# Patient Record
Sex: MALE | Race: WHITE | Employment: OTHER | URBAN - METROPOLITAN AREA
[De-identification: names, ages, dates, MRNs, and addresses within clinical notes are randomized per-mention and may not be internally consistent; named-entity substitution may affect disease eponyms.]

---

## 2017-01-13 DIAGNOSIS — E78.5 HYPERLIPIDEMIA LDL GOAL <100: Primary | ICD-10-CM

## 2017-01-18 RX ORDER — ROSUVASTATIN CALCIUM 40 MG/1
TABLET, FILM COATED ORAL
Qty: 90 TABLET | Refills: 1 | Status: SHIPPED | OUTPATIENT
Start: 2017-01-18 | End: 2017-02-17

## 2017-01-18 NOTE — TELEPHONE ENCOUNTER
Refilled per [facility] Refill Protocol.    Rajwinder Owusu RN, AE-C       Next 5 appointments (look out 90 days)     Apr 04, 2017  8:00 AM   Return Visit with Jad Ball MD   [facility] ([facility])    [address] [place] [id number]   [phone]                   CHOL      149   6/27/2016  HDL       36   6/27/2016  LDL       81   6/27/2016  TRIG      160   6/27/2016  CHOLHDLRATIO      5.4   12/23/2014

## 2017-01-31 DIAGNOSIS — Z01.30 BLOOD PRESSURE CHECK: Primary | ICD-10-CM

## 2017-01-31 NOTE — TELEPHONE ENCOUNTER
metoprolol      Last Written Prescription Date: 10-  Last Fill Quantity: 90, # refills: 0  Last Office Visit with G, ROSELIA or [facility] prescribing provider: 12-   Next 5 appointments (look out 90 days)     Apr 04, 2017  8:00 AM   Return Visit with Jad Ball MD   [facility] ([facility]    [address] [place] [id number] [phone]                   POTASSIUM   Date Value Ref Range Status   06/27/2016 4.0 3.4 - 5.3 mmol/L Final     CREATININE   Date Value Ref Range Status   06/27/2016 0.86 0.66 - 1.25 mg/dL Final     BP Readings from Last 3 Encounters:   12/22/16 154/80   07/05/16 137/79   06/27/16 131/78         Thank You,  Marisel Del Cid  Pharmacy Technician  [facility]  [phone]

## 2017-02-03 RX ORDER — METOPROLOL SUCCINATE 100 MG/1
TABLET, EXTENDED RELEASE ORAL
Qty: 30 TABLET | Refills: 0 | Status: SHIPPED | OUTPATIENT
Start: 2017-02-03 | End: 2017-02-24

## 2017-02-04 NOTE — TELEPHONE ENCOUNTER
Last bp was high.  Did one month fill and forwarded per protocol.  To try and recruit pt for bp checks here.    Segun Walter Pharm. D.  [facility] Pharmacy Manager  [phone]  2/3/2017

## 2017-02-17 ENCOUNTER — MYC REFILL (OUTPATIENT)
Dept: PEDIATRICS | Facility: CLINIC | Age: 77
End: 2017-02-17

## 2017-02-17 DIAGNOSIS — E78.5 HYPERLIPIDEMIA LDL GOAL <100: ICD-10-CM

## 2017-02-17 NOTE — TELEPHONE ENCOUNTER
Message from [employer]:  Original authorizing provider: MD Francisco Javier Sauer would like a refill of the following medications:  CRESTOR 40 MG tablet [Florian Alonzo MD]    Preferred pharmacy: [facility] - [address]    Comment:

## 2017-02-17 NOTE — TELEPHONE ENCOUNTER
Pending Prescriptions:                       Disp   Refills    rosuvastatin (CRESTOR) 40 MG tablet       90 tab*1            Sig: Take 1 tablet (40 mg) by mouth daily    Rx sent to pharmacy on 1/18/17 #90 with 1 refill. Called [facility] to verify if Rx received and sent to patient. Pharmacy never received Rx and was unable to take a verbal order unless MA contacted a new phone number/menu to reach pharmacy.  Routing to RN to resend Rx.  Miguel Luong, CMA

## 2017-02-20 RX ORDER — ROSUVASTATIN CALCIUM 40 MG/1
40 TABLET, COATED ORAL DAILY
Qty: 90 TABLET | Refills: 1 | Status: SHIPPED | OUTPATIENT
Start: 2017-02-20 | End: 2017-02-24

## 2017-02-20 NOTE — TELEPHONE ENCOUNTER
RX sent per original below.    CRESTOR 40 MG tablet 90 tablet 1 1/18/2017  No      Sig: TAKE 1 TABLET DAILY     Class: E-Prescribe     Order: [id number]     E-Prescribing Status: Transmission to pharmacy failed (1/18/2017  4:03 PM CST)     Prior authorization: Closed - Prior Authorization not required for patient/medication       Printout Tracking      External Result Report       Pharmacy      [facility] - [facility], [place] - [address] [place] [address]       Adrienne Gordon RN,   [facility]

## 2017-02-22 ENCOUNTER — DOCUMENTATION ONLY (OUTPATIENT)
Dept: LAB | Facility: CLINIC | Age: 77
End: 2017-02-22

## 2017-02-22 DIAGNOSIS — R73.03 PREDIABETES: Primary | ICD-10-CM

## 2017-02-22 DIAGNOSIS — Z13.1 SCREENING FOR DIABETES MELLITUS: ICD-10-CM

## 2017-02-22 DIAGNOSIS — R73.01 ELEVATED FASTING BLOOD SUGAR: ICD-10-CM

## 2017-02-22 DIAGNOSIS — I10 ESSENTIAL HYPERTENSION WITH GOAL BLOOD PRESSURE LESS THAN 140/90: ICD-10-CM

## 2017-02-22 DIAGNOSIS — R80.9 MICROALBUMINURIA: ICD-10-CM

## 2017-02-22 NOTE — PROGRESS NOTES
Labs pended. Routed to PCP to review and order.    BMP, lipids, microalb completed on 6/27/16    Adrienne Gordon RN,   [facility]

## 2017-02-24 ENCOUNTER — TELEPHONE (OUTPATIENT)
Dept: PEDIATRICS | Facility: CLINIC | Age: 77
End: 2017-02-24

## 2017-02-24 ENCOUNTER — OFFICE VISIT (OUTPATIENT)
Dept: PEDIATRICS | Facility: CLINIC | Age: 77
End: 2017-02-24
Payer: MEDICARE

## 2017-02-24 VITALS
HEART RATE: 75 BPM | BODY MASS INDEX: 31.12 KG/M2 | HEIGHT: 64 IN | TEMPERATURE: 97.6 F | DIASTOLIC BLOOD PRESSURE: 64 MMHG | OXYGEN SATURATION: 95 % | WEIGHT: 182.3 LBS | SYSTOLIC BLOOD PRESSURE: 114 MMHG

## 2017-02-24 DIAGNOSIS — I20.9 ISCHEMIC CHEST PAIN (H): ICD-10-CM

## 2017-02-24 DIAGNOSIS — E78.5 HYPERLIPIDEMIA LDL GOAL <100: ICD-10-CM

## 2017-02-24 DIAGNOSIS — R80.9 MICROALBUMINURIA: ICD-10-CM

## 2017-02-24 DIAGNOSIS — J06.9 VIRAL URI WITH COUGH: ICD-10-CM

## 2017-02-24 DIAGNOSIS — I25.10 CORONARY ARTERY DISEASE INVOLVING NATIVE CORONARY ARTERY OF NATIVE HEART WITHOUT ANGINA PECTORIS: ICD-10-CM

## 2017-02-24 DIAGNOSIS — L40.9 PSORIASIS: ICD-10-CM

## 2017-02-24 DIAGNOSIS — R73.03 PREDIABETES: ICD-10-CM

## 2017-02-24 DIAGNOSIS — E66.9 OBESITY (BMI 30-39.9): ICD-10-CM

## 2017-02-24 DIAGNOSIS — R73.01 ELEVATED FASTING BLOOD SUGAR: ICD-10-CM

## 2017-02-24 DIAGNOSIS — N52.9 ERECTILE DYSFUNCTION, UNSPECIFIED ERECTILE DYSFUNCTION TYPE: ICD-10-CM

## 2017-02-24 DIAGNOSIS — I10 HYPERTENSION GOAL BP (BLOOD PRESSURE) < 130/80: ICD-10-CM

## 2017-02-24 DIAGNOSIS — I10 ESSENTIAL HYPERTENSION WITH GOAL BLOOD PRESSURE LESS THAN 140/90: ICD-10-CM

## 2017-02-24 DIAGNOSIS — R73.03 PREDIABETES: Primary | ICD-10-CM

## 2017-02-24 DIAGNOSIS — Z13.1 SCREENING FOR DIABETES MELLITUS: ICD-10-CM

## 2017-02-24 LAB
ANION GAP SERPL CALCULATED.3IONS-SCNC: 9 MMOL/L (ref 3–14)
BUN SERPL-MCNC: 14 MG/DL (ref 7–30)
CALCIUM SERPL-MCNC: 8.4 MG/DL (ref 8.5–10.1)
CHLORIDE SERPL-SCNC: 104 MMOL/L (ref 94–109)
CO2 SERPL-SCNC: 24 MMOL/L (ref 20–32)
CREAT SERPL-MCNC: 0.95 MG/DL (ref 0.66–1.25)
CREAT UR-MCNC: 198 MG/DL
GFR SERPL CREATININE-BSD FRML MDRD: 77 ML/MIN/1.7M2
GLUCOSE SERPL-MCNC: 120 MG/DL (ref 70–99)
HBA1C MFR BLD: 5.8 % (ref 4.3–6)
MICROALBUMIN UR-MCNC: 254 MG/L
MICROALBUMIN/CREAT UR: 128.28 MG/G CR (ref 0–17)
POTASSIUM SERPL-SCNC: 3.8 MMOL/L (ref 3.4–5.3)
SODIUM SERPL-SCNC: 137 MMOL/L (ref 133–144)

## 2017-02-24 PROCEDURE — 36415 COLL VENOUS BLD VENIPUNCTURE: CPT | Performed by: FAMILY MEDICINE

## 2017-02-24 PROCEDURE — 80048 BASIC METABOLIC PNL TOTAL CA: CPT | Performed by: FAMILY MEDICINE

## 2017-02-24 PROCEDURE — 82043 UR ALBUMIN QUANTITATIVE: CPT | Performed by: FAMILY MEDICINE

## 2017-02-24 PROCEDURE — 83036 HEMOGLOBIN GLYCOSYLATED A1C: CPT | Performed by: FAMILY MEDICINE

## 2017-02-24 PROCEDURE — 99214 OFFICE O/P EST MOD 30 MIN: CPT | Performed by: FAMILY MEDICINE

## 2017-02-24 RX ORDER — VARDENAFIL HYDROCHLORIDE 20 MG/1
20 TABLET ORAL DAILY PRN
Qty: 6 TABLET | Refills: 2 | Status: SHIPPED | OUTPATIENT
Start: 2017-02-24 | End: 2017-02-24

## 2017-02-24 RX ORDER — NITROGLYCERIN 0.4 MG/1
0.4 TABLET SUBLINGUAL EVERY 5 MIN PRN
Qty: 60 TABLET | Refills: 6 | Status: SHIPPED | OUTPATIENT
Start: 2017-02-24 | End: 2017-08-15

## 2017-02-24 RX ORDER — HYDROCORTISONE VALERATE 2 MG/G
OINTMENT TOPICAL
Qty: 45 G | Refills: 3 | Status: SHIPPED | OUTPATIENT
Start: 2017-02-24

## 2017-02-24 RX ORDER — LISINOPRIL 20 MG/1
20 TABLET ORAL DAILY
Qty: 90 TABLET | Refills: 3 | Status: SHIPPED | OUTPATIENT
Start: 2017-02-24 | End: 2017-08-15

## 2017-02-24 RX ORDER — ROSUVASTATIN CALCIUM 40 MG/1
40 TABLET, COATED ORAL DAILY
Qty: 90 TABLET | Refills: 2 | Status: SHIPPED | OUTPATIENT
Start: 2017-02-24 | End: 2017-08-15

## 2017-02-24 RX ORDER — METOPROLOL SUCCINATE 100 MG/1
100 TABLET, EXTENDED RELEASE ORAL DAILY
Qty: 90 TABLET | Refills: 2 | Status: SHIPPED | OUTPATIENT
Start: 2017-02-24 | End: 2017-08-15

## 2017-02-24 RX ORDER — VARDENAFIL HYDROCHLORIDE 20 MG/1
20 TABLET ORAL DAILY PRN
Qty: 6 TABLET | Refills: 2 | Status: SHIPPED
Start: 2017-02-24 | End: 2018-01-01

## 2017-02-24 ASSESSMENT — PAIN SCALES - GENERAL: PAINLEVEL: NO PAIN (0)

## 2017-02-24 NOTE — PROGRESS NOTES
SUBJECTIVE:                                                    Francisco Javier Cortes is a 76 year old male who presents to clinic today for the following health issues:      Hyperlipidemia Follow-Up      Rate your low fat/cholesterol diet?: good    Taking statin?  No    Other lipid medications/supplements?:  Fish oil/Omega 3, dose twice daily without side effects     Hypertension Follow-up      Outpatient blood pressures are being checked at home.  Results are 110-120/80's.    Low Salt Diet: low salt       Amount of exercise or physical activity: 2-3 days/week for an average of 30-45 minutes    Problems taking medications regularly: No    Medication side effects: none  Diet: low salt and low fat/cholesterol    Vascular Disease Follow-up:  Coronary Artery Disease (CAD)      Chest pain or pressure, left side neck or arm pain: No    Shortness of breath/increased sweats/nausea with exertion: No    Pain in calves walking 1-2 blocks: No    Worsened or new symptoms since last visit: No    Nitroglycerin use: no    Daily aspirin use: Yes     PREDIABETES-  Deneis polyuria, polydipsia, and polyphagia.      Problem list and histories reviewed & adjusted, as indicated.  Additional history: as documented    Patient Active Problem List   Diagnosis     Hyperlipidemia LDL goal <100     Advanced directives, counseling/discussion     Prediabetes     Obesity (BMI 30-39.9)     Inflamed seborrheic keratosis     AK (actinic keratosis)     Basal cell carcinoma of neck     Microalbuminuria     Perianal dermatitis     NSTEMI (non-ST elevated myocardial infarction) (H)     S/P coronary angioplasty     Status post percutaneous transluminal coronary angioplasty-Coronary angioplasty with STEPHEN to LCx     Prostate Cancer, s/p prostatectomy     Pseudophakia of both eyes     Gastroesophageal reflux disease, esophagitis presence not specified     Coronary artery disease involving native coronary artery of native heart without angina pectoris     History of  colonic polyps     Chronic diarrhea     Essential hypertension with goal blood pressure less than 140/90     Dyslipidemia     Erectile dysfunction, unspecified erectile dysfunction type     Elevated fasting blood sugar     Past Surgical History   Procedure Laterality Date     C appendectomy       C remv prostate,retropub,rad,tot nodes  1999     Colonoscopy       Ent surgery  1980--1999     Vascular surgery  2013     stents     Stent, coronary, betty  1/09, 2014     x2, x1 in 2014     Phacoemulsification clear cornea with standard intraocular lens implant Left 6/18/2015     Procedure: PHACOEMULSIFICATION CLEAR CORNEA WITH STANDARD INTRAOCULAR LENS IMPLANT;  Surgeon: John Zimmerman MD;  [facility]:  [facility]     Phacoemulsification clear cornea with standard intraocular lens implant Right 7/16/2015     Procedure: PHACOEMULSIFICATION CLEAR CORNEA WITH STANDARD INTRAOCULAR LENS IMPLANT;  Surgeon: John Zimmerman MD;  Location:  [facility]     Colonoscopy with co2 insufflation N/A 7/5/2016     Procedure: COLONOSCOPY WITH CO2 INSUFFLATION;  Surgeon: Duane, William Charles, MD;  Location: [facility] OR     Colonoscopy Left 7/5/2016     Procedure: COMBINED COLONOSCOPY, SINGLE OR MULTIPLE BIOPSY/POLYPECTOMY BY BIOPSY;  Surgeon: Duane, William Charles, MD;  Location: [facility] OR       Social History   Substance Use Topics     Smoking status: Never Smoker     Smokeless tobacco: Never Used     Alcohol use Yes      Comment: a glass of red wine a day     Family History   Problem Relation Age of Onset     Cardiovascular Mother      HEART DISEASE Mother      Cancer - colorectal Father      HEART DISEASE Father      Asthma Brother      HEART DISEASE Son      CANCER Daughter      non hodgkins     Prostate Cancer Brother      Coronary Artery Disease Brother      Prostate Cancer Maternal Grandfather      Other Cancer Father          Current Outpatient Prescriptions   Medication Sig Dispense Refill     hydrocortisone valerate (WEST-NINOSKA) 0.2 %  ointment Apply sparingly to affected area three times daily as needed. 45 g 3     lisinopril (PRINIVIL/ZESTRIL) 20 MG tablet Take 1 tablet (20 mg) by mouth daily 90 tablet 3     metoprolol (TOPROL-XL) 100 MG 24 hr tablet Take 1 tablet (100 mg) by mouth daily 90 tablet 2     nitroglycerin (NITROSTAT) 0.4 MG sublingual tablet Place 1 tablet (0.4 mg) under the tongue every 5 minutes as needed up to 3 tablets per episode. 60 tablet 6     rosuvastatin (CRESTOR) 40 MG tablet Take 1 tablet (40 mg) by mouth daily 90 tablet 2     vardenafil (LEVITRA) 20 MG tablet Take 1 tablet (20 mg) by mouth daily as needed 6 tablet 2     Lansoprazole (PREVACID PO) Take 15 mg by mouth every morning (before breakfast) Patient needs to use brand name Prevacid (generic version causes diarrhea)       omega 3 1000 MG CAPS Take 1 g by mouth 2 times daily 120 capsule 6     Acetaminophen (TYLENOL 8 HOUR PO) Take 500 mg by mouth as needed        MAALOX ADVANCED OR Take 1 tablet as needed (Calcium carbonate 1000mg/Simethicone 80mg)       ASPIRIN 81 MG OR TABS 1 TABLET DAILY       [DISCONTINUED] rosuvastatin (CRESTOR) 40 MG tablet Take 1 tablet (40 mg) by mouth daily 90 tablet 1     [DISCONTINUED] metoprolol (TOPROL-XL) 100 MG 24 hr tablet TAKE ONE TABLET BY MOUTH EVERY DAY 30 tablet 0     [DISCONTINUED] nitroglycerin (NITROSTAT) 0.4 MG SL tablet Place 1 tablet (0.4 mg) under the tongue every 5 minutes as needed up to 3 tablets per episode. 60 tablet 6     [DISCONTINUED] lisinopril (PRINIVIL,ZESTRIL) 20 MG tablet Take 1 tablet (20 mg) by mouth daily 90 tablet 3     [DISCONTINUED] vardenafil (LEVITRA) 20 MG tablet Take 1 tablet (20 mg) by mouth daily as needed 6 tablet 2     Allergies   Allergen Reactions     Niacin      Severe rash and itching     Prevacid [Lansoprazole] Diarrhea     Patient notes diarrhea with 30mg generic version. Patient does ok with 15mg in generic and brand name.     Shellfish Allergy      One kind     Recent Labs   Lab Test   02/24/17   0704  06/27/16   0735  12/17/15   0728   12/23/14   0711   03/19/14   1554   03/29/13   0803 12/12/12   A1C  5.8  6.3*  6.2*   --   6.0   --    --    --   6.0  6.2*   LDL   --   81  85   --   99   < >   --    < >  99  110   HDL   --   36*  31*   --   34*   < >   --    < >  28*  34   TRIG   --   160*  233*   --   259*   < >   --    < >  69  109   ALT   --    --   39   --    --    --   44   --   45   --    CR  0.95  0.86  0.94   < >  1.05   < >  1.20   < >   --   1.00   GFRESTIMATED  77  86  78   < >  69   < >  59*   < >   --   78   GFRESTBLACK  >90   GFR Calc    >90   GFR Calc    >90   GFR Calc     < >  83   < >  72   < >   --    --    POTASSIUM  3.8  4.0  4.1   < >  4.3   < >  4.0   < >   --    --    TSH   --    --    --    --    --    --    --    --    --   1.80    < > = values in this interval not displayed.      BP Readings from Last 3 Encounters:   02/24/17 114/64   12/22/16 154/80   07/05/16 137/79    Wt Readings from Last 3 Encounters:   02/24/17 182 lb 4.8 oz (82.7 kg)   12/22/16 187 lb 14.4 oz (85.2 kg)   06/27/16 185 lb (83.9 kg)                  Labs reviewed in EPIC  Problem list, Medication list, Allergies, and Medical/Social/Surgical histories reviewed in [facility] and updated as appropriate.    ROS:  C: NEGATIVE for fever, chills, change in weight  EYES: NEGATIVE for vision changes or irritation  ENT/MOUTH: Mild runny nose with dry cough for the past 3 days with no concerns for sore throat, fever, chills, shortness of breath, wheezing  RESP:as above  CV: NEGATIVE for chest pain, palpitations or peripheral edema  CV: History of coronary artery disease and Hx HTN  GI: NEGATIVE for nausea, abdominal pain, heartburn, or change in bowel habits and History of GERD  MUSCULOSKELETAL: NEGATIVE for significant arthralgias or myalgia  NEURO: NEGATIVE for weakness, dizziness or paresthesias  ENDOCRINE: NEGATIVE for temperature intolerance, skin/hair  "changes  HEME/ALLERGY/IMMUNE: NEGATIVE for bleeding problems  PSYCHIATRIC: NEGATIVE for changes in mood or affect    OBJECTIVE:                                                    /64  Pulse 75  Temp 97.6  F (36.4  C) (Oral)  Ht 5' 4.25\" (1.632 m)  Wt 182 lb 4.8 oz (82.7 kg)  SpO2 95%  BMI 31.05 kg/m2  Body mass index is 31.05 kg/(m^2).  GENERAL: healthy, alert and no distress  EYES: Eyes grossly normal to inspection  HENT: ear canals and TM's normal, nose and mouth without ulcers or lesions  NECK: no adenopathy, no asymmetry, masses, or scars and thyroid normal to palpation  RESP: lungs clear to auscultation - no rales, rhonchi or wheezes  CV: regular rate and rhythm, normal S1 S2, no S3 or S4, no murmur, click or rub, no peripheral edema and peripheral pulses strong  ABDOMEN: soft, nontender, no hepatosplenomegaly, no masses and bowel sounds normal  MS: no gross musculoskeletal defects noted, no edema  SKIN: no suspicious lesions or rashes  PSYCH: mentation appears normal, affect normal/bright    Diagnostic Test Results:  Results for orders placed or performed in visit on 02/24/17 (from the past 24 hour(s))   Hemoglobin A1c   Result Value Ref Range    Hemoglobin A1C 5.8 4.3 - 6.0 %   Basic metabolic panel   Result Value Ref Range    Sodium 137 133 - 144 mmol/L    Potassium 3.8 3.4 - 5.3 mmol/L    Chloride 104 94 - 109 mmol/L    Carbon Dioxide 24 20 - 32 mmol/L    Anion Gap 9 3 - 14 mmol/L    Glucose 120 (H) 70 - 99 mg/dL    Urea Nitrogen 14 7 - 30 mg/dL    Creatinine 0.95 0.66 - 1.25 mg/dL    GFR Estimate 77 >60 mL/min/1.7m2    GFR Estimate If Black >90   GFR Calc   >60 mL/min/1.7m2    Calcium 8.4 (L) 8.5 - 10.1 mg/dL   Albumin Random Urine Quantitative   Result Value Ref Range    Creatinine Urine 198 mg/dL    Albumin Urine mg/L 254 mg/L    Albumin Urine mg/g Cr 128.28 (H) 0 - 17 mg/g Cr        ASSESSMENT/PLAN:                                                    Hyperlipidemia; " "controlled   Plan:  No changes in the patient's current treatment plan    Hypertension; controlled   Associated with the following complications:    Heart Disease and  microalbuminuria   Plan:  No changes in the patient's current treatment plan    Coronary Artery Disease (CAD); controlled   Associated with the following complications:    None   Plan:  No changes in the patient's current treatment plan      BMI:   Estimated body mass index is 31.05 kg/(m^2) as calculated from the following:    Height as of this encounter: 5' 4.25\" (1.632 m).    Weight as of this encounter: 182 lb 4.8 oz (82.7 kg).   Weight management plan: Discussed healthy diet and exercise guidelines and patient will follow up in 6 months in clinic to re-evaluate.      1. Psoriasis  Refills given per patient's request  - hydrocortisone valerate (WEST-NINOSKA) 0.2 % ointment; Apply sparingly to affected area three times daily as needed.  Dispense: 45 g; Refill: 3    2. Hypertension goal BP (blood pressure) < 130/80  Results for orders placed or performed in visit on 02/24/17   Hemoglobin A1c   Result Value Ref Range    Hemoglobin A1C 5.8 4.3 - 6.0 %   Basic metabolic panel   Result Value Ref Range    Sodium 137 133 - 144 mmol/L    Potassium 3.8 3.4 - 5.3 mmol/L    Chloride 104 94 - 109 mmol/L    Carbon Dioxide 24 20 - 32 mmol/L    Anion Gap 9 3 - 14 mmol/L    Glucose 120 (H) 70 - 99 mg/dL    Urea Nitrogen 14 7 - 30 mg/dL    Creatinine 0.95 0.66 - 1.25 mg/dL    GFR Estimate 77 >60 mL/min/1.7m2    GFR Estimate If Black >90   GFR Calc   >60 mL/min/1.7m2    Calcium 8.4 (L) 8.5 - 10.1 mg/dL   Albumin Random Urine Quantitative   Result Value Ref Range    Creatinine Urine 198 mg/dL    Albumin Urine mg/L 254 mg/L    Albumin Urine mg/g Cr 128.28 (H) 0 - 17 mg/g Cr     Blood pressure is at goal, reviewed normal BMP lab results except for significantly elevated fasting blood sugar, urine myoglobin showing microalbuminuria slightly worse from " before.  We'll continue with metoprolol 100 mg daily, lisinopril 20 mg daily, emphasised on low salt diet, regular exercise, recheck in 6 months or sooner if needed.  - lisinopril (PRINIVIL/ZESTRIL) 20 MG tablet; Take 1 tablet (20 mg) by mouth daily  Dispense: 90 tablet; Refill: 3      4. Ischemic chest pain (H)    - nitroglycerin (NITROSTAT) 0.4 MG sublingual tablet; Place 1 tablet (0.4 mg) under the tongue every 5 minutes as needed up to 3 tablets per episode.  Dispense: 60 tablet; Refill: 6    5. Hyperlipidemia LDL goal <100  LDL Cholesterol Calculated   Date Value Ref Range Status   06/27/2016 81 <100 mg/dL Final     Comment:     Desirable:       <100 mg/dl   ]  LDL is at goal, continue Crestor 40 mg daily, baby aspirin, healthy eating, regular exercises, recheck lipids in 6 months  - rosuvastatin (CRESTOR) 40 MG tablet; Take 1 tablet (40 mg) by mouth daily  Dispense: 90 tablet; Refill: 2    6. Erectile dysfunction, unspecified erectile dysfunction type  Refills given per patient's request  - vardenafil (LEVITRA) 20 MG tablet; Take 1 tablet (20 mg) by mouth daily as needed  Dispense: 6 tablet; Refill: 2    7. Prediabetes  Glucose   Date Value Ref Range Status   02/24/2017 120 (H) 70 - 99 mg/dL Final     Comment:     Fasting specimen   ]  Lab Results   Component Value Date    A1C 5.8 02/24/2017    A1C 6.3 06/27/2016    A1C 6.2 12/17/2015    A1C 6.0 12/23/2014    A1C 6.0 03/29/2013      A1c improved from before, continue with healthy eating, regular exercises, start on weight loss    8. Microalbuminuria  Lab Results   Component Value Date    MICROL 254 02/24/2017     No results found for: MICROALBUMIN  as above        9. Obesity (BMI 30-39.9)  Emphasized on weight loss, portion control, low calorie and low fat diet, healthy eating, regular exercises.      10. Coronary artery disease involving native coronary artery of native heart without angina pectoris  Continue to control hypertension, lipids, prediabetes,  follow healthy eating, regular exercises, continue baby aspirin, statin, beta blocker, ACE inhibitor    11. Viral URI with cough  Normal exam, reassured patient.   Recommend to push fluids, continue to monitor, recheck if no better in 7-10 days or sooner if needed      Chart documentation done in part with Dragon Voice recognition Software. Although reviewed after completion, some word and grammatical error may remain.    See Patient Instructions    Florian Alonzo MD  [facility]

## 2017-02-24 NOTE — NURSING NOTE
"Chief Complaint   Patient presents with     Recheck Medication       Initial /64  Pulse 75  Temp 97.6  F (36.4  C) (Oral)  Ht 5' 4.25\" (1.632 m)  Wt 182 lb 4.8 oz (82.7 kg)  SpO2 95%  BMI 31.05 kg/m2 Estimated body mass index is 31.05 kg/(m^2) as calculated from the following:    Height as of this encounter: 5' 4.25\" (1.632 m).    Weight as of this encounter: 182 lb 4.8 oz (82.7 kg).  BP completed using cuff size: clay Luong, CMA    "

## 2017-02-24 NOTE — TELEPHONE ENCOUNTER
If pharmacy has concerns about him taking Levitra and nitroglycerin due to possible interactions, please update them- Patient has never had to use the nitroglycerin, he is refilling this to keep them in hand  Patient is aware of these interactions and knows not to take the Levitra on the day he takes nitroglycerin or vice versa  Also please update patient on the same  I have resent Prescription for Levitra with the patient instructions

## 2017-02-24 NOTE — MR AVS SNAPSHOT
After Visit Summary   2/24/2017    Francisco Javier Cortes    MRN: [id number]           Patient Information     Date Of Birth          1940        Visit Information        Provider Department      2/24/2017 9:40 AM Florian Alonzo MD [facility]        Today's Diagnoses     Prediabetes    -  1    Psoriasis        Hypertension goal BP (blood pressure) < 130/80        Ischemic chest pain (H)        Hyperlipidemia LDL goal <100        Erectile dysfunction, unspecified erectile dysfunction type        Microalbuminuria        Obesity (BMI 30-39.9)        Coronary artery disease involving native coronary artery of native heart without angina pectoris        Viral URI with cough           Follow-ups after your visit        Follow-up notes from your care team     Return in about 6 months (around 8/24/2017) for physical, fasting lab.      Your next 10 appointments already scheduled     Apr 04, 2017  8:00 AM CDT   Return Visit with Jad Ball MD   [facility] ([facility])    [address] [place] [address] [phone]            Jun 13, 2017  7:30 AM CDT   Return Visit with Aby Rodriguez MD   [facility] ([facility])    [address] [place] [id number] [phone]              Who to contact     If you have questions or need follow up information about today's clinic visit or your schedule please contact [facility] directly at [phone].  Normal or non-critical lab and imaging results will be communicated to you by MyChart, letter or phone within 4 business days after the clinic has received the results. If you do not hear from us within 7 days, please contact the clinic through MyChart or phone. If you have a critical or abnormal lab result, we will notify you by phone as soon as possible.  Submit refill requests through Planet[employer]an or call your pharmacy and they will  "forward the refill request to us. Please allow 3 business days for your refill to be completed.          Additional Information About Your Visit        [facility] [place] Information     ClipMine gives you secure access to your electronic health record. If you see a primary care provider, you can also send messages to your care team and make appointments. If you have questions, please call your primary care clinic.  If you do not have a primary care provider, please call [phone] and they will assist you.      ClipMine is an electronic gateway that provides easy, online access to your medical records. With ClipMine, you can request a clinic appointment, read your test results, renew a prescription or communicate with your care team.     To access your existing account, please contact your [facility] or call [phone] for assistance.        Care EveryWhere ID     This is your Care EveryWhere ID. This could be used by other organizations to access your [place] medical records  [id number]        Your Vitals Were     Pulse Temperature Height Pulse Oximetry BMI (Body Mass Index)       75 97.6  F (36.4  C) (Oral) 5' 4.25\" (1.632 m) 95% 31.05 kg/m2        Blood Pressure from Last 3 Encounters:   02/24/17 114/64   12/22/16 154/80   07/05/16 137/79    Weight from Last 3 Encounters:   02/24/17 182 lb 4.8 oz (82.7 kg)   12/22/16 187 lb 14.4 oz (85.2 kg)   06/27/16 185 lb (83.9 kg)              Today, you had the following     No orders found for display         Today's Medication Changes          These changes are accurate as of: 2/24/17  8:12 PM.  If you have any questions, ask your nurse or doctor.               Start taking these medicines.        Dose/Directions    vardenafil 20 MG tablet   Commonly known as:  LEVITRA   Used for:  Erectile dysfunction, unspecified erectile dysfunction type   Started by:  Florian Alonzo MD        Dose:  20 mg   Take 1 tablet (20 mg) by mouth daily as needed " Take 30 min to 4 hours before intercourse.  Never use with nitroglycerin, terazosin or doxazosin.   Quantity:  6 tablet   Refills:  2         These medicines have changed or have updated prescriptions.        Dose/Directions    metoprolol 100 MG 24 hr tablet   Commonly known as:  TOPROL-XL   This may have changed:  See the new instructions.   Used for:  Hypertension goal BP (blood pressure) < 130/80, Coronary artery disease involving native coronary artery of native heart without angina pectoris   Changed by:  Florian Alonzo MD        Dose:  100 mg   Take 1 tablet (100 mg) by mouth daily   Quantity:  90 tablet   Refills:  2            Where to get your medicines      These medications were sent to [facility] - [address] [address]     Phone:  [phone]     hydrocortisone valerate 0.2 % ointment    lisinopril 20 MG tablet    metoprolol 100 MG 24 hr tablet    nitroglycerin 0.4 MG sublingual tablet    rosuvastatin 40 MG tablet    vardenafil 20 MG tablet                Primary Care Provider Office Phone # Fax #    Florian Alonzo [phone]       [facility] [id number] [facility] [address]        Thank you!     Thank you for choosing [facility]  for your care. Our goal is always to provide you with excellent care. Hearing back from our patients is one way we can continue to improve our services. Please take a few minutes to complete the written survey that you may receive in the mail after your visit with us. Thank you!             Your Updated Medication List - Protect others around you: Learn how to safely use, store and throw away your medicines at www.disposemymeds.org.          This list is accurate as of: 2/24/17  8:12 PM.  Always use your most recent med list.                   Brand Name Dispense Instructions for use    aspirin 81 MG tablet      1 TABLET DAILY        hydrocortisone valerate 0.2 % ointment    [facility]    45 g    Apply sparingly to affected area three times daily as needed.       lisinopril 20 MG tablet    PRINIVIL/ZESTRIL    90 tablet    Take 1 tablet (20 mg) by mouth daily       MAALOX ADVANCED PO      Take 1 tablet as needed (Calcium carbonate 1000mg/Simethicone 80mg)       metoprolol 100 MG 24 hr tablet    TOPROL-XL    90 tablet    Take 1 tablet (100 mg) by mouth daily       nitroglycerin 0.4 MG sublingual tablet    NITROSTAT    60 tablet    Place 1 tablet (0.4 mg) under the tongue every 5 minutes as needed up to 3 tablets per episode.       omega 3 1000 MG Caps     120 capsule    Take 1 g by mouth 2 times daily       PREVACID PO      Take 15 mg by mouth every morning (before breakfast) Patient needs to use brand name Prevacid (generic version causes diarrhea)       rosuvastatin 40 MG tablet    CRESTOR    90 tablet    Take 1 tablet (40 mg) by mouth daily       TYLENOL 8 HOUR PO      Take 500 mg by mouth as needed       vardenafil 20 MG tablet    LEVITRA    6 tablet    Take 1 tablet (20 mg) by mouth daily as needed Take 30 min to 4 hours before intercourse.  Never use with nitroglycerin, terazosin or doxazosin.

## 2017-02-24 NOTE — TELEPHONE ENCOUNTER
[facility] Call Center    Phone Message    Name of Caller: Francisco Javier    Phone Number: Other phone number:  [phone] Ref number [id number]  Best time to return call: anyu    May a detailed message be left on voicemail: yes    Relation to patient: Other Name: Joe  Relationship: [facility] [employer]  Is there legal documentation in chart to discuss information with this person: N/A    Reason for Call: Other: [facility] is calling to verify directions on the patients nitroglycerin (NITROSTAT) 0.4 MG sublingual tablet [33243]. Pharmacy is stating medication interfears with another medication and the pharmacy dose not recommend patient taking medication.     Action Taken: Message routed to:  Primary Care p [id number]

## 2017-02-24 NOTE — TELEPHONE ENCOUNTER
Called [facility] pharmacy. Spoke to pharmacist Obed Cardoza.  Gave him message per Dr. Alonzo.  He wanted to let us know that if the patient has taken Levitra within 24 hours of chest pain. He should not take nitroglycerine but go to the hospital.    Called patient and notified.  He states he already understood and agrees to plan.     Leticia Neal RN, [facility]

## 2017-04-04 ENCOUNTER — OFFICE VISIT (OUTPATIENT)
Dept: CARDIOLOGY | Facility: CLINIC | Age: 77
End: 2017-04-04
Payer: MEDICARE

## 2017-04-04 VITALS
BODY MASS INDEX: 31.27 KG/M2 | HEART RATE: 62 BPM | SYSTOLIC BLOOD PRESSURE: 152 MMHG | WEIGHT: 183.6 LBS | DIASTOLIC BLOOD PRESSURE: 92 MMHG | OXYGEN SATURATION: 97 %

## 2017-04-04 DIAGNOSIS — I25.118 CORONARY ARTERY DISEASE INVOLVING NATIVE CORONARY ARTERY OF NATIVE HEART WITH OTHER FORM OF ANGINA PECTORIS (H): Primary | ICD-10-CM

## 2017-04-04 PROCEDURE — 99214 OFFICE O/P EST MOD 30 MIN: CPT | Performed by: INTERNAL MEDICINE

## 2017-04-04 NOTE — NURSING NOTE
"Francisco Javier Cortes's goals for this visit include: Recheck coronary artery disease  He requests these members of his care team be copied on today's visit information: yes    PCP: Florian Alonzo    Referring Provider:  No referring provider defined for this encounter.    Chief Complaint   Patient presents with     Coronary Artery Disease       Initial BP (!) 152/92 (BP Location: Left arm, Patient Position: Chair, Cuff Size: Adult Regular)  Pulse 62  Wt 83.3 kg (183 lb 9.6 oz)  SpO2 97%  BMI 31.27 kg/m2 Estimated body mass index is 31.27 kg/(m^2) as calculated from the following:    Height as of 2/24/17: 1.632 m (5' 4.25\").    Weight as of this encounter: 83.3 kg (183 lb 9.6 oz).  Medication Reconciliation: complete    Do you need any medication refills at today's visit? no    "

## 2017-04-04 NOTE — PATIENT INSTRUCTIONS
The following is a summary of your office visit:    Medications started today:none    Medications stopped today:none    Medication dose change: none    Nurse contact information: Jennifer Soto RN  Cardiology Care Coordinator  [phone] Phone  [phone] Fax    Appointments made today: One year follow up    Patient instructions:none      If you have had any blood work, imaging or other testing completed we will be in touch within 1-2 weeks regarding the results. If you have any questions, concerns or need to schedule a follow up, please contact us at [phone]. If you are needing refills please contact your pharmacy. For urgent after hour care please call the [facility] at [phone] or the [facility] at [phone] and ask to speak to the cardiologist on call.    It was a pleasure meeting with you today. Please let us know if there is anything else we can do for you so that we can be sure you are leaving completely satisfied with your care experience.     Your Cardiology Team at [facility]  RN Care Coordinator: Jennifer  CMA: Michelle

## 2017-04-04 NOTE — MR AVS SNAPSHOT
After Visit Summary   4/4/2017    Francisco Javier Cortes    MRN: [id number]           Patient Information     Date Of Birth          1940        Visit Information        Provider Department      4/4/2017 8:00 AM Jad Ball MD [facility]        Care Instructions      The following is a summary of your office visit:    Medications started today:none    Medications stopped today:none    Medication dose change: none    Nurse contact information: Jennifer Soto RN  Cardiology Care Coordinator  [phone] Phone  [phone] Fax    Appointments made today: One year follow up    Patient instructions:none      If you have had any blood work, imaging or other testing completed we will be in touch within 1-2 weeks regarding the results. If you have any questions, concerns or need to schedule a follow up, please contact us at [phone]. If you are needing refills please contact your pharmacy. For urgent after hour care please call the [place] [facility] at [phone] or the [facility] at [phone] and ask to speak to the cardiologist on call.    It was a pleasure meeting with you today. Please let us know if there is anything else we can do for you so that we can be sure you are leaving completely satisfied with your care experience.     Your Cardiology Team at [facility]  RN Care Coordinator: Jennifer Martinez                  Follow-ups after your visit        Your next 10 appointments already scheduled     Apr 28, 2017  8:30 AM CDT   New Visit with Chris Casanova OD   [facility])    [address] [facility] [address] [phone]            Jun 13, 2017  7:30 AM CDT   Return Visit with Aby Rodriguez MD   [facility] ([facility])    [address] [facility] [address] [phone]              Who to contact     If you have  questions or need follow up information about today's clinic visit or your schedule please contact [facility] directly at [phone].  Normal or non-critical lab and imaging results will be communicated to you by [employer], letter or phone within 4 business days after the clinic has received the results. If you do not hear from us within 7 days, please contact the clinic through [employer] or phone. If you have a critical or abnormal lab result, we will notify you by phone as soon as possible.  Submit refill requests through Alo7 or call your pharmacy and they will forward the refill request to us. Please allow 3 business days for your refill to be completed.          Additional Information About Your Visit        [employer] Information     [employer] gives you secure access to your electronic health record. If you see a primary care provider, you can also send messages to your care team and make appointments. If you have questions, please call your primary care clinic.  If you do not have a primary care provider, please call [phone] and they will assist you.      Alo7 is an electronic gateway that provides easy, online access to your medical records. With Alo7, you can request a clinic appointment, read your test results, renew a prescription or communicate with your care team.     To access your existing account, please contact your [facility] or call [phone] for assistance.        Care EveryWhere ID     This is your Care EveryWhere ID. This could be used by other organizations to access your [place] medical records  [id number]        Your Vitals Were     Pulse Pulse Oximetry BMI (Body Mass Index)             62 97% 31.27 kg/m2          Blood Pressure from Last 3 Encounters:   04/04/17 (!) 152/92   02/24/17 114/64   12/22/16 154/80    Weight from Last 3 Encounters:   04/04/17 83.3 kg (183 lb 9.6 oz)   02/24/17 82.7 kg (182 lb 4.8 oz)   12/22/16 85.2 kg  (187 lb 14.4 oz)              Today, you had the following     No orders found for display       Primary Care Provider Office Phone # Fax #    Florian Alonzo [phone]       [facility] [address]  [facility] [address]        Thank you!     Thank you for choosing [facility]  for your care. Our goal is always to provide you with excellent care. Hearing back from our patients is one way we can continue to improve our services. Please take a few minutes to complete the written survey that you may receive in the mail after your visit with us. Thank you!             Your Updated Medication List - Protect others around you: Learn how to safely use, store and throw away your medicines at www.disposemymeds.org.          This list is accurate as of: 4/4/17  8:03 AM.  Always use your most recent med list.                   Brand Name Dispense Instructions for use    aspirin 81 MG tablet      1 TABLET DAILY       hydrocortisone valerate 0.2 % ointment    [facility]    45 g    Apply sparingly to affected area three times daily as needed.       lisinopril 20 MG tablet    PRINIVIL/ZESTRIL    90 tablet    Take 1 tablet (20 mg) by mouth daily       MAALOX ADVANCED PO      Take 1 tablet as needed (Calcium carbonate 1000mg/Simethicone 80mg)       metoprolol 100 MG 24 hr tablet    TOPROL-XL    90 tablet    Take 1 tablet (100 mg) by mouth daily       nitroglycerin 0.4 MG sublingual tablet    NITROSTAT    60 tablet    Place 1 tablet (0.4 mg) under the tongue every 5 minutes as needed up to 3 tablets per episode.       omega 3 1000 MG Caps     120 capsule    Take 1 g by mouth 2 times daily       PREVACID PO      Take 15 mg by mouth every morning (before breakfast) Patient needs to use brand name Prevacid (generic version causes diarrhea)       rosuvastatin 40 MG tablet    CRESTOR    90 tablet    Take 1 tablet (40 mg) by mouth daily       TYLENOL 8 HOUR PO      Take 500 mg by mouth  as needed       vardenafil 20 MG tablet    LEVITRA    6 tablet    Take 1 tablet (20 mg) by mouth daily as needed Take 30 min to 4 hours before intercourse.  Never use with nitroglycerin, terazosin or doxazosin.

## 2017-04-04 NOTE — PROGRESS NOTES
2017               Florian Alonzo MD   [address] [address]         RE:  Francisco Javier Cortes    MRN:  [id number]   :  1940      Dear Dr. Alonzo:      It was a pleasure participating care of your patient, Mr. Francisco Javier Cortes.  As you know, he is a 76-year-old gentleman who I see today for coronary artery disease.      His past medical history is significant for the followin.  Prediabetes.   2.  Hypertension.   3.  Hyperlipidemia   4.  Gastroesophageal reflux disease.   5.  Chronic diarrhea.   6.  Prostate cancer, status post prostatectomy.   7.  Basal cell carcinoma of the neck.   8.  Erectile dysfunction.   9.  Peptic ulcer disease.   10.  Cataract surgery.   11.  Appendectomy.      His cardiac history is significant for known coronary artery disease and normal LV systolic function.  He originally experienced a single episode of chest discomfort manifested by tingling in the middle of his chest with pressure at rest.  He sought medical attention and eventually had stenting of his mid right coronary artery and posterolateral branch on 2009.      His symptoms were completely relieved.  However, he had recurrent chest discomfort while driving in .  On 2014 his coronary angiogram revealed the following:     Left main, minimal disease.   LAD, 30-40% distal stenosis.   Circumflex 90% stenosis in the mid-portion.   RCA patent stents with mild disease.      A drug-eluting stent was placed in his circumflex coronary artery.      Since  he has not had any recurrent symptoms.      From a clinical standpoint, the patient has gotten a new dog who he walks twice daily for 30-45 minutes.  He also works out on the treadmill but has had some new problems with his feet and lower extremities and is only able to do this for 10 minutes as well as exercise biking for 10 minutes.  He denies any recurrent anginal symptoms during exercise or at any  time.  He denies new chest pain, shortness of breath, PND, orthopnea, edema, palpitations, syncope or near-syncope.  His blood pressures at home have been running in the 136/75 range and he has had no gross side effects from his medicines.  He has no other complaints.      CURRENT MEDICATIONS:     1.  Lisinopril 20 mg a day.   2.  Metoprolol succinate 100 mg a day.   3.  Rosuvastatin 40 mg a day.   4.  Levitra.   5.  Fish oil.   6.  Aspirin 81 mg a day.      PHYSICAL EXAMINATION:     VITAL SIGNS:  Blood pressure is 150/90, but his blood pressures at home run in the 130/70 range.  Pulse of 62.  His weight is 183 pounds.   NECK:  Exam reveals no obvious jugular venous distention.   LUNGS:  Clear to auscultation.  Respiratory effort is normal.   CARDIAC:  Reveals a regular rate and rhythm, no obvious murmur or gallop was appreciated.   ABDOMEN:  Belly soft, nontender.   EXTREMITIES:  Without gross edema.      Echocardiogram 03/21/2014 reveals normal LV systolic function, ejection fraction 60-65%, no significant valvular pathology identified.       02/24/2017 potassium 3.8, GFR normal.        IMPRESSION:  Francisco Javier is a 76-year-old gentleman who has several active cardiac issues:      1.  Coronary artery disease.      He had stenting of his right coronary artery and RODGER branch in 2009 and had recurrent chest discomfort which led to drug-eluting stent placement in the circumflex coronary artery in 2014.  He has not had any recurrent symptoms of chest discomfort since 2014 and he continues to appear to be clinically stable from this standpoint.  He walks his dog twice daily for 30-45 minutes at a time without symptoms.  We will continue to monitor his clinical progress.      2.  Hypertension.      Blood pressures appear adequately controlled at home in the 130/70 range.  We will continue to monitor.      3.  Hyperlipidemia.      He says he is due to have his cholesterol checked with you this summer.        PLAN:     1.  He  will recheck a fasting lipid profile with you at his next appointment this summer.     2.  Followup in 1 year with me or earlier if needed.      Once again, it was a pleasure participating in the care of your patient, Mr. Francisco Javier Saleh.  Please feel free to contact me anytime if you have any questions regarding his care in the future.         Sincerely,      DICK ACEVEDO MD             D: 2017 08:09   T: 2017 09:44   MT:       Name:     FRANCISCO JAVIER SALEH   MRN:      [id number]        Account:      [id number]   :      1940      Document: [id number]       cc: Florian Alonzo MD

## 2017-04-28 ENCOUNTER — OFFICE VISIT (OUTPATIENT)
Dept: OPTOMETRY | Facility: CLINIC | Age: 77
End: 2017-04-28
Payer: MEDICARE

## 2017-04-28 DIAGNOSIS — H52.4 PRESBYOPIA: ICD-10-CM

## 2017-04-28 DIAGNOSIS — Z96.1 PSEUDOPHAKIA OF BOTH EYES: ICD-10-CM

## 2017-04-28 DIAGNOSIS — H40.003 GLAUCOMA SUSPECT, BILATERAL: Primary | ICD-10-CM

## 2017-04-28 PROCEDURE — 92015 DETERMINE REFRACTIVE STATE: CPT | Mod: GY | Performed by: OPTOMETRIST

## 2017-04-28 PROCEDURE — 92014 COMPRE OPH EXAM EST PT 1/>: CPT | Performed by: OPTOMETRIST

## 2017-04-28 ASSESSMENT — REFRACTION_WEARINGRX
OD_ADD: +2.75
OD_CYLINDER: +0.25
OS_AXIS: 042
OD_CYLINDER: +0.25
OS_SPHERE: -1.00
OS_ADD: +3.00
OD_AXIS: 125
OS_ADD: +2.75
OD_SPHERE: PLANO
OS_AXIS: 042
OD_ADD: +3.00
SPECS_TYPE: BIFOCAL
OS_SPHERE: -1.00
OD_SPHERE: PLANO
OS_CYLINDER: +1.00
OD_AXIS: 125
OS_CYLINDER: +1.00

## 2017-04-28 ASSESSMENT — TONOMETRY
OS_IOP_MMHG: 18
IOP_METHOD: TONOPEN
OD_IOP_MMHG: 15

## 2017-04-28 ASSESSMENT — CONF VISUAL FIELD
OD_NORMAL: 1
METHOD: COUNTING FINGERS
OS_NORMAL: 1

## 2017-04-28 ASSESSMENT — VISUAL ACUITY
OS_CC: 20/20
OS_SC: 20/30
OD_CC: 20/25-1
OS_CC+: -1
METHOD: SNELLEN - LINEAR
CORRECTION_TYPE: GLASSES
OD_CC: 20/25
OD_SC+: -1
OS_CC: 20/20-1
OD_SC: 20/20
OD_CC+: -1

## 2017-04-28 ASSESSMENT — SLIT LAMP EXAM - LIDS
COMMENTS: UPPER LID DERMATOCHALASIS
COMMENTS: UPPER LID DERMATOCHALASIS

## 2017-04-28 ASSESSMENT — REFRACTION_MANIFEST
OD_AXIS: 135
OS_SPHERE: -1.00
OD_CYLINDER: +0.25
OS_CYLINDER: +1.25
OD_ADD: +2.75
OD_SPHERE: PLANO
OS_AXIS: 042
OS_ADD: +2.75

## 2017-04-28 ASSESSMENT — CUP TO DISC RATIO
OS_RATIO: 0.5
OD_RATIO: 0.4

## 2017-04-28 ASSESSMENT — EXTERNAL EXAM - RIGHT EYE: OD_EXAM: NORMAL

## 2017-04-28 ASSESSMENT — EXTERNAL EXAM - LEFT EYE: OS_EXAM: NORMAL

## 2017-04-28 NOTE — MR AVS SNAPSHOT
After Visit Summary   4/28/2017    Francisco Javier Cortes    MRN: [id number]           Patient Information     Date Of Birth          1940        Visit Information        Provider Department      4/28/2017 8:30 AM Chris Casanova, RONAN [facility]        Today's Diagnoses     Glaucoma suspect, bilateral    -  1    Pseudophakia of both eyes        Presbyopia          Care Instructions    You are a glaucoma suspect.  We will continue to monitor your intraocular pressures and optic nerves.   OCT and visual field at next visit.    Eyeglass prescription given.  Optional change in eyeglass prescription.    Return in 1 year for a complete eye exam or sooner if needed.    Chris Casanova, RONAN    The affects of the dilating drops last for 4- 6 hours.  You will be more sensitive to light and vision will be blurry up close.  Mydriatic sunglasses were given if needed.      Optometry Providers       Clinic Locations                                 Telephone Number   Dr. Collette Casanova   [facility]  [phone]     [facility] Hours:                [facility] Hours:       [facility] Hours:  [address]   [address] [facility] [address]     [address] [place] [address]   [place] [address]    [place] [address]  Phone: [phone]                    Phone [phone]                      Phone: [phone]                          Monday 8:00-7:00                          Monday 8:00-7:00                          Monday 8:00-7:00              Tuesday 8:00-6:00                          Tuesday 8:00-7:00                          Tuesday 8:00-7:00              Wednesday 8:00-6:00                  Wednesday 8:00-7:00                   Wednesday 8:00-7:00      Thursday 8:00-6:00                        Thursday 8:00-7:00                         Thursday 8:00-7:00            Friday  8:00-5:00                              Friday 8:00-5:00                              Friday 8:00-5:00    Please log on to [email or web address] to order your contact lenses.  The link is found on the Eye Care and Vision Services page.  As always, Thank you for trusting us with your health care needs!            Follow-ups after your visit        Follow-up notes from your care team     Return in about 1 year (around 4/28/2018) for Annual Visit.      Your next 10 appointments already scheduled     Jun 22, 2017  8:15 AM CDT   Return Visit with Aby Rodriguez MD   [facility] ([facility])    [address] [place] [address]   [phone]            Apr 10, 2018  8:00 AM CDT   Return Visit with Jad Ball MD   [facility] ([facility])    [address] [place] [id number] [phone]              Who to contact     If you have questions or need follow up information about today's clinic visit or your schedule please contact [facility] directly at [phone].  Normal or non-critical lab and imaging results will be communicated to you by MyChart, letter or phone within 4 business days after the clinic has received the results. If you do not hear from us within 7 days, please contact the clinic through [employer] or phone. If you have a critical or abnormal lab result, we will notify you by phone as soon as possible.  Submit refill requests through [employer] or call your pharmacy and they will forward the refill request to us. Please allow 3 business days for your refill to be completed.          Additional Information About Your Visit        [employer] Information     [employer] gives you secure access to your electronic health record. If you see a primary care provider, you can also send messages to your care team and make appointments. If you have questions, please call your primary care clinic.  If you do not have a  primary care provider, please call [phone] and they will assist you.      [employer] is an electronic gateway that provides easy, online access to your medical records. With [employer], you can request a clinic appointment, read your test results, renew a prescription or communicate with your care team.     To access your existing account, please contact your [facility] or call [phone] for assistance.        Care EveryWhere ID     This is your Care EveryWhere ID. This could be used by other organizations to access your [facility] medical records  [id number]         Blood Pressure from Last 3 Encounters:   04/04/17 (!) 152/92   02/24/17 114/64   12/22/16 154/80    Weight from Last 3 Encounters:   04/04/17 83.3 kg (183 lb 9.6 oz)   02/24/17 82.7 kg (182 lb 4.8 oz)   12/22/16 85.2 kg (187 lb 14.4 oz)              We Performed the Following     EYE EXAM (SIMPLE-NONBILLABLE)     REFRACTION        Primary Care Provider Office Phone # Fax #    Florian Alonzo [phone]       [facility] [address]  [facility] [address]        Thank you!     Thank you for choosing [facility]  for your care. Our goal is always to provide you with excellent care. Hearing back from our patients is one way we can continue to improve our services. Please take a few minutes to complete the written survey that you may receive in the mail after your visit with us. Thank you!             Your Updated Medication List - Protect others around you: Learn how to safely use, store and throw away your medicines at www.disposemymeds.org.          This list is accurate as of: 4/28/17 10:02 AM.  Always use your most recent med list.                   Brand Name Dispense Instructions for use    aspirin 81 MG tablet      1 TABLET DAILY       hydrocortisone valerate 0.2 % ointment    WEST-NINOSKA    45 g    Apply sparingly to affected area three times daily as needed.        lisinopril 20 MG tablet    PRINIVIL/ZESTRIL    90 tablet    Take 1 tablet (20 mg) by mouth daily       MAALOX ADVANCED PO      Take 1 tablet as needed (Calcium carbonate 1000mg/Simethicone 80mg)       metoprolol 100 MG 24 hr tablet    TOPROL-XL    90 tablet    Take 1 tablet (100 mg) by mouth daily       nitroglycerin 0.4 MG sublingual tablet    NITROSTAT    60 tablet    Place 1 tablet (0.4 mg) under the tongue every 5 minutes as needed up to 3 tablets per episode.       omega 3 1000 MG Caps     120 capsule    Take 1 g by mouth 2 times daily       PREVACID PO      Take 15 mg by mouth every morning (before breakfast) Patient needs to use brand name Prevacid (generic version causes diarrhea)       rosuvastatin 40 MG tablet    CRESTOR    90 tablet    Take 1 tablet (40 mg) by mouth daily       TYLENOL 8 HOUR PO      Take 500 mg by mouth as needed       vardenafil 20 MG tablet    LEVITRA    6 tablet    Take 1 tablet (20 mg) by mouth daily as needed Take 30 min to 4 hours before intercourse.  Never use with nitroglycerin, terazosin or doxazosin.

## 2017-04-28 NOTE — PATIENT INSTRUCTIONS
You are a glaucoma suspect.  We will continue to monitor your intraocular pressures and optic nerves.   OCT and visual field at next visit.    Eyeglass prescription given.  Optional change in eyeglass prescription.    Return in 1 year for a complete eye exam or sooner if needed.    Chris Casanova, OD    The affects of the dilating drops last for 4- 6 hours.  You will be more sensitive to light and vision will be blurry up close.  Mydriatic sunglasses were given if needed.      Optometry Providers       Clinic Locations                                 Telephone Number   Dr. Collette Casanova   [facility]  [phone]     [facility] Hours:                [facility] Hours:       [facility] Hours:  [address]   [address] [facility] [address]      [address] [place] [address]   [place] [address]    [place] [address]  Phone: [phone]                    Phone [phone]                      Phone: [phone]                          Monday 8:00-7:00                          Monday 8:00-7:00                          Monday 8:00-7:00              Tuesday 8:00-6:00                          Tuesday 8:00-7:00                          Tuesday 8:00-7:00              Wednesday 8:00-6:00                  Wednesday 8:00-7:00                   Wednesday 8:00-7:00      Thursday 8:00-6:00                        Thursday 8:00-7:00                         Thursday 8:00-7:00            Friday 8:00-5:00                              Friday 8:00-5:00                              Friday 8:00-5:00    Please log on to [email or web address] to order your contact lenses.  The link is found on the Eye Care [facility] Vision Services page.  As always, Thank you for trusting us with your health care needs!

## 2017-04-28 NOTE — PROGRESS NOTES
Chief Complaint   Patient presents with     COMPREHENSIVE EYE EXAM         Last Eye Exam: 4-  Dilated Previously: Yes    What are you currently using to see?  glasses       Distance Vision Acuity: Satisfied with vision    Near Vision Acuity: Satisfied with vision while reading  with glasses    Eye Comfort: good  Do you use eye drops? : Yes: artificial tears if eyes feel dry  Occupation or Hobbies: retired    Rozina Justice Optometric Assistant, A.B[employer]          Medical, surgical and family histories reviewed and updated 4/28/2017.       OBJECTIVE: See Ophthalmology exam    ASSESSMENT:    ICD-10-CM    1. Glaucoma suspect, bilateral H40.003 EYE EXAM (SIMPLE-NONBILLABLE)    Stable   2. Pseudophakia of both eyes Z96.1 EYE EXAM (SIMPLE-NONBILLABLE)   3. Presbyopia H52.4 REFRACTION     PLAN:     Patient Instructions   You are a glaucoma suspect.  We will continue to monitor your intraocular pressures and optic nerves.   OCT and visual field at next visit.    Eyeglass prescription given.  Optional change in eyeglass prescription.    Return in 1 year for a complete eye exam or sooner if needed.    Chris Casanova, OD

## 2017-05-27 ENCOUNTER — OFFICE VISIT (OUTPATIENT)
Dept: URGENT CARE | Facility: URGENT CARE | Age: 77
End: 2017-05-27
Payer: MEDICARE

## 2017-05-27 VITALS
OXYGEN SATURATION: 96 % | WEIGHT: 184.6 LBS | DIASTOLIC BLOOD PRESSURE: 82 MMHG | TEMPERATURE: 97.7 F | SYSTOLIC BLOOD PRESSURE: 146 MMHG | HEART RATE: 66 BPM | BODY MASS INDEX: 31.44 KG/M2

## 2017-05-27 DIAGNOSIS — H65.90 OTITIS MEDIA WITH EFFUSION, UNSPECIFIED LATERALITY: Primary | ICD-10-CM

## 2017-05-27 PROCEDURE — 99213 OFFICE O/P EST LOW 20 MIN: CPT | Performed by: FAMILY MEDICINE

## 2017-05-27 RX ORDER — AMOXICILLIN 875 MG
875 TABLET ORAL 2 TIMES DAILY
Qty: 14 TABLET | Refills: 0 | Status: SHIPPED | OUTPATIENT
Start: 2017-05-27 | End: 2017-06-03

## 2017-05-27 ASSESSMENT — PAIN SCALES - GENERAL: PAINLEVEL: MILD PAIN (2)

## 2017-05-27 NOTE — NURSING NOTE
"Chief Complaint   Patient presents with     Ear Problem     Fluid in right ear x 1 month and right ear irritaion since this morning       Initial /82 (BP Location: Left arm, Patient Position: Chair, Cuff Size: Adult Regular)  Pulse 66  Temp 97.7  F (36.5  C) (Oral)  Wt 83.7 kg (184 lb 9.6 oz)  SpO2 96%  BMI 31.44 kg/m2 Estimated body mass index is 31.44 kg/(m^2) as calculated from the following:    Height as of 2/24/17: 1.632 m (5' 4.25\").    Weight as of this encounter: 83.7 kg (184 lb 9.6 oz).  Medication Reconciliation: complete     Brianda Aguilar CMA      "

## 2017-05-27 NOTE — PROGRESS NOTES
Some of this note was populated by a medical assistant.        SUBJECTIVE:                                                    Francisco Javier Cortes is a 76 year old male who presents to clinic today for the following health issues:      Right ear pain       Duration: fluid in right ear x 1 month and pain since this morning. 2/10     Description (location/character/radiation): left ear    Intensity:  mild, 2/10    Accompanying signs and symptoms: Fluid in right ear and dizziness. Viral sinusitis a few months ago.    Hx of ear infections every couple of years as adult and frequent ear infections.     History (similar episodes/previous evaluation): None    Precipitating or alleviating factors: None    Therapies tried and outcome: None     Problem list and histories reviewed & adjusted, as indicated.  Additional history: as documented    Patient Active Problem List   Diagnosis     Hyperlipidemia LDL goal <100     Advanced directives, counseling/discussion     Prediabetes     Obesity (BMI 30-39.9)     Inflamed seborrheic keratosis     AK (actinic keratosis)     Basal cell carcinoma of neck     Microalbuminuria     Perianal dermatitis     NSTEMI (non-ST elevated myocardial infarction) (H)     S/P coronary angioplasty     Status post percutaneous transluminal coronary angioplasty-Coronary angioplasty with STEPHEN to LCx     Prostate Cancer, s/p prostatectomy     Pseudophakia of both eyes     Gastroesophageal reflux disease, esophagitis presence not specified     Coronary artery disease involving native coronary artery of native heart without angina pectoris     History of colonic polyps     Chronic diarrhea     Essential hypertension with goal blood pressure less than 140/90     Dyslipidemia     Erectile dysfunction, unspecified erectile dysfunction type     Elevated fasting blood sugar     Past Surgical History:   Procedure Laterality Date     C APPENDECTOMY       C REMV PROSTATE,RETROPUB,RAD,TOT NODES  1999     CATARACT IOL,  RT/LT       COLONOSCOPY       COLONOSCOPY Left 7/5/2016    Procedure: COMBINED COLONOSCOPY, SINGLE OR MULTIPLE BIOPSY/POLYPECTOMY BY BIOPSY;  Surgeon: Duane, William Charles, MD;  Location: [facility] OR     COLONOSCOPY WITH CO2 INSUFFLATION N/A 7/5/2016    Procedure: COLONOSCOPY WITH CO2 INSUFFLATION;  Surgeon: Duane, William Charles, MD;  Location: [facility] OR     ENT SURGERY  1980--1999     PHACOEMULSIFICATION CLEAR CORNEA WITH STANDARD INTRAOCULAR LENS IMPLANT Left 6/18/2015    Procedure: PHACOEMULSIFICATION CLEAR CORNEA WITH STANDARD INTRAOCULAR LENS IMPLANT;  Surgeon: John Zimmerman MD;  Location:  [facility]     PHACOEMULSIFICATION CLEAR CORNEA WITH STANDARD INTRAOCULAR LENS IMPLANT Right 7/16/2015    Procedure: PHACOEMULSIFICATION CLEAR CORNEA WITH STANDARD INTRAOCULAR LENS IMPLANT;  Surgeon: John Zimmerman MD;  Location:  [facility]     STENT, CORONARY, [place]  1/09, 2014    x2, x1 in 2014     VASCULAR SURGERY  2013    stents       Social History   Substance Use Topics     Smoking status: Never Smoker     Smokeless tobacco: Never Used     Alcohol use Yes      Comment: a glass of red wine a day     Family History   Problem Relation Age of Onset     Cardiovascular Mother      HEART DISEASE Mother      Cancer - colorectal Father      HEART DISEASE Father      Other Cancer Father      Asthma Brother      Coronary Artery Disease Brother      Hypertension Brother      HEART DISEASE Son      CANCER Daughter      non hodgkins     Prostate Cancer Brother      Hypertension Brother      Prostate Cancer Maternal Grandfather          Current Outpatient Prescriptions   Medication Sig Dispense Refill     hydrocortisone valerate (WEST-NINOSKA) 0.2 % ointment Apply sparingly to affected area three times daily as needed. 45 g 3     lisinopril (PRINIVIL/ZESTRIL) 20 MG tablet Take 1 tablet (20 mg) by mouth daily 90 tablet 3     metoprolol (TOPROL-XL) 100 MG 24 hr tablet Take 1 tablet (100 mg) by mouth daily 90 tablet 2     nitroglycerin  (NITROSTAT) 0.4 MG sublingual tablet Place 1 tablet (0.4 mg) under the tongue every 5 minutes as needed up to 3 tablets per episode. 60 tablet 6     rosuvastatin (CRESTOR) 40 MG tablet Take 1 tablet (40 mg) by mouth daily 90 tablet 2     vardenafil (LEVITRA) 20 MG tablet Take 1 tablet (20 mg) by mouth daily as needed Take 30 min to 4 hours before intercourse.  Never use with nitroglycerin, terazosin or doxazosin. 6 tablet 2     Lansoprazole (PREVACID PO) Take 15 mg by mouth every morning (before breakfast) Patient needs to use brand name Prevacid (generic version causes diarrhea)       omega 3 1000 MG CAPS Take 1 g by mouth 2 times daily 120 capsule 6     Acetaminophen (TYLENOL 8 HOUR PO) Take 500 mg by mouth as needed        MAALOX ADVANCED OR Take 1 tablet as needed (Calcium carbonate 1000mg/Simethicone 80mg)       ASPIRIN 81 MG OR TABS 1 TABLET DAILY       Allergies   Allergen Reactions     Niacin      Severe rash and itching     Prevacid [Lansoprazole] Diarrhea     Patient notes diarrhea with 30mg generic version. Patient does ok with 15mg in generic and brand name.     Shellfish Allergy      One kind       Reviewed and updated as needed this visit by clinical staff       Reviewed and updated as needed this visit by Provider         ROS:  Constitutional, HEENT, cardiovascular, pulmonary, gi and gu systems are negative, except as otherwise noted.    OBJECTIVE:                                                    /82 (BP Location: Left arm, Patient Position: Chair, Cuff Size: Adult Regular)  Pulse 66  Temp 97.7  F (36.5  C) (Oral)  Wt 184 lb 9.6 oz (83.7 kg)  SpO2 96%  BMI 31.44 kg/m2  Body mass index is 31.44 kg/(m^2).  GENERAL: healthy, alert and no distress  NECK: no adenopathy, no asymmetry, masses, or scars and thyroid normal to palpation  TMs noted mild effusion bilaterally and inferiorly   RESP: lungs clear to auscultation - no rales, rhonchi or wheezes  CV: regular rate and rhythm, normal S1 S2,  no S3 or S4, no murmur, click or rub, no peripheral edema and peripheral pulses strong  ABDOMEN: soft, nontender, no hepatosplenomegaly, no masses and bowel sounds normal  MS: no gross musculoskeletal defects noted, no edema    Diagnostic Test Results:  none      ASSESSMENT/PLAN:                                                        ICD-10-CM    1. Otitis media with effusion, unspecified laterality H65.90 amoxicillin (AMOXIL) 875 MG tablet      PLAN  OTC use such as decongestant/expectorant explained.   Patient educational/instructional material provided including reasons for follow-up    The patient indicates understanding of these issues and agrees with the plan.  Kris Carreno MD        [facility]

## 2017-05-27 NOTE — MR AVS SNAPSHOT
After Visit Summary   5/27/2017    Francisco Javier Cortes    MRN: [id number]           Patient Information     Date Of Birth          1940        Visit Information        Provider Department      5/27/2017 12:10 PM Kris Carreno MD [facility]        Today's Diagnoses     Otitis media with effusion, unspecified laterality    -  1       Follow-ups after your visit        Your next 10 appointments already scheduled     Jun 22, 2017  8:15 AM CDT   Return Visit with Aby Rodriguez MD   [facility])    [address] [place] [id number] [phone]            Apr 10, 2018  8:00 AM CDT   Return Visit with Jad Ball MD   [facility])    [address] [place] [id number] [phone]              Who to contact     If you have questions or need follow up information about today's clinic visit or your schedule please contact [facility] directly at [phone].  Normal or non-critical lab and imaging results will be communicated to you by [employer], letter or phone within 4 business days after the clinic has received the results. If you do not hear from us within 7 days, please contact the clinic through [employer] or phone. If you have a critical or abnormal lab result, we will notify you by phone as soon as possible.  Submit refill requests through [employer] or call your pharmacy and they will forward the refill request to us. Please allow 3 business days for your refill to be completed.          Additional Information About Your Visit        MyChart Information     [employer] gives you secure access to your electronic health record. If you see a primary care provider, you can also send messages to your care team and make appointments. If you have questions, please call your primary care clinic.  If you do not have a primary care provider, please  call [phone] and they will assist you.        Care EveryWhere ID     This is your Care EveryWhere ID. This could be used by other organizations to access your [facility] medical records  [id number]        Your Vitals Were     Pulse Temperature Pulse Oximetry BMI (Body Mass Index)          66 97.7  F (36.5  C) (Oral) 96% 31.44 kg/m2         Blood Pressure from Last 3 Encounters:   05/27/17 146/82   04/04/17 (!) 152/92   02/24/17 114/64    Weight from Last 3 Encounters:   05/27/17 83.7 kg (184 lb 9.6 oz)   04/04/17 83.3 kg (183 lb 9.6 oz)   02/24/17 82.7 kg (182 lb 4.8 oz)              Today, you had the following     No orders found for display         Today's Medication Changes          These changes are accurate as of: 5/27/17 12:36 PM.  If you have any questions, ask your nurse or doctor.               Start taking these medicines.        Dose/Directions    amoxicillin 875 MG tablet   Commonly known as:  AMOXIL   Used for:  Otitis media with effusion, unspecified laterality   Started by:  Kris Carreno MD        Dose:  875 mg   Take 1 tablet (875 mg) by mouth 2 times daily for 7 days   Quantity:  14 tablet   Refills:  0            Where to get your medicines      Some of these will need a paper prescription and others can be bought over the counter.  Ask your nurse if you have questions.     Bring a paper prescription for each of these medications     amoxicillin 875 MG tablet                Primary Care Provider Office Phone # Fax #    Florian Alonzo [phone]       [facility] [address]  [facility] [address]        Thank you!     Thank you for choosing [facility]  for your care. Our goal is always to provide you with excellent care. Hearing back from our patients is one way we can continue to improve our services. Please take a few minutes to complete the written survey that you may receive in the mail after your visit with us. Thank  you!             Your Updated Medication List - Protect others around you: Learn how to safely use, store and throw away your medicines at www.disposemymeds.org.          This list is accurate as of: 5/27/17 12:36 PM.  Always use your most recent med list.                   Brand Name Dispense Instructions for use    amoxicillin 875 MG tablet    AMOXIL    14 tablet    Take 1 tablet (875 mg) by mouth 2 times daily for 7 days       aspirin 81 MG tablet      1 TABLET DAILY       hydrocortisone valerate 0.2 % ointment    WEST-NINOSKA    45 g    Apply sparingly to affected area three times daily as needed.       lisinopril 20 MG tablet    PRINIVIL/ZESTRIL    90 tablet    Take 1 tablet (20 mg) by mouth daily       MAALOX ADVANCED PO      Take 1 tablet as needed (Calcium carbonate 1000mg/Simethicone 80mg)       metoprolol 100 MG 24 hr tablet    TOPROL-XL    90 tablet    Take 1 tablet (100 mg) by mouth daily       nitroglycerin 0.4 MG sublingual tablet    NITROSTAT    60 tablet    Place 1 tablet (0.4 mg) under the tongue every 5 minutes as needed up to 3 tablets per episode.       omega 3 1000 MG Caps     120 capsule    Take 1 g by mouth 2 times daily       PREVACID PO      Take 15 mg by mouth every morning (before breakfast) Patient needs to use brand name Prevacid (generic version causes diarrhea)       rosuvastatin 40 MG tablet    CRESTOR    90 tablet    Take 1 tablet (40 mg) by mouth daily       TYLENOL 8 HOUR PO      Take 500 mg by mouth as needed       vardenafil 20 MG tablet    LEVITRA    6 tablet    Take 1 tablet (20 mg) by mouth daily as needed Take 30 min to 4 hours before intercourse.  Never use with nitroglycerin, terazosin or doxazosin.

## 2017-06-22 ENCOUNTER — OFFICE VISIT (OUTPATIENT)
Dept: DERMATOLOGY | Facility: CLINIC | Age: 77
End: 2017-06-22
Payer: MEDICARE

## 2017-06-22 DIAGNOSIS — L57.0 ACTINIC KERATOSIS: ICD-10-CM

## 2017-06-22 DIAGNOSIS — D18.01 CHERRY ANGIOMA: ICD-10-CM

## 2017-06-22 DIAGNOSIS — Z85.828 HISTORY OF NONMELANOMA SKIN CANCER: Primary | ICD-10-CM

## 2017-06-22 DIAGNOSIS — R21 RASH: ICD-10-CM

## 2017-06-22 DIAGNOSIS — L82.1 SEBORRHEIC KERATOSIS: ICD-10-CM

## 2017-06-22 PROCEDURE — 17000 DESTRUCT PREMALG LESION: CPT | Performed by: DERMATOLOGY

## 2017-06-22 PROCEDURE — 99213 OFFICE O/P EST LOW 20 MIN: CPT | Mod: 25 | Performed by: DERMATOLOGY

## 2017-06-22 PROCEDURE — 17003 DESTRUCT PREMALG LES 2-14: CPT | Performed by: DERMATOLOGY

## 2017-06-22 RX ORDER — KETOCONAZOLE 20 MG/G
CREAM TOPICAL
Qty: 60 G | Refills: 1 | Status: CANCELLED | OUTPATIENT
Start: 2017-06-22

## 2017-06-22 RX ORDER — CLINDAMYCIN PHOSPHATE 10 UG/ML
LOTION TOPICAL
Qty: 60 ML | Refills: 1 | Status: SHIPPED | OUTPATIENT
Start: 2017-06-22

## 2017-06-22 NOTE — MR AVS SNAPSHOT
After Visit Summary   6/22/2017    Francisco Javier Cortes    MRN: [id number]           Patient Information     Date Of Birth          1940        Visit Information        Provider Department      6/22/2017 8:15 AM Aby Rodriguez MD [facility]        Today's Diagnoses     History of nonmelanoma skin cancer    -  1    Actinic keratosis        Rash        Sandy angioma        Seborrheic keratosis          Care Instructions    It was a pleasure to see you at your recent visit in Dermatology.    We will schedule your next follow up appointment however, your appointment may be rescheduled due to any unforseen circumstances.    If you have any questions or concerns, please feel free to contact us at [facility] at [phone].        Cryotherapy    What is it?    Use of a very cold liquid, such as liquid nitrogen, to freeze and destroy abnormal skin cells that need to be removed    What should I expect?    Tenderness and redness    A small blister that might grow and fill with dark purple blood. There may be crusting.    More than one treatment may be needed if the lesions do not go away.    How do I care for the treated area?    Gently wash the area with your hands when bathing.    Use a thin layer of Vaseline to help with healing. You may use a Band-Aid.     The area should heal within 7-10 days and may leave behind a pink or lighter color.     Do not use an antibiotic or Neosporin ointment.     You may take acetaminophen (Tylenol) for pain.     Call your Doctor if you have:    Severe pain    Signs of infection (warmth, redness, cloudy yellow drainage, and or a bad smell)    Questions or concerns    Who should I call with questions?       [facility]: [phone]       [facility]: [phone]       For urgent needs outside of business hours call the [facility] at [phone]        and ask  for the dermatology resident on call            Follow-ups after your visit        Your next 10 appointments already scheduled     Apr 10, 2018  8:00 AM CDT   [facility] Visit [facility] Jad Ball MD   [facility]    [id number] [place] [id number] [phone]              Who to contact     If you have questions or need follow up information about today's clinic visit or your schedule please contact [facility] directly at [phone].  Normal or non-critical lab and imaging results will be communicated to you by [employer], letter or phone within 4 business days after the clinic has received the results. If you do not hear from us within 7 days, please contact the clinic through [employer] or phone. If you have a critical or abnormal lab result, we will notify you by phone as soon as possible.  Submit refill requests through [employer] or call your pharmacy and they will forward the refill request to us. Please allow 3 business days for your refill to be completed.          Additional Information About Your Visit        [employer] Information     [employer] gives you secure access to your electronic health record. If you see a primary care provider, you can also send messages to your care team and make appointments. If you have questions, please call your primary care clinic.  If you do not have a primary care provider, please call [phone] and they will assist you.      [employer] is an electronic gateway that provides easy, online access to your medical records. With [employer], you can request a clinic appointment, read your test results, renew a prescription or communicate with your care team.     To access your existing account, please contact your [facility] or call [phone] for assistance.        Care EveryWhere ID     This is your Care EveryWhere ID. This could be used by other organizations to access your  [facility] records  [id number]         Blood Pressure from Last 3 Encounters:   05/27/17 146/82   04/04/17 (!) 152/92   02/24/17 114/64    Weight from Last 3 Encounters:   05/27/17 184 lb 9.6 oz (83.7 kg)   04/04/17 183 lb 9.6 oz (83.3 kg)   02/24/17 182 lb 4.8 oz (82.7 kg)              We Performed the Following     DESTRUCT PREMALIGNANT LESION, 2-14     DESTRUCT PREMALIGNANT LESION, FIRST          Today's Medication Changes          These changes are accurate as of: 6/22/17  8:57 AM.  If you have any questions, ask your nurse or doctor.               Start taking these medicines.        Dose/Directions    clindamycin 1 % lotion   Commonly known as:  CLEOCIN T   Used for:  Rash        Apply twice daily for 6 weeks to the groin   Quantity:  60 mL   Refills:  1            Where to get your medicines      These medications were sent to [facility] - [place], [place] - [address], [address]  [address], [address], [facility] [address]     Phone:  [phone]     clindamycin 1 % lotion                Primary Care Provider Office Phone # Fax #    Florian Alonzo [phone]       [facility] [address]  [facility] [address]        Equal Access to Services     NANDA BARAJAS AH: Hadii rivera collazo hadvasuo Solina, waaxda luqadaha, qaybta kaalmada adeegyada, zhao bazan . So [facility] [phone].    ATENCIÓN: Si habla español, tiene a ivan disposición servicios gratuitos de asistencia lingüística. Llame al [phone].    We comply with applicable federal civil rights laws and [place] laws. We do not discriminate on the basis of race, color, national origin, age, disability sex, sexual orientation or gender identity.            Thank you!     Thank you for choosing [facility]  for your care. Our goal is always to provide you with excellent care. Hearing back from our patients is one way we can continue to  improve our services. Please take a few minutes to complete the written survey that you may receive in the mail after your visit with us. Thank you!             Your Updated Medication List - Protect others around you: Learn how to safely use, store and throw away your medicines at www.disposemymeds.org.          This list is accurate as of: 6/22/17  8:57 AM.  Always use your most recent med list.                   Brand Name Dispense Instructions for use Diagnosis    aspirin 81 MG tablet      1 TABLET DAILY        clindamycin 1 % lotion    CLEOCIN T    60 mL    Apply twice daily for 6 weeks to the groin    Rash       hydrocortisone valerate 0.2 % ointment    WEST-NINOSKA    45 g    Apply sparingly to affected area three times daily as needed.    Psoriasis       lisinopril 20 MG tablet    PRINIVIL/ZESTRIL    90 tablet    Take 1 tablet (20 mg) by mouth daily    Hypertension goal BP (blood pressure) < 130/80, Coronary artery disease involving native coronary artery of native heart without angina pectoris       MAALOX ADVANCED PO      Take 1 tablet as needed (Calcium carbonate 1000mg/Simethicone 80mg)    Chest pain       metoprolol 100 MG 24 hr tablet    TOPROL-XL    90 tablet    Take 1 tablet (100 mg) by mouth daily    Hypertension goal BP (blood pressure) < 130/80, Coronary artery disease involving native coronary artery of native heart without angina pectoris       nitroglycerin 0.4 MG sublingual tablet    NITROSTAT    60 tablet    Place 1 tablet (0.4 mg) under the tongue every 5 minutes as needed up to 3 tablets per episode.    Ischemic chest pain (H), Coronary artery disease involving native coronary artery of native heart without angina pectoris       omega 3 1000 MG Caps     120 capsule    Take 1 g by mouth 2 times daily    Coronary artery disease involving native coronary artery of native heart with angina pectoris (H)       PREVACID PO      Take 15 mg by mouth every morning (before breakfast) Patient needs to use  brand name Prevacid (generic version causes diarrhea)        rosuvastatin 40 MG tablet    CRESTOR    90 tablet    Take 1 tablet (40 mg) by mouth daily    Hyperlipidemia LDL goal <100, Coronary artery disease involving native coronary artery of native heart without angina pectoris       TYLENOL 8 HOUR PO      Take 500 mg by mouth as needed        vardenafil 20 MG tablet    LEVITRA    6 tablet    Take 1 tablet (20 mg) by mouth daily as needed Take 30 min to 4 hours before intercourse.  Never use with nitroglycerin, terazosin or doxazosin.    Erectile dysfunction, unspecified erectile dysfunction type

## 2017-06-22 NOTE — NURSING NOTE
Dermatology Rooming Note    Francisco Javier Cortes's goals for this visit include:   Chief Complaint   Patient presents with     Derm Problem     6 month skin check -HX of BCC and AK       Is a scribe okay for this visit:YES    Are records needed for this visit(If yes, obtain release of information): Not applicable     Vitals: There were no vitals taken for this visit.    Referring Provider:  No referring provider defined for this encounter.

## 2017-06-22 NOTE — PATIENT INSTRUCTIONS
It was a pleasure to see you at your recent visit in Dermatology.    We will schedule your next follow up appointment however, your appointment may be rescheduled due to any unforseen circumstances.    If you have any questions or concerns, please feel free to contact us at [facility] at [phone].        Cryotherapy    What is it?    Use of a very cold liquid, such as liquid nitrogen, to freeze and destroy abnormal skin cells that need to be removed    What should I expect?    Tenderness and redness    A small blister that might grow and fill with dark purple blood. There may be crusting.    More than one treatment may be needed if the lesions do not go away.    How do I care for the treated area?    Gently wash the area with your hands when bathing.    Use a thin layer of Vaseline to help with healing. You may use a Band-Aid.     The area should heal within 7-10 days and may leave behind a pink or lighter color.     Do not use an antibiotic or Neosporin ointment.     You may take acetaminophen (Tylenol) for pain.     Call your Doctor if you have:    Severe pain    Signs of infection (warmth, redness, cloudy yellow drainage, and or a bad smell)    Questions or concerns    Who should I call with questions?       [facility]: [phone]       [facility]: [phone]       For urgent needs outside of business hours call the [facility] at [phone]        and ask for the dermatology resident on call

## 2017-06-22 NOTE — PROGRESS NOTES
[facility] Dermatology Note      Dermatology Problem List:  1. BCC, left lateral neck   -s/p Mohs 6/3/2013  2. Actinic keratosis  -Previous Tx: cryotherapy, PDT X2 treatments 2015    Encounter Date: Jun 22, 2017    CC:  Chief Complaint   Patient presents with     Derm Problem     6 month skin check -HX of BCC and AK         History of Present Illness:  Mr. Francisco Javier Cortes is a 76 year old male who presents as a follow-up for history of BCC and AK. The patient was last seen 12/13/2016 when 18 AKs were treated with cryotherapy. Today, the patient reports use of a topical on his groin with improvement of the rash. The rash will improve with occasional topical use. Reports sweating in the area while he sleeps at night. When he wakes in the morning the side he slept on corresponds to redness on that side of the groin. He feels it comes and goes.  The patient reports no other lesions of concern.    Past Medical History:   Patient Active Problem List   Diagnosis     Hyperlipidemia LDL goal <100     Advanced directives, counseling/discussion     Prediabetes     Obesity (BMI 30-39.9)     Inflamed seborrheic keratosis     AK (actinic keratosis)     Basal cell carcinoma of neck     Microalbuminuria     Perianal dermatitis     NSTEMI (non-ST elevated myocardial infarction) (H)     S/P coronary angioplasty     Status post percutaneous transluminal coronary angioplasty-Coronary angioplasty with STEPHEN to LCx     Prostate Cancer, s/p prostatectomy     Pseudophakia of both eyes     Gastroesophageal reflux disease, esophagitis presence not specified     Coronary artery disease involving native coronary artery of native heart without angina pectoris     History of colonic polyps     Chronic diarrhea     Essential hypertension with goal blood pressure less than 140/90     Dyslipidemia     Erectile dysfunction, unspecified erectile dysfunction type     Elevated fasting blood sugar     Past Medical History:   Diagnosis  Date     CAD (coronary artery disease) 1/09    s/p stentsx2     Dyslipidemia      Erectile dysfunction      GERD (gastroesophageal reflux disease)      Hypertension      Prostate cancer (H)     prostatecomy 9 years ago, PSAs remain good     Stented coronary artery     stents placed     Past Surgical History:   Procedure Laterality Date     C APPENDECTOMY       C REMV PROSTATE,RETROPUB,RAD,TOT NODES  1999     CATARACT IOL, RT/LT       COLONOSCOPY       COLONOSCOPY Left 7/5/2016    Procedure: COMBINED COLONOSCOPY, SINGLE OR MULTIPLE BIOPSY/POLYPECTOMY BY BIOPSY;  Surgeon: Duane, William Charles, MD;  Location: [facility] OR     COLONOSCOPY WITH CO2 INSUFFLATION N/A 7/5/2016    Procedure: COLONOSCOPY WITH CO2 INSUFFLATION;  Surgeon: Duane, William Charles, MD;  Location: [facility] OR     ENT SURGERY  1980--1999     PHACOEMULSIFICATION CLEAR CORNEA WITH STANDARD INTRAOCULAR LENS IMPLANT Left 6/18/2015    Procedure: PHACOEMULSIFICATION CLEAR CORNEA WITH STANDARD INTRAOCULAR LENS IMPLANT;  Surgeon: John Zimmerman MD;  Location:  [facility]     PHACOEMULSIFICATION CLEAR CORNEA WITH STANDARD INTRAOCULAR LENS IMPLANT Right 7/16/2015    Procedure: PHACOEMULSIFICATION CLEAR CORNEA WITH STANDARD INTRAOCULAR LENS IMPLANT;  Surgeon: John Zimmerman MD;  Location:  [facility]     STENT, CORONARY, [place]  1/09, 2014    x2, x1 in 2014     VASCULAR SURGERY  2013    stents     Social History:  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 The patient denies use of tanning beds.    Family History:  There is no family history of melanoma.    Medications:  Current Outpatient Prescriptions   Medication Sig Dispense Refill     hydrocortisone valerate (WEST-NINOSKA) 0.2 % ointment Apply sparingly to affected area three times daily as needed. 45 g 3     lisinopril (PRINIVIL/ZESTRIL) 20 MG tablet Take 1 tablet (20 mg) by mouth daily 90 tablet 3     metoprolol (TOPROL-XL) 100 MG 24 hr tablet Take 1 tablet (100 mg) by mouth daily 90 tablet 2      nitroglycerin (NITROSTAT) 0.4 MG sublingual tablet Place 1 tablet (0.4 mg) under the tongue every 5 minutes as needed up to 3 tablets per episode. 60 tablet 6     rosuvastatin (CRESTOR) 40 MG tablet Take 1 tablet (40 mg) by mouth daily 90 tablet 2     vardenafil (LEVITRA) 20 MG tablet Take 1 tablet (20 mg) by mouth daily as needed Take 30 min to 4 hours before intercourse.  Never use with nitroglycerin, terazosin or doxazosin. 6 tablet 2     Lansoprazole (PREVACID PO) Take 15 mg by mouth every morning (before breakfast) Patient needs to use brand name Prevacid (generic version causes diarrhea)       omega 3 1000 MG CAPS Take 1 g by mouth 2 times daily 120 capsule 6     Acetaminophen (TYLENOL 8 HOUR PO) Take 500 mg by mouth as needed        MAALOX ADVANCED OR Take 1 tablet as needed (Calcium carbonate 1000mg/Simethicone 80mg)       ASPIRIN 81 MG OR TABS 1 TABLET DAILY       Allergies   Allergen Reactions     Niacin      Severe rash and itching     Prevacid [Lansoprazole] Diarrhea     Patient notes diarrhea with 30mg generic version. Patient does ok with 15mg in generic and brand name.     Shellfish Allergy      One kind     Review of Systems:  -Const: Denies any changes in medical history. Denies recent illness, surgery or hospitalization. Is otherwise feeling well.   -Skin: As above in HPI. No additional skin concerns.    Physical exam:  There were no vitals taken for this visit.  GEN: This is a well developed, well-nourished male in no acute distress, in a pleasant mood.    SKIN: Total skin excluding the undergarment areas was performed. The exam included the head/face, neck, both arms, chest, back, abdomen, both legs, digits and/or nails. Including exam of the buttocks. Declines genital exam.   -There is a well healed surgical scar without erythema, nodularity or telangiectasias on the left lateral neck.   -There are erythematous macules with overyling adherent scale on the vertex scalp, frontal scalp and  bilateral cheeks.   -There are waxy stuck on tan to brown papules on the trunk and extremities.  -There are bright red some shaped papules scattered on the back.   -Macular erythema of the bilateral inguinal region. Positive woods lamp, coral red  -No other lesions of concern on areas examined.     Impression/Plan:  1. History of nonmelanoma skin cancer, no clincial evidence of recurrence:-using hats and sunscreen  2. Actinic keratosis, vertex scalp, frontal scalp, bilateral cheeks    Cryotherapy procedure note: After verbal consent and discussion of risks and benefits including but no limited to dyspigmentation/scar, blister, and pain, 11 were treated with 1-2mm freeze border for 1 cycle with liquid nitrogen. Post cryotherapy instructions were provided.  3. Seborrheic keratosis, trunk and extremities    No further intervention required at this time.   4. Cherry angiomas, back    No further intervention required at this time.   5. Macular erythema, bilateral inguinal region. Most consistent with erythrasma    Start clindamycin 1% lotion. Apply daily to affected area.     Follow up in 3 months to make sure groin rash only is clear, earlier for new or changing lesions.     Staff Involved:  Staff/Scribe    Scribe Disclosure:   I, Dyan Contreras, am serving as a scribe to document services personally performed by Dr. Aby Rodriguez, based on data collection and the provider's statements to me.       Provider Disclosure:   The documentation recorded by the scribe accurately reflects the services I personally performed and the decisions made by me.    Aby Rodriguez MD    Department of Dermatology  [facility]: Phone: [phone], Fax:[phone]  [facility]: Phone: [phone], Fax: [phone]

## 2017-07-10 ENCOUNTER — TRANSFERRED RECORDS (OUTPATIENT)
Dept: HEALTH INFORMATION MANAGEMENT | Facility: CLINIC | Age: 77
End: 2017-07-10

## 2017-08-08 DIAGNOSIS — Z13.29 SCREENING FOR THYROID DISORDER: ICD-10-CM

## 2017-08-08 DIAGNOSIS — I10 ESSENTIAL HYPERTENSION WITH GOAL BLOOD PRESSURE LESS THAN 140/90: ICD-10-CM

## 2017-08-08 DIAGNOSIS — Z00.00 ROUTINE GENERAL MEDICAL EXAMINATION AT A HEALTH CARE FACILITY: Primary | ICD-10-CM

## 2017-08-08 DIAGNOSIS — E78.5 HYPERLIPIDEMIA LDL GOAL <100: ICD-10-CM

## 2017-08-08 DIAGNOSIS — R73.03 PREDIABETES: ICD-10-CM

## 2017-08-08 DIAGNOSIS — I25.10 CORONARY ARTERY DISEASE INVOLVING NATIVE CORONARY ARTERY OF NATIVE HEART WITHOUT ANGINA PECTORIS: ICD-10-CM

## 2017-08-08 DIAGNOSIS — R73.01 ELEVATED FASTING BLOOD SUGAR: ICD-10-CM

## 2017-08-08 DIAGNOSIS — R80.9 MICROALBUMINURIA: ICD-10-CM

## 2017-08-08 DIAGNOSIS — C61 PROSTATE CANCER (H): ICD-10-CM

## 2017-08-14 DIAGNOSIS — E78.5 HYPERLIPIDEMIA LDL GOAL <100: ICD-10-CM

## 2017-08-14 DIAGNOSIS — R73.01 ELEVATED FASTING BLOOD SUGAR: ICD-10-CM

## 2017-08-14 DIAGNOSIS — Z00.00 ROUTINE GENERAL MEDICAL EXAMINATION AT A HEALTH CARE FACILITY: ICD-10-CM

## 2017-08-14 DIAGNOSIS — Z13.29 SCREENING FOR THYROID DISORDER: ICD-10-CM

## 2017-08-14 DIAGNOSIS — R73.03 PREDIABETES: ICD-10-CM

## 2017-08-14 DIAGNOSIS — I10 ESSENTIAL HYPERTENSION WITH GOAL BLOOD PRESSURE LESS THAN 140/90: ICD-10-CM

## 2017-08-14 DIAGNOSIS — R80.9 MICROALBUMINURIA: ICD-10-CM

## 2017-08-14 DIAGNOSIS — I25.10 CORONARY ARTERY DISEASE INVOLVING NATIVE CORONARY ARTERY OF NATIVE HEART WITHOUT ANGINA PECTORIS: ICD-10-CM

## 2017-08-14 LAB
ALBUMIN SERPL-MCNC: 3.5 G/DL (ref 3.4–5)
ALP SERPL-CCNC: 43 U/L (ref 40–150)
ALT SERPL W P-5'-P-CCNC: 35 U/L (ref 0–70)
ANION GAP SERPL CALCULATED.3IONS-SCNC: 9 MMOL/L (ref 3–14)
AST SERPL W P-5'-P-CCNC: 25 U/L (ref 0–45)
BASOPHILS # BLD AUTO: 0 10E9/L (ref 0–0.2)
BASOPHILS NFR BLD AUTO: 0.3 %
BILIRUB SERPL-MCNC: 0.5 MG/DL (ref 0.2–1.3)
BUN SERPL-MCNC: 18 MG/DL (ref 7–30)
CALCIUM SERPL-MCNC: 8.3 MG/DL (ref 8.5–10.1)
CHLORIDE SERPL-SCNC: 107 MMOL/L (ref 94–109)
CHOLEST SERPL-MCNC: 153 MG/DL
CO2 SERPL-SCNC: 25 MMOL/L (ref 20–32)
CREAT SERPL-MCNC: 0.96 MG/DL (ref 0.66–1.25)
CREAT UR-MCNC: 128 MG/DL
DIFFERENTIAL METHOD BLD: NORMAL
EOSINOPHIL # BLD AUTO: 0.1 10E9/L (ref 0–0.7)
EOSINOPHIL NFR BLD AUTO: 1.8 %
ERYTHROCYTE [DISTWIDTH] IN BLOOD BY AUTOMATED COUNT: 13.8 % (ref 10–15)
GFR SERPL CREATININE-BSD FRML MDRD: 76 ML/MIN/1.7M2
GLUCOSE SERPL-MCNC: 122 MG/DL (ref 70–99)
HBA1C MFR BLD: 5.6 % (ref 4.3–6)
HCT VFR BLD AUTO: 46.7 % (ref 40–53)
HDLC SERPL-MCNC: 32 MG/DL
HGB BLD-MCNC: 16.2 G/DL (ref 13.3–17.7)
LDLC SERPL CALC-MCNC: 81 MG/DL
LYMPHOCYTES # BLD AUTO: 2.4 10E9/L (ref 0.8–5.3)
LYMPHOCYTES NFR BLD AUTO: 32.1 %
MCH RBC QN AUTO: 32.7 PG (ref 26.5–33)
MCHC RBC AUTO-ENTMCNC: 34.7 G/DL (ref 31.5–36.5)
MCV RBC AUTO: 94 FL (ref 78–100)
MICROALBUMIN UR-MCNC: 34 MG/L
MICROALBUMIN/CREAT UR: 26.95 MG/G CR (ref 0–17)
MONOCYTES # BLD AUTO: 0.7 10E9/L (ref 0–1.3)
MONOCYTES NFR BLD AUTO: 9.1 %
NEUTROPHILS # BLD AUTO: 4.2 10E9/L (ref 1.6–8.3)
NEUTROPHILS NFR BLD AUTO: 56.7 %
NONHDLC SERPL-MCNC: 121 MG/DL
PLATELET # BLD AUTO: 181 10E9/L (ref 150–450)
POTASSIUM SERPL-SCNC: 4.2 MMOL/L (ref 3.4–5.3)
PROT SERPL-MCNC: 6.6 G/DL (ref 6.8–8.8)
RBC # BLD AUTO: 4.96 10E12/L (ref 4.4–5.9)
SODIUM SERPL-SCNC: 141 MMOL/L (ref 133–144)
TRIGL SERPL-MCNC: 202 MG/DL
TSH SERPL DL<=0.005 MIU/L-ACNC: 2.06 MU/L (ref 0.4–4)
WBC # BLD AUTO: 7.4 10E9/L (ref 4–11)

## 2017-08-14 PROCEDURE — 80061 LIPID PANEL: CPT | Performed by: FAMILY MEDICINE

## 2017-08-14 PROCEDURE — 82043 UR ALBUMIN QUANTITATIVE: CPT | Performed by: FAMILY MEDICINE

## 2017-08-14 PROCEDURE — 36415 COLL VENOUS BLD VENIPUNCTURE: CPT | Performed by: FAMILY MEDICINE

## 2017-08-14 PROCEDURE — 83036 HEMOGLOBIN GLYCOSYLATED A1C: CPT | Performed by: FAMILY MEDICINE

## 2017-08-14 PROCEDURE — 80050 GENERAL HEALTH PANEL: CPT | Performed by: FAMILY MEDICINE

## 2017-08-15 ENCOUNTER — OFFICE VISIT (OUTPATIENT)
Dept: PEDIATRICS | Facility: CLINIC | Age: 77
End: 2017-08-15
Payer: MEDICARE

## 2017-08-15 VITALS
HEART RATE: 66 BPM | BODY MASS INDEX: 30.97 KG/M2 | DIASTOLIC BLOOD PRESSURE: 76 MMHG | HEIGHT: 64 IN | TEMPERATURE: 97.3 F | WEIGHT: 181.4 LBS | OXYGEN SATURATION: 96 % | SYSTOLIC BLOOD PRESSURE: 112 MMHG

## 2017-08-15 DIAGNOSIS — E78.5 HYPERLIPIDEMIA LDL GOAL <100: ICD-10-CM

## 2017-08-15 DIAGNOSIS — R73.03 PREDIABETES: ICD-10-CM

## 2017-08-15 DIAGNOSIS — I25.10 CORONARY ARTERY DISEASE INVOLVING NATIVE CORONARY ARTERY OF NATIVE HEART WITHOUT ANGINA PECTORIS: ICD-10-CM

## 2017-08-15 DIAGNOSIS — Z00.00 ROUTINE GENERAL MEDICAL EXAMINATION AT A HEALTH CARE FACILITY: Primary | ICD-10-CM

## 2017-08-15 DIAGNOSIS — N52.9 ERECTILE DYSFUNCTION, UNSPECIFIED ERECTILE DYSFUNCTION TYPE: ICD-10-CM

## 2017-08-15 DIAGNOSIS — I25.9 CHEST PAIN DUE TO MYOCARDIAL ISCHEMIA, UNSPECIFIED ISCHEMIC CHEST PAIN TYPE: ICD-10-CM

## 2017-08-15 DIAGNOSIS — I10 HYPERTENSION GOAL BP (BLOOD PRESSURE) < 130/80: ICD-10-CM

## 2017-08-15 DIAGNOSIS — I21.4 NSTEMI (NON-ST ELEVATED MYOCARDIAL INFARCTION) (H): ICD-10-CM

## 2017-08-15 DIAGNOSIS — C61 PROSTATE CANCER (H): ICD-10-CM

## 2017-08-15 DIAGNOSIS — K21.9 GASTROESOPHAGEAL REFLUX DISEASE, ESOPHAGITIS PRESENCE NOT SPECIFIED: ICD-10-CM

## 2017-08-15 DIAGNOSIS — E66.9 OBESITY (BMI 30-39.9): ICD-10-CM

## 2017-08-15 PROCEDURE — G0439 PPPS, SUBSEQ VISIT: HCPCS | Performed by: FAMILY MEDICINE

## 2017-08-15 RX ORDER — METOPROLOL SUCCINATE 100 MG/1
100 TABLET, EXTENDED RELEASE ORAL DAILY
Qty: 90 TABLET | Refills: 3 | Status: SHIPPED | OUTPATIENT
Start: 2017-08-15 | End: 2018-01-01

## 2017-08-15 RX ORDER — LISINOPRIL 20 MG/1
20 TABLET ORAL DAILY
Qty: 90 TABLET | Refills: 3 | Status: SHIPPED | OUTPATIENT
Start: 2017-08-15 | End: 2018-01-01

## 2017-08-15 RX ORDER — NITROGLYCERIN 0.4 MG/1
0.4 TABLET SUBLINGUAL EVERY 5 MIN PRN
Qty: 60 TABLET | Refills: 6 | Status: SHIPPED | OUTPATIENT
Start: 2017-08-15

## 2017-08-15 RX ORDER — ROSUVASTATIN CALCIUM 40 MG/1
40 TABLET, COATED ORAL DAILY
Qty: 90 TABLET | Refills: 3 | Status: SHIPPED | OUTPATIENT
Start: 2017-08-15 | End: 2018-01-01

## 2017-08-15 ASSESSMENT — PAIN SCALES - GENERAL: PAINLEVEL: NO PAIN (0)

## 2017-08-15 NOTE — PATIENT INSTRUCTIONS
Schedule for recheck and fasting labs in 6 months       Preventive Health Recommendations:       Male Ages 65 and over    Yearly exam:             See your health care provider every year in order to  o   Review health changes.   o   Discuss preventive care.    o   Review your medicines if your doctor has prescribed any.    Talk with your health care provider about whether you should have a test to screen for prostate cancer (PSA).    Every 3 years, have a diabetes test (fasting glucose). If you are at risk for diabetes, you should have this test more often.    Every 5 years, have a cholesterol test. Have this test more often if you are at risk for high cholesterol or heart disease.     Every 10 years, have a colonoscopy. Or, have a yearly FIT test (stool test). These exams will check for colon cancer.    Talk to with your health care provider about screening for Abdominal Aortic Aneurysm if you have a family history of AAA or have a history of smoking.  Shots:     Get a flu shot each year.     Get a tetanus shot every 10 years.     Talk to your doctor about your pneumonia vaccines. There are now two you should receive - Pneumovax (PPSV 23) and Prevnar (PCV 13).    Talk to your doctor about a shingles vaccine.     Talk to your doctor about the hepatitis B vaccine.  Nutrition:     Eat at least 5 servings of fruits and vegetables each day.     Eat whole-grain bread, whole-wheat pasta and brown rice instead of white grains and rice.     Talk to your doctor about Calcium and Vitamin D.   Lifestyle    Exercise for at least 150 minutes a week (30 minutes a day, 5 days a week). This will help you control your weight and prevent disease.     Limit alcohol to one drink per day.     No smoking.     Wear sunscreen to prevent skin cancer.     See your dentist every six months for an exam and cleaning.     See your eye doctor every 1 to 2 years to screen for conditions such as glaucoma, macular degeneration and cataracts.

## 2017-08-15 NOTE — MR AVS SNAPSHOT
After Visit Summary   8/15/2017    Francisco Javier Cortes    MRN: [id number]           Patient Information     Date Of Birth          1940        Visit Information        Provider Department      8/15/2017 3:00 PM Florian Alonzo MD [facility]        Today's Diagnoses     Routine general medical examination at a health care facility    -  1    Hypertension goal BP (blood pressure) < 130/80        Coronary artery disease involving native coronary artery of native heart without angina pectoris        Hyperlipidemia LDL goal <100        Chest pain due to myocardial ischemia, unspecified ischemic chest pain type (H)          Care Instructions    Schedule for recheck and fasting labs in 6 months       Preventive Health Recommendations:       Male Ages 65 and over    Yearly exam:             See your health care provider every year in order to  o   Review health changes.   o   Discuss preventive care.    o   Review your medicines if your doctor has prescribed any.    Talk with your health care provider about whether you should have a test to screen for prostate cancer (PSA).    Every 3 years, have a diabetes test (fasting glucose). If you are at risk for diabetes, you should have this test more often.    Every 5 years, have a cholesterol test. Have this test more often if you are at risk for high cholesterol or heart disease.     Every 10 years, have a colonoscopy. Or, have a yearly FIT test (stool test). These exams will check for colon cancer.    Talk to with your health care provider about screening for Abdominal Aortic Aneurysm if you have a family history of AAA or have a history of smoking.  Shots:     Get a flu shot each year.     Get a tetanus shot every 10 years.     Talk to your doctor about your pneumonia vaccines. There are now two you should receive - Pneumovax (PPSV 23) and Prevnar (PCV 13).    Talk to your doctor about a shingles vaccine.     Talk to your doctor about the  hepatitis B vaccine.  Nutrition:     Eat at least 5 servings of fruits and vegetables each day.     Eat whole-grain bread, whole-wheat pasta and brown rice instead of white grains and rice.     Talk to your doctor about Calcium and Vitamin D.   Lifestyle    Exercise for at least 150 minutes a week (30 minutes a day, 5 days a week). This will help you control your weight and prevent disease.     Limit alcohol to one drink per day.     No smoking.     Wear sunscreen to prevent skin cancer.     See your dentist every six months for an exam and cleaning.     See your eye doctor every 1 to 2 years to screen for conditions such as glaucoma, macular degeneration and cataracts.          Follow-ups after your visit        Your next 10 appointments already scheduled     Apr 10, 2018  8:00 AM CDT   [facility] Visit with Jad Ball MD   [facility]    [address] [place] [id number] [phone]              Who to contact     If you have questions or need follow up information about today's clinic visit or your schedule please contact [facility] directly at [phone].  Normal or non-critical lab and imaging results will be communicated to you by Naurexhart, letter or phone within 4 business days after the clinic has received the results. If you do not hear from us within 7 days, please contact the clinic through [employer] or phone. If you have a critical or abnormal lab result, we will notify you by phone as soon as possible.  Submit refill requests through [employer] or call your pharmacy and they will forward the refill request to us. Please allow 3 business days for your refill to be completed.          Additional Information About Your Visit        NaurexharJumia Information     [employer] gives you secure access to your electronic health record. If you see a primary care provider, you can also send messages to your care team and make appointments. If  "you have questions, please call your primary care clinic.  If you do not have a primary care provider, please call [phone] and they will assist you.      ProDeaf is an electronic gateway that provides easy, online access to your medical records. With ProDeaf, you can request a clinic appointment, read your test results, renew a prescription or communicate with your care team.     To access your existing account, please contact your [facility] or call [phone] for assistance.        Care EveryWhere ID     This is your Care EveryWhere ID. This could be used by other organizations to access your [facility] medical records  [id number]        Your Vitals Were     Pulse Temperature Height Pulse Oximetry BMI (Body Mass Index)       66 97.3  F (36.3  C) (Oral) 5' 4.25\" (1.632 m) 96% 30.9 kg/m2        Blood Pressure from Last 3 Encounters:   08/15/17 112/76   05/27/17 146/82   04/04/17 (!) 152/92    Weight from Last 3 Encounters:   08/15/17 181 lb 6.4 oz (82.3 kg)   05/27/17 184 lb 9.6 oz (83.7 kg)   04/04/17 183 lb 9.6 oz (83.3 kg)              Today, you had the following     No orders found for display         Where to get your medicines      These medications were sent to [facility] [address] [address]     Phone:  [phone]     lisinopril 20 MG tablet    metoprolol 100 MG 24 hr tablet    nitroGLYcerin 0.4 MG sublingual tablet    rosuvastatin 40 MG tablet          Primary Care Provider Office Phone # Fax #    Florian Alonzo [phone] [address] [facility] [address]        Equal Access to Services     NANDA BARAJAS : Alondra Cherry, waqida macarena, qaybta kaalzhao miguel. So [facility] [phone].    ATENCIÓN: Si habla español, tiene a ivan disposición servicios gratuitos de asistencia lingüística. Llame al " [phone].    We comply with applicable federal civil rights laws and [place] laws. We do not discriminate on the basis of race, color, national origin, age, disability sex, sexual orientation or gender identity.            Thank you!     Thank you for choosing [facility]  for your care. Our goal is always to provide you with excellent care. Hearing back from our patients is one way we can continue to improve our services. Please take a few minutes to complete the written survey that you may receive in the mail after your visit with us. Thank you!             Your Updated Medication List - Protect others around you: Learn how to safely use, store and throw away your medicines at www.disposemymeds.org.          This list is accurate as of: 8/15/17  3:38 PM.  Always use your most recent med list.                   Brand Name Dispense Instructions for use Diagnosis    aspirin 81 MG tablet      1 TABLET DAILY        clindamycin 1 % lotion    CLEOCIN T    60 mL    Apply twice daily for 6 weeks to the groin    Rash       hydrocortisone valerate 0.2 % ointment    WEST-NINOSKA    45 g    Apply sparingly to affected area three times daily as needed.    Psoriasis       lisinopril 20 MG tablet    PRINIVIL/ZESTRIL    90 tablet    Take 1 tablet (20 mg) by mouth daily    Hypertension goal BP (blood pressure) < 130/80, Coronary artery disease involving native coronary artery of native heart without angina pectoris       MAALOX ADVANCED PO      Take 1 tablet as needed (Calcium carbonate 1000mg/Simethicone 80mg)    Chest pain       metoprolol 100 MG 24 hr tablet    TOPROL-XL    90 tablet    Take 1 tablet (100 mg) by mouth daily    Hypertension goal BP (blood pressure) < 130/80, Coronary artery disease involving native coronary artery of native heart without angina pectoris       nitroGLYcerin 0.4 MG sublingual tablet    NITROSTAT    60 tablet    Place 1 tablet (0.4 mg) under the tongue every 5 minutes as needed  up to 3 tablets per episode.    Chest pain due to myocardial ischemia, unspecified ischemic chest pain type (H), Coronary artery disease involving native coronary artery of native heart without angina pectoris       omega 3 1000 MG Caps     120 capsule    Take 1 g by mouth 2 times daily    Coronary artery disease involving native coronary artery of native heart with angina pectoris (H)       PREVACID PO      Take 15 mg by mouth every morning (before breakfast) Patient needs to use brand name Prevacid (generic version causes diarrhea)        rosuvastatin 40 MG tablet    CRESTOR    90 tablet    Take 1 tablet (40 mg) by mouth daily    Hyperlipidemia LDL goal <100, Coronary artery disease involving native coronary artery of native heart without angina pectoris       TYLENOL 8 HOUR PO      Take 500 mg by mouth as needed        vardenafil 20 MG tablet    LEVITRA    6 tablet    Take 1 tablet (20 mg) by mouth daily as needed Take 30 min to 4 hours before intercourse.  Never use with nitroglycerin, terazosin or doxazosin.    Erectile dysfunction, unspecified erectile dysfunction type

## 2017-08-15 NOTE — PROGRESS NOTES
SUBJECTIVE:   Francisco Javier Cortes is a 77 year old male who presents for Preventive Visit.      Are you in the first 12 months of your Medicare Part B coverage?  No    Healthy Habits:    Do you get at least three servings of calcium containing foods daily (dairy, green leafy vegetables, etc.)? yes    Amount of exercise or daily activities, outside of work: 3 day(s) per week    Problems taking medications regularly No    Medication side effects: No    Have you had an eye exam in the past two years? yes    Do you see a dentist twice per year? yes    Do you have sleep apnea, excessive snoring or daytime drowsiness? Yes, has complete sleep study     Patient declines stating he just had this completed at the [facility],      Hyperlipidemia Follow-Up      Rate your low fat/cholesterol diet?: good    Taking statin?  Yes, no muscle aches from statin    Other lipid medications/supplements?:  none    Hypertension Follow-up      Outpatient blood pressures are not being checked.    Low Salt Diet: no added salt    Vascular Disease Follow-up:  Coronary Artery Disease (CAD)      Chest pain or pressure, left side neck or arm pain: No    Shortness of breath/increased sweats/nausea with exertion: No    Pain in calves walking 1-2 blocks: No    Worsened or new symptoms since last visit: No    Nitroglycerin use: no    Daily aspirin use: Yes              Reviewed and updated as needed this visit by clinical staffTobacco  Allergies  Meds  Med Hx  Surg Hx  Fam Hx  Soc Hx        Reviewed and updated as needed this visit by Provider        Social History   Substance Use Topics     Smoking status: Never Smoker     Smokeless tobacco: Never Used     Alcohol use Yes      Comment: a glass of red wine a day       The patient does not drink >3 drinks per day nor >7 drinks per week.    Today's PHQ-2 Score:   PHQ-2 ( 1999 Pfizer) 8/15/2017 2/24/2017   Q1: Little interest or pleasure in doing things 0 0   Q2: Feeling down, depressed or hopeless 0 0   PHQ-2  Score 0 0       Do you feel safe in your environment - Yes    Do you have a Health Care Directive?: Yes: Advance Directive has been received and scanned.    Current providers sharing in care for this patient include: Patient Care Team:  Florian Alonzo MD as PCP - Zachary Green MD as MD (Cardiology)      Hearing impairment: Yes, uses hearing aids    Ability to successfully perform activities of daily living: Yes, no assistance needed     Fall risk:  Fallen 2 or more times in the past year?: No  Any fall with injury in the past year?: No      Home safety:  none identified      The following health maintenance items are reviewed in Epic and correct as of today:  Health Maintenance   Topic Date Due     ADVANCE DIRECTIVE PLANNING Q5 YRS  01/31/2017     MEDICARE ANNUAL WELLNESS VISIT  06/27/2017     FALL RISK ASSESSMENT  06/27/2017     INFLUENZA VACCINE (SYSTEM ASSIGNED)  09/01/2017     A1C Q6 MO  02/14/2018     EYE EXAM Q1 YEAR  04/28/2018     BMP Q1 YR  08/14/2018     LIPID MONITORING Q1 YEAR  08/14/2018     MICROALBUMIN Q1 YEAR  08/14/2018     TETANUS IMMUNIZATION (SYSTEM ASSIGNED)  04/27/2021     PNEUMOCOCCAL  Completed     Labs reviewed in EPIC  BP Readings from Last 3 Encounters:   08/15/17 112/76   05/27/17 146/82   04/04/17 (!) 152/92    Wt Readings from Last 3 Encounters:   08/15/17 181 lb 6.4 oz (82.3 kg)   05/27/17 184 lb 9.6 oz (83.7 kg)   04/04/17 183 lb 9.6 oz (83.3 kg)                  Patient Active Problem List   Diagnosis     Hyperlipidemia LDL goal <100     Advanced directives, counseling/discussion     Prediabetes     Obesity (BMI 30-39.9)     Inflamed seborrheic keratosis     AK (actinic keratosis)     Basal cell carcinoma of neck     Microalbuminuria     Perianal dermatitis     NSTEMI (non-ST elevated myocardial infarction) (H)     S/P coronary angioplasty     Status post percutaneous transluminal coronary angioplasty-Coronary angioplasty with STEPHEN to LCx     Prostate  Cancer, s/p prostatectomy     Pseudophakia of both eyes     Gastroesophageal reflux disease, esophagitis presence not specified     Coronary artery disease involving native coronary artery of native heart without angina pectoris     History of colonic polyps     Chronic diarrhea     Essential hypertension with goal blood pressure less than 140/90     Dyslipidemia     Erectile dysfunction, unspecified erectile dysfunction type     Elevated fasting blood sugar     Past Surgical History:   Procedure Laterality Date     C APPENDECTOMY       C REMV PROSTATE,RETROPUB,RAD,TOT NODES  1999     CATARACT IOL, RT/LT       COLONOSCOPY       COLONOSCOPY Left 7/5/2016    Procedure: COMBINED COLONOSCOPY, SINGLE OR MULTIPLE BIOPSY/POLYPECTOMY BY BIOPSY;  Surgeon: Duane, William Charles, MD;  Location: [facility] OR     COLONOSCOPY WITH CO2 INSUFFLATION N/A 7/5/2016    Procedure: COLONOSCOPY WITH CO2 INSUFFLATION;  Surgeon: Duane, William Charles, MD;  Location: [facility] OR     ENT SURGERY  1980--1999     PHACOEMULSIFICATION CLEAR CORNEA WITH STANDARD INTRAOCULAR LENS IMPLANT Left 6/18/2015    Procedure: PHACOEMULSIFICATION CLEAR CORNEA WITH STANDARD INTRAOCULAR LENS IMPLANT;  Surgeon: John Zimmerman MD;  Location:  [facility]     PHACOEMULSIFICATION CLEAR CORNEA WITH STANDARD INTRAOCULAR LENS IMPLANT Right 7/16/2015    Procedure: PHACOEMULSIFICATION CLEAR CORNEA WITH STANDARD INTRAOCULAR LENS IMPLANT;  Surgeon: John Zimmerman MD;  Location:  [facility]     STENT, CORONARY, [place]  1/09, 2014    x2, x1 in 2014     VASCULAR SURGERY  2013    stents       Social History   Substance Use Topics     Smoking status: Never Smoker     Smokeless tobacco: Never Used     Alcohol use Yes      Comment: a glass of red wine a day     Family History   Problem Relation Age of Onset     Cardiovascular Mother      HEART DISEASE Mother      Cancer - colorectal Father      HEART DISEASE Father      Other Cancer Father      Asthma Brother      Coronary Artery  Disease Brother      Hypertension Brother      HEART DISEASE Son      CANCER Daughter      non hodgkins     Prostate Cancer Brother      Hypertension Brother      Prostate Cancer Maternal Grandfather          Current Outpatient Prescriptions   Medication Sig Dispense Refill     lisinopril (PRINIVIL/ZESTRIL) 20 MG tablet Take 1 tablet (20 mg) by mouth daily 90 tablet 3     metoprolol (TOPROL-XL) 100 MG 24 hr tablet Take 1 tablet (100 mg) by mouth daily 90 tablet 3     rosuvastatin (CRESTOR) 40 MG tablet Take 1 tablet (40 mg) by mouth daily 90 tablet 3     nitroGLYcerin (NITROSTAT) 0.4 MG sublingual tablet Place 1 tablet (0.4 mg) under the tongue every 5 minutes as needed up to 3 tablets per episode. 60 tablet 6     vardenafil (LEVITRA) 20 MG tablet Take 1 tablet (20 mg) by mouth daily as needed Take 30 min to 4 hours before intercourse.  Never use with nitroglycerin, terazosin or doxazosin. 6 tablet 2     Lansoprazole (PREVACID PO) Take 15 mg by mouth every morning (before breakfast) Patient needs to use brand name Prevacid (generic version causes diarrhea)       omega 3 1000 MG CAPS Take 1 g by mouth 2 times daily 120 capsule 6     Acetaminophen (TYLENOL 8 HOUR PO) Take 500 mg by mouth as needed        MAALOX ADVANCED OR Take 1 tablet as needed (Calcium carbonate 1000mg/Simethicone 80mg)       ASPIRIN 81 MG OR TABS 1 TABLET DAILY       clindamycin (CLEOCIN T) 1 % lotion Apply twice daily for 6 weeks to the groin (Patient not taking: Reported on 8/15/2017) 60 mL 1     hydrocortisone valerate (WEST-NINOSKA) 0.2 % ointment Apply sparingly to affected area three times daily as needed. (Patient not taking: Reported on 8/15/2017) 45 g 3     [DISCONTINUED] lisinopril (PRINIVIL/ZESTRIL) 20 MG tablet Take 1 tablet (20 mg) by mouth daily 90 tablet 3     [DISCONTINUED] metoprolol (TOPROL-XL) 100 MG 24 hr tablet Take 1 tablet (100 mg) by mouth daily 90 tablet 2     [DISCONTINUED] nitroglycerin (NITROSTAT) 0.4 MG sublingual tablet  Place 1 tablet (0.4 mg) under the tongue every 5 minutes as needed up to 3 tablets per episode. 60 tablet 6     [DISCONTINUED] rosuvastatin (CRESTOR) 40 MG tablet Take 1 tablet (40 mg) by mouth daily 90 tablet 2     Allergies   Allergen Reactions     Niacin      Severe rash and itching     Prevacid [Lansoprazole] Diarrhea     Patient notes diarrhea with 30mg generic version. Patient does ok with 15mg in generic and brand name.     Shellfish Allergy      One kind     Recent Labs   Lab Test  08/14/17   0706  02/24/17   0704  06/27/16   0735  12/17/15   0728   03/19/14   1554  12/12/12   A1C  5.6  5.8  6.3*  6.2*   < >   --    < >  6.2*   LDL  81   --   81  85   < >   --    < >  110   HDL  32*   --   36*  31*   < >   --    < >  34   TRIG  202*   --   160*  233*   < >   --    < >  109   ALT  35   --    --   39   --   44   < >   --    CR  0.96  0.95  0.86  0.94   < >  1.20   < >  1.00   GFRESTIMATED  76  77  86  78   < >  59*   < >  78   GFRESTBLACK  >90   GFR Calc    >90   GFR Calc    >90   GFR Calc    >90   GFR Calc     < >  72   < >   --    POTASSIUM  4.2  3.8  4.0  4.1   < >  4.0   < >   --    TSH  2.06   --    --    --    --    --    --   1.80    < > = values in this interval not displayed.              Pneumonia Vaccine:UTD    ROS:  C: NEGATIVE for fever, chills, change in weight  INTEGUMENTARY/SKIN: NEGATIVE for worrisome rashes, moles or lesions  EYES: NEGATIVE for vision changes or irritation  E/M: NEGATIVE for ear, mouth and throat problems  R: NEGATIVE for significant cough or SOB  CV: NEGATIVE for chest pain, palpitations or peripheral edema  CV: Hx HTN  GI: NEGATIVE for nausea, abdominal pain, heartburn, or change in bowel habits and Hx GERD  : erectile dysfunction  MUSCULOSKELETAL: NEGATIVE for significant arthralgias or myalgia  NEURO: NEGATIVE for weakness, dizziness or paresthesias  ENDOCRINE: NEGATIVE for temperature intolerance,  "skin/hair changes  HEME/ALLERGY/IMMUNE: NEGATIVE for bleeding problems  PSYCHIATRIC: NEGATIVE for changes in mood or affect    OBJECTIVE:   /76  Pulse 66  Temp 97.3  F (36.3  C) (Oral)  Ht 5' 4.25\" (1.632 m)  Wt 181 lb 6.4 oz (82.3 kg)  SpO2 96%  BMI 30.9 kg/m2 Estimated body mass index is 30.9 kg/(m^2) as calculated from the following:    Height as of this encounter: 5' 4.25\" (1.632 m).    Weight as of this encounter: 181 lb 6.4 oz (82.3 kg).  EXAM:   GENERAL: healthy, alert and no distress  EYES: Eyes grossly normal to inspection, PERRL and conjunctivae and sclerae normal  HENT: ear canals and TM's normal, nose and mouth without ulcers or lesions  NECK: no adenopathy, no asymmetry, masses, or scars and thyroid normal to palpation  RESP: lungs clear to auscultation - no rales, rhonchi or wheezes  CV: regular rate and rhythm, normal S1 S2, no S3 or S4, no murmur, click or rub, no peripheral edema and peripheral pulses strong  ABDOMEN: soft, nontender, no hepatosplenomegaly, no masses and bowel sounds normal  MS: no gross musculoskeletal defects noted, no edema  SKIN: Multiple seborrheic keratotic lesions  NEURO: Normal strength and tone, mentation intact and speech normal  PSYCH: mentation appears normal, affect normal/bright    ASSESSMENT / PLAN:   1. Routine general medical examination at a health care facility  : Discussed on regular exercises, healthy eating, self testicular exams  and routine dental checks.    2. Hypertension goal BP (blood pressure) < 130/80  Last Basic Metabolic Panel:  Lab Results   Component Value Date     08/14/2017      Lab Results   Component Value Date    POTASSIUM 4.2 08/14/2017     Lab Results   Component Value Date    CHLORIDE 107 08/14/2017     Lab Results   Component Value Date    EVANGELINA 8.3 08/14/2017     Lab Results   Component Value Date    CO2 25 08/14/2017     Lab Results   Component Value Date    BUN 18 08/14/2017     Lab Results   Component Value Date    CR " 0.96 08/14/2017     Lab Results   Component Value Date     08/14/2017     BP Readings from Last 6 Encounters:   08/15/17 112/76   05/27/17 146/82   04/04/17 (!) 152/92   02/24/17 114/64   12/22/16 154/80   07/05/16 137/79       Blood pressure is at goal, revealed normal BMP lab results except for slightly low calcium and a moderately elevated fasting blood sugar  We'll continue his current medications, follow for recheck in 6 months or sooner if needed  Will follow low salt diet, weight loss and regular exercises.    - lisinopril (PRINIVIL/ZESTRIL) 20 MG tablet; Take 1 tablet (20 mg) by mouth daily  Dispense: 90 tablet; Refill: 3  - metoprolol (TOPROL-XL) 100 MG 24 hr tablet; Take 1 tablet (100 mg) by mouth daily  Dispense: 90 tablet; Refill: 3    3. Coronary artery disease involving native coronary artery of native heart without angina pectoris  , Continue statin, beta blocker, baby aspirin  Continue to control hypertension, lipids  - lisinopril (PRINIVIL/ZESTRIL) 20 MG tablet; Take 1 tablet (20 mg) by mouth daily  Dispense: 90 tablet; Refill: 3  - metoprolol (TOPROL-XL) 100 MG 24 hr tablet; Take 1 tablet (100 mg) by mouth daily  Dispense: 90 tablet; Refill: 3  - rosuvastatin (CRESTOR) 40 MG tablet; Take 1 tablet (40 mg) by mouth daily  Dispense: 90 tablet; Refill: 3  - nitroGLYcerin (NITROSTAT) 0.4 MG sublingual tablet; Place 1 tablet (0.4 mg) under the tongue every 5 minutes as needed up to 3 tablets per episode.  Dispense: 60 tablet; Refill: 6    4. Hyperlipidemia LDL goal <100  LDL Cholesterol Calculated   Date Value Ref Range Status   08/14/2017 81 <100 mg/dL Final     Comment:     Desirable:       <100 mg/dl   ]   LDL is at goal, continue with Crestor 40 mg daily, baby aspirin, healthy eating and regular exercises  - rosuvastatin (CRESTOR) 40 MG tablet; Take 1 tablet (40 mg) by mouth daily  Dispense: 90 tablet; Refill: 3    5. Chest pain due to myocardial ischemia, unspecified ischemic chest pain type  "(H)    - nitroGLYcerin (NITROSTAT) 0.4 MG sublingual tablet; Place 1 tablet (0.4 mg) under the tongue every 5 minutes as needed up to 3 tablets per episode.  Dispense: 60 tablet; Refill: 6    6. Prediabetes  Lab Results   Component Value Date    A1C 5.6 08/14/2017    A1C 5.8 02/24/2017    A1C 6.3 06/27/2016    A1C 6.2 12/17/2015    A1C 6.0 12/23/2014     Glucose   Date Value Ref Range Status   08/14/2017 122 (H) 70 - 99 mg/dL Final     Comment:     Fasting specimen   ]    Reviewed normal A1c moderately elevated fasting blood sugars. Tissue to patient's efforts on weight loss, healthy eating, regular exercises  recheck in 6 months.    7. Obesity (BMI 30-39.9)  Emphasized on weight loss, portion control, low calorie and low fat diet, healthy eating, regular exercises.          9. Prostate Cancer, s/p prostatectomy  Patient is doing well, monitor    10. Gastroesophageal reflux disease, esophagitis presence not specified  Continue reflux precautions, over-the-counter Prevacid 15 mg p.r.n.    11. Erectile dysfunction, unspecified erectile dysfunction type  On Levitra      End of Life Planning:  Patient currently has an advanced directive: Yes.  Practitioner is supportive of decision.    COUNSELING:  Reviewed preventive health counseling, as reflected in patient instructions  Special attention given to:       Regular exercise       Healthy diet/nutrition       Vision screening       Hearing screening       Dental care       Colon cancer screening       Osteoporosis Prevention/Bone Health       The ASCVD Risk score ([place] ELLIE Jr, et al., 2013) failed to calculate for the following reasons:    The patient has a prior MCI or stroke diagnosis        Estimated body mass index is 30.9 kg/(m^2) as calculated from the following:    Height as of this encounter: 5' 4.25\" (1.632 m).    Weight as of this encounter: 181 lb 6.4 oz (82.3 kg).  Weight management plan: Discussed healthy diet and exercise guidelines and patient will follow " up in 6 months in clinic to re-evaluate.   reports that he has never smoked. He has never used smokeless tobacco.        Appropriate preventive services were discussed with this patient, including applicable screening as appropriate for cardiovascular disease, diabetes, osteopenia/osteoporosis, and glaucoma.  As appropriate for age/gender, discussed screening for colorectal cancer, prostate cancer, breast cancer, and cervical cancer. Checklist reviewing preventive services available has been given to the patient.    Reviewed patients plan of care and provided an AVS. The Intermediate Care Plan ( asthma action plan, low back pain action plan, and migraine action plan) for Francisco Javier meets the Care Plan requirement. This Care Plan has been established and reviewed with the Patient.    Counseling Resources:  ATP IV Guidelines  Pooled Cohorts Equation Calculator  Breast Cancer Risk Calculator  FRAX Risk Assessment  ICSI Preventive Guidelines  Dietary Guidelines for Americans, 2010  USDA's MyPlate  ASA Prophylaxis  Lung CA Screening    Florian Alonzo MD  [facility]  Chart documentation done in part with Dragon Voice recognition Software. Although reviewed after completion, some word and grammatical error may remain.

## 2017-08-15 NOTE — NURSING NOTE
"Chief Complaint   Patient presents with     Physical       Initial /76  Pulse 66  Temp 97.3  F (36.3  C) (Oral)  Ht 5' 4.25\" (1.632 m)  Wt 181 lb 6.4 oz (82.3 kg)  SpO2 96%  BMI 30.9 kg/m2 Estimated body mass index is 30.9 kg/(m^2) as calculated from the following:    Height as of this encounter: 5' 4.25\" (1.632 m).    Weight as of this encounter: 181 lb 6.4 oz (82.3 kg).  BP completed using cuff size: anat Luong CMA    "

## 2017-09-12 ENCOUNTER — OFFICE VISIT (OUTPATIENT)
Dept: FAMILY MEDICINE | Facility: CLINIC | Age: 77
End: 2017-09-12
Payer: MEDICARE

## 2017-09-12 VITALS
SYSTOLIC BLOOD PRESSURE: 132 MMHG | BODY MASS INDEX: 30.82 KG/M2 | OXYGEN SATURATION: 97 % | HEART RATE: 65 BPM | DIASTOLIC BLOOD PRESSURE: 62 MMHG | WEIGHT: 185 LBS | HEIGHT: 65 IN | RESPIRATION RATE: 16 BRPM | TEMPERATURE: 97.9 F

## 2017-09-12 DIAGNOSIS — Z23 NEED FOR PROPHYLACTIC VACCINATION AND INOCULATION AGAINST INFLUENZA: ICD-10-CM

## 2017-09-12 DIAGNOSIS — H01.021 SQUAMOUS BLEPHARITIS OF RIGHT UPPER EYELID: Primary | ICD-10-CM

## 2017-09-12 DIAGNOSIS — L30.9 ECZEMA, UNSPECIFIED TYPE: ICD-10-CM

## 2017-09-12 PROCEDURE — 90662 IIV NO PRSV INCREASED AG IM: CPT | Performed by: FAMILY MEDICINE

## 2017-09-12 PROCEDURE — 99213 OFFICE O/P EST LOW 20 MIN: CPT | Mod: 25 | Performed by: FAMILY MEDICINE

## 2017-09-12 PROCEDURE — G0008 ADMIN INFLUENZA VIRUS VAC: HCPCS | Performed by: FAMILY MEDICINE

## 2017-09-12 ASSESSMENT — PAIN SCALES - GENERAL: PAINLEVEL: NO PAIN (0)

## 2017-09-12 NOTE — NURSING NOTE
"Chief Complaint   Patient presents with     Eye Problem     right eye lid -      Derm Problem     sides of nose         Initial /62 (BP Location: Right arm, Patient Position: Chair, Cuff Size: Adult Large)  Pulse 65  Temp 97.9  F (36.6  C) (Pulmonary Artery)  Resp 16  Ht 1.638 m (5' 4.5\")  Wt 83.9 kg (185 lb)  SpO2 97%  BMI 31.26 kg/m2 Estimated body mass index is 31.26 kg/(m^2) as calculated from the following:    Height as of this encounter: 1.638 m (5' 4.5\").    Weight as of this encounter: 83.9 kg (185 lb).  Medication Reconciliation: complete   Sindi Wong CMA      "

## 2017-09-12 NOTE — PROGRESS NOTES
Injectable Influenza Immunization Documentation    1.  Are you sick today? (Fever of 100.5 or higher on the day of the clinic)   No    2.  Have you ever had Guillain-[place] Syndrome within 6 weeks of an influenza vaccionation?  No    3. Do you have a life-threatening allergy to eggs?  No    4. Do you have a life-threatening allergy to a component of the vaccine? May include antibiotics, gelatin or latex.  No     5. Have you ever had a reaction to a dose of flu vaccine that needed immediate medical attention?  No     Form completed by Lyubov Delarosa MD

## 2017-09-12 NOTE — PATIENT INSTRUCTIONS
Baby shampoo eye washes recommended twice a day after warm compress to eye.    Continue the other creams for area around the nose.      Blepharitis    Blepharitis is an inflammation of the eyelid. It results in swelling of the eyelids, and it is usually caused by a bacterial infection or a skin condition. Blepharitis is a common eye condition. There are two types. Anterior blepharitis occurs where the eyelashes are attached (outside front edge of the eye). Posterior blepharitis affects the inner edge of the eyelid that touches the eyeball.  In addition to swollen eyelids, symptoms of blepharitis can include thick, yellow, dandruff-like scales that stick to the eyelid. There may be oily patches on the eyelid. The eyelashes may be crusted (with dandruff-like scales) when you wake up from sleeping. The irritated area may itch. The eyelids may be red. The eyes can be red and burn or sting. The eyes may tear a lot, or be dry. You can become sensitive to light or have blurred vision. Symptoms of blepharitis can cause irritability.  Blepharitis is a chronic condition and difficult to cure. Even with successful treatment, recurrences are common. Good hygiene and home treatments (in the Home care section below) can improve your condition.  Causes  Causes of blepharitis may include:    Problems with the oil glands in the eyelid (meibomian glands)    Dandruff of the scalp and eyebrows (seborrheic dermatitis)    Acne rosacea (a skin condition that causes redness of the face, and other symptoms)    Eyelash mites (tiny organisms in the eyelash follicles)    Allergic reactions to cosmetics or medicines  Home care  Medicine: The healthcare provider may prescribe an antibiotic eye drops or ointment, artificial tears, and/or steroid eye drops. Follow all instructions for using these medicines. Use all medicines as directed. If you have pain, take medicine as advised by the healthcare provider.    Wash your hands carefully with soap  and warm water before and after caring for your eyes.    Apply a warm compress or a warm, moist washcloth to the eyelids for 1 minute, 2 to 3 times a day, to loosen the crust. Then, wipe away scales or crust from the eyelids.    After applying the warm compress, gently scrub the base of the eyelashes for almost 15 seconds per eyelid. To do this, close your eyes and use a moist eyelid cleansing wipe, clean washcloth, or cotton swab. Ask your healthcare provider about products (such as nonirritating baby shampoo) to use to help clean the eyelids.    You may be instructed to gently massage your eyelids to help unblock the eyelid glands. Follow all instructions given by the healthcare provider.    Unless told otherwise, on a regular basis, with eyes closed, clean your eyelids as directed by the healthcare provider. Blepharitis can be an ongoing problem.    Do not wear eye makeup until the inflammation goes away, or as directed by your healthcare provider.    Unless told otherwise, stop using contact lenses until you complete treatment for the condition.    Wash your hands regularly to help prevent dirt and bacteria from coming in contact with your eyelid.  Follow-up care  Follow up with your healthcare provider, or as advised. Your healthcare provider may refer you to an eye specialist (an optometrist or ophthalmologist) for further evaluation and treatment.  When to seek medical advice  Call your healthcare provider right away if any of these occur:    Increase in redness of the white part of the eye    Increase in swelling, redness, irritation, or pain of the eyelids    Eye pain    Change in vision (trouble seeing or blurring)    Drainage (pus, blood) from the eyelid    Fever of 100.4 F (38 C) or higher, or as directed by your healthcare provider  Date Last Reviewed: 10/9/2015    [id number] [employer]. [address] [place] [address], [place] [address]. All rights reserved. This information is not intended as a  substitute for professional medical care. Always follow your healthcare professional's instructions.

## 2017-09-12 NOTE — PROGRESS NOTES
"  SUBJECTIVE:   Francisco Javier Cortes is a 77 year old male who presents to clinic today for the following health issues:      Eye(s) Problem  Onset: 3 days     Description:   Location: right eye lid  Pain: burning   Redness: YES    Accompanying Signs & Symptoms:  Discharge/mattering: no  Swelling: no  Visual changes: no  Fever: no  Nasal Congestion: no  Bothered by bright lights: no    History:   Trauma: no   Foreign body exposure: not sure, worked at the [employer] in [place] - Saturday    Precipitating factors:   Wearing contacts: no    Alleviating factors:  Improved by: artificial tears.   Creams triple antibiotic ointment and vanicream to the nose area.    Therapies Tried and outcome: some better     Skin on both sides of nose, using OTC creams and ointments - is a little bettrer           Problem list and histories reviewed & adjusted, as indicated.  Additional history: as documented    BP Readings from Last 3 Encounters:   09/12/17 132/62   08/15/17 112/76   05/27/17 146/82    Wt Readings from Last 3 Encounters:   09/12/17 83.9 kg (185 lb)   08/15/17 82.3 kg (181 lb 6.4 oz)   05/27/17 83.7 kg (184 lb 9.6 oz)                      Reviewed and updated as needed this visit by clinical staffTobacco  Allergies  Meds       Reviewed and updated as needed this visit by Provider  Tobacco  Allergies  Meds  Med Hx  Surg Hx  Fam Hx  Soc Hx        ROS:  Constitutional, HEENT, cardiovascular, pulmonary, gi and gu systems are negative, except as otherwise noted.      OBJECTIVE:   /62 (BP Location: Right arm, Patient Position: Chair, Cuff Size: Adult Large)  Pulse 65  Temp 97.9  F (36.6  C) (Pulmonary Artery)  Resp 16  Ht 1.638 m (5' 4.5\")  Wt 83.9 kg (185 lb)  SpO2 97%  BMI 31.26 kg/m2  Body mass index is 31.26 kg/(m^2).  GENERAL: alert, no distress and elderly  EYES: PERRL, EOMI, conjunctivae and sclerae normal and eyelids- right upper eyelid with mild erythema and swelling, small amount scaling.    HENT: " ear canals and TM's normal, nose and mouth without ulcers or lesions  NECK: no adenopathy, no asymmetry, masses, or scars and thyroid normal to palpation  RESP: lungs clear to auscultation - no rales, rhonchi or wheezes  CV: regular rate and rhythm, normal S1 S2, no S3 or S4, no murmur, click or rub, no peripheral edema and peripheral pulses strong  SKIN:  Redness fredy-nasal bilaterally, scaling.  No secondary infection noted.  Right eyebrow with scaling eczematous change as well.        ASSESSMENT/PLAN:         1. Squamous blepharitis of right upper eyelid  See instructions.  Baby shampoo eye washes, warm compresses.  Follow up if vision change, drainage or worsening symptoms.    2. Eczema, unspecified type  Face - see instructions.    3. Need for prophylactic vaccination and inoculation against influenza  - FLU VACCINE, INCREASED ANTIGEN, PRESV FREE, AGE 65+ [16452]  - ADMIN INFLUENZA (For MEDICARE Patients ONLY) []    Patient Instructions   Baby shampoo eye washes recommended twice a day after warm compress to eye.    Continue the other creams for area around the nose.      Blepharitis    Blepharitis is an inflammation of the eyelid. It results in swelling of the eyelids, and it is usually caused by a bacterial infection or a skin condition. Blepharitis is a common eye condition. There are two types. Anterior blepharitis occurs where the eyelashes are attached (outside front edge of the eye). Posterior blepharitis affects the inner edge of the eyelid that touches the eyeball.  In addition to swollen eyelids, symptoms of blepharitis can include thick, yellow, dandruff-like scales that stick to the eyelid. There may be oily patches on the eyelid. The eyelashes may be crusted (with dandruff-like scales) when you wake up from sleeping. The irritated area may itch. The eyelids may be red. The eyes can be red and burn or sting. The eyes may tear a lot, or be dry. You can become sensitive to light or have blurred  vision. Symptoms of blepharitis can cause irritability.  Blepharitis is a chronic condition and difficult to cure. Even with successful treatment, recurrences are common. Good hygiene and home treatments (in the Home care section below) can improve your condition.  Causes  Causes of blepharitis may include:    Problems with the oil glands in the eyelid (meibomian glands)    Dandruff of the scalp and eyebrows (seborrheic dermatitis)    Acne rosacea (a skin condition that causes redness of the face, and other symptoms)    Eyelash mites (tiny organisms in the eyelash follicles)    Allergic reactions to cosmetics or medicines  Home care  Medicine: The healthcare provider may prescribe an antibiotic eye drops or ointment, artificial tears, and/or steroid eye drops. Follow all instructions for using these medicines. Use all medicines as directed. If you have pain, take medicine as advised by the healthcare provider.    Wash your hands carefully with soap and warm water before and after caring for your eyes.    Apply a warm compress or a warm, moist washcloth to the eyelids for 1 minute, 2 to 3 times a day, to loosen the crust. Then, wipe away scales or crust from the eyelids.    After applying the warm compress, gently scrub the base of the eyelashes for almost 15 seconds per eyelid. To do this, close your eyes and use a moist eyelid cleansing wipe, clean washcloth, or cotton swab. Ask your healthcare provider about products (such as nonirritating baby shampoo) to use to help clean the eyelids.    You may be instructed to gently massage your eyelids to help unblock the eyelid glands. Follow all instructions given by the healthcare provider.    Unless told otherwise, on a regular basis, with eyes closed, clean your eyelids as directed by the healthcare provider. Blepharitis can be an ongoing problem.    Do not wear eye makeup until the inflammation goes away, or as directed by your healthcare provider.    Unless told  otherwise, stop using contact lenses until you complete treatment for the condition.    Wash your hands regularly to help prevent dirt and bacteria from coming in contact with your eyelid.  Follow-up care  Follow up with your healthcare provider, or as advised. Your healthcare provider may refer you to an eye specialist (an optometrist or ophthalmologist) for further evaluation and treatment.  When to seek medical advice  Call your healthcare provider right away if any of these occur:    Increase in redness of the white part of the eye    Increase in swelling, redness, irritation, or pain of the eyelids    Eye pain    Change in vision (trouble seeing or blurring)    Drainage (pus, blood) from the eyelid    Fever of 100.4 F (38 C) or higher, or as directed by your healthcare provider  Date Last Reviewed: 10/9/2015    0379-3003 [employer]. [address] [place] [address], [place] [address]. All rights reserved. This information is not intended as a substitute for professional medical care. Always follow your healthcare professional's instructions.            Lyubov Delarosa MD  [facility]

## 2017-09-12 NOTE — MR AVS SNAPSHOT
After Visit Summary   9/12/2017    Francisco Javier Cortes    MRN: [id number]           Patient Information     Date Of Birth          1940        Visit Information        Provider Department      9/12/2017 8:40 AM Lyubov Delarosa MD [facility]        Today's Diagnoses     Squamous blepharitis of right upper eyelid    -  1    Eczema, unspecified type          Care Instructions    Baby shampoo eye washes recommended twice a day after warm compress to eye.    Continue the other creams for area around the nose.      Blepharitis    Blepharitis is an inflammation of the eyelid. It results in swelling of the eyelids, and it is usually caused by a bacterial infection or a skin condition. Blepharitis is a common eye condition. There are two types. Anterior blepharitis occurs where the eyelashes are attached (outside front edge of the eye). Posterior blepharitis affects the inner edge of the eyelid that touches the eyeball.  In addition to swollen eyelids, symptoms of blepharitis can include thick, yellow, dandruff-like scales that stick to the eyelid. There may be oily patches on the eyelid. The eyelashes may be crusted (with dandruff-like scales) when you wake up from sleeping. The irritated area may itch. The eyelids may be red. The eyes can be red and burn or sting. The eyes may tear a lot, or be dry. You can become sensitive to light or have blurred vision. Symptoms of blepharitis can cause irritability.  Blepharitis is a chronic condition and difficult to cure. Even with successful treatment, recurrences are common. Good hygiene and home treatments (in the Home care section below) can improve your condition.  Causes  Causes of blepharitis may include:    Problems with the oil glands in the eyelid (meibomian glands)    Dandruff of the scalp and eyebrows (seborrheic dermatitis)    Acne rosacea (a skin condition that causes redness of the face, and other symptoms)    Eyelash mites (tiny organisms  in the eyelash follicles)    Allergic reactions to cosmetics or medicines  Home care  Medicine: The healthcare provider may prescribe an antibiotic eye drops or ointment, artificial tears, and/or steroid eye drops. Follow all instructions for using these medicines. Use all medicines as directed. If you have pain, take medicine as advised by the healthcare provider.    Wash your hands carefully with soap and warm water before and after caring for your eyes.    Apply a warm compress or a warm, moist washcloth to the eyelids for 1 minute, 2 to 3 times a day, to loosen the crust. Then, wipe away scales or crust from the eyelids.    After applying the warm compress, gently scrub the base of the eyelashes for almost 15 seconds per eyelid. To do this, close your eyes and use a moist eyelid cleansing wipe, clean washcloth, or cotton swab. Ask your healthcare provider about products (such as nonirritating baby shampoo) to use to help clean the eyelids.    You may be instructed to gently massage your eyelids to help unblock the eyelid glands. Follow all instructions given by the healthcare provider.    Unless told otherwise, on a regular basis, with eyes closed, clean your eyelids as directed by the healthcare provider. Blepharitis can be an ongoing problem.    Do not wear eye makeup until the inflammation goes away, or as directed by your healthcare provider.    Unless told otherwise, stop using contact lenses until you complete treatment for the condition.    Wash your hands regularly to help prevent dirt and bacteria from coming in contact with your eyelid.  Follow-up care  Follow up with your healthcare provider, or as advised. Your healthcare provider may refer you to an eye specialist (an optometrist or ophthalmologist) for further evaluation and treatment.  When to seek medical advice  Call your healthcare provider right away if any of these occur:    Increase in redness of the white part of the eye    Increase in  swelling, redness, irritation, or pain of the eyelids    Eye pain    Change in vision (trouble seeing or blurring)    Drainage (pus, blood) from the eyelid    Fever of 100.4 F (38 C) or higher, or as directed by your healthcare provider  Date Last Reviewed: 10/9/2015    7085-6483 [employer]. [address] [place] [address]. All rights reserved. This information is not intended as a substitute for professional medical care. Always follow your healthcare professional's instructions.                Follow-ups after your visit        Your next 10 appointments already scheduled     Apr 10, 2018  8:00 AM CDT   Return Visit with Jad Ball MD   [facility] [[email or web address]] ([facility] [[email or web address]])    [address] [place] [id number] [phone]              Who to contact     If you have questions or need follow up information about today's clinic visit or your schedule please contact [facility] directly at [phone].  Normal or non-critical lab and imaging results will be communicated to you by Sepiorhart, letter or phone within 4 business days after the clinic has received the results. If you do not hear from us within 7 days, please contact the clinic through zhiwot or phone. If you have a critical or abnormal lab result, we will notify you by phone as soon as possible.  Submit refill requests through appssavvy or call your pharmacy and they will forward the refill request to us. Please allow 3 business days for your refill to be completed.          Additional Information About Your Visit        Sepior[employer] [place] Information     appssavvy gives you secure access to your electronic health record. If you see a primary care provider, you can also send messages to your care team and make appointments. If you have questions, please call your primary care clinic.  If you do not have a primary care provider, please call [phone] and they will assist you.       "  Care EveryWhere ID     This is your Care EveryWhere ID. This could be used by other organizations to access your [facility] medical records  [id number]        Your Vitals Were     Pulse Temperature Respirations Height Pulse Oximetry BMI (Body Mass Index)    65 97.9  F (36.6  C) (Pulmonary Artery) 16 1.638 m (5' 4.5\") 97% 31.26 kg/m2       Blood Pressure from Last 3 Encounters:   09/12/17 132/62   08/15/17 112/76   05/27/17 146/82    Weight from Last 3 Encounters:   09/12/17 83.9 kg (185 lb)   08/15/17 82.3 kg (181 lb 6.4 oz)   05/27/17 83.7 kg (184 lb 9.6 oz)              Today, you had the following     No orders found for display       Primary Care Provider Office Phone # Fax #    Florian Alonzo [phone] [address]  [facility] [address]        Equal Access to Services     [facility]: Hadii aad ku hadasho Soomaali, waaxda luqadaha, qaybta kaalmada adeegyada, waxay inckin haykaiden bazan . So [facility] [phone].    ATENCIÓN: Si habla español, tiene a ivan disposición servicios gratuitos de asistencia lingüística. [facility] [phone].    We comply with applicable federal civil rights laws and [place] laws. We do not discriminate on the basis of race, color, national origin, age, disability sex, sexual orientation or gender identity.            Thank you!     Thank you for choosing [facility]  for your care. Our goal is always to provide you with excellent care. Hearing back from our patients is one way we can continue to improve our services. Please take a few minutes to complete the written survey that you may receive in the mail after your visit with us. Thank you!             Your Updated Medication List - Protect others around you: Learn how to safely use, store and throw away your medicines at www.disposemymeds.org.          This list is accurate as of: 9/12/17  9:23 AM.  Always use your most recent med list.                   Brand Name " Dispense Instructions for use Diagnosis    aspirin 81 MG tablet      1 TABLET DAILY        clindamycin 1 % lotion    CLEOCIN T    60 mL    Apply twice daily for 6 weeks to the groin    Rash       hydrocortisone valerate 0.2 % ointment    WEST-NINOSKA    45 g    Apply sparingly to affected area three times daily as needed.    Psoriasis       lisinopril 20 MG tablet    PRINIVIL/ZESTRIL    90 tablet    Take 1 tablet (20 mg) by mouth daily    Hypertension goal BP (blood pressure) < 130/80, Coronary artery disease involving native coronary artery of native heart without angina pectoris       MAALOX ADVANCED PO      Take 1 tablet as needed (Calcium carbonate 1000mg/Simethicone 80mg)    Chest pain       metoprolol 100 MG 24 hr tablet    TOPROL-XL    90 tablet    Take 1 tablet (100 mg) by mouth daily    Hypertension goal BP (blood pressure) < 130/80, Coronary artery disease involving native coronary artery of native heart without angina pectoris       nitroGLYcerin 0.4 MG sublingual tablet    NITROSTAT    60 tablet    Place 1 tablet (0.4 mg) under the tongue every 5 minutes as needed up to 3 tablets per episode.    Chest pain due to myocardial ischemia, unspecified ischemic chest pain type (H), Coronary artery disease involving native coronary artery of native heart without angina pectoris       omega 3 1000 MG Caps     120 capsule    Take 1 g by mouth 2 times daily    Coronary artery disease involving native coronary artery of native heart with angina pectoris (H)       PREVACID PO      Take 15 mg by mouth every morning (before breakfast) Patient needs to use brand name Prevacid (generic version causes diarrhea)        rosuvastatin 40 MG tablet    CRESTOR    90 tablet    Take 1 tablet (40 mg) by mouth daily    Hyperlipidemia LDL goal <100, Coronary artery disease involving native coronary artery of native heart without angina pectoris       TYLENOL 8 HOUR PO      Take 500 mg by mouth as needed        vardenafil 20 MG tablet     LEVITRA    6 tablet    Take 1 tablet (20 mg) by mouth daily as needed Take 30 min to 4 hours before intercourse.  Never use with nitroglycerin, terazosin or doxazosin.    Erectile dysfunction, unspecified erectile dysfunction type

## 2018-01-01 ENCOUNTER — ONCOLOGY VISIT (OUTPATIENT)
Dept: ONCOLOGY | Facility: CLINIC | Age: 78
End: 2018-01-01
Payer: MEDICARE

## 2018-01-01 ENCOUNTER — OFFICE VISIT (OUTPATIENT)
Dept: RADIOLOGY | Facility: CLINIC | Age: 78
End: 2018-01-01
Attending: RADIOLOGY
Payer: MEDICARE

## 2018-01-01 ENCOUNTER — CARE COORDINATION (OUTPATIENT)
Dept: ONCOLOGY | Facility: CLINIC | Age: 78
End: 2018-01-01

## 2018-01-01 ENCOUNTER — HOSPITAL ENCOUNTER (INPATIENT)
Facility: CLINIC | Age: 78
LOS: 3 days | Discharge: HOME | DRG: 683 | End: 2018-04-01
Attending: INTERNAL MEDICINE | Admitting: INTERNAL MEDICINE
Payer: MEDICARE

## 2018-01-01 ENCOUNTER — TELEPHONE (OUTPATIENT)
Dept: CARDIOLOGY | Facility: CLINIC | Age: 78
End: 2018-01-01

## 2018-01-01 ENCOUNTER — TELEPHONE (OUTPATIENT)
Dept: PEDIATRICS | Facility: CLINIC | Age: 78
End: 2018-01-01

## 2018-01-01 ENCOUNTER — INFUSION THERAPY VISIT (OUTPATIENT)
Dept: INFUSION THERAPY | Facility: CLINIC | Age: 78
End: 2018-01-01
Payer: MEDICARE

## 2018-01-01 ENCOUNTER — HOSPITAL ENCOUNTER (INPATIENT)
Facility: CLINIC | Age: 78
LOS: 2 days | Discharge: HOSPICE | DRG: 840 | End: 2018-09-19
Attending: INTERNAL MEDICINE | Admitting: INTERNAL MEDICINE
Payer: MEDICARE

## 2018-01-01 ENCOUNTER — APPOINTMENT (OUTPATIENT)
Dept: GENERAL RADIOLOGY | Facility: CLINIC | Age: 78
End: 2018-01-01
Attending: EMERGENCY MEDICINE
Payer: MEDICARE

## 2018-01-01 ENCOUNTER — TRANSFERRED RECORDS (OUTPATIENT)
Dept: HEALTH INFORMATION MANAGEMENT | Facility: CLINIC | Age: 78
End: 2018-01-01

## 2018-01-01 ENCOUNTER — HOSPITAL ENCOUNTER (INPATIENT)
Facility: CLINIC | Age: 78
LOS: 4 days | Discharge: HOME | DRG: 871 | End: 2018-04-15
Attending: INTERNAL MEDICINE | Admitting: INTERNAL MEDICINE
Payer: MEDICARE

## 2018-01-01 ENCOUNTER — APPOINTMENT (OUTPATIENT)
Dept: INTERVENTIONAL RADIOLOGY/VASCULAR | Facility: CLINIC | Age: 78
End: 2018-01-01
Attending: PHYSICIAN ASSISTANT
Payer: MEDICARE

## 2018-01-01 ENCOUNTER — HOSPITAL ENCOUNTER (OUTPATIENT)
Facility: CLINIC | Age: 78
Setting detail: OBSERVATION
Discharge: HOME | End: 2018-08-28
Attending: EMERGENCY MEDICINE | Admitting: INTERNAL MEDICINE
Payer: MEDICARE

## 2018-01-01 ENCOUNTER — RADIANT APPOINTMENT (OUTPATIENT)
Dept: RADIOLOGY | Facility: AMBULATORY SURGERY CENTER | Age: 78
End: 2018-01-01
Attending: PHYSICIAN ASSISTANT
Payer: MEDICARE

## 2018-01-01 ENCOUNTER — DOCUMENTATION ONLY (OUTPATIENT)
Dept: SPIRITUAL SERVICES | Facility: CLINIC | Age: 78
End: 2018-01-01

## 2018-01-01 ENCOUNTER — RADIANT APPOINTMENT (OUTPATIENT)
Dept: CT IMAGING | Facility: CLINIC | Age: 78
End: 2018-01-01
Attending: CLINICAL NURSE SPECIALIST
Payer: MEDICARE

## 2018-01-01 ENCOUNTER — APPOINTMENT (OUTPATIENT)
Dept: INTERVENTIONAL RADIOLOGY/VASCULAR | Facility: CLINIC | Age: 78
End: 2018-01-01
Attending: RADIOLOGY
Payer: MEDICARE

## 2018-01-01 ENCOUNTER — OFFICE VISIT (OUTPATIENT)
Dept: PEDIATRICS | Facility: CLINIC | Age: 78
End: 2018-01-01
Payer: MEDICARE

## 2018-01-01 ENCOUNTER — OFFICE VISIT (OUTPATIENT)
Dept: SURGERY | Facility: CLINIC | Age: 78
End: 2018-01-01
Payer: MEDICARE

## 2018-01-01 ENCOUNTER — RADIANT APPOINTMENT (OUTPATIENT)
Dept: CT IMAGING | Facility: CLINIC | Age: 78
End: 2018-01-01
Attending: INTERNAL MEDICINE
Payer: MEDICARE

## 2018-01-01 ENCOUNTER — TELEPHONE (OUTPATIENT)
Dept: ONCOLOGY | Facility: CLINIC | Age: 78
End: 2018-01-01

## 2018-01-01 ENCOUNTER — RADIANT APPOINTMENT (OUTPATIENT)
Dept: PET IMAGING | Facility: CLINIC | Age: 78
End: 2018-01-01
Attending: INTERNAL MEDICINE
Payer: MEDICARE

## 2018-01-01 ENCOUNTER — HOSPITAL ENCOUNTER (OUTPATIENT)
Facility: CLINIC | Age: 78
Discharge: HOME | End: 2018-03-22
Attending: OTOLARYNGOLOGY | Admitting: OTOLARYNGOLOGY
Payer: MEDICARE

## 2018-01-01 ENCOUNTER — OFFICE VISIT (OUTPATIENT)
Dept: URGENT CARE | Facility: URGENT CARE | Age: 78
End: 2018-01-01
Payer: MEDICARE

## 2018-01-01 ENCOUNTER — ALLIED HEALTH/NURSE VISIT (OUTPATIENT)
Dept: PHARMACY | Facility: CLINIC | Age: 78
End: 2018-01-01
Payer: OTHER GOVERNMENT

## 2018-01-01 ENCOUNTER — ANCILLARY PROCEDURE (OUTPATIENT)
Dept: GENERAL RADIOLOGY | Facility: CLINIC | Age: 78
End: 2018-01-01
Payer: MEDICARE

## 2018-01-01 ENCOUNTER — OFFICE VISIT (OUTPATIENT)
Dept: PULMONOLOGY | Facility: CLINIC | Age: 78
DRG: 841 | End: 2018-01-01
Attending: INTERNAL MEDICINE
Payer: MEDICARE

## 2018-01-01 ENCOUNTER — APPOINTMENT (OUTPATIENT)
Dept: CARDIOLOGY | Facility: CLINIC | Age: 78
DRG: 291 | End: 2018-01-01
Attending: PHYSICIAN ASSISTANT
Payer: MEDICARE

## 2018-01-01 ENCOUNTER — NURSE TRIAGE (OUTPATIENT)
Dept: NURSING | Facility: CLINIC | Age: 78
End: 2018-01-01

## 2018-01-01 ENCOUNTER — APPOINTMENT (OUTPATIENT)
Dept: SURGERY | Facility: CLINIC | Age: 78
End: 2018-01-01
Payer: MEDICARE

## 2018-01-01 ENCOUNTER — OFFICE VISIT (OUTPATIENT)
Dept: ONCOLOGY | Facility: CLINIC | Age: 78
End: 2018-01-01
Attending: PHYSICIAN ASSISTANT
Payer: MEDICARE

## 2018-01-01 ENCOUNTER — HOSPITAL ENCOUNTER (OUTPATIENT)
Facility: AMBULATORY SURGERY CENTER | Age: 78
End: 2018-01-01
Attending: PHYSICIAN ASSISTANT
Payer: MEDICARE

## 2018-01-01 ENCOUNTER — DOCUMENTATION ONLY (OUTPATIENT)
Dept: OTOLARYNGOLOGY | Facility: CLINIC | Age: 78
End: 2018-01-01

## 2018-01-01 ENCOUNTER — ALLIED HEALTH/NURSE VISIT (OUTPATIENT)
Dept: SURGERY | Facility: CLINIC | Age: 78
End: 2018-01-01
Payer: MEDICARE

## 2018-01-01 ENCOUNTER — OFFICE VISIT (OUTPATIENT)
Dept: CARDIOLOGY | Facility: CLINIC | Age: 78
End: 2018-01-01
Payer: MEDICARE

## 2018-01-01 ENCOUNTER — DOCUMENTATION ONLY (OUTPATIENT)
Dept: CARE COORDINATION | Facility: CLINIC | Age: 78
End: 2018-01-01

## 2018-01-01 ENCOUNTER — ALLIED HEALTH/NURSE VISIT (OUTPATIENT)
Dept: ONCOLOGY | Facility: CLINIC | Age: 78
DRG: 841 | End: 2018-01-01
Attending: INTERNAL MEDICINE
Payer: MEDICARE

## 2018-01-01 ENCOUNTER — RADIANT APPOINTMENT (OUTPATIENT)
Dept: CT IMAGING | Facility: CLINIC | Age: 78
End: 2018-01-01
Attending: FAMILY MEDICINE
Payer: MEDICARE

## 2018-01-01 ENCOUNTER — SURGERY (OUTPATIENT)
Age: 78
End: 2018-01-01

## 2018-01-01 ENCOUNTER — HOSPITAL ENCOUNTER (OUTPATIENT)
Facility: AMBULATORY SURGERY CENTER | Age: 78
End: 2018-08-08
Attending: PHYSICIAN ASSISTANT
Payer: MEDICARE

## 2018-01-01 ENCOUNTER — DOCUMENTATION ONLY (OUTPATIENT)
Dept: ONCOLOGY | Facility: CLINIC | Age: 78
End: 2018-01-01

## 2018-01-01 ENCOUNTER — NURSING HOME VISIT (OUTPATIENT)
Dept: GERIATRICS | Facility: CLINIC | Age: 78
End: 2018-01-01
Payer: MEDICARE

## 2018-01-01 ENCOUNTER — RADIANT APPOINTMENT (OUTPATIENT)
Dept: ULTRASOUND IMAGING | Facility: CLINIC | Age: 78
End: 2018-01-01
Attending: FAMILY MEDICINE
Payer: MEDICARE

## 2018-01-01 ENCOUNTER — HOSPITAL ENCOUNTER (OUTPATIENT)
Facility: CLINIC | Age: 78
Discharge: HOME | End: 2018-09-06
Attending: RADIOLOGY | Admitting: RADIOLOGY
Payer: MEDICARE

## 2018-01-01 ENCOUNTER — HOSPITAL ENCOUNTER (INPATIENT)
Facility: CLINIC | Age: 78
LOS: 1 days | Discharge: HOME | DRG: 291 | End: 2018-04-21
Attending: EMERGENCY MEDICINE | Admitting: INTERNAL MEDICINE
Payer: MEDICARE

## 2018-01-01 ENCOUNTER — TELEPHONE (OUTPATIENT)
Dept: CARE COORDINATION | Facility: CLINIC | Age: 78
End: 2018-01-01

## 2018-01-01 ENCOUNTER — HOSPITAL ENCOUNTER (OUTPATIENT)
Facility: AMBULATORY SURGERY CENTER | Age: 78
End: 2018-08-31
Attending: PHYSICIAN ASSISTANT
Payer: MEDICARE

## 2018-01-01 ENCOUNTER — MEDICAL CORRESPONDENCE (OUTPATIENT)
Dept: HEALTH INFORMATION MANAGEMENT | Facility: CLINIC | Age: 78
End: 2018-01-01

## 2018-01-01 ENCOUNTER — OFFICE VISIT (OUTPATIENT)
Dept: OTOLARYNGOLOGY | Facility: CLINIC | Age: 78
End: 2018-01-01
Payer: MEDICARE

## 2018-01-01 ENCOUNTER — ANESTHESIA EVENT (OUTPATIENT)
Dept: SURGERY | Facility: CLINIC | Age: 78
End: 2018-01-01
Payer: MEDICARE

## 2018-01-01 ENCOUNTER — PATIENT OUTREACH (OUTPATIENT)
Dept: CARE COORDINATION | Facility: CLINIC | Age: 78
End: 2018-01-01

## 2018-01-01 ENCOUNTER — MYC MEDICAL ADVICE (OUTPATIENT)
Dept: PEDIATRICS | Facility: CLINIC | Age: 78
End: 2018-01-01

## 2018-01-01 ENCOUNTER — APPOINTMENT (OUTPATIENT)
Dept: GENERAL RADIOLOGY | Facility: CLINIC | Age: 78
End: 2018-01-01
Attending: NURSE PRACTITIONER
Payer: MEDICARE

## 2018-01-01 ENCOUNTER — APPOINTMENT (OUTPATIENT)
Dept: GENERAL RADIOLOGY | Facility: CLINIC | Age: 78
DRG: 841 | End: 2018-01-01
Attending: HOSPITALIST
Payer: MEDICARE

## 2018-01-01 ENCOUNTER — HOSPITAL ENCOUNTER (OUTPATIENT)
Facility: CLINIC | Age: 78
End: 2018-01-01
Payer: MEDICARE

## 2018-01-01 ENCOUNTER — HOSPITAL ENCOUNTER (INPATIENT)
Facility: CLINIC | Age: 78
LOS: 1 days | Discharge: HOME | DRG: 841 | End: 2018-08-24
Attending: INTERNAL MEDICINE | Admitting: INTERNAL MEDICINE
Payer: MEDICARE

## 2018-01-01 ENCOUNTER — TELEPHONE (OUTPATIENT)
Dept: FAMILY MEDICINE | Facility: CLINIC | Age: 78
End: 2018-01-01

## 2018-01-01 ENCOUNTER — APPOINTMENT (OUTPATIENT)
Dept: GENERAL RADIOLOGY | Facility: CLINIC | Age: 78
DRG: 871 | End: 2018-01-01
Attending: STUDENT IN AN ORGANIZED HEALTH CARE EDUCATION/TRAINING PROGRAM
Payer: MEDICARE

## 2018-01-01 ENCOUNTER — HOSPITAL ENCOUNTER (OUTPATIENT)
Dept: PET IMAGING | Facility: CLINIC | Age: 78
Discharge: HOME | End: 2018-03-24
Attending: INTERNAL MEDICINE | Admitting: INTERNAL MEDICINE
Payer: MEDICARE

## 2018-01-01 ENCOUNTER — APPOINTMENT (OUTPATIENT)
Dept: LAB | Facility: CLINIC | Age: 78
End: 2018-01-01
Attending: INTERNAL MEDICINE
Payer: MEDICARE

## 2018-01-01 ENCOUNTER — RADIANT APPOINTMENT (OUTPATIENT)
Dept: RADIOLOGY | Facility: AMBULATORY SURGERY CENTER | Age: 78
End: 2018-01-01
Attending: NURSE PRACTITIONER
Payer: MEDICARE

## 2018-01-01 ENCOUNTER — ANESTHESIA (OUTPATIENT)
Dept: SURGERY | Facility: CLINIC | Age: 78
End: 2018-01-01
Payer: MEDICARE

## 2018-01-01 ENCOUNTER — APPOINTMENT (OUTPATIENT)
Dept: MEDSURG UNIT | Facility: CLINIC | Age: 78
End: 2018-01-01
Attending: RADIOLOGY
Payer: MEDICARE

## 2018-01-01 ENCOUNTER — HOSPITAL ENCOUNTER (OUTPATIENT)
Dept: NUCLEAR MEDICINE | Facility: CLINIC | Age: 78
Setting detail: NUCLEAR MEDICINE
Discharge: HOME | End: 2018-08-08
Attending: NURSE PRACTITIONER | Admitting: NURSE PRACTITIONER
Payer: MEDICARE

## 2018-01-01 ENCOUNTER — APPOINTMENT (OUTPATIENT)
Dept: CT IMAGING | Facility: CLINIC | Age: 78
DRG: 291 | End: 2018-01-01
Attending: EMERGENCY MEDICINE
Payer: MEDICARE

## 2018-01-01 ENCOUNTER — TELEPHONE (OUTPATIENT)
Dept: INTERVENTIONAL RADIOLOGY/VASCULAR | Facility: CLINIC | Age: 78
End: 2018-01-01

## 2018-01-01 ENCOUNTER — APPOINTMENT (OUTPATIENT)
Dept: GENERAL RADIOLOGY | Facility: CLINIC | Age: 78
DRG: 871 | End: 2018-01-01
Attending: INTERNAL MEDICINE
Payer: MEDICARE

## 2018-01-01 ENCOUNTER — CARE COORDINATION (OUTPATIENT)
Dept: OTOLARYNGOLOGY | Facility: CLINIC | Age: 78
End: 2018-01-01

## 2018-01-01 VITALS
SYSTOLIC BLOOD PRESSURE: 117 MMHG | DIASTOLIC BLOOD PRESSURE: 83 MMHG | TEMPERATURE: 97.7 F | OXYGEN SATURATION: 95 % | HEART RATE: 99 BPM

## 2018-01-01 VITALS
RESPIRATION RATE: 18 BRPM | WEIGHT: 160 LBS | TEMPERATURE: 97.8 F | OXYGEN SATURATION: 98 % | DIASTOLIC BLOOD PRESSURE: 63 MMHG | SYSTOLIC BLOOD PRESSURE: 116 MMHG | WEIGHT: 151.1 LBS | BODY MASS INDEX: 25.14 KG/M2 | HEART RATE: 86 BPM | BODY MASS INDEX: 26.63 KG/M2 | OXYGEN SATURATION: 95 % | DIASTOLIC BLOOD PRESSURE: 77 MMHG | SYSTOLIC BLOOD PRESSURE: 91 MMHG | HEART RATE: 104 BPM | RESPIRATION RATE: 18 BRPM

## 2018-01-01 VITALS
OXYGEN SATURATION: 95 % | TEMPERATURE: 98.1 F | HEIGHT: 65 IN | HEART RATE: 107 BPM | RESPIRATION RATE: 16 BRPM | DIASTOLIC BLOOD PRESSURE: 60 MMHG | BODY MASS INDEX: 22.74 KG/M2 | SYSTOLIC BLOOD PRESSURE: 81 MMHG | WEIGHT: 136.47 LBS

## 2018-01-01 VITALS
DIASTOLIC BLOOD PRESSURE: 77 MMHG | SYSTOLIC BLOOD PRESSURE: 102 MMHG | HEART RATE: 110 BPM | BODY MASS INDEX: 21.35 KG/M2 | TEMPERATURE: 97.7 F | WEIGHT: 128.3 LBS | OXYGEN SATURATION: 99 % | RESPIRATION RATE: 20 BRPM

## 2018-01-01 VITALS
HEART RATE: 111 BPM | BODY MASS INDEX: 26.44 KG/M2 | WEIGHT: 148 LBS | SYSTOLIC BLOOD PRESSURE: 116 MMHG | WEIGHT: 158.9 LBS | RESPIRATION RATE: 24 BRPM | OXYGEN SATURATION: 93 % | TEMPERATURE: 97.8 F | RESPIRATION RATE: 16 BRPM | DIASTOLIC BLOOD PRESSURE: 72 MMHG | OXYGEN SATURATION: 94 % | DIASTOLIC BLOOD PRESSURE: 76 MMHG | SYSTOLIC BLOOD PRESSURE: 112 MMHG | TEMPERATURE: 97.4 F | BODY MASS INDEX: 24.66 KG/M2 | HEART RATE: 100 BPM | HEIGHT: 65 IN

## 2018-01-01 VITALS
HEART RATE: 128 BPM | WEIGHT: 141.5 LBS | HEIGHT: 65 IN | BODY MASS INDEX: 23.57 KG/M2 | DIASTOLIC BLOOD PRESSURE: 74 MMHG | RESPIRATION RATE: 22 BRPM | OXYGEN SATURATION: 100 % | SYSTOLIC BLOOD PRESSURE: 111 MMHG | TEMPERATURE: 97.1 F

## 2018-01-01 VITALS
HEIGHT: 65 IN | OXYGEN SATURATION: 98 % | SYSTOLIC BLOOD PRESSURE: 97 MMHG | WEIGHT: 160 LBS | DIASTOLIC BLOOD PRESSURE: 69 MMHG | TEMPERATURE: 98.1 F | HEART RATE: 105 BPM | BODY MASS INDEX: 26.66 KG/M2

## 2018-01-01 VITALS
SYSTOLIC BLOOD PRESSURE: 144 MMHG | WEIGHT: 178.6 LBS | DIASTOLIC BLOOD PRESSURE: 88 MMHG | OXYGEN SATURATION: 95 % | HEART RATE: 92 BPM | RESPIRATION RATE: 18 BRPM | BODY MASS INDEX: 29.72 KG/M2 | TEMPERATURE: 98.2 F

## 2018-01-01 VITALS
DIASTOLIC BLOOD PRESSURE: 76 MMHG | OXYGEN SATURATION: 96 % | RESPIRATION RATE: 20 BRPM | SYSTOLIC BLOOD PRESSURE: 120 MMHG | HEART RATE: 96 BPM | TEMPERATURE: 98.8 F

## 2018-01-01 VITALS
WEIGHT: 162.1 LBS | SYSTOLIC BLOOD PRESSURE: 129 MMHG | BODY MASS INDEX: 26.97 KG/M2 | OXYGEN SATURATION: 96 % | TEMPERATURE: 98.5 F | RESPIRATION RATE: 20 BRPM | DIASTOLIC BLOOD PRESSURE: 65 MMHG

## 2018-01-01 VITALS
BODY MASS INDEX: 30.17 KG/M2 | SYSTOLIC BLOOD PRESSURE: 120 MMHG | HEIGHT: 65 IN | DIASTOLIC BLOOD PRESSURE: 77 MMHG | OXYGEN SATURATION: 96 % | WEIGHT: 181.06 LBS | HEART RATE: 76 BPM | TEMPERATURE: 97.7 F

## 2018-01-01 VITALS
WEIGHT: 157.7 LBS | OXYGEN SATURATION: 98 % | RESPIRATION RATE: 15 BRPM | BODY MASS INDEX: 26.24 KG/M2 | BODY MASS INDEX: 26.16 KG/M2 | WEIGHT: 157 LBS | TEMPERATURE: 98 F | DIASTOLIC BLOOD PRESSURE: 79 MMHG | SYSTOLIC BLOOD PRESSURE: 120 MMHG | HEIGHT: 65 IN | DIASTOLIC BLOOD PRESSURE: 73 MMHG | HEART RATE: 87 BPM | SYSTOLIC BLOOD PRESSURE: 109 MMHG | HEART RATE: 104 BPM | OXYGEN SATURATION: 93 %

## 2018-01-01 VITALS
HEART RATE: 107 BPM | DIASTOLIC BLOOD PRESSURE: 53 MMHG | RESPIRATION RATE: 20 BRPM | OXYGEN SATURATION: 100 % | SYSTOLIC BLOOD PRESSURE: 92 MMHG | WEIGHT: 147 LBS | HEIGHT: 65 IN | BODY MASS INDEX: 24.49 KG/M2 | TEMPERATURE: 97.8 F

## 2018-01-01 VITALS
BODY MASS INDEX: 29.82 KG/M2 | HEIGHT: 65 IN | TEMPERATURE: 98.3 F | OXYGEN SATURATION: 95 % | HEART RATE: 79 BPM | WEIGHT: 179 LBS | DIASTOLIC BLOOD PRESSURE: 73 MMHG | SYSTOLIC BLOOD PRESSURE: 125 MMHG

## 2018-01-01 VITALS
WEIGHT: 161 LBS | BODY MASS INDEX: 26.79 KG/M2 | SYSTOLIC BLOOD PRESSURE: 146 MMHG | OXYGEN SATURATION: 97 % | DIASTOLIC BLOOD PRESSURE: 78 MMHG | HEART RATE: 74 BPM

## 2018-01-01 VITALS
WEIGHT: 149 LBS | DIASTOLIC BLOOD PRESSURE: 68 MMHG | TEMPERATURE: 98.4 F | HEART RATE: 99 BPM | BODY MASS INDEX: 24.83 KG/M2 | OXYGEN SATURATION: 99 % | SYSTOLIC BLOOD PRESSURE: 100 MMHG | HEIGHT: 65 IN

## 2018-01-01 VITALS
SYSTOLIC BLOOD PRESSURE: 127 MMHG | OXYGEN SATURATION: 95 % | DIASTOLIC BLOOD PRESSURE: 71 MMHG | RESPIRATION RATE: 18 BRPM | HEART RATE: 84 BPM | TEMPERATURE: 97.5 F

## 2018-01-01 VITALS
TEMPERATURE: 97.7 F | DIASTOLIC BLOOD PRESSURE: 61 MMHG | BODY MASS INDEX: 21.33 KG/M2 | WEIGHT: 128.2 LBS | HEART RATE: 103 BPM | SYSTOLIC BLOOD PRESSURE: 82 MMHG | RESPIRATION RATE: 16 BRPM | OXYGEN SATURATION: 96 %

## 2018-01-01 VITALS
BODY MASS INDEX: 30.79 KG/M2 | RESPIRATION RATE: 18 BRPM | DIASTOLIC BLOOD PRESSURE: 84 MMHG | WEIGHT: 184.8 LBS | HEIGHT: 65 IN | SYSTOLIC BLOOD PRESSURE: 138 MMHG | OXYGEN SATURATION: 97 % | TEMPERATURE: 96.9 F | HEART RATE: 77 BPM

## 2018-01-01 VITALS
OXYGEN SATURATION: 95 % | DIASTOLIC BLOOD PRESSURE: 60 MMHG | TEMPERATURE: 98.1 F | WEIGHT: 181.9 LBS | SYSTOLIC BLOOD PRESSURE: 104 MMHG | HEIGHT: 65 IN | HEART RATE: 77 BPM | BODY MASS INDEX: 30.31 KG/M2

## 2018-01-01 VITALS
DIASTOLIC BLOOD PRESSURE: 62 MMHG | HEART RATE: 94 BPM | SYSTOLIC BLOOD PRESSURE: 96 MMHG | OXYGEN SATURATION: 98 % | TEMPERATURE: 98.4 F

## 2018-01-01 VITALS
RESPIRATION RATE: 16 BRPM | BODY MASS INDEX: 27 KG/M2 | WEIGHT: 162.04 LBS | SYSTOLIC BLOOD PRESSURE: 109 MMHG | DIASTOLIC BLOOD PRESSURE: 65 MMHG | TEMPERATURE: 98.1 F | OXYGEN SATURATION: 94 % | HEIGHT: 65 IN | HEART RATE: 7 BPM

## 2018-01-01 VITALS
DIASTOLIC BLOOD PRESSURE: 70 MMHG | HEART RATE: 109 BPM | OXYGEN SATURATION: 95 % | SYSTOLIC BLOOD PRESSURE: 97 MMHG | BODY MASS INDEX: 26.49 KG/M2 | WEIGHT: 159.02 LBS | HEIGHT: 65 IN | RESPIRATION RATE: 22 BRPM | TEMPERATURE: 97.6 F

## 2018-01-01 VITALS
SYSTOLIC BLOOD PRESSURE: 108 MMHG | DIASTOLIC BLOOD PRESSURE: 72 MMHG | RESPIRATION RATE: 16 BRPM | WEIGHT: 165.5 LBS | TEMPERATURE: 97.7 F | OXYGEN SATURATION: 98 % | HEART RATE: 95 BPM | BODY MASS INDEX: 27.54 KG/M2

## 2018-01-01 VITALS
WEIGHT: 154 LBS | RESPIRATION RATE: 16 BRPM | HEIGHT: 65 IN | HEART RATE: 106 BPM | DIASTOLIC BLOOD PRESSURE: 75 MMHG | TEMPERATURE: 97.5 F | SYSTOLIC BLOOD PRESSURE: 106 MMHG | BODY MASS INDEX: 25.66 KG/M2

## 2018-01-01 VITALS
DIASTOLIC BLOOD PRESSURE: 70 MMHG | OXYGEN SATURATION: 95 % | HEART RATE: 117 BPM | RESPIRATION RATE: 14 BRPM | WEIGHT: 141.5 LBS | TEMPERATURE: 96.8 F | HEIGHT: 65 IN | BODY MASS INDEX: 23.57 KG/M2 | SYSTOLIC BLOOD PRESSURE: 99 MMHG

## 2018-01-01 VITALS
OXYGEN SATURATION: 94 % | SYSTOLIC BLOOD PRESSURE: 104 MMHG | RESPIRATION RATE: 18 BRPM | TEMPERATURE: 97.9 F | HEART RATE: 117 BPM | DIASTOLIC BLOOD PRESSURE: 70 MMHG | BODY MASS INDEX: 25.09 KG/M2 | WEIGHT: 150.8 LBS

## 2018-01-01 VITALS
DIASTOLIC BLOOD PRESSURE: 77 MMHG | WEIGHT: 142.7 LBS | OXYGEN SATURATION: 94 % | HEART RATE: 123 BPM | SYSTOLIC BLOOD PRESSURE: 110 MMHG | TEMPERATURE: 98 F | BODY MASS INDEX: 23.75 KG/M2

## 2018-01-01 VITALS
HEART RATE: 96 BPM | BODY MASS INDEX: 26.66 KG/M2 | TEMPERATURE: 97.8 F | SYSTOLIC BLOOD PRESSURE: 108 MMHG | WEIGHT: 160 LBS | DIASTOLIC BLOOD PRESSURE: 72 MMHG | OXYGEN SATURATION: 97 % | HEIGHT: 65 IN

## 2018-01-01 VITALS
TEMPERATURE: 97 F | WEIGHT: 142.19 LBS | BODY MASS INDEX: 23.69 KG/M2 | SYSTOLIC BLOOD PRESSURE: 93 MMHG | OXYGEN SATURATION: 96 % | DIASTOLIC BLOOD PRESSURE: 68 MMHG | HEART RATE: 115 BPM | RESPIRATION RATE: 16 BRPM | HEIGHT: 65 IN

## 2018-01-01 VITALS
DIASTOLIC BLOOD PRESSURE: 71 MMHG | TEMPERATURE: 96.8 F | RESPIRATION RATE: 15 BRPM | WEIGHT: 176 LBS | SYSTOLIC BLOOD PRESSURE: 108 MMHG | BODY MASS INDEX: 29.32 KG/M2 | HEART RATE: 84 BPM | HEIGHT: 65 IN

## 2018-01-01 VITALS
HEART RATE: 86 BPM | SYSTOLIC BLOOD PRESSURE: 126 MMHG | TEMPERATURE: 99 F | DIASTOLIC BLOOD PRESSURE: 77 MMHG | OXYGEN SATURATION: 96 % | RESPIRATION RATE: 18 BRPM

## 2018-01-01 VITALS
BODY MASS INDEX: 29.82 KG/M2 | WEIGHT: 179 LBS | RESPIRATION RATE: 16 BRPM | OXYGEN SATURATION: 96 % | TEMPERATURE: 97.7 F | HEIGHT: 65 IN | SYSTOLIC BLOOD PRESSURE: 119 MMHG | DIASTOLIC BLOOD PRESSURE: 76 MMHG | HEART RATE: 76 BPM

## 2018-01-01 VITALS
TEMPERATURE: 97.6 F | RESPIRATION RATE: 18 BRPM | OXYGEN SATURATION: 92 % | DIASTOLIC BLOOD PRESSURE: 76 MMHG | SYSTOLIC BLOOD PRESSURE: 110 MMHG | HEART RATE: 103 BPM

## 2018-01-01 VITALS
HEART RATE: 86 BPM | DIASTOLIC BLOOD PRESSURE: 50 MMHG | TEMPERATURE: 97.4 F | WEIGHT: 181.1 LBS | SYSTOLIC BLOOD PRESSURE: 96 MMHG | OXYGEN SATURATION: 95 % | BODY MASS INDEX: 30.61 KG/M2

## 2018-01-01 VITALS
OXYGEN SATURATION: 98 % | DIASTOLIC BLOOD PRESSURE: 62 MMHG | HEART RATE: 100 BPM | BODY MASS INDEX: 24.66 KG/M2 | HEIGHT: 65 IN | RESPIRATION RATE: 16 BRPM | SYSTOLIC BLOOD PRESSURE: 92 MMHG | WEIGHT: 148 LBS | TEMPERATURE: 97.6 F

## 2018-01-01 VITALS
BODY MASS INDEX: 28.66 KG/M2 | DIASTOLIC BLOOD PRESSURE: 70 MMHG | SYSTOLIC BLOOD PRESSURE: 107 MMHG | HEART RATE: 80 BPM | WEIGHT: 172 LBS | HEIGHT: 65 IN | TEMPERATURE: 98 F | OXYGEN SATURATION: 95 %

## 2018-01-01 VITALS
OXYGEN SATURATION: 95 % | HEIGHT: 65 IN | BODY MASS INDEX: 30.41 KG/M2 | SYSTOLIC BLOOD PRESSURE: 112 MMHG | TEMPERATURE: 97.7 F | HEART RATE: 91 BPM | DIASTOLIC BLOOD PRESSURE: 75 MMHG | WEIGHT: 182.5 LBS

## 2018-01-01 VITALS
DIASTOLIC BLOOD PRESSURE: 68 MMHG | OXYGEN SATURATION: 95 % | HEART RATE: 102 BPM | TEMPERATURE: 97.4 F | WEIGHT: 157.1 LBS | BODY MASS INDEX: 26.14 KG/M2 | SYSTOLIC BLOOD PRESSURE: 94 MMHG

## 2018-01-01 VITALS
BODY MASS INDEX: 25.27 KG/M2 | HEART RATE: 107 BPM | HEIGHT: 65 IN | WEIGHT: 151.7 LBS | SYSTOLIC BLOOD PRESSURE: 111 MMHG | TEMPERATURE: 96.7 F | OXYGEN SATURATION: 97 % | RESPIRATION RATE: 16 BRPM | DIASTOLIC BLOOD PRESSURE: 69 MMHG

## 2018-01-01 VITALS
DIASTOLIC BLOOD PRESSURE: 65 MMHG | SYSTOLIC BLOOD PRESSURE: 89 MMHG | RESPIRATION RATE: 22 BRPM | HEART RATE: 116 BPM | OXYGEN SATURATION: 98 %

## 2018-01-01 VITALS
OXYGEN SATURATION: 98 % | SYSTOLIC BLOOD PRESSURE: 96 MMHG | DIASTOLIC BLOOD PRESSURE: 64 MMHG | HEART RATE: 81 BPM | TEMPERATURE: 97.6 F

## 2018-01-01 VITALS
OXYGEN SATURATION: 96 % | TEMPERATURE: 96.5 F | HEART RATE: 120 BPM | DIASTOLIC BLOOD PRESSURE: 50 MMHG | SYSTOLIC BLOOD PRESSURE: 90 MMHG | WEIGHT: 128.6 LBS | BODY MASS INDEX: 21.4 KG/M2

## 2018-01-01 VITALS
HEART RATE: 67 BPM | TEMPERATURE: 97.2 F | DIASTOLIC BLOOD PRESSURE: 71 MMHG | SYSTOLIC BLOOD PRESSURE: 126 MMHG | WEIGHT: 160.3 LBS | OXYGEN SATURATION: 97 % | BODY MASS INDEX: 26.68 KG/M2

## 2018-01-01 DIAGNOSIS — R11.0 NAUSEA: ICD-10-CM

## 2018-01-01 DIAGNOSIS — R59.1 LYMPHADENOPATHY: Primary | ICD-10-CM

## 2018-01-01 DIAGNOSIS — T45.1X5A CHEMOTHERAPY-INDUCED NEUTROPENIA (H): ICD-10-CM

## 2018-01-01 DIAGNOSIS — J90 PLEURAL EFFUSION: Primary | ICD-10-CM

## 2018-01-01 DIAGNOSIS — R93.5 ABNORMAL ABDOMINAL ULTRASOUND: ICD-10-CM

## 2018-01-01 DIAGNOSIS — I25.10 CORONARY ARTERY DISEASE INVOLVING NATIVE CORONARY ARTERY OF NATIVE HEART WITHOUT ANGINA PECTORIS: ICD-10-CM

## 2018-01-01 DIAGNOSIS — R73.03 PREDIABETES: ICD-10-CM

## 2018-01-01 DIAGNOSIS — J30.81 ALLERGIC RHINITIS DUE TO ANIMAL HAIR AND DANDER, UNSPECIFIED CHRONICITY: ICD-10-CM

## 2018-01-01 DIAGNOSIS — J18.9 PNEUMONIA DUE TO INFECTIOUS ORGANISM, UNSPECIFIED LATERALITY, UNSPECIFIED PART OF LUNG: ICD-10-CM

## 2018-01-01 DIAGNOSIS — D70.2 DRUG-INDUCED NEUTROPENIA (H): Primary | ICD-10-CM

## 2018-01-01 DIAGNOSIS — R53.1 WEAKNESS: ICD-10-CM

## 2018-01-01 DIAGNOSIS — C83.18 MANTLE CELL LYMPHOMA OF LYMPH NODES OF MULTIPLE SITES (H): ICD-10-CM

## 2018-01-01 DIAGNOSIS — R14.3 FLATULENCE, ERUCTATION, AND GAS PAIN: ICD-10-CM

## 2018-01-01 DIAGNOSIS — R94.31 NONSPECIFIC ST-T WAVE ELECTROCARDIOGRAPHIC CHANGES: Primary | ICD-10-CM

## 2018-01-01 DIAGNOSIS — R14.2 FLATULENCE, ERUCTATION, AND GAS PAIN: ICD-10-CM

## 2018-01-01 DIAGNOSIS — J90 PLEURAL EFFUSION: ICD-10-CM

## 2018-01-01 DIAGNOSIS — Z51.11 ENCOUNTER FOR ANTINEOPLASTIC CHEMOTHERAPY: ICD-10-CM

## 2018-01-01 DIAGNOSIS — R74.01 ELEVATED AST (SGOT): ICD-10-CM

## 2018-01-01 DIAGNOSIS — J90 BILATERAL PLEURAL EFFUSION: Primary | ICD-10-CM

## 2018-01-01 DIAGNOSIS — S31.000D WOUND OF SACRAL REGION, SUBSEQUENT ENCOUNTER: ICD-10-CM

## 2018-01-01 DIAGNOSIS — R10.10 UPPER ABDOMINAL PAIN: Primary | ICD-10-CM

## 2018-01-01 DIAGNOSIS — R14.1 FLATULENCE, ERUCTATION, AND GAS PAIN: ICD-10-CM

## 2018-01-01 DIAGNOSIS — D62 ACUTE POSTHEMORRHAGIC ANEMIA: ICD-10-CM

## 2018-01-01 DIAGNOSIS — J94.0 PLEURAL EFFUSION, CHYLOUS: Primary | ICD-10-CM

## 2018-01-01 DIAGNOSIS — R19.5 LOOSE STOOLS: ICD-10-CM

## 2018-01-01 DIAGNOSIS — R06.02 SOB (SHORTNESS OF BREATH): Primary | ICD-10-CM

## 2018-01-01 DIAGNOSIS — R60.0 LOWER EXTREMITY EDEMA: ICD-10-CM

## 2018-01-01 DIAGNOSIS — C83.18 MANTLE CELL LYMPHOMA OF LYMPH NODES OF MULTIPLE SITES (H): Primary | ICD-10-CM

## 2018-01-01 DIAGNOSIS — D64.9 ANEMIA, UNSPECIFIED TYPE: ICD-10-CM

## 2018-01-01 DIAGNOSIS — E78.5 HYPERLIPIDEMIA LDL GOAL <100: ICD-10-CM

## 2018-01-01 DIAGNOSIS — D70.2 DRUG-INDUCED NEUTROPENIA (H): ICD-10-CM

## 2018-01-01 DIAGNOSIS — R14.3 FLATULENCE, ERUCTATION AND GAS PAIN: ICD-10-CM

## 2018-01-01 DIAGNOSIS — Z53.9 DIAGNOSIS NOT YET DEFINED: Primary | ICD-10-CM

## 2018-01-01 DIAGNOSIS — I10 HYPERTENSION GOAL BP (BLOOD PRESSURE) < 130/80: ICD-10-CM

## 2018-01-01 DIAGNOSIS — I48.91 ATRIAL FIBRILLATION, UNSPECIFIED TYPE (H): ICD-10-CM

## 2018-01-01 DIAGNOSIS — Z01.818 PREOP EXAMINATION: Primary | ICD-10-CM

## 2018-01-01 DIAGNOSIS — M62.81 GENERALIZED MUSCLE WEAKNESS: ICD-10-CM

## 2018-01-01 DIAGNOSIS — E87.8 ELECTROLYTE ABNORMALITY: ICD-10-CM

## 2018-01-01 DIAGNOSIS — R14.0 BLOATING: ICD-10-CM

## 2018-01-01 DIAGNOSIS — E87.20 ACIDOSIS: ICD-10-CM

## 2018-01-01 DIAGNOSIS — Z01.818 EXAMINATION PRIOR TO CHEMOTHERAPY: ICD-10-CM

## 2018-01-01 DIAGNOSIS — I10 BENIGN ESSENTIAL HYPERTENSION: ICD-10-CM

## 2018-01-01 DIAGNOSIS — R14.2 FLATULENCE, ERUCTATION AND GAS PAIN: ICD-10-CM

## 2018-01-01 DIAGNOSIS — J94.0 CHYLOUS EFFUSION: ICD-10-CM

## 2018-01-01 DIAGNOSIS — D70.1 CHEMOTHERAPY-INDUCED NEUTROPENIA (H): ICD-10-CM

## 2018-01-01 DIAGNOSIS — R10.33 PERIUMBILICAL ABDOMINAL PAIN: ICD-10-CM

## 2018-01-01 DIAGNOSIS — Z98.61 STATUS POST PERCUTANEOUS TRANSLUMINAL CORONARY ANGIOPLASTY: ICD-10-CM

## 2018-01-01 DIAGNOSIS — I50.31 ACUTE DIASTOLIC CONGESTIVE HEART FAILURE (H): ICD-10-CM

## 2018-01-01 DIAGNOSIS — R11.0 NAUSEA: Primary | ICD-10-CM

## 2018-01-01 DIAGNOSIS — R80.9 MICROALBUMINURIA: ICD-10-CM

## 2018-01-01 DIAGNOSIS — R63.0 DECREASE IN APPETITE: ICD-10-CM

## 2018-01-01 DIAGNOSIS — E43 SEVERE MALNUTRITION (H): ICD-10-CM

## 2018-01-01 DIAGNOSIS — Z71.81 SPIRITUAL OR RELIGIOUS COUNSELING: Primary | ICD-10-CM

## 2018-01-01 DIAGNOSIS — R14.3 FLATULENCE, ERUCTATION, AND GAS PAIN: Primary | ICD-10-CM

## 2018-01-01 DIAGNOSIS — R18.8 OTHER ASCITES: Primary | ICD-10-CM

## 2018-01-01 DIAGNOSIS — R59.1 LA (LYMPHADENOPATHY): Primary | ICD-10-CM

## 2018-01-01 DIAGNOSIS — C83.18 LYMPHOMA, MANTLE CELL, MULTIPLE SITES (H): Primary | ICD-10-CM

## 2018-01-01 DIAGNOSIS — I10 ESSENTIAL HYPERTENSION WITH GOAL BLOOD PRESSURE LESS THAN 140/90: ICD-10-CM

## 2018-01-01 DIAGNOSIS — J94.0 BILATERAL CHYLOTHORAX: ICD-10-CM

## 2018-01-01 DIAGNOSIS — R59.1 LA (LYMPHADENOPATHY): ICD-10-CM

## 2018-01-01 DIAGNOSIS — G89.18 POSTOPERATIVE PAIN: Primary | ICD-10-CM

## 2018-01-01 DIAGNOSIS — J90 BILATERAL PLEURAL EFFUSION: ICD-10-CM

## 2018-01-01 DIAGNOSIS — I50.9 ACUTE ON CHRONIC CONGESTIVE HEART FAILURE, UNSPECIFIED CONGESTIVE HEART FAILURE TYPE: Primary | ICD-10-CM

## 2018-01-01 DIAGNOSIS — E87.70 HYPERVOLEMIA, UNSPECIFIED HYPERVOLEMIA TYPE: ICD-10-CM

## 2018-01-01 DIAGNOSIS — D69.6 THROMBOCYTOPENIA (H): ICD-10-CM

## 2018-01-01 DIAGNOSIS — I10 ESSENTIAL HYPERTENSION WITH GOAL BLOOD PRESSURE LESS THAN 140/90: Primary | ICD-10-CM

## 2018-01-01 DIAGNOSIS — I48.91 ATRIAL FIBRILLATION, UNSPECIFIED TYPE (H): Primary | ICD-10-CM

## 2018-01-01 DIAGNOSIS — R14.1 FLATULENCE, ERUCTATION, AND GAS PAIN: Primary | ICD-10-CM

## 2018-01-01 DIAGNOSIS — E87.70 HYPERVOLEMIA, UNSPECIFIED HYPERVOLEMIA TYPE: Primary | ICD-10-CM

## 2018-01-01 DIAGNOSIS — J18.9 HCAP (HEALTHCARE-ASSOCIATED PNEUMONIA): ICD-10-CM

## 2018-01-01 DIAGNOSIS — R14.2 FLATULENCE, ERUCTATION, AND GAS PAIN: Primary | ICD-10-CM

## 2018-01-01 DIAGNOSIS — N39.43 URINARY DRIBBLING: ICD-10-CM

## 2018-01-01 DIAGNOSIS — R14.1 FLATULENCE, ERUCTATION AND GAS PAIN: ICD-10-CM

## 2018-01-01 DIAGNOSIS — K52.9 CHRONIC DIARRHEA: ICD-10-CM

## 2018-01-01 DIAGNOSIS — I89.8 CHYLOUS ASCITES: ICD-10-CM

## 2018-01-01 DIAGNOSIS — J94.0 PLEURAL EFFUSION, CHYLOUS: ICD-10-CM

## 2018-01-01 DIAGNOSIS — R18.8 OTHER ASCITES: ICD-10-CM

## 2018-01-01 DIAGNOSIS — R63.0 POOR APPETITE: ICD-10-CM

## 2018-01-01 DIAGNOSIS — R07.0 THROAT PAIN: Primary | ICD-10-CM

## 2018-01-01 DIAGNOSIS — J90 PLEURAL EFFUSION, BILATERAL: ICD-10-CM

## 2018-01-01 DIAGNOSIS — C83.18 LYMPHOMA, MANTLE CELL, MULTIPLE SITES (H): ICD-10-CM

## 2018-01-01 DIAGNOSIS — R73.9 ELEVATED BLOOD SUGAR: ICD-10-CM

## 2018-01-01 DIAGNOSIS — Z51.5 HOSPICE CARE PATIENT: ICD-10-CM

## 2018-01-01 DIAGNOSIS — E83.42 HYPOMAGNESEMIA: ICD-10-CM

## 2018-01-01 DIAGNOSIS — R10.10 UPPER ABDOMINAL PAIN: ICD-10-CM

## 2018-01-01 DIAGNOSIS — M62.81 GENERALIZED MUSCLE WEAKNESS: Primary | ICD-10-CM

## 2018-01-01 DIAGNOSIS — I48.91 ATRIAL FIBRILLATION WITH RAPID VENTRICULAR RESPONSE (H): ICD-10-CM

## 2018-01-01 DIAGNOSIS — E86.0 DEHYDRATION: Primary | ICD-10-CM

## 2018-01-01 DIAGNOSIS — Z98.61 S/P CORONARY ANGIOPLASTY: ICD-10-CM

## 2018-01-01 DIAGNOSIS — R09.82 POSTNASAL DISCHARGE: ICD-10-CM

## 2018-01-01 DIAGNOSIS — K52.9 CHRONIC DIARRHEA: Primary | ICD-10-CM

## 2018-01-01 DIAGNOSIS — R10.33 PERIUMBILICAL ABDOMINAL PAIN: Primary | ICD-10-CM

## 2018-01-01 DIAGNOSIS — K21.9 GASTROESOPHAGEAL REFLUX DISEASE, ESOPHAGITIS PRESENCE NOT SPECIFIED: ICD-10-CM

## 2018-01-01 DIAGNOSIS — E88.3 TUMOR LYSIS SYNDROME: ICD-10-CM

## 2018-01-01 DIAGNOSIS — E87.6 HYPOKALEMIA: ICD-10-CM

## 2018-01-01 DIAGNOSIS — I50.9 ACUTE CONGESTIVE HEART FAILURE, UNSPECIFIED CONGESTIVE HEART FAILURE TYPE: ICD-10-CM

## 2018-01-01 DIAGNOSIS — Z51.5 HOSPICE CARE PATIENT: Primary | ICD-10-CM

## 2018-01-01 DIAGNOSIS — R42 DIZZINESS: ICD-10-CM

## 2018-01-01 DIAGNOSIS — R14.0 ABDOMINAL DISTENTION: ICD-10-CM

## 2018-01-01 DIAGNOSIS — C83.18 MANTLE CELL LYMPHOMA OF LYMPH NODES OF MULTIPLE REGIONS (H): ICD-10-CM

## 2018-01-01 DIAGNOSIS — C61 MALIGNANT NEOPLASM OF PROSTATE (H): ICD-10-CM

## 2018-01-01 LAB
ABO + RH BLD: NORMAL
ALBUMIN FLD-MCNC: 1 G/DL
ALBUMIN SERPL-MCNC: 1.5 G/DL (ref 3.4–5)
ALBUMIN SERPL-MCNC: 1.6 G/DL (ref 3.4–5)
ALBUMIN SERPL-MCNC: 1.8 G/DL (ref 3.4–5)
ALBUMIN SERPL-MCNC: 2 G/DL (ref 3.4–5)
ALBUMIN SERPL-MCNC: 2 G/DL (ref 3.4–5)
ALBUMIN SERPL-MCNC: 2.1 G/DL (ref 3.4–5)
ALBUMIN SERPL-MCNC: 2.3 G/DL (ref 3.4–5)
ALBUMIN SERPL-MCNC: 2.4 G/DL (ref 3.4–5)
ALBUMIN SERPL-MCNC: 2.4 G/DL (ref 3.4–5)
ALBUMIN SERPL-MCNC: 2.6 G/DL (ref 3.4–5)
ALBUMIN SERPL-MCNC: 2.6 G/DL (ref 3.4–5)
ALBUMIN SERPL-MCNC: 2.7 G/DL (ref 3.4–5)
ALBUMIN SERPL-MCNC: 2.8 G/DL (ref 3.4–5)
ALBUMIN SERPL-MCNC: 2.8 G/DL (ref 3.4–5)
ALBUMIN SERPL-MCNC: 2.9 G/DL (ref 3.4–5)
ALBUMIN SERPL-MCNC: 3 G/DL (ref 3.4–5)
ALBUMIN SERPL-MCNC: 3 G/DL (ref 3.4–5)
ALBUMIN SERPL-MCNC: 3.1 G/DL (ref 3.4–5)
ALBUMIN SERPL-MCNC: 3.2 G/DL (ref 3.4–5)
ALBUMIN SERPL-MCNC: 3.5 G/DL (ref 3.4–5)
ALBUMIN UR-MCNC: 10 MG/DL
ALBUMIN UR-MCNC: 30 MG/DL
ALBUMIN UR-MCNC: 30 MG/DL
ALP SERPL-CCNC: 102 U/L (ref 40–150)
ALP SERPL-CCNC: 106 U/L (ref 40–150)
ALP SERPL-CCNC: 107 U/L (ref 40–150)
ALP SERPL-CCNC: 110 U/L (ref 40–150)
ALP SERPL-CCNC: 128 U/L (ref 40–150)
ALP SERPL-CCNC: 133 U/L (ref 40–150)
ALP SERPL-CCNC: 168 U/L (ref 40–150)
ALP SERPL-CCNC: 67 U/L (ref 40–150)
ALP SERPL-CCNC: 69 U/L (ref 40–150)
ALP SERPL-CCNC: 72 U/L (ref 40–150)
ALP SERPL-CCNC: 76 U/L (ref 40–150)
ALP SERPL-CCNC: 77 U/L (ref 40–150)
ALP SERPL-CCNC: 78 U/L (ref 40–150)
ALP SERPL-CCNC: 81 U/L (ref 40–150)
ALP SERPL-CCNC: 85 U/L (ref 40–150)
ALP SERPL-CCNC: 89 U/L (ref 40–150)
ALP SERPL-CCNC: 91 U/L (ref 40–150)
ALP SERPL-CCNC: 92 U/L (ref 40–150)
ALP SERPL-CCNC: 94 U/L (ref 40–150)
ALP SERPL-CCNC: 94 U/L (ref 40–150)
ALP SERPL-CCNC: 95 U/L (ref 40–150)
ALP SERPL-CCNC: 95 U/L (ref 40–150)
ALP SERPL-CCNC: 96 U/L (ref 40–150)
ALT SERPL W P-5'-P-CCNC: 16 U/L (ref 0–70)
ALT SERPL W P-5'-P-CCNC: 20 U/L (ref 0–70)
ALT SERPL W P-5'-P-CCNC: 20 U/L (ref 0–70)
ALT SERPL W P-5'-P-CCNC: 21 U/L (ref 0–70)
ALT SERPL W P-5'-P-CCNC: 21 U/L (ref 0–70)
ALT SERPL W P-5'-P-CCNC: 23 U/L (ref 0–70)
ALT SERPL W P-5'-P-CCNC: 23 U/L (ref 0–70)
ALT SERPL W P-5'-P-CCNC: 24 U/L (ref 0–70)
ALT SERPL W P-5'-P-CCNC: 24 U/L (ref 0–70)
ALT SERPL W P-5'-P-CCNC: 25 U/L (ref 0–70)
ALT SERPL W P-5'-P-CCNC: 25 U/L (ref 0–70)
ALT SERPL W P-5'-P-CCNC: 26 U/L (ref 0–70)
ALT SERPL W P-5'-P-CCNC: 27 U/L (ref 0–70)
ALT SERPL W P-5'-P-CCNC: 29 U/L (ref 0–70)
ALT SERPL W P-5'-P-CCNC: 30 U/L (ref 0–70)
ALT SERPL W P-5'-P-CCNC: 31 U/L (ref 0–70)
ALT SERPL W P-5'-P-CCNC: 34 U/L (ref 0–70)
ALT SERPL W P-5'-P-CCNC: 34 U/L (ref 0–70)
ALT SERPL W P-5'-P-CCNC: 35 U/L (ref 0–70)
ALT SERPL W P-5'-P-CCNC: 36 U/L (ref 0–70)
ALT SERPL W P-5'-P-CCNC: 36 U/L (ref 0–70)
ALT SERPL W P-5'-P-CCNC: 43 U/L (ref 0–70)
ALT SERPL W P-5'-P-CCNC: 56 U/L (ref 0–70)
ALT SERPL-CCNC: 14 IU/L (ref 12–68)
ALT SERPL-CCNC: 21 IU/L (ref 12–68)
ALT SERPL-CCNC: 25 IU/L (ref 12–68)
ALT SERPL-CCNC: 38 IU/L (ref 12–68)
ALT SERPL-CCNC: <6 IU/L (ref 12–68)
AMORPH CRY #/AREA URNS HPF: ABNORMAL /HPF
AMYLASE SERPL-CCNC: 53 U/L (ref 30–110)
ANION GAP SERPL CALCULATED.3IONS-SCNC: 10 MMOL/L (ref 3–14)
ANION GAP SERPL CALCULATED.3IONS-SCNC: 11 MMOL/L (ref 3–14)
ANION GAP SERPL CALCULATED.3IONS-SCNC: 11 MMOL/L (ref 3–14)
ANION GAP SERPL CALCULATED.3IONS-SCNC: 12 MMOL/L (ref 3–14)
ANION GAP SERPL CALCULATED.3IONS-SCNC: 17 MMOL/L (ref 3–14)
ANION GAP SERPL CALCULATED.3IONS-SCNC: 7 MMOL/L (ref 3–14)
ANION GAP SERPL CALCULATED.3IONS-SCNC: 8 MMOL/L (ref 3–14)
ANION GAP SERPL CALCULATED.3IONS-SCNC: 9 MMOL/L (ref 3–14)
ANISOCYTOSIS BLD QL SMEAR: SLIGHT
APPEARANCE FLD: NORMAL
APPEARANCE FLD: NORMAL
APPEARANCE UR: ABNORMAL
APPEARANCE UR: CLEAR
APPEARANCE UR: CLEAR
APTT PPP: 33 SEC (ref 22–37)
APTT PPP: 35 SEC (ref 22–37)
APTT PPP: 39 SEC (ref 22–37)
AST SERPL W P-5'-P-CCNC: 112 U/L (ref 0–45)
AST SERPL W P-5'-P-CCNC: 116 U/L (ref 0–45)
AST SERPL W P-5'-P-CCNC: 31 U/L (ref 0–45)
AST SERPL W P-5'-P-CCNC: 33 U/L (ref 0–45)
AST SERPL W P-5'-P-CCNC: 35 U/L (ref 0–45)
AST SERPL W P-5'-P-CCNC: 35 U/L (ref 0–45)
AST SERPL W P-5'-P-CCNC: 37 U/L (ref 0–45)
AST SERPL W P-5'-P-CCNC: 42 U/L (ref 0–45)
AST SERPL W P-5'-P-CCNC: 43 U/L (ref 0–45)
AST SERPL W P-5'-P-CCNC: 44 U/L (ref 0–45)
AST SERPL W P-5'-P-CCNC: 44 U/L (ref 0–45)
AST SERPL W P-5'-P-CCNC: 46 U/L (ref 0–45)
AST SERPL W P-5'-P-CCNC: 46 U/L (ref 0–45)
AST SERPL W P-5'-P-CCNC: 47 U/L (ref 0–45)
AST SERPL W P-5'-P-CCNC: 47 U/L (ref 0–45)
AST SERPL W P-5'-P-CCNC: 48 U/L (ref 0–45)
AST SERPL W P-5'-P-CCNC: 49 U/L (ref 0–45)
AST SERPL W P-5'-P-CCNC: 49 U/L (ref 0–45)
AST SERPL W P-5'-P-CCNC: 52 U/L (ref 0–45)
AST SERPL W P-5'-P-CCNC: 64 U/L (ref 0–45)
AST SERPL W P-5'-P-CCNC: 69 U/L (ref 0–45)
AST SERPL W P-5'-P-CCNC: 71 U/L (ref 0–45)
AST SERPL W P-5'-P-CCNC: 80 U/L (ref 0–45)
AST SERPL-CCNC: 28 IU/L (ref 15–37)
AST SERPL-CCNC: 35 IU/L (ref 12–37)
AST SERPL-CCNC: 48 IU/L (ref 15–37)
AST SERPL-CCNC: 51 IU/L (ref 15–37)
AST SERPL-CCNC: 59 IU/L (ref 15–37)
BACTERIA #/AREA URNS HPF: ABNORMAL /HPF
BACTERIA SPEC CULT: ABNORMAL
BACTERIA SPEC CULT: NO GROWTH
BACTERIA SPEC CULT: NORMAL
BASOPHILS # BLD AUTO: 0 10E9/L (ref 0–0.2)
BASOPHILS NFR BLD AUTO: 0 %
BASOPHILS NFR BLD AUTO: 0.1 %
BASOPHILS NFR BLD AUTO: 0.1 %
BASOPHILS NFR BLD AUTO: 0.2 %
BASOPHILS NFR BLD AUTO: 0.3 %
BASOPHILS NFR BLD AUTO: 0.4 %
BASOPHILS NFR BLD AUTO: 0.4 %
BASOPHILS NFR BLD AUTO: 0.8 %
BILIRUB SERPL-MCNC: 0.2 MG/DL (ref 0.2–1.3)
BILIRUB SERPL-MCNC: 0.3 MG/DL (ref 0.2–1.3)
BILIRUB SERPL-MCNC: 0.4 MG/DL (ref 0.2–1.3)
BILIRUB SERPL-MCNC: 0.5 MG/DL (ref 0.2–1.3)
BILIRUB SERPL-MCNC: 0.5 MG/DL (ref 0.2–1.3)
BILIRUB SERPL-MCNC: 0.6 MG/DL (ref 0.2–1.3)
BILIRUB SERPL-MCNC: 0.7 MG/DL (ref 0.2–1.3)
BILIRUB UR QL STRIP: NEGATIVE
BLD GP AB SCN SERPL QL: NORMAL
BLD GP AB SCN SERPL QL: NORMAL
BLOOD BANK CMNT PATIENT-IMP: NORMAL
BLOOD BANK CMNT PATIENT-IMP: NORMAL
BUN SERPL-MCNC: 11 MG/DL (ref 7–30)
BUN SERPL-MCNC: 12 MG/DL (ref 7–30)
BUN SERPL-MCNC: 13 MG/DL (ref 7–30)
BUN SERPL-MCNC: 14 MG/DL (ref 7–30)
BUN SERPL-MCNC: 15 MG/DL (ref 7–30)
BUN SERPL-MCNC: 16 MG/DL (ref 7–30)
BUN SERPL-MCNC: 17 MG/DL (ref 7–30)
BUN SERPL-MCNC: 18 MG/DL (ref 7–30)
BUN SERPL-MCNC: 19 MG/DL (ref 7–30)
BUN SERPL-MCNC: 20 MG/DL (ref 7–30)
BUN SERPL-MCNC: 20 MG/DL (ref 7–30)
BUN SERPL-MCNC: 21 MG/DL (ref 7–30)
BUN SERPL-MCNC: 23 MG/DL (ref 7–30)
BUN SERPL-MCNC: 24 MG/DL (ref 7–30)
BUN SERPL-MCNC: 25 MG/DL (ref 7–30)
BUN SERPL-MCNC: 28 MG/DL (ref 7–30)
BUN SERPL-MCNC: 29 MG/DL (ref 7–30)
BUN SERPL-MCNC: 29 MG/DL (ref 7–30)
BUN SERPL-MCNC: 30 MG/DL (ref 7–30)
BUN SERPL-MCNC: 30 MG/DL (ref 7–30)
BUN SERPL-MCNC: 32 MG/DL (ref 7–30)
BUN SERPL-MCNC: 33 MG/DL (ref 7–30)
BUN SERPL-MCNC: 35 MG/DL (ref 7–30)
BUN SERPL-MCNC: 36 MG/DL (ref 7–30)
BUN SERPL-MCNC: 37 MG/DL (ref 7–30)
BURR CELLS BLD QL SMEAR: SLIGHT
C DIFF TOX B STL QL: NEGATIVE
CA-I BLD-MCNC: 3.8 MG/DL (ref 4.4–5.2)
CALCIUM SERPL-MCNC: 6.1 MG/DL (ref 8.5–10.1)
CALCIUM SERPL-MCNC: 6.4 MG/DL (ref 8.5–10.1)
CALCIUM SERPL-MCNC: 6.5 MG/DL (ref 8.5–10.1)
CALCIUM SERPL-MCNC: 6.9 MG/DL (ref 8.5–10.1)
CALCIUM SERPL-MCNC: 7 MG/DL (ref 8.5–10.1)
CALCIUM SERPL-MCNC: 7.1 MG/DL (ref 8.5–10.1)
CALCIUM SERPL-MCNC: 7.2 MG/DL (ref 8.5–10.1)
CALCIUM SERPL-MCNC: 7.3 MG/DL (ref 8.5–10.1)
CALCIUM SERPL-MCNC: 7.3 MG/DL (ref 8.5–10.1)
CALCIUM SERPL-MCNC: 7.4 MG/DL (ref 8.5–10.1)
CALCIUM SERPL-MCNC: 7.5 MG/DL (ref 8.5–10.1)
CALCIUM SERPL-MCNC: 7.5 MG/DL (ref 8.5–10.1)
CALCIUM SERPL-MCNC: 7.6 MG/DL (ref 8.5–10.1)
CALCIUM SERPL-MCNC: 7.7 MG/DL (ref 8.5–10.1)
CALCIUM SERPL-MCNC: 7.7 MG/DL (ref 8.5–10.1)
CALCIUM SERPL-MCNC: 7.8 MG/DL (ref 8.5–10.1)
CALCIUM SERPL-MCNC: 7.9 MG/DL (ref 8.5–10.1)
CALCIUM SERPL-MCNC: 8 MG/DL (ref 8.5–10.1)
CALCIUM SERPL-MCNC: 8.2 MG/DL (ref 8.5–10.1)
CALCIUM SERPL-MCNC: 8.3 MG/DL (ref 8.5–10.1)
CALCIUM SERPL-MCNC: 8.4 MG/DL (ref 8.5–10.1)
CALCIUM SERPL-MCNC: 8.7 MG/DL (ref 8.5–10.1)
CALCIUM SERPL-MCNC: 8.7 MG/DL (ref 8.5–10.1)
CALCIUM SERPL-MCNC: 9.2 MG/DL (ref 8.5–10.1)
CHLORIDE SERPL-SCNC: 100 MMOL/L (ref 94–109)
CHLORIDE SERPL-SCNC: 104 MMOL/L (ref 94–109)
CHLORIDE SERPL-SCNC: 105 MMOL/L (ref 94–109)
CHLORIDE SERPL-SCNC: 106 MMOL/L (ref 94–109)
CHLORIDE SERPL-SCNC: 107 MMOL/L (ref 94–109)
CHLORIDE SERPL-SCNC: 108 MMOL/L (ref 94–109)
CHLORIDE SERPL-SCNC: 109 MMOL/L (ref 94–109)
CHLORIDE SERPL-SCNC: 110 MMOL/L (ref 94–109)
CHLORIDE SERPL-SCNC: 111 MMOL/L (ref 94–109)
CHLORIDE SERPL-SCNC: 111 MMOL/L (ref 94–109)
CHLORIDE SERPL-SCNC: 112 MMOL/L (ref 94–109)
CHLORIDE SERPL-SCNC: 113 MMOL/L (ref 94–109)
CHLORIDE SERPL-SCNC: 114 MMOL/L (ref 94–109)
CHLORIDE SERPL-SCNC: 115 MMOL/L (ref 94–109)
CHLORIDE SERPL-SCNC: 115 MMOL/L (ref 94–109)
CHLORIDE SERPL-SCNC: 116 MMOL/L (ref 94–109)
CO2 BLDCOV-SCNC: 21 MMOL/L (ref 21–28)
CO2 SERPL-SCNC: 15 MMOL/L (ref 20–32)
CO2 SERPL-SCNC: 18 MMOL/L (ref 20–32)
CO2 SERPL-SCNC: 18 MMOL/L (ref 20–32)
CO2 SERPL-SCNC: 20 MMOL/L (ref 20–32)
CO2 SERPL-SCNC: 21 MMOL/L (ref 20–32)
CO2 SERPL-SCNC: 22 MMOL/L (ref 20–32)
CO2 SERPL-SCNC: 22 MMOL/L (ref 20–32)
CO2 SERPL-SCNC: 23 MMOL/L (ref 20–32)
CO2 SERPL-SCNC: 23 MMOL/L (ref 20–32)
CO2 SERPL-SCNC: 24 MMOL/L (ref 20–32)
CO2 SERPL-SCNC: 25 MMOL/L (ref 20–32)
CO2 SERPL-SCNC: 26 MMOL/L (ref 20–32)
CO2 SERPL-SCNC: 27 MMOL/L (ref 20–32)
CO2 SERPL-SCNC: 28 MMOL/L (ref 20–32)
CO2 SERPL-SCNC: 28 MMOL/L (ref 20–32)
COLLECT DATE SP2 STL: ABNORMAL
COLLECT DATE SP3 STL: ABNORMAL
COLLECT DATE STL: ABNORMAL
COLOR FLD: NORMAL
COLOR FLD: YELLOW
COLOR UR AUTO: ABNORMAL
COLOR UR AUTO: YELLOW
COLOR UR AUTO: YELLOW
COPATH REPORT: NORMAL
CORTIS SERPL-MCNC: 34.9 UG/DL (ref 4–22)
CREAT BLD-MCNC: 0.9 MG/DL (ref 0.66–1.25)
CREAT SERPL-MCNC: 0.79 MG/DL (ref 0.7–1.3)
CREAT SERPL-MCNC: 0.86 MG/DL (ref 0.66–1.25)
CREAT SERPL-MCNC: 0.89 MG/DL (ref 0.66–1.25)
CREAT SERPL-MCNC: 0.91 MG/DL (ref 0.66–1.25)
CREAT SERPL-MCNC: 0.93 MG/DL (ref 0.66–1.25)
CREAT SERPL-MCNC: 0.94 MG/DL (ref 0.66–1.25)
CREAT SERPL-MCNC: 0.95 MG/DL (ref 0.66–1.25)
CREAT SERPL-MCNC: 0.95 MG/DL (ref 0.7–1.3)
CREAT SERPL-MCNC: 0.97 MG/DL (ref 0.66–1.25)
CREAT SERPL-MCNC: 1 MG/DL (ref 0.66–1.25)
CREAT SERPL-MCNC: 1 MG/DL (ref 0.66–1.25)
CREAT SERPL-MCNC: 1.02 MG/DL (ref 0.66–1.25)
CREAT SERPL-MCNC: 1.03 MG/DL (ref 0.66–1.25)
CREAT SERPL-MCNC: 1.04 MG/DL (ref 0.66–1.25)
CREAT SERPL-MCNC: 1.04 MG/DL (ref 0.66–1.25)
CREAT SERPL-MCNC: 1.06 MG/DL (ref 0.66–1.25)
CREAT SERPL-MCNC: 1.07 MG/DL (ref 0.66–1.25)
CREAT SERPL-MCNC: 1.07 MG/DL (ref 0.66–1.25)
CREAT SERPL-MCNC: 1.08 MG/DL (ref 0.66–1.25)
CREAT SERPL-MCNC: 1.09 MG/DL (ref 0.66–1.25)
CREAT SERPL-MCNC: 1.1 MG/DL (ref 0.66–1.25)
CREAT SERPL-MCNC: 1.11 MG/DL (ref 0.66–1.25)
CREAT SERPL-MCNC: 1.11 MG/DL (ref 0.66–1.25)
CREAT SERPL-MCNC: 1.13 MG/DL (ref 0.66–1.25)
CREAT SERPL-MCNC: 1.16 MG/DL (ref 0.66–1.25)
CREAT SERPL-MCNC: 1.16 MG/DL (ref 0.66–1.25)
CREAT SERPL-MCNC: 1.16 MG/DL (ref 0.7–1.3)
CREAT SERPL-MCNC: 1.22 MG/DL (ref 0.66–1.25)
CREAT SERPL-MCNC: 1.24 MG/DL (ref 0.66–1.25)
CREAT SERPL-MCNC: 1.24 MG/DL (ref 0.66–1.25)
CREAT SERPL-MCNC: 1.26 MG/DL (ref 0.66–1.25)
CREAT SERPL-MCNC: 1.28 MG/DL (ref 0.66–1.25)
CREAT SERPL-MCNC: 1.33 MG/DL (ref 0.7–1.3)
CREAT SERPL-MCNC: 1.35 MG/DL (ref 0.66–1.25)
CREAT SERPL-MCNC: 1.36 MG/DL (ref 0.66–1.25)
CREAT SERPL-MCNC: 1.37 MG/DL (ref 0.66–1.25)
CREAT SERPL-MCNC: 1.39 MG/DL (ref 0.66–1.25)
CREAT SERPL-MCNC: 1.39 MG/DL (ref 0.66–1.25)
CREAT SERPL-MCNC: 1.4 MG/DL (ref 0.66–1.25)
CREAT SERPL-MCNC: 1.41 MG/DL (ref 0.66–1.25)
CREAT SERPL-MCNC: 1.45 MG/DL (ref 0.7–1.3)
CREAT UR-MCNC: 170 MG/DL
DEPRECATED S PYO AG THROAT QL EIA: NORMAL
DIFFERENTIAL METHOD BLD: ABNORMAL
DOHLE BOD BLD QL SMEAR: PRESENT
EJECTION FRACTION: 58
EOSINOPHIL # BLD AUTO: 0 10E9/L (ref 0–0.7)
EOSINOPHIL # BLD AUTO: 0.1 10E9/L (ref 0–0.7)
EOSINOPHIL # BLD AUTO: 0.2 10E9/L (ref 0–0.7)
EOSINOPHIL NFR BLD AUTO: 0 %
EOSINOPHIL NFR BLD AUTO: 0.1 %
EOSINOPHIL NFR BLD AUTO: 0.2 %
EOSINOPHIL NFR BLD AUTO: 0.3 %
EOSINOPHIL NFR BLD AUTO: 0.4 %
EOSINOPHIL NFR BLD AUTO: 0.5 %
EOSINOPHIL NFR BLD AUTO: 0.6 %
EOSINOPHIL NFR BLD AUTO: 0.7 %
EOSINOPHIL NFR BLD AUTO: 0.8 %
EOSINOPHIL NFR BLD AUTO: 1 %
EOSINOPHIL NFR BLD AUTO: 1 %
EOSINOPHIL NFR BLD AUTO: 1.1 %
EOSINOPHIL NFR BLD AUTO: 1.1 %
EOSINOPHIL NFR BLD AUTO: 1.2 %
EOSINOPHIL NFR BLD AUTO: 1.6 %
EOSINOPHIL NFR BLD AUTO: 1.9 %
EOSINOPHIL NFR BLD AUTO: 2.9 %
EOSINOPHIL NFR BLD AUTO: 5 %
EOSINOPHIL NFR FLD MANUAL: 2 %
ERYTHROCYTE [DISTWIDTH] IN BLOOD BY AUTOMATED COUNT: 14.4 % (ref 10–15)
ERYTHROCYTE [DISTWIDTH] IN BLOOD BY AUTOMATED COUNT: 14.6 % (ref 10–15)
ERYTHROCYTE [DISTWIDTH] IN BLOOD BY AUTOMATED COUNT: 15 % (ref 10–15)
ERYTHROCYTE [DISTWIDTH] IN BLOOD BY AUTOMATED COUNT: 15.5 % (ref 10–15)
ERYTHROCYTE [DISTWIDTH] IN BLOOD BY AUTOMATED COUNT: 15.6 % (ref 10–15)
ERYTHROCYTE [DISTWIDTH] IN BLOOD BY AUTOMATED COUNT: 15.8 % (ref 10–15)
ERYTHROCYTE [DISTWIDTH] IN BLOOD BY AUTOMATED COUNT: 15.9 % (ref 10–15)
ERYTHROCYTE [DISTWIDTH] IN BLOOD BY AUTOMATED COUNT: 16.1 % (ref 10–15)
ERYTHROCYTE [DISTWIDTH] IN BLOOD BY AUTOMATED COUNT: 16.2 % (ref 10–15)
ERYTHROCYTE [DISTWIDTH] IN BLOOD BY AUTOMATED COUNT: 16.4 % (ref 10–15)
ERYTHROCYTE [DISTWIDTH] IN BLOOD BY AUTOMATED COUNT: 16.6 % (ref 10–15)
ERYTHROCYTE [DISTWIDTH] IN BLOOD BY AUTOMATED COUNT: 16.7 % (ref 10–15)
ERYTHROCYTE [DISTWIDTH] IN BLOOD BY AUTOMATED COUNT: 16.8 % (ref 10–15)
ERYTHROCYTE [DISTWIDTH] IN BLOOD BY AUTOMATED COUNT: 16.9 % (ref 10–15)
ERYTHROCYTE [DISTWIDTH] IN BLOOD BY AUTOMATED COUNT: 17.2 % (ref 10–15)
ERYTHROCYTE [DISTWIDTH] IN BLOOD BY AUTOMATED COUNT: 17.3 % (ref 10–15)
ERYTHROCYTE [DISTWIDTH] IN BLOOD BY AUTOMATED COUNT: 17.3 % (ref 10–15)
ERYTHROCYTE [DISTWIDTH] IN BLOOD BY AUTOMATED COUNT: 17.7 % (ref 10–15)
ERYTHROCYTE [DISTWIDTH] IN BLOOD BY AUTOMATED COUNT: 18.5 % (ref 10–15)
ERYTHROCYTE [DISTWIDTH] IN BLOOD BY AUTOMATED COUNT: 18.6 % (ref 10–15)
ERYTHROCYTE [DISTWIDTH] IN BLOOD BY AUTOMATED COUNT: 18.7 % (ref 10–15)
ERYTHROCYTE [DISTWIDTH] IN BLOOD BY AUTOMATED COUNT: 21.1 % (ref 10–15)
ERYTHROCYTE [DISTWIDTH] IN BLOOD BY AUTOMATED COUNT: 21.2 % (ref 10–15)
ERYTHROCYTE [DISTWIDTH] IN BLOOD BY AUTOMATED COUNT: 21.5 % (ref 10–15)
ERYTHROCYTE [DISTWIDTH] IN BLOOD BY AUTOMATED COUNT: 21.6 % (ref 10–15)
FIBRINOGEN PPP-MCNC: 162 MG/DL (ref 200–420)
FIBRINOGEN PPP-MCNC: 191 MG/DL (ref 200–420)
FIBRINOGEN PPP-MCNC: 288 MG/DL (ref 200–420)
FIBRINOGEN PPP-MCNC: 319 MG/DL (ref 200–420)
FIBRINOGEN PPP-MCNC: 363 MG/DL (ref 200–420)
FLUAV H1 2009 PAND RNA SPEC QL NAA+PROBE: NEGATIVE
FLUAV H1 RNA SPEC QL NAA+PROBE: NEGATIVE
FLUAV H3 RNA SPEC QL NAA+PROBE: NEGATIVE
FLUAV RNA SPEC QL NAA+PROBE: NEGATIVE
FLUBV RNA SPEC QL NAA+PROBE: NEGATIVE
GFR SERPL CREATININE-BSD FRML MDRD: 47 ML/MIN
GFR SERPL CREATININE-BSD FRML MDRD: 49 ML/MIN/1.7M2
GFR SERPL CREATININE-BSD FRML MDRD: 50 ML/MIN/1.7M2
GFR SERPL CREATININE-BSD FRML MDRD: 51 ML/MIN/1.7M2
GFR SERPL CREATININE-BSD FRML MDRD: 51 ML/MIN/1.7M2
GFR SERPL CREATININE-BSD FRML MDRD: 52 ML/MIN
GFR SERPL CREATININE-BSD FRML MDRD: 54 ML/MIN/1.7M2
GFR SERPL CREATININE-BSD FRML MDRD: 55 ML/MIN/1.7M2
GFR SERPL CREATININE-BSD FRML MDRD: 56 ML/MIN/1.7M2
GFR SERPL CREATININE-BSD FRML MDRD: 56 ML/MIN/1.7M2
GFR SERPL CREATININE-BSD FRML MDRD: 57 ML/MIN/1.7M2
GFR SERPL CREATININE-BSD FRML MDRD: 61 ML/MIN/1.7M2
GFR SERPL CREATININE-BSD FRML MDRD: 61 ML/MIN/1.7M2
GFR SERPL CREATININE-BSD FRML MDRD: 63 ML/MIN/1.7M2
GFR SERPL CREATININE-BSD FRML MDRD: 64 ML/MIN/1.7M2
GFR SERPL CREATININE-BSD FRML MDRD: 64 ML/MIN/1.7M2
GFR SERPL CREATININE-BSD FRML MDRD: 65 ML/MIN/1.7M2
GFR SERPL CREATININE-BSD FRML MDRD: 65 ML/MIN/1.7M2
GFR SERPL CREATININE-BSD FRML MDRD: 66 ML/MIN/1.7M2
GFR SERPL CREATININE-BSD FRML MDRD: 67 ML/MIN/1.7M2
GFR SERPL CREATININE-BSD FRML MDRD: 69 ML/MIN/1.7M2
GFR SERPL CREATININE-BSD FRML MDRD: 69 ML/MIN/1.7M2
GFR SERPL CREATININE-BSD FRML MDRD: 70 ML/MIN/1.7M2
GFR SERPL CREATININE-BSD FRML MDRD: 72 ML/MIN/1.7M2
GFR SERPL CREATININE-BSD FRML MDRD: 72 ML/MIN/1.7M2
GFR SERPL CREATININE-BSD FRML MDRD: 75 ML/MIN/1.7M2
GFR SERPL CREATININE-BSD FRML MDRD: 77 ML/MIN/1.7M2
GFR SERPL CREATININE-BSD FRML MDRD: 78 ML/MIN/1.7M2
GFR SERPL CREATININE-BSD FRML MDRD: 79 ML/MIN/1.7M2
GFR SERPL CREATININE-BSD FRML MDRD: 81 ML/MIN/1.7M2
GFR SERPL CREATININE-BSD FRML MDRD: 82 ML/MIN/1.7M2
GFR SERPL CREATININE-BSD FRML MDRD: 83 ML/MIN/1.7M2
GFR SERPL CREATININE-BSD FRML MDRD: 85 ML/MIN/1.7M2
GFR SERPL CREATININE-BSD FRML MDRD: >60 ML/MIN
GLUCOSE BLDC GLUCOMTR-MCNC: 100 MG/DL (ref 70–99)
GLUCOSE BLDC GLUCOMTR-MCNC: 107 MG/DL (ref 70–99)
GLUCOSE BLDC GLUCOMTR-MCNC: 112 MG/DL (ref 70–99)
GLUCOSE BLDC GLUCOMTR-MCNC: 61 MG/DL (ref 70–99)
GLUCOSE BLDC GLUCOMTR-MCNC: 65 MG/DL (ref 70–99)
GLUCOSE BLDC GLUCOMTR-MCNC: 66 MG/DL (ref 70–99)
GLUCOSE BLDC GLUCOMTR-MCNC: 73 MG/DL (ref 70–99)
GLUCOSE BLDC GLUCOMTR-MCNC: 74 MG/DL (ref 70–99)
GLUCOSE BLDC GLUCOMTR-MCNC: 86 MG/DL (ref 70–99)
GLUCOSE BLDC GLUCOMTR-MCNC: 86 MG/DL (ref 70–99)
GLUCOSE BLDC GLUCOMTR-MCNC: 90 MG/DL (ref 70–99)
GLUCOSE BLDC GLUCOMTR-MCNC: 92 MG/DL (ref 70–99)
GLUCOSE FLD-MCNC: 85 MG/DL
GLUCOSE SERPL-MCNC: 101 MG/DL (ref 70–99)
GLUCOSE SERPL-MCNC: 104 MG/DL (ref 70–99)
GLUCOSE SERPL-MCNC: 106 MG/DL (ref 70–99)
GLUCOSE SERPL-MCNC: 106 MG/DL (ref 70–99)
GLUCOSE SERPL-MCNC: 107 MG/DL (ref 70–99)
GLUCOSE SERPL-MCNC: 107 MG/DL (ref 70–99)
GLUCOSE SERPL-MCNC: 108 MG/DL (ref 70–99)
GLUCOSE SERPL-MCNC: 108 MG/DL (ref 70–99)
GLUCOSE SERPL-MCNC: 110 MG/DL (ref 70–99)
GLUCOSE SERPL-MCNC: 117 MG/DL (ref 70–99)
GLUCOSE SERPL-MCNC: 118 MG/DL (ref 70–99)
GLUCOSE SERPL-MCNC: 123 MG/DL (ref 70–99)
GLUCOSE SERPL-MCNC: 124 MG/DL (ref 70–99)
GLUCOSE SERPL-MCNC: 126 MG/DL (ref 70–99)
GLUCOSE SERPL-MCNC: 129 MG/DL (ref 70–99)
GLUCOSE SERPL-MCNC: 130 MG/DL (ref 70–110)
GLUCOSE SERPL-MCNC: 134 MG/DL (ref 70–99)
GLUCOSE SERPL-MCNC: 135 MG/DL (ref 70–99)
GLUCOSE SERPL-MCNC: 141 MG/DL (ref 70–99)
GLUCOSE SERPL-MCNC: 141 MG/DL (ref 70–99)
GLUCOSE SERPL-MCNC: 147 MG/DL (ref 70–110)
GLUCOSE SERPL-MCNC: 147 MG/DL (ref 70–99)
GLUCOSE SERPL-MCNC: 153 MG/DL (ref 70–99)
GLUCOSE SERPL-MCNC: 167 MG/DL (ref 70–99)
GLUCOSE SERPL-MCNC: 62 MG/DL (ref 70–99)
GLUCOSE SERPL-MCNC: 66 MG/DL (ref 70–99)
GLUCOSE SERPL-MCNC: 75 MG/DL (ref 70–99)
GLUCOSE SERPL-MCNC: 76 MG/DL (ref 70–99)
GLUCOSE SERPL-MCNC: 77 MG/DL (ref 70–110)
GLUCOSE SERPL-MCNC: 78 MG/DL (ref 70–99)
GLUCOSE SERPL-MCNC: 80 MG/DL (ref 70–99)
GLUCOSE SERPL-MCNC: 82 MG/DL (ref 70–99)
GLUCOSE SERPL-MCNC: 84 MG/DL (ref 70–99)
GLUCOSE SERPL-MCNC: 85 MG/DL (ref 74–106)
GLUCOSE SERPL-MCNC: 89 MG/DL (ref 70–110)
GLUCOSE SERPL-MCNC: 89 MG/DL (ref 70–99)
GLUCOSE SERPL-MCNC: 90 MG/DL (ref 70–99)
GLUCOSE SERPL-MCNC: 92 MG/DL (ref 70–99)
GLUCOSE SERPL-MCNC: 95 MG/DL (ref 70–99)
GLUCOSE SERPL-MCNC: 95 MG/DL (ref 70–99)
GLUCOSE SERPL-MCNC: 97 MG/DL (ref 70–99)
GLUCOSE UR STRIP-MCNC: NEGATIVE MG/DL
GP B STREP AG SPEC QL: NORMAL
GRAM STN SPEC: ABNORMAL
GRAM STN SPEC: NORMAL
GRAN CASTS #/AREA URNS LPF: 3 /LPF
H PYLORI AG STL QL IA: NORMAL
HADV DNA SPEC QL NAA+PROBE: NEGATIVE
HADV DNA SPEC QL NAA+PROBE: NEGATIVE
HBA1C MFR BLD: 5.8 % (ref 4.3–6)
HBV CORE AB SERPL QL IA: NONREACTIVE
HBV SURFACE AB SERPL IA-ACNC: 1.11 M[IU]/ML
HBV SURFACE AG SERPL QL IA: NONREACTIVE
HCT VFR BLD AUTO: 25 % (ref 40–53)
HCT VFR BLD AUTO: 26.3 % (ref 40–53)
HCT VFR BLD AUTO: 27.2 % (ref 40–53)
HCT VFR BLD AUTO: 27.4 % (ref 40–53)
HCT VFR BLD AUTO: 27.6 % (ref 40–53)
HCT VFR BLD AUTO: 27.6 % (ref 40–53)
HCT VFR BLD AUTO: 28.2 % (ref 40–53)
HCT VFR BLD AUTO: 29 % (ref 40–53)
HCT VFR BLD AUTO: 29 % (ref 40–53)
HCT VFR BLD AUTO: 29.2 % (ref 40–53)
HCT VFR BLD AUTO: 29.7 % (ref 40–53)
HCT VFR BLD AUTO: 29.7 % (ref 40–53)
HCT VFR BLD AUTO: 30.2 % (ref 40–53)
HCT VFR BLD AUTO: 30.5 % (ref 40–53)
HCT VFR BLD AUTO: 31.1 % (ref 40–53)
HCT VFR BLD AUTO: 31.2 % (ref 40–53)
HCT VFR BLD AUTO: 31.3 % (ref 40–53)
HCT VFR BLD AUTO: 31.9 % (ref 40–53)
HCT VFR BLD AUTO: 33 % (ref 40–53)
HCT VFR BLD AUTO: 33.2 % (ref 40–53)
HCT VFR BLD AUTO: 33.6 % (ref 40–53)
HCT VFR BLD AUTO: 33.8 % (ref 40–53)
HCT VFR BLD AUTO: 33.8 % (ref 40–53)
HCT VFR BLD AUTO: 34.2 % (ref 40–53)
HCT VFR BLD AUTO: 34.6 % (ref 40–53)
HCT VFR BLD AUTO: 34.8 % (ref 40–53)
HCT VFR BLD AUTO: 35.2 % (ref 40–53)
HCT VFR BLD AUTO: 35.3 % (ref 40–53)
HCT VFR BLD AUTO: 36.2 % (ref 40–53)
HCT VFR BLD AUTO: 38.8 % (ref 40–53)
HCT VFR BLD AUTO: 39.2 % (ref 40–53)
HCT VFR BLD AUTO: 40.6 % (ref 40–53)
HCT VFR BLD AUTO: 43.2 % (ref 40–53)
HCT VFR BLD AUTO: 44.8 % (ref 40–53)
HCT VFR BLD AUTO: 45.4 % (ref 40–53)
HCT VFR BLD AUTO: 46.2 % (ref 40–53)
HCV AB SERPL QL IA: NONREACTIVE
HEMOCCULT SP1 STL QL: NEGATIVE
HEMOCCULT SP2 STL QL: NEGATIVE
HEMOCCULT SP3 STL QL: POSITIVE
HGB BLD-MCNC: 10.1 G/DL (ref 13.3–17.7)
HGB BLD-MCNC: 10.2 G/DL (ref 13.3–17.7)
HGB BLD-MCNC: 10.5 G/DL (ref 13.3–17.7)
HGB BLD-MCNC: 10.6 G/DL (ref 13.3–17.7)
HGB BLD-MCNC: 10.9 G/DL (ref 13.3–17.7)
HGB BLD-MCNC: 11 G/DL (ref 13.3–17.7)
HGB BLD-MCNC: 11 G/DL (ref 13.3–17.7)
HGB BLD-MCNC: 11.1 G/DL (ref 13.3–17.7)
HGB BLD-MCNC: 11.1 G/DL (ref 13.3–17.7)
HGB BLD-MCNC: 11.3 G/DL (ref 13.3–17.7)
HGB BLD-MCNC: 11.3 G/DL (ref 13.3–17.7)
HGB BLD-MCNC: 11.4 G/DL (ref 13.3–17.7)
HGB BLD-MCNC: 11.4 G/DL (ref 13.3–17.7)
HGB BLD-MCNC: 11.7 G/DL (ref 13.3–17.7)
HGB BLD-MCNC: 12.2 G/DL (ref 13.3–17.7)
HGB BLD-MCNC: 12.7 G/DL (ref 13.3–17.7)
HGB BLD-MCNC: 13.1 G/DL (ref 13.3–17.7)
HGB BLD-MCNC: 13.7 G/DL (ref 13.3–17.7)
HGB BLD-MCNC: 14.4 G/DL (ref 13.3–17.7)
HGB BLD-MCNC: 14.5 G/DL (ref 13.3–17.7)
HGB BLD-MCNC: 15.1 G/DL (ref 13.3–17.7)
HGB BLD-MCNC: 8.1 G/DL (ref 13.3–17.7)
HGB BLD-MCNC: 8.4 G/DL (ref 13.3–17.7)
HGB BLD-MCNC: 8.7 G/DL (ref 13.3–17.7)
HGB BLD-MCNC: 8.8 G/DL (ref 13.3–17.7)
HGB BLD-MCNC: 8.9 G/DL (ref 13.3–17.7)
HGB BLD-MCNC: 8.9 G/DL (ref 13.3–17.7)
HGB BLD-MCNC: 9 G/DL (ref 13.3–17.7)
HGB BLD-MCNC: 9.4 G/DL (ref 13.3–17.7)
HGB BLD-MCNC: 9.4 G/DL (ref 13.3–17.7)
HGB BLD-MCNC: 9.5 G/DL (ref 13.3–17.7)
HGB BLD-MCNC: 9.5 G/DL (ref 13.3–17.7)
HGB BLD-MCNC: 9.6 G/DL (ref 13.3–17.7)
HGB BLD-MCNC: 9.8 G/DL (ref 13.3–17.7)
HGB UR QL STRIP: ABNORMAL
HGB UR QL STRIP: ABNORMAL
HGB UR QL STRIP: NEGATIVE
HIV 1+2 AB+HIV1 P24 AG SERPL QL IA: NONREACTIVE
HMPV RNA SPEC QL NAA+PROBE: NEGATIVE
HPIV1 RNA SPEC QL NAA+PROBE: NEGATIVE
HPIV2 RNA SPEC QL NAA+PROBE: NEGATIVE
HPIV3 RNA SPEC QL NAA+PROBE: NEGATIVE
HYALINE CASTS #/AREA URNS LPF: <1 /LPF (ref 0–2)
IMM GRANULOCYTES # BLD: 0 10E9/L (ref 0–0.4)
IMM GRANULOCYTES # BLD: 0.1 10E9/L (ref 0–0.4)
IMM GRANULOCYTES # BLD: 0.2 10E9/L (ref 0–0.4)
IMM GRANULOCYTES # BLD: 0.3 10E9/L (ref 0–0.4)
IMM GRANULOCYTES # BLD: 0.3 10E9/L (ref 0–0.4)
IMM GRANULOCYTES NFR BLD: 0.3 %
IMM GRANULOCYTES NFR BLD: 0.4 %
IMM GRANULOCYTES NFR BLD: 0.4 %
IMM GRANULOCYTES NFR BLD: 0.5 %
IMM GRANULOCYTES NFR BLD: 0.6 %
IMM GRANULOCYTES NFR BLD: 0.8 %
IMM GRANULOCYTES NFR BLD: 0.9 %
IMM GRANULOCYTES NFR BLD: 1 %
IMM GRANULOCYTES NFR BLD: 1.7 %
IMM GRANULOCYTES NFR BLD: 2.1 %
IMM GRANULOCYTES NFR BLD: 2.3 %
IMM GRANULOCYTES NFR BLD: 2.4 %
INR PPP: 0.94 (ref 0.86–1.14)
INR PPP: 0.95 (ref 0.86–1.14)
INR PPP: 1.03 (ref 0.86–1.14)
INR PPP: 1.05 (ref 0.86–1.14)
INR PPP: 1.07 (ref 0.86–1.14)
INR PPP: 1.09 (ref 0.86–1.14)
INR PPP: 1.1 (ref 0.9–1.1)
INR PPP: 1.14 (ref 0.86–1.14)
INR PPP: 1.15 (ref 0.86–1.14)
INR PPP: 1.2 (ref 0.9–1.1)
INR PPP: 1.21 (ref 0.86–1.14)
INTERPRETATION ECG - MUSE: NORMAL
KETONES UR STRIP-MCNC: 10 MG/DL
KETONES UR STRIP-MCNC: NEGATIVE MG/DL
KETONES UR STRIP-MCNC: NEGATIVE MG/DL
L PNEUMO1 AG UR QL IA: NORMAL
LACTATE BLD-SCNC: 1.4 MMOL/L (ref 0.7–2)
LACTATE BLD-SCNC: 1.7 MMOL/L (ref 0.7–2)
LACTATE BLD-SCNC: 1.8 MMOL/L (ref 0.7–2)
LACTATE BLD-SCNC: 1.8 MMOL/L (ref 0.7–2)
LACTATE BLD-SCNC: 2.1 MMOL/L (ref 0.7–2)
LACTATE BLD-SCNC: 2.1 MMOL/L (ref 0.7–2.1)
LACTATE BLD-SCNC: 2.3 MMOL/L (ref 0.7–2)
LACTATE BLD-SCNC: 2.4 MMOL/L (ref 0.7–2)
LACTATE BLD-SCNC: 2.8 MMOL/L (ref 0.7–2)
LACTATE BLD-SCNC: 3.1 MMOL/L (ref 0.7–2)
LACTATE BLD-SCNC: 3.3 MMOL/L (ref 0.4–1.9)
LACTATE BLD-SCNC: 3.3 MMOL/L (ref 0.7–2)
LACTATE BLD-SCNC: 3.3 MMOL/L (ref 0.7–2)
LACTATE BLD-SCNC: 5.7 MMOL/L (ref 0.7–2)
LDH FLD L TO P-CCNC: 231 U/L
LDH FLD L TO P-CCNC: 296 U/L
LDH SERPL L TO P-CCNC: 290 U/L (ref 85–227)
LDH SERPL L TO P-CCNC: 297 U/L (ref 85–227)
LDH SERPL L TO P-CCNC: 297 U/L (ref 85–227)
LDH SERPL L TO P-CCNC: 317 U/L (ref 85–227)
LDH SERPL L TO P-CCNC: 323 U/L (ref 85–227)
LDH SERPL L TO P-CCNC: 331 U/L (ref 85–227)
LDH SERPL L TO P-CCNC: 355 U/L (ref 85–227)
LDH SERPL L TO P-CCNC: 400 U/L (ref 85–227)
LDH SERPL L TO P-CCNC: 442 U/L (ref 85–227)
LDH SERPL L TO P-CCNC: 476 U/L (ref 85–227)
LDH SERPL L TO P-CCNC: 477 U/L (ref 85–227)
LDH SERPL L TO P-CCNC: 478 U/L (ref 85–227)
LDH SERPL L TO P-CCNC: 545 U/L (ref 85–227)
LDH SERPL L TO P-CCNC: 638 U/L (ref 85–227)
LDH SERPL L TO P-CCNC: 726 U/L (ref 85–227)
LDH SERPL L TO P-CCNC: 940 U/L (ref 85–227)
LDH SERPL L TO P-CCNC: 994 U/L (ref 85–227)
LEUKOCYTE ESTERASE UR QL STRIP: NEGATIVE
LIPASE SERPL-CCNC: 188 U/L (ref 73–393)
LIPASE SERPL-CCNC: 223 U/L (ref 73–393)
LIPASE SERPL-CCNC: 252 U/L (ref 73–393)
LYMPHOCYTES # BLD AUTO: 0 10E9/L (ref 0.8–5.3)
LYMPHOCYTES # BLD AUTO: 0 10E9/L (ref 0.8–5.3)
LYMPHOCYTES # BLD AUTO: 0.1 10E9/L (ref 0.8–5.3)
LYMPHOCYTES # BLD AUTO: 0.1 10E9/L (ref 0.8–5.3)
LYMPHOCYTES # BLD AUTO: 0.2 10E9/L (ref 0.8–5.3)
LYMPHOCYTES # BLD AUTO: 0.3 10E9/L (ref 0.8–5.3)
LYMPHOCYTES # BLD AUTO: 0.4 10E9/L (ref 0.8–5.3)
LYMPHOCYTES # BLD AUTO: 0.4 10E9/L (ref 0.8–5.3)
LYMPHOCYTES # BLD AUTO: 0.5 10E9/L (ref 0.8–5.3)
LYMPHOCYTES # BLD AUTO: 0.5 10E9/L (ref 0.8–5.3)
LYMPHOCYTES # BLD AUTO: 0.6 10E9/L (ref 0.8–5.3)
LYMPHOCYTES # BLD AUTO: 0.7 10E9/L (ref 0.8–5.3)
LYMPHOCYTES # BLD AUTO: 0.7 10E9/L (ref 0.8–5.3)
LYMPHOCYTES # BLD AUTO: 0.8 10E9/L (ref 0.8–5.3)
LYMPHOCYTES # BLD AUTO: 1 10E9/L (ref 0.8–5.3)
LYMPHOCYTES # BLD AUTO: 1.1 10E9/L (ref 0.8–5.3)
LYMPHOCYTES # BLD AUTO: 1.2 10E9/L (ref 0.8–5.3)
LYMPHOCYTES # BLD AUTO: 1.3 10E9/L (ref 0.8–5.3)
LYMPHOCYTES # BLD AUTO: 1.7 10E9/L (ref 0.8–5.3)
LYMPHOCYTES # BLD AUTO: 1.7 10E9/L (ref 0.8–5.3)
LYMPHOCYTES # BLD AUTO: 1.8 10E9/L (ref 0.8–5.3)
LYMPHOCYTES # BLD AUTO: 1.9 10E9/L (ref 0.8–5.3)
LYMPHOCYTES # BLD AUTO: 2 10E9/L (ref 0.8–5.3)
LYMPHOCYTES # BLD AUTO: 2.5 10E9/L (ref 0.8–5.3)
LYMPHOCYTES # BLD AUTO: 2.9 10E9/L (ref 0.8–5.3)
LYMPHOCYTES # BLD AUTO: 3.8 10E9/L (ref 0.8–5.3)
LYMPHOCYTES # BLD AUTO: 3.9 10E9/L (ref 0.8–5.3)
LYMPHOCYTES # BLD AUTO: 4.1 10E9/L (ref 0.8–5.3)
LYMPHOCYTES NFR BLD AUTO: 0 %
LYMPHOCYTES NFR BLD AUTO: 0 %
LYMPHOCYTES NFR BLD AUTO: 0.8 %
LYMPHOCYTES NFR BLD AUTO: 0.9 %
LYMPHOCYTES NFR BLD AUTO: 1 %
LYMPHOCYTES NFR BLD AUTO: 1.8 %
LYMPHOCYTES NFR BLD AUTO: 10 %
LYMPHOCYTES NFR BLD AUTO: 11 %
LYMPHOCYTES NFR BLD AUTO: 13.9 %
LYMPHOCYTES NFR BLD AUTO: 15.7 %
LYMPHOCYTES NFR BLD AUTO: 16.6 %
LYMPHOCYTES NFR BLD AUTO: 17 %
LYMPHOCYTES NFR BLD AUTO: 18.1 %
LYMPHOCYTES NFR BLD AUTO: 18.3 %
LYMPHOCYTES NFR BLD AUTO: 19.4 %
LYMPHOCYTES NFR BLD AUTO: 20 %
LYMPHOCYTES NFR BLD AUTO: 22.2 %
LYMPHOCYTES NFR BLD AUTO: 23.1 %
LYMPHOCYTES NFR BLD AUTO: 25 %
LYMPHOCYTES NFR BLD AUTO: 28 %
LYMPHOCYTES NFR BLD AUTO: 3.9 %
LYMPHOCYTES NFR BLD AUTO: 33 %
LYMPHOCYTES NFR BLD AUTO: 37 %
LYMPHOCYTES NFR BLD AUTO: 37.1 %
LYMPHOCYTES NFR BLD AUTO: 55 %
LYMPHOCYTES NFR BLD AUTO: 6.2 %
LYMPHOCYTES NFR BLD AUTO: 6.5 %
LYMPHOCYTES NFR BLD AUTO: 7.7 %
LYMPHOCYTES NFR BLD AUTO: 7.9 %
LYMPHOCYTES NFR BLD AUTO: 8.6 %
LYMPHOCYTES NFR BLD AUTO: 9 %
LYMPHOCYTES NFR BLD AUTO: 9.3 %
LYMPHOCYTES NFR FLD MANUAL: 32 %
Lab: ABNORMAL
Lab: NORMAL
MACROCYTES BLD QL SMEAR: PRESENT
MAGNESIUM SERPL-MCNC: 1.6 MG/DL (ref 1.6–2.3)
MAGNESIUM SERPL-MCNC: 1.8 MG/DL (ref 1.6–2.3)
MAGNESIUM SERPL-MCNC: 1.8 MG/DL (ref 1.6–2.3)
MAGNESIUM SERPL-MCNC: 1.9 MG/DL (ref 1.6–2.3)
MAGNESIUM SERPL-MCNC: 2 MG/DL (ref 1.6–2.3)
MAGNESIUM SERPL-MCNC: 2.1 MG/DL (ref 1.6–2.3)
MCH RBC QN AUTO: 29.1 PG (ref 26.5–33)
MCH RBC QN AUTO: 29.2 PG (ref 26.5–33)
MCH RBC QN AUTO: 29.3 PG (ref 26.5–33)
MCH RBC QN AUTO: 29.4 PG (ref 26.5–33)
MCH RBC QN AUTO: 29.4 PG (ref 26.5–33)
MCH RBC QN AUTO: 29.5 PG (ref 26.5–33)
MCH RBC QN AUTO: 29.6 PG (ref 26.5–33)
MCH RBC QN AUTO: 29.7 PG (ref 26.5–33)
MCH RBC QN AUTO: 29.7 PG (ref 26.5–33)
MCH RBC QN AUTO: 29.8 PG (ref 26.5–33)
MCH RBC QN AUTO: 30.1 PG (ref 26.5–33)
MCH RBC QN AUTO: 30.1 PG (ref 26.5–33)
MCH RBC QN AUTO: 30.3 PG (ref 26.5–33)
MCH RBC QN AUTO: 32 PG (ref 26.5–33)
MCH RBC QN AUTO: 32.3 PG (ref 26.5–33)
MCH RBC QN AUTO: 32.6 PG (ref 26.5–33)
MCH RBC QN AUTO: 32.8 PG (ref 26.5–33)
MCH RBC QN AUTO: 33.1 PG (ref 26.5–33)
MCH RBC QN AUTO: 33.3 PG (ref 26.5–33)
MCH RBC QN AUTO: 33.4 PG (ref 26.5–33)
MCH RBC QN AUTO: 33.5 PG (ref 26.5–33)
MCH RBC QN AUTO: 33.6 PG (ref 26.5–33)
MCH RBC QN AUTO: 33.6 PG (ref 26.5–33)
MCH RBC QN AUTO: 33.7 PG (ref 26.5–33)
MCH RBC QN AUTO: 34 PG (ref 26.5–33)
MCH RBC QN AUTO: 34.1 PG (ref 26.5–33)
MCH RBC QN AUTO: 34.5 PG (ref 26.5–33)
MCH RBC QN AUTO: 34.6 PG (ref 26.5–33)
MCH RBC QN AUTO: 34.9 PG (ref 26.5–33)
MCHC RBC AUTO-ENTMCNC: 31.4 G/DL (ref 31.5–36.5)
MCHC RBC AUTO-ENTMCNC: 31.7 G/DL (ref 31.5–36.5)
MCHC RBC AUTO-ENTMCNC: 31.7 G/DL (ref 31.5–36.5)
MCHC RBC AUTO-ENTMCNC: 31.8 G/DL (ref 31.5–36.5)
MCHC RBC AUTO-ENTMCNC: 31.9 G/DL (ref 31.5–36.5)
MCHC RBC AUTO-ENTMCNC: 32 G/DL (ref 31.5–36.5)
MCHC RBC AUTO-ENTMCNC: 32.1 G/DL (ref 31.5–36.5)
MCHC RBC AUTO-ENTMCNC: 32.2 G/DL (ref 31.5–36.5)
MCHC RBC AUTO-ENTMCNC: 32.3 G/DL (ref 31.5–36.5)
MCHC RBC AUTO-ENTMCNC: 32.4 G/DL (ref 31.5–36.5)
MCHC RBC AUTO-ENTMCNC: 32.5 G/DL (ref 31.5–36.5)
MCHC RBC AUTO-ENTMCNC: 32.5 G/DL (ref 31.5–36.5)
MCHC RBC AUTO-ENTMCNC: 32.6 G/DL (ref 31.5–36.5)
MCHC RBC AUTO-ENTMCNC: 32.7 G/DL (ref 31.5–36.5)
MCHC RBC AUTO-ENTMCNC: 32.8 G/DL (ref 31.5–36.5)
MCHC RBC AUTO-ENTMCNC: 32.8 G/DL (ref 31.5–36.5)
MCHC RBC AUTO-ENTMCNC: 32.9 G/DL (ref 31.5–36.5)
MCHC RBC AUTO-ENTMCNC: 33 G/DL (ref 31.5–36.5)
MCHC RBC AUTO-ENTMCNC: 33 G/DL (ref 31.5–36.5)
MCHC RBC AUTO-ENTMCNC: 33.1 G/DL (ref 31.5–36.5)
MCHC RBC AUTO-ENTMCNC: 33.1 G/DL (ref 31.5–36.5)
MCHC RBC AUTO-ENTMCNC: 33.4 G/DL (ref 31.5–36.5)
MCV RBC AUTO: 100 FL (ref 78–100)
MCV RBC AUTO: 101 FL (ref 78–100)
MCV RBC AUTO: 102 FL (ref 78–100)
MCV RBC AUTO: 103 FL (ref 78–100)
MCV RBC AUTO: 103 FL (ref 78–100)
MCV RBC AUTO: 107 FL (ref 78–100)
MCV RBC AUTO: 107 FL (ref 78–100)
MCV RBC AUTO: 108 FL (ref 78–100)
MCV RBC AUTO: 109 FL (ref 78–100)
MCV RBC AUTO: 90 FL (ref 78–100)
MCV RBC AUTO: 91 FL (ref 78–100)
MCV RBC AUTO: 91 FL (ref 78–100)
MCV RBC AUTO: 92 FL (ref 78–100)
MCV RBC AUTO: 93 FL (ref 78–100)
MCV RBC AUTO: 94 FL (ref 78–100)
MCV RBC AUTO: 99 FL (ref 78–100)
METAMYELOCYTES # BLD: 0.1 10E9/L
METAMYELOCYTES # BLD: 0.3 10E9/L
METAMYELOCYTES # BLD: 0.3 10E9/L
METAMYELOCYTES # BLD: 0.6 10E9/L
METAMYELOCYTES NFR BLD MANUAL: 0.9 %
METAMYELOCYTES NFR BLD MANUAL: 1 %
METAMYELOCYTES NFR BLD MANUAL: 1.7 %
METAMYELOCYTES NFR BLD MANUAL: 2.6 %
MICROALBUMIN UR-MCNC: 82 MG/L
MICROALBUMIN/CREAT UR: 48.35 MG/G CR (ref 0–17)
MICROBIOLOGIST REVIEW: NORMAL
MONOCYTES # BLD AUTO: 0.1 10E9/L (ref 0–1.3)
MONOCYTES # BLD AUTO: 0.2 10E9/L (ref 0–1.3)
MONOCYTES # BLD AUTO: 0.3 10E9/L (ref 0–1.3)
MONOCYTES # BLD AUTO: 0.4 10E9/L (ref 0–1.3)
MONOCYTES # BLD AUTO: 0.5 10E9/L (ref 0–1.3)
MONOCYTES # BLD AUTO: 0.6 10E9/L (ref 0–1.3)
MONOCYTES # BLD AUTO: 0.7 10E9/L (ref 0–1.3)
MONOCYTES # BLD AUTO: 0.8 10E9/L (ref 0–1.3)
MONOCYTES # BLD AUTO: 0.9 10E9/L (ref 0–1.3)
MONOCYTES # BLD AUTO: 1.1 10E9/L (ref 0–1.3)
MONOCYTES # BLD AUTO: 1.3 10E9/L (ref 0–1.3)
MONOCYTES # BLD AUTO: 1.5 10E9/L (ref 0–1.3)
MONOCYTES # BLD AUTO: 1.7 10E9/L (ref 0–1.3)
MONOCYTES # BLD AUTO: 2.5 10E9/L (ref 0–1.3)
MONOCYTES NFR BLD AUTO: 0.9 %
MONOCYTES NFR BLD AUTO: 1 %
MONOCYTES NFR BLD AUTO: 1.8 %
MONOCYTES NFR BLD AUTO: 10 %
MONOCYTES NFR BLD AUTO: 10.5 %
MONOCYTES NFR BLD AUTO: 10.9 %
MONOCYTES NFR BLD AUTO: 13.3 %
MONOCYTES NFR BLD AUTO: 14 %
MONOCYTES NFR BLD AUTO: 14.4 %
MONOCYTES NFR BLD AUTO: 16.8 %
MONOCYTES NFR BLD AUTO: 2.6 %
MONOCYTES NFR BLD AUTO: 2.6 %
MONOCYTES NFR BLD AUTO: 2.8 %
MONOCYTES NFR BLD AUTO: 3 %
MONOCYTES NFR BLD AUTO: 3.6 %
MONOCYTES NFR BLD AUTO: 3.9 %
MONOCYTES NFR BLD AUTO: 4.4 %
MONOCYTES NFR BLD AUTO: 4.6 %
MONOCYTES NFR BLD AUTO: 4.6 %
MONOCYTES NFR BLD AUTO: 5 %
MONOCYTES NFR BLD AUTO: 5.5 %
MONOCYTES NFR BLD AUTO: 5.6 %
MONOCYTES NFR BLD AUTO: 6 %
MONOCYTES NFR BLD AUTO: 6.8 %
MONOCYTES NFR BLD AUTO: 7 %
MONOCYTES NFR BLD AUTO: 7 %
MONOCYTES NFR BLD AUTO: 7.7 %
MONOCYTES NFR BLD AUTO: 8 %
MONOCYTES NFR BLD AUTO: 8.9 %
MONOCYTES NFR BLD AUTO: 9 %
MONOS+MACROS NFR FLD MANUAL: 19 %
MRSA DNA SPEC QL NAA+PROBE: NEGATIVE
MUCOUS THREADS #/AREA URNS LPF: PRESENT /LPF
NEUTROPHILS # BLD AUTO: 10.1 10E9/L (ref 1.6–8.3)
NEUTROPHILS # BLD AUTO: 10.4 10E9/L (ref 1.6–8.3)
NEUTROPHILS # BLD AUTO: 10.4 10E9/L (ref 1.6–8.3)
NEUTROPHILS # BLD AUTO: 11 10E9/L (ref 1.6–8.3)
NEUTROPHILS # BLD AUTO: 12.3 10E9/L (ref 1.6–8.3)
NEUTROPHILS # BLD AUTO: 12.4 10E9/L (ref 1.6–8.3)
NEUTROPHILS # BLD AUTO: 12.5 10E9/L (ref 1.6–8.3)
NEUTROPHILS # BLD AUTO: 12.7 10E9/L (ref 1.6–8.3)
NEUTROPHILS # BLD AUTO: 12.8 10E9/L (ref 1.6–8.3)
NEUTROPHILS # BLD AUTO: 19.2 10E9/L (ref 1.6–8.3)
NEUTROPHILS # BLD AUTO: 2 10E9/L (ref 1.6–8.3)
NEUTROPHILS # BLD AUTO: 2.3 10E9/L (ref 1.6–8.3)
NEUTROPHILS # BLD AUTO: 2.6 10E9/L (ref 1.6–8.3)
NEUTROPHILS # BLD AUTO: 2.7 10E9/L (ref 1.6–8.3)
NEUTROPHILS # BLD AUTO: 2.9 10E9/L (ref 1.6–8.3)
NEUTROPHILS # BLD AUTO: 20.1 10E9/L (ref 1.6–8.3)
NEUTROPHILS # BLD AUTO: 29.4 10E9/L (ref 1.6–8.3)
NEUTROPHILS # BLD AUTO: 3.1 10E9/L (ref 1.6–8.3)
NEUTROPHILS # BLD AUTO: 34.2 10E9/L (ref 1.6–8.3)
NEUTROPHILS # BLD AUTO: 4 10E9/L (ref 1.6–8.3)
NEUTROPHILS # BLD AUTO: 4.3 10E9/L (ref 1.6–8.3)
NEUTROPHILS # BLD AUTO: 4.5 10E9/L (ref 1.6–8.3)
NEUTROPHILS # BLD AUTO: 5.3 10E9/L (ref 1.6–8.3)
NEUTROPHILS # BLD AUTO: 6.1 10E9/L (ref 1.6–8.3)
NEUTROPHILS # BLD AUTO: 6.4 10E9/L (ref 1.6–8.3)
NEUTROPHILS # BLD AUTO: 6.4 10E9/L (ref 1.6–8.3)
NEUTROPHILS # BLD AUTO: 6.6 10E9/L (ref 1.6–8.3)
NEUTROPHILS # BLD AUTO: 6.8 10E9/L (ref 1.6–8.3)
NEUTROPHILS # BLD AUTO: 6.8 10E9/L (ref 1.6–8.3)
NEUTROPHILS # BLD AUTO: 7.3 10E9/L (ref 1.6–8.3)
NEUTROPHILS # BLD AUTO: 8.3 10E9/L (ref 1.6–8.3)
NEUTROPHILS # BLD AUTO: 8.5 10E9/L (ref 1.6–8.3)
NEUTROPHILS NFR BLD AUTO: 38 %
NEUTROPHILS NFR BLD AUTO: 54.9 %
NEUTROPHILS NFR BLD AUTO: 58 %
NEUTROPHILS NFR BLD AUTO: 60 %
NEUTROPHILS NFR BLD AUTO: 63.4 %
NEUTROPHILS NFR BLD AUTO: 64 %
NEUTROPHILS NFR BLD AUTO: 64 %
NEUTROPHILS NFR BLD AUTO: 66.7 %
NEUTROPHILS NFR BLD AUTO: 67.4 %
NEUTROPHILS NFR BLD AUTO: 67.5 %
NEUTROPHILS NFR BLD AUTO: 69 %
NEUTROPHILS NFR BLD AUTO: 70.6 %
NEUTROPHILS NFR BLD AUTO: 71 %
NEUTROPHILS NFR BLD AUTO: 72 %
NEUTROPHILS NFR BLD AUTO: 73.5 %
NEUTROPHILS NFR BLD AUTO: 74.6 %
NEUTROPHILS NFR BLD AUTO: 80 %
NEUTROPHILS NFR BLD AUTO: 80.7 %
NEUTROPHILS NFR BLD AUTO: 81 %
NEUTROPHILS NFR BLD AUTO: 81.3 %
NEUTROPHILS NFR BLD AUTO: 83 %
NEUTROPHILS NFR BLD AUTO: 84.7 %
NEUTROPHILS NFR BLD AUTO: 85 %
NEUTROPHILS NFR BLD AUTO: 85.5 %
NEUTROPHILS NFR BLD AUTO: 86.2 %
NEUTROPHILS NFR BLD AUTO: 87.6 %
NEUTROPHILS NFR BLD AUTO: 91.4 %
NEUTROPHILS NFR BLD AUTO: 93.9 %
NEUTROPHILS NFR BLD AUTO: 95.7 %
NEUTROPHILS NFR BLD AUTO: 97 %
NEUTROPHILS NFR BLD AUTO: 97.4 %
NEUTROPHILS NFR BLD AUTO: 98.2 %
NEUTS BAND NFR FLD MANUAL: 47 %
NEUTS BAND NFR FLD MANUAL: 8 %
NITRATE UR QL: NEGATIVE
NRBC # BLD AUTO: 0 10*3/UL
NRBC BLD AUTO-RTO: 0 /100
NT-PROBNP SERPL-MCNC: 1436 PG/ML (ref 0–1800)
NT-PROBNP SERPL-MCNC: 3025 PG/ML (ref 0–1800)
OTHER CELLS # BLD MANUAL: 1.1 10E9/L
OTHER CELLS FLD MANUAL: 92 %
OTHER CELLS NFR BLD MANUAL: 7.4 %
OVALOCYTES BLD QL SMEAR: SLIGHT
OVALOCYTES BLD QL SMEAR: SLIGHT
PCO2 BLDV: 38 MM HG (ref 40–50)
PH BLDV: 7.35 PH (ref 7.32–7.43)
PH UR STRIP: 5 PH (ref 5–7)
PH UR STRIP: 5 PH (ref 5–7)
PH UR STRIP: 7 PH (ref 5–7)
PHOSPHATE SERPL-MCNC: 2.2 MG/DL (ref 2.5–4.5)
PHOSPHATE SERPL-MCNC: 2.4 MG/DL (ref 2.5–4.5)
PHOSPHATE SERPL-MCNC: 2.5 MG/DL (ref 2.5–4.5)
PHOSPHATE SERPL-MCNC: 2.6 MG/DL (ref 2.5–4.5)
PHOSPHATE SERPL-MCNC: 2.7 MG/DL (ref 2.5–4.5)
PHOSPHATE SERPL-MCNC: 2.7 MG/DL (ref 2.5–4.5)
PHOSPHATE SERPL-MCNC: 2.9 MG/DL (ref 2.5–4.5)
PHOSPHATE SERPL-MCNC: 2.9 MG/DL (ref 2.5–4.5)
PHOSPHATE SERPL-MCNC: 3 MG/DL (ref 2.5–4.5)
PHOSPHATE SERPL-MCNC: 3 MG/DL (ref 2.5–4.5)
PHOSPHATE SERPL-MCNC: 3.1 MG/DL (ref 2.5–4.5)
PHOSPHATE SERPL-MCNC: 3.3 MG/DL (ref 2.5–4.5)
PHOSPHATE SERPL-MCNC: 3.4 MG/DL (ref 2.5–4.5)
PHOSPHATE SERPL-MCNC: 3.5 MG/DL (ref 2.5–4.5)
PHOSPHATE SERPL-MCNC: 4 MG/DL (ref 2.5–4.5)
PLATELET # BLD AUTO: 105 10E9/L (ref 150–450)
PLATELET # BLD AUTO: 105 10E9/L (ref 150–450)
PLATELET # BLD AUTO: 107 10E9/L (ref 150–450)
PLATELET # BLD AUTO: 114 10E9/L (ref 150–450)
PLATELET # BLD AUTO: 114 10E9/L (ref 150–450)
PLATELET # BLD AUTO: 124 10E9/L (ref 150–450)
PLATELET # BLD AUTO: 126 10E9/L (ref 150–450)
PLATELET # BLD AUTO: 129 10E9/L (ref 150–450)
PLATELET # BLD AUTO: 137 10E9/L (ref 150–450)
PLATELET # BLD AUTO: 138 10E9/L (ref 150–450)
PLATELET # BLD AUTO: 140 10E9/L (ref 150–450)
PLATELET # BLD AUTO: 147 10E9/L (ref 150–450)
PLATELET # BLD AUTO: 175 10E9/L (ref 150–450)
PLATELET # BLD AUTO: 187 10E9/L (ref 150–450)
PLATELET # BLD AUTO: 33 10E9/L (ref 150–450)
PLATELET # BLD AUTO: 37 10E9/L (ref 150–450)
PLATELET # BLD AUTO: 37 10E9/L (ref 150–450)
PLATELET # BLD AUTO: 42 10E9/L (ref 150–450)
PLATELET # BLD AUTO: 43 10E9/L (ref 150–450)
PLATELET # BLD AUTO: 44 10E9/L (ref 150–450)
PLATELET # BLD AUTO: 47 10E9/L (ref 150–450)
PLATELET # BLD AUTO: 49 10E9/L (ref 150–450)
PLATELET # BLD AUTO: 49 10E9/L (ref 150–450)
PLATELET # BLD AUTO: 50 10E9/L (ref 150–450)
PLATELET # BLD AUTO: 65 10E9/L (ref 150–450)
PLATELET # BLD AUTO: 65 10E9/L (ref 150–450)
PLATELET # BLD AUTO: 75 10E9/L (ref 150–450)
PLATELET # BLD AUTO: 77 10E9/L (ref 150–450)
PLATELET # BLD AUTO: 78 10E9/L (ref 150–450)
PLATELET # BLD AUTO: 86 10E9/L (ref 150–450)
PLATELET # BLD AUTO: 87 10E9/L (ref 150–450)
PLATELET # BLD AUTO: 90 10E9/L (ref 150–450)
PLATELET # BLD AUTO: 93 10E9/L (ref 150–450)
PLATELET # BLD AUTO: 94 10E9/L (ref 150–450)
PLATELET # BLD AUTO: 96 10E9/L (ref 150–450)
PLATELET # BLD AUTO: 97 10E9/L (ref 150–450)
PLATELET # BLD EST: ABNORMAL 10*3/UL
PO2 BLDV: 36 MM HG (ref 25–47)
POIKILOCYTOSIS BLD QL SMEAR: ABNORMAL
POIKILOCYTOSIS BLD QL SMEAR: SLIGHT
POTASSIUM SERPL-SCNC: 2.6 MMOL/L (ref 3.5–5.1)
POTASSIUM SERPL-SCNC: 3.3 MMOL/L (ref 3.4–5.3)
POTASSIUM SERPL-SCNC: 3.4 MMOL/L (ref 3.4–5.3)
POTASSIUM SERPL-SCNC: 3.5 MMOL/L (ref 3.4–5.3)
POTASSIUM SERPL-SCNC: 3.5 MMOL/L (ref 3.4–5.3)
POTASSIUM SERPL-SCNC: 3.7 MMOL/L (ref 3.4–5.3)
POTASSIUM SERPL-SCNC: 3.7 MMOL/L (ref 3.4–5.3)
POTASSIUM SERPL-SCNC: 3.7 MMOL/L (ref 3.5–5.1)
POTASSIUM SERPL-SCNC: 3.8 MMOL/L (ref 3.4–5.3)
POTASSIUM SERPL-SCNC: 3.8 MMOL/L (ref 3.4–5.3)
POTASSIUM SERPL-SCNC: 3.9 MMOL/L (ref 3.4–5.3)
POTASSIUM SERPL-SCNC: 4 MMOL/L (ref 3.4–5.3)
POTASSIUM SERPL-SCNC: 4.1 MMOL/L (ref 3.4–5.3)
POTASSIUM SERPL-SCNC: 4.1 MMOL/L (ref 3.5–5.1)
POTASSIUM SERPL-SCNC: 4.2 MMOL/L (ref 3.4–5.3)
POTASSIUM SERPL-SCNC: 4.3 MMOL/L (ref 3.4–5.3)
POTASSIUM SERPL-SCNC: 4.4 MMOL/L (ref 3.4–5.3)
POTASSIUM SERPL-SCNC: 4.5 MMOL/L (ref 3.4–5.3)
POTASSIUM SERPL-SCNC: 4.6 MMOL/L (ref 3.4–5.3)
POTASSIUM SERPL-SCNC: 4.6 MMOL/L (ref 3.5–5.1)
POTASSIUM SERPL-SCNC: 4.7 MMOL/L (ref 3.5–5.1)
POTASSIUM SERPL-SCNC: 5 MMOL/L (ref 3.4–5.3)
PROCALCITONIN SERPL-MCNC: 0.99 NG/ML
PROT FLD-MCNC: 1.8 G/DL
PROT FLD-MCNC: 2.7 G/DL
PROT SERPL-MCNC: 4.2 G/DL (ref 6.8–8.8)
PROT SERPL-MCNC: 4.3 G/DL (ref 6.8–8.8)
PROT SERPL-MCNC: 4.3 G/DL (ref 6.8–8.8)
PROT SERPL-MCNC: 4.8 G/DL (ref 6.8–8.8)
PROT SERPL-MCNC: 4.9 G/DL (ref 6.8–8.8)
PROT SERPL-MCNC: 5.1 G/DL (ref 6.8–8.8)
PROT SERPL-MCNC: 5.4 G/DL (ref 6.8–8.8)
PROT SERPL-MCNC: 5.4 G/DL (ref 6.8–8.8)
PROT SERPL-MCNC: 5.5 G/DL (ref 6.8–8.8)
PROT SERPL-MCNC: 5.6 G/DL (ref 6.8–8.8)
PROT SERPL-MCNC: 5.7 G/DL (ref 6.8–8.8)
PROT SERPL-MCNC: 5.8 G/DL (ref 6.8–8.8)
PROT SERPL-MCNC: 5.9 G/DL (ref 6.8–8.8)
PROT SERPL-MCNC: 6.1 G/DL (ref 6.8–8.8)
PROT SERPL-MCNC: 6.2 G/DL (ref 6.8–8.8)
PROT SERPL-MCNC: 6.3 G/DL (ref 6.8–8.8)
PROT SERPL-MCNC: 6.6 G/DL (ref 6.8–8.8)
PROT SERPL-MCNC: 7.3 G/DL (ref 6.8–8.8)
PSA SERPL-MCNC: <0.01 UG/L (ref 0–4)
RADIOLOGIST FLAGS: ABNORMAL
RBC # BLD AUTO: 2.67 10E12/L (ref 4.4–5.9)
RBC # BLD AUTO: 2.72 10E12/L (ref 4.4–5.9)
RBC # BLD AUTO: 2.79 10E12/L (ref 4.4–5.9)
RBC # BLD AUTO: 2.79 10E12/L (ref 4.4–5.9)
RBC # BLD AUTO: 2.9 10E12/L (ref 4.4–5.9)
RBC # BLD AUTO: 2.94 10E12/L (ref 4.4–5.9)
RBC # BLD AUTO: 2.94 10E12/L (ref 4.4–5.9)
RBC # BLD AUTO: 2.96 10E12/L (ref 4.4–5.9)
RBC # BLD AUTO: 2.96 10E12/L (ref 4.4–5.9)
RBC # BLD AUTO: 2.97 10E12/L (ref 4.4–5.9)
RBC # BLD AUTO: 3.01 10E12/L (ref 4.4–5.9)
RBC # BLD AUTO: 3.01 10E12/L (ref 4.4–5.9)
RBC # BLD AUTO: 3.05 10E12/L (ref 4.4–5.9)
RBC # BLD AUTO: 3.08 10E12/L (ref 4.4–5.9)
RBC # BLD AUTO: 3.08 10E12/L (ref 4.4–5.9)
RBC # BLD AUTO: 3.12 10E12/L (ref 4.4–5.9)
RBC # BLD AUTO: 3.19 10E12/L (ref 4.4–5.9)
RBC # BLD AUTO: 3.19 10E12/L (ref 4.4–5.9)
RBC # BLD AUTO: 3.27 10E12/L (ref 4.4–5.9)
RBC # BLD AUTO: 3.28 10E12/L (ref 4.4–5.9)
RBC # BLD AUTO: 3.3 10E12/L (ref 4.4–5.9)
RBC # BLD AUTO: 3.36 10E12/L (ref 4.4–5.9)
RBC # BLD AUTO: 3.38 10E12/L (ref 4.4–5.9)
RBC # BLD AUTO: 3.41 10E12/L (ref 4.4–5.9)
RBC # BLD AUTO: 3.58 10E12/L (ref 4.4–5.9)
RBC # BLD AUTO: 3.62 10E12/L (ref 4.4–5.9)
RBC # BLD AUTO: 3.77 10E12/L (ref 4.4–5.9)
RBC # BLD AUTO: 3.8 10E12/L (ref 4.4–5.9)
RBC # BLD AUTO: 3.83 10E12/L (ref 4.4–5.9)
RBC # BLD AUTO: 4.16 10E12/L (ref 4.4–5.9)
RBC # BLD AUTO: 4.26 10E12/L (ref 4.4–5.9)
RBC # BLD AUTO: 4.43 10E12/L (ref 4.4–5.9)
RBC # BLD AUTO: 4.64 10E12/L (ref 4.4–5.9)
RBC # BLD AUTO: 4.91 10E12/L (ref 4.4–5.9)
RBC # BLD AUTO: 4.94 10E12/L (ref 4.4–5.9)
RBC # BLD AUTO: 4.99 10E12/L (ref 4.4–5.9)
RBC #/AREA URNS AUTO: 2 /HPF (ref 0–2)
RBC #/AREA URNS AUTO: <1 /HPF (ref 0–2)
RBC #/AREA URNS AUTO: <1 /HPF (ref 0–2)
RBC INCLUSIONS BLD: SLIGHT
RBC MORPH BLD: NORMAL
RHINOVIRUS RNA SPEC QL NAA+PROBE: NEGATIVE
RSV RNA SPEC QL NAA+PROBE: NEGATIVE
RSV RNA SPEC QL NAA+PROBE: NEGATIVE
S PNEUM AG SPEC QL: NORMAL
SAO2 % BLDV FROM PO2: 67 %
SODIUM SERPL-SCNC: 134 MMOL/L (ref 133–144)
SODIUM SERPL-SCNC: 135 MMOL/L (ref 133–144)
SODIUM SERPL-SCNC: 136 MMOL/L (ref 133–144)
SODIUM SERPL-SCNC: 137 MMOL/L (ref 133–144)
SODIUM SERPL-SCNC: 138 MMOL/L (ref 133–144)
SODIUM SERPL-SCNC: 139 MMOL/L (ref 133–144)
SODIUM SERPL-SCNC: 140 MMOL/L (ref 133–144)
SODIUM SERPL-SCNC: 141 MMOL/L (ref 133–144)
SODIUM SERPL-SCNC: 142 MMOL/L (ref 133–144)
SODIUM SERPL-SCNC: 143 MMOL/L (ref 133–144)
SODIUM SERPL-SCNC: 144 MMOL/L (ref 133–144)
SODIUM SERPL-SCNC: 144 MMOL/L (ref 133–144)
SODIUM SERPL-SCNC: 145 MMOL/L (ref 133–144)
SODIUM SERPL-SCNC: 147 MMOL/L (ref 133–144)
SOURCE: ABNORMAL
SP GR UR STRIP: 1.01 (ref 1–1.03)
SP GR UR STRIP: 1.02 (ref 1–1.03)
SP GR UR STRIP: 1.02 (ref 1–1.03)
SPECIMEN EXP DATE BLD: NORMAL
SPECIMEN EXP DATE BLD: NORMAL
SPECIMEN SOURCE FLD: NORMAL
SPECIMEN SOURCE: ABNORMAL
SPECIMEN SOURCE: NORMAL
SQUAMOUS #/AREA URNS AUTO: <1 /HPF (ref 0–1)
TRIGL FLD-MCNC: 845 MG/DL
TROPONIN I SERPL-MCNC: <0.015 UG/L (ref 0–0.04)
TROPONIN I SERPL-MCNC: <0.015 UG/L (ref 0–0.04)
URATE SERPL-MCNC: 3.4 MG/DL (ref 3.5–7.2)
URATE SERPL-MCNC: 3.6 MG/DL (ref 3.5–7.2)
URATE SERPL-MCNC: 3.7 MG/DL (ref 3.5–7.2)
URATE SERPL-MCNC: 3.9 MG/DL (ref 3.5–7.2)
URATE SERPL-MCNC: 3.9 MG/DL (ref 3.5–7.2)
URATE SERPL-MCNC: 4.4 MG/DL (ref 3.5–7.2)
URATE SERPL-MCNC: 4.5 MG/DL (ref 3.5–7.2)
URATE SERPL-MCNC: 4.7 MG/DL (ref 3.5–7.2)
URATE SERPL-MCNC: 5 MG/DL (ref 3.5–7.2)
URATE SERPL-MCNC: 5.2 MG/DL (ref 3.5–7.2)
URATE SERPL-MCNC: 5.2 MG/DL (ref 3.5–7.2)
URATE SERPL-MCNC: 5.5 MG/DL (ref 3.5–7.2)
URATE SERPL-MCNC: 5.8 MG/DL (ref 3.5–7.2)
URATE SERPL-MCNC: 6.2 MG/DL (ref 3.5–7.2)
URATE SERPL-MCNC: 6.2 MG/DL (ref 3.5–7.2)
URATE SERPL-MCNC: 6.6 MG/DL (ref 3.5–7.2)
URATE SERPL-MCNC: 6.7 MG/DL (ref 3.5–7.2)
URATE SERPL-MCNC: 6.9 MG/DL (ref 3.5–7.2)
UROBILINOGEN UR STRIP-MCNC: NORMAL MG/DL (ref 0–2)
VARIANT LYMPHS BLD QL SMEAR: PRESENT
VARIANT LYMPHS BLD QL SMEAR: PRESENT
WBC # BLD AUTO: 10.1 10E9/L (ref 4–11)
WBC # BLD AUTO: 10.4 10E9/L (ref 4–11)
WBC # BLD AUTO: 11.4 10E9/L (ref 4–11)
WBC # BLD AUTO: 11.6 10E9/L (ref 4–11)
WBC # BLD AUTO: 11.7 10E9/L (ref 4–11)
WBC # BLD AUTO: 12.1 10E9/L (ref 4–11)
WBC # BLD AUTO: 12.3 10E9/L (ref 4–11)
WBC # BLD AUTO: 12.4 10E9/L (ref 4–11)
WBC # BLD AUTO: 12.6 10E9/L (ref 4–11)
WBC # BLD AUTO: 12.9 10E9/L (ref 4–11)
WBC # BLD AUTO: 13.4 10E9/L (ref 4–11)
WBC # BLD AUTO: 14.4 10E9/L (ref 4–11)
WBC # BLD AUTO: 14.5 10E9/L (ref 4–11)
WBC # BLD AUTO: 14.6 10E9/L (ref 4–11)
WBC # BLD AUTO: 15.1 10E9/L (ref 4–11)
WBC # BLD AUTO: 19.6 10E9/L (ref 4–11)
WBC # BLD AUTO: 25.1 10E9/L (ref 4–11)
WBC # BLD AUTO: 3.1 10E9/L (ref 4–11)
WBC # BLD AUTO: 3.4 10E9/L (ref 4–11)
WBC # BLD AUTO: 30.7 10E9/L (ref 4–11)
WBC # BLD AUTO: 35.1 10E9/L (ref 4–11)
WBC # BLD AUTO: 4.1 10E9/L (ref 4–11)
WBC # BLD AUTO: 4.7 10E9/L (ref 4–11)
WBC # BLD AUTO: 4.7 10E9/L (ref 4–11)
WBC # BLD AUTO: 4.8 10E9/L (ref 4–11)
WBC # BLD AUTO: 5.3 10E9/L (ref 4–11)
WBC # BLD AUTO: 5.4 10E9/L (ref 4–11)
WBC # BLD AUTO: 5.5 10E9/L (ref 4–11)
WBC # BLD AUTO: 5.8 10E9/L (ref 4–11)
WBC # BLD AUTO: 7.1 10E9/L (ref 4–11)
WBC # BLD AUTO: 7.9 10E9/L (ref 4–11)
WBC # BLD AUTO: 8 10E9/L (ref 4–11)
WBC # BLD AUTO: 8.3 10E9/L (ref 4–11)
WBC # BLD AUTO: 8.4 10E9/L (ref 4–11)
WBC # BLD AUTO: 9.5 10E9/L (ref 4–11)
WBC # BLD AUTO: 9.7 10E9/L (ref 4–11)
WBC # FLD AUTO: 109 /UL
WBC # FLD AUTO: 3912 /UL
WBC #/AREA URNS AUTO: 0 /HPF (ref 0–5)
WBC #/AREA URNS AUTO: 1 /HPF (ref 0–5)
WBC #/AREA URNS AUTO: 1 /HPF (ref 0–5)
YEAST SPEC QL CULT: NO GROWTH

## 2018-01-01 PROCEDURE — 99214 OFFICE O/P EST MOD 30 MIN: CPT | Performed by: NURSE PRACTITIONER

## 2018-01-01 PROCEDURE — 93010 ELECTROCARDIOGRAM REPORT: CPT | Performed by: INTERNAL MEDICINE

## 2018-01-01 PROCEDURE — 83615 LACTATE (LD) (LDH) ENZYME: CPT | Performed by: STUDENT IN AN ORGANIZED HEALTH CARE EDUCATION/TRAINING PROGRAM

## 2018-01-01 PROCEDURE — 96360 HYDRATION IV INFUSION INIT: CPT | Performed by: INTERNAL MEDICINE

## 2018-01-01 PROCEDURE — 0298T ZZC EXT ECG > 48HR TO 21 DAY REVIEW AND INTERPRETATN: CPT | Mod: ZP | Performed by: INTERNAL MEDICINE

## 2018-01-01 PROCEDURE — 87040 BLOOD CULTURE FOR BACTERIA: CPT | Performed by: PHYSICIAN ASSISTANT

## 2018-01-01 PROCEDURE — 84550 ASSAY OF BLOOD/URIC ACID: CPT | Performed by: INTERNAL MEDICINE

## 2018-01-01 PROCEDURE — 81001 URINALYSIS AUTO W/SCOPE: CPT | Performed by: STUDENT IN AN ORGANIZED HEALTH CARE EDUCATION/TRAINING PROGRAM

## 2018-01-01 PROCEDURE — 89051 BODY FLUID CELL COUNT: CPT | Performed by: NURSE PRACTITIONER

## 2018-01-01 PROCEDURE — 85610 PROTHROMBIN TIME: CPT | Performed by: PHYSICIAN ASSISTANT

## 2018-01-01 PROCEDURE — 85025 COMPLETE CBC W/AUTO DIFF WBC: CPT | Performed by: PHYSICIAN ASSISTANT

## 2018-01-01 PROCEDURE — 3E03305 INTRODUCTION OF OTHER ANTINEOPLASTIC INTO PERIPHERAL VEIN, PERCUTANEOUS APPROACH: ICD-10-PCS | Performed by: INTERNAL MEDICINE

## 2018-01-01 PROCEDURE — A9270 NON-COVERED ITEM OR SERVICE: HCPCS | Mod: GY | Performed by: STUDENT IN AN ORGANIZED HEALTH CARE EDUCATION/TRAINING PROGRAM

## 2018-01-01 PROCEDURE — 83605 ASSAY OF LACTIC ACID: CPT | Performed by: STUDENT IN AN ORGANIZED HEALTH CARE EDUCATION/TRAINING PROGRAM

## 2018-01-01 PROCEDURE — 40000167 ZZH STATISTIC PP CARE STAGE 2

## 2018-01-01 PROCEDURE — 88184 FLOWCYTOMETRY/ TC 1 MARKER: CPT | Performed by: OTOLARYNGOLOGY

## 2018-01-01 PROCEDURE — 96415 CHEMO IV INFUSION ADDL HR: CPT | Performed by: INTERNAL MEDICINE

## 2018-01-01 PROCEDURE — 83735 ASSAY OF MAGNESIUM: CPT | Performed by: INTERNAL MEDICINE

## 2018-01-01 PROCEDURE — 25000128 H RX IP 250 OP 636: Mod: ZF | Performed by: NURSE PRACTITIONER

## 2018-01-01 PROCEDURE — 88184 FLOWCYTOMETRY/ TC 1 MARKER: CPT | Performed by: INTERNAL MEDICINE

## 2018-01-01 PROCEDURE — 25000128 H RX IP 250 OP 636: Performed by: INTERNAL MEDICINE

## 2018-01-01 PROCEDURE — 83615 LACTATE (LD) (LDH) ENZYME: CPT | Performed by: NURSE PRACTITIONER

## 2018-01-01 PROCEDURE — 36415 COLL VENOUS BLD VENIPUNCTURE: CPT | Performed by: INTERNAL MEDICINE

## 2018-01-01 PROCEDURE — 82962 GLUCOSE BLOOD TEST: CPT

## 2018-01-01 PROCEDURE — 87081 CULTURE SCREEN ONLY: CPT | Performed by: STUDENT IN AN ORGANIZED HEALTH CARE EDUCATION/TRAINING PROGRAM

## 2018-01-01 PROCEDURE — 20000002 ZZH R&B BMT INTERMEDIATE

## 2018-01-01 PROCEDURE — 00000146 ZZHCL STATISTIC GLUCOSE BY METER IP

## 2018-01-01 PROCEDURE — 88185 FLOWCYTOMETRY/TC ADD-ON: CPT | Performed by: PHYSICIAN ASSISTANT

## 2018-01-01 PROCEDURE — 36592 COLLECT BLOOD FROM PICC: CPT | Performed by: INTERNAL MEDICINE

## 2018-01-01 PROCEDURE — 99207 ZZC NO CHARGE NURSE ONLY: CPT

## 2018-01-01 PROCEDURE — 27210794 ZZH OR GENERAL SUPPLY STERILE: Performed by: OTOLARYNGOLOGY

## 2018-01-01 PROCEDURE — 96411 CHEMO IV PUSH ADDL DRUG: CPT | Performed by: INTERNAL MEDICINE

## 2018-01-01 PROCEDURE — 25000128 H RX IP 250 OP 636: Performed by: EMERGENCY MEDICINE

## 2018-01-01 PROCEDURE — 80053 COMPREHEN METABOLIC PANEL: CPT | Performed by: NURSE PRACTITIONER

## 2018-01-01 PROCEDURE — 25000131 ZZH RX MED GY IP 250 OP 636 PS 637: Mod: GY | Performed by: INTERNAL MEDICINE

## 2018-01-01 PROCEDURE — A9270 NON-COVERED ITEM OR SERVICE: HCPCS | Mod: GY | Performed by: INTERNAL MEDICINE

## 2018-01-01 PROCEDURE — 25000132 ZZH RX MED GY IP 250 OP 250 PS 637: Mod: GY | Performed by: INTERNAL MEDICINE

## 2018-01-01 PROCEDURE — 81001 URINALYSIS AUTO W/SCOPE: CPT | Performed by: NURSE PRACTITIONER

## 2018-01-01 PROCEDURE — 96367 TX/PROPH/DG ADDL SEQ IV INF: CPT | Performed by: INTERNAL MEDICINE

## 2018-01-01 PROCEDURE — 49083 ABD PARACENTESIS W/IMAGING: CPT

## 2018-01-01 PROCEDURE — 87103 BLOOD FUNGUS CULTURE: CPT | Performed by: STUDENT IN AN ORGANIZED HEALTH CARE EDUCATION/TRAINING PROGRAM

## 2018-01-01 PROCEDURE — 80053 COMPREHEN METABOLIC PANEL: CPT | Performed by: INTERNAL MEDICINE

## 2018-01-01 PROCEDURE — 99213 OFFICE O/P EST LOW 20 MIN: CPT | Performed by: FAMILY MEDICINE

## 2018-01-01 PROCEDURE — 36415 COLL VENOUS BLD VENIPUNCTURE: CPT | Performed by: ANESTHESIOLOGY

## 2018-01-01 PROCEDURE — 87088 URINE BACTERIA CULTURE: CPT | Performed by: NURSE PRACTITIONER

## 2018-01-01 PROCEDURE — 99214 OFFICE O/P EST MOD 30 MIN: CPT | Mod: 25 | Performed by: INTERNAL MEDICINE

## 2018-01-01 PROCEDURE — 96375 TX/PRO/DX INJ NEW DRUG ADDON: CPT | Performed by: EMERGENCY MEDICINE

## 2018-01-01 PROCEDURE — 85610 PROTHROMBIN TIME: CPT | Performed by: RADIOLOGY

## 2018-01-01 PROCEDURE — 25000128 H RX IP 250 OP 636: Performed by: NURSE ANESTHETIST, CERTIFIED REGISTERED

## 2018-01-01 PROCEDURE — 96377 APPLICATON ON-BODY INJECTOR: CPT | Mod: 59 | Performed by: INTERNAL MEDICINE

## 2018-01-01 PROCEDURE — 25000128 H RX IP 250 OP 636: Performed by: PEDIATRICS

## 2018-01-01 PROCEDURE — 83615 LACTATE (LD) (LDH) ENZYME: CPT | Performed by: INTERNAL MEDICINE

## 2018-01-01 PROCEDURE — 88342 IMHCHEM/IMCYTCHM 1ST ANTB: CPT | Performed by: OTOLARYNGOLOGY

## 2018-01-01 PROCEDURE — 83605 ASSAY OF LACTIC ACID: CPT | Performed by: INTERNAL MEDICINE

## 2018-01-01 PROCEDURE — 80053 COMPREHEN METABOLIC PANEL: CPT | Performed by: FAMILY MEDICINE

## 2018-01-01 PROCEDURE — 70491 CT SOFT TISSUE NECK W/DYE: CPT | Performed by: RADIOLOGY

## 2018-01-01 PROCEDURE — 82040 ASSAY OF SERUM ALBUMIN: CPT | Performed by: NURSE PRACTITIONER

## 2018-01-01 PROCEDURE — 0W993ZZ DRAINAGE OF RIGHT PLEURAL CAVITY, PERCUTANEOUS APPROACH: ICD-10-PCS | Performed by: HOSPITALIST

## 2018-01-01 PROCEDURE — A9552 F18 FDG: HCPCS | Performed by: INTERNAL MEDICINE

## 2018-01-01 PROCEDURE — 38222 DX BONE MARROW BX & ASPIR: CPT | Performed by: NURSE PRACTITIONER

## 2018-01-01 PROCEDURE — 87081 CULTURE SCREEN ONLY: CPT | Performed by: NURSE PRACTITIONER

## 2018-01-01 PROCEDURE — 93005 ELECTROCARDIOGRAM TRACING: CPT | Performed by: EMERGENCY MEDICINE

## 2018-01-01 PROCEDURE — 80048 BASIC METABOLIC PNL TOTAL CA: CPT | Performed by: EMERGENCY MEDICINE

## 2018-01-01 PROCEDURE — 88239 TISSUE CULTURE TUMOR: CPT | Performed by: OTOLARYNGOLOGY

## 2018-01-01 PROCEDURE — 87040 BLOOD CULTURE FOR BACTERIA: CPT | Performed by: NURSE PRACTITIONER

## 2018-01-01 PROCEDURE — 88173 CYTOPATH EVAL FNA REPORT: CPT | Performed by: OTOLARYNGOLOGY

## 2018-01-01 PROCEDURE — 85610 PROTHROMBIN TIME: CPT | Performed by: INTERNAL MEDICINE

## 2018-01-01 PROCEDURE — 96374 THER/PROPH/DIAG INJ IV PUSH: CPT

## 2018-01-01 PROCEDURE — 88172 CYTP DX EVAL FNA 1ST EA SITE: CPT | Performed by: OTOLARYNGOLOGY

## 2018-01-01 PROCEDURE — 36591 DRAW BLOOD OFF VENOUS DEVICE: CPT

## 2018-01-01 PROCEDURE — 99207 ZZC NO CHARGE LOS: CPT

## 2018-01-01 PROCEDURE — 74177 CT ABD & PELVIS W/CONTRAST: CPT | Performed by: RADIOLOGY

## 2018-01-01 PROCEDURE — 83605 ASSAY OF LACTIC ACID: CPT

## 2018-01-01 PROCEDURE — 96413 CHEMO IV INFUSION 1 HR: CPT | Performed by: INTERNAL MEDICINE

## 2018-01-01 PROCEDURE — 88184 FLOWCYTOMETRY/ TC 1 MARKER: CPT | Performed by: PHYSICIAN ASSISTANT

## 2018-01-01 PROCEDURE — 85025 COMPLETE CBC W/AUTO DIFF WBC: CPT | Performed by: INTERNAL MEDICINE

## 2018-01-01 PROCEDURE — 80048 BASIC METABOLIC PNL TOTAL CA: CPT | Performed by: PHYSICIAN ASSISTANT

## 2018-01-01 PROCEDURE — A9270 NON-COVERED ITEM OR SERVICE: HCPCS | Mod: GY | Performed by: NURSE PRACTITIONER

## 2018-01-01 PROCEDURE — 34300033 ZZH RX 343: Performed by: NURSE PRACTITIONER

## 2018-01-01 PROCEDURE — 84100 ASSAY OF PHOSPHORUS: CPT | Performed by: INTERNAL MEDICINE

## 2018-01-01 PROCEDURE — 99213 OFFICE O/P EST LOW 20 MIN: CPT | Mod: GC | Performed by: INTERNAL MEDICINE

## 2018-01-01 PROCEDURE — 25000128 H RX IP 250 OP 636: Performed by: STUDENT IN AN ORGANIZED HEALTH CARE EDUCATION/TRAINING PROGRAM

## 2018-01-01 PROCEDURE — 84550 ASSAY OF BLOOD/URIC ACID: CPT | Performed by: STUDENT IN AN ORGANIZED HEALTH CARE EDUCATION/TRAINING PROGRAM

## 2018-01-01 PROCEDURE — 83735 ASSAY OF MAGNESIUM: CPT | Performed by: STUDENT IN AN ORGANIZED HEALTH CARE EDUCATION/TRAINING PROGRAM

## 2018-01-01 PROCEDURE — 36415 COLL VENOUS BLD VENIPUNCTURE: CPT | Performed by: NURSE PRACTITIONER

## 2018-01-01 PROCEDURE — 40001005 ZZHCL STATISTIC FLOW >15 ABY TC 88189: Performed by: OTOLARYNGOLOGY

## 2018-01-01 PROCEDURE — 88271 CYTOGENETICS DNA PROBE: CPT | Performed by: OTOLARYNGOLOGY

## 2018-01-01 PROCEDURE — 99285 EMERGENCY DEPT VISIT HI MDM: CPT | Mod: 25 | Performed by: EMERGENCY MEDICINE

## 2018-01-01 PROCEDURE — 25000132 ZZH RX MED GY IP 250 OP 250 PS 637: Mod: GY | Performed by: STUDENT IN AN ORGANIZED HEALTH CARE EDUCATION/TRAINING PROGRAM

## 2018-01-01 PROCEDURE — 93005 ELECTROCARDIOGRAM TRACING: CPT

## 2018-01-01 PROCEDURE — 87070 CULTURE OTHR SPECIMN AEROBIC: CPT | Performed by: PHYSICIAN ASSISTANT

## 2018-01-01 PROCEDURE — 87086 URINE CULTURE/COLONY COUNT: CPT | Performed by: NURSE PRACTITIONER

## 2018-01-01 PROCEDURE — 25000125 ZZHC RX 250: Performed by: PHYSICIAN ASSISTANT

## 2018-01-01 PROCEDURE — 85384 FIBRINOGEN ACTIVITY: CPT | Performed by: INTERNAL MEDICINE

## 2018-01-01 PROCEDURE — 12000008 ZZH R&B INTERMEDIATE UMMC

## 2018-01-01 PROCEDURE — 25000125 ZZHC RX 250: Performed by: STUDENT IN AN ORGANIZED HEALTH CARE EDUCATION/TRAINING PROGRAM

## 2018-01-01 PROCEDURE — G0499 HEPB SCREEN HIGH RISK INDIV: HCPCS | Performed by: INTERNAL MEDICINE

## 2018-01-01 PROCEDURE — 25000125 ZZHC RX 250: Performed by: INTERNAL MEDICINE

## 2018-01-01 PROCEDURE — 84484 ASSAY OF TROPONIN QUANT: CPT | Performed by: EMERGENCY MEDICINE

## 2018-01-01 PROCEDURE — 87641 MR-STAPH DNA AMP PROBE: CPT | Performed by: INTERNAL MEDICINE

## 2018-01-01 PROCEDURE — 97602 WOUND(S) CARE NON-SELECTIVE: CPT

## 2018-01-01 PROCEDURE — 87338 HPYLORI STOOL AG IA: CPT | Performed by: FAMILY MEDICINE

## 2018-01-01 PROCEDURE — 36592 COLLECT BLOOD FROM PICC: CPT | Performed by: PHYSICIAN ASSISTANT

## 2018-01-01 PROCEDURE — 88161 CYTOPATH SMEAR OTHER SOURCE: CPT | Mod: XU | Performed by: INTERNAL MEDICINE

## 2018-01-01 PROCEDURE — 25000128 H RX IP 250 OP 636: Performed by: ANESTHESIOLOGY

## 2018-01-01 PROCEDURE — 71045 X-RAY EXAM CHEST 1 VIEW: CPT

## 2018-01-01 PROCEDURE — 93306 TTE W/DOPPLER COMPLETE: CPT | Mod: 26 | Performed by: INTERNAL MEDICINE

## 2018-01-01 PROCEDURE — 80048 BASIC METABOLIC PNL TOTAL CA: CPT | Performed by: INTERNAL MEDICINE

## 2018-01-01 PROCEDURE — 96375 TX/PRO/DX INJ NEW DRUG ADDON: CPT | Performed by: INTERNAL MEDICINE

## 2018-01-01 PROCEDURE — 78195 LYMPH SYSTEM IMAGING: CPT

## 2018-01-01 PROCEDURE — 96411 CHEMO IV PUSH ADDL DRUG: CPT | Performed by: NURSE PRACTITIONER

## 2018-01-01 PROCEDURE — 32554 ASPIRATE PLEURA W/O IMAGING: CPT | Mod: GC | Performed by: HOSPITALIST

## 2018-01-01 PROCEDURE — 25000125 ZZHC RX 250: Performed by: NURSE PRACTITIONER

## 2018-01-01 PROCEDURE — 85025 COMPLETE CBC W/AUTO DIFF WBC: CPT | Performed by: NURSE PRACTITIONER

## 2018-01-01 PROCEDURE — A9541 TC99M SULFUR COLLOID: HCPCS | Performed by: NURSE PRACTITIONER

## 2018-01-01 PROCEDURE — 84153 ASSAY OF PSA TOTAL: CPT | Performed by: INTERNAL MEDICINE

## 2018-01-01 PROCEDURE — 96361 HYDRATE IV INFUSION ADD-ON: CPT | Performed by: INTERNAL MEDICINE

## 2018-01-01 PROCEDURE — 88275 CYTOGENETICS 100-300: CPT | Performed by: INTERNAL MEDICINE

## 2018-01-01 PROCEDURE — 86900 BLOOD TYPING SEROLOGIC ABO: CPT | Performed by: STUDENT IN AN ORGANIZED HEALTH CARE EDUCATION/TRAINING PROGRAM

## 2018-01-01 PROCEDURE — 87205 SMEAR GRAM STAIN: CPT | Performed by: INTERNAL MEDICINE

## 2018-01-01 PROCEDURE — 93000 ELECTROCARDIOGRAM COMPLETE: CPT | Performed by: INTERNAL MEDICINE

## 2018-01-01 PROCEDURE — 86803 HEPATITIS C AB TEST: CPT | Performed by: INTERNAL MEDICINE

## 2018-01-01 PROCEDURE — 25000132 ZZH RX MED GY IP 250 OP 250 PS 637: Mod: GY | Performed by: NURSE PRACTITIONER

## 2018-01-01 PROCEDURE — 96415 CHEMO IV INFUSION ADDL HR: CPT | Performed by: NURSE PRACTITIONER

## 2018-01-01 PROCEDURE — 99152 MOD SED SAME PHYS/QHP 5/>YRS: CPT

## 2018-01-01 PROCEDURE — 84100 ASSAY OF PHOSPHORUS: CPT | Performed by: NURSE PRACTITIONER

## 2018-01-01 PROCEDURE — 85610 PROTHROMBIN TIME: CPT | Performed by: NURSE PRACTITIONER

## 2018-01-01 PROCEDURE — 36000053 ZZH SURGERY LEVEL 2 EA 15 ADDTL MIN - UMMC: Performed by: OTOLARYNGOLOGY

## 2018-01-01 PROCEDURE — 87205 SMEAR GRAM STAIN: CPT | Performed by: STUDENT IN AN ORGANIZED HEALTH CARE EDUCATION/TRAINING PROGRAM

## 2018-01-01 PROCEDURE — 88280 CHROMOSOME KARYOTYPE STUDY: CPT | Performed by: OTOLARYNGOLOGY

## 2018-01-01 PROCEDURE — 88341 IMHCHEM/IMCYTCHM EA ADD ANTB: CPT | Performed by: OTOLARYNGOLOGY

## 2018-01-01 PROCEDURE — 71000027 ZZH RECOVERY PHASE 2 EACH 15 MINS: Performed by: OTOLARYNGOLOGY

## 2018-01-01 PROCEDURE — 87880 STREP A ASSAY W/OPTIC: CPT | Performed by: NURSE PRACTITIONER

## 2018-01-01 PROCEDURE — A9270 NON-COVERED ITEM OR SERVICE: HCPCS | Mod: GY | Performed by: PHYSICIAN ASSISTANT

## 2018-01-01 PROCEDURE — 36592 COLLECT BLOOD FROM PICC: CPT | Performed by: HOSPITALIST

## 2018-01-01 PROCEDURE — 87899 AGENT NOS ASSAY W/OPTIC: CPT | Performed by: STUDENT IN AN ORGANIZED HEALTH CARE EDUCATION/TRAINING PROGRAM

## 2018-01-01 PROCEDURE — P9041 ALBUMIN (HUMAN),5%, 50ML: HCPCS | Performed by: NURSE ANESTHETIST, CERTIFIED REGISTERED

## 2018-01-01 PROCEDURE — 85384 FIBRINOGEN ACTIVITY: CPT | Performed by: PHYSICIAN ASSISTANT

## 2018-01-01 PROCEDURE — 96361 HYDRATE IV INFUSION ADD-ON: CPT | Performed by: EMERGENCY MEDICINE

## 2018-01-01 PROCEDURE — 80048 BASIC METABOLIC PNL TOTAL CA: CPT | Performed by: NURSE PRACTITIONER

## 2018-01-01 PROCEDURE — 83605 ASSAY OF LACTIC ACID: CPT | Performed by: THORACIC SURGERY (CARDIOTHORACIC VASCULAR SURGERY)

## 2018-01-01 PROCEDURE — 87186 SC STD MICRODIL/AGAR DIL: CPT | Performed by: NURSE PRACTITIONER

## 2018-01-01 PROCEDURE — 85025 COMPLETE CBC W/AUTO DIFF WBC: CPT | Performed by: EMERGENCY MEDICINE

## 2018-01-01 PROCEDURE — 84157 ASSAY OF PROTEIN OTHER: CPT | Performed by: NURSE PRACTITIONER

## 2018-01-01 PROCEDURE — P9041 ALBUMIN (HUMAN),5%, 50ML: HCPCS | Performed by: INTERNAL MEDICINE

## 2018-01-01 PROCEDURE — 83735 ASSAY OF MAGNESIUM: CPT | Performed by: NURSE PRACTITIONER

## 2018-01-01 PROCEDURE — 99607 MTMS BY PHARM ADDL 15 MIN: CPT | Mod: U4 | Performed by: PHARMACIST

## 2018-01-01 PROCEDURE — G0378 HOSPITAL OBSERVATION PER HR: HCPCS

## 2018-01-01 PROCEDURE — 82565 ASSAY OF CREATININE: CPT | Performed by: INTERNAL MEDICINE

## 2018-01-01 PROCEDURE — 87070 CULTURE OTHR SPECIMN AEROBIC: CPT | Performed by: INTERNAL MEDICINE

## 2018-01-01 PROCEDURE — 25000128 H RX IP 250 OP 636: Mod: ZF | Performed by: PHYSICIAN ASSISTANT

## 2018-01-01 PROCEDURE — 96375 TX/PRO/DX INJ NEW DRUG ADDON: CPT | Performed by: NURSE PRACTITIONER

## 2018-01-01 PROCEDURE — 88275 CYTOGENETICS 100-300: CPT | Performed by: OTOLARYNGOLOGY

## 2018-01-01 PROCEDURE — 99291 CRITICAL CARE FIRST HOUR: CPT | Mod: 25 | Performed by: EMERGENCY MEDICINE

## 2018-01-01 PROCEDURE — 83690 ASSAY OF LIPASE: CPT | Performed by: EMERGENCY MEDICINE

## 2018-01-01 PROCEDURE — 36415 COLL VENOUS BLD VENIPUNCTURE: CPT | Performed by: PHYSICIAN ASSISTANT

## 2018-01-01 PROCEDURE — 80048 BASIC METABOLIC PNL TOTAL CA: CPT | Performed by: HOSPITALIST

## 2018-01-01 PROCEDURE — 85025 COMPLETE CBC W/AUTO DIFF WBC: CPT | Performed by: STUDENT IN AN ORGANIZED HEALTH CARE EDUCATION/TRAINING PROGRAM

## 2018-01-01 PROCEDURE — 00000058 ZZHCL STATISTIC BONE MARROW ASP PERF TC 38220: Performed by: INTERNAL MEDICINE

## 2018-01-01 PROCEDURE — 87040 BLOOD CULTURE FOR BACTERIA: CPT | Performed by: INTERNAL MEDICINE

## 2018-01-01 PROCEDURE — 88185 FLOWCYTOMETRY/TC ADD-ON: CPT | Performed by: OTOLARYNGOLOGY

## 2018-01-01 PROCEDURE — 87640 STAPH A DNA AMP PROBE: CPT | Performed by: INTERNAL MEDICINE

## 2018-01-01 PROCEDURE — 82270 OCCULT BLOOD FECES: CPT | Performed by: FAMILY MEDICINE

## 2018-01-01 PROCEDURE — 40000275 ZZH STATISTIC RCP TIME EA 10 MIN

## 2018-01-01 PROCEDURE — 27211039 ZZH NEEDLE CR2

## 2018-01-01 PROCEDURE — 96360 HYDRATION IV INFUSION INIT: CPT | Performed by: EMERGENCY MEDICINE

## 2018-01-01 PROCEDURE — 27210732 ZZH ACCESSORY CR1

## 2018-01-01 PROCEDURE — 40001004 ZZHCL STATISTIC FLOW INT 9-15 ABY TC 88188: Performed by: PHYSICIAN ASSISTANT

## 2018-01-01 PROCEDURE — 86704 HEP B CORE ANTIBODY TOTAL: CPT | Performed by: INTERNAL MEDICINE

## 2018-01-01 PROCEDURE — G0463 HOSPITAL OUTPT CLINIC VISIT: HCPCS | Mod: ZF

## 2018-01-01 PROCEDURE — 88305 TISSUE EXAM BY PATHOLOGIST: CPT | Performed by: INTERNAL MEDICINE

## 2018-01-01 PROCEDURE — 25800025 ZZH RX 258

## 2018-01-01 PROCEDURE — 84132 ASSAY OF SERUM POTASSIUM: CPT | Performed by: ANESTHESIOLOGY

## 2018-01-01 PROCEDURE — 82330 ASSAY OF CALCIUM: CPT | Performed by: INTERNAL MEDICINE

## 2018-01-01 PROCEDURE — 99214 OFFICE O/P EST MOD 30 MIN: CPT | Mod: 25 | Performed by: NURSE PRACTITIONER

## 2018-01-01 PROCEDURE — 86850 RBC ANTIBODY SCREEN: CPT | Performed by: STUDENT IN AN ORGANIZED HEALTH CARE EDUCATION/TRAINING PROGRAM

## 2018-01-01 PROCEDURE — 84100 ASSAY OF PHOSPHORUS: CPT | Performed by: HOSPITALIST

## 2018-01-01 PROCEDURE — 85027 COMPLETE CBC AUTOMATED: CPT | Performed by: INTERNAL MEDICINE

## 2018-01-01 PROCEDURE — 88311 DECALCIFY TISSUE: CPT | Performed by: INTERNAL MEDICINE

## 2018-01-01 PROCEDURE — 36415 COLL VENOUS BLD VENIPUNCTURE: CPT | Performed by: STUDENT IN AN ORGANIZED HEALTH CARE EDUCATION/TRAINING PROGRAM

## 2018-01-01 PROCEDURE — 25500064 ZZH RX 255 OP 636: Performed by: INTERNAL MEDICINE

## 2018-01-01 PROCEDURE — 75989 ABSCESS DRAINAGE UNDER X-RAY: CPT | Mod: XS

## 2018-01-01 PROCEDURE — 82803 BLOOD GASES ANY COMBINATION: CPT

## 2018-01-01 PROCEDURE — 0296T ZIO PATCH HOLTER: CPT | Performed by: STUDENT IN AN ORGANIZED HEALTH CARE EDUCATION/TRAINING PROGRAM

## 2018-01-01 PROCEDURE — 37000008 ZZH ANESTHESIA TECHNICAL FEE, 1ST 30 MIN: Performed by: OTOLARYNGOLOGY

## 2018-01-01 PROCEDURE — 80053 COMPREHEN METABOLIC PANEL: CPT | Performed by: STUDENT IN AN ORGANIZED HEALTH CARE EDUCATION/TRAINING PROGRAM

## 2018-01-01 PROCEDURE — 99214 OFFICE O/P EST MOD 30 MIN: CPT | Performed by: FAMILY MEDICINE

## 2018-01-01 PROCEDURE — 86901 BLOOD TYPING SEROLOGIC RH(D): CPT | Performed by: STUDENT IN AN ORGANIZED HEALTH CARE EDUCATION/TRAINING PROGRAM

## 2018-01-01 PROCEDURE — 71000014 ZZH RECOVERY PHASE 1 LEVEL 2 FIRST HR: Performed by: OTOLARYNGOLOGY

## 2018-01-01 PROCEDURE — 96367 TX/PROPH/DG ADDL SEQ IV INF: CPT | Performed by: NURSE PRACTITIONER

## 2018-01-01 PROCEDURE — 85025 COMPLETE CBC W/AUTO DIFF WBC: CPT | Performed by: FAMILY MEDICINE

## 2018-01-01 PROCEDURE — 84550 ASSAY OF BLOOD/URIC ACID: CPT | Performed by: HOSPITALIST

## 2018-01-01 PROCEDURE — 96409 CHEMO IV PUSH SNGL DRUG: CPT | Performed by: INTERNAL MEDICINE

## 2018-01-01 PROCEDURE — 36000051 ZZH SURGERY LEVEL 2 1ST 30 MIN - UMMC: Performed by: OTOLARYNGOLOGY

## 2018-01-01 PROCEDURE — 71260 CT THORAX DX C+: CPT | Performed by: RADIOLOGY

## 2018-01-01 PROCEDURE — 40000170 ZZH STATISTIC PRE-PROCEDURE ASSESSMENT II: Performed by: OTOLARYNGOLOGY

## 2018-01-01 PROCEDURE — 83880 ASSAY OF NATRIURETIC PEPTIDE: CPT | Performed by: EMERGENCY MEDICINE

## 2018-01-01 PROCEDURE — 25000566 ZZH SEVOFLURANE, EA 15 MIN: Performed by: OTOLARYNGOLOGY

## 2018-01-01 PROCEDURE — 84478 ASSAY OF TRIGLYCERIDES: CPT | Performed by: NURSE PRACTITIONER

## 2018-01-01 PROCEDURE — 82945 GLUCOSE OTHER FLUID: CPT | Performed by: NURSE PRACTITIONER

## 2018-01-01 PROCEDURE — 87205 SMEAR GRAM STAIN: CPT | Performed by: PHYSICIAN ASSISTANT

## 2018-01-01 PROCEDURE — 00000159 ZZHCL STATISTIC H-SEND OUTS PREP: Performed by: OTOLARYNGOLOGY

## 2018-01-01 PROCEDURE — 76700 US EXAM ABDOM COMPLETE: CPT | Performed by: RADIOLOGY

## 2018-01-01 PROCEDURE — 40000986 XR CHEST PORT 1 VW

## 2018-01-01 PROCEDURE — 83036 HEMOGLOBIN GLYCOSYLATED A1C: CPT | Performed by: FAMILY MEDICINE

## 2018-01-01 PROCEDURE — 99205 OFFICE O/P NEW HI 60 MIN: CPT | Performed by: INTERNAL MEDICINE

## 2018-01-01 PROCEDURE — 82565 ASSAY OF CREATININE: CPT | Performed by: ANESTHESIOLOGY

## 2018-01-01 PROCEDURE — 84155 ASSAY OF PROTEIN SERUM: CPT | Performed by: NURSE PRACTITIONER

## 2018-01-01 PROCEDURE — 71260 CT THORAX DX C+: CPT

## 2018-01-01 PROCEDURE — 82150 ASSAY OF AMYLASE: CPT | Performed by: FAMILY MEDICINE

## 2018-01-01 PROCEDURE — 82533 TOTAL CORTISOL: CPT | Performed by: PEDIATRICS

## 2018-01-01 PROCEDURE — 96413 CHEMO IV INFUSION 1 HR: CPT | Performed by: NURSE PRACTITIONER

## 2018-01-01 PROCEDURE — 85610 PROTHROMBIN TIME: CPT | Performed by: STUDENT IN AN ORGANIZED HEALTH CARE EDUCATION/TRAINING PROGRAM

## 2018-01-01 PROCEDURE — 83605 ASSAY OF LACTIC ACID: CPT | Mod: 91 | Performed by: NURSE PRACTITIONER

## 2018-01-01 PROCEDURE — 87070 CULTURE OTHR SPECIMN AEROBIC: CPT | Performed by: NURSE PRACTITIONER

## 2018-01-01 PROCEDURE — 00000160 ZZHCL STATISTIC H-SPECIAL HANDLING: Performed by: OTOLARYNGOLOGY

## 2018-01-01 PROCEDURE — 87040 BLOOD CULTURE FOR BACTERIA: CPT | Performed by: EMERGENCY MEDICINE

## 2018-01-01 PROCEDURE — 88313 SPECIAL STAINS GROUP 2: CPT | Performed by: INTERNAL MEDICINE

## 2018-01-01 PROCEDURE — 83605 ASSAY OF LACTIC ACID: CPT | Performed by: EMERGENCY MEDICINE

## 2018-01-01 PROCEDURE — 25000132 ZZH RX MED GY IP 250 OP 250 PS 637: Mod: GY | Performed by: PHYSICIAN ASSISTANT

## 2018-01-01 PROCEDURE — 00000161 ZZHCL STATISTIC H-SPHEME PROCESS B/S: Performed by: INTERNAL MEDICINE

## 2018-01-01 PROCEDURE — 27210903 ZZH KIT CR5

## 2018-01-01 PROCEDURE — 25000128 H RX IP 250 OP 636: Performed by: PHYSICIAN ASSISTANT

## 2018-01-01 PROCEDURE — 27210904 ZZH KIT CR6

## 2018-01-01 PROCEDURE — 96376 TX/PRO/DX INJ SAME DRUG ADON: CPT

## 2018-01-01 PROCEDURE — 36415 COLL VENOUS BLD VENIPUNCTURE: CPT | Performed by: FAMILY MEDICINE

## 2018-01-01 PROCEDURE — 40001004 ZZHCL STATISTIC FLOW INT 9-15 ABY TC 88188: Performed by: INTERNAL MEDICINE

## 2018-01-01 PROCEDURE — 88280 CHROMOSOME KARYOTYPE STUDY: CPT | Performed by: INTERNAL MEDICINE

## 2018-01-01 PROCEDURE — 83690 ASSAY OF LIPASE: CPT | Performed by: STUDENT IN AN ORGANIZED HEALTH CARE EDUCATION/TRAINING PROGRAM

## 2018-01-01 PROCEDURE — 99207 ZZC NO CHARGE NURSE ONLY: CPT | Performed by: NURSE PRACTITIONER

## 2018-01-01 PROCEDURE — 99214 OFFICE O/P EST MOD 30 MIN: CPT | Performed by: INTERNAL MEDICINE

## 2018-01-01 PROCEDURE — 87389 HIV-1 AG W/HIV-1&-2 AB AG IA: CPT | Performed by: INTERNAL MEDICINE

## 2018-01-01 PROCEDURE — 85027 COMPLETE CBC AUTOMATED: CPT | Performed by: STUDENT IN AN ORGANIZED HEALTH CARE EDUCATION/TRAINING PROGRAM

## 2018-01-01 PROCEDURE — 81261 IGH GENE REARRANGE AMP METH: CPT | Performed by: INTERNAL MEDICINE

## 2018-01-01 PROCEDURE — 85730 THROMBOPLASTIN TIME PARTIAL: CPT | Performed by: STUDENT IN AN ORGANIZED HEALTH CARE EDUCATION/TRAINING PROGRAM

## 2018-01-01 PROCEDURE — 99310 SBSQ NF CARE HIGH MDM 45: CPT | Mod: GV | Performed by: NURSE PRACTITIONER

## 2018-01-01 PROCEDURE — 84157 ASSAY OF PROTEIN OTHER: CPT | Performed by: PHYSICIAN ASSISTANT

## 2018-01-01 PROCEDURE — 87086 URINE CULTURE/COLONY COUNT: CPT | Performed by: STUDENT IN AN ORGANIZED HEALTH CARE EDUCATION/TRAINING PROGRAM

## 2018-01-01 PROCEDURE — 85730 THROMBOPLASTIN TIME PARTIAL: CPT | Performed by: PHYSICIAN ASSISTANT

## 2018-01-01 PROCEDURE — 99207 ZZC CHGS TRANSFERRED TO HOSPITAL: CPT | Performed by: INTERNAL MEDICINE

## 2018-01-01 PROCEDURE — 81001 URINALYSIS AUTO W/SCOPE: CPT | Performed by: EMERGENCY MEDICINE

## 2018-01-01 PROCEDURE — 87633 RESP VIRUS 12-25 TARGETS: CPT | Performed by: STUDENT IN AN ORGANIZED HEALTH CARE EDUCATION/TRAINING PROGRAM

## 2018-01-01 PROCEDURE — 78816 PET IMAGE W/CT FULL BODY: CPT | Mod: PI

## 2018-01-01 PROCEDURE — 99223 1ST HOSP IP/OBS HIGH 75: CPT | Performed by: INTERNAL MEDICINE

## 2018-01-01 PROCEDURE — 99212 OFFICE O/P EST SF 10 MIN: CPT | Mod: ZP | Performed by: PHYSICIAN ASSISTANT

## 2018-01-01 PROCEDURE — C1729 CATH, DRAINAGE: HCPCS

## 2018-01-01 PROCEDURE — C1769 GUIDE WIRE: HCPCS

## 2018-01-01 PROCEDURE — 71046 X-RAY EXAM CHEST 2 VIEWS: CPT | Mod: XU

## 2018-01-01 PROCEDURE — 25800025 ZZH RX 258: Performed by: INTERNAL MEDICINE

## 2018-01-01 PROCEDURE — 85730 THROMBOPLASTIN TIME PARTIAL: CPT | Performed by: INTERNAL MEDICINE

## 2018-01-01 PROCEDURE — 34300033 ZZH RX 343: Performed by: INTERNAL MEDICINE

## 2018-01-01 PROCEDURE — 71046 X-RAY EXAM CHEST 2 VIEWS: CPT

## 2018-01-01 PROCEDURE — 80048 BASIC METABOLIC PNL TOTAL CA: CPT | Performed by: STUDENT IN AN ORGANIZED HEALTH CARE EDUCATION/TRAINING PROGRAM

## 2018-01-01 PROCEDURE — 88185 FLOWCYTOMETRY/TC ADD-ON: CPT | Performed by: INTERNAL MEDICINE

## 2018-01-01 PROCEDURE — 88264 CHROMOSOME ANALYSIS 20-25: CPT | Performed by: INTERNAL MEDICINE

## 2018-01-01 PROCEDURE — 96372 THER/PROPH/DIAG INJ SC/IM: CPT

## 2018-01-01 PROCEDURE — 93010 ELECTROCARDIOGRAM REPORT: CPT | Mod: Z6 | Performed by: EMERGENCY MEDICINE

## 2018-01-01 PROCEDURE — 93306 TTE W/DOPPLER COMPLETE: CPT

## 2018-01-01 PROCEDURE — G0179 MD RECERTIFICATION HHA PT: HCPCS | Performed by: FAMILY MEDICINE

## 2018-01-01 PROCEDURE — 88341 IMHCHEM/IMCYTCHM EA ADD ANTB: CPT | Performed by: INTERNAL MEDICINE

## 2018-01-01 PROCEDURE — 88307 TISSUE EXAM BY PATHOLOGIST: CPT | Performed by: OTOLARYNGOLOGY

## 2018-01-01 PROCEDURE — 78815 PET IMAGE W/CT SKULL-THIGH: CPT | Mod: 59 | Performed by: RADIOLOGY

## 2018-01-01 PROCEDURE — G0463 HOSPITAL OUTPT CLINIC VISIT: HCPCS | Mod: 25

## 2018-01-01 PROCEDURE — 99153 MOD SED SAME PHYS/QHP EA: CPT

## 2018-01-01 PROCEDURE — 40001004 ZZHCL STATISTIC FLOW INT 9-15 ABY TC 88188: Performed by: OTOLARYNGOLOGY

## 2018-01-01 PROCEDURE — 37000009 ZZH ANESTHESIA TECHNICAL FEE, EACH ADDTL 15 MIN: Performed by: OTOLARYNGOLOGY

## 2018-01-01 PROCEDURE — 93005 ELECTROCARDIOGRAM TRACING: CPT | Mod: 59 | Performed by: EMERGENCY MEDICINE

## 2018-01-01 PROCEDURE — 88271 CYTOGENETICS DNA PROBE: CPT | Performed by: INTERNAL MEDICINE

## 2018-01-01 PROCEDURE — 99207 ZZC NO CHARGE LOS: CPT | Performed by: DIETITIAN, REGISTERED

## 2018-01-01 PROCEDURE — 82043 UR ALBUMIN QUANTITATIVE: CPT | Performed by: FAMILY MEDICINE

## 2018-01-01 PROCEDURE — 25000125 ZZHC RX 250: Performed by: OTOLARYNGOLOGY

## 2018-01-01 PROCEDURE — 25000125 ZZHC RX 250: Performed by: NURSE ANESTHETIST, CERTIFIED REGISTERED

## 2018-01-01 PROCEDURE — 96417 CHEMO IV INFUS EACH ADDL SEQ: CPT | Performed by: INTERNAL MEDICINE

## 2018-01-01 PROCEDURE — 89051 BODY FLUID CELL COUNT: CPT | Performed by: PHYSICIAN ASSISTANT

## 2018-01-01 PROCEDURE — 00000155 ZZHCL STATISTIC H-CELL BLOCK W/STAIN: Performed by: OTOLARYNGOLOGY

## 2018-01-01 PROCEDURE — 87493 C DIFF AMPLIFIED PROBE: CPT | Performed by: STUDENT IN AN ORGANIZED HEALTH CARE EDUCATION/TRAINING PROGRAM

## 2018-01-01 PROCEDURE — 74177 CT ABD & PELVIS W/CONTRAST: CPT

## 2018-01-01 PROCEDURE — 40000611 ZZHCL STATISTIC MORPHOLOGY W/INTERP HEMEPATH TC 85060: Performed by: INTERNAL MEDICINE

## 2018-01-01 PROCEDURE — 84550 ASSAY OF BLOOD/URIC ACID: CPT | Performed by: NURSE PRACTITIONER

## 2018-01-01 PROCEDURE — 88305 TISSUE EXAM BY PATHOLOGIST: CPT | Performed by: OTOLARYNGOLOGY

## 2018-01-01 PROCEDURE — 84100 ASSAY OF PHOSPHORUS: CPT | Performed by: STUDENT IN AN ORGANIZED HEALTH CARE EDUCATION/TRAINING PROGRAM

## 2018-01-01 PROCEDURE — 99215 OFFICE O/P EST HI 40 MIN: CPT | Performed by: INTERNAL MEDICINE

## 2018-01-01 PROCEDURE — 82042 OTHER SOURCE ALBUMIN QUAN EA: CPT | Performed by: NURSE PRACTITIONER

## 2018-01-01 PROCEDURE — 40000065 ZZH STATISTIC EKG NON-CHARGEABLE

## 2018-01-01 PROCEDURE — 27210738 ZZH ACCESSORY CR2

## 2018-01-01 PROCEDURE — 25000128 H RX IP 250 OP 636: Performed by: NURSE PRACTITIONER

## 2018-01-01 PROCEDURE — 40000424 ZZHCL STATISTIC BONE MARROW CORE PERF TC 38221: Performed by: INTERNAL MEDICINE

## 2018-01-01 PROCEDURE — 87040 BLOOD CULTURE FOR BACTERIA: CPT | Performed by: STUDENT IN AN ORGANIZED HEALTH CARE EDUCATION/TRAINING PROGRAM

## 2018-01-01 PROCEDURE — 83690 ASSAY OF LIPASE: CPT | Performed by: FAMILY MEDICINE

## 2018-01-01 PROCEDURE — 88237 TISSUE CULTURE BONE MARROW: CPT | Performed by: INTERNAL MEDICINE

## 2018-01-01 PROCEDURE — 96374 THER/PROPH/DIAG INJ IV PUSH: CPT | Performed by: EMERGENCY MEDICINE

## 2018-01-01 PROCEDURE — 96361 HYDRATE IV INFUSION ADD-ON: CPT | Mod: XU | Performed by: EMERGENCY MEDICINE

## 2018-01-01 PROCEDURE — 71046 X-RAY EXAM CHEST 2 VIEWS: CPT | Performed by: RADIOLOGY

## 2018-01-01 PROCEDURE — 87205 SMEAR GRAM STAIN: CPT | Performed by: NURSE PRACTITIONER

## 2018-01-01 PROCEDURE — 84145 PROCALCITONIN (PCT): CPT | Performed by: STUDENT IN AN ORGANIZED HEALTH CARE EDUCATION/TRAINING PROGRAM

## 2018-01-01 PROCEDURE — 27210886 ZZH ACCESSORY CR5

## 2018-01-01 PROCEDURE — 40000951 ZZHCL STATISTIC BONE MARROW INTERP TC 85097: Performed by: INTERNAL MEDICINE

## 2018-01-01 PROCEDURE — 86706 HEP B SURFACE ANTIBODY: CPT | Performed by: INTERNAL MEDICINE

## 2018-01-01 PROCEDURE — 32550 INSERT PLEURAL CATH: CPT | Mod: XS

## 2018-01-01 PROCEDURE — 80053 COMPREHEN METABOLIC PANEL: CPT | Performed by: EMERGENCY MEDICINE

## 2018-01-01 PROCEDURE — 88342 IMHCHEM/IMCYTCHM 1ST ANTB: CPT | Performed by: INTERNAL MEDICINE

## 2018-01-01 PROCEDURE — P9047 ALBUMIN (HUMAN), 25%, 50ML: HCPCS | Performed by: PHYSICIAN ASSISTANT

## 2018-01-01 PROCEDURE — 99605 MTMS BY PHARM NP 15 MIN: CPT | Mod: U4 | Performed by: PHARMACIST

## 2018-01-01 PROCEDURE — 88264 CHROMOSOME ANALYSIS 20-25: CPT | Performed by: OTOLARYNGOLOGY

## 2018-01-01 PROCEDURE — 32555 ASPIRATE PLEURA W/ IMAGING: CPT | Mod: 50

## 2018-01-01 PROCEDURE — 83615 LACTATE (LD) (LDH) ENZYME: CPT | Performed by: PHYSICIAN ASSISTANT

## 2018-01-01 PROCEDURE — 83615 LACTATE (LD) (LDH) ENZYME: CPT | Performed by: HOSPITALIST

## 2018-01-01 PROCEDURE — 80053 COMPREHEN METABOLIC PANEL: CPT | Performed by: PHYSICIAN ASSISTANT

## 2018-01-01 RX ORDER — MEPERIDINE HYDROCHLORIDE 25 MG/ML
25 INJECTION INTRAMUSCULAR; INTRAVENOUS; SUBCUTANEOUS
Status: CANCELLED
Start: 2018-01-01

## 2018-01-01 RX ORDER — HEPARIN SODIUM (PORCINE) LOCK FLUSH IV SOLN 100 UNIT/ML 100 UNIT/ML
5 SOLUTION INTRAVENOUS
Status: DISCONTINUED | OUTPATIENT
Start: 2018-01-01 | End: 2018-01-01 | Stop reason: HOSPADM

## 2018-01-01 RX ORDER — LISINOPRIL 20 MG/1
20 TABLET ORAL DAILY
Qty: 90 TABLET | Refills: 3 | Status: SHIPPED | OUTPATIENT
Start: 2018-01-01 | End: 2018-01-01

## 2018-01-01 RX ORDER — MEPERIDINE HYDROCHLORIDE 25 MG/ML
25 INJECTION INTRAMUSCULAR; INTRAVENOUS; SUBCUTANEOUS EVERY 30 MIN PRN
Status: CANCELLED | OUTPATIENT
Start: 2018-01-01

## 2018-01-01 RX ORDER — ALBUTEROL SULFATE 0.83 MG/ML
2.5 SOLUTION RESPIRATORY (INHALATION)
Status: CANCELLED | OUTPATIENT
Start: 2018-01-01

## 2018-01-01 RX ORDER — LORAZEPAM 2 MG/ML
0.5 INJECTION INTRAMUSCULAR EVERY 4 HOURS PRN
Status: CANCELLED
Start: 2018-01-01

## 2018-01-01 RX ORDER — ACETAMINOPHEN 325 MG/1
650 TABLET ORAL ONCE
Status: COMPLETED | OUTPATIENT
Start: 2018-01-01 | End: 2018-01-01

## 2018-01-01 RX ORDER — ONDANSETRON 8 MG/1
8 TABLET, FILM COATED ORAL EVERY 8 HOURS PRN
Qty: 30 TABLET | Status: SHIPPED | OUTPATIENT
Start: 2018-01-01

## 2018-01-01 RX ORDER — LIDOCAINE/PRILOCAINE 2.5 %-2.5%
1 CREAM (GRAM) TOPICAL PRN
COMMUNITY
Start: 2018-01-01

## 2018-01-01 RX ORDER — POTASSIUM CHLORIDE 29.8 MG/ML
20 INJECTION INTRAVENOUS
Status: DISCONTINUED | OUTPATIENT
Start: 2018-01-01 | End: 2018-01-01 | Stop reason: HOSPADM

## 2018-01-01 RX ORDER — POTASSIUM CHLORIDE 1.5 G/1.58G
20-40 POWDER, FOR SOLUTION ORAL
Status: DISCONTINUED | OUTPATIENT
Start: 2018-01-01 | End: 2018-01-01 | Stop reason: HOSPADM

## 2018-01-01 RX ORDER — POTASSIUM CHLORIDE 750 MG/1
10 TABLET, EXTENDED RELEASE ORAL DAILY
Status: DISCONTINUED | OUTPATIENT
Start: 2018-01-01 | End: 2018-01-01

## 2018-01-01 RX ORDER — ROSUVASTATIN CALCIUM 20 MG/1
40 TABLET, COATED ORAL EVERY MORNING
Status: DISCONTINUED | OUTPATIENT
Start: 2018-01-01 | End: 2018-01-01 | Stop reason: HOSPADM

## 2018-01-01 RX ORDER — ONDANSETRON 8 MG/1
8 TABLET, FILM COATED ORAL EVERY 8 HOURS PRN
Qty: 10 TABLET | Refills: 3 | Status: ON HOLD | OUTPATIENT
Start: 2018-01-01 | End: 2018-01-01

## 2018-01-01 RX ORDER — SODIUM CHLORIDE 9 MG/ML
INJECTION, SOLUTION INTRAVENOUS CONTINUOUS
Status: DISCONTINUED | OUTPATIENT
Start: 2018-01-01 | End: 2018-01-01 | Stop reason: HOSPADM

## 2018-01-01 RX ORDER — ACETAMINOPHEN 325 MG/1
650 TABLET ORAL ONCE
Status: CANCELLED
Start: 2018-01-01

## 2018-01-01 RX ORDER — METOPROLOL SUCCINATE 100 MG/1
100 TABLET, EXTENDED RELEASE ORAL DAILY
Qty: 90 TABLET | Refills: 3 | Status: ON HOLD | OUTPATIENT
Start: 2018-01-01 | End: 2018-01-01

## 2018-01-01 RX ORDER — LORAZEPAM 0.5 MG/1
0.5 TABLET ORAL EVERY 4 HOURS PRN
Status: DISCONTINUED | OUTPATIENT
Start: 2018-01-01 | End: 2018-01-01 | Stop reason: HOSPADM

## 2018-01-01 RX ORDER — HYDROCODONE BITARTRATE AND ACETAMINOPHEN 5; 325 MG/1; MG/1
1 TABLET ORAL EVERY 6 HOURS PRN
Qty: 15 TABLET | Refills: 0 | Status: ON HOLD | OUTPATIENT
Start: 2018-01-01 | End: 2018-01-01

## 2018-01-01 RX ORDER — DEXTROSE MONOHYDRATE 25 G/50ML
INJECTION, SOLUTION INTRAVENOUS
Status: COMPLETED
Start: 2018-01-01 | End: 2018-01-01

## 2018-01-01 RX ORDER — FUROSEMIDE 20 MG
20 TABLET ORAL DAILY
Qty: 60 TABLET | Refills: 6 | Status: SHIPPED | OUTPATIENT
Start: 2018-01-01 | End: 2018-01-01 | Stop reason: SINTOL

## 2018-01-01 RX ORDER — ONDANSETRON 8 MG/1
8 TABLET, FILM COATED ORAL ONCE
Status: DISCONTINUED | OUTPATIENT
Start: 2018-01-01 | End: 2018-01-01 | Stop reason: CLARIF

## 2018-01-01 RX ORDER — DIPHENHYDRAMINE HYDROCHLORIDE 50 MG/ML
50 INJECTION INTRAMUSCULAR; INTRAVENOUS
Status: CANCELLED
Start: 2018-01-01

## 2018-01-01 RX ORDER — LORAZEPAM 0.5 MG/1
0.5 TABLET ORAL EVERY 4 HOURS PRN
Qty: 30 TABLET | Refills: 5 | Status: SHIPPED | OUTPATIENT
Start: 2018-01-01

## 2018-01-01 RX ORDER — DEXAMETHASONE SODIUM PHOSPHATE 4 MG/ML
8 INJECTION, SOLUTION INTRA-ARTICULAR; INTRALESIONAL; INTRAMUSCULAR; INTRAVENOUS; SOFT TISSUE ONCE
Status: COMPLETED | OUTPATIENT
Start: 2018-01-01 | End: 2018-01-01

## 2018-01-01 RX ORDER — ALBUTEROL SULFATE 90 UG/1
1-2 AEROSOL, METERED RESPIRATORY (INHALATION)
Status: CANCELLED
Start: 2018-01-01

## 2018-01-01 RX ORDER — HALOPERIDOL 2 MG/ML
.5-1 SOLUTION ORAL 2 TIMES DAILY
Qty: 15 ML | Status: SHIPPED | OUTPATIENT
Start: 2018-01-01

## 2018-01-01 RX ORDER — IOPAMIDOL 755 MG/ML
62 INJECTION, SOLUTION INTRAVASCULAR ONCE
Status: COMPLETED | OUTPATIENT
Start: 2018-01-01 | End: 2018-01-01

## 2018-01-01 RX ORDER — ONDANSETRON 8 MG/1
8 TABLET, ORALLY DISINTEGRATING ORAL EVERY 8 HOURS PRN
Status: DISCONTINUED | OUTPATIENT
Start: 2018-01-01 | End: 2018-01-01 | Stop reason: HOSPADM

## 2018-01-01 RX ORDER — FENTANYL CITRATE 50 UG/ML
25-50 INJECTION, SOLUTION INTRAMUSCULAR; INTRAVENOUS EVERY 5 MIN PRN
Status: DISCONTINUED | OUTPATIENT
Start: 2018-01-01 | End: 2018-01-01 | Stop reason: HOSPADM

## 2018-01-01 RX ORDER — EPINEPHRINE 0.3 MG/.3ML
0.3 INJECTION SUBCUTANEOUS EVERY 5 MIN PRN
Status: CANCELLED | OUTPATIENT
Start: 2018-01-01

## 2018-01-01 RX ORDER — ASPIRIN 81 MG/1
81 TABLET ORAL EVERY MORNING
Status: DISCONTINUED | OUTPATIENT
Start: 2018-01-01 | End: 2018-01-01 | Stop reason: HOSPADM

## 2018-01-01 RX ORDER — NITROGLYCERIN 0.4 MG/1
0.4 TABLET SUBLINGUAL EVERY 5 MIN PRN
Status: DISCONTINUED | OUTPATIENT
Start: 2018-01-01 | End: 2018-01-01 | Stop reason: HOSPADM

## 2018-01-01 RX ORDER — EPHEDRINE SULFATE 50 MG/ML
INJECTION, SOLUTION INTRAMUSCULAR; INTRAVENOUS; SUBCUTANEOUS PRN
Status: DISCONTINUED | OUTPATIENT
Start: 2018-01-01 | End: 2018-01-01

## 2018-01-01 RX ORDER — HEPARIN SODIUM,PORCINE 10 UNIT/ML
5-10 VIAL (ML) INTRAVENOUS EVERY 24 HOURS
Status: DISCONTINUED | OUTPATIENT
Start: 2018-01-01 | End: 2018-01-01 | Stop reason: HOSPADM

## 2018-01-01 RX ORDER — AMOXICILLIN 250 MG
1-2 CAPSULE ORAL 2 TIMES DAILY
Qty: 30 TABLET | Refills: 0 | Status: SHIPPED | OUTPATIENT
Start: 2018-01-01 | End: 2018-01-01

## 2018-01-01 RX ORDER — DIPHENHYDRAMINE HCL 25 MG
50 CAPSULE ORAL ONCE
Status: COMPLETED | OUTPATIENT
Start: 2018-01-01 | End: 2018-01-01

## 2018-01-01 RX ORDER — PROCHLORPERAZINE MALEATE 5 MG
5 TABLET ORAL EVERY 6 HOURS PRN
Status: DISCONTINUED | OUTPATIENT
Start: 2018-01-01 | End: 2018-01-01 | Stop reason: HOSPADM

## 2018-01-01 RX ORDER — LORAZEPAM 0.5 MG/1
0.5 TABLET ORAL EVERY 4 HOURS PRN
Qty: 30 TABLET | Refills: 3 | Status: ON HOLD | OUTPATIENT
Start: 2018-01-01 | End: 2018-01-01

## 2018-01-01 RX ORDER — AMOXICILLIN 250 MG
1 CAPSULE ORAL 2 TIMES DAILY
COMMUNITY

## 2018-01-01 RX ORDER — POTASSIUM CHLORIDE 7.45 MG/ML
10 INJECTION INTRAVENOUS
Status: DISCONTINUED | OUTPATIENT
Start: 2018-01-01 | End: 2018-01-01 | Stop reason: HOSPADM

## 2018-01-01 RX ORDER — ALBUMIN, HUMAN INJ 5% 5 %
SOLUTION INTRAVENOUS CONTINUOUS PRN
Status: DISCONTINUED | OUTPATIENT
Start: 2018-01-01 | End: 2018-01-01

## 2018-01-01 RX ORDER — EPINEPHRINE 1 MG/ML
0.3 INJECTION, SOLUTION INTRAMUSCULAR; SUBCUTANEOUS EVERY 5 MIN PRN
Status: CANCELLED | OUTPATIENT
Start: 2018-01-01

## 2018-01-01 RX ORDER — ZINC OXIDE 200 MG/G
PASTE TOPICAL
Status: DISCONTINUED | OUTPATIENT
Start: 2018-01-01 | End: 2018-01-01 | Stop reason: HOSPADM

## 2018-01-01 RX ORDER — SODIUM CHLORIDE 9 MG/ML
1000 INJECTION, SOLUTION INTRAVENOUS CONTINUOUS PRN
Status: CANCELLED
Start: 2018-01-01

## 2018-01-01 RX ORDER — LORAZEPAM 2 MG/ML
0.5 INJECTION INTRAMUSCULAR EVERY 4 HOURS PRN
Status: DISCONTINUED | OUTPATIENT
Start: 2018-01-01 | End: 2018-01-01

## 2018-01-01 RX ORDER — ONDANSETRON 4 MG/1
4 TABLET, ORALLY DISINTEGRATING ORAL EVERY 30 MIN PRN
Status: DISCONTINUED | OUTPATIENT
Start: 2018-01-01 | End: 2018-01-01 | Stop reason: HOSPADM

## 2018-01-01 RX ORDER — SIMETHICONE 80 MG
80-160 TABLET,CHEWABLE ORAL EVERY 6 HOURS PRN
Status: DISCONTINUED | OUTPATIENT
Start: 2018-01-01 | End: 2018-01-01 | Stop reason: HOSPADM

## 2018-01-01 RX ORDER — MECOBALAMIN 5000 MCG
15 TABLET,DISINTEGRATING ORAL DAILY
Status: DISCONTINUED | OUTPATIENT
Start: 2018-01-01 | End: 2018-01-01

## 2018-01-01 RX ORDER — NICOTINE POLACRILEX 4 MG
15-30 LOZENGE BUCCAL
Status: DISCONTINUED | OUTPATIENT
Start: 2018-01-01 | End: 2018-01-01 | Stop reason: HOSPADM

## 2018-01-01 RX ORDER — ONDANSETRON 2 MG/ML
4 INJECTION INTRAMUSCULAR; INTRAVENOUS EVERY 6 HOURS PRN
Status: DISCONTINUED | OUTPATIENT
Start: 2018-01-01 | End: 2018-01-01 | Stop reason: HOSPADM

## 2018-01-01 RX ORDER — IOPAMIDOL 755 MG/ML
100 INJECTION, SOLUTION INTRAVASCULAR ONCE
Status: COMPLETED | OUTPATIENT
Start: 2018-01-01 | End: 2018-01-01

## 2018-01-01 RX ORDER — HEPARIN SODIUM (PORCINE) LOCK FLUSH IV SOLN 100 UNIT/ML 100 UNIT/ML
500 SOLUTION INTRAVENOUS ONCE
Status: DISCONTINUED | OUTPATIENT
Start: 2018-01-01 | End: 2018-01-01 | Stop reason: HOSPADM

## 2018-01-01 RX ORDER — NALOXONE HYDROCHLORIDE 0.4 MG/ML
.1-.4 INJECTION, SOLUTION INTRAMUSCULAR; INTRAVENOUS; SUBCUTANEOUS
Status: DISCONTINUED | OUTPATIENT
Start: 2018-01-01 | End: 2018-01-01 | Stop reason: HOSPADM

## 2018-01-01 RX ORDER — SIMETHICONE 80 MG
80-160 TABLET,CHEWABLE ORAL EVERY 6 HOURS PRN
Qty: 180 TABLET | Refills: 3 | Status: SHIPPED | OUTPATIENT
Start: 2018-01-01 | End: 2018-01-01

## 2018-01-01 RX ORDER — HYDROCORTISONE VALERATE 2 MG/G
OINTMENT TOPICAL 3 TIMES DAILY
Status: DISCONTINUED | OUTPATIENT
Start: 2018-01-01 | End: 2018-01-01 | Stop reason: HOSPADM

## 2018-01-01 RX ORDER — POLYETHYLENE GLYCOL 3350 17 G/17G
17 POWDER, FOR SOLUTION ORAL DAILY PRN
Status: DISCONTINUED | OUTPATIENT
Start: 2018-01-01 | End: 2018-01-01 | Stop reason: HOSPADM

## 2018-01-01 RX ORDER — DIPHENHYDRAMINE HCL 25 MG
50 CAPSULE ORAL ONCE
Status: CANCELLED
Start: 2018-01-01

## 2018-01-01 RX ORDER — ONDANSETRON 4 MG/1
8 TABLET, ORALLY DISINTEGRATING ORAL EVERY 8 HOURS PRN
Status: DISCONTINUED | OUTPATIENT
Start: 2018-01-01 | End: 2018-01-01 | Stop reason: HOSPADM

## 2018-01-01 RX ORDER — LIDOCAINE 40 MG/G
CREAM TOPICAL
Status: DISCONTINUED | OUTPATIENT
Start: 2018-01-01 | End: 2018-01-01 | Stop reason: HOSPADM

## 2018-01-01 RX ORDER — METOPROLOL SUCCINATE 50 MG/1
100 TABLET, EXTENDED RELEASE ORAL DAILY
Status: DISCONTINUED | OUTPATIENT
Start: 2018-01-01 | End: 2018-01-01 | Stop reason: HOSPADM

## 2018-01-01 RX ORDER — ONDANSETRON 4 MG/1
8 TABLET, FILM COATED ORAL EVERY 8 HOURS PRN
Status: DISCONTINUED | OUTPATIENT
Start: 2018-01-01 | End: 2018-01-01 | Stop reason: HOSPADM

## 2018-01-01 RX ORDER — ALUMINA, MAGNESIA, AND SIMETHICONE 2400; 2400; 240 MG/30ML; MG/30ML; MG/30ML
5 SUSPENSION ORAL DAILY PRN
Status: DISCONTINUED | OUTPATIENT
Start: 2018-01-01 | End: 2018-01-01 | Stop reason: HOSPADM

## 2018-01-01 RX ORDER — ACETAMINOPHEN 325 MG/1
650 TABLET ORAL EVERY 4 HOURS PRN
Status: DISCONTINUED | OUTPATIENT
Start: 2018-01-01 | End: 2018-01-01 | Stop reason: HOSPADM

## 2018-01-01 RX ORDER — ACETAMINOPHEN 325 MG/1
325-650 TABLET ORAL EVERY 4 HOURS PRN
Status: DISCONTINUED | OUTPATIENT
Start: 2018-01-01 | End: 2018-01-01

## 2018-01-01 RX ORDER — FUROSEMIDE 20 MG
20 TABLET ORAL DAILY
Qty: 30 TABLET | COMMUNITY
Start: 2018-01-01 | End: 2018-01-01

## 2018-01-01 RX ORDER — PANTOPRAZOLE SODIUM 20 MG/1
20 TABLET, DELAYED RELEASE ORAL EVERY EVENING
Status: DISCONTINUED | OUTPATIENT
Start: 2018-01-01 | End: 2018-01-01 | Stop reason: HOSPADM

## 2018-01-01 RX ORDER — METHYLPREDNISOLONE SODIUM SUCCINATE 125 MG/2ML
125 INJECTION, POWDER, LYOPHILIZED, FOR SOLUTION INTRAMUSCULAR; INTRAVENOUS
Status: CANCELLED
Start: 2018-01-01

## 2018-01-01 RX ORDER — EPINEPHRINE 1 MG/ML
0.3 INJECTION, SOLUTION, CONCENTRATE INTRAVENOUS EVERY 5 MIN PRN
Status: DISCONTINUED | OUTPATIENT
Start: 2018-01-01 | End: 2018-01-01 | Stop reason: HOSPADM

## 2018-01-01 RX ORDER — HEPARIN SODIUM (PORCINE) LOCK FLUSH IV SOLN 100 UNIT/ML 100 UNIT/ML
5 SOLUTION INTRAVENOUS ONCE
Status: COMPLETED | OUTPATIENT
Start: 2018-01-01 | End: 2018-01-01

## 2018-01-01 RX ORDER — ONDANSETRON 4 MG/1
4 TABLET, ORALLY DISINTEGRATING ORAL EVERY 6 HOURS PRN
Status: DISCONTINUED | OUTPATIENT
Start: 2018-01-01 | End: 2018-01-01 | Stop reason: HOSPADM

## 2018-01-01 RX ORDER — POLYETHYLENE GLYCOL 3350 17 G/17G
17 POWDER, FOR SOLUTION ORAL DAILY
Status: DISCONTINUED | OUTPATIENT
Start: 2018-01-01 | End: 2018-01-01 | Stop reason: HOSPADM

## 2018-01-01 RX ORDER — METOPROLOL SUCCINATE 50 MG/1
50 TABLET, EXTENDED RELEASE ORAL DAILY
Status: DISCONTINUED | OUTPATIENT
Start: 2018-01-01 | End: 2018-01-01 | Stop reason: HOSPADM

## 2018-01-01 RX ORDER — PALONOSETRON 0.05 MG/ML
0.25 INJECTION, SOLUTION INTRAVENOUS ONCE
Status: COMPLETED | OUTPATIENT
Start: 2018-01-01 | End: 2018-01-01

## 2018-01-01 RX ORDER — LORAZEPAM 0.5 MG/1
0.5 TABLET ORAL EVERY 4 HOURS PRN
Qty: 30 TABLET | Refills: 3 | Status: SHIPPED | OUTPATIENT
Start: 2018-01-01 | End: 2018-01-01

## 2018-01-01 RX ORDER — MEPERIDINE HYDROCHLORIDE 50 MG/ML
25 INJECTION INTRAMUSCULAR; INTRAVENOUS; SUBCUTANEOUS EVERY 30 MIN PRN
Status: CANCELLED | OUTPATIENT
Start: 2018-01-01

## 2018-01-01 RX ORDER — PALONOSETRON 0.05 MG/ML
0.25 INJECTION, SOLUTION INTRAVENOUS ONCE
Status: CANCELLED
Start: 2018-01-01 | End: 2018-01-01

## 2018-01-01 RX ORDER — DEXAMETHASONE 4 MG/1
12 TABLET ORAL ONCE
Status: COMPLETED | OUTPATIENT
Start: 2018-01-01 | End: 2018-01-01

## 2018-01-01 RX ORDER — MEPERIDINE HYDROCHLORIDE 50 MG/ML
25 INJECTION INTRAMUSCULAR; INTRAVENOUS; SUBCUTANEOUS
Status: DISCONTINUED | OUTPATIENT
Start: 2018-01-01 | End: 2018-01-01 | Stop reason: HOSPADM

## 2018-01-01 RX ORDER — DEXTROSE MONOHYDRATE 25 G/50ML
25-50 INJECTION, SOLUTION INTRAVENOUS
Status: DISCONTINUED | OUTPATIENT
Start: 2018-01-01 | End: 2018-01-01 | Stop reason: HOSPADM

## 2018-01-01 RX ORDER — ASPIRIN 81 MG/1
81 TABLET ORAL EVERY MORNING
Status: DISCONTINUED | OUTPATIENT
Start: 2018-01-01 | End: 2018-01-01

## 2018-01-01 RX ORDER — SODIUM CHLORIDE 9 MG/ML
1000 INJECTION, SOLUTION INTRAVENOUS CONTINUOUS PRN
Status: DISCONTINUED | OUTPATIENT
Start: 2018-01-01 | End: 2018-01-01 | Stop reason: HOSPADM

## 2018-01-01 RX ORDER — ROSUVASTATIN CALCIUM 20 MG/1
40 TABLET, COATED ORAL AT BEDTIME
Status: DISCONTINUED | OUTPATIENT
Start: 2018-01-01 | End: 2018-01-01 | Stop reason: HOSPADM

## 2018-01-01 RX ORDER — ALBUMIN, HUMAN INJ 5% 5 %
25 SOLUTION INTRAVENOUS ONCE
Status: COMPLETED | OUTPATIENT
Start: 2018-01-01 | End: 2018-01-01

## 2018-01-01 RX ORDER — LORAZEPAM 0.5 MG/1
0.5 TABLET ORAL EVERY 4 HOURS PRN
Status: DISCONTINUED | OUTPATIENT
Start: 2018-01-01 | End: 2018-01-01

## 2018-01-01 RX ORDER — IBUPROFEN 600 MG/1
600 TABLET, FILM COATED ORAL EVERY 6 HOURS PRN
Status: ON HOLD | COMMUNITY
Start: 2016-12-17 | End: 2018-01-01

## 2018-01-01 RX ORDER — PIPERACILLIN SODIUM, TAZOBACTAM SODIUM 3; .375 G/15ML; G/15ML
3.38 INJECTION, POWDER, LYOPHILIZED, FOR SOLUTION INTRAVENOUS EVERY 6 HOURS
Status: DISCONTINUED | OUTPATIENT
Start: 2018-01-01 | End: 2018-01-01

## 2018-01-01 RX ORDER — LORAZEPAM 2 MG/ML
.5-1 INJECTION INTRAMUSCULAR EVERY 6 HOURS PRN
Status: DISCONTINUED | OUTPATIENT
Start: 2018-01-01 | End: 2018-01-01 | Stop reason: HOSPADM

## 2018-01-01 RX ORDER — ONDANSETRON 8 MG/1
8 TABLET, FILM COATED ORAL EVERY 8 HOURS PRN
Qty: 10 TABLET | Refills: 3 | Status: SHIPPED | OUTPATIENT
Start: 2018-01-01 | End: 2018-01-01

## 2018-01-01 RX ORDER — LISINOPRIL 10 MG/1
20 TABLET ORAL DAILY
Status: DISCONTINUED | OUTPATIENT
Start: 2018-01-01 | End: 2018-01-01

## 2018-01-01 RX ORDER — MEPERIDINE HYDROCHLORIDE 50 MG/ML
25 INJECTION INTRAMUSCULAR; INTRAVENOUS; SUBCUTANEOUS EVERY 30 MIN PRN
Status: DISCONTINUED | OUTPATIENT
Start: 2018-01-01 | End: 2018-01-01 | Stop reason: HOSPADM

## 2018-01-01 RX ORDER — ACETAMINOPHEN 650 MG/1
650 SUPPOSITORY RECTAL EVERY 4 HOURS PRN
Qty: 2 SUPPOSITORY | Status: SHIPPED | OUTPATIENT
Start: 2018-01-01

## 2018-01-01 RX ORDER — LORATADINE 10 MG/1
10 TABLET ORAL DAILY
Qty: 30 TABLET | Refills: 1 | COMMUNITY
Start: 2018-01-01 | End: 2018-01-01

## 2018-01-01 RX ORDER — FLUMAZENIL 0.1 MG/ML
0.2 INJECTION, SOLUTION INTRAVENOUS
Status: DISCONTINUED | OUTPATIENT
Start: 2018-01-01 | End: 2018-01-01 | Stop reason: HOSPADM

## 2018-01-01 RX ORDER — POTASSIUM CL/LIDO/0.9 % NACL 10MEQ/0.1L
10 INTRAVENOUS SOLUTION, PIGGYBACK (ML) INTRAVENOUS
Status: DISCONTINUED | OUTPATIENT
Start: 2018-01-01 | End: 2018-01-01 | Stop reason: HOSPADM

## 2018-01-01 RX ORDER — DIPHENHYDRAMINE HYDROCHLORIDE 50 MG/ML
50 INJECTION INTRAMUSCULAR; INTRAVENOUS
Status: DISCONTINUED | OUTPATIENT
Start: 2018-01-01 | End: 2018-01-01 | Stop reason: HOSPADM

## 2018-01-01 RX ORDER — HEPARIN SODIUM (PORCINE) LOCK FLUSH IV SOLN 100 UNIT/ML 100 UNIT/ML
500 SOLUTION INTRAVENOUS ONCE
Status: COMPLETED | OUTPATIENT
Start: 2018-01-01 | End: 2018-01-01

## 2018-01-01 RX ORDER — AMOXICILLIN 250 MG
2 CAPSULE ORAL 2 TIMES DAILY PRN
Status: DISCONTINUED | OUTPATIENT
Start: 2018-01-01 | End: 2018-01-01 | Stop reason: HOSPADM

## 2018-01-01 RX ORDER — GINSENG 100 MG
CAPSULE ORAL 3 TIMES DAILY
Status: DISCONTINUED | OUTPATIENT
Start: 2018-01-01 | End: 2018-01-01 | Stop reason: HOSPADM

## 2018-01-01 RX ORDER — MAGNESIUM SULFATE HEPTAHYDRATE 40 MG/ML
4 INJECTION, SOLUTION INTRAVENOUS EVERY 4 HOURS PRN
Status: DISCONTINUED | OUTPATIENT
Start: 2018-01-01 | End: 2018-01-01 | Stop reason: HOSPADM

## 2018-01-01 RX ORDER — FUROSEMIDE 10 MG/ML
20 INJECTION INTRAMUSCULAR; INTRAVENOUS ONCE
Status: COMPLETED | OUTPATIENT
Start: 2018-01-01 | End: 2018-01-01

## 2018-01-01 RX ORDER — HYDROCODONE BITARTRATE AND ACETAMINOPHEN 5; 325 MG/1; MG/1
1 TABLET ORAL EVERY 4 HOURS PRN
Status: DISCONTINUED | OUTPATIENT
Start: 2018-01-01 | End: 2018-01-01 | Stop reason: HOSPADM

## 2018-01-01 RX ORDER — SODIUM CHLORIDE, SODIUM LACTATE, POTASSIUM CHLORIDE, CALCIUM CHLORIDE 600; 310; 30; 20 MG/100ML; MG/100ML; MG/100ML; MG/100ML
INJECTION, SOLUTION INTRAVENOUS CONTINUOUS
Status: DISCONTINUED | OUTPATIENT
Start: 2018-01-01 | End: 2018-01-01 | Stop reason: HOSPADM

## 2018-01-01 RX ORDER — LIDOCAINE HYDROCHLORIDE 20 MG/ML
INJECTION, SOLUTION INFILTRATION; PERINEURAL PRN
Status: DISCONTINUED | OUTPATIENT
Start: 2018-01-01 | End: 2018-01-01

## 2018-01-01 RX ORDER — FUROSEMIDE 10 MG/ML
40 INJECTION INTRAMUSCULAR; INTRAVENOUS ONCE
Status: DISCONTINUED | OUTPATIENT
Start: 2018-01-01 | End: 2018-01-01 | Stop reason: HOSPADM

## 2018-01-01 RX ORDER — IOPAMIDOL 755 MG/ML
1-135 INJECTION, SOLUTION INTRAVASCULAR ONCE
Status: COMPLETED | OUTPATIENT
Start: 2018-01-01 | End: 2018-01-01

## 2018-01-01 RX ORDER — MECOBALAMIN 5000 MCG
20 TABLET,DISINTEGRATING ORAL DAILY
Status: ON HOLD | COMMUNITY
End: 2018-01-01

## 2018-01-01 RX ORDER — MEPERIDINE HYDROCHLORIDE 50 MG/ML
12.5 INJECTION INTRAMUSCULAR; INTRAVENOUS; SUBCUTANEOUS
Status: DISCONTINUED | OUTPATIENT
Start: 2018-01-01 | End: 2018-01-01 | Stop reason: HOSPADM

## 2018-01-01 RX ORDER — FLUTICASONE PROPIONATE 50 MCG
1-2 SPRAY, SUSPENSION (ML) NASAL DAILY
Qty: 1 BOTTLE | Refills: 11 | Status: SHIPPED | OUTPATIENT
Start: 2018-01-01 | End: 2018-01-01

## 2018-01-01 RX ORDER — POTASSIUM CHLORIDE 750 MG/1
20-40 TABLET, EXTENDED RELEASE ORAL
Status: DISCONTINUED | OUTPATIENT
Start: 2018-01-01 | End: 2018-01-01 | Stop reason: HOSPADM

## 2018-01-01 RX ORDER — ONDANSETRON 2 MG/ML
INJECTION INTRAMUSCULAR; INTRAVENOUS PRN
Status: DISCONTINUED | OUTPATIENT
Start: 2018-01-01 | End: 2018-01-01

## 2018-01-01 RX ORDER — HEPARIN SODIUM,PORCINE 10 UNIT/ML
5-10 VIAL (ML) INTRAVENOUS
Status: DISCONTINUED | OUTPATIENT
Start: 2018-01-01 | End: 2018-01-01 | Stop reason: HOSPADM

## 2018-01-01 RX ORDER — ALLOPURINOL 300 MG/1
300 TABLET ORAL DAILY
Status: DISCONTINUED | OUTPATIENT
Start: 2018-01-01 | End: 2018-01-01 | Stop reason: HOSPADM

## 2018-01-01 RX ORDER — ONDANSETRON 8 MG/1
8 TABLET, FILM COATED ORAL EVERY 8 HOURS PRN
Status: DISCONTINUED | OUTPATIENT
Start: 2018-01-01 | End: 2018-01-01 | Stop reason: HOSPADM

## 2018-01-01 RX ORDER — LORATADINE 10 MG/1
10 TABLET ORAL DAILY
Status: DISCONTINUED | OUTPATIENT
Start: 2018-01-01 | End: 2018-01-01 | Stop reason: HOSPADM

## 2018-01-01 RX ORDER — SENNOSIDES 8.6 MG
1 TABLET ORAL 2 TIMES DAILY
COMMUNITY
End: 2018-01-01 | Stop reason: ALTCHOICE

## 2018-01-01 RX ORDER — ACETAMINOPHEN 325 MG/1
650 TABLET ORAL EVERY 4 HOURS PRN
Status: DISCONTINUED | OUTPATIENT
Start: 2018-01-01 | End: 2018-01-01

## 2018-01-01 RX ORDER — AMOXICILLIN 250 MG
1 CAPSULE ORAL 2 TIMES DAILY PRN
Status: DISCONTINUED | OUTPATIENT
Start: 2018-01-01 | End: 2018-01-01 | Stop reason: HOSPADM

## 2018-01-01 RX ORDER — ONDANSETRON 2 MG/ML
8 INJECTION INTRAMUSCULAR; INTRAVENOUS EVERY 8 HOURS PRN
Status: DISCONTINUED | OUTPATIENT
Start: 2018-01-01 | End: 2018-01-01 | Stop reason: HOSPADM

## 2018-01-01 RX ORDER — FUROSEMIDE 20 MG
20 TABLET ORAL DAILY
Qty: 180 TABLET | Refills: 3 | Status: ON HOLD | OUTPATIENT
Start: 2018-01-01 | End: 2018-01-01

## 2018-01-01 RX ORDER — ROSUVASTATIN CALCIUM 10 MG/1
40 TABLET, COATED ORAL DAILY
Status: DISCONTINUED | OUTPATIENT
Start: 2018-01-01 | End: 2018-01-01 | Stop reason: HOSPADM

## 2018-01-01 RX ORDER — ACETAMINOPHEN 500 MG
500 TABLET ORAL EVERY 6 HOURS PRN
Status: DISCONTINUED | OUTPATIENT
Start: 2018-01-01 | End: 2018-01-01 | Stop reason: HOSPADM

## 2018-01-01 RX ORDER — CEFAZOLIN SODIUM 1 G/3ML
INJECTION, POWDER, FOR SOLUTION INTRAMUSCULAR; INTRAVENOUS PRN
Status: DISCONTINUED | OUTPATIENT
Start: 2018-01-01 | End: 2018-01-01

## 2018-01-01 RX ORDER — LIDOCAINE HYDROCHLORIDE AND EPINEPHRINE 10; 10 MG/ML; UG/ML
INJECTION, SOLUTION INFILTRATION; PERINEURAL PRN
Status: DISCONTINUED | OUTPATIENT
Start: 2018-01-01 | End: 2018-01-01 | Stop reason: HOSPADM

## 2018-01-01 RX ORDER — ALLOPURINOL 300 MG/1
300 TABLET ORAL DAILY
Status: DISCONTINUED | OUTPATIENT
Start: 2018-01-01 | End: 2018-01-01

## 2018-01-01 RX ORDER — ROSUVASTATIN CALCIUM 40 MG/1
40 TABLET, COATED ORAL EVERY MORNING
Status: DISCONTINUED | OUTPATIENT
Start: 2018-01-01 | End: 2018-01-01 | Stop reason: HOSPADM

## 2018-01-01 RX ORDER — FUROSEMIDE 20 MG
20 TABLET ORAL DAILY
Qty: 30 TABLET | Refills: 0 | Status: SHIPPED | OUTPATIENT
Start: 2018-01-01 | End: 2018-01-01

## 2018-01-01 RX ORDER — ONDANSETRON 2 MG/ML
4 INJECTION INTRAMUSCULAR; INTRAVENOUS EVERY 30 MIN PRN
Status: DISCONTINUED | OUTPATIENT
Start: 2018-01-01 | End: 2018-01-01 | Stop reason: HOSPADM

## 2018-01-01 RX ORDER — SODIUM CHLORIDE 9 MG/ML
INJECTION, SOLUTION INTRAVENOUS CONTINUOUS
Status: CANCELLED | OUTPATIENT
Start: 2018-01-01

## 2018-01-01 RX ORDER — FUROSEMIDE 10 MG/ML
40 INJECTION INTRAMUSCULAR; INTRAVENOUS ONCE
Status: COMPLETED | OUTPATIENT
Start: 2018-01-01 | End: 2018-01-01

## 2018-01-01 RX ORDER — FENTANYL CITRATE 50 UG/ML
INJECTION, SOLUTION INTRAMUSCULAR; INTRAVENOUS PRN
Status: DISCONTINUED | OUTPATIENT
Start: 2018-01-01 | End: 2018-01-01

## 2018-01-01 RX ORDER — POTASSIUM CHLORIDE 29.8 MG/ML
20 INJECTION INTRAVENOUS
Status: DISCONTINUED | OUTPATIENT
Start: 2018-01-01 | End: 2018-01-01

## 2018-01-01 RX ORDER — CLINDAMYCIN PHOSPHATE 10 UG/ML
LOTION TOPICAL 2 TIMES DAILY
Status: DISCONTINUED | OUTPATIENT
Start: 2018-01-01 | End: 2018-01-01 | Stop reason: HOSPADM

## 2018-01-01 RX ORDER — POTASSIUM CHLORIDE 750 MG/1
10 TABLET, EXTENDED RELEASE ORAL DAILY
Qty: 30 TABLET | Refills: 0 | Status: SHIPPED | OUTPATIENT
Start: 2018-01-01 | End: 2018-01-01 | Stop reason: SINTOL

## 2018-01-01 RX ORDER — HYDROCODONE BITARTRATE AND ACETAMINOPHEN 5; 325 MG/1; MG/1
1 TABLET ORAL EVERY 6 HOURS PRN
Status: DISCONTINUED | OUTPATIENT
Start: 2018-01-01 | End: 2018-01-01 | Stop reason: HOSPADM

## 2018-01-01 RX ORDER — POTASSIUM CHLORIDE 750 MG/1
20-40 TABLET, EXTENDED RELEASE ORAL
Status: DISCONTINUED | OUTPATIENT
Start: 2018-01-01 | End: 2018-01-01

## 2018-01-01 RX ORDER — SODIUM CHLORIDE, SODIUM LACTATE, POTASSIUM CHLORIDE, CALCIUM CHLORIDE 600; 310; 30; 20 MG/100ML; MG/100ML; MG/100ML; MG/100ML
INJECTION, SOLUTION INTRAVENOUS CONTINUOUS PRN
Status: DISCONTINUED | OUTPATIENT
Start: 2018-01-01 | End: 2018-01-01

## 2018-01-01 RX ORDER — LOPERAMIDE HCL 2 MG
2 CAPSULE ORAL 4 TIMES DAILY PRN
Status: DISCONTINUED | OUTPATIENT
Start: 2018-01-01 | End: 2018-01-01 | Stop reason: HOSPADM

## 2018-01-01 RX ORDER — POTASSIUM CHLORIDE 750 MG/1
10 TABLET, EXTENDED RELEASE ORAL DAILY
Qty: 30 TABLET | Refills: 1 | Status: SHIPPED | OUTPATIENT
Start: 2018-01-01 | End: 2018-01-01

## 2018-01-01 RX ORDER — ALBUTEROL SULFATE 0.83 MG/ML
2.5 SOLUTION RESPIRATORY (INHALATION)
Status: DISCONTINUED | OUTPATIENT
Start: 2018-01-01 | End: 2018-01-01 | Stop reason: HOSPADM

## 2018-01-01 RX ORDER — POTASSIUM CL/LIDO/0.9 % NACL 10MEQ/0.1L
10 INTRAVENOUS SOLUTION, PIGGYBACK (ML) INTRAVENOUS
Status: DISCONTINUED | OUTPATIENT
Start: 2018-01-01 | End: 2018-01-01 | Stop reason: RX

## 2018-01-01 RX ORDER — PROCHLORPERAZINE MALEATE 10 MG
10 TABLET ORAL EVERY 6 HOURS PRN
Qty: 30 TABLET | Refills: 1 | Status: SHIPPED | OUTPATIENT
Start: 2018-01-01

## 2018-01-01 RX ORDER — POTASSIUM CHLORIDE 750 MG/1
10 TABLET, EXTENDED RELEASE ORAL DAILY
Qty: 30 TABLET | Refills: 1 | Status: ON HOLD | OUTPATIENT
Start: 2018-01-01 | End: 2018-01-01

## 2018-01-01 RX ORDER — HYDROCODONE BITARTRATE AND ACETAMINOPHEN 5; 325 MG/1; MG/1
1 TABLET ORAL EVERY 4 HOURS PRN
COMMUNITY
End: 2018-01-01

## 2018-01-01 RX ORDER — HEPARIN SODIUM,PORCINE 10 UNIT/ML
5 VIAL (ML) INTRAVENOUS
Status: DISCONTINUED | OUTPATIENT
Start: 2018-01-01 | End: 2018-01-01 | Stop reason: HOSPADM

## 2018-01-01 RX ORDER — SODIUM CHLORIDE 9 MG/ML
INJECTION, SOLUTION INTRAVENOUS CONTINUOUS
Status: DISCONTINUED | OUTPATIENT
Start: 2018-01-01 | End: 2018-01-01

## 2018-01-01 RX ORDER — PROCHLORPERAZINE MALEATE 10 MG
10 TABLET ORAL EVERY 6 HOURS PRN
Qty: 30 TABLET | Refills: 1 | Status: SHIPPED | OUTPATIENT
Start: 2018-01-01 | End: 2018-01-01

## 2018-01-01 RX ORDER — LEVOFLOXACIN 750 MG/1
750 TABLET, FILM COATED ORAL DAILY
Qty: 9 TABLET | Refills: 0 | Status: ON HOLD | OUTPATIENT
Start: 2018-01-01 | End: 2018-01-01

## 2018-01-01 RX ORDER — PROCHLORPERAZINE MALEATE 10 MG
5 TABLET ORAL EVERY 6 HOURS PRN
Qty: 30 TABLET | Refills: 3 | Status: ON HOLD | OUTPATIENT
Start: 2018-01-01 | End: 2018-01-01

## 2018-01-01 RX ORDER — ALBUMIN (HUMAN) 12.5 G/50ML
12.5 SOLUTION INTRAVENOUS CONTINUOUS PRN
Status: DISCONTINUED | OUTPATIENT
Start: 2018-01-01 | End: 2018-01-01 | Stop reason: DRUGHIGH

## 2018-01-01 RX ORDER — AMOXICILLIN 250 MG
2 CAPSULE ORAL 2 TIMES DAILY
Status: DISCONTINUED | OUTPATIENT
Start: 2018-01-01 | End: 2018-01-01 | Stop reason: HOSPADM

## 2018-01-01 RX ORDER — ACETAMINOPHEN 500 MG
500 TABLET ORAL EVERY 6 HOURS PRN
COMMUNITY

## 2018-01-01 RX ORDER — ALUMINUM HYDROXIDE, MAGNESIUM HYDROXIDE, SIMETHICONE 400; 400; 40 MG/10ML; MG/10ML; MG/10ML
5 SUSPENSION ORAL DAILY PRN
COMMUNITY
End: 2018-01-01 | Stop reason: ALTCHOICE

## 2018-01-01 RX ORDER — LORAZEPAM 0.5 MG/1
.5-1 TABLET ORAL EVERY 6 HOURS PRN
Status: DISCONTINUED | OUTPATIENT
Start: 2018-01-01 | End: 2018-01-01 | Stop reason: HOSPADM

## 2018-01-01 RX ORDER — LORAZEPAM 2 MG/ML
.5-1 INJECTION INTRAMUSCULAR EVERY 4 HOURS PRN
Status: DISCONTINUED | OUTPATIENT
Start: 2018-01-01 | End: 2018-01-01 | Stop reason: HOSPADM

## 2018-01-01 RX ORDER — FLUTICASONE PROPIONATE 50 MCG
1-2 SPRAY, SUSPENSION (ML) NASAL DAILY
Status: DISCONTINUED | OUTPATIENT
Start: 2018-01-01 | End: 2018-01-01 | Stop reason: HOSPADM

## 2018-01-01 RX ORDER — CIPROFLOXACIN 500 MG/1
500 TABLET, FILM COATED ORAL
COMMUNITY
Start: 2018-01-01 | End: 2018-01-01

## 2018-01-01 RX ORDER — METOPROLOL SUCCINATE 25 MG/1
25 TABLET, EXTENDED RELEASE ORAL DAILY
Qty: 90 TABLET | Refills: 1 | Status: ON HOLD | OUTPATIENT
Start: 2018-01-01 | End: 2018-01-01

## 2018-01-01 RX ORDER — BISACODYL 10 MG
10 SUPPOSITORY, RECTAL RECTAL DAILY PRN
Qty: 2 SUPPOSITORY | Status: SHIPPED | OUTPATIENT
Start: 2018-01-01

## 2018-01-01 RX ORDER — POTASSIUM CHLORIDE 1.5 G/1.58G
20-40 POWDER, FOR SOLUTION ORAL
Status: DISCONTINUED | OUTPATIENT
Start: 2018-01-01 | End: 2018-01-01

## 2018-01-01 RX ORDER — HEPARIN SODIUM (PORCINE) LOCK FLUSH IV SOLN 100 UNIT/ML 100 UNIT/ML
5 SOLUTION INTRAVENOUS EVERY 8 HOURS
Status: DISCONTINUED | OUTPATIENT
Start: 2018-01-01 | End: 2018-01-01 | Stop reason: HOSPADM

## 2018-01-01 RX ORDER — HYDROMORPHONE HYDROCHLORIDE 1 MG/ML
0.5 INJECTION, SOLUTION INTRAMUSCULAR; INTRAVENOUS; SUBCUTANEOUS ONCE
Status: COMPLETED | OUTPATIENT
Start: 2018-01-01 | End: 2018-01-01

## 2018-01-01 RX ORDER — ALBUMIN (HUMAN) 12.5 G/50ML
12.5 SOLUTION INTRAVENOUS CONTINUOUS PRN
Status: DISCONTINUED | OUTPATIENT
Start: 2018-01-01 | End: 2018-01-01 | Stop reason: HOSPADM

## 2018-01-01 RX ORDER — LEVOFLOXACIN 750 MG/1
750 TABLET, FILM COATED ORAL DAILY
Status: DISCONTINUED | OUTPATIENT
Start: 2018-01-01 | End: 2018-01-01 | Stop reason: HOSPADM

## 2018-01-01 RX ORDER — ALBUTEROL SULFATE 90 UG/1
1-2 AEROSOL, METERED RESPIRATORY (INHALATION)
Status: DISCONTINUED | OUTPATIENT
Start: 2018-01-01 | End: 2018-01-01 | Stop reason: HOSPADM

## 2018-01-01 RX ORDER — IOPAMIDOL 755 MG/ML
96 INJECTION, SOLUTION INTRAVASCULAR ONCE
Status: COMPLETED | OUTPATIENT
Start: 2018-01-01 | End: 2018-01-01

## 2018-01-01 RX ORDER — HEPARIN SODIUM 5000 [USP'U]/.5ML
5000 INJECTION, SOLUTION INTRAVENOUS; SUBCUTANEOUS EVERY 8 HOURS
Status: DISCONTINUED | OUTPATIENT
Start: 2018-01-01 | End: 2018-01-01

## 2018-01-01 RX ORDER — ACETAMINOPHEN 325 MG/1
325-650 TABLET ORAL EVERY 4 HOURS PRN
Status: DISCONTINUED | OUTPATIENT
Start: 2018-01-01 | End: 2018-01-01 | Stop reason: HOSPADM

## 2018-01-01 RX ORDER — IOPAMIDOL 755 MG/ML
111 INJECTION, SOLUTION INTRAVASCULAR ONCE
Status: COMPLETED | OUTPATIENT
Start: 2018-01-01 | End: 2018-01-01

## 2018-01-01 RX ORDER — AMOXICILLIN 250 MG
1 CAPSULE ORAL DAILY PRN
Status: DISCONTINUED | OUTPATIENT
Start: 2018-01-01 | End: 2018-01-01 | Stop reason: HOSPADM

## 2018-01-01 RX ORDER — METHYLPREDNISOLONE SODIUM SUCCINATE 125 MG/2ML
125 INJECTION, POWDER, LYOPHILIZED, FOR SOLUTION INTRAMUSCULAR; INTRAVENOUS
Status: DISCONTINUED | OUTPATIENT
Start: 2018-01-01 | End: 2018-01-01 | Stop reason: HOSPADM

## 2018-01-01 RX ORDER — DOCUSATE SODIUM 100 MG/1
100 CAPSULE, LIQUID FILLED ORAL 2 TIMES DAILY
Status: DISCONTINUED | OUTPATIENT
Start: 2018-01-01 | End: 2018-01-01 | Stop reason: HOSPADM

## 2018-01-01 RX ORDER — ALLOPURINOL 300 MG/1
300 TABLET ORAL DAILY
Qty: 30 TABLET | Refills: 1 | Status: ON HOLD | OUTPATIENT
Start: 2018-01-01 | End: 2018-01-01

## 2018-01-01 RX ORDER — FUROSEMIDE 20 MG
20 TABLET ORAL DAILY
Status: DISCONTINUED | OUTPATIENT
Start: 2018-01-01 | End: 2018-01-01

## 2018-01-01 RX ORDER — AMOXICILLIN 250 MG
1-2 CAPSULE ORAL 2 TIMES DAILY
Qty: 100 TABLET | Status: SHIPPED | OUTPATIENT
Start: 2018-01-01 | End: 2018-01-01

## 2018-01-01 RX ORDER — ROSUVASTATIN CALCIUM 40 MG/1
40 TABLET, COATED ORAL EVERY EVENING
Status: DISCONTINUED | OUTPATIENT
Start: 2018-01-01 | End: 2018-01-01

## 2018-01-01 RX ORDER — PROCHLORPERAZINE MALEATE 5 MG
5-10 TABLET ORAL EVERY 6 HOURS PRN
Status: DISCONTINUED | OUTPATIENT
Start: 2018-01-01 | End: 2018-01-01 | Stop reason: HOSPADM

## 2018-01-01 RX ORDER — EPINEPHRINE 0.3 MG/.3ML
0.3 INJECTION SUBCUTANEOUS EVERY 5 MIN PRN
Status: DISCONTINUED | OUTPATIENT
Start: 2018-01-01 | End: 2018-01-01 | Stop reason: RX

## 2018-01-01 RX ORDER — LORAZEPAM 0.5 MG/1
.5-1 TABLET ORAL EVERY 4 HOURS PRN
Status: DISCONTINUED | OUTPATIENT
Start: 2018-01-01 | End: 2018-01-01 | Stop reason: HOSPADM

## 2018-01-01 RX ORDER — FENTANYL CITRATE 50 UG/ML
25-50 INJECTION, SOLUTION INTRAMUSCULAR; INTRAVENOUS
Status: DISCONTINUED | OUTPATIENT
Start: 2018-01-01 | End: 2018-01-01 | Stop reason: HOSPADM

## 2018-01-01 RX ORDER — PROPOFOL 10 MG/ML
INJECTION, EMULSION INTRAVENOUS PRN
Status: DISCONTINUED | OUTPATIENT
Start: 2018-01-01 | End: 2018-01-01

## 2018-01-01 RX ORDER — ONDANSETRON 4 MG/1
8 TABLET, FILM COATED ORAL EVERY 8 HOURS PRN
Status: DISCONTINUED | OUTPATIENT
Start: 2018-01-01 | End: 2018-01-01

## 2018-01-01 RX ORDER — MECOBALAMIN 5000 MCG
15 TABLET,DISINTEGRATING ORAL DAILY
Status: DISCONTINUED | OUTPATIENT
Start: 2018-01-01 | End: 2018-01-01 | Stop reason: HOSPADM

## 2018-01-01 RX ORDER — ONDANSETRON 2 MG/ML
4 INJECTION INTRAMUSCULAR; INTRAVENOUS ONCE
Status: COMPLETED | OUTPATIENT
Start: 2018-01-01 | End: 2018-01-01

## 2018-01-01 RX ORDER — ALBUMIN (HUMAN) 12.5 G/50ML
12.5-5 SOLUTION INTRAVENOUS CONTINUOUS PRN
Status: DISCONTINUED | OUTPATIENT
Start: 2018-01-01 | End: 2018-01-01 | Stop reason: HOSPADM

## 2018-01-01 RX ORDER — PROCHLORPERAZINE MALEATE 5 MG
10 TABLET ORAL EVERY 6 HOURS PRN
Status: DISCONTINUED | OUTPATIENT
Start: 2018-01-01 | End: 2018-01-01

## 2018-01-01 RX ORDER — POTASSIUM CHLORIDE 7.45 MG/ML
10 INJECTION INTRAVENOUS
Status: DISCONTINUED | OUTPATIENT
Start: 2018-01-01 | End: 2018-01-01

## 2018-01-01 RX ORDER — MORPHINE SULFATE 100 MG/5ML
2.5-5 SOLUTION ORAL
Qty: 30 ML | Refills: 0 | Status: SHIPPED | OUTPATIENT
Start: 2018-01-01

## 2018-01-01 RX ORDER — SODIUM CHLORIDE, SODIUM LACTATE, POTASSIUM CHLORIDE, CALCIUM CHLORIDE 600; 310; 30; 20 MG/100ML; MG/100ML; MG/100ML; MG/100ML
INJECTION, SOLUTION INTRAVENOUS CONTINUOUS
Status: DISCONTINUED | OUTPATIENT
Start: 2018-01-01 | End: 2018-01-01

## 2018-01-01 RX ORDER — ALUMINUM HYDROXIDE, MAGNESIUM HYDROXIDE, SIMETHICONE 400; 400; 40 MG/10ML; MG/10ML; MG/10ML
5 SUSPENSION ORAL DAILY PRN
Start: 2018-01-01

## 2018-01-01 RX ORDER — SIMETHICONE 80 MG
80 TABLET,CHEWABLE ORAL EVERY 6 HOURS PRN
Start: 2018-01-01

## 2018-01-01 RX ORDER — AMOXICILLIN 250 MG
1 CAPSULE ORAL 2 TIMES DAILY
Status: DISCONTINUED | OUTPATIENT
Start: 2018-01-01 | End: 2018-01-01 | Stop reason: HOSPADM

## 2018-01-01 RX ORDER — ROSUVASTATIN CALCIUM 40 MG/1
40 TABLET, COATED ORAL DAILY
Qty: 90 TABLET | Refills: 3 | Status: ON HOLD | OUTPATIENT
Start: 2018-01-01 | End: 2018-01-01

## 2018-01-01 RX ORDER — ACETAMINOPHEN 325 MG/1
975 TABLET ORAL ONCE
Status: DISCONTINUED | OUTPATIENT
Start: 2018-01-01 | End: 2018-01-01 | Stop reason: HOSPADM

## 2018-01-01 RX ADMIN — HYDROCORTISONE VALERATE: 2 OINTMENT TOPICAL at 08:14

## 2018-01-01 RX ADMIN — CLINDAMYCIN PHOSPHATE: 10 LOTION TOPICAL at 08:14

## 2018-01-01 RX ADMIN — PHENYLEPHRINE HYDROCHLORIDE 100 MCG: 10 INJECTION, SOLUTION INTRAMUSCULAR; INTRAVENOUS; SUBCUTANEOUS at 08:18

## 2018-01-01 RX ADMIN — CEFAZOLIN 2 G: 1 INJECTION, POWDER, FOR SOLUTION INTRAMUSCULAR; INTRAVENOUS at 07:45

## 2018-01-01 RX ADMIN — PHENYLEPHRINE HYDROCHLORIDE 150 MCG: 10 INJECTION, SOLUTION INTRAMUSCULAR; INTRAVENOUS; SUBCUTANEOUS at 08:31

## 2018-01-01 RX ADMIN — HEPARIN SODIUM 5000 UNITS: 5000 INJECTION, SOLUTION INTRAVENOUS; SUBCUTANEOUS at 08:21

## 2018-01-01 RX ADMIN — PROPOFOL 150 MG: 10 INJECTION, EMULSION INTRAVENOUS at 07:31

## 2018-01-01 RX ADMIN — LANSOPRAZOLE 15 MG: 15 CAPSULE, DELAYED RELEASE ORAL at 09:07

## 2018-01-01 RX ADMIN — ROSUVASTATIN CALCIUM 40 MG: 40 TABLET, FILM COATED ORAL at 22:02

## 2018-01-01 RX ADMIN — ACETAMINOPHEN 650 MG: 325 TABLET, FILM COATED ORAL at 13:32

## 2018-01-01 RX ADMIN — LOPERAMIDE HYDROCHLORIDE 2 MG: 2 CAPSULE ORAL at 12:49

## 2018-01-01 RX ADMIN — HYDROCORTISONE VALERATE: 2 OINTMENT TOPICAL at 10:03

## 2018-01-01 RX ADMIN — PIPERACILLIN SODIUM AND TAZOBACTAM SODIUM 3.38 G: 3; .375 INJECTION, POWDER, LYOPHILIZED, FOR SOLUTION INTRAVENOUS at 03:35

## 2018-01-01 RX ADMIN — ACETAMINOPHEN 650 MG: 325 TABLET, FILM COATED ORAL at 03:25

## 2018-01-01 RX ADMIN — ACETAMINOPHEN 500 MG: 500 TABLET, FILM COATED ORAL at 10:03

## 2018-01-01 RX ADMIN — REMIFENTANIL HYDROCHLORIDE 0.05 MCG/KG/MIN: 1 INJECTION, POWDER, LYOPHILIZED, FOR SOLUTION INTRAVENOUS at 07:42

## 2018-01-01 RX ADMIN — METOPROLOL SUCCINATE 100 MG: 50 TABLET, EXTENDED RELEASE ORAL at 08:01

## 2018-01-01 RX ADMIN — ASPIRIN 81 MG: 81 TABLET, COATED ORAL at 08:16

## 2018-01-01 RX ADMIN — ONDANSETRON 4 MG: 2 INJECTION INTRAMUSCULAR; INTRAVENOUS at 10:18

## 2018-01-01 RX ADMIN — SODIUM CHLORIDE 1000 ML: 9 INJECTION, SOLUTION INTRAVENOUS at 10:18

## 2018-01-01 RX ADMIN — PANTOPRAZOLE SODIUM 20 MG: 20 TABLET, DELAYED RELEASE ORAL at 19:52

## 2018-01-01 RX ADMIN — HYDROMORPHONE HYDROCHLORIDE 0.5 MG: 1 INJECTION, SOLUTION INTRAMUSCULAR; INTRAVENOUS; SUBCUTANEOUS at 10:18

## 2018-01-01 RX ADMIN — ALBUMIN HUMAN 25 G: 0.05 INJECTION, SOLUTION INTRAVENOUS at 05:16

## 2018-01-01 RX ADMIN — ACETAMINOPHEN 650 MG: 325 TABLET ORAL at 11:52

## 2018-01-01 RX ADMIN — LORATADINE 10 MG: 10 TABLET ORAL at 16:36

## 2018-01-01 RX ADMIN — Medication 250 ML: at 12:19

## 2018-01-01 RX ADMIN — PALONOSETRON 0.25 MG: 0.05 INJECTION, SOLUTION INTRAVENOUS at 09:59

## 2018-01-01 RX ADMIN — HYDROCORTISONE VALERATE: 2 OINTMENT TOPICAL at 22:02

## 2018-01-01 RX ADMIN — ACETAMINOPHEN 650 MG: 325 TABLET ORAL at 09:34

## 2018-01-01 RX ADMIN — SODIUM CHLORIDE, POTASSIUM CHLORIDE, SODIUM LACTATE AND CALCIUM CHLORIDE: 600; 310; 30; 20 INJECTION, SOLUTION INTRAVENOUS at 07:01

## 2018-01-01 RX ADMIN — ROSUVASTATIN CALCIUM 40 MG: 40 TABLET, FILM COATED ORAL at 08:46

## 2018-01-01 RX ADMIN — LOPERAMIDE HYDROCHLORIDE 2 MG: 2 CAPSULE ORAL at 08:03

## 2018-01-01 RX ADMIN — VANCOMYCIN HYDROCHLORIDE 1500 MG: 10 INJECTION, POWDER, LYOPHILIZED, FOR SOLUTION INTRAVENOUS at 03:14

## 2018-01-01 RX ADMIN — PROCHLORPERAZINE EDISYLATE 10 MG: 5 INJECTION INTRAMUSCULAR; INTRAVENOUS at 08:35

## 2018-01-01 RX ADMIN — FLUDEOXYGLUCOSE F-18 14.65 MCI.: 500 INJECTION, SOLUTION INTRAVENOUS at 10:18

## 2018-01-01 RX ADMIN — SODIUM CHLORIDE, POTASSIUM CHLORIDE, SODIUM LACTATE AND CALCIUM CHLORIDE 1000 ML: 600; 310; 30; 20 INJECTION, SOLUTION INTRAVENOUS at 03:35

## 2018-01-01 RX ADMIN — ASPIRIN 81 MG: 81 TABLET, COATED ORAL at 07:53

## 2018-01-01 RX ADMIN — ALLOPURINOL 300 MG: 300 TABLET ORAL at 08:07

## 2018-01-01 RX ADMIN — Medication 50 MG: at 09:34

## 2018-01-01 RX ADMIN — DEXAMETHASONE SODIUM PHOSPHATE 8 MG: 4 INJECTION, SOLUTION INTRA-ARTICULAR; INTRALESIONAL; INTRAMUSCULAR; INTRAVENOUS; SOFT TISSUE at 10:49

## 2018-01-01 RX ADMIN — PROPOFOL 20 MG: 10 INJECTION, EMULSION INTRAVENOUS at 07:48

## 2018-01-01 RX ADMIN — ALLOPURINOL 300 MG: 300 TABLET ORAL at 07:53

## 2018-01-01 RX ADMIN — SODIUM CHLORIDE 1000 ML: 9 INJECTION, SOLUTION INTRAVENOUS at 14:41

## 2018-01-01 RX ADMIN — Medication 50 MG: at 10:18

## 2018-01-01 RX ADMIN — DEXTROSE MONOHYDRATE 25 ML: 25 INJECTION, SOLUTION INTRAVENOUS at 05:36

## 2018-01-01 RX ADMIN — Medication 100 MG: at 07:31

## 2018-01-01 RX ADMIN — ALBUMIN HUMAN: 0.05 INJECTION, SOLUTION INTRAVENOUS at 08:55

## 2018-01-01 RX ADMIN — FENTANYL CITRATE 50 MCG: 50 INJECTION, SOLUTION INTRAMUSCULAR; INTRAVENOUS at 07:31

## 2018-01-01 RX ADMIN — METOPROLOL SUCCINATE 50 MG: 50 TABLET, EXTENDED RELEASE ORAL at 08:57

## 2018-01-01 RX ADMIN — ENOXAPARIN SODIUM 40 MG: 40 INJECTION SUBCUTANEOUS at 19:53

## 2018-01-01 RX ADMIN — PHENYLEPHRINE HYDROCHLORIDE 150 MCG: 10 INJECTION, SOLUTION INTRAMUSCULAR; INTRAVENOUS; SUBCUTANEOUS at 07:57

## 2018-01-01 RX ADMIN — LIDOCAINE HYDROCHLORIDE 80 MG: 20 INJECTION, SOLUTION INFILTRATION; PERINEURAL at 07:31

## 2018-01-01 RX ADMIN — PIPERACILLIN SODIUM AND TAZOBACTAM SODIUM 3.38 G: 3; .375 INJECTION, POWDER, LYOPHILIZED, FOR SOLUTION INTRAVENOUS at 02:52

## 2018-01-01 RX ADMIN — HEPARIN SODIUM (PORCINE) LOCK FLUSH IV SOLN 100 UNIT/ML 5 ML: 100 SOLUTION at 07:40

## 2018-01-01 RX ADMIN — Medication 250 ML: at 10:24

## 2018-01-01 RX ADMIN — METOPROLOL SUCCINATE 50 MG: 50 TABLET, EXTENDED RELEASE ORAL at 08:21

## 2018-01-01 RX ADMIN — FENTANYL CITRATE 50 MCG: 50 INJECTION, SOLUTION INTRAMUSCULAR; INTRAVENOUS at 09:37

## 2018-01-01 RX ADMIN — PHENYLEPHRINE HYDROCHLORIDE 150 MCG: 10 INJECTION, SOLUTION INTRAMUSCULAR; INTRAVENOUS; SUBCUTANEOUS at 09:25

## 2018-01-01 RX ADMIN — HEPARIN SODIUM (PORCINE) LOCK FLUSH IV SOLN 100 UNIT/ML 5 ML: 100 SOLUTION at 16:00

## 2018-01-01 RX ADMIN — LEVOFLOXACIN 750 MG: 500 TABLET, FILM COATED ORAL at 04:41

## 2018-01-01 RX ADMIN — PHENYLEPHRINE HYDROCHLORIDE 100 MCG: 10 INJECTION, SOLUTION INTRAMUSCULAR; INTRAVENOUS; SUBCUTANEOUS at 08:53

## 2018-01-01 RX ADMIN — CLINDAMYCIN PHOSPHATE: 10 LOTION TOPICAL at 22:02

## 2018-01-01 RX ADMIN — SODIUM CHLORIDE, PRESERVATIVE FREE 5 ML: 5 INJECTION INTRAVENOUS at 13:56

## 2018-01-01 RX ADMIN — CLINDAMYCIN PHOSPHATE: 10 LOTION TOPICAL at 10:03

## 2018-01-01 RX ADMIN — HEPARIN SODIUM (PORCINE) LOCK FLUSH IV SOLN 100 UNIT/ML 5 ML: 100 SOLUTION at 12:33

## 2018-01-01 RX ADMIN — ROSUVASTATIN CALCIUM 40 MG: 40 TABLET, FILM COATED ORAL at 08:59

## 2018-01-01 RX ADMIN — LEVOFLOXACIN 750 MG: 750 TABLET, FILM COATED ORAL at 12:38

## 2018-01-01 RX ADMIN — SODIUM CHLORIDE, POTASSIUM CHLORIDE, SODIUM LACTATE AND CALCIUM CHLORIDE 1000 ML: 600; 310; 30; 20 INJECTION, SOLUTION INTRAVENOUS at 05:31

## 2018-01-01 RX ADMIN — HEPARIN SODIUM (PORCINE) LOCK FLUSH IV SOLN 100 UNIT/ML 5 ML: 100 SOLUTION at 10:01

## 2018-01-01 RX ADMIN — ASPIRIN 81 MG: 81 TABLET, COATED ORAL at 08:21

## 2018-01-01 RX ADMIN — ROSUVASTATIN CALCIUM 40 MG: 20 TABLET, FILM COATED ORAL at 08:32

## 2018-01-01 RX ADMIN — ALLOPURINOL 300 MG: 300 TABLET ORAL at 17:49

## 2018-01-01 RX ADMIN — IOPAMIDOL 100 ML: 755 INJECTION, SOLUTION INTRAVASCULAR at 14:52

## 2018-01-01 RX ADMIN — PIPERACILLIN SODIUM AND TAZOBACTAM SODIUM 3.38 G: 3; .375 INJECTION, POWDER, LYOPHILIZED, FOR SOLUTION INTRAVENOUS at 14:34

## 2018-01-01 RX ADMIN — ALBUMIN HUMAN 12.5 G: 0.25 SOLUTION INTRAVENOUS at 11:09

## 2018-01-01 RX ADMIN — PHENYLEPHRINE HYDROCHLORIDE 100 MCG: 10 INJECTION, SOLUTION INTRAMUSCULAR; INTRAVENOUS; SUBCUTANEOUS at 09:42

## 2018-01-01 RX ADMIN — SODIUM CHLORIDE 1000 ML: 9 INJECTION, SOLUTION INTRAVENOUS at 05:22

## 2018-01-01 RX ADMIN — Medication 50 MG: at 12:22

## 2018-01-01 RX ADMIN — SODIUM CHLORIDE: 9 INJECTION, SOLUTION INTRAVENOUS at 06:44

## 2018-01-01 RX ADMIN — ACETAMINOPHEN 650 MG: 325 TABLET ORAL at 12:55

## 2018-01-01 RX ADMIN — ALLOPURINOL 300 MG: 300 TABLET ORAL at 08:01

## 2018-01-01 RX ADMIN — PIPERACILLIN SODIUM AND TAZOBACTAM SODIUM 3.38 G: 3; .375 INJECTION, POWDER, LYOPHILIZED, FOR SOLUTION INTRAVENOUS at 04:00

## 2018-01-01 RX ADMIN — VANCOMYCIN HYDROCHLORIDE 1500 MG: 10 INJECTION, POWDER, LYOPHILIZED, FOR SOLUTION INTRAVENOUS at 15:34

## 2018-01-01 RX ADMIN — Medication 250 ML: at 11:37

## 2018-01-01 RX ADMIN — ROSUVASTATIN CALCIUM 40 MG: 40 TABLET, FILM COATED ORAL at 08:16

## 2018-01-01 RX ADMIN — SODIUM CHLORIDE 500 ML: 9 INJECTION, SOLUTION INTRAVENOUS at 12:57

## 2018-01-01 RX ADMIN — SODIUM CHLORIDE, PRESERVATIVE FREE 500 UNITS: 5 INJECTION INTRAVENOUS at 14:42

## 2018-01-01 RX ADMIN — Medication 250 ML: at 11:22

## 2018-01-01 RX ADMIN — ACETAMINOPHEN 650 MG: 325 TABLET, FILM COATED ORAL at 08:51

## 2018-01-01 RX ADMIN — LIDOCAINE HYDROCHLORIDE AND EPINEPHRINE 2 ML: 10; 10 INJECTION, SOLUTION INFILTRATION; PERINEURAL at 07:45

## 2018-01-01 RX ADMIN — HEPARIN SODIUM (PORCINE) LOCK FLUSH IV SOLN 100 UNIT/ML 5 ML: 100 SOLUTION at 15:05

## 2018-01-01 RX ADMIN — PALONOSETRON 0.25 MG: 0.05 INJECTION, SOLUTION INTRAVENOUS at 12:56

## 2018-01-01 RX ADMIN — HEPARIN SODIUM (PORCINE) LOCK FLUSH IV SOLN 100 UNIT/ML 5 ML: 100 SOLUTION at 08:37

## 2018-01-01 RX ADMIN — ALLOPURINOL 300 MG: 300 TABLET ORAL at 09:07

## 2018-01-01 RX ADMIN — ROSUVASTATIN CALCIUM 40 MG: 10 TABLET, FILM COATED ORAL at 09:07

## 2018-01-01 RX ADMIN — POTASSIUM CHLORIDE 20 MEQ: 750 TABLET, EXTENDED RELEASE ORAL at 10:22

## 2018-01-01 RX ADMIN — PALONOSETRON 0.25 MG: 0.05 INJECTION, SOLUTION INTRAVENOUS at 09:56

## 2018-01-01 RX ADMIN — HEPARIN SODIUM (PORCINE) LOCK FLUSH IV SOLN 100 UNIT/ML 5 ML: 100 SOLUTION at 10:02

## 2018-01-01 RX ADMIN — SENNOSIDES AND DOCUSATE SODIUM 1 TABLET: 8.6; 5 TABLET ORAL at 10:03

## 2018-01-01 RX ADMIN — PHENYLEPHRINE HYDROCHLORIDE 100 MCG: 10 INJECTION, SOLUTION INTRAMUSCULAR; INTRAVENOUS; SUBCUTANEOUS at 07:43

## 2018-01-01 RX ADMIN — HEPARIN SODIUM (PORCINE) LOCK FLUSH IV SOLN 100 UNIT/ML 5 ML: 100 SOLUTION at 09:01

## 2018-01-01 RX ADMIN — VANCOMYCIN HYDROCHLORIDE 1500 MG: 10 INJECTION, POWDER, LYOPHILIZED, FOR SOLUTION INTRAVENOUS at 17:33

## 2018-01-01 RX ADMIN — ASPIRIN 81 MG: 81 TABLET, COATED ORAL at 09:07

## 2018-01-01 RX ADMIN — PIPERACILLIN SODIUM AND TAZOBACTAM SODIUM 3.38 G: 3; .375 INJECTION, POWDER, LYOPHILIZED, FOR SOLUTION INTRAVENOUS at 16:40

## 2018-01-01 RX ADMIN — ACETAMINOPHEN 650 MG: 325 TABLET, FILM COATED ORAL at 09:00

## 2018-01-01 RX ADMIN — HEPARIN SODIUM (PORCINE) LOCK FLUSH IV SOLN 100 UNIT/ML 5 ML: 100 SOLUTION at 15:01

## 2018-01-01 RX ADMIN — PHENYLEPHRINE HYDROCHLORIDE 100 MCG: 10 INJECTION, SOLUTION INTRAMUSCULAR; INTRAVENOUS; SUBCUTANEOUS at 08:24

## 2018-01-01 RX ADMIN — METOPROLOL SUCCINATE 100 MG: 50 TABLET, EXTENDED RELEASE ORAL at 07:54

## 2018-01-01 RX ADMIN — ALLOPURINOL 300 MG: 300 TABLET ORAL at 08:21

## 2018-01-01 RX ADMIN — CLINDAMYCIN PHOSPHATE: 10 LOTION TOPICAL at 21:29

## 2018-01-01 RX ADMIN — IOPAMIDOL 62 ML: 755 INJECTION, SOLUTION INTRAVENOUS at 11:28

## 2018-01-01 RX ADMIN — ROSUVASTATIN CALCIUM 40 MG: 40 TABLET, FILM COATED ORAL at 07:53

## 2018-01-01 RX ADMIN — PIPERACILLIN SODIUM AND TAZOBACTAM SODIUM 3.38 G: 3; .375 INJECTION, POWDER, LYOPHILIZED, FOR SOLUTION INTRAVENOUS at 21:27

## 2018-01-01 RX ADMIN — SODIUM CHLORIDE 1000 ML: 9 INJECTION, SOLUTION INTRAVENOUS at 20:45

## 2018-01-01 RX ADMIN — ONDANSETRON 4 MG: 2 INJECTION INTRAMUSCULAR; INTRAVENOUS at 09:27

## 2018-01-01 RX ADMIN — METOPROLOL SUCCINATE 100 MG: 50 TABLET, EXTENDED RELEASE ORAL at 07:55

## 2018-01-01 RX ADMIN — VANCOMYCIN HYDROCHLORIDE 1500 MG: 10 INJECTION, POWDER, LYOPHILIZED, FOR SOLUTION INTRAVENOUS at 03:37

## 2018-01-01 RX ADMIN — ALLOPURINOL 300 MG: 300 TABLET ORAL at 07:55

## 2018-01-01 RX ADMIN — ROSUVASTATIN CALCIUM 40 MG: 20 TABLET ORAL at 20:04

## 2018-01-01 RX ADMIN — ALBUMIN HUMAN 12.5 G: 0.25 SOLUTION INTRAVENOUS at 11:26

## 2018-01-01 RX ADMIN — Medication 250 ML: at 12:28

## 2018-01-01 RX ADMIN — SODIUM CHLORIDE, POTASSIUM CHLORIDE, SODIUM LACTATE AND CALCIUM CHLORIDE: 600; 310; 30; 20 INJECTION, SOLUTION INTRAVENOUS at 04:59

## 2018-01-01 RX ADMIN — SODIUM CHLORIDE, POTASSIUM CHLORIDE, SODIUM LACTATE AND CALCIUM CHLORIDE 1000 ML: 600; 310; 30; 20 INJECTION, SOLUTION INTRAVENOUS at 18:24

## 2018-01-01 RX ADMIN — ACETAMINOPHEN 650 MG: 325 TABLET, FILM COATED ORAL at 03:14

## 2018-01-01 RX ADMIN — LEVOFLOXACIN 750 MG: 750 TABLET, FILM COATED ORAL at 08:46

## 2018-01-01 RX ADMIN — Medication 250 ML: at 13:38

## 2018-01-01 RX ADMIN — PHENYLEPHRINE HYDROCHLORIDE 200 MCG: 10 INJECTION, SOLUTION INTRAMUSCULAR; INTRAVENOUS; SUBCUTANEOUS at 08:12

## 2018-01-01 RX ADMIN — HEPARIN SODIUM (PORCINE) LOCK FLUSH IV SOLN 100 UNIT/ML 5 ML: 100 SOLUTION at 08:01

## 2018-01-01 RX ADMIN — ACETAMINOPHEN 500 MG: 500 TABLET, FILM COATED ORAL at 02:00

## 2018-01-01 RX ADMIN — Medication 10 ML: at 14:12

## 2018-01-01 RX ADMIN — HUMAN ALBUMIN MICROSPHERES AND PERFLUTREN 5 ML: 10; .22 INJECTION, SOLUTION INTRAVENOUS at 09:15

## 2018-01-01 RX ADMIN — AZITHROMYCIN MONOHYDRATE 500 MG: 500 INJECTION, POWDER, LYOPHILIZED, FOR SOLUTION INTRAVENOUS at 11:29

## 2018-01-01 RX ADMIN — PROCHLORPERAZINE MALEATE 10 MG: 5 TABLET, FILM COATED ORAL at 19:53

## 2018-01-01 RX ADMIN — Medication 12 ML: at 09:02

## 2018-01-01 RX ADMIN — PANTOPRAZOLE SODIUM 20 MG: 20 TABLET, DELAYED RELEASE ORAL at 20:04

## 2018-01-01 RX ADMIN — LOPERAMIDE HYDROCHLORIDE 2 MG: 2 CAPSULE ORAL at 02:55

## 2018-01-01 RX ADMIN — HEPARIN SODIUM (PORCINE) LOCK FLUSH IV SOLN 100 UNIT/ML 5 ML: 100 SOLUTION at 13:26

## 2018-01-01 RX ADMIN — ONDANSETRON HYDROCHLORIDE 8 MG: 8 TABLET, FILM COATED ORAL at 20:03

## 2018-01-01 RX ADMIN — FENTANYL CITRATE 50 MCG: 50 INJECTION INTRAMUSCULAR; INTRAVENOUS at 12:21

## 2018-01-01 RX ADMIN — CALCIUM GLUCONATE 1 G: 98 INJECTION, SOLUTION INTRAVENOUS at 06:47

## 2018-01-01 RX ADMIN — ACETAMINOPHEN 650 MG: 325 TABLET ORAL at 09:46

## 2018-01-01 RX ADMIN — ALBUMIN HUMAN 25 G: 0.05 INJECTION, SOLUTION INTRAVENOUS at 22:56

## 2018-01-01 RX ADMIN — METOPROLOL SUCCINATE 50 MG: 50 TABLET, EXTENDED RELEASE ORAL at 08:45

## 2018-01-01 RX ADMIN — ROSUVASTATIN CALCIUM 40 MG: 40 TABLET, FILM COATED ORAL at 08:21

## 2018-01-01 RX ADMIN — PANTOPRAZOLE SODIUM 20 MG: 20 TABLET, DELAYED RELEASE ORAL at 20:01

## 2018-01-01 RX ADMIN — SODIUM CHLORIDE 1000 ML: 9 INJECTION, SOLUTION INTRAVENOUS at 20:14

## 2018-01-01 RX ADMIN — PIPERACILLIN SODIUM AND TAZOBACTAM SODIUM 3.38 G: 3; .375 INJECTION, POWDER, LYOPHILIZED, FOR SOLUTION INTRAVENOUS at 14:47

## 2018-01-01 RX ADMIN — ACETAMINOPHEN 650 MG: 325 TABLET, FILM COATED ORAL at 20:42

## 2018-01-01 RX ADMIN — PROCHLORPERAZINE MALEATE 10 MG: 5 TABLET, FILM COATED ORAL at 18:12

## 2018-01-01 RX ADMIN — METOPROLOL SUCCINATE 100 MG: 50 TABLET, EXTENDED RELEASE ORAL at 17:49

## 2018-01-01 RX ADMIN — DEXTROSE MONOHYDRATE 25 ML: 500 INJECTION PARENTERAL at 19:17

## 2018-01-01 RX ADMIN — ASPIRIN 81 MG: 81 TABLET, COATED ORAL at 08:57

## 2018-01-01 RX ADMIN — ALLOPURINOL 300 MG: 300 TABLET ORAL at 08:57

## 2018-01-01 RX ADMIN — ALLOPURINOL 300 MG: 300 TABLET ORAL at 08:16

## 2018-01-01 RX ADMIN — HEPARIN SODIUM (PORCINE) LOCK FLUSH IV SOLN 100 UNIT/ML 5 ML: 100 SOLUTION at 14:00

## 2018-01-01 RX ADMIN — LORAZEPAM 0.5 MG: 2 INJECTION INTRAMUSCULAR; INTRAVENOUS at 20:37

## 2018-01-01 RX ADMIN — HEPARIN SODIUM (PORCINE) LOCK FLUSH IV SOLN 100 UNIT/ML 5 ML: 100 SOLUTION at 15:48

## 2018-01-01 RX ADMIN — HEPARIN SODIUM (PORCINE) LOCK FLUSH IV SOLN 100 UNIT/ML 5 ML: 100 SOLUTION at 12:10

## 2018-01-01 RX ADMIN — HEPARIN SODIUM (PORCINE) LOCK FLUSH IV SOLN 100 UNIT/ML 5 ML: 100 SOLUTION at 13:07

## 2018-01-01 RX ADMIN — PIPERACILLIN SODIUM AND TAZOBACTAM SODIUM 3.38 G: 3; .375 INJECTION, POWDER, LYOPHILIZED, FOR SOLUTION INTRAVENOUS at 08:51

## 2018-01-01 RX ADMIN — FUROSEMIDE 20 MG: 20 TABLET ORAL at 08:10

## 2018-01-01 RX ADMIN — METOPROLOL SUCCINATE 50 MG: 50 TABLET, EXTENDED RELEASE ORAL at 07:53

## 2018-01-01 RX ADMIN — PHENYLEPHRINE HYDROCHLORIDE 100 MCG: 10 INJECTION, SOLUTION INTRAMUSCULAR; INTRAVENOUS; SUBCUTANEOUS at 09:00

## 2018-01-01 RX ADMIN — Medication 250 ML: at 09:39

## 2018-01-01 RX ADMIN — DEXTROSE MONOHYDRATE 25 ML: 500 INJECTION PARENTERAL at 22:38

## 2018-01-01 RX ADMIN — ALLOPURINOL 300 MG: 300 TABLET ORAL at 08:10

## 2018-01-01 RX ADMIN — ACETAMINOPHEN 650 MG: 325 TABLET, FILM COATED ORAL at 17:32

## 2018-01-01 RX ADMIN — Medication 1000 ML: at 11:01

## 2018-01-01 RX ADMIN — LOPERAMIDE HYDROCHLORIDE 2 MG: 2 CAPSULE ORAL at 20:05

## 2018-01-01 RX ADMIN — DEXTROSE AND SODIUM CHLORIDE: 5; 900 INJECTION, SOLUTION INTRAVENOUS at 21:45

## 2018-01-01 RX ADMIN — PANTOPRAZOLE SODIUM 20 MG: 20 TABLET, DELAYED RELEASE ORAL at 21:07

## 2018-01-01 RX ADMIN — DEXTROSE MONOHYDRATE 25 ML: 500 INJECTION PARENTERAL at 05:36

## 2018-01-01 RX ADMIN — TECHNETIUM TC 99M SULFUR COLLOID 8 MILLICURIE: KIT at 10:05

## 2018-01-01 RX ADMIN — PHENYLEPHRINE HYDROCHLORIDE 100 MCG: 10 INJECTION, SOLUTION INTRAMUSCULAR; INTRAVENOUS; SUBCUTANEOUS at 08:03

## 2018-01-01 RX ADMIN — Medication 250 ML: at 13:16

## 2018-01-01 RX ADMIN — SODIUM CHLORIDE: 9 INJECTION, SOLUTION INTRAVENOUS at 21:01

## 2018-01-01 RX ADMIN — LIDOCAINE HYDROCHLORIDE 35 ML: 10 INJECTION, SOLUTION INFILTRATION; PERINEURAL at 12:08

## 2018-01-01 RX ADMIN — HEPARIN 5 ML: 100 SYRINGE at 17:16

## 2018-01-01 RX ADMIN — SODIUM CHLORIDE, PRESERVATIVE FREE 5 ML: 5 INJECTION INTRAVENOUS at 12:34

## 2018-01-01 RX ADMIN — PROCHLORPERAZINE MALEATE 5 MG: 5 TABLET, FILM COATED ORAL at 17:45

## 2018-01-01 RX ADMIN — ALLOPURINOL 300 MG: 300 TABLET ORAL at 16:36

## 2018-01-01 RX ADMIN — POTASSIUM CHLORIDE 10 MEQ: 750 TABLET, EXTENDED RELEASE ORAL at 08:10

## 2018-01-01 RX ADMIN — ALLOPURINOL 300 MG: 300 TABLET ORAL at 07:54

## 2018-01-01 RX ADMIN — ACETAMINOPHEN 650 MG: 325 TABLET, FILM COATED ORAL at 21:14

## 2018-01-01 RX ADMIN — ASPIRIN 81 MG: 81 TABLET, COATED ORAL at 08:46

## 2018-01-01 RX ADMIN — ALBUMIN HUMAN 12.5 G: 0.25 SOLUTION INTRAVENOUS at 10:55

## 2018-01-01 RX ADMIN — HEPARIN SODIUM (PORCINE) LOCK FLUSH IV SOLN 100 UNIT/ML 5 ML: 100 SOLUTION at 08:10

## 2018-01-01 RX ADMIN — ACETAMINOPHEN 650 MG: 325 TABLET ORAL at 12:22

## 2018-01-01 RX ADMIN — PHENYLEPHRINE HYDROCHLORIDE 150 MCG: 10 INJECTION, SOLUTION INTRAMUSCULAR; INTRAVENOUS; SUBCUTANEOUS at 09:12

## 2018-01-01 RX ADMIN — PIPERACILLIN SODIUM AND TAZOBACTAM SODIUM 3.38 G: 3; .375 INJECTION, POWDER, LYOPHILIZED, FOR SOLUTION INTRAVENOUS at 08:19

## 2018-01-01 RX ADMIN — HEPARIN SODIUM 5000 UNITS: 5000 INJECTION, SOLUTION INTRAVENOUS; SUBCUTANEOUS at 15:30

## 2018-01-01 RX ADMIN — MEPERIDINE HYDROCHLORIDE 25 MG: 50 INJECTION INTRAMUSCULAR; INTRAVENOUS; SUBCUTANEOUS at 13:28

## 2018-01-01 RX ADMIN — Medication 5 MG: at 08:57

## 2018-01-01 RX ADMIN — ACETAMINOPHEN 650 MG: 325 TABLET ORAL at 10:19

## 2018-01-01 RX ADMIN — HYDROCORTISONE VALERATE: 2 OINTMENT TOPICAL at 21:30

## 2018-01-01 RX ADMIN — PALONOSETRON 0.25 MG: 0.05 INJECTION, SOLUTION INTRAVENOUS at 13:02

## 2018-01-01 RX ADMIN — SODIUM CHLORIDE 500 ML: 9 INJECTION, SOLUTION INTRAVENOUS at 13:16

## 2018-01-01 RX ADMIN — HEPARIN SODIUM (PORCINE) LOCK FLUSH IV SOLN 100 UNIT/ML 5 ML: 100 SOLUTION at 15:11

## 2018-01-01 RX ADMIN — SODIUM CHLORIDE, PRESERVATIVE FREE 5 ML: 5 INJECTION INTRAVENOUS at 10:54

## 2018-01-01 RX ADMIN — HEPARIN SODIUM (PORCINE) LOCK FLUSH IV SOLN 100 UNIT/ML 5 ML: 100 SOLUTION at 11:37

## 2018-01-01 RX ADMIN — Medication 250 ML: at 09:38

## 2018-01-01 RX ADMIN — IOPAMIDOL 111 ML: 755 INJECTION, SOLUTION INTRAVASCULAR at 15:40

## 2018-01-01 RX ADMIN — Medication 250 ML: at 09:45

## 2018-01-01 RX ADMIN — IOPAMIDOL 96 ML: 755 INJECTION, SOLUTION INTRAVASCULAR at 09:28

## 2018-01-01 RX ADMIN — ONDANSETRON 4 MG: 4 TABLET, ORALLY DISINTEGRATING ORAL at 16:06

## 2018-01-01 RX ADMIN — PROCHLORPERAZINE MALEATE 5 MG: 5 TABLET, FILM COATED ORAL at 16:30

## 2018-01-01 RX ADMIN — PHENYLEPHRINE HYDROCHLORIDE 150 MCG: 10 INJECTION, SOLUTION INTRAMUSCULAR; INTRAVENOUS; SUBCUTANEOUS at 08:37

## 2018-01-01 RX ADMIN — LANSOPRAZOLE 15 MG: 15 CAPSULE, DELAYED RELEASE ORAL at 08:10

## 2018-01-01 RX ADMIN — ROSUVASTATIN CALCIUM 40 MG: 20 TABLET ORAL at 21:53

## 2018-01-01 RX ADMIN — ACETAMINOPHEN 650 MG: 325 TABLET, FILM COATED ORAL at 14:00

## 2018-01-01 RX ADMIN — HEPARIN SODIUM (PORCINE) LOCK FLUSH IV SOLN 100 UNIT/ML 5 ML: 100 SOLUTION at 08:19

## 2018-01-01 RX ADMIN — HEPARIN SODIUM (PORCINE) LOCK FLUSH IV SOLN 100 UNIT/ML 5 ML: 100 SOLUTION at 12:09

## 2018-01-01 RX ADMIN — FUROSEMIDE 40 MG: 10 INJECTION, SOLUTION INTRAVENOUS at 15:38

## 2018-01-01 RX ADMIN — PIPERACILLIN SODIUM AND TAZOBACTAM SODIUM 3.38 G: 3; .375 INJECTION, POWDER, LYOPHILIZED, FOR SOLUTION INTRAVENOUS at 03:02

## 2018-01-01 RX ADMIN — PIPERACILLIN SODIUM AND TAZOBACTAM SODIUM 3.38 G: 3; .375 INJECTION, POWDER, LYOPHILIZED, FOR SOLUTION INTRAVENOUS at 10:16

## 2018-01-01 RX ADMIN — HEPARIN SODIUM (PORCINE) LOCK FLUSH IV SOLN 100 UNIT/ML 5 ML: 100 SOLUTION at 15:23

## 2018-01-01 RX ADMIN — PROCHLORPERAZINE EDISYLATE 10 MG: 5 INJECTION INTRAMUSCULAR; INTRAVENOUS at 08:16

## 2018-01-01 RX ADMIN — PANTOPRAZOLE SODIUM 20 MG: 20 TABLET, DELAYED RELEASE ORAL at 21:51

## 2018-01-01 RX ADMIN — HEPARIN SODIUM (PORCINE) LOCK FLUSH IV SOLN 100 UNIT/ML 5 ML: 100 SOLUTION at 12:00

## 2018-01-01 RX ADMIN — Medication 250 ML: at 12:56

## 2018-01-01 RX ADMIN — SODIUM CHLORIDE, PRESERVATIVE FREE 91 ML: 5 INJECTION INTRAVENOUS at 11:28

## 2018-01-01 RX ADMIN — SODIUM CHLORIDE: 9 INJECTION, SOLUTION INTRAVENOUS at 02:21

## 2018-01-01 RX ADMIN — HEPARIN SODIUM (PORCINE) LOCK FLUSH IV SOLN 100 UNIT/ML 5 ML: 100 SOLUTION at 14:03

## 2018-01-01 RX ADMIN — SODIUM CHLORIDE, PRESERVATIVE FREE 5 ML: 5 INJECTION INTRAVENOUS at 09:21

## 2018-01-01 RX ADMIN — METOPROLOL SUCCINATE 50 MG: 50 TABLET, EXTENDED RELEASE ORAL at 08:16

## 2018-01-01 RX ADMIN — PANTOPRAZOLE SODIUM 20 MG: 20 TABLET, DELAYED RELEASE ORAL at 20:02

## 2018-01-01 RX ADMIN — SODIUM CHLORIDE: 9 INJECTION, SOLUTION INTRAVENOUS at 08:49

## 2018-01-01 RX ADMIN — FUROSEMIDE 20 MG: 10 INJECTION, SOLUTION INTRAMUSCULAR; INTRAVENOUS at 12:38

## 2018-01-01 RX ADMIN — PIPERACILLIN SODIUM AND TAZOBACTAM SODIUM 3.38 G: 3; .375 INJECTION, POWDER, LYOPHILIZED, FOR SOLUTION INTRAVENOUS at 20:59

## 2018-01-01 RX ADMIN — BENDAMUSTINE HYDROCHLORIDE 175 MG: 25 INJECTION, SOLUTION INTRAVENOUS at 14:11

## 2018-01-01 RX ADMIN — DEXTROSE MONOHYDRATE 25 ML: 500 INJECTION PARENTERAL at 02:57

## 2018-01-01 RX ADMIN — ENOXAPARIN SODIUM 40 MG: 40 INJECTION SUBCUTANEOUS at 00:17

## 2018-01-01 RX ADMIN — LOPERAMIDE HYDROCHLORIDE 2 MG: 2 CAPSULE ORAL at 21:23

## 2018-01-01 RX ADMIN — PEGFILGRASTIM 6 MG: 6 INJECTION SUBCUTANEOUS at 12:21

## 2018-01-01 RX ADMIN — ENOXAPARIN SODIUM 40 MG: 40 INJECTION SUBCUTANEOUS at 23:48

## 2018-01-01 RX ADMIN — SODIUM CHLORIDE, PRESERVATIVE FREE 5 ML: 5 INJECTION INTRAVENOUS at 05:30

## 2018-01-01 RX ADMIN — ACETAMINOPHEN 650 MG: 325 TABLET, FILM COATED ORAL at 18:47

## 2018-01-01 RX ADMIN — PANTOPRAZOLE SODIUM 20 MG: 20 TABLET, DELAYED RELEASE ORAL at 20:09

## 2018-01-01 RX ADMIN — HEPARIN SODIUM (PORCINE) LOCK FLUSH IV SOLN 100 UNIT/ML 5 ML: 100 SOLUTION at 11:52

## 2018-01-01 RX ADMIN — PALONOSETRON 0.25 MG: 0.05 INJECTION, SOLUTION INTRAVENOUS at 10:29

## 2018-01-01 RX ADMIN — PROCHLORPERAZINE EDISYLATE 10 MG: 5 INJECTION INTRAMUSCULAR; INTRAVENOUS at 14:31

## 2018-01-01 RX ADMIN — ALLOPURINOL 300 MG: 300 TABLET ORAL at 08:32

## 2018-01-01 RX ADMIN — HEPARIN SODIUM (PORCINE) LOCK FLUSH IV SOLN 100 UNIT/ML 5 ML: 100 SOLUTION at 14:55

## 2018-01-01 RX ADMIN — PIPERACILLIN SODIUM AND TAZOBACTAM SODIUM 3.38 G: 3; .375 INJECTION, POWDER, LYOPHILIZED, FOR SOLUTION INTRAVENOUS at 08:57

## 2018-01-01 RX ADMIN — PIPERACILLIN SODIUM AND TAZOBACTAM SODIUM 3.38 G: 3; .375 INJECTION, POWDER, LYOPHILIZED, FOR SOLUTION INTRAVENOUS at 21:14

## 2018-01-01 RX ADMIN — ENOXAPARIN SODIUM 40 MG: 40 INJECTION SUBCUTANEOUS at 21:23

## 2018-01-01 RX ADMIN — HEPARIN SODIUM (PORCINE) LOCK FLUSH IV SOLN 100 UNIT/ML 5 ML: 100 SOLUTION at 15:26

## 2018-01-01 RX ADMIN — PALONOSETRON 0.25 MG: 0.05 INJECTION, SOLUTION INTRAVENOUS at 14:02

## 2018-01-01 RX ADMIN — METOPROLOL SUCCINATE 100 MG: 50 TABLET, EXTENDED RELEASE ORAL at 08:08

## 2018-01-01 RX ADMIN — SENNOSIDES AND DOCUSATE SODIUM 1 TABLET: 8.6; 5 TABLET ORAL at 08:11

## 2018-01-01 RX ADMIN — SODIUM CHLORIDE, PRESERVATIVE FREE 5 ML: 5 INJECTION INTRAVENOUS at 14:26

## 2018-01-01 RX ADMIN — DEXAMETHASONE 12 MG: 4 TABLET ORAL at 13:16

## 2018-01-01 RX ADMIN — PANTOPRAZOLE SODIUM 20 MG: 20 TABLET, DELAYED RELEASE ORAL at 16:36

## 2018-01-01 RX ADMIN — HEPARIN SODIUM (PORCINE) LOCK FLUSH IV SOLN 100 UNIT/ML 5 ML: 100 SOLUTION at 12:36

## 2018-01-01 RX ADMIN — SODIUM CHLORIDE, POTASSIUM CHLORIDE, SODIUM LACTATE AND CALCIUM CHLORIDE 500 ML: 600; 310; 30; 20 INJECTION, SOLUTION INTRAVENOUS at 18:58

## 2018-01-01 RX ADMIN — ACETAMINOPHEN 325 MG: 325 TABLET, FILM COATED ORAL at 14:31

## 2018-01-01 RX ADMIN — HEPARIN SODIUM (PORCINE) LOCK FLUSH IV SOLN 100 UNIT/ML 5 ML: 100 SOLUTION at 09:26

## 2018-01-01 RX ADMIN — PHENYLEPHRINE HYDROCHLORIDE 150 MCG: 10 INJECTION, SOLUTION INTRAMUSCULAR; INTRAVENOUS; SUBCUTANEOUS at 07:51

## 2018-01-01 RX ADMIN — ALLOPURINOL 300 MG: 300 TABLET ORAL at 08:46

## 2018-01-01 RX ADMIN — Medication 50 MG: at 12:54

## 2018-01-01 RX ADMIN — LORATADINE 10 MG: 10 TABLET ORAL at 08:32

## 2018-01-01 RX ADMIN — ROSUVASTATIN CALCIUM 40 MG: 20 TABLET ORAL at 20:01

## 2018-01-01 RX ADMIN — Medication 5 MG: at 08:40

## 2018-01-01 RX ADMIN — IOPAMIDOL 100 ML: 755 INJECTION, SOLUTION INTRAVASCULAR at 14:21

## 2018-01-01 RX ADMIN — PHENYLEPHRINE HYDROCHLORIDE 100 MCG: 10 INJECTION, SOLUTION INTRAMUSCULAR; INTRAVENOUS; SUBCUTANEOUS at 08:09

## 2018-01-01 RX ADMIN — SODIUM CHLORIDE, POTASSIUM CHLORIDE, SODIUM LACTATE AND CALCIUM CHLORIDE 500 ML: 600; 310; 30; 20 INJECTION, SOLUTION INTRAVENOUS at 16:15

## 2018-01-01 RX ADMIN — MAGNESIUM SULFATE HEPTAHYDRATE 2 G: 500 INJECTION, SOLUTION INTRAMUSCULAR; INTRAVENOUS at 11:30

## 2018-01-01 RX ADMIN — PANTOPRAZOLE SODIUM 20 MG: 20 TABLET, DELAYED RELEASE ORAL at 21:46

## 2018-01-01 ASSESSMENT — ACTIVITIES OF DAILY LIVING (ADL)
DRESS: 0-->INDEPENDENT
ADLS_ACUITY_SCORE: 24
EATING: 0 - INDEPENDENT
ADLS_ACUITY_SCORE: 23
RETIRED_EATING: 0-->INDEPENDENT
TOILETING: 0-->INDEPENDENT
CHANGE_IN_FUNCTIONAL_STATUS_SINCE_ONSET_OF_CURRENT_ILLNESS/INJURY: NO
BATHING: 0-->INDEPENDENT
ADLS_ACUITY_SCORE: 24
COMMUNICATION: 0 - UNDERSTANDS/COMMUNICATES WITHOUT DIFFICULTY
ADLS_ACUITY_SCORE: 23
ADLS_ACUITY_SCORE: 24
ADLS_ACUITY_SCORE: 11
ADLS_ACUITY_SCORE: 11
ADLS_ACUITY_SCORE: 23
ADLS_ACUITY_SCORE: 23
AMBULATION: 0 - INDEPENDENT
COGNITION: 0 - NO COGNITION ISSUES REPORTED
ADLS_ACUITY_SCORE: 11
ADLS_ACUITY_SCORE: 24
AMBULATION: 0-->INDEPENDENT
DRESS: 0 - INDEPENDENT
RETIRED_COMMUNICATION: 0-->UNDERSTANDS/COMMUNICATES WITHOUT DIFFICULTY
ADLS_ACUITY_SCORE: 24
FALL_HISTORY_WITHIN_LAST_SIX_MONTHS: NO
ADLS_ACUITY_SCORE: 11
TOILETING: 0 - INDEPENDENT
TRANSFERRING: 0 - INDEPENDENT
TRANSFERRING: 0-->INDEPENDENT
SWALLOWING: 0 - SWALLOWS FOODS/LIQUIDS WITHOUT DIFFICULTY
ADLS_ACUITY_SCORE: 11
ADLS_ACUITY_SCORE: 23
ADLS_ACUITY_SCORE: 11
SWALLOWING: 0-->SWALLOWS FOODS/LIQUIDS WITHOUT DIFFICULTY
ADLS_ACUITY_SCORE: 11
BATHING: 0 - INDEPENDENT
ADLS_ACUITY_SCORE: 11

## 2018-01-01 ASSESSMENT — ENCOUNTER SYMPTOMS
HEADACHES: 0
SHORTNESS OF BREATH: 1
DIZZINESS: 1
FATIGUE: 0
SHORTNESS OF BREATH: 1
CHILLS: 0
ABDOMINAL PAIN: 0
COLOR CHANGE: 0
ABDOMINAL DISTENTION: 1
NECK PAIN: 0
COUGH: 1
SHORTNESS OF BREATH: 1
FEVER: 0
DIARRHEA: 0
CHILLS: 0
VOMITING: 0
DIAPHORESIS: 0
BRUISES/BLEEDS EASILY: 0
NECK STIFFNESS: 0
FEVER: 0
COUGH: 0
ADENOPATHY: 0
NECK PAIN: 0
NAUSEA: 0
NAUSEA: 1
SORE THROAT: 0
BACK PAIN: 0
NAUSEA: 0
RHINORRHEA: 0
AGITATION: 0
SLEEP DISTURBANCE: 1
VOMITING: 1
EYES NEGATIVE: 1
FEVER: 0
ABDOMINAL PAIN: 0
DIFFICULTY URINATING: 0
VOMITING: 0

## 2018-01-01 ASSESSMENT — PAIN SCALES - GENERAL
PAINLEVEL: NO PAIN (0)
PAINLEVEL: MILD PAIN (2)
PAINLEVEL: NO PAIN (0)
PAINLEVEL: NO PAIN (0)
PAINLEVEL: MILD PAIN (3)
PAINLEVEL: NO PAIN (0)
PAINLEVEL: MODERATE PAIN (5)
PAINLEVEL: NO PAIN (0)
PAINLEVEL: MODERATE PAIN (5)
PAINLEVEL: NO PAIN (0)
PAINLEVEL: MILD PAIN (2)

## 2018-01-01 ASSESSMENT — COPD QUESTIONNAIRES: COPD: 0

## 2018-01-01 ASSESSMENT — LIFESTYLE VARIABLES: TOBACCO_USE: 0

## 2018-01-01 ASSESSMENT — PAIN DESCRIPTION - DESCRIPTORS: DESCRIPTORS: SORE

## 2018-01-08 NOTE — TELEPHONE ENCOUNTER
Left message for patient to return clinic call regarding scheduling. Patient needs a Return  appointment for 6 mo recheck with Dr Alonzo on or after 2/12/18 and  Fasting labs per last visit's instructions. Number to clinic and Mychart option given, please assist in scheduling once patient returns clinic call from the recall/wait list tab.    Call Center OKAY TO SCHEDULE.    Thanks,   Joann Montes  Primary Care   [facility] [place]

## 2018-02-21 NOTE — MR AVS SNAPSHOT
After Visit Summary   2/21/2018    Francisco Javier Cortes    MRN: [id number]           Patient Information     Date Of Birth          1940        Visit Information        Provider Department      2/21/2018 11:30 AM Pedrito Barbosa MD [facility]        Today's Diagnoses     Periumbilical abdominal pain    -  1    Flatulence, eructation and gas pain        Decrease in appetite        Elevated blood sugar        Benign essential hypertension           Follow-ups after your visit        Your next 10 appointments already scheduled     Feb 23, 2018  9:10 AM CST   LAB with LAB FIRST FLOOR [facility] ([facility])    [address] [place] [id number] [phone]           Please do not eat 10-12 hours before your appointment if you are coming in fasting for labs on lipids, cholesterol, or glucose (sugar). This does not apply to pregnant women. Water, hot tea and black coffee (with nothing added) are okay. Do not drink other fluids, diet soda or chew gum.            Feb 23, 2018  9:30 AM CST   Return Visit with Florian Alonzo MD   [facility] ([facility]    [address] [place] [id number] [phone]            Apr 10, 2018  8:00 AM CDT   Return Visit with Jad Ball MD   [facility])    [address] [place] [id number] [phone]              Future tests that were ordered for you today     Open Future Orders        Priority Expected Expires Ordered    Occult blood stool 1-3 spec Routine  2/21/2019 2/21/2018            Who to contact     If you have questions or need follow up information about today's clinic visit or your schedule please contact [facility] directly at [phone].  Normal or non-critical lab and imaging results will be communicated to you by MyChart, letter or phone  "within 4 business days after the clinic has received the results. If you do not hear from us within 7 days, please contact the clinic through [employer] or phone. If you have a critical or abnormal lab result, we will notify you by phone as soon as possible.  Submit refill requests through [employer] or call your pharmacy and they will forward the refill request to us. Please allow 3 business days for your refill to be completed.          Additional Information About Your Visit        [employer] Information     [employer] gives you secure access to your electronic health record. If you see a primary care provider, you can also send messages to your care team and make appointments. If you have questions, please call your primary care clinic.  If you do not have a primary care provider, please call [phone] and they will assist you.      [employer] is an electronic gateway that provides easy, online access to your medical records. With [employer], you can request a clinic appointment, read your test results, renew a prescription or communicate with your care team.     To access your existing account, please contact your [facility] or call [phone] for assistance.        Care EveryWhere ID     This is your Care EveryWhere ID. This could be used by other organizations to access your [facility] medical records  [id number]        Your Vitals Were     Pulse Temperature Height Pulse Oximetry BMI (Body Mass Index)       77 98.1  F (36.7  C) (Temporal) 5' 4.5\" (1.638 m) 95% 30.74 kg/m2        Blood Pressure from Last 3 Encounters:   02/21/18 104/60   09/12/17 132/62   08/15/17 112/76    Weight from Last 3 Encounters:   02/21/18 181 lb 14.4 oz (82.5 kg)   09/12/17 185 lb (83.9 kg)   08/15/17 181 lb 6.4 oz (82.3 kg)              We Performed the Following     Amylase     CBC with platelets and differential     Comprehensive metabolic panel (BMP + Alb, Alk Phos, ALT, AST, Total. Bili, TP)     Lipase        " Primary Care Provider Office Phone # Fax #    Florian Alonzo [phone] [address]  [facility] [address]        Equal Access to Services     NANDA BARAJAS : Hadii rivera ku yahairao Sonormaali, waaxda luqadaha, qaybta kaalmada adesadi, zhao miranda laChetnakaiden lorenzo. So [facility] [phone].    ATENCIÓN: Si habla español, tiene a ivan disposición servicios gratuitos de asistencia lingüística. Llame al [phone].    We comply with applicable federal civil rights laws and [place] laws. We do not discriminate on the basis of race, color, national origin, age, disability, sex, sexual orientation, or gender identity.            Thank you!     Thank you for choosing [facility]  for your care. Our goal is always to provide you with excellent care. Hearing back from our patients is one way we can continue to improve our services. Please take a few minutes to complete the written survey that you may receive in the mail after your visit with us. Thank you!             Your Updated Medication List - Protect others around you: Learn how to safely use, store and throw away your medicines at www.disposemymeds.org.          This list is accurate as of 2/21/18 12:32 PM.  Always use your most recent med list.                   Brand Name Dispense Instructions for use Diagnosis    aspirin 81 MG tablet      1 TABLET DAILY        clindamycin 1 % lotion    CLEOCIN T    60 mL    Apply twice daily for 6 weeks to the groin    Rash       hydrocortisone valerate 0.2 % ointment    WEST-NINOSKA    45 g    Apply sparingly to affected area three times daily as needed.    Psoriasis       lisinopril 20 MG tablet    PRINIVIL/ZESTRIL    90 tablet    Take 1 tablet (20 mg) by mouth daily    Hypertension goal BP (blood pressure) < 130/80, Coronary artery disease involving native coronary artery of native heart without angina pectoris       MAALOX ADVANCED PO      Take 1 tablet as needed (Calcium  carbonate 1000mg/Simethicone 80mg)    Chest pain       metoprolol succinate 100 MG 24 hr tablet    TOPROL-XL    90 tablet    Take 1 tablet (100 mg) by mouth daily    Hypertension goal BP (blood pressure) < 130/80, Coronary artery disease involving native coronary artery of native heart without angina pectoris       nitroGLYcerin 0.4 MG sublingual tablet    NITROSTAT    60 tablet    Place 1 tablet (0.4 mg) under the tongue every 5 minutes as needed up to 3 tablets per episode.    Chest pain due to myocardial ischemia, unspecified ischemic chest pain type (H), Coronary artery disease involving native coronary artery of native heart without angina pectoris       omega 3 1000 MG Caps     120 capsule    Take 1 g by mouth 2 times daily    Coronary artery disease involving native coronary artery of native heart with angina pectoris (H)       PREVACID PO      Take 15 mg by mouth every morning (before breakfast) Patient needs to use brand name Prevacid (generic version causes diarrhea)        rosuvastatin 40 MG tablet    CRESTOR    90 tablet    Take 1 tablet (40 mg) by mouth daily    Hyperlipidemia LDL goal <100, Coronary artery disease involving native coronary artery of native heart without angina pectoris       TYLENOL 8 HOUR PO      Take 500 mg by mouth as needed        vardenafil 20 MG tablet    LEVITRA    6 tablet    Take 1 tablet (20 mg) by mouth daily as needed Take 30 min to 4 hours before intercourse.  Never use with nitroglycerin, terazosin or doxazosin.    Erectile dysfunction, unspecified erectile dysfunction type

## 2018-02-21 NOTE — TELEPHONE ENCOUNTER
Comprehensive metabolic panel (BMP + Alb, Alk Phos, ALT, AST, Total. Bili, TP)   Status:  Final result   Visible to patient:  Yes (MyChart) Dx:  Periumbilical abdominal pain; Flatule... Order: [id number]       Notes Recorded by Pedrito Barbosa MD on 2/21/2018 at 1:23 PM  Notify the patint that there ere no significant lab findings at this time. Pedrito Barbosa           Ref Range & Units 12:39 PM   6mo ago   12mo ago        Sodium 133 - 144 mmol/L 138 141 137      Potassium 3.4 - 5.3 mmol/L 4.6 4.2 3.8      Chloride 94 - 109 mmol/L 106 107 104      Carbon Dioxide 20 - 32 mmol/L 25 25 24      Anion Gap 3 - 14 mmol/L 7 9 9      Glucose 70 - 99 mg/dL 97 122 (H) (H)CM     Comments: Non Fasting      Urea Nitrogen 7 - 30 mg/dL 20 18 14      Creatinine 0.66 - 1.25 mg/dL 1.08 0.96 0.95      GFR Estimate >60 mL/min/1.7m2 66 76CM 77CM     Comments: Non  GFR Calc      GFR Estimate If Black >60 mL/min/1.7m2 80 >90  African American GFR ... >90   GFR ...     Comments:  GFR Calc      Calcium 8.5 - 10.1 mg/dL 9.2 8.3 (L) 8.4 (L)      Bilirubin Total 0.2 - 1.3 mg/dL 0.4 0.5       Albumin 3.4 - 5.0 g/dL 3.5 3.5       Protein Total 6.8 - 8.8 g/dL 7.3 6.6 (L)       Alkaline Phosphatase 40 - 150 U/L 72 43       ALT 0 - 70 U/L 34 35       AST 0 - 45 U/L 47 (H) 25      Resulting Agency  [facility]          Specimen Collected: 02/21/18 12:39 PM Last Resulted: 02/21/18  1:10 PM                CM=Additional comments                      Lipase   Status:  Final result   Visible to patient:  Yes (MyChart) Dx:  Periumbilical abdominal pain; Flatule... Order: [id number]       Notes Recorded by Pedrito Barbosa MD on 2/21/2018 at 1:23 PM  Notify the patint that there ere no significant lab findings at this time. Pedrito Barbosa        Ref Range & Units 12:39 PM       Lipase 73 - 393 U/L 223     Resulting Agency  [facility]          Specimen Collected: 02/21/18 12:39 PM Last Resulted: 02/21/18  1:07  PM                                 Amylase   Status:  Final result   Visible to patient:  Yes (MyChart) Dx:  Periumbilical abdominal pain; Flatule... Order: [id number]       Notes Recorded by Pedrito Barbosa MD on 2/21/2018 at 1:23 PM  Notify the patint that there ere no significant lab findings at this time. Pedrito Barbosa        Ref Range & Units 12:39 PM       Amylase 30 - 110 U/L 53     Resulting Agency  [facility]          Specimen Collected: 02/21/18 12:39 PM Last Resulted: 02/21/18  1:07 PM                                CBC with platelets and differential   Status:  Edited Result - FINAL   Visible to patient:  Yes (MyChart) Dx:  Periumbilical abdominal pain; Flatule... Order: [id number]       Notes Recorded by Pedrito Barbosa MD on 2/21/2018 at 1:23 PM  Notify the patint that there ere no significant lab findings at this time. Pedrito Barbosa           Ref Range & Units 12:39 PM   6mo ago   2yr ago        WBC 4.0 - 11.0 10e9/L 9.5 7.4 8.7      RBC Count 4.4 - 5.9 10e12/L 4.99 4.96 5.10      Hemoglobin 13.3 - 17.7 g/dL 15.1 16.2 16.3      Hematocrit 40.0 - 53.0 % 46.2 46.7 48.2      MCV 78 - 100 fl 93 94 95      MCH 26.5 - 33.0 pg 30.3 32.7 32.0      MCHC 31.5 - 36.5 g/dL 32.7 34.7 33.8      RDW 10.0 - 15.0 % 14.6 13.8 14.3      Platelet Count 150 - 450 10e9/L 126 (L) 181 111 (L)      Diff Method  Automated Method Automated Method       % Neutrophils % 67.4 56.7       % Lymphocytes % 18.3 32.1       % Monocytes % 13.3 9.1       % Eosinophils % 0.7 1.8       % Basophils % 0.3 0.3       Absolute Neutrophil 1.6 - 8.3 10e9/L 6.4 4.2       Absolute Lymphocytes 0.8 - 5.3 10e9/L 1.7 2.4       Absolute Monocytes 0.0 - 1.3 10e9/L 1.3 0.7       Absolute Eosinophils 0.0 - 0.7 10e9/L 0.1 0.1       Absolute Basophils 0.0 - 0.2 10e9/L 0.0 0.0      Resulting Agency  [facility]          Specimen Collected: 02/21/18 12:39 PM Last Resulted: 02/21/18 12:55 PM                       Louann Moreno RN

## 2018-02-21 NOTE — PROGRESS NOTES
SUBJECTIVE:   Francisco Javier Cortes is a 77 year old male who presents to clinic today for the following health issues:      ABDOMINAL PAIN/Nausea/Having problems swallowing.  Patients cardiologist advised patient to take 3 1000g fish oil daily patient is wondering if that is causing his abdominal pain/gas.  History of ulcers.  Having leg and foot cramps at night.     Onset: Symptoms worse since 01/29/18    Description:   Character: Dull ache  Location: fredy-umbilical region  Radiation: None    Intensity: 3/10    Progression of Symptoms:  worsening    Accompanying Signs & Symptoms:  Fever/Chills?: No  Gas/Bloating: YES  Nausea: YES  Vomitting: no   Diarrhea?: YES- soft stool  Constipation:no   Dysuria or Hematuria: no    History:   Trauma: no   Previous similar pain: no    Previous tests done: Colonoscopy    Precipitating factors:   Does the pain change with:     Food: YES- sometimes     BM: no     Urination: no     Alleviating factors:  Patient was taking prevacid 40 mg but that was causing diarrhea so he started taking prevacid 20 mg OTC and that helped a little.  Patient has stopped taking prevacid the last 3 days.  Patient now taking up to 2 tums daily one in am after coffee and at bedtime.    Therapies Tried and outcome: Tums, prevacid    LMP:  not applicable       He has had symptoms since  Returning for [facility]. in late Jan. Had been using prevacid as noted but has stopped in th last 3 days. Abdominal pain localized across his upper abdomen and he reports decreased appetite along with his other GI symptoms.  Increase flatulence, nausea, loss stool which improved with OTC antidiarrheal which he reports seemed too help with is abdominal pain. Drank city water while in [facility] and had bleaching and decrease appetite- ate small amounts. Previous swallowing issues which he has managed with increase fluids and slower eating. He has not had choking on food but his wife reports that he has decreased the size of his bites. He has  not seen GI specialist since 1982-83        Problem list and histories reviewed & adjusted, as indicated.  Additional history: as documented    Patient Active Problem List   Diagnosis     Hyperlipidemia LDL goal <100     Advanced directives, counseling/discussion     Prediabetes     Obesity (BMI 30-39.9)     Inflamed seborrheic keratosis     AK (actinic keratosis)     Basal cell carcinoma of neck     Microalbuminuria     Perianal dermatitis     NSTEMI (non-ST elevated myocardial infarction) (H)     S/P coronary angioplasty     Status post percutaneous transluminal coronary angioplasty-Coronary angioplasty with STEPHEN to LCx     Prostate Cancer, s/p prostatectomy     Pseudophakia of both eyes     Gastroesophageal reflux disease, esophagitis presence not specified     Coronary artery disease involving native coronary artery of native heart without angina pectoris     History of colonic polyps     Chronic diarrhea     Essential hypertension with goal blood pressure less than 140/90     Dyslipidemia     Erectile dysfunction, unspecified erectile dysfunction type     Elevated fasting blood sugar     Eczema, unspecified type     Squamous blepharitis of right upper eyelid     Past Surgical History:   Procedure Laterality Date     C APPENDECTOMY       C REMV PROSTATE,RETROPUB,RAD,TOT NODES  1999     CATARACT IOL, RT/LT       COLONOSCOPY       COLONOSCOPY Left 7/5/2016    Procedure: COMBINED COLONOSCOPY, SINGLE OR MULTIPLE BIOPSY/POLYPECTOMY BY BIOPSY;  Surgeon: Duane, William Charles, MD;  Location:  OR     COLONOSCOPY WITH CO2 INSUFFLATION N/A 7/5/2016    Procedure: COLONOSCOPY WITH CO2 INSUFFLATION;  Surgeon: Duane, William Charles, MD;  Location:  OR     ENT SURGERY  1980--1999     PHACOEMULSIFICATION CLEAR CORNEA WITH STANDARD INTRAOCULAR LENS IMPLANT Left 6/18/2015    Procedure: PHACOEMULSIFICATION CLEAR CORNEA WITH STANDARD INTRAOCULAR LENS IMPLANT;  Surgeon: John Zimmerman MD;  Location: [facility]      PHACOEMULSIFICATION CLEAR CORNEA WITH STANDARD INTRAOCULAR LENS IMPLANT Right 7/16/2015    Procedure: PHACOEMULSIFICATION CLEAR CORNEA WITH STANDARD INTRAOCULAR LENS IMPLANT;  Surgeon: John Zimmerman MD;  Location:  [facility]     STENT, CORONARY, [place]  1/09, 2014    x2, x1 in 2014     VASCULAR SURGERY  2013    stents       Social History   Substance Use Topics     Smoking status: Never Smoker     Smokeless tobacco: Never Used     Alcohol use Yes      Comment: a glass of red wine a day     Family History   Problem Relation Age of Onset     Cardiovascular Mother      HEART DISEASE Mother      Cancer - colorectal Father      HEART DISEASE Father      Other Cancer Father      Asthma Brother      Coronary Artery Disease Brother      Hypertension Brother      HEART DISEASE Son      CANCER Daughter      non hodgkins     Prostate Cancer Brother      Hypertension Brother      Prostate Cancer Maternal Grandfather          Current Outpatient Prescriptions   Medication Sig Dispense Refill     lisinopril (PRINIVIL/ZESTRIL) 20 MG tablet Take 1 tablet (20 mg) by mouth daily 90 tablet 3     metoprolol (TOPROL-XL) 100 MG 24 hr tablet Take 1 tablet (100 mg) by mouth daily 90 tablet 3     rosuvastatin (CRESTOR) 40 MG tablet Take 1 tablet (40 mg) by mouth daily 90 tablet 3     vardenafil (LEVITRA) 20 MG tablet Take 1 tablet (20 mg) by mouth daily as needed Take 30 min to 4 hours before intercourse.  Never use with nitroglycerin, terazosin or doxazosin. 6 tablet 2     omega 3 1000 MG CAPS Take 1 g by mouth 2 times daily 120 capsule 6     MAALOX ADVANCED OR Take 1 tablet as needed (Calcium carbonate 1000mg/Simethicone 80mg)       ASPIRIN 81 MG OR TABS 1 TABLET DAILY       nitroGLYcerin (NITROSTAT) 0.4 MG sublingual tablet Place 1 tablet (0.4 mg) under the tongue every 5 minutes as needed up to 3 tablets per episode. (Patient not taking: Reported on 2/21/2018) 60 tablet 6     clindamycin (CLEOCIN T) 1 % lotion Apply twice daily for 6  "weeks to the groin (Patient not taking: Reported on 2/21/2018) 60 mL 1     hydrocortisone valerate (WEST-NINOSKA) 0.2 % ointment Apply sparingly to affected area three times daily as needed. (Patient not taking: Reported on 2/21/2018) 45 g 3     Lansoprazole (PREVACID PO) Take 15 mg by mouth every morning (before breakfast) Patient needs to use brand name Prevacid (generic version causes diarrhea)       Acetaminophen (TYLENOL 8 HOUR PO) Take 500 mg by mouth as needed        Allergies   Allergen Reactions     Niacin      Severe rash and itching     Prevacid [Lansoprazole] Diarrhea     Patient notes diarrhea with 30mg generic version. Patient does ok with 15mg in generic and brand name.     Shellfish Allergy      One kind     BP Readings from Last 3 Encounters:   02/21/18 104/60   09/12/17 132/62   08/15/17 112/76    Wt Readings from Last 3 Encounters:   02/21/18 181 lb 14.4 oz (82.5 kg)   09/12/17 185 lb (83.9 kg)   08/15/17 181 lb 6.4 oz (82.3 kg)                    Reviewed and updated as needed this visit by clinical staff       Reviewed and updated as needed this visit by Provider         ROS:  Constitutional, HEENT, cardiovascular, pulmonary, gi and gu systems are negative, except as otherwise noted.    OBJECTIVE:     /60  Pulse 77  Temp 98.1  F (36.7  C) (Temporal)  Ht 5' 4.5\" (1.638 m)  Wt 181 lb 14.4 oz (82.5 kg)  SpO2 95%  BMI 30.74 kg/m2  Body mass index is 30.74 kg/(m^2).  GENERAL: healthy, alert and no distress  EYES: Eyes grossly normal to inspection, PERRL and conjunctivae and sclerae normal  HENT: ear canals and TM's normal, nose and mouth without ulcers or lesions  NECK: no adenopathy, no asymmetry, masses, or scars and thyroid normal to palpation  RESP: lungs clear to auscultation - no rales, rhonchi or wheezes  CV: regular rate and rhythm, normal S1 S2, no S3 or S4, no murmur, click or rub, no peripheral edema and peripheral pulses strong  ABDOMEN: tenderness across his abdomen at th level " of his umbilicus, no organomegaly or masses, bowel sounds normal and  Mo guarding or rebound moted  MS: no gross musculoskeletal defects noted, no edema  SKIN: no suspicious lesions or rashes  NEURO: Normal strength and tone, mentation intact and speech normal  PSYCH: mentation appears normal, affect normal/bright        ASSESSMENT/PLAN:               ICD-10-CM    1. Periumbilical abdominal pain R10.33 Occult blood stool 1-3 spec     CBC with platelets and differential     Amylase     Lipase     Comprehensive metabolic panel (BMP + Alb, Alk Phos, ALT, AST, Total. Bili, TP)   2. Flatulence, eructation and gas pain R14.3 Occult blood stool 1-3 spec    R14.1 CBC with platelets and differential    R14.2 Amylase     Lipase     Comprehensive metabolic panel (BMP + Alb, Alk Phos, ALT, AST, Total. Bili, TP)   3. Decrease in appetite R63.0 CBC with platelets and differential     Amylase     Lipase     Comprehensive metabolic panel (BMP + Alb, Alk Phos, ALT, AST, Total. Bili, TP)   4. Elevated blood sugar R73.9    5. Benign essential hypertension I10        Discontinue Fish oil and recheck in 1-2 weeks if not improving or symptoms worsen- to be notified of lab results if significant to his symptoms    Pedrito Barbosa MD  [facility]

## 2018-02-21 NOTE — NURSING NOTE
"Chief Complaint   Patient presents with     Abdominal Pain     Nausea       Initial /60  Pulse 77  Temp 98.1  F (36.7  C) (Temporal)  Ht 5' 4.5\" (1.638 m)  Wt 181 lb 14.4 oz (82.5 kg)  SpO2 95%  BMI 30.74 kg/m2 Estimated body mass index is 30.74 kg/(m^2) as calculated from the following:    Height as of this encounter: 5' 4.5\" (1.638 m).    Weight as of this encounter: 181 lb 14.4 oz (82.5 kg).  Medication Reconciliation: complete     Mamie Rivera CMA  "

## 2018-02-23 PROBLEM — R14.2 FLATULENCE, ERUCTATION, AND GAS PAIN: Status: ACTIVE | Noted: 2018-01-01

## 2018-02-23 PROBLEM — R14.1 FLATULENCE, ERUCTATION, AND GAS PAIN: Status: ACTIVE | Noted: 2018-01-01

## 2018-02-23 PROBLEM — R14.3 FLATULENCE, ERUCTATION, AND GAS PAIN: Status: ACTIVE | Noted: 2018-01-01

## 2018-02-23 PROBLEM — R74.01 ELEVATED AST (SGOT): Status: ACTIVE | Noted: 2018-01-01

## 2018-02-23 PROBLEM — R14.0 BLOATING: Status: ACTIVE | Noted: 2018-01-01

## 2018-02-23 NOTE — MR AVS SNAPSHOT
After Visit Summary   2/23/2018    Francisco Javier Cortes    MRN: [id number]           Patient Information     Date Of Birth          1940        Visit Information        Provider Department      2/23/2018 9:30 AM Florian Alonzo MD [facility]        Today's Diagnoses     Flatulence, eructation, and gas pain    -  1    Bloating        Elevated AST (SGOT)          Care Instructions    Get the stool test today  Schedule for ultrasound abdomen   Schedule for fasting labs and physical in 6 months          Follow-ups after your visit        Your next 10 appointments already scheduled     Apr 10, 2018  8:00 AM CDT   Return Visit with Jad Ball MD   [facility] ([facility])    [id number] [place] [id number] [phone]              Future tests that were ordered for you today     Open Future Orders        Priority Expected Expires Ordered    H Pylori antigen, stool Routine  3/25/2018 2/23/2018    US Abdomen Complete Routine  2/23/2019 2/23/2018            Who to contact     If you have questions or need follow up information about today's clinic visit or your schedule please contact [facility] directly at [phone].  Normal or non-critical lab and imaging results will be communicated to you by MyChart, letter or phone within 4 business days after the clinic has received the results. If you do not hear from us within 7 days, please contact the clinic through [employer] or phone. If you have a critical or abnormal lab result, we will notify you by phone as soon as possible.  Submit refill requests through [employer] or call your pharmacy and they will forward the refill request to us. Please allow 3 business days for your refill to be completed.          Additional Information About Your Visit        [employer] Information     [employer] gives you secure access to your electronic health record. If you see a primary care  provider, you can also send messages to your care team and make appointments. If you have questions, please call your primary care clinic.  If you do not have a primary care provider, please call [phone] and they will assist you.      [employer] is an electronic gateway that provides easy, online access to your medical records. With [employer], you can request a clinic appointment, read your test results, renew a prescription or communicate with your care team.     To access your existing account, please contact your [facility] or call [phone] for assistance.        Care EveryWhere ID     This is your Care EveryWhere ID. This could be used by other organizations to access your [facility] medical records  [id number]        Your Vitals Were     Pulse Temperature Pulse Oximetry BMI (Body Mass Index)          86 97.4  F (36.3  C) (Oral) 95% 30.61 kg/m2         Blood Pressure from Last 3 Encounters:   02/23/18 96/50   02/21/18 104/60   09/12/17 132/62    Weight from Last 3 Encounters:   02/23/18 181 lb 1.6 oz (82.1 kg)   02/21/18 181 lb 14.4 oz (82.5 kg)   09/12/17 185 lb (83.9 kg)               Primary Care Provider Office Phone # Fax #    Florian Alonzo [phone] [address] [facility] [address]        Equal Access to Services     [facility] AH: Hadii aad ku hadasho Soomaali, waaxda luqadaha, qaybta kaalmada adeegyada, zhao manriquez haykaiden bazan . So [facility] [place] [facility] [phone].    ATENCIÓN: Si habla español, tiene a ivan disposición servicios gratuitos de asistencia lingüística. Llame al [phone].    We comply with applicable federal civil rights laws and [place] laws. We do not discriminate on the basis of race, color, national origin, age, disability, sex, sexual orientation, or gender identity.            Thank you!     Thank you for choosing [facility]  for your care. Our goal is always to provide you with excellent  care. Hearing back from our patients is one way we can continue to improve our services. Please take a few minutes to complete the written survey that you may receive in the mail after your visit with us. Thank you!             Your Updated Medication List - Protect others around you: Learn how to safely use, store and throw away your medicines at www.disposemymeds.org.          This list is accurate as of 2/23/18 10:02 AM.  Always use your most recent med list.                   Brand Name Dispense Instructions for use Diagnosis    aspirin 81 MG tablet      1 TABLET DAILY        clindamycin 1 % lotion    CLEOCIN T    60 mL    Apply twice daily for 6 weeks to the groin    Rash       hydrocortisone valerate 0.2 % ointment    WEST-NINOSKA    45 g    Apply sparingly to affected area three times daily as needed.    Psoriasis       lisinopril 20 MG tablet    PRINIVIL/ZESTRIL    90 tablet    Take 1 tablet (20 mg) by mouth daily    Hypertension goal BP (blood pressure) < 130/80, Coronary artery disease involving native coronary artery of native heart without angina pectoris       MAALOX ADVANCED PO      Take 1 tablet as needed (Calcium carbonate 1000mg/Simethicone 80mg)    Chest pain       metoprolol succinate 100 MG 24 hr tablet    TOPROL-XL    90 tablet    Take 1 tablet (100 mg) by mouth daily    Hypertension goal BP (blood pressure) < 130/80, Coronary artery disease involving native coronary artery of native heart without angina pectoris       nitroGLYcerin 0.4 MG sublingual tablet    NITROSTAT    60 tablet    Place 1 tablet (0.4 mg) under the tongue every 5 minutes as needed up to 3 tablets per episode.    Chest pain due to myocardial ischemia, unspecified ischemic chest pain type (H), Coronary artery disease involving native coronary artery of native heart without angina pectoris       omega 3 1000 MG Caps     120 capsule    Take 1 g by mouth 2 times daily    Coronary artery disease involving native coronary artery of  native heart with angina pectoris (H)       PREVACID PO      Take 15 mg by mouth every morning (before breakfast) Patient needs to use brand name Prevacid (generic version causes diarrhea)        rosuvastatin 40 MG tablet    CRESTOR    90 tablet    Take 1 tablet (40 mg) by mouth daily    Hyperlipidemia LDL goal <100, Coronary artery disease involving native coronary artery of native heart without angina pectoris       TYLENOL 8 HOUR PO      Take 500 mg by mouth as needed        vardenafil 20 MG tablet    LEVITRA    6 tablet    Take 1 tablet (20 mg) by mouth daily as needed Take 30 min to 4 hours before intercourse.  Never use with nitroglycerin, terazosin or doxazosin.    Erectile dysfunction, unspecified erectile dysfunction type

## 2018-02-23 NOTE — NURSING NOTE
"Chief Complaint   Patient presents with     Recheck Medication       Initial BP 96/50 (BP Location: Right arm, Patient Position: Sitting, Cuff Size: Adult Large)  Pulse 86  Temp 97.4  F (36.3  C) (Oral)  Wt 181 lb 1.6 oz (82.1 kg)  SpO2 95%  BMI 30.61 kg/m2 Estimated body mass index is 30.61 kg/(m^2) as calculated from the following:    Height as of 2/21/18: 5' 4.5\" (1.638 m).    Weight as of this encounter: 181 lb 1.6 oz (82.1 kg).  Medication Reconciliation: complete  Miguel Luong, ANGELO    "

## 2018-02-23 NOTE — PATIENT INSTRUCTIONS
Get the stool test today  Schedule for ultrasound abdomen   Schedule for fasting labs and physical in 6 months

## 2018-02-23 NOTE — PROGRESS NOTES
SUBJECTIVE:   Francisco Javier Cortes is a 77 year old male who presents to clinic today for the following health issues:      Hyperlipidemia Follow-Up      Rate your low fat/cholesterol diet?: good    Taking statin?  Yes, no muscle aches from statin    Other lipid medications/supplements?:  none    Hypertension Follow-up      Outpatient blood pressures are not being checked.    Low Salt Diet: no added salt      Amount of exercise or physical activity: 4-5 days/week for an average of 15-30 minutes    Problems taking medications regularly: No    Medication side effects: none    Diet: low salt and low fat/cholesterol        Vascular Disease Follow-up:  Coronary Artery Disease (CAD)      Chest pain or pressure, left side neck or arm pain: No    Shortness of breath/increased sweats/nausea with exertion: No    Pain in calves walking 1-2 blocks: No    Worsened or new symptoms since last visit: No    Nitroglycerin use: no    Daily aspirin use: Yes    BLOATING-patient is also here with concerns of having abdominal bloating, belching, excessive flatulence , intermittent mild periumbilical pain, associated with nausea and with no associated concerns for vomiting, change in bowel movements, rectal bleeding, hematemesis, melena .  He was seen 2 days ago with  in the clinic,Had labs done showing slightly elevated AST but otherwise normal LFT and normal pancreatic enzymes.    Patient drinks 1 glass of wine every day.    Has no previous history of gallbladder, liver or pancreatic concerns in the past.  Patient denies problems with UTI symptoms, hematuria.      Problem list and histories reviewed & adjusted, as indicated.  Additional history: as documented    Patient Active Problem List   Diagnosis     Hyperlipidemia LDL goal <100     Advanced directives, counseling/discussion     Prediabetes     Obesity (BMI 30-39.9)     Inflamed seborrheic keratosis     AK (actinic keratosis)     Basal cell carcinoma of neck     Microalbuminuria      Perianal dermatitis     NSTEMI (non-ST elevated myocardial infarction) (H)     S/P coronary angioplasty     Status post percutaneous transluminal coronary angioplasty-Coronary angioplasty with STEPHEN to LCx     Prostate Cancer, s/p prostatectomy     Pseudophakia of both eyes     Gastroesophageal reflux disease, esophagitis presence not specified     Coronary artery disease involving native coronary artery of native heart without angina pectoris     History of colonic polyps     Chronic diarrhea     Essential hypertension with goal blood pressure less than 140/90     Dyslipidemia     Erectile dysfunction, unspecified erectile dysfunction type     Elevated fasting blood sugar     Eczema, unspecified type     Squamous blepharitis of right upper eyelid     Elevated AST (SGOT)     Flatulence, eructation, and gas pain     Bloating     Past Surgical History:   Procedure Laterality Date     C APPENDECTOMY       C REMV PROSTATE,RETROPUB,RAD,TOT NODES  1999     CATARACT IOL, RT/LT       COLONOSCOPY       COLONOSCOPY Left 7/5/2016    Procedure: COMBINED COLONOSCOPY, SINGLE OR MULTIPLE BIOPSY/POLYPECTOMY BY BIOPSY;  Surgeon: Duane, William Charles, MD;  Location: [facility] OR     COLONOSCOPY WITH CO2 INSUFFLATION N/A 7/5/2016    Procedure: COLONOSCOPY WITH CO2 INSUFFLATION;  Surgeon: Duane, William Charles, MD;  Location: [facility] OR     ENT SURGERY  1980--1999     PHACOEMULSIFICATION CLEAR CORNEA WITH STANDARD INTRAOCULAR LENS IMPLANT Left 6/18/2015    Procedure: PHACOEMULSIFICATION CLEAR CORNEA WITH STANDARD INTRAOCULAR LENS IMPLANT;  Surgeon: John Zimmerman MD;  Location:  [facility]     PHACOEMULSIFICATION CLEAR CORNEA WITH STANDARD INTRAOCULAR LENS IMPLANT Right 7/16/2015    Procedure: PHACOEMULSIFICATION CLEAR CORNEA WITH STANDARD INTRAOCULAR LENS IMPLANT;  Surgeon: John Zimmerman MD;  Location:  [facility]     STENT, CORONARY, [place]  1/09, 2014    x2, x1 in 2014     VASCULAR SURGERY  2013    stents       Social History    Substance Use Topics     Smoking status: Never Smoker     Smokeless tobacco: Never Used     Alcohol use Yes      Comment: a glass of red wine a day     Family History   Problem Relation Age of Onset     Cardiovascular Mother      HEART DISEASE Mother      Cancer - colorectal Father      HEART DISEASE Father      Other Cancer Father      Asthma Brother      Coronary Artery Disease Brother      Hypertension Brother      HEART DISEASE Son      CANCER Daughter      non hodgkins     Prostate Cancer Brother      Hypertension Brother      Prostate Cancer Maternal Grandfather          Current Outpatient Prescriptions   Medication Sig Dispense Refill     lisinopril (PRINIVIL/ZESTRIL) 20 MG tablet Take 1 tablet (20 mg) by mouth daily 90 tablet 3     metoprolol succinate (TOPROL-XL) 100 MG 24 hr tablet Take 1 tablet (100 mg) by mouth daily 90 tablet 3     rosuvastatin (CRESTOR) 40 MG tablet Take 1 tablet (40 mg) by mouth daily 90 tablet 3     nitroGLYcerin (NITROSTAT) 0.4 MG sublingual tablet Place 1 tablet (0.4 mg) under the tongue every 5 minutes as needed up to 3 tablets per episode. 60 tablet 6     clindamycin (CLEOCIN T) 1 % lotion Apply twice daily for 6 weeks to the groin 60 mL 1     hydrocortisone valerate (WEST-NINOSKA) 0.2 % ointment Apply sparingly to affected area three times daily as needed. 45 g 3     vardenafil (LEVITRA) 20 MG tablet Take 1 tablet (20 mg) by mouth daily as needed Take 30 min to 4 hours before intercourse.  Never use with nitroglycerin, terazosin or doxazosin. 6 tablet 2     Lansoprazole (PREVACID PO) Take 15 mg by mouth every morning (before breakfast) Patient needs to use brand name Prevacid (generic version causes diarrhea)       omega 3 1000 MG CAPS Take 1 g by mouth 2 times daily 120 capsule 6     Acetaminophen (TYLENOL 8 HOUR PO) Take 500 mg by mouth as needed        MAALOX ADVANCED OR Take 1 tablet as needed (Calcium carbonate 1000mg/Simethicone 80mg)       ASPIRIN 81 MG OR TABS 1 TABLET  DAILY       [DISCONTINUED] lisinopril (PRINIVIL/ZESTRIL) 20 MG tablet Take 1 tablet (20 mg) by mouth daily 90 tablet 3     [DISCONTINUED] metoprolol (TOPROL-XL) 100 MG 24 hr tablet Take 1 tablet (100 mg) by mouth daily 90 tablet 3     [DISCONTINUED] rosuvastatin (CRESTOR) 40 MG tablet Take 1 tablet (40 mg) by mouth daily 90 tablet 3     Allergies   Allergen Reactions     Niacin      Severe rash and itching     Prevacid [Lansoprazole] Diarrhea     Patient notes diarrhea with 30mg generic version. Patient does ok with 15mg in generic and brand name.     Shellfish Allergy      One kind     Recent Labs   Lab Test  02/21/18   1240  02/21/18   1239  08/14/17   0706  02/24/17   0704  06/27/16   0735  12/17/15   0728  12/12/12   A1C  5.8   --   5.6  5.8  6.3*  6.2*   < >  6.2*   LDL   --    --   81   --   81  85   < >  110   HDL   --    --   32*   --   36*  31*   < >  34   TRIG   --    --   202*   --   160*  233*   < >  109   ALT   --   34  35   --    --   39   < >   --    CR   --   1.08  0.96  0.95  0.86  0.94   < >  1.00   GFRESTIMATED   --   66  76  77  86  78   < >  78   GFRESTBLACK   --   80  >90   GFR Calc    >90   GFR Calc    >90   GFR Calc    >90   GFR Calc     < >   --    POTASSIUM   --   4.6  4.2  3.8  4.0  4.1   < >   --    TSH   --    --   2.06   --    --    --    --   1.80    < > = values in this interval not displayed.      BP Readings from Last 3 Encounters:   02/23/18 96/50   02/21/18 104/60   09/12/17 132/62    Wt Readings from Last 3 Encounters:   02/23/18 181 lb 1.6 oz (82.1 kg)   02/21/18 181 lb 14.4 oz (82.5 kg)   09/12/17 185 lb (83.9 kg)                  Labs reviewed in EPIC    Reviewed and updated as needed this visit by clinical staff  Tobacco  Allergies  Meds  Med Hx  Surg Hx  Fam Hx  Soc Hx      Reviewed and updated as needed this visit by Provider         ROS:  CONSTITUTIONAL: NEGATIVE for fever, chills, change in  weight  INTEGUMENTARY/SKIN: NEGATIVE for worrisome rashes, moles or lesions  RESP: NEGATIVE for significant cough or SOB  CV: NEGATIVE for chest pain, palpitations or peripheral edema  CV: Hx HTN  GI: as above  MUSCULOSKELETAL: NEGATIVE for significant arthralgias or myalgia  NEURO: NEGATIVE for weakness, dizziness or paresthesias  ENDOCRINE: NEGATIVE for temperature intolerance, skin/hair changes  HEME/ALLERGY/IMMUNE: NEGATIVE for bleeding problems  PSYCHIATRIC: NEGATIVE for changes in mood or affect    OBJECTIVE:     BP 96/50 (BP Location: Right arm, Patient Position: Sitting, Cuff Size: Adult Large)  Pulse 86  Temp 97.4  F (36.3  C) (Oral)  Wt 181 lb 1.6 oz (82.1 kg)  SpO2 95%  BMI 30.61 kg/m2  Body mass index is 30.61 kg/(m^2).  GENERAL: healthy, alert and no distress  NECK: no adenopathy, no asymmetry, masses, or scars and thyroid normal to palpation  RESP: lungs clear to auscultation - no rales, rhonchi or wheezes  CV: regular rate and rhythm, normal S1 S2, no S3 or S4, no murmur, click or rub, no peripheral edema and peripheral pulses strong  ABDOMEN: soft,  very minimal right upper quadrant discomfort with no guarding or rigidity, no organomegaly, normal BS, no costovertebral angle tenderness    MS: no gross musculoskeletal defects noted, no edema  SKIN: no suspicious lesions or rashes  NEURO: Normal strength and tone, mentation intact and speech normal  BACK: no CVA tenderness, no paralumbar tenderness  PSYCH: mentation appears normal, affect normal/bright    Diagnostic Test Results:  No results found for this or any previous visit (from the past 24 hour(s)).  Results for orders placed or performed in visit on 02/21/18   **A1C FUTURE anytime   Result Value Ref Range    Hemoglobin A1C 5.8 4.3 - 6.0 %   Albumin Random Urine Quantitative with Creat Ratio   Result Value Ref Range    Creatinine Urine 170 mg/dL    Albumin Urine mg/L 82 mg/L    Albumin Urine mg/g Cr 48.35 (H) 0 - 17 mg/g Cr  "      ASSESSMENT/PLAN:     Hyperlipidemia; controlled   Plan:  No changes in the patient's current treatment plan    Hypertension; controlled   Associated with the following complications:    heart Disease and microalbuminuria   Plan:  No changes in the patient's current treatment plan        BMI:   Estimated body mass index is 30.61 kg/(m^2) as calculated from the following:    Height as of 2/21/18: 5' 4.5\" (1.638 m).    Weight as of this encounter: 181 lb 1.6 oz (82.1 kg).   Weight management plan: Discussed healthy diet and exercise guidelines and patient will follow up in 6 months in clinic to re-evaluate.      1. Flatulence, eructation, and gas pain  ddx-cholelithiasis/constipation/H. pylori dyspepsia/?  Remotely pancreatic pathology   reviewed normal pancreatic enzymes, normal LFT except for slightly elevated AST.    Recommended to schedule for abdominal ultrasound, will get stool H. pylori today.  If these test results are normal, will consider abdominal CT   for persistent or worsening concerns  Reviewed colonoscopy from July 2016 showing tubular adenoma of colon.  Will f/u on results and call with recommendations.  Patient verbalised understanding and is agreeable to the plan.      - US Abdomen Complete; Future  - H Pylori antigen, stool; Future    2. Bloating  as above    - US Abdomen Complete; Future  - H Pylori antigen, stool; Future    3. Elevated AST (SGOT)  as above    - US Abdomen Complete; Future  - H Pylori antigen, stool; Future    4. Hypertension goal BP (blood pressure) < 130/80  Blood pressure is at goal, continue with metoprolol and lisinopril at current doses, recheck in 6 months at the time of physical.  .Will follow low salt diet, weight loss and regular exercises.    - lisinopril (PRINIVIL/ZESTRIL) 20 MG tablet; Take 1 tablet (20 mg) by mouth daily  Dispense: 90 tablet; Refill: 3  - metoprolol succinate (TOPROL-XL) 100 MG 24 hr tablet; Take 1 tablet (100 mg) by mouth daily  Dispense: 90 " tablet; Refill: 3    5. Coronary artery disease involving native coronary artery of native heart without angina pectoris  Stable, continue with control of hypertension, lipids, continue statin, beta-blocker, ACE inhibitor, baby aspirin    - lisinopril (PRINIVIL/ZESTRIL) 20 MG tablet; Take 1 tablet (20 mg) by mouth daily  Dispense: 90 tablet; Refill: 3  - metoprolol succinate (TOPROL-XL) 100 MG 24 hr tablet; Take 1 tablet (100 mg) by mouth daily  Dispense: 90 tablet; Refill: 3  - rosuvastatin (CRESTOR) 40 MG tablet; Take 1 tablet (40 mg) by mouth daily  Dispense: 90 tablet; Refill: 3    6. Hyperlipidemia LDL goal <100  LDL Cholesterol Calculated   Date Value Ref Range Status   08/14/2017 81 <100 mg/dL Final     Comment:     Desirable:       <100 mg/dl   ]    LDL  is at goal, continue with Crestor 40 mg daily healthy eating and regular exercises.,  - rosuvastatin (CRESTOR) 40 MG tablet; Take 1 tablet (40 mg) by mouth daily  Dispense: 90 tablet; Refill: 3    7. Microalbuminuria  Lab Results   Component Value Date    MICROL 82 02/21/2018     No results found for: MICROALBUMIN    Avoid over-the-counter NSAID use, monitor.  Persistent microalbuminuria, emphasized on low salt diet, continue current medications,  - lisinopril (PRINIVIL/ZESTRIL) 20 MG tablet; Take 1 tablet (20 mg) by mouth daily  Dispense: 90 tablet; Refill: 3    8. Prediabetes  Lab Results   Component Value Date    A1C 5.8 02/21/2018    A1C 5.6 08/14/2017    A1C 5.8 02/24/2017    A1C 6.3 06/27/2016    A1C 6.2 12/17/2015     Glucose   Date Value Ref Range Status   02/21/2018 97 70 - 99 mg/dL Final     Comment:     Non Fasting   ]      Negative test results reviewed with the patient and reassured.        Work on weight loss  Regular exercise  Chart documentation done in part with Dragon Voice recognition Software. Although reviewed after completion, some word and grammatical error may remain.    See Patient Instructions    Florian Alonzo MD  [facility]  [facility]

## 2018-02-26 NOTE — TELEPHONE ENCOUNTER
Called patient at home, patient's wife states he will be back shortly.  Will call back in about 30 min.    Leticia Neal RN, [facility]

## 2018-02-26 NOTE — TELEPHONE ENCOUNTER
Called patient and gave message per Dr. Alonzo.  Patient verbalized understanding and agreement to plan.   Transferred to scheduling for CT.    Leticia Neal RN, [facility]

## 2018-02-26 NOTE — TELEPHONE ENCOUNTER
[facility] Call Center    Phone Message: Melissa  [phone]    Reason for Call: Incidental finding Ultrasound- 2/26/208 Urgent alert abnormal appearance of the Pancreas- Follow up CT if recommended.    Action Taken: Message routed to:  Primary Care p [phone]

## 2018-02-26 NOTE — TELEPHONE ENCOUNTER
Please call Bipin and inform him of abdominal ultrasound results-concerning cystic mass of the pancreas needing further evaluation with abdominal CT.  Orders placed, please help him schedule at the earliest.  Will f/u on results and call with recommendations.

## 2018-02-26 NOTE — TELEPHONE ENCOUNTER
Will route to Dr. Alonzo to please review findings from US.    Leticia Neal RN, [facility]

## 2018-02-27 NOTE — TELEPHONE ENCOUNTER
Patient agreeable to appointment scheduled on 3/5/18.    Joann Montes  Primary Care   [facility] [place]

## 2018-02-27 NOTE — TELEPHONE ENCOUNTER
Routing to Dr. Alonzo to advise if a message should be given to patient regarding the positive Occult blood test.  Or just schedule CT first and discuss after the CT results at one time?    Leticia Neal RN, [facility]

## 2018-02-27 NOTE — TELEPHONE ENCOUNTER
I already sent my chart message on CT results.  He needs a colonoscopy for the positive test, but will defer to oncology when they discuss with him about the lymphoma work up.

## 2018-02-27 NOTE — TELEPHONE ENCOUNTER
Patient has not read the [employer] message.     Called patient and gave message per Dr. Alonzo.  He verbalized understanding and agreement to plan.    Also gave message regarding positive occult blood test and need for colonoscopy to further evaluate.  He verbalized understanding for this as well.    Routing to Joann Montes to please assist with scheduling.    Leticia Neal RN, [facility]

## 2018-03-05 NOTE — NURSING NOTE
"Oncology Rooming Note    March 5, 2018 9:56 AM   Francisco Javier Cortes is a 77 year old male who presents for:    Chief Complaint   Patient presents with     Oncology Clinic Visit     Lymphadenopathy/Kuppa ref     Initial Vitals: /77  Pulse 76  Temp 97.7  F (36.5  C)  Ht 1.638 m (5' 4.5\")  Wt 82.1 kg (181 lb 1 oz)  SpO2 96%  BMI 30.6 kg/m2 Estimated body mass index is 30.6 kg/(m^2) as calculated from the following:    Height as of this encounter: 1.638 m (5' 4.5\").    Weight as of this encounter: 82.1 kg (181 lb 1 oz). Body surface area is 1.93 meters squared.  No Pain (0) Comment: Data Unavailable   No LMP for male patient.  Allergies reviewed: Yes  Medications reviewed: Yes    Medications: Medication refills not needed today.  Pharmacy name entered into Serious Parody:    [facility] [place] - [place], [place] - [address], [id number]  [facility] - [facility], [place] - [address]/[id number] - [place], [place] - [address] [place] [address] [facility]        5 minutes for nursing intake (face to face time)     Kathleen Sandoval LPN              "

## 2018-03-05 NOTE — LETTER
3/5/2018         RE: Francisco Javier Cortes  [address] [id number]        Dear Colleague,    Thank you for referring your patient, Francisco Javier Cortes, to the [facility]. Please see a copy of my visit note below.    DATE OF VISIT: Mar 5, 2018    REASON FOR REFERRAL:   Lymphadenopathy    CHIEF COMPLAINT:   Chief Complaint   Patient presents with     Oncology Clinic Visit     Lymphadenopathy/Kuppa ref       HISTORY OF PRESENT ILLNESS:     77-year-old male with past medical history significant for prostate cancer status post prostatectomy, coronary artery disease status post stent placement, hypertension, hyperlipidemia since January was complaining of abdominal bloating/discomfort associated with loss of appetite. He was evaluated by primary care provider with laboratory lab work and abdominal ultrasound. Her son showed abnormal appearance of the pancreas as well as enlarged spleen.  He subsequently underwent CT of the abdomen and pelvis showing extensive retroperitoneal, mesenteric, inguinal, pelvic and retrocrural adenopathy - suspicious for lymphoma.    Deferred to medical oncology for further evaluation and care.  Patient is accompanied in the clinic today by his wife. He continues to have decreased appetite and early satiety. Abdominal bloating/discomfort has resolved. Denies fever/chills, nausea/vomiting, night sweats, fatigue. Has had a 5 pound weight loss in the last month or so but has been intentional.     Eyes history of recurrent infections, bleeding/axis pausing. Prostatectomy was performed 19 years ago and he did not require adjuvant hormonal or therapy or radiation.    REVIEW OF SYSTEMS:      ROS: 14 point ROS neg other than the symptoms noted above in the HPI.    PAST MEDICAL HISTORY:   Past Medical History:   Diagnosis Date     CAD (coronary artery disease) 1/09    s/p stentsx2     Dyslipidemia      Erectile dysfunction      GERD (gastroesophageal reflux disease)       Hypertension      Prostate cancer (H)     prostatecomy 9 years ago, PSAs remain good     Stented coronary artery     stents placed       PAST SURGICAL HISTORY:   Past Surgical History:   Procedure Laterality Date     C APPENDECTOMY       C REMV PROSTATE,RETROPUB,RAD,TOT NODES  1999     CATARACT IOL, RT/LT       COLONOSCOPY       COLONOSCOPY Left 7/5/2016    Procedure: COMBINED COLONOSCOPY, SINGLE OR MULTIPLE BIOPSY/POLYPECTOMY BY BIOPSY;  Surgeon: Duane, William Charles, MD;  Location: [facility] OR     COLONOSCOPY WITH CO2 INSUFFLATION N/A 7/5/2016    Procedure: COLONOSCOPY WITH CO2 INSUFFLATION;  Surgeon: Duane, William Charles, MD;  Location: [facility] OR     ENT SURGERY  1980--1999     PHACOEMULSIFICATION CLEAR CORNEA WITH STANDARD INTRAOCULAR LENS IMPLANT Left 6/18/2015    Procedure: PHACOEMULSIFICATION CLEAR CORNEA WITH STANDARD INTRAOCULAR LENS IMPLANT;  Surgeon: John Zimmerman MD;  Location:  [facility]     PHACOEMULSIFICATION CLEAR CORNEA WITH STANDARD INTRAOCULAR LENS IMPLANT Right 7/16/2015    Procedure: PHACOEMULSIFICATION CLEAR CORNEA WITH STANDARD INTRAOCULAR LENS IMPLANT;  Surgeon: John Zimmerman MD;  Location:  [facility]     STENT, CORONARY, [place]  1/09, 2014    x2, x1 in 2014     VASCULAR SURGERY  2013    stents       ALLERGIES:   Allergies as of 03/05/2018 - Review Complete 03/05/2018   Allergen Reaction Noted     Niacin  08/11/2010     Prevacid [lansoprazole] Diarrhea 06/27/2016     Shellfish allergy  04/24/2012       MEDICATIONS:   Current Outpatient Prescriptions   Medication Sig Dispense Refill     lisinopril (PRINIVIL/ZESTRIL) 20 MG tablet Take 1 tablet (20 mg) by mouth daily 90 tablet 3     metoprolol succinate (TOPROL-XL) 100 MG 24 hr tablet Take 1 tablet (100 mg) by mouth daily 90 tablet 3     rosuvastatin (CRESTOR) 40 MG tablet Take 1 tablet (40 mg) by mouth daily 90 tablet 3     clindamycin (CLEOCIN T) 1 % lotion Apply twice daily for 6 weeks to the groin 60 mL 1     hydrocortisone valerate  (WEST-NINOSKA) 0.2 % ointment Apply sparingly to affected area three times daily as needed. 45 g 3     Lansoprazole (PREVACID PO) Take 15 mg by mouth every morning (before breakfast) Patient needs to use brand name Prevacid (generic version causes diarrhea)       omega 3 1000 MG CAPS Take 1 g by mouth 2 times daily (Patient taking differently: Take 1 g by mouth 2 times daily currently taking 1 tablet daily 3/5/18) 120 capsule 6     Acetaminophen (TYLENOL 8 HOUR PO) Take 500 mg by mouth as needed        MAALOX ADVANCED OR Take 1 tablet as needed (Calcium carbonate 1000mg/Simethicone 80mg)       ASPIRIN 81 MG OR TABS 1 TABLET DAILY       nitroGLYcerin (NITROSTAT) 0.4 MG sublingual tablet Place 1 tablet (0.4 mg) under the tongue every 5 minutes as needed up to 3 tablets per episode. (Patient not taking: Reported on 3/5/2018) 60 tablet 6     vardenafil (LEVITRA) 20 MG tablet Take 1 tablet (20 mg) by mouth daily as needed Take 30 min to 4 hours before intercourse.  Never use with nitroglycerin, terazosin or doxazosin. 6 tablet 2        FAMILY HISTORY:   Family History   Problem Relation Age of Onset     Cardiovascular Mother      HEART DISEASE Mother      Cancer - colorectal Father      HEART DISEASE Father      Other Cancer Father      Asthma Brother      Coronary Artery Disease Brother      Hypertension Brother      HEART DISEASE Son      CANCER Daughter      non hodgkins     Prostate Cancer Brother      Hypertension Brother      Prostate Cancer Maternal Grandfather        SOCIAL HISTORY:   Social History     Social History     Marital status:      Spouse name: N/A     Number of children: N/A     Years of education: N/A     Social History Main Topics     Smoking status: Never Smoker     Smokeless tobacco: Never Used     Alcohol use Yes      Comment: a glass of red wine a day     Drug use: No     Sexual activity: Yes     Partners: Female     Other Topics Concern      Service Yes     [employer]     Blood  "Transfusions No     Caffeine Concern No     Occupational Exposure Yes          Hobby Hazards No     Sleep Concern No     Stress Concern No     Weight Concern No     Special Diet Yes     Back Care No     Exercise Yes     Seat Belt Yes     Self-Exams Yes     Parent/Sibling W/ Cabg, Mi Or Angioplasty Before 65f 55m? Yes     Social History Narrative       PHYSICAL EXAMINATION:   /77  Pulse 76  Temp 97.7  F (36.5  C)  Ht 1.638 m (5' 4.5\")  Wt 82.1 kg (181 lb 1 oz)  SpO2 96%  BMI 30.6 kg/m2  Wt Readings from Last 10 Encounters:   03/05/18 82.1 kg (181 lb 1 oz)   02/23/18 82.1 kg (181 lb 1.6 oz)   02/21/18 82.5 kg (181 lb 14.4 oz)   09/12/17 83.9 kg (185 lb)   08/15/17 82.3 kg (181 lb 6.4 oz)   05/27/17 83.7 kg (184 lb 9.6 oz)   04/04/17 83.3 kg (183 lb 9.6 oz)   02/24/17 82.7 kg (182 lb 4.8 oz)   12/22/16 85.2 kg (187 lb 14.4 oz)   06/27/16 83.9 kg (185 lb)      ECOG performance status: 1  Exam:  Constitutional: healthy, alert and no distress  Head: Normocephalic. No masses, lesions, tenderness or abnormalities  Neck: Neck supple. Left cervical  adenopathy.  ENT: ENT exam normal, no neck nodes or sinus tenderness  Cardiovascular: S1S2 RRR.   Respiratory: negative\". Lungs clear  Gastrointestinal: Abdomen soft, non-tender. BS normal. No masses, organomegaly  Musculoskeletal: extremities normal  Skin: no suspicious lesions or rashes  Neurologic: Gait normal. Sensation grossly WNL.  Psychiatric: mentation appears normal and affect normal/bright  Hematologic/Lymphatic/Immunologic: cervical and inguinal adenopathy        LABORATORY RESULTS:    Recent Labs   Lab Test  02/21/18   1239  08/14/17   0706   03/20/14   1143   NA  138  141   < >  140   POTASSIUM  4.6  4.2   < >  4.6   CHLORIDE  106  107   < >  105   BUN  20  18   < >  15   CR  1.08  0.96   < >  0.96   GLC  97  122*   < >  104*   EVANGELINA  9.2  8.3*   < >  9.4   MAG   --    --    --   2.3    < > = values in this interval not displayed.     Recent Labs "   Lab Test  02/21/18   1239  08/14/17   0706  06/17/15   0844   03/19/14   1554   WBC  9.5  7.4  8.7   < >  9.0   HGB  15.1  16.2  16.3   < >  15.8   PLT  126*  181  111*   < >  234   MCV  93  94  95   < >  92   NEUTROPHIL  67.4  56.7   --    --   73.7    < > = values in this interval not displayed.     Recent Labs   Lab Test  02/21/18   1239  08/14/17   0706  12/17/15   0728  03/19/14   1554   BILITOTAL  0.4  0.5   --   0.2   ALKPHOS  72  43   --   54   ALT  34  35  39  44   AST  47*  25   --   32   ALBUMIN  3.5  3.5   --   4.0     TSH   Date Value Ref Range Status   08/14/2017 2.06 0.40 - 4.00 mU/L Final   ]    IMAGING RESULTS:  Recent Results (from the past 744 hour(s))   US Abdomen Complete   Result Value    Radiologist flags Pancreas (Urgent)    Narrative    EXAMINATION: US ABDOMEN COMPLETE, 2/26/2018 7:27 AM     COMPARISON: None    HISTORY: Flatulence, bloating, elevated aspartate aminotransferase.    TECHNIQUE: The abdomen was scanned in standard fashion with  specialized ultrasound transducer(s) using both grey scale and limited  color Doppler techniques.    Findings:  Pancreas: The visualized portions of the pancreas, head and proximal  body, are almost completely replaced with a solid cystic mass  appearance.  Aorta and IVC:  The visualized portions are not dilated.   Liver: The liver demonstrates normal homogeneous echotexture.  The  main portal vein is patent with antegrade flow, measuring 1.2 cm.  Gallbladder: There is no wall thickening, pericholecystic fluid, nor  sonographic Naylor's sign. No cholelithiasis.  Bile Ducts: There is no intrahepatic bile duct dilation.  The common  bile duct measures 4 mm in diameter.  Kidneys: No hydronephrosis.  The craniocaudal dimensions are: right-  13.2 cm, left- 12.1 cm. And the inferior lateral portion of the left  kidney there are 2 adjacent simple cysts that together measure 1.9 x  1.6 x 1.5 cm, a thin septation cannot be excluded.  Spleen: Enlarged, measuring  15.2 cm in sagittal dimension.  Fluid: No evidence of ascites or pleural effusions.      Impression    Impression:   1.  Abnormal appearance to the pancreas. Further evaluation with CT is  recommended.  2.  Splenomegaly.    [Access Center: Pancreas]    This report will be copied to the [facility] to ensure a  provider acknowledges the finding. Access Center is available Monday  through Friday 8am-3:30 pm.     DONA DHALIWAL MD   CT Abdomen Pelvis w Contrast    Narrative    EXAMINATION: CT ABDOMEN PELVIS W CONTRAST, 2/26/2018 3:50 PM    COMPARISON: To/26/2018 ultrasound    HISTORY: Elevated liver enzymes, abnormal ultrasound showing  pancreatic mass abdominal pain,; Upper abdominal pain; Elevated AST  (SGOT); Abnormal abdominal ultrasound    FINDINGS: There is extensive adenopathy throughout the mesentery,  pelvis, and retroperitoneum which is new from comparison exam. The  largest mesenteric lymph node measures 5 cm in short axis.  Additionally, there is extensive inguinal adenopathy measuring up to  1.6 cm in short axis. Largest retroperitoneal lymph node measures 3.5  cm in short axis. Retrocrural adenopathy is also present.    There are numerous hepatic hypodensities which are too small to fully  characterize but likely benign. No biliary dilation. The pancreas is  partially fatty replaced but without definite mass lesion. The  abnormality seen on recent ultrasound correspond to the adjacent  lymphadenopathy. Spleen is enlarged measuring up to 14 cm.  Gallbladder, adrenal glands are unremarkable. Small benign appearing  cyst in the lower pole the left kidney. The bladder and ureters are  unremarkable. Small amount of free fluid in the pelvis. Sigmoid colon  diverticulosis without surrounding inflammatory changes. Normal  caliber of the small bowel.    There are degenerative changes in the spine and hip. No highly  suspicious bone lesions.    Fibroatelectasis changes in the lung bases. Stable  triangular-shaped  nodule in the inferior left lingula measuring 7 mm is likely an  intrapulmonary lymph node. 6 mm rounded nodule at the left lung base.  This is not well seen on comparison imaging and appears increased in  size or new.      Impression    IMPRESSION:   1. Extensive retroperitoneal, mesenteric, inguinal, pelvic, and  retrocrural adenopathy. Findings highly suspicious for lymphoma.  2. 6 mm nodule in the left lung base appears new or increased in size.  Recommend chest CT for complete evaluation.    MADELAINE CABRAL MD       ASSESSMENT AND PLAN:    77-year-old male with past medical history significant for prostate cancer status post prostatectomy, coronary artery disease status post stent placement, hypertension, hyperlipidemia on workup of complaints of abdominal discomfort and early satiety/loss of appetite  and was noted to have splenomegaly with extensive intra-abdominal and pelvic lymphadenopathy. Physical exam today significant for left cervical lymphadenopathy as well.    Medically doing well overall with no B symptoms    Differential at this point includes most likely advanced lymphoma versus metastatic carcinoma     Patient and the wife were explained that the next  step in this process would be to obtain tissue diagnosis for confirmation by doing an excisional lymph node biopsy. Referred the patient to surgery to determine the optimal site (inguinal versus cervical ) and obtain tissue diagnosis.  Will order CT thorax and CT neck for further staging workup   Obtain CBC with differential, LDH, HIV and hepatitis serologies. Check PSA levels given history of prostate cancer    RTC in 2 weeks for follow-up to discuss above workup and formulated plan of care     The patient is ready to learn, no apparent learning barriers were identified, Diagnosis and treatment plans were explained to the patient. The patient expressed understanding of the content. The patient questions were answered to his  satisfaction.    Chart documentation with Dragon Voice recognition Software. Although reviewed after completion, some words and grammatical errors may remain.    Erickson Adan MD  Attending Physician   Hematology/Medical Oncology        Again, thank you for allowing me to participate in the care of your patient.        Sincerely,        Erickson Adan MD

## 2018-03-05 NOTE — MR AVS SNAPSHOT
After Visit Summary   3/5/2018    Francisco Javier Cortes    MRN: [id number]           Patient Information     Date Of Birth          1940        Visit Information        Provider Department      3/5/2018 9:45 AM Erickson Adan MD [facility]        Today's Diagnoses     LA (lymphadenopathy)    -  1    Malignant neoplasm of prostate (H)           Care Instructions      Excisional Biopsy: Neck Lymph Node     This image shows many of the lymph nodes in the neck area. Your healthcare provider can show you which of your lymph nodes is affected.   The lymph nodes are part of the immune system. These small organs are located throughout the body. There are many of them in the neck. Neck lymph nodes sometimes enlarge. This is most often due to infection, but it can also be caused by cancer. Excisional biopsy helps find the cause of an enlarged lymph node. You may have already had other tests, such as a needle biopsy, but more information is needed for your healthcare provider to diagnose the problem. During an excisional biopsy, part or all of the enlarged lymph node is removed. The removed tissue is then sent to a lab for study. This sheet explains the biopsy procedure and what to expect.  Preparing for the procedure  Prepare for the surgery as you have been instructed. Be sure to tell your healthcare provider about all medicines you take. This includes over-the-counter medicines. It also includes herbs and other supplements. You may need to stop taking some or all of them before surgery. Also, follow any directions you re given for not eating or drinking before surgery.  The day of the procedure  The procedure takes about 60 minutes. Most people go home the same day.  Before the procedure begins  Here is what to expect before surgery:  An IV line is put into a vein in your arm or hand. This line delivers fluids and medicines.    You will be given medicine (anesthesia) to help you sleep and keep  you free of pain during surgery. This may be sedation, which makes you relaxed and sleepy. Local anesthesia is also injected near the lymph node to numb the area. Or, you may receive general anesthesia. This puts you into a state like deep sleep.  During the procedure  Here is what to expect during surgery:     The skin over the enlarged lymph node is marked and cleaned.    An incision (cut) is made through the skin. If possible, the incision is made within the creases of the neck. This makes it less noticeable when it has healed.    Part or all of the enlarged lymph node is removed. It is sent to a lab for testing.    The incision is closed with sutures, staples, surgical glue, or surgical strips. It is then bandaged.    A tube (drain) may be placed near the incision. This drains fluids that may build up after the procedure.  After the procedure  You will be taken to a room to wake up from the anesthesia. You may feel sleepy and nauseated at first. Medicine will be given to manage any pain. If you have a drain, you will be shown how to care for it. When you are ready to go home, have an adult family member or friend drive you. Follow your healthcare provider s instructions for recovery, such as:    Take any prescribed medicine as directed.    Care for your incision as instructed.    Avoid strenuous activity until your healthcare provider says it s OK.  When to call the healthcare provider  Be sure you have a contact number for your healthcare provider so you can get in touch after office hours and on weekends if needed. After you get home, call this number if you have any of the following:    Chest pain or trouble breathing (first call 911)    Fever of 100.4 F (38 C) or higher, or as directed by your healthcare provider    Soreness at the biopsy site that's getting worse    Difficulty turning your head    Symptoms of infection at an incision site, such as increased redness or swelling, warmth, worsening pain, or  foul-smelling drainage    Bleeding or bruising at the incision site    Numbness or tingling in the mouth, jaw, neck, arm, or shoulder    Problems speaking or swallowing    Hoarse voice that worsens   Follow-up  During follow-up visits, your healthcare provider will check on your healing. If you have a drain, it will be removed 1 to 2 days after the procedure. Stitches or staples are removed about 7 to 10 days after the procedure. You and your healthcare provider will also discuss your biopsy results. If cancer was found, further treatment may be needed.  Risks and possible complications  Risks of this procedure include:    Bleeding    Infection    Injury to the spinal accessory nerve, affecting movement of the shoulder (could be temporary or permanent)    Injury to sensory nerves, affecting sensation of the earlobe, neck, or skin of the neck (could be temporary or permanent)    Neck abscess    Scarring    Reopening of the incision    Another enlarged lymph node    Risks of anesthesia (you will discuss these with the anesthesiologist)   Date Last Reviewed: 12/9/2015 2000-2017 [employer]. [address] [place] [address]. All rights reserved. This information is not intended as a substitute for professional medical care. Always follow your healthcare professional's instructions.                Follow-ups after your visit        Additional Services     GENERAL SURG ADULT REFERRAL       Please be aware that coverage of these services is subject to the terms and limitations of your health insurance plan.  Call member services at your health plan with any benefit or coverage questions.      Please bring the following with you to your appointment:    (1) Any X-Rays, CTs or MRIs which have been performed.  Contact the facility where they were done to arrange for  prior to your scheduled appointment.   (2) List of current medications   (3) This referral request   (4) Any documents/labs given  to you for this referral                  Your next 10 appointments already scheduled     Mar 14, 2018  2:20 PM CDT   (Arrive by 2:05 PM)   New Patient Visit with Samia Gaviria MD   [facility] ([facility] [place])    [address]  [address] [facility] [id number] [phone]            Apr 10, 2018  8:00 AM CDT   Return Visit with Jad Ball MD   [facility] ([facility])    [id number] [place] [id number] [phone]              Who to contact     If you have questions or need follow up information about today's clinic visit or your schedule please contact [facility] directly at [phone].  Normal or non-critical lab and imaging results will be communicated to you by [employer]elliFl[employer]art, letter or phone within 4 business days after the clinic has received the results. If you do not hear from us within 7 days, please contact the clinic through [employer] or phone. If you have a critical or abnormal lab result, we will notify you by phone as soon as possible.  Submit refill requests through Shaka or call your pharmacy and they will forward the refill request to us. Please allow 3 business days for your refill to be completed.          Additional Information About Your Visit        Shaka Information     Shaka gives you secure access to your electronic health record. If you see a primary care provider, you can also send messages to your care team and make appointments. If you have questions, please call your primary care clinic.  If you do not have a primary care provider, please call [phone] and they will assist you.      Shaka is an electronic gateway that provides easy, online access to your medical records. With Shaka, you can request a clinic appointment, read your test results, renew a prescription or communicate with your care team.     To access your existing account, please  "contact your [facility] or call [phone] for assistance.        Care EveryWhere ID     This is your Care EveryWhere ID. This could be used by other organizations to access your [place] medical records  [id number]        Your Vitals Were     Pulse Temperature Height Pulse Oximetry BMI (Body Mass Index)       76 97.7  F (36.5  C) 1.638 m (5' 4.5\") 96% 30.6 kg/m2        Blood Pressure from Last 3 Encounters:   No data found for BP    Weight from Last 3 Encounters:   No data found for Wt              Today, you had the following     No orders found for display         Today's Medication Changes          These changes are accurate as of 3/5/18 11:59 PM.  If you have any questions, ask your nurse or doctor.               These medicines have changed or have updated prescriptions.        Dose/Directions    omega 3 1000 MG Caps   This may have changed:  additional instructions   Used for:  Coronary artery disease involving native coronary artery of native heart with angina pectoris (H)        Dose:  1 g   Take 1 g by mouth 2 times daily   Quantity:  120 capsule   Refills:  6                Primary Care Provider Office Phone # Fax #    Florian Alonzo [phone]       [address] [facility] [address]        Equal Access to Services     NANDA BARAJAS : Hadii aad ku hadasho Sonormaali, waaxda luqadaha, qaybta kaalmada adeegyada, zhao lorenzo. So [facility] [phone].    ATENCIÓN: Si habla español, tiene a ivan disposición servicios gratuitos de asistencia lingüística. Llame al [phone].    We comply with applicable federal civil rights laws and [place] laws. We do not discriminate on the basis of race, color, national origin, age, disability, sex, sexual orientation, or gender identity.            Thank you!     Thank you for choosing [facility]  for your care. Our goal is always to provide you with excellent care. " Hearing back from our patients is one way we can continue to improve our services. Please take a few minutes to complete the written survey that you may receive in the mail after your visit with us. Thank you!             Your Updated Medication List - Protect others around you: Learn how to safely use, store and throw away your medicines at www.disposemymeds.org.          This list is accurate as of 3/5/18 11:59 PM.  Always use your most recent med list.                   Brand Name Dispense Instructions for use Diagnosis    aspirin 81 MG tablet      1 TABLET DAILY        clindamycin 1 % lotion    CLEOCIN T    60 mL    Apply twice daily for 6 weeks to the groin    Rash       hydrocortisone valerate 0.2 % ointment    WEST-NINOSKA    45 g    Apply sparingly to affected area three times daily as needed.    Psoriasis       lisinopril 20 MG tablet    PRINIVIL/ZESTRIL    90 tablet    Take 1 tablet (20 mg) by mouth daily    Hypertension goal BP (blood pressure) < 130/80, Coronary artery disease involving native coronary artery of native heart without angina pectoris, Microalbuminuria       MAALOX ADVANCED PO      Take 1 tablet as needed (Calcium carbonate 1000mg/Simethicone 80mg)    Chest pain       metoprolol succinate 100 MG 24 hr tablet    TOPROL-XL    90 tablet    Take 1 tablet (100 mg) by mouth daily    Hypertension goal BP (blood pressure) < 130/80, Coronary artery disease involving native coronary artery of native heart without angina pectoris       nitroGLYcerin 0.4 MG sublingual tablet    NITROSTAT    60 tablet    Place 1 tablet (0.4 mg) under the tongue every 5 minutes as needed up to 3 tablets per episode.    Chest pain due to myocardial ischemia, unspecified ischemic chest pain type (H), Coronary artery disease involving native coronary artery of native heart without angina pectoris       omega 3 1000 MG Caps     120 capsule    Take 1 g by mouth 2 times daily    Coronary artery disease involving native coronary  artery of native heart with angina pectoris (H)       PREVACID PO      Take 15 mg by mouth every morning (before breakfast) Patient needs to use brand name Prevacid (generic version causes diarrhea)        rosuvastatin 40 MG tablet    CRESTOR    90 tablet    Take 1 tablet (40 mg) by mouth daily    Hyperlipidemia LDL goal <100, Coronary artery disease involving native coronary artery of native heart without angina pectoris       TYLENOL 8 HOUR PO      Take 500 mg by mouth as needed        vardenafil 20 MG tablet    LEVITRA    6 tablet    Take 1 tablet (20 mg) by mouth daily as needed Take 30 min to 4 hours before intercourse.  Never use with nitroglycerin, terazosin or doxazosin.    Erectile dysfunction, unspecified erectile dysfunction type

## 2018-03-05 NOTE — PROGRESS NOTES
DATE OF VISIT: Mar 5, 2018    REASON FOR REFERRAL:   Lymphadenopathy    CHIEF COMPLAINT:   Chief Complaint   Patient presents with     Oncology Clinic Visit     Lymphadenopathy/[place] ref       HISTORY OF PRESENT ILLNESS:     77-year-old male with past medical history significant for prostate cancer status post prostatectomy, coronary artery disease status post stent placement, hypertension, hyperlipidemia since January was complaining of abdominal bloating/discomfort associated with loss of appetite. He was evaluated by primary care provider with laboratory lab work and abdominal ultrasound. Her son showed abnormal appearance of the pancreas as well as enlarged spleen.  He subsequently underwent CT of the abdomen and pelvis showing extensive retroperitoneal, mesenteric, inguinal, pelvic and retrocrural adenopathy - suspicious for lymphoma.    Deferred to medical oncology for further evaluation and care.  Patient is accompanied in the clinic today by his wife. He continues to have decreased appetite and early satiety. Abdominal bloating/discomfort has resolved. Denies fever/chills, nausea/vomiting, night sweats, fatigue. Has had a 5 pound weight loss in the last month or so but has been intentional.     Eyes history of recurrent infections, bleeding/axis pausing. Prostatectomy was performed 19 years ago and he did not require adjuvant hormonal or therapy or radiation.    REVIEW OF SYSTEMS:      ROS: 14 point ROS neg other than the symptoms noted above in the HPI.    PAST MEDICAL HISTORY:   Past Medical History:   Diagnosis Date     CAD (coronary artery disease) 1/09    s/p stentsx2     Dyslipidemia      Erectile dysfunction      GERD (gastroesophageal reflux disease)      Hypertension      Prostate cancer (H)     prostatecomy 9 years ago, PSAs remain good     Stented coronary artery     stents placed       PAST SURGICAL HISTORY:   Past Surgical History:   Procedure Laterality Date     C APPENDECTOMY       C REMV  PROSTATE,RETROPUB,RAD,TOT NODES  1999     CATARACT IOL, RT/LT       COLONOSCOPY       COLONOSCOPY Left 7/5/2016    Procedure: COMBINED COLONOSCOPY, SINGLE OR MULTIPLE BIOPSY/POLYPECTOMY BY BIOPSY;  Surgeon: Duane, William Charles, MD;  Location: [facility] OR     COLONOSCOPY WITH CO2 INSUFFLATION N/A 7/5/2016    Procedure: COLONOSCOPY WITH CO2 INSUFFLATION;  Surgeon: Duane, William Charles, MD;  Location: [facility] OR     ENT SURGERY  1980--1999     PHACOEMULSIFICATION CLEAR CORNEA WITH STANDARD INTRAOCULAR LENS IMPLANT Left 6/18/2015    Procedure: PHACOEMULSIFICATION CLEAR CORNEA WITH STANDARD INTRAOCULAR LENS IMPLANT;  Surgeon: John Zimmerman MD;  Location:  [facility]     PHACOEMULSIFICATION CLEAR CORNEA WITH STANDARD INTRAOCULAR LENS IMPLANT Right 7/16/2015    Procedure: PHACOEMULSIFICATION CLEAR CORNEA WITH STANDARD INTRAOCULAR LENS IMPLANT;  Surgeon: John Zimmerman MD;  Location:  [facility]     STENT, CORONARY, [place]  1/09, 2014    x2, x1 in 2014     VASCULAR SURGERY  2013    stents       ALLERGIES:   Allergies as of 03/05/2018 - Review Complete 03/05/2018   Allergen Reaction Noted     Niacin  08/11/2010     Prevacid [lansoprazole] Diarrhea 06/27/2016     Shellfish allergy  04/24/2012       MEDICATIONS:   Current Outpatient Prescriptions   Medication Sig Dispense Refill     lisinopril (PRINIVIL/ZESTRIL) 20 MG tablet Take 1 tablet (20 mg) by mouth daily 90 tablet 3     metoprolol succinate (TOPROL-XL) 100 MG 24 hr tablet Take 1 tablet (100 mg) by mouth daily 90 tablet 3     rosuvastatin (CRESTOR) 40 MG tablet Take 1 tablet (40 mg) by mouth daily 90 tablet 3     clindamycin (CLEOCIN T) 1 % lotion Apply twice daily for 6 weeks to the groin 60 mL 1     hydrocortisone valerate (WEST-NINOSKA) 0.2 % ointment Apply sparingly to affected area three times daily as needed. 45 g 3     Lansoprazole (PREVACID PO) Take 15 mg by mouth every morning (before breakfast) Patient needs to use brand name Prevacid (generic version causes  diarrhea)       omega 3 1000 MG CAPS Take 1 g by mouth 2 times daily (Patient taking differently: Take 1 g by mouth 2 times daily currently taking 1 tablet daily 3/5/18) 120 capsule 6     Acetaminophen (TYLENOL 8 HOUR PO) Take 500 mg by mouth as needed        MAALOX ADVANCED OR Take 1 tablet as needed (Calcium carbonate 1000mg/Simethicone 80mg)       ASPIRIN 81 MG OR TABS 1 TABLET DAILY       nitroGLYcerin (NITROSTAT) 0.4 MG sublingual tablet Place 1 tablet (0.4 mg) under the tongue every 5 minutes as needed up to 3 tablets per episode. (Patient not taking: Reported on 3/5/2018) 60 tablet 6     vardenafil (LEVITRA) 20 MG tablet Take 1 tablet (20 mg) by mouth daily as needed Take 30 min to 4 hours before intercourse.  Never use with nitroglycerin, terazosin or doxazosin. 6 tablet 2        FAMILY HISTORY:   Family History   Problem Relation Age of Onset     Cardiovascular Mother      HEART DISEASE Mother      Cancer - colorectal Father      HEART DISEASE Father      Other Cancer Father      Asthma Brother      Coronary Artery Disease Brother      Hypertension Brother      HEART DISEASE Son      CANCER Daughter      non hodgkins     Prostate Cancer Brother      Hypertension Brother      Prostate Cancer Maternal Grandfather        SOCIAL HISTORY:   Social History     Social History     Marital status:      Spouse name: N/A     Number of children: N/A     Years of education: N/A     Social History Main Topics     Smoking status: Never Smoker     Smokeless tobacco: Never Used     Alcohol use Yes      Comment: a glass of red wine a day     Drug use: No     Sexual activity: Yes     Partners: Female     Other Topics Concern      Service Yes     [employer]     Blood Transfusions No     Caffeine Concern No     Occupational Exposure Yes          Hobby Hazards No     Sleep Concern No     Stress Concern No     Weight Concern No     Special Diet Yes     Back Care No     Exercise Yes     Seat Belt Yes      "Self-Exams Yes     Parent/Sibling W/ Cabg, Mi Or Angioplasty Before 65f 55m? Yes     Social History Narrative       PHYSICAL EXAMINATION:   /77  Pulse 76  Temp 97.7  F (36.5  C)  Ht 1.638 m (5' 4.5\")  Wt 82.1 kg (181 lb 1 oz)  SpO2 96%  BMI 30.6 kg/m2  Wt Readings from Last 10 Encounters:   03/05/18 82.1 kg (181 lb 1 oz)   02/23/18 82.1 kg (181 lb 1.6 oz)   02/21/18 82.5 kg (181 lb 14.4 oz)   09/12/17 83.9 kg (185 lb)   08/15/17 82.3 kg (181 lb 6.4 oz)   05/27/17 83.7 kg (184 lb 9.6 oz)   04/04/17 83.3 kg (183 lb 9.6 oz)   02/24/17 82.7 kg (182 lb 4.8 oz)   12/22/16 85.2 kg (187 lb 14.4 oz)   06/27/16 83.9 kg (185 lb)      ECOG performance status: 1  Exam:  Constitutional: healthy, alert and no distress  Head: Normocephalic. No masses, lesions, tenderness or abnormalities  Neck: Neck supple. Left cervical  adenopathy.  ENT: ENT exam normal, no neck nodes or sinus tenderness  Cardiovascular: S1S2 RRR.   Respiratory: negative\". Lungs clear  Gastrointestinal: Abdomen soft, non-tender. BS normal. No masses, organomegaly  Musculoskeletal: extremities normal  Skin: no suspicious lesions or rashes  Neurologic: Gait normal. Sensation grossly WNL.  Psychiatric: mentation appears normal and affect normal/bright  Hematologic/Lymphatic/Immunologic: cervical and inguinal adenopathy        LABORATORY RESULTS:    Recent Labs   Lab Test  02/21/18   1239  08/14/17   0706   03/20/14   1143   NA  138  141   < >  140   POTASSIUM  4.6  4.2   < >  4.6   CHLORIDE  106  107   < >  105   BUN  20  18   < >  15   CR  1.08  0.96   < >  0.96   GLC  97  122*   < >  104*   EVANGELINA  9.2  8.3*   < >  9.4   MAG   --    --    --   2.3    < > = values in this interval not displayed.     Recent Labs   Lab Test  02/21/18   1239  08/14/17   0706  06/17/15   0844   03/19/14   1554   WBC  9.5  7.4  8.7   < >  9.0   HGB  15.1  16.2  16.3   < >  15.8   PLT  126*  181  111*   < >  234   MCV  93  94  95   < >  92   NEUTROPHIL  67.4  56.7   --    --   " 73.7    < > = values in this interval not displayed.     Recent Labs   Lab Test  02/21/18   1239  08/14/17   0706  12/17/15   0728  03/19/14   1554   BILITOTAL  0.4  0.5   --   0.2   ALKPHOS  72  43   --   54   ALT  34  35  39  44   AST  47*  25   --   32   ALBUMIN  3.5  3.5   --   4.0     TSH   Date Value Ref Range Status   08/14/2017 2.06 0.40 - 4.00 mU/L Final   ]    IMAGING RESULTS:  Recent Results (from the past 744 hour(s))   US Abdomen Complete   Result Value    Radiologist flags Pancreas (Urgent)    Narrative    EXAMINATION: US ABDOMEN COMPLETE, 2/26/2018 7:27 AM     COMPARISON: None    HISTORY: Flatulence, bloating, elevated aspartate aminotransferase.    TECHNIQUE: The abdomen was scanned in standard fashion with  specialized ultrasound transducer(s) using both grey scale and limited  color Doppler techniques.    Findings:  Pancreas: The visualized portions of the pancreas, head and proximal  body, are almost completely replaced with a solid cystic mass  appearance.  Aorta and IVC:  The visualized portions are not dilated.   Liver: The liver demonstrates normal homogeneous echotexture.  The  main portal vein is patent with antegrade flow, measuring 1.2 cm.  Gallbladder: There is no wall thickening, pericholecystic fluid, nor  sonographic Naylor's sign. No cholelithiasis.  Bile Ducts: There is no intrahepatic bile duct dilation.  The common  bile duct measures 4 mm in diameter.  Kidneys: No hydronephrosis.  The craniocaudal dimensions are: right-  13.2 cm, left- 12.1 cm. And the inferior lateral portion of the left  kidney there are 2 adjacent simple cysts that together measure 1.9 x  1.6 x 1.5 cm, a thin septation cannot be excluded.  Spleen: Enlarged, measuring 15.2 cm in sagittal dimension.  Fluid: No evidence of ascites or pleural effusions.      Impression    Impression:   1.  Abnormal appearance to the pancreas. Further evaluation with CT is  recommended.  2.  Splenomegaly.    [Access Center:  Pancreas]    This report will be copied to the [facility] to ensure a  provider acknowledges the finding. Access Center is available Monday  through Friday 8am-3:30 pm.     DONA DHALIWAL MD   CT Abdomen Pelvis w Contrast    Narrative    EXAMINATION: CT ABDOMEN PELVIS W CONTRAST, 2/26/2018 3:50 PM    COMPARISON: To/26/2018 ultrasound    HISTORY: Elevated liver enzymes, abnormal ultrasound showing  pancreatic mass abdominal pain,; Upper abdominal pain; Elevated AST  (SGOT); Abnormal abdominal ultrasound    FINDINGS: There is extensive adenopathy throughout the mesentery,  pelvis, and retroperitoneum which is new from comparison exam. The  largest mesenteric lymph node measures 5 cm in short axis.  Additionally, there is extensive inguinal adenopathy measuring up to  1.6 cm in short axis. Largest retroperitoneal lymph node measures 3.5  cm in short axis. Retrocrural adenopathy is also present.    There are numerous hepatic hypodensities which are too small to fully  characterize but likely benign. No biliary dilation. The pancreas is  partially fatty replaced but without definite mass lesion. The  abnormality seen on recent ultrasound correspond to the adjacent  lymphadenopathy. Spleen is enlarged measuring up to 14 cm.  Gallbladder, adrenal glands are unremarkable. Small benign appearing  cyst in the lower pole the left kidney. The bladder and ureters are  unremarkable. Small amount of free fluid in the pelvis. Sigmoid colon  diverticulosis without surrounding inflammatory changes. Normal  caliber of the small bowel.    There are degenerative changes in the spine and hip. No highly  suspicious bone lesions.    Fibroatelectasis changes in the lung bases. Stable triangular-shaped  nodule in the inferior left lingula measuring 7 mm is likely an  intrapulmonary lymph node. 6 mm rounded nodule at the left lung base.  This is not well seen on comparison imaging and appears increased in  size or new.      Impression     IMPRESSION:   1. Extensive retroperitoneal, mesenteric, inguinal, pelvic, and  retrocrural adenopathy. Findings highly suspicious for lymphoma.  2. 6 mm nodule in the left lung base appears new or increased in size.  Recommend chest CT for complete evaluation.    MADELAINE CABRAL MD       ASSESSMENT AND PLAN:    77-year-old male with past medical history significant for prostate cancer status post prostatectomy, coronary artery disease status post stent placement, hypertension, hyperlipidemia on workup of complaints of abdominal discomfort and early satiety/loss of appetite  and was noted to have splenomegaly with extensive intra-abdominal and pelvic lymphadenopathy. Physical exam today significant for left cervical lymphadenopathy as well.    Medically doing well overall with no B symptoms    Differential at this point includes most likely advanced lymphoma versus metastatic carcinoma     Patient and the wife were explained that the next  step in this process would be to obtain tissue diagnosis for confirmation by doing an excisional lymph node biopsy. Referred the patient to surgery to determine the optimal site (inguinal versus cervical ) and obtain tissue diagnosis.  Will order CT thorax and CT neck for further staging workup   Obtain CBC with differential, LDH, HIV and hepatitis serologies. Check PSA levels given history of prostate cancer    RTC in 2 weeks for follow-up to discuss above workup and formulated plan of care     The patient is ready to learn, no apparent learning barriers were identified, Diagnosis and treatment plans were explained to the patient. The patient expressed understanding of the content. The patient questions were answered to his satisfaction.    Chart documentation with Dragon Voice recognition Software. Although reviewed after completion, some words and grammatical errors may remain.    Erickson Adan MD  Attending Physician   Hematology/Medical Oncology

## 2018-03-05 NOTE — PROGRESS NOTES
Met with patient to discuss follow up plan going forward.  He is to have ENT appointment for lymph node biopsy.  Given written teaching on excisional neck node biopsy via Clinical Reference printout.  Business card of care coordinator given.  Pt introduced to scheduling to set up all follow up appointments.    Beti Caballero RN

## 2018-03-05 NOTE — PATIENT INSTRUCTIONS
Excisional Biopsy: Neck Lymph Node     This image shows many of the lymph nodes in the neck area. Your healthcare provider can show you which of your lymph nodes is affected.   The lymph nodes are part of the immune system. These small organs are located throughout the body. There are many of them in the neck. Neck lymph nodes sometimes enlarge. This is most often due to infection, but it can also be caused by cancer. Excisional biopsy helps find the cause of an enlarged lymph node. You may have already had other tests, such as a needle biopsy, but more information is needed for your healthcare provider to diagnose the problem. During an excisional biopsy, part or all of the enlarged lymph node is removed. The removed tissue is then sent to a lab for study. This sheet explains the biopsy procedure and what to expect.  Preparing for the procedure  Prepare for the surgery as you have been instructed. Be sure to tell your healthcare provider about all medicines you take. This includes over-the-counter medicines. It also includes herbs and other supplements. You may need to stop taking some or all of them before surgery. Also, follow any directions you re given for not eating or drinking before surgery.  The day of the procedure  The procedure takes about 60 minutes. Most people go home the same day.  Before the procedure begins  Here is what to expect before surgery:  An IV line is put into a vein in your arm or hand. This line delivers fluids and medicines.    You will be given medicine (anesthesia) to help you sleep and keep you free of pain during surgery. This may be sedation, which makes you relaxed and sleepy. Local anesthesia is also injected near the lymph node to numb the area. Or, you may receive general anesthesia. This puts you into a state like deep sleep.  During the procedure  Here is what to expect during surgery:     The skin over the enlarged lymph node is marked and cleaned.    An incision (cut) is  made through the skin. If possible, the incision is made within the creases of the neck. This makes it less noticeable when it has healed.    Part or all of the enlarged lymph node is removed. It is sent to a lab for testing.    The incision is closed with sutures, staples, surgical glue, or surgical strips. It is then bandaged.    A tube (drain) may be placed near the incision. This drains fluids that may build up after the procedure.  After the procedure  You will be taken to a room to wake up from the anesthesia. You may feel sleepy and nauseated at first. Medicine will be given to manage any pain. If you have a drain, you will be shown how to care for it. When you are ready to go home, have an adult family member or friend drive you. Follow your healthcare provider s instructions for recovery, such as:    Take any prescribed medicine as directed.    Care for your incision as instructed.    Avoid strenuous activity until your healthcare provider says it s OK.  When to call the healthcare provider  Be sure you have a contact number for your healthcare provider so you can get in touch after office hours and on weekends if needed. After you get home, call this number if you have any of the following:    Chest pain or trouble breathing (first call 911)    Fever of 100.4 F (38 C) or higher, or as directed by your healthcare provider    Soreness at the biopsy site that's getting worse    Difficulty turning your head    Symptoms of infection at an incision site, such as increased redness or swelling, warmth, worsening pain, or foul-smelling drainage    Bleeding or bruising at the incision site    Numbness or tingling in the mouth, jaw, neck, arm, or shoulder    Problems speaking or swallowing    Hoarse voice that worsens   Follow-up  During follow-up visits, your healthcare provider will check on your healing. If you have a drain, it will be removed 1 to 2 days after the procedure. Stitches or staples are removed about  7 to 10 days after the procedure. You and your healthcare provider will also discuss your biopsy results. If cancer was found, further treatment may be needed.  Risks and possible complications  Risks of this procedure include:    Bleeding    Infection    Injury to the spinal accessory nerve, affecting movement of the shoulder (could be temporary or permanent)    Injury to sensory nerves, affecting sensation of the earlobe, neck, or skin of the neck (could be temporary or permanent)    Neck abscess    Scarring    Reopening of the incision    Another enlarged lymph node    Risks of anesthesia (you will discuss these with the anesthesiologist)   Date Last Reviewed: 12/9/2015 2000-2017 [employer]. [address] [place] [address], [place] [address]. All rights reserved. This information is not intended as a substitute for professional medical care. Always follow your healthcare professional's instructions.

## 2018-03-14 NOTE — NURSING NOTE
Teaching Flowsheet - ENT   Relevant Diagnosis: Lymphadenopathy   Teaching Topic: excisional node biopsy   Person(s) involved in teaching: Patient        Motivation Level:  Asks Questions:   Yes  Eager to Learn:   Yes  Cooperative:   Yes  Receptive (willing/able to accept information):   Yes  Comments: Reviewed pre-op H and P,  NPO prior to  surgery,  pre-op scrub (given Hibiclens)  Reviewed post-op  cares , activity and pain.     Patient demonstrates understanding of the following:  Reason for the appointment, diagnosis and treatment plan:   Yes  Knowledge of proper use of medications and conditions for which they are ordered (with special attention to potential side effects or drug interactions):  stop aspirin products 1 week before surgery Yes  Which situations necessitate calling provider and whom to contact:   Yes  Nutritional needs and diet plan:   Yes  Pain management techniques:   Yes  Patient instructed on hand hygiene:  Yes  How and/when to access community resources:   Yes     Infection Prevention:  Patient   demonstrates understanding of the following:  Surgical procedure site care taught Yes  Signs and symptoms of infection taught Yes  Wound care taught Yes  Instructional Materials Used/Given: pre- op booklet,verbal  Instruction.    Debbi Baumann, RN, BSN

## 2018-03-14 NOTE — NURSING NOTE
Chief Complaint   Patient presents with     Consult     [place] Lymphadenopathy referral from Dr. Loco Sinclair Medical Assistant

## 2018-03-14 NOTE — LETTER
3/14/2018       RE: Francisco Javier Cortes  [address] [address]     Dear Colleague,    Thank you for referring your patient, Francisco Javier Cortes, to the [facility] EAR NOSE AND THROAT at [facility]. Please see a copy of my visit note below.    Dear Dr. Adan:    I had the pleasure of meeting Francisco Javier Cortes in consultation today at the [facility] at your request.     History of Present Illness:   Francisco Javier Cortes is a 77 year old man who presents for evaluation of lymphadenopathy. He underwent initial evaluation by PCP for abdominal discomfort and loss of appetite. He was found to have an enlarge spleen and pancreas. A CT scan of the abdomen/pelvis showed significant abdominal/inguinal/pelvic adenopathy. He was referred to Dr Adan in medical oncology for further evaluation. At that time further imaging was obtained showing adenopathy in the axilla, neck and chest, all concerning for lymphoma. He has not had a biopsy yet. He says today that the abdominal complaints have improved. He is not having any B cell symptoms including fevers, chills, night sweats. He lost about 5 pounds. He says he has some swallowing issues and feels that occasionally foods get stuck. He is on prilosec.    PMH/PSH: prostate cancer s/p prostatectomy, CAD with stent placement and on 81 mg aspirin (no other anticoagulation), HTN, HLP, history of some sort of ear surgery x 2, some sort of nose surgery    FH: daughter with non-Hodgkins lymphoma    Social history: denies smoking, small glass of wine at night    MEDICATIONS:     Current Outpatient Prescriptions   Medication Sig Dispense Refill     lisinopril (PRINIVIL/ZESTRIL) 20 MG tablet Take 1 tablet (20 mg) by mouth daily 90 tablet 3     metoprolol succinate (TOPROL-XL) 100 MG 24 hr tablet Take 1 tablet (100 mg) by mouth daily 90 tablet 3     rosuvastatin (CRESTOR) 40 MG tablet Take 1 tablet (40 mg) by mouth daily 90  tablet 3     nitroGLYcerin (NITROSTAT) 0.4 MG sublingual tablet Place 1 tablet (0.4 mg) under the tongue every 5 minutes as needed up to 3 tablets per episode. 60 tablet 6     clindamycin (CLEOCIN T) 1 % lotion Apply twice daily for 6 weeks to the groin 60 mL 1     hydrocortisone valerate (WEST-NINOSKA) 0.2 % ointment Apply sparingly to affected area three times daily as needed. 45 g 3     vardenafil (LEVITRA) 20 MG tablet Take 1 tablet (20 mg) by mouth daily as needed Take 30 min to 4 hours before intercourse.  Never use with nitroglycerin, terazosin or doxazosin. 6 tablet 2     Lansoprazole (PREVACID PO) Take 15 mg by mouth every morning (before breakfast) Patient needs to use brand name Prevacid (generic version causes diarrhea)       omega 3 1000 MG CAPS Take 1 g by mouth 2 times daily (Patient taking differently: Take 1 g by mouth 2 times daily currently taking 1 tablet daily 3/5/18) 120 capsule 6     Acetaminophen (TYLENOL 8 HOUR PO) Take 500 mg by mouth as needed        MAALOX ADVANCED OR Take 1 tablet as needed (Calcium carbonate 1000mg/Simethicone 80mg)       ASPIRIN 81 MG OR TABS 1 TABLET DAILY         ALLERGIES:    Allergies   Allergen Reactions     Niacin      Severe rash and itching     Prevacid [Lansoprazole] Diarrhea     Patient notes diarrhea with 30mg generic version. Patient does ok with 15mg in generic and brand name.     Shellfish Allergy      One kind       HABITS/SOCIAL HISTORY:   Denies smoking  Small glass of wine at night      Social History     Social History     Marital status:      Spouse name: N/A     Number of children: N/A     Years of education: N/A     Occupational History     Not on file.     Social History Main Topics     Smoking status: Never Smoker     Smokeless tobacco: Never Used     Alcohol use Yes      Comment: a glass of red wine a day     Drug use: No     Sexual activity: Not Currently     Partners: Female     Birth control/ protection: Post-menopausal     Other  Topics Concern      Service Yes      [employer]     Blood Transfusions No     Caffeine Concern No     Occupational Exposure Yes          Hobby Hazards No     Sleep Concern No     Stress Concern No     Weight Concern No     Special Diet Yes     Back Care No     Exercise Yes     Seat Belt Yes     Self-Exams Yes     Parent/Sibling W/ Cabg, Mi Or Angioplasty Before 65f 55m? Yes     Social History Narrative       PAST MEDICAL HISTORY:   Past Medical History:   Diagnosis Date     CAD (coronary artery disease) 1/09    s/p stentsx2     Dyslipidemia      Erectile dysfunction      GERD (gastroesophageal reflux disease)      Hearing problem One ear 1961     Hypertension      Pneumonia      Prostate cancer (H)     prostatecomy 9 years ago, PSAs remain good     Stented coronary artery     stents placed        PAST SURGICAL HISTORY:   Past Surgical History:   Procedure Laterality Date     C APPENDECTOMY       C REMV PROSTATE,RETROPUB,RAD,TOT NODES  1999     CATARACT IOL, RT/LT       COLONOSCOPY       COLONOSCOPY Left 7/5/2016    Procedure: COMBINED COLONOSCOPY, SINGLE OR MULTIPLE BIOPSY/POLYPECTOMY BY BIOPSY;  Surgeon: Duane, William Charles, MD;  Location: [facility] OR     COLONOSCOPY WITH CO2 INSUFFLATION N/A 7/5/2016    Procedure: COLONOSCOPY WITH CO2 INSUFFLATION;  Surgeon: Duane, William Charles, MD;  Location: [facility] OR     ENT SURGERY  1980--1999     PHACOEMULSIFICATION CLEAR CORNEA WITH STANDARD INTRAOCULAR LENS IMPLANT Left 6/18/2015    Procedure: PHACOEMULSIFICATION CLEAR CORNEA WITH STANDARD INTRAOCULAR LENS IMPLANT;  Surgeon: John Zimmerman MD;  Location:  [facility]     PHACOEMULSIFICATION CLEAR CORNEA WITH STANDARD INTRAOCULAR LENS IMPLANT Right 7/16/2015    Procedure: PHACOEMULSIFICATION CLEAR CORNEA WITH STANDARD INTRAOCULAR LENS IMPLANT;  Surgeon: John Zimmerman MD;  Location:  [facility]     STENT, CORONARY, [place]  1/09, 2014    x2, x1 in 2014     TYMPANOPLASTY       VASCULAR SURGERY  2013    stents  "      FAMILY HISTORY:    Family History   Problem Relation Age of Onset     Cardiovascular Mother      HEART DISEASE Mother      Cancer - colorectal Father      HEART DISEASE Father      Other Cancer Father      CANCER Father      Hypertension Father      Asthma Brother      Coronary Artery Disease Brother      Hypertension Brother      HEART DISEASE Brother      HEART DISEASE Son      Hypertension Son      CANCER Daughter      non hodgkins     Prostate Cancer Brother      Hypertension Brother      CANCER Brother      Prostate Cancer Maternal Grandfather      CANCER Maternal Grandfather      Muscular Disorder Maternal Grandfather      HEART DISEASE Paternal Grandmother      Hypertension Paternal Grandmother      Hypertension Sister        REVIEW OF SYSTEMS:  12 point ROS was negative other than the symptoms noted above in the HPI.  Patient Supplied Answers to Review of Systems  [facility] ENT ROS 3/14/2018   Constitutional Appetite change, Unexplained fatigue, Problems with sleep   Ears, Nose, Throat Nasal congestion or drainage, Trouble swallowing   Cardiopulmonary Cough   Gastrointestinal/Genitourinary Heartburn/indigestion, Diarrhea         PHYSICAL EXAMINATION:   /73  Pulse 79  Temp 98.3  F (36.8  C)  Ht 1.638 m (5' 4.5\")  Wt 81.2 kg (179 lb)  SpO2 95%  BMI 30.25 kg/m2  Appearance:   normal; NAD, age-appropriate appearance, well-developed, normal habitus   Communication:   normal; communicates verbally, normal voice quality   Head/Face:   inspection -  Normal; no scars or visible lesions   Salivary glands -  Normal size, no tenderness, swelling, or palpable masses   Facial strength -  Normal and symmetric bilateral; H/B I/VI   Skin:  normal, no rash   Ocular Motility:  normal occular movements   Ears:  auricle (AD) -  normal  EAC (AD) -  normal  TM (AD) -  No effusion  auricle (AS) -  normal  EAC (AS) -  normal  TM (AS) -  No effusion  Normal clinical speech reception   Nose:  Ext. inspection -  Normal "   Oral Cavity:  lips -  Normal mucosa, oral competence, and stoma size   Age-appropriate dentition, healthy gingival mucosa   Hard palate, buccal, floor of mouth mucosa normal   Tongue - normal movement, no lesions   Oropharynx:  mucosa -  Normal, no lesions  soft palate -  Normal, no lesions, no asymmetry, normal elevation   Neck: No visible mass or asymmetry   Normal palpation, no tenderness, no tracheal deviation  Normal range of motion   Lymphatic:  left supraclavicular fossa with 2 enlarged nodes that are mobile  Palpable level 2 adenopathy   Cardiovascular:  warm, pink, well-perfused extremities without swelling, tenderness, or edema   Respiratory:  Normal respiratory effort, no stridor   Neuro/Psych.:  mood/affect -  normal  mental status -  normal  cranial nerves -  normal        RESULTS REVIEWED:   I reviewed Dr Adan's referral notes which are summarized above    I independently reviewed the CT scan images which show extensive lymphadenopathy in the neck bilaterally with large node in left supraclavicular fossa    Findings:   Evaluation of the mucosal space demonstrates no evident abnormality in  the nasopharynx, oropharynx, hypopharynx or the glottis. The tongue  base appears normal. The parotid glands demonstrate numerous enlarged  lymph nodes, otherwise the major salivary glands are unremarkable..  The thyroid is unremarkable.     Extensive cervical, mediastinal, and axillary lymphadenopathy. The  fascial spaces in the neck are intact bilaterally. The major vascular  structures in the neck appear unremarkable.     Evaluation of the osseous structures demonstrate no worrisome lytic or  sclerotic lesion. No overt spinal canal or neuroforaminal stenosis.  Multilevel degenerative disc disease. The visualized paranasal sinuses  are clear. The mastoid air cells are clear.      The visualized lung apices are clear.         Impression: Extensive cervical, mediastinal, and axillary  lymphadenopathy, suspicious  for lymphoma.        IMPRESSION AND PLAN:   Francisco Javier Cortes is a 77 year old man with a history of lymphadenopathy in the neck, chest, abdomen and pelvis concerning for lymphoma. An FNA was performed in clinic today by pathology. Specimen was sent for flow cytology. I discussed with the patient that we perform the FNA first just to ensure that there is not a H&N pathology present as the management would be different, though this is most likely lymphoma. I discussed an excisional biopsy of one of the nodes with the patient along with the risks of the procedure. We discussed risks of bleeding, scarring, damage to nerves, and if we attempt to biopsy the left supraclavicular node the risk of a chyle leak. He may have a drain placed postoperatively. He will be seen for clearance by PAC prior to surgery. We have scheduled him for the excisional node biopsy next week pending the results of the FNA.    Thank you very much for the opportunity to participate in the care of your patient.      Samia Gaviria M.D.  Otolaryngology- Head & Neck Surgery    CC:  Erickson Adan MD  Department of Oncology  [facility]    Davis Alonzo MD  [address] [facility] [address]

## 2018-03-14 NOTE — MR AVS SNAPSHOT
After Visit Summary   3/14/2018    Francisco Javier Cortes    MRN: [id number]           Patient Information     Date Of Birth          1940        Visit Information        Provider Department      3/14/2018 2:20 PM Samia Gaviria MD [facility] Ear Nose and Throat        Today's Diagnoses     Lymphadenopathy    -  1      Care Instructions    1. Patient is scheduled for surgery on 3/21/18 at 7:30am. You need to arrive at 5:30am.   2. You are scheduled for a PAC appointment on Friday 3/16/18 at 2:00pm .   3. Patient to avoid blood thinning medications 1 week prior to surgery (Ibuprofen, Aleve, Aspirin, etc.)   4. Patient to review contents within the surgical packet & use the antiseptic scrub as directed.   5. Patient to call the ENT clinic with further questions or concerns: [phone]              Follow-ups after your visit        Your next 10 appointments already scheduled     Mar 16, 2018  2:00 PM CDT   (Arrive by 1:45 PM)   PAC EVALUATION with  Pac Karolyn 1   [facility] ([facility] [place])    [address] [place] [id number] [phone]            Mar 16, 2018  3:00 PM CDT   (Arrive by 2:45 PM)   PAC RN ASSESSMENT with  Pac Rn   [facility] [place] ([facility])    [address] [place] [id number] [phone]            Mar 16, 2018  3:30 PM CDT   (Arrive by 3:15 PM)   PAC Anesthesia Consult with  Pac Anesthesiologist   [facility] [place] ([facility])    [address] [place] [id number] [phone]            Mar 22, 2018   Procedure with Samia Gaviria MD   [facility] [place] Same Day Surgery (--)    [address] [facility] [address] [phone]            Mar 30, 2018  2:20 PM CDT   (Arrive by 2:05 PM)   Return Visit with MD ANTONIO Sewell [facility] Ear Nose and  "Throat ([facility])    [address]  4th Floor  [facility] [id number] [phone]            Apr 09, 2018  7:45 AM CDT   [facility] Visit with Erickson [facility]an MD   [facility]    [address] [facility] [address] [phone]            Apr 10, 2018  8:00 AM CDT   [facility] Visit with Jad Ball MD   [facility]    [address] [facility] [address] [phone]              Who to contact     Please call your clinic at [phone] to:    Ask questions about your health    Make or cancel appointments    Discuss your medicines    Learn about your test results    Speak to your doctor            Additional Information About Your Visit        [facility] [employer] Information     [employer] gives you secure access to your electronic health record. If you see a primary care provider, you can also send messages to your care team and make appointments. If you have questions, please call your primary care clinic.  If you do not have a primary care provider, please call [phone] and they will assist you.      [employer] is an electronic gateway that provides easy, online access to your medical records. With [employer], you can request a clinic appointment, read your test results, renew a prescription or communicate with your care team.     To access your existing account, please contact your [facility] or call [phone] for assistance.        Care EveryWhere ID     This is your Care EveryWhere ID. This could be used by other organizations to access your [facility] medical records  [id number]        Your Vitals Were     Pulse Temperature Height Pulse Oximetry BMI (Body Mass Index)       79 98.3  F (36.8  C) 1.638 m (5' 4.5\") 95% 30.25 kg/m2        Blood Pressure from Last 3 Encounters:   03/14/18 125/73   03/05/18 120/77   02/23/18 96/50    Weight " from Last 3 Encounters:   03/14/18 81.2 kg (179 lb)   03/05/18 82.1 kg (181 lb 1 oz)   02/23/18 82.1 kg (181 lb 1.6 oz)              We Performed the Following     Fine needle aspiration     IMAGESTREAM RECORDING ORDER     Leukemia Lymphoma Evaluation (Flow Cytometry)     Christi-Operative Worksheet (Head & Neck)          Today's Medication Changes          These changes are accurate as of 3/14/18 11:59 PM.  If you have any questions, ask your nurse or doctor.               These medicines have changed or have updated prescriptions.        Dose/Directions    omega 3 1000 MG Caps   This may have changed:  additional instructions   Used for:  Coronary artery disease involving native coronary artery of native heart with angina pectoris (H)        Dose:  1 g   Take 1 g by mouth 2 times daily   Quantity:  120 capsule   Refills:  6                Primary Care Provider Office Phone # Fax #    Florian Alonzo [phone]       [address]  [facility] [address]        Equal Access to Services     [facility]: Hadii aad ku hadasho Soomaali, waaxda luqadaha, qaybta kaalmada adeegyada, waxay idiin haychrisn sharron bazan . So [facility] [phone].    ATENCIÓN: Si habla español, tiene a ivan disposición servicios gratuitos de asistencia lingüística. Llame al [phone].    We comply with applicable federal civil rights laws and [place] laws. We do not discriminate on the basis of race, color, national origin, age, disability, sex, sexual orientation, or gender identity.            Thank you!     Thank you for choosing [facility] EAR NOSE AND THROAT  for your care. Our goal is always to provide you with excellent care. Hearing back from our patients is one way we can continue to improve our services. Please take a few minutes to complete the written survey that you may receive in the mail after your visit with us. Thank you!             Your Updated Medication List - Protect others around you: Learn how  to safely use, store and throw away your medicines at www.disposemymeds.org.          This list is accurate as of 3/14/18 11:59 PM.  Always use your most recent med list.                   Brand Name Dispense Instructions for use Diagnosis    aspirin 81 MG tablet      1 TABLET DAILY        clindamycin 1 % lotion    CLEOCIN T    60 mL    Apply twice daily for 6 weeks to the groin    Rash       hydrocortisone valerate 0.2 % ointment    WEST-NINOSKA    45 g    Apply sparingly to affected area three times daily as needed.    Psoriasis       lisinopril 20 MG tablet    PRINIVIL/ZESTRIL    90 tablet    Take 1 tablet (20 mg) by mouth daily    Hypertension goal BP (blood pressure) < 130/80, Coronary artery disease involving native coronary artery of native heart without angina pectoris, Microalbuminuria       MAALOX ADVANCED PO      Take 1 tablet as needed (Calcium carbonate 1000mg/Simethicone 80mg)    Chest pain       metoprolol succinate 100 MG 24 hr tablet    TOPROL-XL    90 tablet    Take 1 tablet (100 mg) by mouth daily    Hypertension goal BP (blood pressure) < 130/80, Coronary artery disease involving native coronary artery of native heart without angina pectoris       nitroGLYcerin 0.4 MG sublingual tablet    NITROSTAT    60 tablet    Place 1 tablet (0.4 mg) under the tongue every 5 minutes as needed up to 3 tablets per episode.    Chest pain due to myocardial ischemia, unspecified ischemic chest pain type (H), Coronary artery disease involving native coronary artery of native heart without angina pectoris       omega 3 1000 MG Caps     120 capsule    Take 1 g by mouth 2 times daily    Coronary artery disease involving native coronary artery of native heart with angina pectoris (H)       PREVACID PO      Take 15 mg by mouth every morning (before breakfast) Patient needs to use brand name Prevacid (generic version causes diarrhea)        rosuvastatin 40 MG tablet    CRESTOR    90 tablet    Take 1 tablet (40 mg) by mouth  daily    Hyperlipidemia LDL goal <100, Coronary artery disease involving native coronary artery of native heart without angina pectoris       TYLENOL 8 HOUR PO      Take 500 mg by mouth as needed        vardenafil 20 MG tablet    LEVITRA    6 tablet    Take 1 tablet (20 mg) by mouth daily as needed Take 30 min to 4 hours before intercourse.  Never use with nitroglycerin, terazosin or doxazosin.    Erectile dysfunction, unspecified erectile dysfunction type

## 2018-03-14 NOTE — PATIENT INSTRUCTIONS
1. Patient is scheduled for surgery on 3/21/18 at 7:30am. You need to arrive at 5:30am.   2. You are scheduled for a PAC appointment on Friday 3/16/18 at 2:00pm .   3. Patient to avoid blood thinning medications 1 week prior to surgery (Ibuprofen, Aleve, Aspirin, etc.)   4. Patient to review contents within the surgical packet & use the antiseptic scrub as directed.   5. Patient to call the ENT clinic with further questions or concerns: [phone]

## 2018-03-15 NOTE — PROGRESS NOTES
Dear Dr. Adan:    I had the pleasure of meeting Francisco Javier Cortes in consultation today at the [facility] at your request.     History of Present Illness:   Francisco Javier Cortes is a 77 year old man who presents for evaluation of lymphadenopathy. He underwent initial evaluation by PCP for abdominal discomfort and loss of appetite. He was found to have an enlarge spleen and pancreas. A CT scan of the abdomen/pelvis showed significant abdominal/inguinal/pelvic adenopathy. He was referred to Dr Adan in medical oncology for further evaluation. At that time further imaging was obtained showing adenopathy in the axilla, neck and chest, all concerning for lymphoma. He has not had a biopsy yet. He says today that the abdominal complaints have improved. He is not having any B cell symptoms including fevers, chills, night sweats. He lost about 5 pounds. He says he has some swallowing issues and feels that occasionally foods get stuck. He is on prilosec.    PMH/PSH: prostate cancer s/p prostatectomy, CAD with stent placement and on 81 mg aspirin (no other anticoagulation), HTN, HLP, history of some sort of ear surgery x 2, some sort of nose surgery    FH: daughter with non-Hodgkins lymphoma    Social history: denies smoking, small glass of wine at night    MEDICATIONS:     Current Outpatient Prescriptions   Medication Sig Dispense Refill     lisinopril (PRINIVIL/ZESTRIL) 20 MG tablet Take 1 tablet (20 mg) by mouth daily 90 tablet 3     metoprolol succinate (TOPROL-XL) 100 MG 24 hr tablet Take 1 tablet (100 mg) by mouth daily 90 tablet 3     rosuvastatin (CRESTOR) 40 MG tablet Take 1 tablet (40 mg) by mouth daily 90 tablet 3     nitroGLYcerin (NITROSTAT) 0.4 MG sublingual tablet Place 1 tablet (0.4 mg) under the tongue every 5 minutes as needed up to 3 tablets per episode. 60 tablet 6     clindamycin (CLEOCIN T) 1 % lotion Apply twice daily for 6 weeks to the groin 60 mL 1     hydrocortisone valerate  (WEST-NINOSKA) 0.2 % ointment Apply sparingly to affected area three times daily as needed. 45 g 3     vardenafil (LEVITRA) 20 MG tablet Take 1 tablet (20 mg) by mouth daily as needed Take 30 min to 4 hours before intercourse.  Never use with nitroglycerin, terazosin or doxazosin. 6 tablet 2     Lansoprazole (PREVACID PO) Take 15 mg by mouth every morning (before breakfast) Patient needs to use brand name Prevacid (generic version causes diarrhea)       omega 3 1000 MG CAPS Take 1 g by mouth 2 times daily (Patient taking differently: Take 1 g by mouth 2 times daily currently taking 1 tablet daily 3/5/18) 120 capsule 6     Acetaminophen (TYLENOL 8 HOUR PO) Take 500 mg by mouth as needed        MAALOX ADVANCED OR Take 1 tablet as needed (Calcium carbonate 1000mg/Simethicone 80mg)       ASPIRIN 81 MG OR TABS 1 TABLET DAILY         ALLERGIES:    Allergies   Allergen Reactions     Niacin      Severe rash and itching     Prevacid [Lansoprazole] Diarrhea     Patient notes diarrhea with 30mg generic version. Patient does ok with 15mg in generic and brand name.     Shellfish Allergy      One kind       HABITS/SOCIAL HISTORY:   Denies smoking  Small glass of wine at night      Social History     Social History     Marital status:      Spouse name: N/A     Number of children: N/A     Years of education: N/A     Occupational History     Not on file.     Social History Main Topics     Smoking status: Never Smoker     Smokeless tobacco: Never Used     Alcohol use Yes      Comment: a glass of red wine a day     Drug use: No     Sexual activity: Not Currently     Partners: Female     Birth control/ protection: Post-menopausal     Other Topics Concern      Service Yes     [employer]     Blood Transfusions No     Caffeine Concern No     Occupational Exposure Yes          Hobby Hazards No     Sleep Concern No     Stress Concern No     Weight Concern No     Special Diet Yes     Back Care No     Exercise  Yes     Seat Belt Yes     Self-Exams Yes     Parent/Sibling W/ Cabg, Mi Or Angioplasty Before 65f 55m? Yes     Social History Narrative       PAST MEDICAL HISTORY:   Past Medical History:   Diagnosis Date     CAD (coronary artery disease) 1/09    s/p stentsx2     Dyslipidemia      Erectile dysfunction      GERD (gastroesophageal reflux disease)      Hearing problem One ear 1961     Hypertension      Pneumonia      Prostate cancer (H)     prostatecomy 9 years ago, PSAs remain good     Stented coronary artery     stents placed        PAST SURGICAL HISTORY:   Past Surgical History:   Procedure Laterality Date     C APPENDECTOMY       C REMV PROSTATE,RETROPUB,RAD,TOT NODES  1999     CATARACT IOL, RT/LT       COLONOSCOPY       COLONOSCOPY Left 7/5/2016    Procedure: COMBINED COLONOSCOPY, SINGLE OR MULTIPLE BIOPSY/POLYPECTOMY BY BIOPSY;  Surgeon: Duane, William Charles, MD;  Location: [facility] OR     COLONOSCOPY WITH CO2 INSUFFLATION N/A 7/5/2016    Procedure: COLONOSCOPY WITH CO2 INSUFFLATION;  Surgeon: Duane, William Charles, MD;  Location: [facility] OR     ENT SURGERY  1980--1999     PHACOEMULSIFICATION CLEAR CORNEA WITH STANDARD INTRAOCULAR LENS IMPLANT Left 6/18/2015    Procedure: PHACOEMULSIFICATION CLEAR CORNEA WITH STANDARD INTRAOCULAR LENS IMPLANT;  Surgeon: John Zimmerman MD;  Location:  [facility]     PHACOEMULSIFICATION CLEAR CORNEA WITH STANDARD INTRAOCULAR LENS IMPLANT Right 7/16/2015    Procedure: PHACOEMULSIFICATION CLEAR CORNEA WITH STANDARD INTRAOCULAR LENS IMPLANT;  Surgeon: John Zimmerman MD;  Location:  [facility]     STENT, CORONARY, [place]  1/09, 2014    x2, x1 in 2014     TYMPANOPLASTY       VASCULAR SURGERY  2013    stents       FAMILY HISTORY:    Family History   Problem Relation Age of Onset     Cardiovascular Mother      HEART DISEASE Mother      Cancer - colorectal Father      HEART DISEASE Father      Other Cancer Father      CANCER Father      Hypertension Father      Asthma Brother      Coronary  "Artery Disease Brother      Hypertension Brother      HEART DISEASE Brother      HEART DISEASE Son      Hypertension Son      CANCER Daughter      non hodgkins     Prostate Cancer Brother      Hypertension Brother      CANCER Brother      Prostate Cancer Maternal Grandfather      CANCER Maternal Grandfather      Muscular Disorder Maternal Grandfather      HEART DISEASE Paternal Grandmother      Hypertension Paternal Grandmother      Hypertension Sister        REVIEW OF SYSTEMS:  12 point ROS was negative other than the symptoms noted above in the HPI.  Patient Supplied Answers to Review of Systems  [facility] ENT ROS 3/14/2018   Constitutional Appetite change, Unexplained fatigue, Problems with sleep   Ears, Nose, Throat Nasal congestion or drainage, Trouble swallowing   Cardiopulmonary Cough   Gastrointestinal/Genitourinary Heartburn/indigestion, Diarrhea         PHYSICAL EXAMINATION:   /73  Pulse 79  Temp 98.3  F (36.8  C)  Ht 1.638 m (5' 4.5\")  Wt 81.2 kg (179 lb)  SpO2 95%  BMI 30.25 kg/m2  Appearance:   normal; NAD, age-appropriate appearance, well-developed, normal habitus   Communication:   normal; communicates verbally, normal voice quality   Head/Face:   inspection -  Normal; no scars or visible lesions   Salivary glands -  Normal size, no tenderness, swelling, or palpable masses   Facial strength -  Normal and symmetric bilateral; H/B I/VI   Skin:  normal, no rash   Ocular Motility:  normal occular movements   Ears:  auricle (AD) -  normal  EAC (AD) -  normal  TM (AD) -  No effusion  auricle (AS) -  normal  EAC (AS) -  normal  TM (AS) -  No effusion  Normal clinical speech reception   Nose:  Ext. inspection -  Normal   Oral Cavity:  lips -  Normal mucosa, oral competence, and stoma size   Age-appropriate dentition, healthy gingival mucosa   Hard palate, buccal, floor of mouth mucosa normal   Tongue - normal movement, no lesions   Oropharynx:  mucosa -  Normal, no lesions  soft palate -  Normal, no " lesions, no asymmetry, normal elevation   Neck: No visible mass or asymmetry   Normal palpation, no tenderness, no tracheal deviation  Normal range of motion   Lymphatic:  left supraclavicular fossa with 2 enlarged nodes that are mobile  Palpable level 2 adenopathy   Cardiovascular:  warm, pink, well-perfused extremities without swelling, tenderness, or edema   Respiratory:  Normal respiratory effort, no stridor   Neuro/Psych.:  mood/affect -  normal  mental status -  normal  cranial nerves -  normal        RESULTS REVIEWED:   I reviewed Dr Adan's referral notes which are summarized above    I independently reviewed the CT scan images which show extensive lymphadenopathy in the neck bilaterally with large node in left supraclavicular fossa    Findings:   Evaluation of the mucosal space demonstrates no evident abnormality in  the nasopharynx, oropharynx, hypopharynx or the glottis. The tongue  base appears normal. The parotid glands demonstrate numerous enlarged  lymph nodes, otherwise the major salivary glands are unremarkable..  The thyroid is unremarkable.     Extensive cervical, mediastinal, and axillary lymphadenopathy. The  fascial spaces in the neck are intact bilaterally. The major vascular  structures in the neck appear unremarkable.     Evaluation of the osseous structures demonstrate no worrisome lytic or  sclerotic lesion. No overt spinal canal or neuroforaminal stenosis.  Multilevel degenerative disc disease. The visualized paranasal sinuses  are clear. The mastoid air cells are clear.      The visualized lung apices are clear.         Impression: Extensive cervical, mediastinal, and axillary  lymphadenopathy, suspicious for lymphoma.        IMPRESSION AND PLAN:   Francisco Javier Cortes is a 77 year old man with a history of lymphadenopathy in the neck, chest, abdomen and pelvis concerning for lymphoma. An FNA was performed in clinic today by pathology. Specimen was sent for flow cytology. I discussed with the  patient that we perform the FNA first just to ensure that there is not a H&N pathology present as the management would be different, though this is most likely lymphoma. I discussed an excisional biopsy of one of the nodes with the patient along with the risks of the procedure. We discussed risks of bleeding, scarring, damage to nerves, and if we attempt to biopsy the left supraclavicular node the risk of a chyle leak. He may have a drain placed postoperatively. He will be seen for clearance by PAC prior to surgery. We have scheduled him for the excisional node biopsy next week pending the results of the FNA.    Thank you very much for the opportunity to participate in the care of your patient.      Samia Gaviria M.D.  Otolaryngology- Head & Neck Surgery      CC:  Erickson Adan MD  Department of Oncology  [facility]    Davis Alonzo MD  [address]  [facility] [address]

## 2018-03-15 NOTE — TELEPHONE ENCOUNTER
Reason for call:  Other   Patient called regarding (reason for call): Patient states he got a message on my chart awhile ago about doing or updating helath care directive.    He wants to know if he should get an appointment?   a form?  He wasn't too sure about what he is to be doing.   Additional comments: na    Phone number to reach patient:  Cell number on file:    Telephone Information:   Mobile [phone]       Best Time:  any    Can we leave a detailed message on this number?  YES

## 2018-03-16 NOTE — TELEPHONE ENCOUNTER
Patient advised that:  Health Care Directive dated 1-16-12 received as noted below reviewed for validation. Found to be invalid due to both witnesses also being named as agent. Validation form completed and sent for scanning to note invalid document. Copy of validation form will be sent to patient with contact information for resolution. Notified Danis Fountain facilitator at clinic for assistance. Obdulia Sam RN, System ACP Coordinator 12/6/2012.      I advised patient that there needs to be 3 signatures.  Patients signature, the agent(person making decisions for him) and a witness.    Patient stated understanding.      New Healthcare Directive packets mailed to patients home address/Packet sent for his wife too.  Per patietn request.    Mamie Rivera CMA

## 2018-03-16 NOTE — PATIENT INSTRUCTIONS
Preparing for Your Surgery      Name:  Francisco Javier Cortes   MRN:  [id number]   :  1940   Today's Date:  3/16/2018     Arriving for surgery:  Surgery date:  3/22  Arrival time:  5:30AM  Please come to:       [facility] [address]  [address] [place]  [address]    -   parking is available in front of the hospital from 5:15 am to 8:00 pm    -  Stop at the Information Desk in the lobby    -   Inform the information person that you are here for surgery. An escort to 3c will be provided. If you would not like an escort, please proceed to 3C on the 3rd floor. [phone]     What can I eat or drink?  -  You may have solid food or milk products until 8 hours prior to your surgery--do not eat food after 11:30PM on the night before surgery   -  You may have water, apple juice or 7up/Sprite until 2 hours prior to your surgery--OK to have CLEAR liquids until 5:30AM on the day of surgery     Which medicines can I take?(please hold your fish oil starting today  -  Do NOT take these medications in the morning, the day of surgery:    Lisinopril  maalox  Cleocin  Hydrocortisone,   Levitra            -  Please take these medications the day of surgery:    Metoprolol  Prevacid  Aspirin  Tylenol as needed  Crestor          How do I prepare myself?  -  Take two showers: one the night before surgery; and one the morning of surgery.         Use Scrubcare or Hibiclens to wash from neck down.  You may use your own shampoo and conditioner. No other hair products.   -  Do NOT use lotion, powder, deodorant, or antiperspirant the day of your surgery.  -  Do NOT wear any makeup, fingernail polish or jewelry.  -Do not bring your own medications to the hospital, except for inhalers and eye drops.  -  Bring your ID and insurance card.    Questions or Concerns:  If you have questions or concerns regarding the day of surgery, please call the Preoperative Assessment Center (PAC), Monday-Friday  7AM-7PM:  [phone].  After surgery please call your surgeons office.       AFTER YOUR SURGERY  Breathing exercises   Breathing exercises help you recover faster. Take deep breaths and let the air out slowly. This will:     Help you wake up after surgery.    Help prevent complications like pneumonia.  Preventing complications will help you go home sooner.   We may give you a breathing device (incentive spirometer) to encourage you to breathe deeply.   Nausea and vomiting   You may feel sick to your stomach after surgery; if so, let your nurse know.    Pain control:  After surgery, you may have pain. Our goal is to help you manage your pain. Pain medicine will help you feel comfortable enough to do activities that will help you heal.  These activities may include breathing exercises, walking and physical therapy.   To help your health care team treat your pain we will ask: 1) If you have pain  2) where it is located 3) describe your pain in your words  Methods of pain control include medications given by mouth, vein or by nerve block for some surgeries.  We may give you a pain control pump that will:  1) Deliver the medicine through a tube placed in your vein  2) Control the amount of medicine you receive  3) Allow you to push a button to deliver a dose of pain medicine  Sequential Compression Device (SCD) or Pneumo Boots:  You may need to wear SCD S on your legs or feet. These are wraps connected to a machine that pumps in air and releases it. The repeated pumping helps prevent blood clots from forming.

## 2018-03-16 NOTE — MR AVS SNAPSHOT
After Visit Summary   3/16/2018    Francisco Javier Cortes    MRN: [id number]           Patient Information     Date Of Birth          1940        Visit Information        Provider Department      3/16/2018 3:00 PM Rn, [facility]        Care Instructions    Preparing for Your Surgery      Name:  Francisco Javier Cortes   MRN:  [id number]   :  1940   Today's Date:  3/16/2018     Arriving for surgery:  Surgery date:  3/22  Arrival time:  5:30AM  Please come to:       [facility] [address]  [address] [place]  [address]    -   parking is available in front of the hospital from 5:15 am to 8:00 pm    -  Stop at the Information Desk in the lobby    -   Inform the information person that you are here for surgery. An escort to 3c will be provided. If you would not like an escort, please proceed to 3C on the 3rd floor. [phone]     What can I eat or drink?  -  You may have solid food or milk products until 8 hours prior to your surgery--do not eat food after 11:30PM on the night before surgery   -  You may have water, apple juice or 7up/Sprite until 2 hours prior to your surgery--OK to have CLEAR liquids until 5:30AM on the day of surgery     Which medicines can I take?(please hold your fish oil starting today  -  Do NOT take these medications in the morning, the day of surgery:    Lisinopril  maalox  Cleocin  Hydrocortisone,   Levitra            -  Please take these medications the day of surgery:    Metoprolol  Prevacid  Aspirin  Tylenol as needed  Crestor          How do I prepare myself?  -  Take two showers: one the night before surgery; and one the morning of surgery.         Use Scrubcare or Hibiclens to wash from neck down.  You may use your own shampoo and conditioner. No other hair products.   -  Do NOT use lotion, powder, deodorant, or antiperspirant the day of your surgery.  -  Do NOT wear any makeup, fingernail polish  or jewelry.  -Do not bring your own medications to the hospital, except for inhalers and eye drops.  -  Bring your ID and insurance card.    Questions or Concerns:  If you have questions or concerns regarding the day of surgery, please call the Preoperative Assessment Center (PAC), Monday-Friday 7AM-7PM:  [phone].  After surgery please call your surgeons office.       AFTER YOUR SURGERY  Breathing exercises   Breathing exercises help you recover faster. Take deep breaths and let the air out slowly. This will:     Help you wake up after surgery.    Help prevent complications like pneumonia.  Preventing complications will help you go home sooner.   We may give you a breathing device (incentive spirometer) to encourage you to breathe deeply.   Nausea and vomiting   You may feel sick to your stomach after surgery; if so, let your nurse know.    Pain control:  After surgery, you may have pain. Our goal is to help you manage your pain. Pain medicine will help you feel comfortable enough to do activities that will help you heal.  These activities may include breathing exercises, walking and physical therapy.   To help your health care team treat your pain we will ask: 1) If you have pain  2) where it is located 3) describe your pain in your words  Methods of pain control include medications given by mouth, vein or by nerve block for some surgeries.  We may give you a pain control pump that will:  1) Deliver the medicine through a tube placed in your vein  2) Control the amount of medicine you receive  3) Allow you to push a button to deliver a dose of pain medicine  Sequential Compression Device (SCD) or Pneumo Boots:  You may need to wear SCD S on your legs or feet. These are wraps connected to a machine that pumps in air and releases it. The repeated pumping helps prevent blood clots from forming.           Follow-ups after your visit        Your next 10 appointments already scheduled     Mar 16, 2018  3:00 PM CDT    (Arrive by 2:45 PM)   PAC RN ASSESSMENT with Uc Pac Rn   [facility] ([facility])    [address] [facility] [address]   [phone]            Mar 16, 2018  3:30 PM CDT   (Arrive by 3:15 PM)   PAC Anesthesia Consult with Uc Pac Anesthesiologist   [facility] ([facility])    [address] [facility] [address]   [phone]            Mar 22, 2018   Procedure with Samia Gaviria MD   [facility] [place] Same Day [facility] (--)    [address] [facility] [address]   [phone]            Mar 30, 2018  2:20 PM CDT   (Arrive by 2:05 PM)   Return Visit with Samia Gaviria MD   [facility] ([facility])    [address] [address]   [phone]            Apr 09, 2018  7:45 AM CDT   Return Visit with Erickson Adan MD   [facility] ([facility])    [address] [place] [id number] [phone]            Apr 10, 2018  8:00 AM CDT   Return Visit with Jad Ball MD   [facility] ([facility])    [address] [place] [phone]              Who to contact     Please call your clinic at [phone] to:    Ask questions about your health    Make or cancel appointments    Discuss your medicines    Learn about your test results    Speak to your doctor            Additional Information About Your Visit        MyChart Information     [employer] gives you secure access to your electronic health record. If you see a primary care provider, you can also send messages to your care team and make appointments. If you have questions, please call your primary care clinic.  If you do not have a primary care provider, please call [phone] and they will assist you.      [employer] is an  electronic gateway that provides easy, online access to your medical records. With [employer], you can request a clinic appointment, read your test results, renew a prescription or communicate with your care team.     To access your existing account, please contact your [facility] or call [phone] for assistance.        Care EveryWhere ID     This is your Care EveryWhere ID. This could be used by other organizations to access your [place] medical records  [id number]         Blood Pressure from Last 3 Encounters:   03/16/18 119/76   03/14/18 125/73   03/05/18 120/77    Weight from Last 3 Encounters:   03/16/18 81.2 kg (179 lb)   03/14/18 81.2 kg (179 lb)   03/05/18 82.1 kg (181 lb 1 oz)              Today, you had the following     No orders found for display         Today's Medication Changes          These changes are accurate as of 3/16/18  2:34 PM.  If you have any questions, ask your nurse or doctor.               These medicines have changed or have updated prescriptions.        Dose/Directions    lisinopril 20 MG tablet   Commonly known as:  PRINIVIL/ZESTRIL   This may have changed:  when to take this   Used for:  Hypertension goal BP (blood pressure) < 130/80, Coronary artery disease involving native coronary artery of native heart without angina pectoris, Microalbuminuria        Dose:  20 mg   Take 1 tablet (20 mg) by mouth daily   Quantity:  90 tablet   Refills:  3       metoprolol succinate 100 MG 24 hr tablet   Commonly known as:  TOPROL-XL   This may have changed:  when to take this   Used for:  Hypertension goal BP (blood pressure) < 130/80, Coronary artery disease involving native coronary artery of native heart without angina pectoris        Dose:  100 mg   Take 1 tablet (100 mg) by mouth daily   Quantity:  90 tablet   Refills:  3       omega 3 1000 MG Caps   This may have changed:  additional instructions   Used for:  Coronary artery disease involving native  coronary artery of native heart with angina pectoris (H)        Dose:  1 g   Take 1 g by mouth 2 times daily   Quantity:  120 capsule   Refills:  6       rosuvastatin 40 MG tablet   Commonly known as:  CRESTOR   This may have changed:  when to take this   Used for:  Hyperlipidemia LDL goal <100, Coronary artery disease involving native coronary artery of native heart without angina pectoris        Dose:  40 mg   Take 1 tablet (40 mg) by mouth daily   Quantity:  90 tablet   Refills:  3                Primary Care Provider Office Phone # Fax #    Florian Alonzo [phone] [address]  [facility] [address]        Equal Access to Services     [facility]: Hadii rivera collazo hadanita Solina, waaxda lusheba, qaybta kaalmada bryce, zhao bazan . So [facility] [phone].    ATENCIÓN: Si habla español, tiene a ivan disposición servicios gratuitos de asistencia lingüística. [facility] [phone].    We comply with applicable federal civil rights laws and [place] laws. We do not discriminate on the basis of race, color, national origin, age, disability, sex, sexual orientation, or gender identity.            Thank you!     Thank you for choosing [facility]  for your care. Our goal is always to provide you with excellent care. Hearing back from our patients is one way we can continue to improve our services. Please take a few minutes to complete the written survey that you may receive in the mail after your visit with us. Thank you!             Your Updated Medication List - Protect others around you: Learn how to safely use, store and throw away your medicines at www.disposemymeds.org.          This list is accurate as of 3/16/18  2:34 PM.  Always use your most recent med list.                   Brand Name Dispense Instructions for use Diagnosis    aspirin 81 MG tablet      1 TABLET EVERY MORNING        clindamycin 1 % lotion    CLEOCIN T     60 mL    Apply twice daily for 6 weeks to the groin    Rash       hydrocortisone valerate 0.2 % ointment    WEST-NINOSKA    45 g    Apply sparingly to affected area three times daily as needed.    Psoriasis       lisinopril 20 MG tablet    PRINIVIL/ZESTRIL    90 tablet    Take 1 tablet (20 mg) by mouth daily    Hypertension goal BP (blood pressure) < 130/80, Coronary artery disease involving native coronary artery of native heart without angina pectoris, Microalbuminuria       MAALOX ADVANCED PO      Take 1 tablet as needed (Calcium carbonate 1000mg/Simethicone 80mg)    Chest pain       metoprolol succinate 100 MG 24 hr tablet    TOPROL-XL    90 tablet    Take 1 tablet (100 mg) by mouth daily    Hypertension goal BP (blood pressure) < 130/80, Coronary artery disease involving native coronary artery of native heart without angina pectoris       nitroGLYcerin 0.4 MG sublingual tablet    NITROSTAT    60 tablet    Place 1 tablet (0.4 mg) under the tongue every 5 minutes as needed up to 3 tablets per episode.    Chest pain due to myocardial ischemia, unspecified ischemic chest pain type (H), Coronary artery disease involving native coronary artery of native heart without angina pectoris       omega 3 1000 MG Caps     120 capsule    Take 1 g by mouth 2 times daily    Coronary artery disease involving native coronary artery of native heart with angina pectoris (H)       PREVACID PO      Take 20 mg by mouth every evening Patient needs to use brand name Prevacid (generic version causes diarrhea)        rosuvastatin 40 MG tablet    CRESTOR    90 tablet    Take 1 tablet (40 mg) by mouth daily    Hyperlipidemia LDL goal <100, Coronary artery disease involving native coronary artery of native heart without angina pectoris       TYLENOL 8 HOUR PO      Take 500 mg by mouth as needed

## 2018-03-16 NOTE — ANESTHESIA PREPROCEDURE EVALUATION
Anesthesia Evaluation     . Pt has had prior anesthetic. Type: General and MAC    No history of anesthetic complications          ROS/MED HX    ENT/Pulmonary: Comment: Neg sleep study     (-) tobacco use, asthma, COPD, sleep apnea and recent URI   Neurologic:      (-) CVA   Cardiovascular:     (+) Dyslipidemia, hypertension--CAD, -past MI,-stent,last 6-7 yrs ago  3 Drug Eluting Stent .. Taking blood thinners Pt has received instructions: Instructions Given to patient: Will remain on. . . :. . Previous cardiac testing Echodate:2014results:date: results:ECG reviewed date:last 2015 results:SB rate 56, old inferior infarctCath date: 2014 results:         (-) CHF and COLE   METS/Exercise Tolerance:  4 - Raking leaves, gardening   Hematologic:  - neg hematologic  ROS       Musculoskeletal:  - neg musculoskeletal ROS       GI/Hepatic:     (+) GERD Asymptomatic on medication,       Renal/Genitourinary:  - ROS Renal section negative       Endo:     (+) Obesity, .      Psychiatric:  - neg psychiatric ROS       Infectious Disease:  - neg infectious disease ROS       Malignancy:   (+) Malignancy History of Prostate  Prostate CA Remission status post Surgery, LN enlargement, concern for lymphoma        Other:    (+) No chance of pregnancy C-spine cleared: N/A, no H/O Chronic Pain,no other significant disability                    Physical Exam      Airway   Mallampati: II  TM distance: >3 FB  Neck ROM: full    Dental   (+) caps    Cardiovascular   Rhythm and rate: regular and normal      Pulmonary    breath sounds clear to auscultation    Other findings: Echo 2014     Interpretation Summary  Global and regional left ventricular function is normal with an EF of 60-65%.   Pulmonary artery systolic pressure cannot be assessed. The inferior vena cava    is normal. No pericardial effusion is present.    Angiogram 2014  Impression:  NSTEMI secondary to 1 vessel CAD (mid-LCx 90%)  Widely patent stents with minimal ISR in mid RCA and  RPLA  Successful PCI with STEPHEN to LCx           PAC Discussion and Assessment    ASA Classification: 3  Case is suitable for: DoctorC  Anesthetic techniques and relevant risks discussed: GA  Invasive monitoring and risk discussed: No  Types:   Possibility and Risk of blood transfusion discussed: No  NPO instructions given:   Additional anesthetic preparation and risks discussed:   Needs early admission to pre-op area:   Other:     PAC Resident/NP Anesthesia Assessment:  Francisco Javier Cortes is a 77 year old male scheduled to undergo excisional lymph node biopsy with Dr. Gaviria on 3/22/18. He has the following specific operative considerations:   - RCRI : 0.9% risk of major adverse cardiac event.   - VTE risk: 3%  - Risk of PONV score = 2.  If > 2, anti-emetic intervention recommended.    Last procedures for cataracts and colonoscopy. No history of problems with anesthesia.     --Extensive enlargement of lymph nodes on CT imaging during evaluation for abdominal pain, bloating and loss of appetite. Above procedure now planned.    --HLD. Rosuvastatin. HTN. Will take Metoprolol on DOS. Will hold Lisinopril. CAD, s/p NSTEMI and stent X 3, last 6-7 years ago. ASA 81 mg daily and will remain on. Testing above. Good exercise tolerance.    --Nonsmoker. No pulmonary symptoms. History of neg sleep study.    --GERD. Will take Prevacid on DOS.   --[place]. Left side hearing aid.    Patient was discussed with Dr Tse.      Reviewed and Signed by PAC Mid-Level Provider/Resident  Mid-Level Provider/Resident: MARTHA Cordon CNS  Date: 3/16/18  Time: 1:45pm    Attending Anesthesiologist Anesthesia Assessment:        Anesthesiologist:   Date:   Time:   Pass/Fail:   Disposition:     PAC Pharmacist Assessment:        Pharmacist:   Date:   Time:      Anesthesia Plan      History & Physical Review  History and physical reviewed and following examination; no interval change.    ASA Status:  3 .    NPO Status:  > 6 hours    Plan for General and  ETT with Intravenous induction. Maintenance will be Balanced.    PONV prophylaxis:  Ondansetron (or other 5HT-3) and Dexamethasone or Solumedrol       Postoperative Care  Postoperative pain management:  Multi-modal analgesia.      Consents  Anesthetic plan, risks, benefits and alternatives discussed with:  Patient..                          .

## 2018-03-16 NOTE — H&P
Pre-Operative H & P     CC:  Preoperative exam to assess for increased cardiopulmonary risk while undergoing surgery and anesthesia.    Date of Encounter: 3/16/2018  Primary Care Physician:  Florian Alonzo  Reason for visit: Lymphadenopathy [R59.1]  - Primary     HPI  Francisco Javier Cortes is a 77 year old male who presents for pre-operative H & P in preparation for excisional lymph node biopsy with Dr. Gaviria on 3/22/18 at [facility]. History is obtained from the patient.     Patient who developed abdominal bloating and loss of appetite in January 2018. An evaluation by his PCP with abdominal US showed abnormal appearance of pancreas and enlarged spleen. CT imaging revealed extensive retroperitoneal, mesenteric, inguinal, pelvic and retrocrural adenopathy - suspicious for lymphoma. He was referred to Oncology for further management and saw Dr. Gaviria in consideration for surgical biopsy. Above procedure now planned.   His history is otherwise significant for HLD, HTN, CAD, s/p NSTEMI, stent placement X3, and prostate cancer s/p prostatectomy.   Past Medical History  Past Medical History:   Diagnosis Date     CAD (coronary artery disease) 1/09    s/p stentsx2     Coronary artery disease involving native coronary artery of native heart without angina pectoris 12/22/2015     Dyslipidemia      Erectile dysfunction      GERD (gastroesophageal reflux disease)      Hearing problem One ear 1961     Hypertension      NSTEMI (non-ST elevated myocardial infarction) (H) 3/20/2014     Pneumonia      Prostate cancer (H)     prostatecomy 9 years ago, PSAs remain good     Status post percutaneous transluminal coronary angioplasty-Coronary angioplasty with STEPHEN to LCx 4/4/2014     Stented coronary artery     stents placed       Past Surgical History  Past Surgical History:   Procedure Laterality Date     C APPENDECTOMY       C REMV PROSTATE,RETROPUB,RAD,TOT NODES  1999     CATARACT IOL, RT/LT        COLONOSCOPY       COLONOSCOPY Left 7/5/2016    Procedure: COMBINED COLONOSCOPY, SINGLE OR MULTIPLE BIOPSY/POLYPECTOMY BY BIOPSY;  Surgeon: Duane, William Charles, MD;  Location: [facility] OR     COLONOSCOPY WITH CO2 INSUFFLATION N/A 7/5/2016    Procedure: COLONOSCOPY WITH CO2 INSUFFLATION;  Surgeon: Duane, William Charles, MD;  Location: [facility] OR     ENT SURGERY  1980--1999     PHACOEMULSIFICATION CLEAR CORNEA WITH STANDARD INTRAOCULAR LENS IMPLANT Left 6/18/2015    Procedure: PHACOEMULSIFICATION CLEAR CORNEA WITH STANDARD INTRAOCULAR LENS IMPLANT;  Surgeon: John Zimmerman MD;  Location:  [facility]     PHACOEMULSIFICATION CLEAR CORNEA WITH STANDARD INTRAOCULAR LENS IMPLANT Right 7/16/2015    Procedure: PHACOEMULSIFICATION CLEAR CORNEA WITH STANDARD INTRAOCULAR LENS IMPLANT;  Surgeon: John Zimmerman MD;  Location:  [facility]     STENT, CORONARY, [place]  1/09, 2014    x2, x1 in 2014     TYMPANOPLASTY       VASCULAR SURGERY  2013    stents       Hx of Blood transfusions/reactions: Denies.      Hx of abnormal bleeding or anti-platelet use: ASA 81 mg daily    Menstrual history: No LMP for male patient.    Steroid use in the last year: Denies.     Personal or FH with difficulty with Anesthesia:  Denies.     Prior to Admission Medications  Current Outpatient Prescriptions   Medication Sig Dispense Refill     lisinopril (PRINIVIL/ZESTRIL) 20 MG tablet Take 1 tablet (20 mg) by mouth daily (Patient taking differently: Take 20 mg by mouth every morning ) 90 tablet 3     metoprolol succinate (TOPROL-XL) 100 MG 24 hr tablet Take 1 tablet (100 mg) by mouth daily (Patient taking differently: Take 100 mg by mouth every morning ) 90 tablet 3     rosuvastatin (CRESTOR) 40 MG tablet Take 1 tablet (40 mg) by mouth daily (Patient taking differently: Take 40 mg by mouth every morning ) 90 tablet 3     Lansoprazole (PREVACID PO) Take 20 mg by mouth every evening Patient needs to use brand name Prevacid (generic version causes diarrhea)         MAALOX ADVANCED OR Take 1 tablet as needed (Calcium carbonate 1000mg/Simethicone 80mg)       ASPIRIN 81 MG OR TABS 1 TABLET EVERY MORNING       nitroGLYcerin (NITROSTAT) 0.4 MG sublingual tablet Place 1 tablet (0.4 mg) under the tongue every 5 minutes as needed up to 3 tablets per episode. 60 tablet 6     clindamycin (CLEOCIN T) 1 % lotion Apply twice daily for 6 weeks to the groin 60 mL 1     hydrocortisone valerate (WEST-NINOSKA) 0.2 % ointment Apply sparingly to affected area three times daily as needed. 45 g 3     omega 3 1000 MG CAPS Take 1 g by mouth 2 times daily (Patient taking differently: Take 1 g by mouth 2 times daily currently taking 1 tablet daily 3/5/18) 120 capsule 6     Acetaminophen (TYLENOL 8 HOUR PO) Take 500 mg by mouth as needed          Allergies  Allergies   Allergen Reactions     Niacin      Severe rash and itching     Prevacid [Lansoprazole] Diarrhea     Patient notes diarrhea with 30mg generic version. Patient does ok with 15mg in generic and brand name.     Shellfish Allergy      One kind       Social History  Social History     Social History     Marital status:      Spouse name: N/A     Number of children: N/A     Years of education: N/A     Occupational History     Not on file.     Social History Main Topics     Smoking status: Never Smoker     Smokeless tobacco: Never Used     Alcohol use Yes      Comment: a glass of red wine a day     Drug use: No     Sexual activity: Not Currently     Partners: Female     Birth control/ protection: Post-menopausal     Other Topics Concern      Service Yes     [employer]     Blood Transfusions No     Caffeine Concern No     Occupational Exposure Yes          Hobby Hazards No     Sleep Concern No     Stress Concern No     Weight Concern No     Special Diet Yes     Back Care No     Exercise Yes     Seat Belt Yes     Self-Exams Yes     Parent/Sibling W/ Cabg, Mi Or Angioplasty Before 65f 55m? Yes     Social History Narrative        Family History  Family History   Problem Relation Age of Onset     Cardiovascular Mother      HEART DISEASE Mother      Cancer - colorectal Father      HEART DISEASE Father      Other Cancer Father      CANCER Father      Hypertension Father      Asthma Brother      Coronary Artery Disease Brother      Hypertension Brother      HEART DISEASE Brother      HEART DISEASE Son      Hypertension Son      CANCER Daughter      non hodgkins     Prostate Cancer Brother      Hypertension Brother      CANCER Brother      Prostate Cancer Maternal Grandfather      CANCER Maternal Grandfather      Muscular Disorder Maternal Grandfather      HEART DISEASE Paternal Grandmother      Hypertension Paternal Grandmother      Hypertension Sister        Review of Systems  ROS/MED HISTORY    The complete review of systems is negative other than noted in the HPI or here.     ENT/Pulmonary: Comment: Neg sleep study  (-) tobacco use, asthma, COPD and sleep apnea   Neurologic:      (-) CVA   Cardiovascular:     (+) Dyslipidemia, hypertension--CAD, -past MI,-stent X3 Taking blood thinners Pt has received instructions: Instructions Given to patient: Will remain on. . . :. . Previous cardiac testing Echodate:2014results:date: results: date: results:Cath date: 2014 results:         (-) CHF and COLE   METS/Exercise Tolerance:  >4 METS   Hematologic:  - neg hematologic  ROS       Musculoskeletal:  neg     GI/Hepatic:     (+) GERD Asymptomatic on medication,       Renal/Genitourinary:  - ROS Renal section negative       Endo:     (+) Obesity, .      Psychiatric:  - neg psychiatric ROS       Infectious Disease:   neg   Malignancy:   (+) Malignancy History of Prostate  Prostate CA Remission status post Surgery,    Concern for lymphoma     Other:    (+) No chance of pregnancy C-spine cleared: N/A, no H/O Chronic Pain,no other significant disability        Physical Exam      Airway   Mallampati: II  TM distance: >3 FB  Neck ROM: full    Temp: 97.7  F  "(36.5  C) Temp src: Oral BP: 119/76 Pulse: 76   Resp: 16 SpO2: 96 %         179 lbs 0 oz  5' 4.5\"   Body mass index is 30.25 kg/(m^2).       Physical Exam  Constitutional: Awake, alert, cooperative, no apparent distress, and appears stated age.  Eyes: Pupils equal, round and reactive to light, extra ocular muscles intact, sclera clear, conjunctiva normal. Glasses on.   HENT: Normocephalic, oral pharynx with moist mucus membranes, good dentition. No goiter appreciated. Left side hearing aid.   Respiratory: Clear to auscultation bilaterally, no crackles or wheezing. No cough or obvious dyspnea.  Cardiovascular: Regular rate and rhythm, normal S1 and S2, and no murmur noted. Carotids +2, no bruits. No edema. Palpable pulses to radial  DP and PT arteries.   GI: Normal bowel sounds, firm, non-distended, non-tender, no masses palpated. Difficult exam due to obese abdomen.  Lymph/Hematologic: Left side cervical lymphadenopathy with biopsy site. Nontender.  Genitourinary: Deferred.   Skin: Warm and dry.  No rashes at anticipated surgical site.   Musculoskeletal: Full ROM of neck. There is no redness, warmth, or swelling of the joints. Gross motor strength is normal.    Neurologic: Awake, alert, oriented to name, place and time. Cranial nerves II-XII are grossly intact. Gait is normal.   Neuropsychiatric: Calm, cooperative. Normal affect.     Labs: (personally reviewed)   Lab Results   Component Value Date    WBC 10.1 03/05/2018     Lab Results   Component Value Date    RBC 4.91 03/05/2018     Lab Results   Component Value Date    HGB 14.5 03/05/2018     Lab Results   Component Value Date    HCT 44.8 03/05/2018     Lab Results   Component Value Date    MCV 91 03/05/2018     Lab Results   Component Value Date    MCH 29.5 03/05/2018     Lab Results   Component Value Date    MCHC 32.4 03/05/2018     Lab Results   Component Value Date    RDW 14.4 03/05/2018     Lab Results   Component Value Date     03/05/2018     Last " Basic Metabolic Panel:  Lab Results   Component Value Date     02/21/2018      Lab Results   Component Value Date    POTASSIUM 4.6 02/21/2018     Lab Results   Component Value Date    CHLORIDE 106 02/21/2018     Lab Results   Component Value Date    EVANGELINA 9.2 02/21/2018     Lab Results   Component Value Date    CO2 25 02/21/2018     Lab Results   Component Value Date    BUN 20 02/21/2018     Lab Results   Component Value Date    CR 1.08 02/21/2018     Lab Results   Component Value Date    GLC 97 02/21/2018     Lab Results   Component Value Date    AST 47 02/21/2018     Lab Results   Component Value Date    ALT 34 02/21/2018     Lab Results   Component Value Date    BILICONJ 0.0 12/08/2011      Lab Results   Component Value Date    BILITOTAL 0.4 02/21/2018     Lab Results   Component Value Date    ALBUMIN 3.5 02/21/2018     Lab Results   Component Value Date    PROTTOTAL 7.3 02/21/2018      Lab Results   Component Value Date    ALKPHOS 72 02/21/2018     A1C 5.8  EKG: Personally reviewed 2015 Sinus bradycardia, rate 56, old inferior infarct  Cardiac echo: 2014  Interpretation Summary  Global and regional left ventricular function is normal with an EF of 60-65%.   Pulmonary artery systolic pressure cannot be assessed. The inferior vena cava    is normal. No pericardial effusion is present.  Angiogram 2014  Impression:  NSTEMI secondary to 1 vessel CAD (mid-LCx 90%)  Widely patent stents with minimal ISR in mid RCA and RPLA  Successful PCI with STEPHEN to LCx  3/5/18 CT Chest   IMPRESSION:   1. Bulky and confluent mediastinal, hilar, axillary, and  supraclavicular lymphadenopathy, which given the the diffuse abdominal  lymphadenopathy seen on 2/26/2017 represents lymphoma until proven  otherwise.  2. New and enlarging pulmonary nodules, likely metastatic disease,  including 18 x 12 mm right para-fissural right middle lobe nodule,  previously 10 x 7 mm on 6/29/2015, which may represent an enlarging  fissural lymph node  or metastatic nodule.  3. Splenomegaly, suggesting lymphomatous involvement.  CT Soft Tissue Neck 3/5/18  Impression: Extensive cervical, mediastinal, and axillary  lymphadenopathy, suspicious for lymphoma.   Outside records reviewed from: Care Everywhere    ASSESSMENT and PLAN  Francisco Javier Cortes is a 77 year old male scheduled to undergo excisional lymph node biopsy with Dr. Gaviria on 3/22/18. He has the following specific operative considerations:   - RCRI : 0.9% risk of major adverse cardiac event.   - Anesthesia considerations:  Refer to PAC assessment in anesthesia records  - VTE risk: 3%  - Risk of PONV score = 2.  If > 2, anti-emetic intervention recommended.     --Extensive enlargement of lymph nodes on CT imaging during evaluation for abdominal pain, bloating and loss of appetite. Above procedure now planned.    --HLD. Rosuvastatin. HTN. Will take Metoprolol on DOS. Will hold Lisinopril. CAD, s/p NSTEMI and stent X 3, last 6-7 years ago. ASA 81 mg daily and will remain on. Testing above. Good exercise tolerance.    --Nonsmoker. No pulmonary symptoms. History of neg sleep study.    --GERD. Will take Prevacid on DOS.   --[place]. Left side hearing aid.  Arrival time, NPO, shower and medication instructions provided by nursing staff today. Preparing For Your Surgery handout given.  Patient was discussed with Dr Tse.    MARTHA Patel CNS  Preoperative Assessment Center  [facility]  Phone: [phone]  Fax: [phone]

## 2018-03-20 NOTE — PROGRESS NOTES
Dr. Adan has contacted patient with below results and recommendations.  Port placement orders faxed to the  [facility] at fax number [phone].  Bone marrow biopsy scheduled for 3/21/18.  Port placement has been scheduled for 3/27/18, and PET scan has been scheduled on 3/24/18.  Dr. Auguste was updated, and is agreeable with seeing patient to review results and discuss treatment plan on 3/30/18 at 11:00 am (for a 1-hour appointment slot).  Patient was given all appointment details from scheduling staff, and verbalizes understanding.    Cam Conner, RN, BSN, OCN  Oncology Care Coordinator  [facility]       Message  Received: 3/19/2018 4:32 pm       Erickson Adan MD Shiell, Cam ALVAREZ, RN                   Mason Levy,     FNA from the lymph node just came back showing mantle cell lymphoma. I called him and updated him of results. He is scheduled for excisional lymph node biopsy on Thursday. He will also need PET scan and BM biopsy to complete staging as well as port placement to get prepared for treatment. Will place orders.       We may need to get started with treatment earlier than 04/09 when he comes back and sees me as he was noticing more symptoms. Can you check to see if Dr. Mcneil or Dr. Auguste can see him next week to initiate treatment. I am thinking BR regimen for him.     Thanks.

## 2018-03-21 NOTE — LETTER
3/21/2018         RE: Francisco Javier Cortes  [address] [address]        Dear Colleague,    Thank you for referring your patient, Francisco Javier Cortes, to the [facility]. Please see a copy of my visit note below.    ON[facility] Adult Bone Marrow Biopsy Procedure Note  March 21, 2018  /76  Pulse 93  Temp 97.8  F (36.6  C) (Oral)  Resp 20  SpO2 96%   Results for orders placed or performed in visit on 03/21/18   CBC with platelets and differential   Result Value Ref Range    WBC 10.4 4.0 - 11.0 10e9/L    RBC Count 4.94 4.4 - 5.9 10e12/L    Hemoglobin 14.4 13.3 - 17.7 g/dL    Hematocrit 45.4 40.0 - 53.0 %    MCV 92 78 - 100 fl    MCH 29.1 26.5 - 33.0 pg    MCHC 31.7 31.5 - 36.5 g/dL    RDW 15.0 10.0 - 15.0 %    Platelet Count 90 (L) 150 - 450 10e9/L    Diff Method Automated Method     % Neutrophils 63.4 %    % Lymphocytes 18.1 %    % Monocytes 16.8 %    % Eosinophils 0.5 %    % Basophils 0.4 %    % Immature Granulocytes 0.8 %    Absolute Neutrophil 6.6 1.6 - 8.3 10e9/L    Absolute Lymphocytes 1.9 0.8 - 5.3 10e9/L    Absolute Monocytes 1.7 (H) 0.0 - 1.3 10e9/L    Absolute Eosinophils 0.1 0.0 - 0.7 10e9/L    Absolute Basophils 0.0 0.0 - 0.2 10e9/L    Abs Immature Granulocytes 0.1 0 - 0.4 10e9/L     Learning needs assessment complete within 12 months? NO    DIAGNOSIS: Mantle cell lymphoma    PROCEDURE: Unilateral Bone Marrow Biopsy and Unilateral Aspirate    LOCATION: [facility]    Patient s identification was positively verified by verbal identification and invasive procedure safety checklist was completed. Informed consent was obtained. Following the administration of no premedication, patient was placed in the left lateral decubitus position and prepped and draped in a sterile manner. Approximately 10 cc of 1% Lidocaine was used over the right posterior iliac spine. Following this a 3 mm incision was made. Trephine bone marrow core(s) was (were) obtained  from the [facility]. Bone marrow aspirates were obtained from the [facility]. Aspirates were sent for morphology, immunophenotyping, cytogenetics and molecular diagnostics . A total of approximately 20 ml of marrow was aspirated. Following this procedure a sterile dressing was applied to the bone marrow biopsy site(s). The patient was placed in the supine position to maintain pressure on the biopsy site. Post-procedure wound care instructions were given.     Complications: NO    Pre-procedural pain: 0 out of 10 on the numeric pain rating scale.     Post-procedural pain assessment: 1 out of 10 on the numeric pain rating scale.     Interventions: NO- tolerated procedure very well with only local lidocaine    Length of procedure:20 minutes or less      Procedure performed by: Joy Bush CNP      Again, thank you for allowing me to participate in the care of your patient.        Sincerely,        Joy Bush NP, APRN CNP

## 2018-03-21 NOTE — MR AVS SNAPSHOT
After Visit Summary   3/21/2018    Francisco Javier Cortes    MRN: [id number]           Patient Information     Date Of Birth          1940        Visit Information        Provider Department      3/21/2018 7:45 AM NURSE ONLY CANCER CENTER [facility]        Today's Diagnoses     Lymphoma, mantle cell, multiple sites (H)           Follow-ups after your visit        Your next 10 appointments already scheduled     Mar 22, 2018   Procedure with Samia Gaviria MD   [facility], [place], Same Day Surgery (--)    [address] [facility] [address] [phone]            Mar 24, 2018 10:30 AM CDT   (Arrive by 10:15 AM)   PE NPET ONCOLOGY (EYES TO THIGHS) with UUPET1   [facility] PET CT ([facility])    [address] [facility] [address] [phone]           Tell your doctor:   If there is any chance you may be pregnant or if you are breastfeeding.   If you have problems lying in small spaces (claustrophobia). If you do, your doctor may give you medicine to help you relax. If you have diabetes:   Have your exam early in the morning. Your blood glucose will go up as the day goes by.   Your glucose level must be 180 or less at the start of the exam. Please take any medicines you need to ensure this blood glucose level. 24 hours before your scan: Don t do any heavy exercise. (No jogging, aerobics or other workouts.) Exercise will make your pictures less accurate. 6 hours before your scan:   Stop all food and liquids (except water).   Do not chew gum or suck on mints.   If you need to take medicine with food, you may take it with a few crackers.  Please call your Imaging Department at your exam site with any questions.            Mar 30, 2018 11:00 AM CDT   Return Visit with Merry Auguste MD   [facility]    [address] [address] [place] [address] [phone]            Mar 30,  2018  2:20 PM CDT   (Arrive by 2:05 PM)   Return Visit with Samia Gaviria MD   [facility] Ear Nose and Throat ([facility])    [address] [facility]  [address] [facility] [address] [phone]            Apr 09, 2018  7:45 AM CDT   Return Visit with Erickson Adan MD   [facility] ([facility]    [address] [address] [facility] [address] [phone]            Apr 10, 2018  8:00 AM CDT   Return Visit with Jad Ball MD   [facility] ([facility])    [address] [place] [address] [phone]              Who to contact     If you have questions or need follow up information about today's clinic visit or your schedule please contact [facility] directly at [phone].  Normal or non-critical lab and imaging results will be communicated to you by Infinity Boxhart, letter or phone within 4 business days after the clinic has received the results. If you do not hear from us within 7 days, please contact the clinic through Wikiat or phone. If you have a critical or abnormal lab result, we will notify you by phone as soon as possible.  Submit refill requests through Sarata or call your pharmacy and they will forward the refill request to us. Please allow 3 business days for your refill to be completed.          Additional Information About Your Visit        Sarata Information     Sarata gives you secure access to your electronic health record. If you see a primary care provider, you can also send messages to your care team and make appointments. If you have questions, please call your primary care clinic.  If you do not have a primary care provider, please call [phone] and they will assist you.      Sarata is an electronic gateway that provides easy, online access to your medical records. With Sarata, you can request a clinic appointment, read your test results,  renew a prescription or communicate with your care team.     To access your existing account, please contact your [facility] or call [phone] for assistance.        Care EveryWhere ID     This is your Care EveryWhere ID. This could be used by other organizations to access your [facility] medical records  [id number]         Blood Pressure from Last 3 Encounters:   03/21/18 120/76   03/16/18 119/76   03/14/18 125/73    Weight from Last 3 Encounters:   03/16/18 81.2 kg (179 lb)   03/14/18 81.2 kg (179 lb)   03/05/18 82.1 kg (181 lb 1 oz)              We Performed the Following     CBC with platelets and differential          Today's Medication Changes          These changes are accurate as of 3/21/18 10:07 AM.  If you have any questions, ask your nurse or doctor.               These medicines have changed or have updated prescriptions.        Dose/Directions    lisinopril 20 MG tablet   Commonly known as:  PRINIVIL/ZESTRIL   This may have changed:  when to take this   Used for:  Hypertension goal BP (blood pressure) < 130/80, Coronary artery disease involving native coronary artery of native heart without angina pectoris, Microalbuminuria        Dose:  20 mg   Take 1 tablet (20 mg) by mouth daily   Quantity:  90 tablet   Refills:  3       metoprolol succinate 100 MG 24 hr tablet   Commonly known as:  TOPROL-XL   This may have changed:  when to take this   Used for:  Hypertension goal BP (blood pressure) < 130/80, Coronary artery disease involving native coronary artery of native heart without angina pectoris        Dose:  100 mg   Take 1 tablet (100 mg) by mouth daily   Quantity:  90 tablet   Refills:  3       omega 3 1000 MG Caps   This may have changed:  additional instructions   Used for:  Coronary artery disease involving native coronary artery of native heart with angina pectoris (H)        Dose:  1 g   Take 1 g by mouth 2 times daily   Quantity:  120 capsule   Refills:  6        rosuvastatin 40 MG tablet   Commonly known as:  CRESTOR   This may have changed:  when to take this   Used for:  Hyperlipidemia LDL goal <100, Coronary artery disease involving native coronary artery of native heart without angina pectoris        Dose:  40 mg   Take 1 tablet (40 mg) by mouth daily   Quantity:  90 tablet   Refills:  3                Primary Care Provider Office Phone # Fax #    Florian Alonzo [phone]       [address]  [facility] [address]        Equal Access to Services     TERENCE BARAJAS : Hadii aad ku hadasho Soomaali, waaxda luqadaha, qaybta kaalmada adeegyada, waxay idiin hayaan adeeg janisaraalex bazan . So [facility] [phone].    ATENCIÓN: Si habla español, tiene a ivan disposición servicios gratuitos de asistencia lingüística. Llame al [phone].    We comply with applicable federal civil rights laws and [place] laws. We do not discriminate on the basis of race, color, national origin, age, disability, sex, sexual orientation, or gender identity.            Thank you!     Thank you for choosing [facility]  for your care. Our goal is always to provide you with excellent care. Hearing back from our patients is one way we can continue to improve our services. Please take a few minutes to complete the written survey that you may receive in the mail after your visit with us. Thank you!             Your Updated Medication List - Protect others around you: Learn how to safely use, store and throw away your medicines at www.disposemymeds.org.          This list is accurate as of 3/21/18 10:07 AM.  Always use your most recent med list.                   Brand Name Dispense Instructions for use Diagnosis    aspirin 81 MG tablet      1 TABLET EVERY MORNING        clindamycin 1 % lotion    CLEOCIN T    60 mL    Apply twice daily for 6 weeks to the groin    Rash       hydrocortisone valerate 0.2 % ointment    WEST-NINOSKA    45 g    Apply sparingly to affected area  three times daily as needed.    Psoriasis       lisinopril 20 MG tablet    PRINIVIL/ZESTRIL    90 tablet    Take 1 tablet (20 mg) by mouth daily    Hypertension goal BP (blood pressure) < 130/80, Coronary artery disease involving native coronary artery of native heart without angina pectoris, Microalbuminuria       MAALOX ADVANCED PO      Take 1 tablet as needed (Calcium carbonate 1000mg/Simethicone 80mg)    Chest pain       metoprolol succinate 100 MG 24 hr tablet    TOPROL-XL    90 tablet    Take 1 tablet (100 mg) by mouth daily    Hypertension goal BP (blood pressure) < 130/80, Coronary artery disease involving native coronary artery of native heart without angina pectoris       nitroGLYcerin 0.4 MG sublingual tablet    NITROSTAT    60 tablet    Place 1 tablet (0.4 mg) under the tongue every 5 minutes as needed up to 3 tablets per episode.    Chest pain due to myocardial ischemia, unspecified ischemic chest pain type (H), Coronary artery disease involving native coronary artery of native heart without angina pectoris       omega 3 1000 MG Caps     120 capsule    Take 1 g by mouth 2 times daily    Coronary artery disease involving native coronary artery of native heart with angina pectoris (H)       PREVACID PO      Take 20 mg by mouth every evening Patient needs to use brand name Prevacid (generic version causes diarrhea)        rosuvastatin 40 MG tablet    CRESTOR    90 tablet    Take 1 tablet (40 mg) by mouth daily    Hyperlipidemia LDL goal <100, Coronary artery disease involving native coronary artery of native heart without angina pectoris       TYLENOL 8 HOUR PO      Take 500 mg by mouth as needed

## 2018-03-21 NOTE — PROGRESS NOTES
"Patient presents for bone marrow biopsy.  Discussed procedure with patient.  Vitals obtained and stable.  Lab staff present during PIV start and lab drawn.  Joy Bush CNP met with patient and consent was signed.  Pre-medications administered, patient positioned in prone position.  Patient tolerated procedure well.  Post observation x 30\", Coffee and snack provided. PIV D/C'd, cathlon intact.  Dressing assessed; no bleeding. Discharge instructions reviewed with patient and he verbalized understanding.  Patient did not receive IV sedation so was able to drive himself home. Patient discharged at 0940, in stable condition. Zhane Barth RN  BSN OCN      "

## 2018-03-21 NOTE — PROGRESS NOTES
ONC Adult Bone Marrow Biopsy Procedure Note  March 21, 2018  /76  Pulse 93  Temp 97.8  F (36.6  C) (Oral)  Resp 20  SpO2 96%   Results for orders placed or performed in visit on 03/21/18   CBC with platelets and differential   Result Value Ref Range    WBC 10.4 4.0 - 11.0 10e9/L    RBC Count 4.94 4.4 - 5.9 10e12/L    Hemoglobin 14.4 13.3 - 17.7 g/dL    Hematocrit 45.4 40.0 - 53.0 %    MCV 92 78 - 100 fl    MCH 29.1 26.5 - 33.0 pg    MCHC 31.7 31.5 - 36.5 g/dL    RDW 15.0 10.0 - 15.0 %    Platelet Count 90 (L) 150 - 450 10e9/L    Diff Method Automated Method     % Neutrophils 63.4 %    % Lymphocytes 18.1 %    % Monocytes 16.8 %    % Eosinophils 0.5 %    % Basophils 0.4 %    % Immature Granulocytes 0.8 %    Absolute Neutrophil 6.6 1.6 - 8.3 10e9/L    Absolute Lymphocytes 1.9 0.8 - 5.3 10e9/L    Absolute Monocytes 1.7 (H) 0.0 - 1.3 10e9/L    Absolute Eosinophils 0.1 0.0 - 0.7 10e9/L    Absolute Basophils 0.0 0.0 - 0.2 10e9/L    Abs Immature Granulocytes 0.1 0 - 0.4 10e9/L     Learning needs assessment complete within 12 months? NO    DIAGNOSIS: Mantle cell lymphoma    PROCEDURE: Unilateral Bone Marrow Biopsy and Unilateral Aspirate    LOCATION: [facility]    Patient s identification was positively verified by verbal identification and invasive procedure safety checklist was completed. Informed consent was obtained. Following the administration of no premedication, patient was placed in the left lateral decubitus position and prepped and draped in a sterile manner. Approximately 10 cc of 1% Lidocaine was used over the right posterior iliac spine. Following this a 3 mm incision was made. Trephine bone marrow core(s) was (were) obtained from the [facility]. Bone marrow aspirates were obtained from the [facility]. Aspirates were sent for morphology, immunophenotyping, cytogenetics and molecular diagnostics . A total of approximately 20 ml of marrow was aspirated. Following this procedure a sterile  dressing was applied to the bone marrow biopsy site(s). The patient was placed in the supine position to maintain pressure on the biopsy site. Post-procedure wound care instructions were given.     Complications: NO    Pre-procedural pain: 0 out of 10 on the numeric pain rating scale.     Post-procedural pain assessment: 1 out of 10 on the numeric pain rating scale.     Interventions: NO- tolerated procedure very well with only local lidocaine    Length of procedure:20 minutes or less      Procedure performed by: Joy Bush CNP

## 2018-03-22 NOTE — ANESTHESIA CARE TRANSFER NOTE
Patient: Francisco Javier Cortes    Procedure(s):  Excisional Biopsy Left Cervical Lymph Node  - Wound Class: I-Clean    Diagnosis: Left Lymphadenopathy   Diagnosis Additional Information: No value filed.    Anesthesia Type:   General, ETT     Note:  Airway :Face Mask  Patient transferred to:PACU  Handoff Report: Identifed the Patient, Identified the Reponsible Provider, Reviewed the pertinent medical history, Discussed the surgical course, Reviewed Intra-OP anesthesia mangement and issues during anesthesia, Set expectations for post-procedure period and Allowed opportunity for questions and acknowledgement of understanding      Vitals: (Last set prior to Anesthesia Care Transfer)    CRNA VITALS  3/22/2018 0924 - 3/22/2018 0955      3/22/2018             SpO2: 96 %    Resp Rate (observed): 18    EKG: NSR                Electronically Signed By: MARTHA Mcmillan CRNA  March 22, 2018  9:55 AM

## 2018-03-22 NOTE — IP AVS SNAPSHOT
Same Day Surgery [address] [address]    Phone:  [phone]                                       After Visit Summary   3/22/2018    Francisco Javier Cortes    MRN: [id number]           After Visit Summary Signature Page     I have received my discharge instructions, and my questions have been answered. I have discussed any challenges I see with this plan with the nurse or doctor.    ..........................................................................................................................................  Patient/Patient Representative Signature      ..........................................................................................................................................  Patient Representative Print Name and Relationship to Patient    ..................................................               ................................................  Date                                            Time    ..........................................................................................................................................  Reviewed by Signature/Title    ...................................................              ..............................................  Date                                                            Time

## 2018-03-22 NOTE — OP NOTE
Date of Procedure: 3/22/2018    Attending Physician: Samia Gaviria MD    Resident Physicians: Tee Fernandez MD    Procedure Performed:  Left excisional lymph node biopsy    Preoperative Diagnosis: B cell lymphoma    Postoperative Diagnosis: same    Anesthesia: General    Blood loss: 15 cc    Specimens:   ID Type Source Tests Collected by Time Destination   A : Left Level 5 Lymph Node Tissue Neck SURGICAL PATHOLOGY EXAM Samia Gaviria MD 3/22/2018  9:14 AM          Implants:  PITER drain x 1    Complications: None    Findings:  Supraclavicular lymphadenopathy - nodes friable, two sent for fresh pathology for lymphoma workuo    Indications:  Francisco Javier Cortes is a 77 year old man who presented to his PCP for abdominal discomfort and loss of appetite. He was found to have an enlarged spleen and pancreas. A CT scan of the abdomen/pelvis showed significant abdominal/inguinal/pelvic adenopathy. He was referred to Dr Adan in medical oncology for further evaluation. At that time further imaging was obtained showing adenopathy in the axilla, neck and chest, all concerning for lymphoma. FNA was consistent with a B cell lymphoma. He presents today for excisional node biopsy for further characterization of the lymphoma.    Description of Procedure:  After informed consent was obtained, the patient was brought back to the main operating room and placed in a supine position. General anesthesia was induced and the patient was orotracheally intubated. The bed was turned 180 degrees away from anesthesia. The planned incision had been marked over the supraclavicular node while the patient was in preop holding. This was injected with 1% lidocaine with 1:100,000 epinephrine. The patient was then prepped and draped in sterile fashion. A time out was performed. A 15 blade was used to make an incision through the skin down to the platysma, taking care to preserve the external jugular vein along the anterior border of the incision. This was  divided and flaps were raised inferiorly and superiorly to allow access to the nodes that could be palpated deeply. The posterior border of the SCM was released and retracted anteriorly. There were palpable nodes in level Vb. Blunt dissection was performed to release one of the nodes from its surrounding soft tissue attachments. The node was very friable and while dissecting it the capsule was violated. Due to the disruption of the architecture of the node as well as its small size being concerning for providing adequate information for pathological diagnosis, the decision was made to excise a second node to ensure adequate tissue was obtained. A larger node of about 2 cm could be palpated within level V, above the clavicle and posterior to the SCM. This was carefully dissected using a combination of blunt dissection and cautery. The patient bled easily presumably from his needing to remain on his aspirin due to his stent history. There were no obvious lymphatic ducts identified during the dissection but clips were placed when releasing the large node inferiorly to reduce the risk of a chyle leak. The node was released from the floor of the neck along the cervical rootlets. One of the rootlet branches was divided where it was involved with the node. Once the node was completely released it was sent to pathology fresh for lymphoma workup along with the smaller node. The wound was thoroughly irrigated with saline. Hemostasis was achieved with bipolar cautery. Multiple sustained valsalva maneuvers were performed to ensure hemostasis. Additionally pressure was placed on the abdomen and the neck was observed to ensure no chyle leak. A 10 round PITER drain was placed in the neck and secured with a 3-0 nylon. The incision was closed with a buried interrupted 3-0 vicryl followed by a running subcuticular 4-0 monocryl. Steri-strips were placed over the incision. The patient was handed back to anesthesia for extubation and  transported to the recovery room in stable condition. The patient tolerated the procedure well with no immediate complications.    I was present for and participated in the entire procedure.      Samia Gaviria MD    Department of Otolaryngology

## 2018-03-22 NOTE — OR NURSING
Assisted pt to ambulate to bathroom, pt able to void without difficulty, pt hit his left hand where IV was dc'd and has large hematoma from bleeding while in bathroom, dressing to site changed and pressure dressing applied with instructions to care for site

## 2018-03-22 NOTE — OR NURSING
L neck site w/ serena drain in place intact and draining small amount into bulb, all care of SERENA drain given to pt wife with return demonstration by wife appropriate, appx 3cc bloody drainage noted in bulb

## 2018-03-22 NOTE — IP AVS SNAPSHOT
MRN:[id number]                      After Visit Summary   3/22/2018    Francisco Javier Cortes    MRN: [id number]           Thank you!     Thank you for choosing [facility] for your care. Our goal is always to provide you with excellent care. Hearing back from our patients is one way we can continue to improve our services. Please take a few minutes to complete the written survey that you may receive in the mail after you visit with us. Thank you!        Patient Information     Date Of Birth          1940        About your hospital stay     You were admitted on:  March 22, 2018 You last received care in the:  Same Day Surgery [facility]    You were discharged on:  March 22, 2018       Who to Call     For medical emergencies, please call 911.  For non-urgent questions about your medical care, please call your primary care provider or clinic, [phone]  For questions related to your surgery, please call your surgery clinic        Attending Provider     Provider Samia Ambrosio MD Otolaryngology       Primary Care Provider Office Phone # Fax #    Florian Alonzo [phone]      After Care Instructions     Diet Instructions       Resume pre procedure diet            Discharge Instructions       1. Follow up with Dr. Gaviria as previously scheduled  2. Empty and record the drain outputs twice daily. Dr. Gaviria's nurse will contact you about drain removal which will be appropriate when output is less than 30 cc (1 ounce or 2 tablespoons) in 24 hours.  3. OK to shower in 48 hours, but do not scrub the incision. Take a shower from the collar bone down and wash your hair in a sink until then.  4. Steristrips will fall off in 7-10 days. Ok to trim the curled ends.  5. Do not lift more than 15 pounds for 2 weeks to allow the incision to heal  6. Please notify your doctor if you experience wound breakdown, sustained bleeding from the wound site, or increasing redness,  "swelling, and/or purulent malorodorous discharge from the wound site which may indicate infection. If you feel it is acute, please return to the emergency department. If you have questions or concerns during the day please call ENT clinic and [phone]. If at night you can call [facility] at [phone] and ask for the \"ENT resident on call\".  7. Take pain medication as prescribed. If you do not need to take Norco, don't take it. Tylenol works well if you have mild pain.            No driving or operating machinery       until the day after procedure or while taking narcotic pain medication                  Your next 10 appointments already scheduled     Mar 24, 2018 10:30 AM CDT   (Arrive by 10:15 AM)   PE NPET ONCOLOGY (EYES TO THIGHS) with UUPET1   [facility], [place] [facility] [address] ([facility], [facility])    [address] [address] [phone]           Tell your doctor:   If there is any chance you may be pregnant or if you are breastfeeding.   If you have problems lying in small spaces (claustrophobia). If you do, your doctor may give you medicine to help you relax. If you have diabetes:   Have your exam early in the morning. Your blood glucose will go up as the day goes by.   Your glucose level must be 180 or less at the start of the exam. Please take any medicines you need to ensure this blood glucose level. 24 hours before your scan: Don t do any heavy exercise. (No jogging, aerobics or other workouts.) Exercise will make your pictures less accurate. 6 hours before your scan:   Stop all food and liquids (except water).   Do not chew gum or suck on mints.   If you need to take medicine with food, you may take it with a few crackers.  Please call your Imaging Department at your exam site with any questions.            Mar 30, 2018 11:00 AM CDT   [facility]e [facility]   [facility]    " [address] [address] [place] [address]   [phone]            Mar 30, 2018  2:20 PM CDT   (Arrive by 2:05 PM)   Return Visit with Samia Gaviria MD   [facility] ([facility]    [address]  [address] [facility] [address]   [phone]            Apr 09, 2018  7:45 AM CDT   Return Visit with Erickson Adan MD   [facility]    [address] [place] [address]   [phone]            Apr 10, 2018  8:00 AM CDT   Return Visit with Jad Ball MD   [facility]    [address] [place] [address]   [phone]              Further instructions from your care team       [facility]  Same-Day Surgery   Adult Discharge Orders & Instructions     For 24 hours after surgery    1. Get plenty of rest.  A responsible adult must stay with you for at least 24 hours after you leave the hospital.   2. Do not drive or use heavy equipment.  If you have weakness or tingling, don't drive or use heavy equipment until this feeling goes away.  3. Do not drink alcohol.  4. Avoid strenuous or risky activities.  Ask for help when climbing stairs.   5. You may feel lightheaded.  IF so, sit for a few minutes before standing.  Have someone help you get up.   6. If you have nausea (feel sick to your stomach): Drink only clear liquids such as apple juice, ginger ale, broth or 7-Up.  Rest may also help.  Be sure to drink enough fluids.  Move to a regular diet as you feel able.  7. You may have a slight fever. Call the doctor if your fever is over 100 F (37.7 C) (taken under the tongue) or lasts longer than 24 hours.  8. You may have a dry mouth, a sore throat, muscle aches or trouble sleeping.  These should go away after 24 hours.  9. Do not make important or legal decisions.   Call your doctor for any of the  followin.  Signs of infection (fever, growing tenderness at the surgery site, a large amount of drainage or bleeding, severe pain, foul-smelling drainage, redness, swelling).    2. It has been over 8 to 10 hours since surgery and you are still not able to urinate (pass water).    3.  Headache for over 24 hours.      To contact a doctor, call Dr. Samia Gaviria at [phone] (Otolarygology clinic)or:    x   [phone] and ask for the resident on call for   ENT/Otolaryngology (answered 24 hours a day)  x   Emergency Department:    [facility]: [phone]       (TTY for hearing impaired: [phone])           Tips for taking pain medications  To get the best pain relief possible , remember these points:      Take pain medications as directed, before pain becomes severe      Pain medication can upset your stomach: taking it with food may help      Constipation is a common side effect of pain medication. Drink plenty of  Fluids      Eat foods high in fiber. Take a stool softener  if recommended by your doctor or  Pharmacist.        Do not drink alcohol, drive or operate machinery while taking pain medications.      Ask about other ways to control pain, such as with heat, ice or relaxation.      Supply list  Q-tips and or/ clean washcloth  Gauze or split gauze (2 x 2 )   Paper tape (1 inch wide)  Specimen cup                                                                       Enrike Clement Drain    Home Care Instructions   What is a Enrike Clement (PITER) drain?  This is a small tube that connects to a bulb. Its gentle suction removes extra fluid from a surgical wound. Your doctor will remove the tube when the amount of fluid decreases.  The color and amount of fluid varies. Right after surgery the fluid is bright red. Over time, it changes to light pink and may become clear or the color of straw.  How should I care for my tube site?    Keep the skin around the tube dry. Check with your doctor about how to  shower. You may need to cover the site with plastic when you shower. Or, it may be okay to let the site get wet and put on a clean bandage after you shower.    Tape the tube to the skin below the bandage. Make sure to keep some slack in the tube. This helps prevent pulling on the stitches.    You will need to change your bandage at least once a day. If the bandage gets wet, you will need to change it again.  To change the bandage: Prepare    Clean your work area with alcohol or soap and water and a paper towel.    Wash your hands with soap and water.    Place on your clean work area:    Bag for old bandage    Gauze bandage and one-inch paper tape    Anti-bacterial wash (.9% normal saline) or soap and water    Cotton-tipped swab (like Q-Tips) or a clean wash cloth.  Remove the old bandage    Remove the old bandage and throw it out. Be carefulnot to pull on your stitches or tubing.     Do not use a scissors--you might cut the tube.    Check for any redness, swelling, drainage or broken stitches. If you have any of these, call your doctor  Clean the site    Wash your hands.    Clean the skin around the tube site. Use soap and   water or .9% saline with cotton swabs or a clean wash   cloth. Start at the tube site and move outward in a circular   motion about 1 to 2 inches away from the site.  Rinse with water and pat dry.   Replace the bandage    We will give you a gauze bandage (two per package).    If you have bandages with slits, place one bandage around the tube. Place the other bandage under the tube with the slit facing the opposite way. Tape the bandages in place.    If you have bandages without slits, fold each one in half. Place one above the tube and one under the tube. Tape in place.  Tape the tube    Tape the tube to the skin. Leave some slack in the tubing.  Use paper tape or adhesive tape if paper tape will not hold. Your nurse may show you how to use a StayFix bandage (a tube stabilizer)      Clean  up    Throw out all used materials.    Clean work area with alcohol or soap and water and a paper towel.    Wash your hands with soap and water.    Bandage, tube and tape                How should I care for the bulb?    Keep the bulb compressed at all times except while you empty it.    Attach the bulb to your clothing with tape and a safety pin.    Try to empty the bulb at the same time every day. Empty the bulb at least once a day, or when the bulb becomes half full. If it becomes too full, there will not be enough suction.  To empty the bulb:    Wash your hands.    0pen the bulb cap.    Drain the fluid from the bulb into the measuring cup. If you have two drains, use two cups.    Clean the mouth of the bulb with an alcohol wipe if your nurse told you to.    Squeeze the bulb (fold it in half before you close the bulb cap). If it does not stay compressed, call your nurse or clinic.    Write the amount of drainage on the drainage record (see back page). If you have two drains, write the amount for each bulb.    Flush the drainage down the toilet. Rinse the measuring cup.  Wash your hands.   When should I call my doctor?   Call your doctor if:    You have a fever over 101 F (38.3 C), taken under the tongue.    The drainage increases or smells bad.    The skin around your tube has increased redness, swelling, warmth or pain.    You have pus or fluid leaking at the tube site.    Your stitches break.    You think the tube is not draining.    The tube falls out.    You have any problems or concerns.  If your doctor has instructed you to strip your tube, follow these steps:    Use lotion to make the thumb and index finger of one hand slippery.    With the other hand, pinch off the top of the tube close to the skin.    While pinching the tube, squeeze the tube with your slippery thumb and index finger. Keep squeezing the tube as you run your fingers down toward the bulb. This will move the fluid into the bulb.    Let go  "of the tubing with both hands. If the tube is still blocked, repeat these steps three or four times. Make sure that the bulb is compressed, so it creates suction.   Your drainage record    Empty your bulb at the same time each day. Write down the date, time and amount of drainage for each bulb. You may wish to make notes about the   color and smell as well.  Bring this record to each clinic visit.               Your doctor may ask you to call the office each day to report the amount of drainage. If so, please call:  Dr. scooby Gallegos                  Pending Results     Date and Time Order Name Status Description    3/22/2018 0914 Surgical pathology exam In process     3/22/2018 0914 Leukemia Lymphoma Evaluation (Flow Cytometry) In process     3/22/2018 0544 EKG 12-lead, tracing only Preliminary     3/21/2018 1234 FISH In process     3/21/2018 1233 CHROMOSOME BONE MARROW In process     3/21/2018 1108 B CELL GENE REARRANGEMENT IGH PCR In process     3/21/2018 0811 BONE MARROW BIOPSY In process             Admission Information     Date & Time Provider Department Dept. Phone    3/22/2018 Samia Gaviria MD Same Day Surgery [facility] [phone]      Your Vitals Were     Blood Pressure Pulse Temperature Respirations Height Weight    136/78 (Cuff Size: Adult Regular) 7 98.1  F (36.7  C) (Oral) 16 1.651 m (5' 5\") 73.5 kg (162 lb 0.6 oz)    Pulse Oximetry BMI (Body Mass Index)                95% 26.96 kg/m2          MyChart Information     [employer] gives you secure access to your electronic health record. If you see a primary care provider, you can also send messages to your care team and make appointments. If you have questions, please call your primary care clinic.  If you do not have a primary care provider, please call [phone] and they will assist you.        Care EveryWhere ID     This is your Care EveryWhere ID. This could be used by other organizations to access your [facility] medical records  [id number]   "      Equal Access to Services     [facility] : Hadii aad ku hadvasukaren Cherry, waaxda luqadaha, qaybta kageoffdonn wu, zhao lorenzo. So [facility] [phone].    ATENCIÓN: Si habla español, tiene a ivan disposición servicios gratuitos de asistencia lingüística. Llame al [phone].    We comply with applicable federal civil rights laws and [place] laws. We do not discriminate on the basis of race, color, national origin, age, disability, sex, sexual orientation, or gender identity.               Review of your medicines      START taking        Dose / Directions    HYDROcodone-acetaminophen 5-325 MG per tablet   Commonly known as:  NORCO   Used for:  Postoperative pain        Dose:  1 tablet   Take 1 tablet by mouth every 6 hours as needed for severe pain maximum 6 tablet(s) per day   Quantity:  15 tablet   Refills:  0       senna-docusate 8.6-50 MG per tablet   Commonly known as:  SENOKOT-S;PERICOLACE   Used for:  Postoperative pain        Dose:  1-2 tablet   Take 1-2 tablets by mouth 2 times daily Take while on oral narcotics to prevent or treat constipation.   Quantity:  30 tablet   Refills:  0         CONTINUE these medicines which may have CHANGED, or have new prescriptions. If we are uncertain of the size of tablets/capsules you have at home, strength may be listed as something that might have changed.        Dose / Directions    lisinopril 20 MG tablet   Commonly known as:  PRINIVIL/ZESTRIL   This may have changed:  when to take this   Used for:  Hypertension goal BP (blood pressure) < 130/80, Coronary artery disease involving native coronary artery of native heart without angina pectoris, Microalbuminuria        Dose:  20 mg   Take 1 tablet (20 mg) by mouth daily   Quantity:  90 tablet   Refills:  3       metoprolol succinate 100 MG 24 hr tablet   Commonly known as:  TOPROL-XL   This may have changed:  when to take this   Used for:  Hypertension goal BP (blood pressure) <  130/80, Coronary artery disease involving native coronary artery of native heart without angina pectoris        Dose:  100 mg   Take 1 tablet (100 mg) by mouth daily   Quantity:  90 tablet   Refills:  3       omega 3 1000 MG Caps   This may have changed:  additional instructions   Used for:  Coronary artery disease involving native coronary artery of native heart with angina pectoris (H)        Dose:  1 g   Take 1 g by mouth 2 times daily   Quantity:  120 capsule   Refills:  6       rosuvastatin 40 MG tablet   Commonly known as:  CRESTOR   This may have changed:  when to take this   Used for:  Hyperlipidemia LDL goal <100, Coronary artery disease involving native coronary artery of native heart without angina pectoris        Dose:  40 mg   Take 1 tablet (40 mg) by mouth daily   Quantity:  90 tablet   Refills:  3         CONTINUE these medicines which have NOT CHANGED        Dose / Directions    aspirin 81 MG tablet        1 TABLET EVERY MORNING   Refills:  0       clindamycin 1 % lotion   Commonly known as:  CLEOCIN T   Used for:  Rash        Apply twice daily for 6 weeks to the groin   Quantity:  60 mL   Refills:  1       hydrocortisone valerate 0.2 % ointment   Commonly known as:  WEST-NINOSKA   Used for:  Psoriasis        Apply sparingly to affected area three times daily as needed.   Quantity:  45 g   Refills:  3       MAALOX ADVANCED PO   Used for:  Chest pain        Take 1 tablet as needed (Calcium carbonate 1000mg/Simethicone 80mg)   Refills:  0       nitroGLYcerin 0.4 MG sublingual tablet   Commonly known as:  NITROSTAT   Used for:  Chest pain due to myocardial ischemia, unspecified ischemic chest pain type (H), Coronary artery disease involving native coronary artery of native heart without angina pectoris        Dose:  0.4 mg   Place 1 tablet (0.4 mg) under the tongue every 5 minutes as needed up to 3 tablets per episode.   Quantity:  60 tablet   Refills:  6       PREVACID PO        Dose:  20 mg   Take 20 mg  by mouth every evening Patient needs to use brand name Prevacid (generic version causes diarrhea)   Refills:  0       TYLENOL 8 HOUR PO        Dose:  500 mg   Take 500 mg by mouth as needed   Refills:  0            Where to get your medicines      These medications were sent to [facility] - [place], [place] - [address]  [address], [facility] [address]     Phone:  [phone]     senna-docusate 8.6-50 MG per tablet         Some of these will need a paper prescription and others can be bought over the counter. Ask your nurse if you have questions.     Bring a paper prescription for each of these medications     HYDROcodone-acetaminophen 5-325 MG per tablet                Protect others around you: Learn how to safely use, store and throw away your medicines at www.disposemymeds.org.        Information about OPIOIDS     PRESCRIPTION OPIOIDS: WHAT YOU NEED TO KNOW    Prescription opioids can be used to help relieve moderate to severe pain and are often prescribed following a surgery or injury, or for certain health conditions. These medications can be an important part of treatment but also come with serious risks. It is important to work with your health care provider to make sure you are getting the safest, most effective care.    WHAT ARE THE RISKS AND SIDE EFFECTS OF OPIOID USE?  Prescription opioids carry serious risks of addiction and overdose, especially with prolonged use. An opioid overdose, often marked by slowed breathing can cause sudden death. The use of prescription opioids can have a number of side effects as well, even when taken as directed:      Tolerance - meaning you might need to take more of a medication for the same pain relief    Physical dependence - meaning you have symptoms of withdrawal when a medication is stopped    Increased sensitivity to pain    Constipation    Nausea, vomiting, and dry mouth    Sleepiness and  dizziness    Confusion    Depression    Low levels of testosterone that can result in lower sex drive, energy, and strength    Itching and sweating    RISKS ARE GREATER WITH:    History of drug misuse, substance use disorder, or overdose    Mental health conditions (such as depression or anxiety)    Sleep apnea    Older age (65 years or older)    Pregnancy    Avoid alcohol while taking prescription opioids.   Also, unless specifically advised by your health care provider, medications to avoid include:    Benzodiazepines (such as Xanax or Valium)    Muscle relaxants (such as Soma or Flexeril)    Hypnotics (such as Ambien or Lunesta)    Other prescription opioids    KNOW YOUR OPTIONS:  Talk to your health care provider about ways to manage your pain that do not involve prescription opioids. Some of these options may actually work better and have fewer risks and side effects:    Pain relievers such as acetaminophen, ibuprofen, and naproxen    Some medications that are also used for depression or seizures    Physical therapy and exercise    Cognitive behavioral therapy, a psychological, goal-directed approach, in which patients learn how to modify physical, behavioral, and emotional triggers of pain and stress    IF YOU ARE PRESCRIBED OPIOIDS FOR PAIN:    Never take opioids in greater amounts or more often than prescribed    Follow up with your primary health care provider and work together to create a plan on how to manage your pain.    Talk about ways to help manage your pain that do not involve prescription opioids    Talk about all concerns and side effects    Help prevent misuse and abuse    Never sell or share prescription opioids    Never use another person's prescription opioids    Store prescription opioids in a secure place and out of reach of others (this may include visitors, children, friends, and family)    Visit www.cdc.gov/drugoverdose to learn about risks of opioid abuse and overdose    If you believe  you may be struggling with addiction, tell your health care provider and ask for guidance or call [facility]'s National Helpline at 2-618-192-HELP    LEARN MORE / www.cdc.gov/drugoverdose/prescribing/guideline.html    Safely dispose of unused prescription opioids: Find your local drug take-back programs and more information about the importance of safe disposal at www.doseofreality.[place].gov             Medication List: This is a list of all your medications and when to take them. Check marks below indicate your daily home schedule. Keep this list as a reference.      Medications           Morning Afternoon Evening Bedtime As Needed    aspirin 81 MG tablet   1 TABLET EVERY MORNING                                clindamycin 1 % lotion   Commonly known as:  CLEOCIN T   Apply twice daily for 6 weeks to the groin                                HYDROcodone-acetaminophen 5-325 MG per tablet   Commonly known as:  NORCO   Take 1 tablet by mouth every 6 hours as needed for severe pain maximum 6 tablet(s) per day                                hydrocortisone valerate 0.2 % ointment   Commonly known as:  WEST-NINOSKA   Apply sparingly to affected area three times daily as needed.                                lisinopril 20 MG tablet   Commonly known as:  PRINIVIL/ZESTRIL   Take 1 tablet (20 mg) by mouth daily                                MAALOX ADVANCED PO   Take 1 tablet as needed (Calcium carbonate 1000mg/Simethicone 80mg)                                metoprolol succinate 100 MG 24 hr tablet   Commonly known as:  TOPROL-XL   Take 1 tablet (100 mg) by mouth daily                                nitroGLYcerin 0.4 MG sublingual tablet   Commonly known as:  NITROSTAT   Place 1 tablet (0.4 mg) under the tongue every 5 minutes as needed up to 3 tablets per episode.                                omega 3 1000 MG Caps   Take 1 g by mouth 2 times daily                                PREVACID PO   Take 20 mg by mouth every evening Patient  needs to use brand name Prevacid (generic version causes diarrhea)                                rosuvastatin 40 MG tablet   Commonly known as:  CRESTOR   Take 1 tablet (40 mg) by mouth daily                                senna-docusate 8.6-50 MG per tablet   Commonly known as:  SENOKOT-S;PERICOLACE   Take 1-2 tablets by mouth 2 times daily Take while on oral narcotics to prevent or treat constipation.                                TYLENOL 8 HOUR PO   Take 500 mg by mouth as needed

## 2018-03-22 NOTE — DISCHARGE INSTRUCTIONS
[facility]  Same-Day Surgery   Adult Discharge Orders & Instructions     For 24 hours after surgery    1. Get plenty of rest.  A responsible adult must stay with you for at least 24 hours after you leave the hospital.   2. Do not drive or use heavy equipment.  If you have weakness or tingling, don't drive or use heavy equipment until this feeling goes away.  3. Do not drink alcohol.  4. Avoid strenuous or risky activities.  Ask for help when climbing stairs.   5. You may feel lightheaded.  IF so, sit for a few minutes before standing.  Have someone help you get up.   6. If you have nausea (feel sick to your stomach): Drink only clear liquids such as apple juice, ginger ale, broth or 7-Up.  Rest may also help.  Be sure to drink enough fluids.  Move to a regular diet as you feel able.  7. You may have a slight fever. Call the doctor if your fever is over 100 F (37.7 C) (taken under the tongue) or lasts longer than 24 hours.  8. You may have a dry mouth, a sore throat, muscle aches or trouble sleeping.  These should go away after 24 hours.  9. Do not make important or legal decisions.   Call your doctor for any of the followin.  Signs of infection (fever, growing tenderness at the surgery site, a large amount of drainage or bleeding, severe pain, foul-smelling drainage, redness, swelling).    2. It has been over 8 to 10 hours since surgery and you are still not able to urinate (pass water).    3.  Headache for over 24 hours.      To contact a doctor, call Dr. Samia Gaviria at [phone] (Otolarygology clinic)or:    x   [phone] and ask for the resident on call for   ENT/Otolaryngology (answered 24 hours a day)  x   Emergency Department:    [facility]: [phone]       (TTY for hearing impaired: [phone])           Tips for taking pain medications  To get the best pain relief possible , remember these points:      Take pain medications as directed, before  pain becomes severe      Pain medication can upset your stomach: taking it with food may help      Constipation is a common side effect of pain medication. Drink plenty of  Fluids      Eat foods high in fiber. Take a stool softener  if recommended by your doctor or  Pharmacist.        Do not drink alcohol, drive or operate machinery while taking pain medications.      Ask about other ways to control pain, such as with heat, ice or relaxation.      Supply list  Q-tips and or/ clean washcloth  Gauze or split gauze (2 x 2 )   Paper tape (1 inch wide)  Specimen cup                                                                       Enrike Clement Drain    Home Care Instructions   What is a Enrike Clement (PITER) drain?  This is a small tube that connects to a bulb. Its gentle suction removes extra fluid from a surgical wound. Your doctor will remove the tube when the amount of fluid decreases.  The color and amount of fluid varies. Right after surgery the fluid is bright red. Over time, it changes to light pink and may become clear or the color of straw.  How should I care for my tube site?    Keep the skin around the tube dry. Check with your doctor about how to shower. You may need to cover the site with plastic when you shower. Or, it may be okay to let the site get wet and put on a clean bandage after you shower.    Tape the tube to the skin below the bandage. Make sure to keep some slack in the tube. This helps prevent pulling on the stitches.    You will need to change your bandage at least once a day. If the bandage gets wet, you will need to change it again.  To change the bandage: Prepare    Clean your work area with alcohol or soap and water and a paper towel.    Wash your hands with soap and water.    Place on your clean work area:    Bag for old bandage    Gauze bandage and one-inch paper tape    Anti-bacterial wash (.9% normal saline) or soap and water    Cotton-tipped swab (like Q-Tips) or a clean wash  cloth.  Remove the old bandage    Remove the old bandage and throw it out. Be carefulnot to pull on your stitches or tubing.     Do not use a scissors--you might cut the tube.    Check for any redness, swelling, drainage or broken stitches. If you have any of these, call your doctor  Clean the site    Wash your hands.    Clean the skin around the tube site. Use soap and   water or .9% saline with cotton swabs or a clean wash   cloth. Start at the tube site and move outward in a circular   motion about 1 to 2 inches away from the site.  Rinse with water and pat dry.   Replace the bandage    We will give you a gauze bandage (two per package).    If you have bandages with slits, place one bandage around the tube. Place the other bandage under the tube with the slit facing the opposite way. Tape the bandages in place.    If you have bandages without slits, fold each one in half. Place one above the tube and one under the tube. Tape in place.  Tape the tube    Tape the tube to the skin. Leave some slack in the tubing.  Use paper tape or adhesive tape if paper tape will not hold. Your nurse may show you how to use a StayFix bandage (a tube stabilizer)      Clean up    Throw out all used materials.    Clean work area with alcohol or soap and water and a paper towel.    Wash your hands with soap and water.    Bandage, tube and tape                How should I care for the bulb?    Keep the bulb compressed at all times except while you empty it.    Attach the bulb to your clothing with tape and a safety pin.    Try to empty the bulb at the same time every day. Empty the bulb at least once a day, or when the bulb becomes half full. If it becomes too full, there will not be enough suction.  To empty the bulb:    Wash your hands.    0pen the bulb cap.    Drain the fluid from the bulb into the measuring cup. If you have two drains, use two cups.    Clean the mouth of the bulb with an alcohol wipe if your nurse told you  to.    Squeeze the bulb (fold it in half before you close the bulb cap). If it does not stay compressed, call your nurse or clinic.    Write the amount of drainage on the drainage record (see back page). If you have two drains, write the amount for each bulb.    Flush the drainage down the toilet. Rinse the measuring cup.  Wash your hands.   When should I call my doctor?   Call your doctor if:    You have a fever over 101 F (38.3 C), taken under the tongue.    The drainage increases or smells bad.    The skin around your tube has increased redness, swelling, warmth or pain.    You have pus or fluid leaking at the tube site.    Your stitches break.    You think the tube is not draining.    The tube falls out.    You have any problems or concerns.  If your doctor has instructed you to strip your tube, follow these steps:    Use lotion to make the thumb and index finger of one hand slippery.    With the other hand, pinch off the top of the tube close to the skin.    While pinching the tube, squeeze the tube with your slippery thumb and index finger. Keep squeezing the tube as you run your fingers down toward the bulb. This will move the fluid into the bulb.    Let go of the tubing with both hands. If the tube is still blocked, repeat these steps three or four times. Make sure that the bulb is compressed, so it creates suction.   Your drainage record    Empty your bulb at the same time each day. Write down the date, time and amount of drainage for each bulb. You may wish to make notes about the   color and smell as well.  Bring this record to each clinic visit.               Your doctor may ask you to call the office each day to report the amount of drainage. If so, please call:  Dr. scooby Gallegos

## 2018-03-22 NOTE — BRIEF OP NOTE
[facility], [place]    Brief Operative Note    Pre-operative diagnosis: Left Lymphadenopathy   Post-operative diagnosis Same  Procedure: Procedure(s):  Excisional Biopsy Left Cervical Lymph Node  - Wound Class: I-Clean  Surgeon: Surgeon(s) and Role:     * Samia Gaviria MD - Primary     * Tee Fernandez MD - Resident - Assisting  Anesthesia: General   Estimated blood loss: Less than 10 ml  Drains: Enrike-Clement  Specimens:   ID Type Source Tests Collected by Time Destination   A : Left Level 5 Lymph Node Tissue Neck SURGICAL PATHOLOGY EXAM Samia Gaviria MD 3/22/2018  9:14 AM      Findings:   Bilateral lymphadenopathy. Excisional biopsy of left level Vb nodes..  Complications: None.  Implants: None.

## 2018-03-22 NOTE — OR NURSING
Pt c/o constriction in his throat. Airway not occluded. Dr. Lane orderd 8 mg of decadron since none was given in the OR. She was told that pt was ready for a sign-out. Pt can proceed to Phase II.

## 2018-03-22 NOTE — ANESTHESIA POSTPROCEDURE EVALUATION
Patient: Francisco Javier Cortes    Procedure(s):  Excisional Biopsy Left Cervical Lymph Node  - Wound Class: I-Clean    Diagnosis:Left Lymphadenopathy   Diagnosis Additional Information: No value filed.    Anesthesia Type:  General, ETT    Note:  Anesthesia Post Evaluation    Patient location during evaluation: PACU  Patient participation: Able to fully participate in evaluation  Level of consciousness: awake  Pain management: adequate  Airway patency: patent  Cardiovascular status: acceptable  Respiratory status: acceptable  Hydration status: acceptable  PONV: none     Anesthetic complications: None          Last vitals:  Vitals:    03/22/18 1045 03/22/18 1100 03/22/18 1104   BP: 111/84  136/78   Pulse:   (!) 7   Resp: 16  16   Temp: 35.2  C (95.4  F) 36.7  C (98  F) 36.7  C (98.1  F)   SpO2: 94% 93% 95%         Electronically Signed By: Lance Lane MD  March 22, 2018  11:23 AM

## 2018-03-23 NOTE — PROGRESS NOTES
ENT Discharge Follow-Up      Responsible Attending Physician: Dr. Gaviria.  Date of Discharge:  3/22/18  Discharge to:  Home    Current Status:  Pt is a 78 y/o male s/p excisional node biopsy on 3/22/18. Operative pain is decreasing.  Ambulating without assistance.  Pain well controlled with current meds, ample supply.  Reports incision CDI without signs of infection.  Denies redness, swelling, increased tenderness, or elevated temp.  Denies current bowel or bladder issues. PITER drain output since yesterday night has currently been 27 ml. Patient will be here for PET scan tomorrow and can have drain removed with ENT resident if less than 30 ml in 24 hours. Patient agreeable to plan. He will proceed to 6A around 9:45am tomorrow 3/24/18 and ENT resident (Tee Fernandez) will remove.      Discharge instructions and medication use were reviewed.  RN triage/on call number given:  [phone] or after hours and w/e - [phone].  Patient verbalized understanding and agreement with current plan.    Follow up appointments/imaging/tests needed: pos-op follow-up with Dr. Gaviria scheduled for 3/30/18.    Debbi Baumann, RN, BSN

## 2018-03-24 NOTE — PROCEDURES
Mr Cortes presents for drain removal prior to PET scan later this morning. He state the drainage has been minimal. No erythema or concerns of infection. He has kept the area clean. He reports no fevers no chills no SOB no chest pain.     Procedure:  Drain removal:  Suture cut and removed. Drain opened to air, and pulled atraumatically. No complications

## 2018-03-27 NOTE — TELEPHONE ENCOUNTER
Left vm on [employer] cell phone. Stated prescription sent to [facility] pharmacy for Compazine, an antinausea medication. Provided contact number for this RN  Zhane Barth  RN, BSN, OCN

## 2018-03-27 NOTE — TELEPHONE ENCOUNTER
"Received a call from patient's wife. States she is very concerned about her . \"He is failing fast.  He is not eating and is getting very weak.  Nausea started many weeks ago. Yesterday he only had a bowl of cereal and an oatmeal cookie. Tried a upset stomach pill like Pepto Bismol, which seemed to help.   Drinking a little 7-up and water. \"He's trying to keep himself hydrated\". Having 1-2 loose stools/day and takes imodium. She does not feel they can wait until Friday to see a doctor. He is having his port placed this afternoon.   "

## 2018-03-28 PROBLEM — C83.18 MANTLE CELL LYMPHOMA OF LYMPH NODES OF MULTIPLE SITES (H): Status: ACTIVE | Noted: 2018-01-01

## 2018-03-28 NOTE — TELEPHONE ENCOUNTER
"Called patient to see how he was doing today. Spoke to patient and wife,Court. He states the port placement went well other than he vomited prior to the procedure and was given IV antiemetic. Nausea improved for a few hours after. Picked up compazine but has not taken. No nausea this morning and was able to eat a light breakfast.  Reports several episodes of watery,dark brown, diarrhea over night. Reports occasional abdominal cramping but no sharp pain. Took 1 imodium and has not had diarrhea for a few hours now. Discussed taking imodium with Joy Bush CNP who verified it is ok for patient to take. Instructed patient to try taking imodium on a schedule of 1-2 tablets q 4 hrs, with a maximum of 8 tablets/day, until he has no diarrhea for 12 hrs. Court expressing a lot of frustration with patient not eating or drinking enough. She is trying to push patient to eat but \"he is not cooperating\". Offered advice including but not limited to;  try the compazine an hour before eating, eat small frequent meals, nutritional supplements such as Ensure or [place] instant Breakfast, increase protein with examples of eggs, peanut butter. Patient states he does not like to take a lot of medication unless he absolutely needs them. Encouraged patient to try drink at least 64oz of fluids/day.  Reports urine is pale yellow. Patient has provider visit in 2 days. Instructed to call with any questions or concerns.  Zhane Barth  RN, BSN, OCN    "

## 2018-03-28 NOTE — PROGRESS NOTES
Received message/request from Dr. Auguste to schedule patient for a visit with her tomorrow morning 3/29/18, as well as an infusion appointment tomorrow and Friday to start chemotherapy treatment with Bendamustine/Rituxan.  Dr. Auguste has also placed order for allopurinol for patient to start taking for tumor lysis prophylaxis.   Patient will also need to have labs prior to his visit with Dr. Auguste on Friday.   Call placed to spouse, Court, with recommendations and plan.  Court verbalizes understanding, is in agreement with this plan, and will communicate this information to the patient.  Court will  patient's allopurinol prescription in our pharmacy shortly, and will have patient begin taking the allopurinol this afternoon.    Cam Conner RN, BSN, OCN  Oncology Care Coordinator  [facility]      Merry Auguste MD  P [facility] Infusion Pool; Joy Bush, APRN CNP; Patricia Carmichael, RN; Cam Conner, RN Cc: P  Cancer Ctr Scheduling                   Hello, All,   I was looking at this Dr. Adan patient's chart and seems like he is pretty ill and symptomatic and needs to start treatment ASAP. I was scheduled to see him on Friday but I see that his bendamustine rituxan regimen needs to run over 2 days. I can come in and see him early tomorrow at 9 am. Please make room for him to start Bendamustine Rituxan in infusion tomorrow. For now please keep him on my schedule for Friday too please. We'll get tumor lysis labs on Friday.  Also, need to start him on PO allopurinol for tumor lysis prophylaxis. Rx is in for that for upstairs pharmacy.   Thank you, Dr. Auguste

## 2018-03-29 PROBLEM — R11.0 NAUSEA: Status: ACTIVE | Noted: 2018-01-01

## 2018-03-29 PROBLEM — Z01.818 EXAMINATION PRIOR TO CHEMOTHERAPY: Status: ACTIVE | Noted: 2018-01-01

## 2018-03-29 PROBLEM — R94.31 NONSPECIFIC ST-T WAVE ELECTROCARDIOGRAPHIC CHANGES: Status: ACTIVE | Noted: 2018-01-01

## 2018-03-29 PROBLEM — D70.2 DRUG-INDUCED NEUTROPENIA (H): Status: ACTIVE | Noted: 2018-01-01

## 2018-03-29 PROBLEM — Z51.11 ENCOUNTER FOR ANTINEOPLASTIC CHEMOTHERAPY: Status: ACTIVE | Noted: 2018-01-01

## 2018-03-29 PROBLEM — E88.3 TUMOR LYSIS SYNDROME: Status: ACTIVE | Noted: 2018-01-01

## 2018-03-29 NOTE — NURSING NOTE
"Oncology Rooming Note    March 29, 2018 8:42 AM   Francisco Javier Cortes is a 77 year old male who presents for:    Chief Complaint   Patient presents with     Oncology Clinic Visit     prior to treatment     Initial Vitals: /70  Pulse 80  Temp 98  F (36.7  C)  Ht 1.651 m (5' 5\")  Wt 78 kg (172 lb)  SpO2 95%  BMI 28.62 kg/m2 Estimated body mass index is 28.62 kg/(m^2) as calculated from the following:    Height as of this encounter: 1.651 m (5' 5\").    Weight as of this encounter: 78 kg (172 lb). Body surface area is 1.89 meters squared.  Mild Pain (2) Comment: Data Unavailable   No LMP for male patient.  Allergies reviewed: Yes  Medications reviewed: Yes    Medications: Medication refills not needed today.  Pharmacy name entered into [facility]:    [facility] [place] - [place], [place] - [address], [address]  [facility], [place] - [id number] - [place], [place] - [address], [place] AT [facility]      5 minutes for nursing intake (face to face time)     Kathleen Sandoval LPN              "

## 2018-03-29 NOTE — IP AVS SNAPSHOT
[address] [address]    Phone:  [phone]                                       After Visit Summary   3/29/2018    Francisco Javier Cortes    MRN: [id number]           After Visit Summary Signature Page     I have received my discharge instructions, and my questions have been answered. I have discussed any challenges I see with this plan with the nurse or doctor.    ..........................................................................................................................................  Patient/Patient Representative Signature      ..........................................................................................................................................  Patient Representative Print Name and Relationship to Patient    ..................................................               ................................................  Date                                            Time    ..........................................................................................................................................  Reviewed by Signature/Title    ...................................................              ..............................................  Date                                                            Time

## 2018-03-29 NOTE — TELEPHONE ENCOUNTER
Date: 3/29/2018    Time of Call: 12:43 PM     Diagnosis:  Abnormal EKG, CAD     [  ] Ordering provider: Dr Milo Ball    Order: Talked to Dr Auguste.   Can we please get an echo and a Janessa prior to his appt with me for abnormal ECG?      Order received by: JONATHAN Shetty   Follow-up/additional notes: Message left at both phone numbers.

## 2018-03-29 NOTE — LETTER
3/29/2018         RE: Francisco Javier Cortes  [address] [id number]        Dear Colleague,    Thank you for referring your patient, Francisco Javier Cortes, to the [facility]. Please see a copy of my visit note below.    Oncology Follow-up visit  Mar 29, 2018    HISTORY OF PRESENT ILLNESS:     77-year-old male with past medical history significant for prostate cancer status post prostatectomy, coronary artery disease status post stent placement, hyperlipidemia, hypertension who is being seen for evaluation of new diagnosis of mantle cell lymphoma.  He was seen by Dr. Adan in the first week of March and at that time he was c/o of abdominal bloating/discomfort associated with loss of appetite x 2 months. He has abdominal US in February which showed findings c/w splenomegaly. CT of the abdomen and pelvis on 2/25/2018 showed extensive retroperitoneal, mesenteric, inguinal, pelvic and retrocrural adenopathy - suspicious for lymphoma. He underwent CT neck and CT chest as well on 3/5/2018 which showed bulky and confluent mediastinal, hilar, axillary, and  supraclavicular lymphadenopathy as well as new and enlarging pulmonary nodules, up to 18 mm,  likely metastatic disease. There was splenomegaly noted again. He met with Dr. Gaviria and proceeded to undergo FNA of left supraclavicular mass which on pathology showedfindings c/w  mantle cell lymphoma, which on IHC was CD20+, cyclin D1 positive and CD 5 positive. Concurrent flow cytometry revealed a population of CD5-positive   kappa-monotypic B cells that expressed CD79b but with dim to absent CD23. He proceeded to undergo excisional biopsy of left level V LN on 3/22/2018 and the pathology from the biopsy revealed mantle cell lymphoma, blastoid variant. Immunohistochemistry showed that lymphoma cells were positive for cyclin D1. Ki-67 proliferation index varied widely but was estimated to be 50% on average;with the majority of the lymph node  showing  proliferation index of 70% but focal areas show proliferation index is low as 20%.  Concurrent flow cytometry demonstrated a large population of CD5 positive kappa monotypic B cells. Cytogenetics by FISH and conventional studies is pending from both the cervical LN excisional biopsy and the bone marrow biopsy. He underwent a bone marrow biopsy on 3/21/2018 which showed BM involvement by mantle cell lymphoma (40-50%), with hypercellular marrow for age with a cellularity of 60-70% with   trilineage hematopoiesis, atypical lymphoid aggregates comprising approximately 40 to 50% of marrow cellular composition. Peripheral blood showing monocytosis, a small subset of atypical lymphocytes and moderate thrombocytopenia with normal platelet morphology. Lymphoid aggregates are positive for CD20 and CD5, but lack CD10 and CD3. Cyclin D1 on the core sections did   not work well, due to decalcification and is very weakly positive. Concurrent flow cytometry ([id number]) reported CD5 positive kappa monotypic   B cells (13%.). Based on the morphology and immunohistochemistry, the lymphoma cells were felt to be c/w  the patient's previously diagnosed mantle cell lymphoma.  He had a PET/CT of the neck, chest, abdomen and pelvis on 3/24/2018. We have reviewed the images and findings with the patient and his family in detail.  It showed extensive hypermetabolic adenopathy at each level of the cervical  Chain, with the largest measuring 3.3 x 3.8 cm at the left supraclavicular level, max SUV 10.4.  There was extensive axillary, mediastinal and hilar adenopathy, largest 7.8 x 3.2 cm carinal lymph node and 2.3 x 3.7 cm left axillary lymph node. There were b/l lung nodules, largest 1.4 cm, hypermetabolic.  There were small b/l pleural effusions. The spleen was enlarged measuring up to 18 cm and was hypermetabolic. There was extensive intra-abdominal, retroperitoneal and inguinal adenopathy, with the largest conglomerate of central  mesenteric lymph nodes measuring   approximately 15.5 x 6.9 cm, max SUV 7. Conglomerate of retroperitoneal lymph nodes measured approximately  7.5 x 4.7 cm. There were no suspicious lytic or blastic osseous lesions in the bones. He has a Hx of prostate cancer - prostatectomy was performed 19 years ago and he did not require adjuvant hormonal  therapy or radiation. PSA was <0.01 on 3/5/2018.  He has lost about 20 lbs (per chart) in the last year. He has lost about 12 lbs in the last month. He does feel nauseated and this is helped with compazine and zofran prn. He started on Allopurinol last night. He has occasional loose stools. Otherwise he is feeling well. He does feel lumps in his neck especially on the left. He denies CP or SOB. He has a Hx of CAD and has been seeing Dr. Ball on an annual basis. He had stenting of his right coronary artery and RODGER branch in 2009 and had recurrent chest discomfort which led to drug-eluting stent placement in the circumflex coronary artery in 2014. He is on Lisinopril and Metoprolol. He had an EKG prior to his left cervical LN excisional biopsy, on 3/22/2018. I have reviewed it and also have discussed it with Dr. Ball who was able to review it. It demonstrated normal sinus rhythm at 85 BMP, QT of 404 ms and QTc prolonged at 480 ms. There were nonspecific T wave changes in the anterior leads.  Francisco Javier is here with his wife and daughter today to discuss the results of his workup and to start treatment as soon as possible given his progressive symptoms of anorexia, weight loss, abdominal bloating, and nausea.  In addition, a complete 12 point  review of systems is negative.      PAST MEDICAL HISTORY:   Past Medical History:   Diagnosis Date     CAD (coronary artery disease) 1/09    s/p stentsx2     Coronary artery disease involving native coronary artery of native heart without angina pectoris 12/22/2015     Dyslipidemia      Erectile dysfunction      GERD (gastroesophageal reflux  disease)      Hearing problem One ear 1961     Hypertension      NSTEMI (non-ST elevated myocardial infarction) (H) 3/20/2014     Pneumonia      Prostate cancer (H)     prostatecomy 9 years ago, PSAs remain good     Status post percutaneous transluminal coronary angioplasty-Coronary angioplasty with STEPHEN to LCx 4/4/2014     Stented coronary artery     stents placed       PAST SURGICAL HISTORY:   Past Surgical History:   Procedure Laterality Date     BIOPSY LYMPH NODE CERVICAL Left 3/22/2018    Procedure: BIOPSY LYMPH NODE CERVICAL;  Excisional Biopsy Left Cervical Lymph Node ;  Surgeon: Samia Gaviria MD;  Location: [facility] OR     C APPENDECTOMY       C REMV PROSTATE,RETROPUB,RAD,TOT NODES  1999     CATARACT IOL, RT/LT       COLONOSCOPY       COLONOSCOPY Left 7/5/2016    Procedure: COMBINED COLONOSCOPY, SINGLE OR MULTIPLE BIOPSY/POLYPECTOMY BY BIOPSY;  Surgeon: Duane, William Charles, MD;  Location: [facility] OR     COLONOSCOPY WITH CO2 INSUFFLATION N/A 7/5/2016    Procedure: COLONOSCOPY WITH CO2 INSUFFLATION;  Surgeon: Duane, William Charles, MD;  Location: [facility] OR     ENT SURGERY  1980--1999     PHACOEMULSIFICATION CLEAR CORNEA WITH STANDARD INTRAOCULAR LENS IMPLANT Left 6/18/2015    Procedure: PHACOEMULSIFICATION CLEAR CORNEA WITH STANDARD INTRAOCULAR LENS IMPLANT;  Surgeon: John Zimmerman MD;  Location:  [facility]     PHACOEMULSIFICATION CLEAR CORNEA WITH STANDARD INTRAOCULAR LENS IMPLANT Right 7/16/2015    Procedure: PHACOEMULSIFICATION CLEAR CORNEA WITH STANDARD INTRAOCULAR LENS IMPLANT;  Surgeon: John Zimmerman MD;  Location:  [facility]     STENT, CORONARY, [place]  1/09, 2014    x2, x1 in 2014     TYMPANOPLASTY       VASCULAR SURGERY  2013    stents       ALLERGIES:   Allergies as of 03/29/2018 - Leonel as Reviewed 03/22/2018   Allergen Reaction Noted     Niacin  08/11/2010     Prevacid [lansoprazole] Diarrhea 06/27/2016     Shellfish allergy  04/24/2012       MEDICATIONS:   Current Outpatient Prescriptions    Medication Sig Dispense Refill     allopurinol (ZYLOPRIM) 300 MG tablet Take 1 tablet (300 mg) by mouth daily 30 tablet 1     LORazepam (ATIVAN) 0.5 MG tablet Take 1 tablet (0.5 mg) by mouth every 4 hours as needed (Anxiety, Nausea/Vomiting or Sleep) 30 tablet 3     prochlorperazine (COMPAZINE) 10 MG tablet Take 0.5 tablets (5 mg) by mouth every 6 hours as needed (Nausea/Vomiting) 30 tablet 3     ondansetron (ZOFRAN) 8 MG tablet Take 1 tablet (8 mg) by mouth every 8 hours as needed (Nausea/Vomiting) 10 tablet 3     prochlorperazine (COMPAZINE) 10 MG tablet Take 1 tablet (10 mg) by mouth every 6 hours as needed for nausea or vomiting 30 tablet 1     HYDROcodone-acetaminophen (NORCO) 5-325 MG per tablet Take 1 tablet by mouth every 6 hours as needed for severe pain maximum 6 tablet(s) per day 15 tablet 0     senna-docusate (SENOKOT-S;PERICOLACE) 8.6-50 MG per tablet Take 1-2 tablets by mouth 2 times daily Take while on oral narcotics to prevent or treat constipation. 30 tablet 0     lisinopril (PRINIVIL/ZESTRIL) 20 MG tablet Take 1 tablet (20 mg) by mouth daily (Patient taking differently: Take 20 mg by mouth every morning ) 90 tablet 3     metoprolol succinate (TOPROL-XL) 100 MG 24 hr tablet Take 1 tablet (100 mg) by mouth daily (Patient taking differently: Take 100 mg by mouth every morning ) 90 tablet 3     rosuvastatin (CRESTOR) 40 MG tablet Take 1 tablet (40 mg) by mouth daily (Patient taking differently: Take 40 mg by mouth every morning ) 90 tablet 3     nitroGLYcerin (NITROSTAT) 0.4 MG sublingual tablet Place 1 tablet (0.4 mg) under the tongue every 5 minutes as needed up to 3 tablets per episode. 60 tablet 6     clindamycin (CLEOCIN T) 1 % lotion Apply twice daily for 6 weeks to the groin 60 mL 1     hydrocortisone valerate (WEST-NINOSKA) 0.2 % ointment Apply sparingly to affected area three times daily as needed. 45 g 3     Lansoprazole (PREVACID PO) Take 20 mg by mouth every evening Patient needs to use  "brand name Prevacid (generic version causes diarrhea)        omega 3 1000 MG CAPS Take 1 g by mouth 2 times daily (Patient taking differently: Take 1 g by mouth 2 times daily currently taking 1 tablet daily 3/5/18) 120 capsule 6     Acetaminophen (TYLENOL 8 HOUR PO) Take 500 mg by mouth as needed        MAALOX ADVANCED OR Take 1 tablet as needed (Calcium carbonate 1000mg/Simethicone 80mg)       ASPIRIN 81 MG OR TABS 1 TABLET EVERY MORNING          FAMILY HISTORY:   Family History   Problem Relation Age of Onset     Cardiovascular Mother      HEART DISEASE Mother      Cancer - colorectal Father      HEART DISEASE Father      Other Cancer Father      CANCER Father      Hypertension Father      Asthma Brother      Coronary Artery Disease Brother      Hypertension Brother      HEART DISEASE Brother      HEART DISEASE Son      Hypertension Son      CANCER Daughter      non hodgkins     Prostate Cancer Brother      Hypertension Brother      CANCER Brother      Prostate Cancer Maternal Grandfather      CANCER Maternal Grandfather      Muscular Disorder Maternal Grandfather      HEART DISEASE Paternal Grandmother      Hypertension Paternal Grandmother      Hypertension Sister        SOCIAL HISTORY:   Social History     Social History     Marital status:      Social History Main Topics     Smoking status: Never Smoker     Smokeless tobacco: Never Used     Alcohol use Yes      Comment: a glass of red wine a day     Drug use: No     Sexual activity: Yes     Partners: Female     Other Topics Concern      Service Yes     Actifiy 1984       PHYSICAL EXAMINATION:   /70  Pulse 80  Temp 98  F (36.7  C)  Ht 1.651 m (5' 5\")  Wt 78 kg (172 lb)  SpO2 95%  BMI 28.62 kg/m2    Constitutional: healthy, alert and no distress  Head: Normocephalic. No masses, lesions, tenderness or abnormalities  Neck: Neck supple. + Left cervical  matted adenopathy and L supraclavicular lymphadenopathy  Cardiovascular: S1S2 RRR. "   Respiratory: Lungs clear  Gastrointestinal: Abdomen soft, non-tender. BS normal. No masses, organomegaly but hepatosplenomegaly difficult to evaluate secondary to body habitus  Musculoskeletal: extremities normal  Skin: no suspicious lesions or rashes  Neurologic: Gait normal. Sensation grossly WNL.  Psychiatric: mentation appears normal and affect normal/bright  Hematologic/Lymphatic/Immunologic: L cervical matted lymphadenopathy and also L axillary lymphadenopathy about 3 cm. Inguinal area not examined today due to patient's sited in a recliner chair in the infusion room.  Chest: R port-a-cath in place. No signs of infection      LABORATORY RESULTS:  Oncology Visit on 03/29/2018   Component Date Value Ref Range Status     WBC 03/29/2018 12.3* 4.0 - 11.0 10e9/L Final     RBC Count 03/29/2018 4.64  4.4 - 5.9 10e12/L Final     Hemoglobin 03/29/2018 13.7  13.3 - 17.7 g/dL Final     Hematocrit 03/29/2018 43.2  40.0 - 53.0 % Final     MCV 03/29/2018 93  78 - 100 fl Final     MCH 03/29/2018 29.5  26.5 - 33.0 pg Final     MCHC 03/29/2018 31.7  31.5 - 36.5 g/dL Final     RDW 03/29/2018 15.8* 10.0 - 15.0 % Final     Platelet Count 03/29/2018 87* 150 - 450 10e9/L Final     Diff Method 03/29/2018 PENDING   Incomplete     Sodium 03/29/2018 140  133 - 144 mmol/L Final     Potassium 03/29/2018 4.3  3.4 - 5.3 mmol/L Final     Chloride 03/29/2018 106  94 - 109 mmol/L Final     Carbon Dioxide 03/29/2018 24  20 - 32 mmol/L Final     Anion Gap 03/29/2018 10  3 - 14 mmol/L Final     Glucose 03/29/2018 124* 70 - 99 mg/dL Final     Urea Nitrogen 03/29/2018 30  7 - 30 mg/dL Final     Creatinine 03/29/2018 1.39* 0.66 - 1.25 mg/dL Final     GFR Estimate 03/29/2018 49* >60 mL/min/1.7m2 Final    Non  GFR Calc     GFR Estimate If Black 03/29/2018 60* >60 mL/min/1.7m2 Final    African American GFR Calc     Calcium 03/29/2018 7.9* 8.5 - 10.1 mg/dL Final     Bilirubin Total 03/29/2018 0.7  0.2 - 1.3 mg/dL Final     Albumin  03/29/2018 3.1* 3.4 - 5.0 g/dL Final     Protein Total 03/29/2018 6.6* 6.8 - 8.8 g/dL Final     Alkaline Phosphatase 03/29/2018 102  40 - 150 U/L Final     ALT 03/29/2018 29  0 - 70 U/L Final     AST 03/29/2018 71* 0 - 45 U/L Final     Uric Acid 03/29/2018 6.9  3.5 - 7.2 mg/dL Final     Lactate Dehydrogenase 03/29/2018 545* 85 - 227 U/L Final     Results for DIDIER CORTES (MRN [id number]) as of 3/29/2018 08:44   Ref. Range 6/27/2016 07:35 2/24/2017 07:04 8/14/2017 07:06 2/21/2018 12:39 3/22/2018 06:00 3/29/2018 08:12   Creatinine Latest Ref Range: 0.66 - 1.25 mg/dL 0.86 0.95 0.96 1.08 1.36 (H) 1.39 (H)     Hep B and C screen negative.    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  ECOG performance status: 1    1. Stage IVB mantle cell lymphoma, with the Mantle Cell Lymphoma International Prognostic Index of 4.3 c/w low risk although he does have a blastoid variant of MCL which in some studies is reported  to be more aggressive. In various studies,  median overall survival for patients with low MIPI score at five years was from 60 to 83%. Bendamustine (90 mg/m2 days 1 and 2) plus rituximab (375 mg/m2 day 1) every 28 days for six cycles is the standard of care for advanced mantle cell lymphoma for patients over the age of 65. We'll proceed with this regimen cycle 1 dose 1 today. We have discussed the rationale behind using bendamustine/ Rituximab in detail as well as major side effects including but  not limited to immediate/short term side effects of acute infusion reaction/anaphylaxis and flu-like symptoms( headache, weakness, fever, shakes, aches, pains, and sweating), hypotension, lightheadedness, nausea/vomiting, GI  toxicity, low platelets, anemia  and low white blood cell counts and long-term side effects including the risk of infection and death a life-threatening brain virus (Progressive Multifocal Leukoencephalopathy). He is at high risk of tumor lysis syndrome. This was discussed with the patient as well.  The patient agrees to proceed with treatment. We'll recheck tumor lysis labs tonight. If worse, we'll plan to admit for observation. If stable or improved, we'll see the patient tomorrow in am with repeat tumor lysis labs.    2. Tumor lysis prophylaxis- he started Allopurinol 300 mg PO daily last night.  Uric acid normal at baseline at 6.9. Phos is low. He will receive about 1000 ml of IVF with his treatment today. We'll recheck his labs at 4-5 pm tonight before the end of day and also plan to recheck tumor lysis labs tomorrow and Monday. We did discuss with the patient that he is at high risk of tumor lysis given the large burden of disease and  if he had any worsening symptoms or worsening in his labs, he will need to be admitted to the hospital.     3. Nausea- this is currently relieved by PO antiemetics at home (compazine, zofran)- we'll use Emend and Aloxi in addition to 12 mg of IV Dexamethasone as premeds today. He has lorazepam, compazine and Zofran prn to use at home.     4. Cardiac- Hx of CAD with  stenting of his right coronary artery and RODGER branch in 2009 and had recurrent chest discomfort which led to drug-eluting stent placement in the circumflex coronary artery in 2014. He is on Lisinopril and Metoprolol. I have discussed his situation with Dr. Ball today who had no objections to patient proceeding with chemotherapy- bendamustine and Rituxan. Francisco Javier's EKG, on 3/22/2018 showed prolonged QTc at 480 ms as well as  nonspecific T wave changes in the anterior leads. Dr. Ball is planning to see him in the near future with echocardiogram and possible stress test if needed. Patient is asymptomatic from cardiac  standpoint.     5. Neutropenia prophylaxis- use Neulasta on day 3 of chemotherapy for neutropenia prophylaxis.   6. Creat elevated in the last week- likely secondary to dehydration. CrCl>40 and ok to use bendamustine. He will be getting IVF with treatment and we'll monitor creatinine closely.  7. S/p L cervical LN excision- needs stiches removed. His appt tomorrow coincides with chemotherapy and we'll communicate with ENT team to reschedule.  It was my pleasure to meet Francisco Javier and his family.  At the end of our visit patient verbalized understanding and concurred with the plan.      Again, thank you for allowing me to participate in the care of your patient.        Sincerely,        Merry Auguste MD, MD

## 2018-03-29 NOTE — H&P
[facility], [facility]    Hematology / Oncology  Admission History & Physical     Date of Admission:  03/29/18  Date of Service: 03/29/18  Primary Hematologist/Oncologist: Dr. Merry Auguste    Assessment & Plan   Francisco Javier Cortes is a 77 year old male with history of prostate cancer (s/p prostatectomy), CAD (s/p STEPHEN), HTN, HLD and new diagnosis of Stage IVB mantle cell lymphoma. He was started on bendamustine and rituximab today, and was admitted to the hospital for close monitoring for TLS.    Stage IVB Mantle Cell Lymphoma.  Recently diagnosed. Following with Dr. Auguste in [place]. Started Bendamustine + Rituximab today, and labs were notable for LDH increased from 545 this AM to 940 this afternoon. Admitted for close monitoring for TLS given high risk with bulky disease.  - TLS and DIC labs Q8H for now, next check in the AM.  - Continue allopurinol 300 mg daily.  - IV fluids overnight.    Anemia + thrombocytopenia.  Likely secondary to malignancy and chemotherapy. Labs on admission notable for Hgb 12.7 and Plts 42K.  - Transfuse for goal Hgb >7 and platelets >10K.  - Patient will need to be consented for blood products if platelet count drops more precipitously than anticipated.    PORSCHE (versus CKD stage 2).  Creatinine ~1.3 for the last week or so, previously close to 1. Likely secondary to underlying malignancy with moderate TLS.   - IV fluids overnight.  - Avoid nephrotoxins as able.    CAD w/prior NSTEMI (s/p STEPHEN).  - Continue home metoprolol and rosuvastatin.     GERD.  - Continue home lansoprazole (pantoprazole substituted per hospital formulary).    Pain Assessment:  Current Pain Score 3/22/2018   Patient currently in pain? yes   Pain score (0-10) -   Pain location Neck   Pain descriptors Discomfort   - Francisco Javier diane pain level was assessed and he currently denies pain.      FEN: regular diet, NS at 125 ml/hr, replete lytes PRN  Prophylaxis: mechanical (thrombocytopenia)  Code:  FULL  Disposition: Admitted to the hematology service for monitoring for TLS given high-risk with bulky disease. Anticipate discharge to home in the next day or two pending stable labs.    Wilma Green MD/PhD  Heme/Onc/Transplant Hospitalist    Chief Complaint   Admitted from [facility] for close monitoring for TLS.  - History obtained from the patient and through chart review.    History of Present Illness   Francisco Javier Cortes is a 77 year old male with history of prostate cancer (s/p prostatectomy), CAD (s/p STEPHEN), HTN, HLD and new diagnosis of Stage IVB mantle cell lymphoma. He was started on bendamustine and rituximab today, and labs were notable for LDH increase from 545 this AM to 940 this afternoon. He was admitted for close monitoring for TLS given high risk with bulky disease. He reports overall feeling quite well, other than some mild nausea. He was given anti-emetics in clinic with his chemotherapy, which have helped a little. Prior to this diagnosis, he reported anorexia, weight loss and abdominal discomfort. He states he tolerated chemo today without significant problems.     Past Medical History    I have reviewed this patient's medical history and updated it with pertinent information if needed.   Past Medical History:   Diagnosis Date     CAD (coronary artery disease) 1/09    s/p stentsx2     Coronary artery disease involving native coronary artery of native heart without angina pectoris 12/22/2015     Dyslipidemia      Erectile dysfunction      GERD (gastroesophageal reflux disease)      Hearing problem One ear 1961     Hypertension      NSTEMI (non-ST elevated myocardial infarction) (H) 3/20/2014     Pneumonia      Prostate cancer (H)     prostatecomy 9 years ago, PSAs remain good     Status post percutaneous transluminal coronary angioplasty-Coronary angioplasty with STEPHEN to LCx 4/4/2014     Stented coronary artery     stents placed       Past Surgical History   I have reviewed this  patient's surgical history and updated it with pertinent information if needed.  Past Surgical History:   Procedure Laterality Date     BIOPSY LYMPH NODE CERVICAL Left 3/22/2018    Procedure: BIOPSY LYMPH NODE CERVICAL;  Excisional Biopsy Left Cervical Lymph Node ;  Surgeon: Samia Gaviria MD;  Location: [facility] OR     C APPENDECTOMY       C REMV PROSTATE,RETROPUB,RAD,TOT NODES       CATARACT IOL, RT/LT       COLONOSCOPY       COLONOSCOPY Left 2016    Procedure: COMBINED COLONOSCOPY, SINGLE OR MULTIPLE BIOPSY/POLYPECTOMY BY BIOPSY;  Surgeon: Duane, William Charles, MD;  Location: [facility] OR     COLONOSCOPY WITH CO2 INSUFFLATION N/A 2016    Procedure: COLONOSCOPY WITH CO2 INSUFFLATION;  Surgeon: Duane, William Charles, MD;  Location: [facility] OR     ENT SURGERY  --     PHACOEMULSIFICATION CLEAR CORNEA WITH STANDARD INTRAOCULAR LENS IMPLANT Left 2015    Procedure: PHACOEMULSIFICATION CLEAR CORNEA WITH STANDARD INTRAOCULAR LENS IMPLANT;  Surgeon: John Zimmerman MD;  Location:  [facility]     PHACOEMULSIFICATION CLEAR CORNEA WITH STANDARD INTRAOCULAR LENS IMPLANT Right 2015    Procedure: PHACOEMULSIFICATION CLEAR CORNEA WITH STANDARD INTRAOCULAR LENS IMPLANT;  Surgeon: John Zimmerman MD;  Location:  [facility]     STENT, CORONARY, [place]  1/09, 2014    x2, x1 in      TYMPANOPLASTY       VASCULAR SURGERY      stents       Prior to Admission Medications   Prior to Admission Medications   Prescriptions Last Dose Informant Patient Reported? Taking?   ASPIRIN 81 MG OR TABS   Yes No   Si TABLET EVERY MORNING   Acetaminophen (TYLENOL 8 HOUR PO)   Yes No   Sig: Take 500 mg by mouth as needed    HYDROcodone-acetaminophen (NORCO) 5-325 MG per tablet   No No   Sig: Take 1 tablet by mouth every 6 hours as needed for severe pain maximum 6 tablet(s) per day   LORazepam (ATIVAN) 0.5 MG tablet   No No   Sig: Take 1 tablet (0.5 mg) by mouth every 4 hours as needed (Anxiety, Nausea/Vomiting or Sleep)    Lansoprazole (PREVACID PO)   Yes No   Sig: Take 20 mg by mouth every evening Patient needs to use brand name Prevacid (generic version causes diarrhea)    MAALOX ADVANCED OR   Yes No   Sig: Take 1 tablet as needed (Calcium carbonate 1000mg/Simethicone 80mg)   allopurinol (ZYLOPRIM) 300 MG tablet   No No   Sig: Take 1 tablet (300 mg) by mouth daily   clindamycin (CLEOCIN T) 1 % lotion   No No   Sig: Apply twice daily for 6 weeks to the groin   hydrocortisone valerate (WEST-NINOSKA) 0.2 % ointment   No No   Sig: Apply sparingly to affected area three times daily as needed.   lisinopril (PRINIVIL/ZESTRIL) 20 MG tablet   No No   Sig: Take 1 tablet (20 mg) by mouth daily   Patient taking differently: Take 20 mg by mouth every morning    metoprolol succinate (TOPROL-XL) 100 MG 24 hr tablet   No No   Sig: Take 1 tablet (100 mg) by mouth daily   nitroGLYcerin (NITROSTAT) 0.4 MG sublingual tablet   No No   Sig: Place 1 tablet (0.4 mg) under the tongue every 5 minutes as needed up to 3 tablets per episode.   omega 3 1000 MG CAPS   No No   Sig: Take 1 g by mouth 2 times daily   Patient taking differently: Take 1 g by mouth 2 times daily currently taking 1 tablet daily 3/5/18   ondansetron (ZOFRAN) 8 MG tablet   No No   Sig: Take 1 tablet (8 mg) by mouth every 8 hours as needed (Nausea/Vomiting)   prochlorperazine (COMPAZINE) 10 MG tablet   No No   Sig: Take 1 tablet (10 mg) by mouth every 6 hours as needed for nausea or vomiting   prochlorperazine (COMPAZINE) 10 MG tablet   No No   Sig: Take 0.5 tablets (5 mg) by mouth every 6 hours as needed (Nausea/Vomiting)   rosuvastatin (CRESTOR) 40 MG tablet   No No   Sig: Take 1 tablet (40 mg) by mouth daily   Patient taking differently: Take 40 mg by mouth every morning    senna-docusate (SENOKOT-S;PERICOLACE) 8.6-50 MG per tablet   No No   Sig: Take 1-2 tablets by mouth 2 times daily Take while on oral narcotics to prevent or treat constipation.      Facility-Administered Medications:  None     Allergies   Allergies   Allergen Reactions     Niacin      Severe rash and itching     Prevacid [Lansoprazole] Diarrhea     Patient notes diarrhea with 30mg generic version. Patient does ok with 15mg in generic and brand name.     Shellfish Allergy      One kind       Social History   Social History     Social History     Marital status:      Spouse name: N/A     Number of children: N/A     Years of education: N/A     Occupational History     Not on file.     Social History Main Topics     Smoking status: Never Smoker     Smokeless tobacco: Never Used     Alcohol use Yes      Comment: a glass of red wine a day     Drug use: No     Sexual activity: Not Currently     Partners: Female     Birth control/ protection: Post-menopausal     Other Topics Concern      Service Yes     [employer]     Blood Transfusions No     Caffeine Concern No     Occupational Exposure Yes          Hobby Hazards No     Sleep Concern No     Stress Concern No     Weight Concern No     Special Diet Yes     Back Care No     Exercise Yes     Seat Belt Yes     Self-Exams Yes     Parent/Sibling W/ Cabg, Mi Or Angioplasty Before 65f 55m? Yes     Social History Narrative       Family History   I have reviewed this patient's family history and updated it with pertinent information if needed.   Family History   Problem Relation Age of Onset     Cardiovascular Mother      HEART DISEASE Mother      Cancer - colorectal Father      HEART DISEASE Father      Other Cancer Father      CANCER Father      Hypertension Father      Asthma Brother      Coronary Artery Disease Brother      Hypertension Brother      HEART DISEASE Brother      HEART DISEASE Son      Hypertension Son      CANCER Daughter      non hodgkins     Prostate Cancer Brother      Hypertension Brother      CANCER Brother      Prostate Cancer Maternal Grandfather      CANCER Maternal Grandfather      Muscular Disorder Maternal Grandfather      HEART DISEASE  Paternal Grandmother      Hypertension Paternal Grandmother      Hypertension Sister        Review of Systems   A comprehensive ROS was performed with the patient and was found to be negative or non-contributory with the exception of that noted in the HPI above.    Physical Exam   Temp:  [97.5  F (36.4  C)-98  F (36.7  C)] 97.5  F (36.4  C)  Pulse:  [80-84] 84  BP: (107-127)/(70-71) 127/71  SpO2:  [95 %] 95 %  CONSTITUTIONAL: Alert and interactive. Lying in bed in no acute distress.  HEENT: NC/AT, PERRLA, EOMI, anicteric sclera, oropharynx is pink and moist without erythema/exudates/lesions/thrush.  NECK: Supple, full ROM.  LYMPHATIC: Left cervical lymphadenopathy, left supraclavicular lymphadenopathy.  CARDIOVASCULAR: RRR. Normal S1/S2. No murmurs. 2+ equal and bilateral radial and pedal pulses.   RESPIRATORY: CTAB. No wheezes appreciated. Normal respiratory effort on ambient air.  GASTROINTESTINAL: Soft, non-tender, non-distended, normoactive bowel sounds.  MUSCULOSKELETAL: No joint swelling or tenderness.  EXTREMITIES: No lower extremity edema.   SKIN: Warm and intact. No concerning lesions or rashes on exposed skin surfaces. No jaundice.  NEUROLOGIC: Alert and fully oriented, appropriately responsive during interview.     Data   I have personally reviewed the following labs/imaging:  Results for orders placed or performed in visit on 03/29/18 (from the past 24 hour(s))   CBC with platelets differential   Result Value Ref Range    WBC 12.3 (H) 4.0 - 11.0 10e9/L    RBC Count 4.64 4.4 - 5.9 10e12/L    Hemoglobin 13.7 13.3 - 17.7 g/dL    Hematocrit 43.2 40.0 - 53.0 %    MCV 93 78 - 100 fl    MCH 29.5 26.5 - 33.0 pg    MCHC 31.7 31.5 - 36.5 g/dL    RDW 15.8 (H) 10.0 - 15.0 %    Platelet Count 87 (L) 150 - 450 10e9/L    % Neutrophils 60.0 %    % Lymphocytes 33.0 %    % Monocytes 7.0 %    Absolute Neutrophil 7.3 1.6 - 8.3 10e9/L    Absolute Lymphocytes 4.1 0.8 - 5.3 10e9/L    Absolute Monocytes 0.9 0.0 - 1.3 10e9/L     RBC Morphology Normal     Platelet Estimate       Automated count confirmed.  Platelet morphology is normal.    Diff Method Manual Differential    Comprehensive metabolic panel   Result Value Ref Range    Sodium 140 133 - 144 mmol/L    Potassium 4.3 3.4 - 5.3 mmol/L    Chloride 106 94 - 109 mmol/L    Carbon Dioxide 24 20 - 32 mmol/L    Anion Gap 10 3 - 14 mmol/L    Glucose 124 (H) 70 - 99 mg/dL    Urea Nitrogen 30 7 - 30 mg/dL    Creatinine 1.39 (H) 0.66 - 1.25 mg/dL    GFR Estimate 49 (L) >60 mL/min/1.7m2    GFR Estimate If Black 60 (L) >60 mL/min/1.7m2    Calcium 7.9 (L) 8.5 - 10.1 mg/dL    Bilirubin Total 0.7 0.2 - 1.3 mg/dL    Albumin 3.1 (L) 3.4 - 5.0 g/dL    Protein Total 6.6 (L) 6.8 - 8.8 g/dL    Alkaline Phosphatase 102 40 - 150 U/L    ALT 29 0 - 70 U/L    AST 71 (H) 0 - 45 U/L   Uric acid   Result Value Ref Range    Uric Acid 6.9 3.5 - 7.2 mg/dL   Lactate Dehydrogenase   Result Value Ref Range    Lactate Dehydrogenase 545 (H) 85 - 227 U/L   Phosphorus   Result Value Ref Range    Phosphorus 2.4 (L) 2.5 - 4.5 mg/dL   CBC with platelets differential   Result Value Ref Range    WBC 12.6 (H) 4.0 - 11.0 10e9/L    RBC Count 4.16 (L) 4.4 - 5.9 10e12/L    Hemoglobin 12.2 (L) 13.3 - 17.7 g/dL    Hematocrit 38.8 (L) 40.0 - 53.0 %    MCV 93 78 - 100 fl    MCH 29.3 26.5 - 33.0 pg    MCHC 31.4 (L) 31.5 - 36.5 g/dL    RDW 15.5 (H) 10.0 - 15.0 %    Platelet Count 47 (LL) 150 - 450 10e9/L    % Neutrophils 97.0 %    % Lymphocytes 1.0 %    % Monocytes 1.0 %    % Metamyelocytes 1.0 %    Absolute Neutrophil 12.3 (H) 1.6 - 8.3 10e9/L    Absolute Lymphocytes 0.1 (L) 0.8 - 5.3 10e9/L    Absolute Monocytes 0.1 0.0 - 1.3 10e9/L    Absolute Metamyelocytes 0.1 (H) 0 10e9/L    Diff Method Manual Differential    Comprehensive metabolic panel   Result Value Ref Range    Sodium 140 133 - 144 mmol/L    Potassium 4.4 3.4 - 5.3 mmol/L    Chloride 107 94 - 109 mmol/L    Carbon Dioxide 22 20 - 32 mmol/L    Anion Gap 11 3 - 14 mmol/L    Glucose  153 (H) 70 - 99 mg/dL    Urea Nitrogen 30 7 - 30 mg/dL    Creatinine 1.24 0.66 - 1.25 mg/dL    GFR Estimate 56 (L) >60 mL/min/1.7m2    GFR Estimate If Black 68 >60 mL/min/1.7m2    Calcium 7.3 (L) 8.5 - 10.1 mg/dL    Bilirubin Total 0.7 0.2 - 1.3 mg/dL    Albumin 2.9 (L) 3.4 - 5.0 g/dL    Protein Total 5.9 (L) 6.8 - 8.8 g/dL    Alkaline Phosphatase 106 40 - 150 U/L    ALT 31 0 - 70 U/L     (H) 0 - 45 U/L   Lactate Dehydrogenase   Result Value Ref Range    Lactate Dehydrogenase 940 (H) 85 - 227 U/L   Uric acid   Result Value Ref Range    Uric Acid 6.6 3.5 - 7.2 mg/dL   Phosphorus   Result Value Ref Range    Phosphorus 2.7 2.5 - 4.5 mg/dL

## 2018-03-29 NOTE — PROGRESS NOTES
SPIRITUAL HEALTH SERVICES  SPIRITUAL ASSESSMENT Progress Note  [facility]    PRIMARY FOCUS:     Emotional/spiritual/Congregation distress    Support for coping    ILLNESS CIRCUMSTANCES:   Reviewed documentation. Reflective conversation shared with Francisco Javier Cortes & his wife, Court, which integrated elements of illness and family narratives.  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    Context of Serious Illness/Symptom(s) - Cancer    Resources for Support - wife, family, large network of friends, simin    DISTRESS:     Emotional/Spiritual/Existential Distress - Pt's wife expressed concern about the treatment process, but stated they were determined to do all they could to get a good outcome.    Jehovah's witness Distress - Not Applicable    Social/Cultural/Economic Distress - Unknown    SPIRITUAL/Buddhism COPING:     Temple/Simin - Zoroastrianism - members of [facility], [place]    Spiritual Practice(s) - Church, prayer -  provided a prayer and sang a verse of Amazing Jackie.  (Pt declined a prayer shawl.)    Emotional/Relational/Existential Connections - Court proudly spoke of their 46 yrs of marriage and their four children.  Pt has an extensive network of friends.    GOALS OF CARE:    Goals of Care - Address the cancer.    Meaning/Sense-Making - Pt's simin is central to his meaning-making.    PLAN:  will follow up in the future.    Chad Jacobson M.Div., [facility]  Staff   Office tel: [phone]

## 2018-03-29 NOTE — PROGRESS NOTES
Received request from Dr. Auguste to directly admit patient to unit 7D at [facility] to monitor for tumor lysis syndrome, due to rising LDH level.  Per Dr. Auguste, it is planned for patient to receive his D2 chemotherapy tomorrow as an inpatient.  Dr. Auguste has spoken with Dr. Tonya Barragan, who has accepted patient for the admission.  Call was placed to [facility], who are arranging for patient's placement.  Nursing report called to Min, charge nurse on 7D.      Cam Conner, RN, BSN, OCN  Oncology Care Coordinator  [facility]

## 2018-03-29 NOTE — PROGRESS NOTES
Infusion Nursing Note:  Francisco Javier Cortes presents today for C1D1 Rituxan/Bendamustine.    Patient seen by provider today: Yes: Dr. Auguste   present during visit today: Not Applicable.    Note: Rituxan rate: 50 ml/hr x 30 min, 100 ml/hr x 30 min, 150 ml/hr x 30 min, 200 ml/hr x 30 min, 250 ml/hr x 30 min, 300 ml/hr x 30 min, 350 ml/hr x 30 min, 400 ml/hr remainder.  Pharmacist met with patient to provide education on take home anti-nausea medications.   Patient started taking the Allopurinol 300 mg yesterday.     Intravenous Access:  Implanted Port.    Treatment Conditions:  Lab Results   Component Value Date    HGB 13.7 03/29/2018     Lab Results   Component Value Date    WBC 12.3 03/29/2018      Lab Results   Component Value Date    ANEU 7.3 03/29/2018     Lab Results   Component Value Date    PLT 87 03/29/2018      Lab Results   Component Value Date     03/29/2018                   Lab Results   Component Value Date    POTASSIUM 4.3 03/29/2018           Lab Results   Component Value Date    MAG 2.3 03/20/2014            Lab Results   Component Value Date    CR 1.39 03/29/2018                   Lab Results   Component Value Date    EVANGELINA 7.9 03/29/2018                Lab Results   Component Value Date    BILITOTAL 0.7 03/29/2018           Lab Results   Component Value Date    ALBUMIN 3.1 03/29/2018                    Lab Results   Component Value Date    ALT 29 03/29/2018           Lab Results   Component Value Date    AST 71 03/29/2018     Results reviewed, labs MET treatment parameters, ok to proceed with treatment.    Post Infusion Assessment:  Patient tolerated infusion without incident.  Blood return noted pre and post infusion.  Site patent and intact, free from redness, edema or discomfort.  Access discontinued per protocol.    Discharge Plan:   Prescription refills given for Ativan, Compazine, Zofran..  Discharge instructions reviewed with: Patient and wife.  Patient and/or family verbalized  understanding of discharge instructions and all questions answered.  Patient will return 3/30/18 for next appointment.   Patient discharged in stable condition accompanied by: wife.  Departure Mode: Ambulatory.    Nell Gabriel RN

## 2018-03-29 NOTE — MR AVS SNAPSHOT
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)    -  1       Follow-ups after your visit        Your next 10 appointments already scheduled     Mar 30, 2018 10:30 AM CDT   Return Visit with NUR[facility] ONLY CANCER CENTER   [facility] ([facility])    [address] [place] [address]   [phone]            Mar 30, 2018 11:00 AM CDT   Return Visit with Merry Auguste MD   [facility])    [address] [place] [address]   [phone]            Mar 30, 2018  1:30 PM CDT   Level 1 with [place] [facility] INFUSION   [facility])    [address] [place] [address]   [phone]            Mar 30, 2018  2:20 PM CDT   (Arrive by 2:05 PM)   Return Visit with Samia Gaviria MD   [facility] ([facility])    [address] [place] [address]   [phone]            Apr 09, 2018  7:45 AM CDT   Return Visit with Erickson Adan MD   [facility])    [address] [place] [address]   [phone]            Apr 10, 2018  8:00 AM CDT   Return Visit with Jad Ball MD   [facility])    [address] [place] [address]   [phone]              Future tests that were ordered for you today     Open Standing Orders        Priority Remaining Interval Expires Ordered    Uric acid Routine 1/1 AM DRAW  3/29/2018    Lactate Dehydrogenase Routine 1/1 AM DRAW  3/29/2018             Who to contact     If you have questions or need follow up information about today's clinic visit or your schedule please contact [facility] directly at [phone].  Normal or non-critical lab and imaging results will be communicated to you by Net[employer], letter or phone within 4 business days after the clinic has received the results. If you do not hear from us within 7 days, please contact the clinic through [employer] or phone. If you have a critical or abnormal lab result, we will notify you by phone as soon as possible.  Submit refill requests through [employer] or call your pharmacy and they will forward the refill request to us. Please allow 3 business days for your refill to be completed.          Additional Information About Your Visit        [employer] Information     [employer] gives you secure access to your electronic health record. If you see a primary care provider, you can also send messages to your care team and make appointments. If you have questions, please call your primary care clinic.  If you do not have a primary care provider, please call [phone] and they will assist you.      [employer] is an electronic gateway that provides easy, online access to your medical records. With [employer], you can request a clinic appointment, read your test results, renew a prescription or communicate with your care team.     To access your existing account, please contact your [facility] or call [phone] for assistance.        Care EveryWhere ID     This is your Care EveryWhere ID. This could be used by other organizations to access your [place] medical records  [id number]         Blood Pressure from Last 3 Encounters:   03/29/18 107/70   03/22/18 109/65   03/21/18 126/77    Weight from Last 3 Encounters:   03/29/18 78 kg (172 lb)   03/22/18 73.5 kg (162 lb 0.6 oz)   03/16/18 81.2 kg (179 lb)              We Performed the Following     CBC with platelets  differential     Comprehensive metabolic panel     Lactate Dehydrogenase     Phosphorus     Uric acid          Today's Medication Changes          These changes are accurate as of 3/29/18  9:14 AM.  If you have any questions, ask your nurse or doctor.               These medicines have changed or have updated prescriptions.        Dose/Directions    lisinopril 20 MG tablet   Commonly known as:  PRINIVIL/ZESTRIL   This may have changed:  when to take this   Used for:  Hypertension goal BP (blood pressure) < 130/80, Coronary artery disease involving native coronary artery of native heart without angina pectoris, Microalbuminuria        Dose:  20 mg   Take 1 tablet (20 mg) by mouth daily   Quantity:  90 tablet   Refills:  3       omega 3 1000 MG Caps   This may have changed:  additional instructions   Used for:  Coronary artery disease involving native coronary artery of native heart with angina pectoris (H)        Dose:  1 g   Take 1 g by mouth 2 times daily   Quantity:  120 capsule   Refills:  6       rosuvastatin 40 MG tablet   Commonly known as:  CRESTOR   This may have changed:  when to take this   Used for:  Hyperlipidemia LDL goal <100, Coronary artery disease involving native coronary artery of native heart without angina pectoris        Dose:  40 mg   Take 1 tablet (40 mg) by mouth daily   Quantity:  90 tablet   Refills:  3                Primary Care Provider Office Phone # Fax #    Florian Alonzo [phone]       [address]  [facility] [address]        Equal Access to Services     NANDA BARAJAS AH: Hadii aad ku hadasho Soomaali, waaxda luqadaha, qaybta kaalmada adeegyada, zhao manriquez haykaiden lorenzo. So [facility] [phone].    ATENCIÓN: Si habla español, tiene a ivan disposición servicios gratuitos de asistencia lingüística. Llame al [phone].    We comply with applicable federal civil rights laws and [place] laws. We do not discriminate on the basis of race, color,  national origin, age, disability, sex, sexual orientation, or gender identity.            Thank you!     Thank you for choosing [facility]  for your care. Our goal is always to provide you with excellent care. Hearing back from our patients is one way we can continue to improve our services. Please take a few minutes to complete the written survey that you may receive in the mail after your visit with us. Thank you!             Your Updated Medication List - Protect others around you: Learn how to safely use, store and throw away your medicines at www.disposemymeds.org.          This list is accurate as of 3/29/18  9:14 AM.  Always use your most recent med list.                   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)       PREVACID PO      Take 20 mg by mouth every evening Patient needs to use brand name Prevacid (generic version causes diarrhea)        * prochlorperazine 10 MG tablet    COMPAZINE    30 tablet    Take 1 tablet (10 mg) by mouth every 6 hours as needed for nausea or vomiting    Nausea       * prochlorperazine 10 MG tablet    COMPAZINE    30 tablet    Take 0.5 tablets (5 mg) by mouth every 6 hours as needed (Nausea/Vomiting)    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    Postoperative  pain       TYLENOL 8 HOUR PO      Take 500 mg by mouth as needed        * Notice:  This list has 2 medication(s) that are the same as other medications prescribed for you. Read the directions carefully, and ask your doctor or other care provider to review them with you.

## 2018-03-29 NOTE — PROGRESS NOTES
"Chemotherapy Education Notes    Met with patient in the infusion area for chemotherapy education on Bendamustine/Rituxan. Via Oncology handouts dated 3/28/18 were discussed and given to patient to take home.  Reviewed the following with the patient:     Treatment Goal/Regimen/Duration: rationale for strict adherence, specific medication names including pre-treatment medications, and at home scheduled or as-needed medications, medication delivery methods; possible chemotherapy side effects and management of side effects, including: skin changes (rash/redness), anemia, neutropenia, thrombocytopenia, diarrhea/constipation, nausea/vomiting, memory changes, mouth sores, taste changes, appetite changes, fatigue, myelosuppression, changes with lab values (liver and kidney function tests), risk for tumor lysis syndrome, flu-like symptoms, and possible infusion reactions.  Reviewed infection prevention, and monitoring of lab values, what lab tests and what changes of these values meant, along with the possibility of IV hydration or blood product transfusion, or the need to defer or hold treatment.  Also reviewed signs/symptoms that should be reported to care team or on-call provider immediately, including: temperature of 100.4 degrees or higher, shortness of breath, chest pain, unusual bruising, bleeding symptoms, extreme fatigue, >4-6 episodes of diarrhea in 24 hours, uncontrolled nausea/vomiting, symptoms of dehydration (severe dry mouth/severe thirst, rapid heart beat, dizziness, dark or decreased urination, and lethargy), or symptoms of DVT (sudden onset redness, swelling, tenderness, and warmth of extremity).      General Chemotherapy Information, what to do if needing to miss a treatment, and when to call the provider.  Importance of Central line care (port-a-cath) or IV site care.      Written Information: Patient was provided with the [employer] \"My Cancer Guidebook\" binder, which includes information on the following: " "\"Getting Ready for Chemotherapy: What to Expect, Before, During, and After your Treatment,\" printouts from [employer] on specific chemotherapy drugs, \"Eating Hints: Before, During, and After Cancer Treatment,\" printouts on possible chemotherapy side effects/ways to cope with side effects, \"When to Call the Doctor,\" and \"Self-Care Tips During Cancer Treatment.\"  Also, information regarding various programs offered at [facility], and our business card with contact information given for the following: Oncology Clinic/scheduling, RN Care Coordinator, and the after-hours Nurse Advice Line.    No barriers to learning identified. Patient verbalized understanding of all written and verbal information. All questions answered patient s satisfaction.  Learning barriers and method preference are documented in the patient education flowsheet.    Patient instructed to call with further questions or concerns.  Patient states understanding and is in agreement with this plan.    Cam Conner, RN, BSN, OCN  Oncology Care Coordinator  [facility]  "

## 2018-03-29 NOTE — MR AVS SNAPSHOT
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)        Flatulence, eructation, and gas pain           Follow-ups after your visit        Your next 10 appointments already scheduled     Mar 30, 2018 10:30 AM CDT   Return Visit with NURSE ONLY CANCER CENTER   [facility]    [address] [place] [address]   [phone]            Mar 30, 2018 11:00 AM CDT   Return Visit with Merry Auguste MD   [facility])    [address] [place] [address]   [phone]            Mar 30, 2018  1:30 PM CDT   Level 1 with [place] [facility])    [address] [place] [address]   [phone]            Mar 30, 2018  2:20 PM CDT   (Arrive by 2:05 PM)   Return Visit with Samia Gaviria MD   [facility] ([facility])    [address] [address]   [phone]            Apr 09, 2018  7:45 AM CDT   Return Visit with Erickson Adan MD   [facility])    [address] [place] [address]   [phone]            Apr 10, 2018  8:00 AM CDT   Return Visit with Jad Ball MD   [facility])    [address] [place] [address]   [phone]              Future tests that were ordered for you today     Open Standing Orders        Priority Remaining Interval Expires Ordered    CBC with  platelets differential Routine 9/10 weekly 5/29/2018 3/29/2018    Comprehensive metabolic panel Routine 9/10 weekly 5/29/2018 3/29/2018    Lactate Dehydrogenase Routine 9/10 weekly 5/29/2018 3/29/2018    Uric acid Routine 9/10 weekly 5/29/2018 3/29/2018    Phosphorus Routine 9/10 weekly 5/29/2018 3/29/2018          Open Future Orders        Priority Expected Expires Ordered    NM Lexiscan stress test Routine 3/30/2018 8/29/2018 3/29/2018    Echocardiogram Routine 3/30/2018 8/29/2018 3/29/2018            Who to contact     If you have questions or need follow up information about today's clinic visit or your schedule please contact [facility] directly at [phone].  Normal or non-critical lab and imaging results will be communicated to you by [employer], letter or phone within 4 business days after the clinic has received the results. If you do not hear from us within 7 days, please contact the clinic through [employer] or phone. If you have a critical or abnormal lab result, we will notify you by phone as soon as possible.  Submit refill requests through [employer] or call your pharmacy and they will forward the refill request to us. Please allow 3 business days for your refill to be completed.          Additional Information About Your Visit        [employer] Information     [employer] gives you secure access to your electronic health record. If you see a primary care provider, you can also send messages to your care team and make appointments. If you have questions, please call your primary care clinic.  If you do not have a primary care provider, please call [phone] and they will assist you.      [employer] is an electronic gateway that provides easy, online access to your medical records. With [employer], you can request a clinic appointment, read your test results, renew a prescription or communicate with your care team.     To access your existing account, please contact your [facility] or call [phone] for assistance.        Care EveryWhere ID     This is your Care EveryWhere ID. This could be used by other organizations to access your [place] medical records  [id number]        Your Vitals Were     Pulse Temperature Respirations Pulse Oximetry          84 97.5  F (36.4  C) (Oral) 18 95%         Blood Pressure from Last 3 Encounters:   03/29/18 107/70   03/29/18 127/71   03/22/18 109/65    Weight from Last 3 Encounters:   03/29/18 78 kg (172 lb)   03/22/18 73.5 kg (162 lb 0.6 oz)   03/16/18 81.2 kg (179 lb)              Today, you had the following     No orders found for display         Today's Medication Changes          These changes are accurate as of 3/29/18  5:56 PM.  If you have any questions, ask your nurse or doctor.               These medicines have changed or have updated prescriptions.        Dose/Directions    lisinopril 20 MG tablet   Commonly known as:  PRINIVIL/ZESTRIL   This may have changed:  when to take this   Used for:  Hypertension goal BP (blood pressure) < 130/80, Coronary artery disease involving native coronary artery of native heart without angina pectoris, Microalbuminuria        Dose:  20 mg   Take 1 tablet (20 mg) by mouth daily   Quantity:  90 tablet   Refills:  3       omega 3 1000 MG Caps   This may have changed:  additional instructions   Used for:  Coronary artery disease involving native coronary artery of native heart with angina pectoris (H)        Dose:  1 g   Take 1 g by mouth 2 times daily   Quantity:  120 capsule   Refills:  6       rosuvastatin 40 MG tablet   Commonly known as:  CRESTOR   This may have changed:  when to take this   Used for:  Hyperlipidemia LDL goal <100, Coronary artery disease involving native coronary artery of native heart without angina pectoris        Dose:  40 mg   Take 1 tablet (40 mg) by mouth daily   Quantity:  90 tablet   Refills:  3                Primary Care Provider Office Phone # Fax #    [email or web address]  ANTONIO Alonzo [phone]       [address]  [facility] [address]        Equal Access to Services     NANDA BARAJAS : Alondra rivera collazo steph Cherry, ishanda waldojajaha, wong kalizabeth wu, zhao farleyjay lorenzo. So [facility] [phone].    ATENCIÓN: Si habla español, tiene a ivan disposición servicios gratuitos de asistencia lingüística. Llame al [phone].    We comply with applicable federal civil rights laws and [place] laws. We do not discriminate on the basis of race, color, national origin, age, disability, sex, sexual orientation, or gender identity.            Thank you!     Thank you for choosing [facility]  for your care. Our goal is always to provide you with excellent care. Hearing back from our patients is one way we can continue to improve our services. Please take a few minutes to complete the written survey that you may receive in the mail after your visit with us. Thank you!             Your Updated Medication List - Protect others around you: Learn how to safely use, store and throw away your medicines at www.disposemymeds.org.          This list is accurate as of 3/29/18  5:56 PM.  Always use your most recent med list.                   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)       PREVACID PO      Take 20 mg by mouth every evening Patient needs to use brand name Prevacid (generic version causes diarrhea)        * prochlorperazine 10 MG tablet    COMPAZINE    30 tablet    Take 1 tablet (10 mg) by mouth every 6 hours as needed for nausea or vomiting    Nausea       * prochlorperazine 10 MG tablet    COMPAZINE    30 tablet    Take 0.5 tablets (5 mg) by  mouth every 6 hours as needed (Nausea/Vomiting)    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    Postoperative pain       TYLENOL 8 HOUR PO      Take 500 mg by mouth as needed        * Notice:  This list has 2 medication(s) that are the same as other medications prescribed for you. Read the directions carefully, and ask your doctor or other care provider to review them with you.

## 2018-03-29 NOTE — PROGRESS NOTES
"Patient's name and  were verified.  See Doc Flowsheet - IV assess for details.  IVAD accessed with 20G 3/4\" balderas gripper plus needle  blood return positive: YES  Site without redness, tenderness or swelling: YES  flushed with 30cc NS and 5cc 100u/ml heparin  Needle: Left accessed for infusion    Comments: Labs drawn.  Patient tolerated procedure without incident.    Zhane Barth  RN, BSN, OCN        "

## 2018-03-29 NOTE — MR AVS SNAPSHOT
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)    [address] [place] [address]   [phone]            Mar 30, 2018  1:30 PM CDT   Level 1 with [address])    [address] [place] [id number]   [phone]            Mar 30, 2018  2:20 PM CDT   (Arrive by 2:05 PM)   Return Visit with Samia Gaviria MD   [facility])    [address] [address] [phone]            Apr 09, 2018  7:45 AM CDT   Return Visit with Erickson Adan MD   [facility])    [address] [place] [address]   [phone]            Apr 10, 2018  8:00 AM CDT   Return Visit with Jad Ball MD   [facility])    [address] [place] [address]   [phone]              Future tests that were ordered for you today     Open Standing Orders         Priority Remaining Interval Expires Ordered    CBC with platelets differential Routine 10/10 weekly 5/29/2018 3/29/2018    Comprehensive metabolic panel Routine 10/10 weekly 5/29/2018 3/29/2018    Lactate Dehydrogenase Routine 10/10 weekly 5/29/2018 3/29/2018    Uric acid Routine 10/10 weekly 5/29/2018 3/29/2018    Phosphorus Routine 10/10 weekly 5/29/2018 3/29/2018    Uric acid Routine 1/1 AM DRAW  3/29/2018    Lactate Dehydrogenase Routine 1/1 AM DRAW  3/29/2018            Who to contact     If you have questions or need follow up information about today's clinic visit or your schedule please contact [facility] directly at [phone].  Normal or non-critical lab and imaging results will be communicated to you by [employer], letter or phone within 4 business days after the clinic has received the results. If you do not hear from us within 7 days, please contact the clinic through [employer] [facility] [employer] or phone. If you have a critical or abnormal lab result, we will notify you by phone as soon as possible.  Submit refill requests through [employer] or call your pharmacy and they will forward the refill request to us. Please allow 3 business days for your refill to be completed.          Additional Information About Your Visit        [employer] Information     [employer] gives you secure access to your electronic health record. If you see a primary care provider, you can also send messages to your care team and make appointments. If you have questions, please call your primary care clinic.  If you do not have a primary care provider, please call [phone] and they will assist you.      [employer] is an electronic gateway that provides easy, online access to your medical records. With [employer], you can request a clinic appointment, read your test results, renew a prescription or communicate with your care team.     To access your existing account, please contact your [facility] or call  "[phone] for assistance.        Care EveryWhere ID     This is your Care EveryWhere ID. This could be used by other organizations to access your [place] medical records  [id number]        Your Vitals Were     Pulse Temperature Height Pulse Oximetry BMI (Body Mass Index)       80 98  F (36.7  C) 1.651 m (5' 5\") 95% 28.62 kg/m2        Blood Pressure from Last 3 Encounters:   03/29/18 107/70   03/22/18 109/65   03/21/18 126/77    Weight from Last 3 Encounters:   03/29/18 78 kg (172 lb)   03/22/18 73.5 kg (162 lb 0.6 oz)   03/16/18 81.2 kg (179 lb)                 Today's Medication Changes          These changes are accurate as of 3/29/18 11:08 AM.  If you have any questions, ask your nurse or doctor.               These medicines have changed or have updated prescriptions.        Dose/Directions    lisinopril 20 MG tablet   Commonly known as:  PRINIVIL/ZESTRIL   This may have changed:  when to take this   Used for:  Hypertension goal BP (blood pressure) < 130/80, Coronary artery disease involving native coronary artery of native heart without angina pectoris, Microalbuminuria        Dose:  20 mg   Take 1 tablet (20 mg) by mouth daily   Quantity:  90 tablet   Refills:  3       omega 3 1000 MG Caps   This may have changed:  additional instructions   Used for:  Coronary artery disease involving native coronary artery of native heart with angina pectoris (H)        Dose:  1 g   Take 1 g by mouth 2 times daily   Quantity:  120 capsule   Refills:  6       rosuvastatin 40 MG tablet   Commonly known as:  CRESTOR   This may have changed:  when to take this   Used for:  Hyperlipidemia LDL goal <100, Coronary artery disease involving native coronary artery of native heart without angina pectoris        Dose:  40 mg   Take 1 tablet (40 mg) by mouth daily   Quantity:  90 tablet   Refills:  3                Primary Care Provider Office Phone # Fax #    Florian Alonzo [phone] [address]  [facility] [address]        Equal Access to Services     [facility] : Hadii rivera ku yahairao Sonormaali, waaxda luqadaha, qaybta kaalmadonn wu, zhao brucesayraalex lorenzo. So [facility] [phone].    ATENCIÓN: Si habla español, tiene a ivan disposición servicios gratuitos de asistencia lingüística. Micaela al [phone].    We comply with applicable federal civil rights laws and [place] laws. We do not discriminate on the basis of race, color, national origin, age, disability, sex, sexual orientation, or gender identity.            Thank you!     Thank you for choosing [facility]  for your care. Our goal is always to provide you with excellent care. Hearing back from our patients is one way we can continue to improve our services. Please take a few minutes to complete the written survey that you may receive in the mail after your visit with us. Thank you!             Your Updated Medication List - Protect others around you: Learn how to safely use, store and throw away your medicines at www.disposemymeds.org.          This list is accurate as of 3/29/18 11:08 AM.  Always use your most recent med list.                   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)       PREVACID PO      Take 20 mg by mouth every evening Patient needs to use brand name Prevacid (generic version causes diarrhea)        * prochlorperazine 10 MG tablet    COMPAZINE    30 tablet    Take 1 tablet (10 mg) by mouth every 6 hours as needed for nausea or vomiting    Nausea       * prochlorperazine 10 MG tablet    COMPAZINE    30 tablet    Take 0.5 tablets (5 mg) by mouth every 6 hours as needed (Nausea/Vomiting)    Mantle cell  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    Postoperative pain       TYLENOL 8 HOUR PO      Take 500 mg by mouth as needed        * Notice:  This list has 2 medication(s) that are the same as other medications prescribed for you. Read the directions carefully, and ask your doctor or other care provider to review them with you.

## 2018-03-29 NOTE — IP AVS SNAPSHOT
MRN:[id number]                      After Visit Summary   3/29/2018    Francisco Javier Cortes    MRN: [id number]           Thank you!     Thank you for choosing [facility] for your care. Our goal is always to provide you with excellent care. Hearing back from our patients is one way we can continue to improve our services. Please take a few minutes to complete the written survey that you may receive in the mail after you visit with us. Thank you!        Patient Information     Date Of Birth          1940        Designated Caregiver       Most Recent Value    Caregiver    Will someone help with your care after discharge? 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 It can occur when lymphoma cells die rapidly in response to the treatment. Please continue to be well hydrated to protect your kidneys.                  Who to Call     For medical emergencies, please call 911.  For non-urgent questions about your medical care, please call your primary care provider or clinic, [phone]          Attending Provider     Provider Specialty    Tonya Barragan MD Hematology       Primary Care Provider Office Phone # Fax #    Florian Alonzo [phone]      After Care Instructions     Activity       Your activity upon discharge: activity as tolerated            Diet       Follow this diet upon discharge: Regular                  Follow-up Appointments     [facility] Follow-up and recommended labs and tests       Follow up with Dr. Adan on 4/2/18 as scheduled.                  Your next 10 appointments already scheduled     Apr 02, 2018 10:40 AM CDT   (Arrive by 10:25 AM)   Return Visit with Samia Gaviria MD   [facility] ([facility])    [address]  4th Floor  [facility] [address]   [phone]            Apr 02, 2018 11:45 AM CDT   Return Visit with [facility] C[facility]   [facility])    [address] [place] [address]   [phone]            Apr 02, 2018 12:15 PM CDT   Return Visit with MARTHA Gonzales CNP   [facility])    [id number] [place] [address]   [phone]            Apr 02, 2018  1:00 PM CDT   Level 3 with BAY [address] INFUSION   [facility])    [id number] [place] [address]   [phone]            Apr 09, 2018  7:45 AM CDT   Return Visit with Erickson Adan MD   [facility])    [address] [place] [address]   [phone]            Apr 10, 2018  8:00 AM CDT   Return Visit with Jad Ball MD   [facility])    [address] [place] [address]   [phone]              Pending Results     No orders found from 3/27/2018 to 3/30/2018.            Statement of Approval     Ordered          04/01/18 1039  I have reviewed and agree with all the recommendations and orders detailed in this document.  EFFECTIVE NOW     Approved and electronically signed by:  Se Shashank Matthews MD             Admission Information     Date & Time Provider Department Dept. Phone    3/29/2018 Tonya Barragan MD [address] [facility] [place] [phone]      Your Vitals Were     Blood Pressure Pulse Temperature Respirations Height Weight    138/84 (BP Location: Left arm) 77 96.9  F (36.1  C) (Oral) 18 1.651 m (5' 5\") 83.8 kg (184 lb 12.8 oz)    Pulse Oximetry BMI (Body Mass Index)                97% 30.75 kg/m2          MyChart Information     [employer] gives you secure access to your electronic health " record. If you see a primary care provider, you can also send messages to your care team and make appointments. If you have questions, please call your primary care clinic.  If you do not have a primary care provider, please call [phone] and they will assist you.        Care EveryWhere ID     This is your Care EveryWhere ID. This could be used by other organizations to access your [facility] medical records  [id number]        Equal Access to Services     NANDA BARAJAS : Hadii rivera syedo Sonormaali, waaxda luqadaha, qaybta kaalmada adebryantmanasda, zhao brucesayraalex lorenzo. So [facility] [phone].    ATENCIÓN: Si habla sandra, tiene a ivan disposición servicios gratuitos de asistencia lingüística. Llame al [phone].    We comply with applicable federal civil rights laws and [place] laws. We do not discriminate on the basis of race, color, national origin, age, disability, sex, sexual orientation, or gender identity.               Review of your medicines      CONTINUE these medicines which may have CHANGED, or have new prescriptions. If we are uncertain of the size of tablets/capsules you have at home, strength may be listed as something that might have changed.        Dose / Directions    lisinopril 20 MG tablet   Commonly known as:  PRINIVIL/ZESTRIL   This may have changed:  when to take this   Used for:  Hypertension goal BP (blood pressure) < 130/80, Coronary artery disease involving native coronary artery of native heart without angina pectoris, Microalbuminuria        Dose:  20 mg   Take 1 tablet (20 mg) by mouth daily   Quantity:  90 tablet   Refills:  3       omega 3 1000 MG Caps   This may have changed:  additional instructions   Used for:  Coronary artery disease involving native coronary artery of native heart with angina pectoris (H)        Dose:  1 g   Take 1 g by mouth 2 times daily   Quantity:  120 capsule   Refills:  6       rosuvastatin 40 MG tablet   Commonly known as:  CRESTOR    This may have changed:  when to take this   Used for:  Hyperlipidemia LDL goal <100, Coronary artery disease involving native coronary artery of native heart without angina pectoris        Dose:  40 mg   Take 1 tablet (40 mg) by mouth daily   Quantity:  90 tablet   Refills:  3         CONTINUE these medicines which have NOT CHANGED        Dose / Directions    allopurinol 300 MG tablet   Commonly known as:  ZYLOPRIM   Used for:  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)        Dose:  300 mg   Take 1 tablet (300 mg) by mouth daily   Quantity:  30 tablet   Refills:  1       aspirin 81 MG tablet        1 TABLET EVERY MORNING   Refills:  0       clindamycin 1 % lotion   Commonly known as:  CLEOCIN T   Used for:  Rash        Apply twice daily for 6 weeks to the groin   Quantity:  60 mL   Refills:  1       HYDROcodone-acetaminophen 5-325 MG per tablet   Commonly known as:  NORCO   Used for:  Postoperative pain        Dose:  1 tablet   Take 1 tablet by mouth every 6 hours as needed for severe pain maximum 6 tablet(s) per day   Quantity:  15 tablet   Refills:  0       hydrocortisone valerate 0.2 % ointment   Commonly known as:  WEST-NINOSKA   Used for:  Psoriasis        Apply sparingly to affected area three times daily as needed.   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)        Dose:  0.5 mg   Take 1 tablet (0.5 mg) by mouth every 4 hours as needed (Anxiety, Nausea/Vomiting or Sleep)   Quantity:  30 tablet   Refills:  3       MAALOX ADVANCED PO   Used for:  Chest pain        Take 1 tablet as needed (Calcium carbonate 1000mg/Simethicone 80mg)   Refills:  0       metoprolol succinate 100 MG 24 hr tablet   Commonly known as:  TOPROL-XL   Used for:  Hypertension goal BP (blood pressure) < 130/80, Coronary artery disease involving native coronary artery of native heart without angina pectoris         Dose:  100 mg   Take 1 tablet (100 mg) by mouth daily   Quantity:  90 tablet   Refills:  3       nitroGLYcerin 0.4 MG sublingual tablet   Commonly known as:  NITROSTAT   Used for:  Chest pain due to myocardial ischemia, unspecified ischemic chest pain type (H), Coronary artery disease involving native coronary artery of native heart without angina pectoris        Dose:  0.4 mg   Place 1 tablet (0.4 mg) under the tongue every 5 minutes as needed up to 3 tablets per episode.   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)        Dose:  8 mg   Take 1 tablet (8 mg) by mouth every 8 hours as needed (Nausea/Vomiting)   Quantity:  10 tablet   Refills:  3       PREVACID PO        Dose:  20 mg   Take 20 mg by mouth every evening Patient needs to use brand name Prevacid (generic version causes diarrhea)   Refills:  0       * prochlorperazine 10 MG tablet   Commonly known as:  COMPAZINE   Used for:  Nausea        Dose:  10 mg   Take 1 tablet (10 mg) by mouth every 6 hours as needed for nausea or vomiting   Quantity:  30 tablet   Refills:  1       * prochlorperazine 10 MG tablet   Commonly known as:  COMPAZINE   Used for:  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   Quantity:  30 tablet   Refills:  0       TYLENOL 8 HOUR PO        Dose:  500 mg   Take 500 mg by mouth as needed   Refills:  0       * Notice:  This list has 2 medication(s) that are the same as other medications prescribed for you. Read the directions  carefully, and ask your doctor or other care provider to review them with you.             Protect others around you: Learn how to safely use, store and throw away your medicines at www.disposemymeds.org.             Medication List: This is a list of all your medications and when to take them. Check marks below indicate your daily home schedule. Keep this list as a reference.      Medications           Morning Afternoon Evening Bedtime As Needed    allopurinol 300 MG tablet   Commonly known as:  ZYLOPRIM   Take 1 tablet (300 mg) by mouth daily   Last time this was given:  300 mg on 4/1/2018  7:55 AM                                aspirin 81 MG tablet   1 TABLET EVERY MORNING                                clindamycin 1 % lotion   Commonly known as:  CLEOCIN T   Apply twice daily for 6 weeks to the groin                                HYDROcodone-acetaminophen 5-325 MG per tablet   Commonly known as:  NORCO   Take 1 tablet by mouth every 6 hours as needed for severe pain maximum 6 tablet(s) per day                                hydrocortisone valerate 0.2 % ointment   Commonly known as:  WEST-NINOSKA   Apply sparingly to affected area three times daily as needed.                                lisinopril 20 MG tablet   Commonly known as:  PRINIVIL/ZESTRIL   Take 1 tablet (20 mg) by mouth daily                                LORazepam 0.5 MG tablet   Commonly known as:  ATIVAN   Take 1 tablet (0.5 mg) by mouth every 4 hours as needed (Anxiety, Nausea/Vomiting or Sleep)                                MAALOX ADVANCED PO   Take 1 tablet as needed (Calcium carbonate 1000mg/Simethicone 80mg)                                metoprolol succinate 100 MG 24 hr tablet   Commonly known as:  TOPROL-XL   Take 1 tablet (100 mg) by mouth daily   Last time this was given:  100 mg on 4/1/2018  7:55 AM                                nitroGLYcerin 0.4 MG sublingual tablet   Commonly known as:  NITROSTAT   Place 1 tablet (0.4 mg) under the  tongue every 5 minutes as needed up to 3 tablets per episode.                                omega 3 1000 MG Caps   Take 1 g by mouth 2 times daily                                ondansetron 8 MG tablet   Commonly known as:  ZOFRAN   Take 1 tablet (8 mg) by mouth every 8 hours as needed (Nausea/Vomiting)   Last time this was given:  8 mg on 3/30/2018  8:03 PM                                PREVACID PO   Take 20 mg by mouth every evening Patient needs to use brand name Prevacid (generic version causes diarrhea)                                * prochlorperazine 10 MG tablet   Commonly known as:  COMPAZINE   Take 1 tablet (10 mg) by mouth every 6 hours as needed for nausea or vomiting   Last time this was given:  5 mg on 3/30/2018  5:45 PM                                * prochlorperazine 10 MG tablet   Commonly known as:  COMPAZINE   Take 0.5 tablets (5 mg) by mouth every 6 hours as needed (Nausea/Vomiting)   Last time this was given:  5 mg on 3/30/2018  5:45 PM                                rosuvastatin 40 MG tablet   Commonly known as:  CRESTOR   Take 1 tablet (40 mg) by mouth daily   Last time this was given:  40 mg on 3/31/2018  8:01 PM                                senna-docusate 8.6-50 MG per tablet   Commonly known as:  SENOKOT-S;PERICOLACE   Take 1-2 tablets by mouth 2 times daily Take while on oral narcotics to prevent or treat constipation.                                TYLENOL 8 HOUR PO   Take 500 mg by mouth as needed                                * Notice:  This list has 2 medication(s) that are the same as other medications prescribed for you. Read the directions carefully, and ask your doctor or other care provider to review them with you.

## 2018-03-30 NOTE — PROGRESS NOTES
Brief ENT Progress Note  3/30/2018    S: Patient seen for routine follow-up following excisional lymph node biopsy performed by Dr. Gaviria last week. He had an appointment in ENT clinic today, however, patient remains admitted to the hospital for possible tumor lysis syndrome. Patient reports he has been doing well since surgery, denies any pain, redness, or irritation at the incision site. He has been wearing a band-aid over the site.     O:  General: Alert & Oriented, NAD  HEENT: Left neck incision c/d/i, no surrounding erythema or induration. Subcuticular suture closure, no suture removal necessary.   Respiratory: breathing non-labored on RA, no stridor    A/P: Francisco Javier G Story 77 year old male found to have an enlarged spleen and pancreas, and diffuse adenopathy concerning for lymphoma. He is now POD#8 s/p left excisional lymph node biopsy. Surgical site healing well. No sutures that require removal.  - Apply Aquaphor ointment to site three times daily  - May leave site open to air  - Follow up with Dr. Gaviria as needed    Sharla Jones PA-C  Otolaryngology-Head & Neck Surgery  Please contact ENT by dialing [phone] and entering job code [phone].

## 2018-03-30 NOTE — PLAN OF CARE
Problem: Chemotherapy Effects (Adult)  Goal: Signs and Symptoms of Listed Potential Problems Will be Absent, Minimized or Managed (Chemotherapy Effects)  Signs and symptoms of listed potential problems will be absent, minimized or managed by discharge/transition of care (reference Chemotherapy Effects (Adult) CPG).   Outcome: No Change  VSS. Afebrile. Denies pain/nausea. NS @ 125mL/hr infusing to R) port. Independent. Will continue w/POC.

## 2018-03-30 NOTE — PROGRESS NOTES
DATE/TIME  (DOT-TD, DOT-NOW) CHEMO CHECK ACTIVITY (REGIMEN & DOSE CHECK, DAY, DOSE #, NAME OF CHEMO #1)  CHEMO DRUG #2  CHEMO DRUG #3 NAME OF RN #1 (USE DOT-ME HERE) NAME OF RN#2 (2ND RN TO LOG IN SEPARATELY)   3/30/2018  10:31 AM [place] Protocol Double Check   Segun Henao     3/30/2018  2:06 PM   [place]    Segun LanieraphMario Crow                                                                                                                                                                                                                             .

## 2018-03-30 NOTE — PROGRESS NOTES
[facility]  Hematology Oncology Progress Note      Patient: Francisco Javier Cortes MRN# [id number]   Age: 77 year old YOB: 1940           Assessment and Plans     Francisco Javier Cortes is a 77 year old male with history of prostate cancer (s/p prostatectomy), CAD (s/p STEPHEN), HTN, HLD and new diagnosis of Stage IVB mantle cell lymphoma. He was started on bendamustine and rituximab today, and was admitted to the hospital for close monitoring for TLS.     Stage IVB Mantle Cell Lymphoma.  Recently diagnosed. Following with Dr. Auguste in [place]. Started C1 Bendamustine + Rituximab 3/29, and labs were notable for LDH increased from 545 last AM to 940 in PM. Admitted for close monitoring for TLS given high risk with bulky disease.  - Continue D2 bendamustine  - TLS and DIC labs Q8H; PORSCHE (Cr baseline 0.8-0.9 >> 1.3 on admission), elevated LDH (900's), up-trending phosphorus    - Continue allopurinol 300 mg daily.  - IV fluids  ml/hr  - Has an outpatient f/u on Monday; possible d/c on Sunday      Anemia + thrombocytopenia.  Likely secondary to malignancy and chemotherapy. Labs on admission notable for Hgb 12.7 and Plts 42K.  - Transfuse for goal Hgb >7 and platelets >10K.  - Patient will need to be consented for blood products if platelet count drops more precipitously than anticipated.     PORSCHE   BL around 0.8-1.0, Creatinine ~1.3 for the last week or so.   Likely secondary to underlying malignancy with moderate TLS.   - continue IV fluids   - Avoid nephrotoxins as able.     CAD w/prior NSTEMI (s/p STEPHEN).  - Continue home metoprolol and rosuvastatin.      GERD.  - Continue home lansoprazole (pantoprazole substituted per hospital formulary).    FEN: regular diet, NS at 125 ml/hr, replete lytes PRN  Prophylaxis: mechanical (thrombocytopenia)  Code: FULL  Disposition: Admitted to the hematology service for monitoring for TLS given high-risk with bulky disease. Anticipate discharge to home  around Sunday     Patient was seen, care plans discussed and staffed with Dr. Barragan.     Se lester Matthews  Hematology Oncology and Transplantation Fellow  Pager: [phone]      Interval History   Mr. Cortes has been doing fine. Felt nausea yesterday with chemo, but subsided. No acute events overnight. Patient denies fever, chills, chest pain, cough, shortness of breath, abdominal pain, N/V/D/C, signs of active bleeding. No other immediate concerns or acute issues.     ROS: Negative other than as stated in above interval history.      Current Facility-Administered Medications   Medication     famotidine (PEPCID) infusion 20 mg     bendamustine HCl (BENDEKA) 175 mg in sodium chloride 0.9 % 62 mL CHEMOTHERAPY     MEDICATION INSTRUCTION     methylPREDNISolone sodium succinate (solu-MEDROL) injection 125 mg     diphenhydrAMINE (BENADRYL) injection 50 mg     meperidine (DEMEROL) injection 25 mg     EPINEPHrine PF (ADRENALIN) injection 0.3 mg     albuterol (PROAIR HFA/PROVENTIL HFA/VENTOLIN HFA) Inhaler 1-2 puff     albuterol neb solution 2.5 mg     sodium chloride 0.9% infusion     dexamethasone (DECADRON) tablet 12 mg     allopurinol (ZYLOPRIM) tablet 300 mg     HYDROcodone-acetaminophen (NORCO) 5-325 MG per tablet 1 tablet     pantoprazole (PROTONIX) EC tablet 20 mg     metoprolol succinate (TOPROL-XL) 24 hr tablet 100 mg     rosuvastatin (CRESTOR) tablet 40 mg     sodium chloride 0.9% infusion     prochlorperazine (COMPAZINE) tablet 5 mg    Or     prochlorperazine (COMPAZINE) injection 5 mg     [START ON 4/1/2018] ondansetron (ZOFRAN) injection 8 mg    Or     [START ON 4/1/2018] ondansetron (ZOFRAN-ODT) ODT tab 8 mg    Or     [START ON 4/1/2018] ondansetron (ZOFRAN) tablet 8 mg     LORazepam (ATIVAN) tablet 0.5-1 mg    Or     LORazepam (ATIVAN) injection 0.5-1 mg     acetaminophen (TYLENOL) tablet 650 mg     senna-docusate (SENOKOT-S;PERICOLACE) 8.6-50 MG per tablet 1 tablet     naloxone (NARCAN) injection 0.1-0.4 mg      "sodium chloride (PF) 0.9% PF flush 3 mL           Physical Exams     /78 (BP Location: Right arm)  Pulse 79  Temp 96.1  F (35.6  C) (Oral)  Resp 20  Ht 1.651 m (5' 5\")  Wt 79.7 kg (175 lb 9.6 oz)  SpO2 96%  BMI 29.22 kg/m2  Wt Readings from Last 3 Encounters:   03/30/18 79.7 kg (175 lb 9.6 oz)   03/29/18 78 kg (172 lb)   03/22/18 73.5 kg (162 lb 0.6 oz)     CONSTITUTIONAL: Alert and interactive. Sitting up in bed in no acute distress.  HEENT: NC/AT, PERRLA, EOMI, anicteric sclera, oropharynx is pink and moist   CARDIOVASCULAR: RRR. Normal S1/S2. No murmurs. 2+ equal and bilateral radial and pedal pulses.   RESPIRATORY: CTAB. No wheezes appreciated. Normal respiratory effort on ambient air.  GASTROINTESTINAL: Soft, non-tender, non-distended, normoactive bowel sounds.  MUSCULOSKELETAL: No joint swelling or tenderness.  EXTREMITIES: intact strength  SKIN: Warm and intact. No concerning lesions or rashes NEUROLOGIC: Alert and fully oriented, grossly nonfocal  Psych: calm and appropriate       Labs     CBC  ===  WBC (10e9/L)   Date Value   03/29/2018 11.4 (H)     Hemoglobin (g/dL)   Date Value   03/29/2018 12.7 (L)     Platelet Count (10e9/L)   Date Value   03/29/2018 42 (LL)       BMP  ===  Sodium (mmol/L)   Date Value   03/29/2018 139     Potassium (mmol/L)   Date Value   03/29/2018 4.4     Urea Nitrogen (mg/dL)   Date Value   03/29/2018 32 (H)     Creatinine (mg/dL)   Date Value   03/29/2018 1.28 (H)     Calcium (mg/dL)   Date Value   03/29/2018 7.3 (L)       LFTs  ====  Bilirubin Total (mg/dL)   Date Value   03/29/2018 0.6     Alkaline Phosphatase (U/L)   Date Value   03/29/2018 110     AST (U/L)   Date Value   03/29/2018 116 (H)     ALT (U/L)   Date Value   03/29/2018 35               Images     Results for orders placed or performed during the hospital encounter of 03/24/18   PET Oncology Whole Body    Narrative    Combined Report of:    PET and CT on  3/26/2018 7:56 AM :    1. PET of the neck, chest, " abdomen, and pelvis.  2. PET CT Fusion for Attenuation Correction and Anatomical  Localization:    3. 3D MIP and PET-CT fused images were processed on an independent  workstation and archived to PACS and reviewed by a radiologist.    Technique:    1. PET: The patient received 14.65 mCi of F-18-FDG; the serum glucose  was 86 prior to administration, body weight was 73.5 kg. Images were  evaluated in the axial, sagittal, and coronal planes as well as the  rotational whole body MIP. Images were acquired from the Vertex to the  Feet.    UPTAKE WAS MEASURED AT 66 MINUTES.     BACKGROUND:  Liver SUV max= 2.7,   Aorta Blood SUV Max: 2.     2. CT: CT only obtained for attenuation correction and not diagnostic  purposes.    INDICATION: Lymphoma, mantle cell, multiple sites (H)    ADDITIONAL INFORMATION OBTAINED FROM EMR: 77-year-old male with  history of prostate cancer, status post prostatectomy, now with new  diagnosis of mantle cell lymphoma.    COMPARISON: CTs dated 3/5/2018, 6/29/2015, 1/5/2015    FINDINGS:     HEAD/NECK:  Extensive hypermetabolic adenopathy at each level of the cervical  chain. The largest measures 3.3 x 3.8 cm, left supraclavicular level,  (series 3 image 94), max SUV 10.4.    CHEST:  Extensive axillary, mediastinal and hilar adenopathy. For example:  - 7.8 x 3.2 cm carinal lymph node (series 3 image 142), max SUV 9.2.  - 2.3 x 3.7 cm left axillary lymph node (series 3 image 112), max SUV  8.7.    1.4 cm nodule in the right middle lobe (series 3 image 142), max SUV  3.8. Additional smaller nodules are better characterized on dedicated  CT of the chest dated 3/5/2018.    The heart is not enlarged. There is no significant pericardial  effusion. Dense coronary calcifications. Small bilateral pleural  effusions.    ABDOMEN AND PELVIS:  The spleen is enlarged measuring up to 18 cm with increased FDG  avidity, max SUV 7.     Extensive intra-abdominal, retroperitoneal and inguinal adenopathy.  For  example:  - Conglomerate of central mesenteric lymph nodes measures  approximately 15.5 x 6.9 cm (series 3 image 205), max SUV 7.  - Conglomerate of retroperitoneal lymph nodes measures approximately  7.5 x 4.7 cm (series 3 image 209), max SUV 8.4.  - 1.8 x 2.6 cm left inguinal lymph node (series 3 image 299), max SUV  7.6.  - Additional adenopathy is seen along the iliac chains in the  presacral fat.    Small ascites. The bowel is nondilated. No pneumatosis or portal  venous gas. No pneumoperitoneum.    LOWER EXTREMITIES:   No abnormal masses or hypermetabolic lesions.    BONES:   There are no suspicious lytic or blastic osseous lesions.  There is no  abnormal FDG uptake in the skeleton.      Impression    IMPRESSION:   1. In this patient with new diagnosis of mantle cell lymphoma without  a diagnostic CT:  a. Extensive lymphadenopathy and disease involvement in the neck,  chest, abdomen and pelvis as above.  b. 1.4 cm pulmonary nodule in the right middle lobe, max SUV 3.8.  c. Splenomegaly with diffuse FDG avidity, concerning for diffuse  splenic involvement of lymphoma.  2. Small bilateral pleural effusions.  3. Small ascites.    I have personally reviewed the examination and initial interpretation  and I agree with the findings.    SHARATH VALLADARES MD         Patient has been seen and evaluated by me. I have reviewed today's vital signs, medications, labs and imaging results independently. I have discussed the plan with the team and agree with the findings and plan in this note.   Feels OK.  PE:  Chest: CTAB  CVS: S1 S2 RRR, no murmurs or gallops  PA: soft, non tender  CNS: non focal    A/P:   Francisco Javier Cortes is a 77 year old male with history of prostate cancer (s/p prostatectomy), CAD (s/p STEPHEN), HTN, HLD and new diagnosis of Stage IVB mantle cell lymphoma. He was started on bendamustine and rituximab yesterdy, and was admitted to the hospital for close monitoring for TLS.   Will continue IV fluids, second dose of  bendamustine today. Possible discharge Sunday  Tonya Barragan MD

## 2018-03-30 NOTE — TELEPHONE ENCOUNTER
Social Work Encounter Visit  [facility]    Data/Intervention:  Patient Name:  Francisco Javier Cortes  /Age:  1940 (77 year old)    Reason for Encounter:  SW received referral from Caorline Rendon RN, Oncology Clinic, indicating that pt/spouse (Court Cortes) have completed their Health Care Directives and would them to be reviewed to ensure they are completed correctly and have them notarized.    Collaborated With:    Zhane Barth RN, Pt, Spouse, Micheline Carlisle    Resources Provided:  Micheline    Assessment:  SW met with pt and spouse in infusion and reviewed their Health Care Directives.  They both appeared to be completed accurately though HIM will review them as well to make sure they meet criteria.  SW requested micheline Carlisle notarize the documents for pt and spouse, which she was able to do.  SW sent copies of HCD's to HIM to be scanned into both patient's medical records.  SW also provided copies to pt/spouse to give to their Health Care agent other health care providers.  Pt/spouse denied any other SW related needs at this time.    Plan:  Pt/spouse have SW contact information for future needs.    BURTON Gregory     Social Work  [facility]  Office:  [phone]  E-Mail:  [email or web address]  3/30/2018 8:42 AM

## 2018-03-30 NOTE — PLAN OF CARE
Problem: Chemotherapy Effects (Adult)  Goal: Signs and Symptoms of Listed Potential Problems Will be Absent, Minimized or Managed (Chemotherapy Effects)  Signs and symptoms of listed potential problems will be absent, minimized or managed by discharge/transition of care (reference Chemotherapy Effects (Adult) CPG).   Outcome: No Change  Afebrile, VSS. Pt reports feeling well this morning. Denies pain and/or nausea/vomiting. Up ad joseph and voiding without difficulty. Received day #2 Bendamustine without complication. No acute events. Continue IVF and TLS labs q8h.

## 2018-03-31 NOTE — PROGRESS NOTES
[facility]  Hematology Oncology Progress Note      Patient: Francisco Javier Cortes MRN# [id number]   Age: 77 year old YOB: 1940           Assessment and Plans     Francisco Javier Cortes is a 77 year old male with history of prostate cancer (s/p prostatectomy), CAD (s/p STEPHEN), HTN, HLD and new diagnosis of Stage IVB mantle cell lymphoma. He was started on bendamustine and rituximab today, and was admitted to the hospital for close monitoring for TLS.     Stage IVB Mantle Cell Lymphoma.  Recently diagnosed. Following with Dr. Auguste in [place]. Started C1 Bendamustine + Rituximab 3/29, and labs were notable for LDH increased from 545 last AM to 940 in PM. Admitted for close monitoring for TLS given high risk with bulky disease.  - Completed D2 bendamustine 3/30; today C1D3  - TLS and DIC labs Q8H; PORSCHE (Cr baseline 0.8-0.9 >> 1.3 on admission), elevated LDH (900's), up-trending phosphorus; laboratory findings stable w/o changes in Cr 3/31  - Continue allopurinol 300 mg daily.  - IV fluids  ml/hr  - Has an outpatient f/u on Monday; possible d/c on Sunday      Anemia + thrombocytopenia.  Likely secondary to malignancy and chemotherapy. Labs on admission notable for Hgb 12.7 and Plts 42K.  - Transfuse for goal Hgb >7 and platelets >10K.  - Patient will need to be consented for blood products if platelet count drops more precipitously than anticipated.     PORSCHE   BL around 0.8-1.0, Creatinine ~1.3 for the last week or so.   Likely secondary to underlying malignancy with moderate TLS.   - continue IV fluids   - Avoid nephrotoxins as able.     CAD w/prior NSTEMI (s/p STEPHEN).  - Continue home metoprolol and rosuvastatin.      GERD.  - Continue home lansoprazole (pantoprazole substituted per hospital formulary).    FEN: regular diet, NS at 125 ml/hr, replete lytes PRN  Prophylaxis: mechanical (thrombocytopenia)  Code: FULL  Disposition: Admitted to the hematology service for monitoring for  TLS given high-risk with bulky disease. Anticipate discharge to home around Sunday     Patient was seen, care plans discussed and staffed with Dr. Barragan.     Se lester Matthews  Hematology Oncology and Transplantation Fellow  Pager: [phone]      Interval History   Mr. Cortes is doing fine. No events overnight. Patient denies fever, chills, chest pain, cough, shortness of breath, abdominal pain, N/V/D/C, signs of active bleeding. No other immediate concerns or acute issues.     ROS: Negative other than as stated in above interval history.      Current Facility-Administered Medications   Medication     famotidine (PEPCID) infusion 20 mg     MEDICATION INSTRUCTION     methylPREDNISolone sodium succinate (solu-MEDROL) injection 125 mg     diphenhydrAMINE (BENADRYL) injection 50 mg     meperidine (DEMEROL) injection 25 mg     EPINEPHrine PF (ADRENALIN) injection 0.3 mg     albuterol (PROAIR HFA/PROVENTIL HFA/VENTOLIN HFA) Inhaler 1-2 puff     albuterol neb solution 2.5 mg     sodium chloride 0.9% infusion     allopurinol (ZYLOPRIM) tablet 300 mg     HYDROcodone-acetaminophen (NORCO) 5-325 MG per tablet 1 tablet     pantoprazole (PROTONIX) EC tablet 20 mg     metoprolol succinate (TOPROL-XL) 24 hr tablet 100 mg     rosuvastatin (CRESTOR) tablet 40 mg     sodium chloride 0.9% infusion     prochlorperazine (COMPAZINE) tablet 5 mg    Or     prochlorperazine (COMPAZINE) injection 5 mg     [START ON 4/1/2018] ondansetron (ZOFRAN) injection 8 mg    Or     [START ON 4/1/2018] ondansetron (ZOFRAN-ODT) ODT tab 8 mg    Or     [START ON 4/1/2018] ondansetron (ZOFRAN) tablet 8 mg     LORazepam (ATIVAN) tablet 0.5-1 mg    Or     LORazepam (ATIVAN) injection 0.5-1 mg     acetaminophen (TYLENOL) tablet 650 mg     senna-docusate (SENOKOT-S;PERICOLACE) 8.6-50 MG per tablet 1 tablet     naloxone (NARCAN) injection 0.1-0.4 mg     sodium chloride (PF) 0.9% PF flush 3 mL           Physical Exams     /86 (BP Location: Left arm)  Pulse 77  " Temp 97  F (36.1  C) (Oral)  Resp 18  Ht 1.651 m (5' 5\")  Wt 83.4 kg (183 lb 12.8 oz)  SpO2 95%  BMI 30.59 kg/m2  Wt Readings from Last 3 Encounters:   03/31/18 83.4 kg (183 lb 12.8 oz)   03/29/18 78 kg (172 lb)   03/22/18 73.5 kg (162 lb 0.6 oz)     CONSTITUTIONAL: Alert and interactive. Sitting up in bed in no acute distress.  HEENT: NC/AT, PERRLA, EOMI, anicteric sclera, oropharynx is pink and moist   CARDIOVASCULAR: RRR. Normal S1/S2. No murmurs. 2+ equal and bilateral radial and pedal pulses.   RESPIRATORY: CTAB. No wheezes appreciated. Normal respiratory effort on ambient air.  GASTROINTESTINAL: Soft, non-tender, non-distended, normoactive bowel sounds.  MUSCULOSKELETAL: No joint swelling or tenderness.  EXTREMITIES: intact strength  SKIN: Warm and intact. No concerning lesions or rashes NEUROLOGIC: Alert and fully oriented, grossly nonfocal  Psych: calm and appropriate       Labs     CBC  ===  WBC (10e9/L)   Date Value   03/31/2018 14.5 (H)     Hemoglobin (g/dL)   Date Value   03/31/2018 11.4 (L)     Platelet Count (10e9/L)   Date Value   03/31/2018 75 (L)       BMP  ===  Sodium (mmol/L)   Date Value   03/31/2018 141     Potassium (mmol/L)   Date Value   03/31/2018 4.5     Urea Nitrogen (mg/dL)   Date Value   03/31/2018 36 (H)     Creatinine (mg/dL)   Date Value   03/31/2018 1.39 (H)     Calcium (mg/dL)   Date Value   03/31/2018 6.4 (L)       LFTs  ====  Bilirubin Total (mg/dL)   Date Value   03/29/2018 0.6     Alkaline Phosphatase (U/L)   Date Value   03/29/2018 110     AST (U/L)   Date Value   03/29/2018 116 (H)     ALT (U/L)   Date Value   03/29/2018 35               Images     Results for orders placed or performed during the hospital encounter of 03/24/18   PET Oncology Whole Body    Narrative    Combined Report of:    PET and CT on  3/26/2018 7:56 AM :    1. PET of the neck, chest, abdomen, and pelvis.  2. PET CT Fusion for Attenuation Correction and Anatomical  Localization:    3. 3D MIP and " PET-CT fused images were processed on an independent  workstation and archived to PACS and reviewed by a radiologist.    Technique:    1. PET: The patient received 14.65 mCi of F-18-FDG; the serum glucose  was 86 prior to administration, body weight was 73.5 kg. Images were  evaluated in the axial, sagittal, and coronal planes as well as the  rotational whole body MIP. Images were acquired from the Vertex to the  Feet.    UPTAKE WAS MEASURED AT 66 MINUTES.     BACKGROUND:  Liver SUV max= 2.7,   Aorta Blood SUV Max: 2.     2. CT: CT only obtained for attenuation correction and not diagnostic  purposes.    INDICATION: Lymphoma, mantle cell, multiple sites (H)    ADDITIONAL INFORMATION OBTAINED FROM EMR: 77-year-old male with  history of prostate cancer, status post prostatectomy, now with new  diagnosis of mantle cell lymphoma.    COMPARISON: CTs dated 3/5/2018, 6/29/2015, 1/5/2015    FINDINGS:     HEAD/NECK:  Extensive hypermetabolic adenopathy at each level of the cervical  chain. The largest measures 3.3 x 3.8 cm, left supraclavicular level,  (series 3 image 94), max SUV 10.4.    CHEST:  Extensive axillary, mediastinal and hilar adenopathy. For example:  - 7.8 x 3.2 cm carinal lymph node (series 3 image 142), max SUV 9.2.  - 2.3 x 3.7 cm left axillary lymph node (series 3 image 112), max SUV  8.7.    1.4 cm nodule in the right middle lobe (series 3 image 142), max SUV  3.8. Additional smaller nodules are better characterized on dedicated  CT of the chest dated 3/5/2018.    The heart is not enlarged. There is no significant pericardial  effusion. Dense coronary calcifications. Small bilateral pleural  effusions.    ABDOMEN AND PELVIS:  The spleen is enlarged measuring up to 18 cm with increased FDG  avidity, max SUV 7.     Extensive intra-abdominal, retroperitoneal and inguinal adenopathy.  For example:  - Conglomerate of central mesenteric lymph nodes measures  approximately 15.5 x 6.9 cm (series 3 image 205), max  SUV 7.  - Conglomerate of retroperitoneal lymph nodes measures approximately  7.5 x 4.7 cm (series 3 image 209), max SUV 8.4.  - 1.8 x 2.6 cm left inguinal lymph node (series 3 image 299), max SUV  7.6.  - Additional adenopathy is seen along the iliac chains in the  presacral fat.    Small ascites. The bowel is nondilated. No pneumatosis or portal  venous gas. No pneumoperitoneum.    LOWER EXTREMITIES:   No abnormal masses or hypermetabolic lesions.    BONES:   There are no suspicious lytic or blastic osseous lesions.  There is no  abnormal FDG uptake in the skeleton.      Impression    IMPRESSION:   1. In this patient with new diagnosis of mantle cell lymphoma without  a diagnostic CT:  a. Extensive lymphadenopathy and disease involvement in the neck,  chest, abdomen and pelvis as above.  b. 1.4 cm pulmonary nodule in the right middle lobe, max SUV 3.8.  c. Splenomegaly with diffuse FDG avidity, concerning for diffuse  splenic involvement of lymphoma.  2. Small bilateral pleural effusions.  3. Small ascites.    I have personally reviewed the examination and initial interpretation  and I agree with the findings.    SHARATH VALLADARES MD         Patient has been seen and evaluated by me. I have reviewed today's vital signs, medications, labs and imaging results independently. I have discussed the plan with the team and agree with the findings and plan in this note.   Feels OK.  PE:  Chest: CTAB  CVS: S1 S2 RRR, no murmurs or gallops  PA: soft, non tender  CNS: non focal    A/P:   Francisco Javier Cortes is a 77 year old male with history of prostate cancer (s/p prostatectomy), CAD (s/p STEPHEN), HTN, HLD and new diagnosis of Stage IVB mantle cell lymphoma. He was started on bendamustine and rituximab, and was admitted to the hospital for close monitoring for TLS.   Will continue IV fluids, second dose of bendamustine yesterday. Continue IV fluids. Possible discharge Sunday    Tonya Barragan MD

## 2018-03-31 NOTE — PLAN OF CARE
Problem: Chemotherapy Effects (Adult)  Goal: Signs and Symptoms of Listed Potential Problems Will be Absent, Minimized or Managed (Chemotherapy Effects)  Signs and symptoms of listed potential problems will be absent, minimized or managed by discharge/transition of care (reference Chemotherapy Effects (Adult) CPG).     2311-2352:  VSS, afebrile. Pt had already received compazine, but nausea still present, PO zofran given w/ relief. Denies pain. Adequate UOP. Continues w/ IVF & TLS labs q8hrs. Continue to monitor & w/ POC.

## 2018-03-31 NOTE — PLAN OF CARE
Problem: Chemotherapy Effects (Adult)  Goal: Signs and Symptoms of Listed Potential Problems Will be Absent, Minimized or Managed (Chemotherapy Effects)  Signs and symptoms of listed potential problems will be absent, minimized or managed by discharge/transition of care (reference Chemotherapy Effects (Adult) CPG).   Outcome: No Change  Afebrile, VSS. Pt's TLS labs continue to improve. Denied nausea and ate well this morning. Up ad joseph. No acute events. Notify MD with status changes and continue with plan of care.

## 2018-04-01 NOTE — PLAN OF CARE
Focus: Discharge  D: Pt seen my MDs this morning and deemed appropriate for discharge. DC orders prepared and signed.   I: Flushed pt's port with heparin and de-accessed needle. Assisted pt to gather personal belongings. Reviewed DC orders with pt.  A/P: Pt verbalized understanding of DC orders and readiness to DC to home. Pt left 7D with all his personal belongings at 1300. DC to home and follow-up with primary MD outpt on 4/2/18.

## 2018-04-01 NOTE — PLAN OF CARE
Problem: Chemotherapy Effects (Adult)  Goal: Signs and Symptoms of Listed Potential Problems Will be Absent, Minimized or Managed (Chemotherapy Effects)  Signs and symptoms of listed potential problems will be absent, minimized or managed by discharge/transition of care (reference Chemotherapy Effects (Adult) CPG).     VSS, afebrile. Denied pain/nausea. Pt did become slightly wheezy during the night, but pt declined SOB, O2 saturating staying above 95% on RA. Potential discharge today. Continue to monitor & w/ POC.

## 2018-04-01 NOTE — DISCHARGE SUMMARY
"[facility] Hematology/Oncology Discharge Summary    Francisco Javier Cortes MRN# [id number]   Age: 77 year old YOB: 1940     Date of Admission:  3/29/2018  Date of Discharge:  4/1/2018  Admitting Physician:  Tonya Barragan MD  Discharge Physician:  Se tayler Matthews MD (fellow)/Tonya Barragan MD    Primary Heme/Oncologist: Erickson Adan MD         Discharge Diagnoses:   Tumor lysis syndrome with PORSCHE  Mantle cell lymphoma s/p C1 BR D1 3/30       Discharge Physical Exam/Labs:   /84 (BP Location: Left arm)  Pulse 77  Temp 96.9  F (36.1  C) (Oral)  Resp 18  Ht 1.651 m (5' 5\")  Wt 83.8 kg (184 lb 12.8 oz)  SpO2 97%  BMI 30.75 kg/m2  Vitals:    03/30/18 0730 03/31/18 0750 04/01/18 0805   Weight: 79.7 kg (175 lb 9.6 oz) 83.4 kg (183 lb 12.8 oz) 83.8 kg (184 lb 12.8 oz)     CONSTITUTIONAL: Alert and interactive. Sitting up in bed in no acute distress.  HEENT: NC/AT, EOMI, anicteric sclera, oropharynx is pink and moist   CARDIOVASCULAR: RRR. Normal S1/S2. No murmurs. No edema  RESPIRATORY: CTAB. No wheezes appreciated. Normal respiratory effort on ambient air.  GASTROINTESTINAL: Soft, non-tender, non-distended, normoactive bowel sounds.  MUSCULOSKELETAL: No joint swelling or tenderness.  EXTREMITIES: intact strength  SKIN: Warm and intact. No concerning lesions or rashes   NEUROLOGIC: Alert and fully oriented, grossly nonfocal  Psych: calm and appropriate    CBC  Recent Labs  Lab 04/01/18  0535 03/31/18  0631 03/29/18 2005 03/29/18  1555   WBC 14.4* 14.5* 11.4* 12.6*   RBC 3.77* 3.83* 4.26* 4.16*   HGB 11.1* 11.4* 12.7* 12.2*   HCT 33.8* 34.8* 39.2* 38.8*   MCV 90 91 92 93   MCH 29.4 29.8 29.8 29.3   MCHC 32.8 32.8 32.4 31.4*   RDW 16.1* 15.8* 15.8* 15.5*   PLT 77* 75* 42* 47*       Recent Labs  Lab 04/01/18  0535 03/31/18  0631 03/29/18 2005   INR 1.14 1.09 1.15*            Imaging/Procedures:     Results for orders placed or performed during the hospital encounter of 03/24/18   PET Oncology Whole Body    " Narrative    Combined Report of:    PET and CT on  3/26/2018 7:56 AM :    1. PET of the neck, chest, abdomen, and pelvis.  2. PET CT Fusion for Attenuation Correction and Anatomical  Localization:    3. 3D MIP and PET-CT fused images were processed on an independent  workstation and archived to PACS and reviewed by a radiologist.    Technique:    1. PET: The patient received 14.65 mCi of F-18-FDG; the serum glucose  was 86 prior to administration, body weight was 73.5 kg. Images were  evaluated in the axial, sagittal, and coronal planes as well as the  rotational whole body MIP. Images were acquired from the Vertex to the  Feet.    UPTAKE WAS MEASURED AT 66 MINUTES.     BACKGROUND:  Liver SUV max= 2.7,   Aorta Blood SUV Max: 2.     2. CT: CT only obtained for attenuation correction and not diagnostic  purposes.    INDICATION: Lymphoma, mantle cell, multiple sites (H)    ADDITIONAL INFORMATION OBTAINED FROM EMR: 77-year-old male with  history of prostate cancer, status post prostatectomy, now with new  diagnosis of mantle cell lymphoma.    COMPARISON: CTs dated 3/5/2018, 6/29/2015, 1/5/2015    FINDINGS:     HEAD/NECK:  Extensive hypermetabolic adenopathy at each level of the cervical  chain. The largest measures 3.3 x 3.8 cm, left supraclavicular level,  (series 3 image 94), max SUV 10.4.    CHEST:  Extensive axillary, mediastinal and hilar adenopathy. For example:  - 7.8 x 3.2 cm carinal lymph node (series 3 image 142), max SUV 9.2.  - 2.3 x 3.7 cm left axillary lymph node (series 3 image 112), max SUV  8.7.    1.4 cm nodule in the right middle lobe (series 3 image 142), max SUV  3.8. Additional smaller nodules are better characterized on dedicated  CT of the chest dated 3/5/2018.    The heart is not enlarged. There is no significant pericardial  effusion. Dense coronary calcifications. Small bilateral pleural  effusions.    ABDOMEN AND PELVIS:  The spleen is enlarged measuring up to 18 cm with increased  FDG  avidity, max SUV 7.     Extensive intra-abdominal, retroperitoneal and inguinal adenopathy.  For example:  - Conglomerate of central mesenteric lymph nodes measures  approximately 15.5 x 6.9 cm (series 3 image 205), max SUV 7.  - Conglomerate of retroperitoneal lymph nodes measures approximately  7.5 x 4.7 cm (series 3 image 209), max SUV 8.4.  - 1.8 x 2.6 cm left inguinal lymph node (series 3 image 299), max SUV  7.6.  - Additional adenopathy is seen along the iliac chains in the  presacral fat.    Small ascites. The bowel is nondilated. No pneumatosis or portal  venous gas. No pneumoperitoneum.    LOWER EXTREMITIES:   No abnormal masses or hypermetabolic lesions.    BONES:   There are no suspicious lytic or blastic osseous lesions.  There is no  abnormal FDG uptake in the skeleton.      Impression    IMPRESSION:   1. In this patient with new diagnosis of mantle cell lymphoma without  a diagnostic CT:  a. Extensive lymphadenopathy and disease involvement in the neck,  chest, abdomen and pelvis as above.  b. 1.4 cm pulmonary nodule in the right middle lobe, max SUV 3.8.  c. Splenomegaly with diffuse FDG avidity, concerning for diffuse  splenic involvement of lymphoma.  2. Small bilateral pleural effusions.  3. Small ascites.    I have personally reviewed the examination and initial interpretation  and I agree with the findings.    SHARATH VALLADARES MD          Discharge Medications:     Current Discharge Medication List      CONTINUE these medications which have NOT CHANGED    Details   allopurinol (ZYLOPRIM) 300 MG tablet Take 1 tablet (300 mg) by mouth daily  Qty: 30 tablet, Refills: 1    Associated Diagnoses: 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)      LORazepam (ATIVAN) 0.5 MG tablet Take 1 tablet (0.5 mg) by mouth every 4 hours as needed (Anxiety, Nausea/Vomiting or Sleep)  Qty: 30 tablet, Refills: 3    Associated Diagnoses: 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)      !!  prochlorperazine (COMPAZINE) 10 MG tablet Take 0.5 tablets (5 mg) by mouth every 6 hours as needed (Nausea/Vomiting)  Qty: 30 tablet, Refills: 3    Associated Diagnoses: 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)      ondansetron (ZOFRAN) 8 MG tablet Take 1 tablet (8 mg) by mouth every 8 hours as needed (Nausea/Vomiting)  Qty: 10 tablet, Refills: 3    Associated Diagnoses: 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)      !! prochlorperazine (COMPAZINE) 10 MG tablet Take 1 tablet (10 mg) by mouth every 6 hours as needed for nausea or vomiting  Qty: 30 tablet, Refills: 1    Associated Diagnoses: Nausea      HYDROcodone-acetaminophen (NORCO) 5-325 MG per tablet Take 1 tablet by mouth every 6 hours as needed for severe pain maximum 6 tablet(s) per day  Qty: 15 tablet, Refills: 0    Associated Diagnoses: Postoperative pain      senna-docusate (SENOKOT-S;PERICOLACE) 8.6-50 MG per tablet Take 1-2 tablets by mouth 2 times daily Take while on oral narcotics to prevent or treat constipation.  Qty: 30 tablet, Refills: 0    Comments: While on narcotics  Associated Diagnoses: Postoperative pain      lisinopril (PRINIVIL/ZESTRIL) 20 MG tablet Take 1 tablet (20 mg) by mouth daily  Qty: 90 tablet, Refills: 3    Associated Diagnoses: Hypertension goal BP (blood pressure) < 130/80; Coronary artery disease involving native coronary artery of native heart without angina pectoris; Microalbuminuria      metoprolol succinate (TOPROL-XL) 100 MG 24 hr tablet Take 1 tablet (100 mg) by mouth daily  Qty: 90 tablet, Refills: 3    Associated Diagnoses: Hypertension goal BP (blood pressure) < 130/80; Coronary artery disease involving native coronary artery of native heart without angina pectoris      rosuvastatin (CRESTOR) 40 MG tablet Take 1 tablet (40 mg) by mouth daily  Qty: 90 tablet, Refills: 3    Associated Diagnoses: Hyperlipidemia LDL goal <100; Coronary artery disease involving native coronary artery of  native heart without angina pectoris      nitroGLYcerin (NITROSTAT) 0.4 MG sublingual tablet Place 1 tablet (0.4 mg) under the tongue every 5 minutes as needed up to 3 tablets per episode.  Qty: 60 tablet, Refills: 6    Associated Diagnoses: Chest pain due to myocardial ischemia, unspecified ischemic chest pain type (H); Coronary artery disease involving native coronary artery of native heart without angina pectoris      clindamycin (CLEOCIN T) 1 % lotion Apply twice daily for 6 weeks to the groin  Qty: 60 mL, Refills: 1    Associated Diagnoses: Rash      hydrocortisone valerate (WEST-NINOSKA) 0.2 % ointment Apply sparingly to affected area three times daily as needed.  Qty: 45 g, Refills: 3    Associated Diagnoses: Psoriasis      Lansoprazole (PREVACID PO) Take 20 mg by mouth every evening Patient needs to use brand name Prevacid (generic version causes diarrhea)       omega 3 1000 MG CAPS Take 1 g by mouth 2 times daily  Qty: 120 capsule, Refills: 6    Associated Diagnoses: Coronary artery disease involving native coronary artery of native heart with angina pectoris (H)      Acetaminophen (TYLENOL 8 HOUR PO) Take 500 mg by mouth as needed       MAALOX ADVANCED OR Take 1 tablet as needed (Calcium carbonate 1000mg/Simethicone 80mg)    Associated Diagnoses: Chest pain      ASPIRIN 81 MG OR TABS 1 TABLET EVERY MORNING       !! - Potential duplicate medications found. Please discuss with provider.             Brief History of Illness:   Please refer to the H&P dated 3/29/2018 for more detailed history. Summarized as:    Francisco Javier Cortes is a 77 year old male with history of prostate cancer (s/p prostatectomy), CAD (s/p STEPHEN), HTN, HLD and new diagnosis of Stage IVB mantle cell lymphoma. He was started on bendamustine and rituximab on 3/29, and was admitted to the hospital for close monitoring for TLS.           Hospital Course:     Mr. Cortes was admitted for possible TLS from starting C1 BR for mantle cell lymphoma. Upon  admission, his creatinine was elevated to around 1.4 compared to his baseline (0.9-1.0) with high 's. The patient was placed on aggressive IV fluid and TLS labs every 8 hrs. Over 72 hrs monitoring, Cr remained 1.4 but other TLS laboratory findings were trending down. Notably, he proceed with D2 bendamustine on 3/30 w/o any significant side effects.    The patient will be discharged home on 4/1 and advised to follow up with Dr. Adan tomorrow as scheduled.             Discharge Instructions and Follow-Up:   Discharge diet: Regular. Pt instructed to drink plenty of non-caffeinated beverages;  supplement diet with high-calorie snacks or nutritional supplements   (e.g. Boost, Ensure, Resource Breeze) as tolerated to ensure   adequate calorie intake; and notify primary provider if poor appetite,   not eating well, and/or losing weight.    Discharge activity: Activity as tolerated. Pt instructed not to drive or engage in strenuous   activity while taking narcotics, if having headache / dizziness / vision   changes, or if feeling generally unwell.   Discharge follow-up: Appointment with Dr. Adan on 4/2/18          Discharge Disposition:   Discharged to Home.      Attestation:  I have reviewed today's vital signs, notes, medications, labs and imaging.    Se tayler Matthews MD  Hematology Oncology and Transplantation Fellow  Pager: [phone]                                  Patient has been seen and evaluated by me. I have reviewed today's vital signs, medications, labs and imaging results independently. I have discussed the plan with the team and agree with the findings and plan in this note.    Tonya Barragan MD

## 2018-04-02 NOTE — TELEPHONE ENCOUNTER
Called and spoke to patient. Explained that the two physicians had talked and Dr Ball would like him to have a stress test and echo before his appointment. Francisco Javier would like to try and do the echo and stress test on a day that he is in [place] so there is less running around.

## 2018-04-02 NOTE — PROGRESS NOTES
Oncology Follow Up Visit: April 2, 2018    Oncologist: Dr. Merry Auguste  PCP: Florian Alonzo    Diagnosis: Stage IV mantle cell lymphoma  Francisco Javier Cortes is a 76 yo  male presented in March 2018 with complaint of abdominal bloating and discomfort loss of appetite ×2 months. Initial imaging showed splenomegaly, extensive retroperitoneal mesenteric, inguinal and pelvic as well as retrocrural adenopathy and enlarging pulmonary nodules up to 18 mm. FNA proved mantle cell lymphoma-please see progress notes of 3/29/18 by Dr. Auguste for specific information.  Treatment:   3/29/18 began Bendamustine and Rituxan  - Hospitalized for TLS    Interval History: Mr. Cortes comes to clinic with wife for post hospital visit from TLS at [facility] from 3/29/18 -4/1/18. Patient states he is feeling so much better than last week and is appreciative of his time in the hospital. He did notice there was significant amount of fluid in his system including his legs and hands that is starting to improve he is now starting to get his appetite back as well. He denies any pain nausea shortness of breath or fevers or chills. He has had loose stools since he came home in the last day. He does feel weak but is better this morning than he was yesterday. And mentions he is not sleeping well due to the constant urination-noted regular leakage due to history of his prostate cancer and surgery and is just now starting to get some control of that now that he has diuresed. Admits he still getting to the shock of the diagnosis but family as being very supportive.  Rest of comprehensive and complete ROS is reviewed and is negative.   Past Medical History:   Diagnosis Date     CAD (coronary artery disease) 1/09    s/p stentsx2     Coronary artery disease involving native coronary artery of native heart without angina pectoris 12/22/2015     Dyslipidemia      Erectile dysfunction      GERD (gastroesophageal reflux disease)       "Hearing problem One ear 1961     Hypertension      NSTEMI (non-ST elevated myocardial infarction) (H) 3/20/2014     Pneumonia      Prostate cancer (H)     prostatecomy 9 years ago, PSAs remain good     Status post percutaneous transluminal coronary angioplasty-Coronary angioplasty with STEPHEN to LCx 4/4/2014     Stented coronary artery     stents placed     Current Outpatient Prescriptions   Medication     allopurinol (ZYLOPRIM) 300 MG tablet     LORazepam (ATIVAN) 0.5 MG tablet     prochlorperazine (COMPAZINE) 10 MG tablet     ondansetron (ZOFRAN) 8 MG tablet     prochlorperazine (COMPAZINE) 10 MG tablet     HYDROcodone-acetaminophen (NORCO) 5-325 MG per tablet     senna-docusate (SENOKOT-S;PERICOLACE) 8.6-50 MG per tablet     lisinopril (PRINIVIL/ZESTRIL) 20 MG tablet     metoprolol succinate (TOPROL-XL) 100 MG 24 hr tablet     rosuvastatin (CRESTOR) 40 MG tablet     nitroGLYcerin (NITROSTAT) 0.4 MG sublingual tablet     clindamycin (CLEOCIN T) 1 % lotion     hydrocortisone valerate (WEST-NINOSKA) 0.2 % ointment     Lansoprazole (PREVACID PO)     omega 3 1000 MG CAPS     Acetaminophen (TYLENOL 8 HOUR PO)     MAALOX ADVANCED OR     ASPIRIN 81 MG OR TABS     No current facility-administered medications for this visit.      Allergies   Allergen Reactions     Niacin      Severe rash and itching     Prevacid [Lansoprazole] Diarrhea     Patient notes diarrhea with 30mg generic version. Patient does ok with 15mg in generic and brand name.     Shellfish Allergy      One kind       Physical Exam:/75  Pulse 91  Temp 97.7  F (36.5  C)  Ht 1.651 m (5' 5\")  Wt 82.8 kg (182 lb 8 oz)  SpO2 95%  BMI 30.37 kg/m2  ECOG PS- 1  Constitutional: Alert, moving well on own today and in no distress.   ENT: Eyes bright , No mouth sores  Neck: Supple,  bilateral submandibular nodes noted as well as a subclavian. Previous biopsy site to the left neck is healed well  Cardiac: Heart rate and rhythm is regular and strong without " murmur  Respiratory: Breathing easy. Lung sounds clear to auscultation  GI: Abdomen is rounded, mildly tender to the left upper quadrant though patient states it is less than previous exams, BS active. Cannot feel any masses or organomegaly  MS: Muscle tone normal, extremities normal with 1+ pitting edema up to below the knees bilaterally  Skin: No suspicious lesions or rashes  Neuro: Sensory grossly WNL, gait normal.   Lymph: Normal ant/post cervical, axillary, supraclavicular nodes  Psych: Mentation appears normal and affect normal/bright and smiling    Laboratory Results:    Ref. Range 3/31/2018 06:31 4/1/2018 05:35 4/2/2018 11:51   Sodium Latest Ref Range: 133 - 144 mmol/L 141 145 (H) 142   Potassium Latest Ref Range: 3.4 - 5.3 mmol/L 4.5 4.5 4.6   Chloride Latest Ref Range: 94 - 109 mmol/L 113 (H) 115 (H) 111 (H)   Carbon Dioxide Latest Ref Range: 20 - 32 mmol/L 20 18 (L) 21   Urea Nitrogen Latest Ref Range: 7 - 30 mg/dL 36 (H) 35 (H) 28   Creatinine Latest Ref Range: 0.66 - 1.25 mg/dL 1.39 (H) 1.41 (H) 1.35 (H)   GFR Estimate Latest Ref Range: >60 mL/min/1.7m2 49 (L) 49 (L) 51 (L)   GFR Estimate If Black Latest Ref Range: >60 mL/min/1.7m2 60 (L) 59 (L) 62   Calcium Latest Ref Range: 8.5 - 10.1 mg/dL 6.4 (L) 6.1 (L) 7.1 (L)   Anion Gap Latest Ref Range: 3 - 14 mmol/L 8 11 10   Phosphorus Latest Ref Range: 2.5 - 4.5 mg/dL 3.5 3.4 4.0   Albumin Latest Ref Range: 3.4 - 5.0 g/dL   3.0 (L)   Protein Total Latest Ref Range: 6.8 - 8.8 g/dL   6.1 (L)   Bilirubin Total Latest Ref Range: 0.2 - 1.3 mg/dL   0.6   Alkaline Phosphatase Latest Ref Range: 40 - 150 U/L   94   ALT Latest Ref Range: 0 - 70 U/L   36   AST Latest Ref Range: 0 - 45 U/L   69 (H)   Lactate Dehydrogenase Latest Ref Range: 85 - 227 U/L   638 (H)   Uric Acid Latest Ref Range: 3.5 - 7.2 mg/dL 5.2 4.7 4.4   Glucose Latest Ref Range: 70 - 99 mg/dL 108 (H) 89 101 (H)   WBC Latest Ref Range: 4.0 - 11.0 10e9/L 14.5 (H) 14.4 (H) 12.1 (H)   Hemoglobin Latest Ref  Range: 13.3 - 17.7 g/dL 11.4 (L) 11.1 (L) 13.1 (L)   Hematocrit Latest Ref Range: 40.0 - 53.0 % 34.8 (L) 33.8 (L) 40.6   Platelet Count Latest Ref Range: 150 - 450 10e9/L 75 (L) 77 (L) 86 (L)   RBC Count Latest Ref Range: 4.4 - 5.9 10e12/L 3.83 (L) 3.77 (L) 4.43   MCV Latest Ref Range: 78 - 100 fl 91 90 92   MCH Latest Ref Range: 26.5 - 33.0 pg 29.8 29.4 29.6   MCHC Latest Ref Range: 31.5 - 36.5 g/dL 32.8 32.8 32.3   RDW Latest Ref Range: 10.0 - 15.0 % 15.8 (H) 16.1 (H) 15.8 (H)   Diff Method Unknown Automated Method Manual Differential PENDING   % Neutrophils Latest Units: % 87.6 86.2    % Lymphocytes Latest Units: % 9.0 1.8    % Monocytes Latest Units: % 2.8 4.6    % Eosinophils Latest Units: % 0.0 0.0    % Basophils Latest Units: % 0.0 0.0        Assessment and Plan:   Stage IV mantle cell lymphoma with TLS hospitalization-patient is recently been diagnosed with lymphoma. He was hospitalized for TLS after start of treatment with bendamustine and Rituxan on 3/29/18.   He appears to have recovered quite well with labs noted today to be improving and the excess fluids from the hospital appear to be resolving.  There is no need for further fluids today and patient is expressing that his appetite is improving though still not taking fluids well, limiting fluids due to urinary dribbling. Reviewed with him that he needs to continue to drink fluids to keep the residual tumor breakdown from building in his system. He continues to take the allopurinol as ordered.  Encouraged short bouts of exercise to build his strength-does not appear a fall risk today due to weakness.  He is still having some loose stools but not needing Imodium at this time though did discuss treatment if greater than 3 loose stools each day.  *Patient is still not back to his normal baseline and would suggest he return in 3 days for blood work of BMP with possible fluids of 1 L if creatinine greater than 1.5 or with elevated LD or uric acid higher than  it is today.  His next bendamustine/Rituxan cycle will start on 426 and he will have a provider visit for review of appropriateness of treatment prior to infusion.  This was a 25 min visit with > 50% in counseling and coordinating care including education and management of concerns.    Joy Bush,CNP

## 2018-04-02 NOTE — NURSING NOTE
"Oncology Rooming Note    April 2, 2018 12:08 PM   Francisco Javier Cortes is a 77 year old male who presents for:    Chief Complaint   Patient presents with     Oncology Clinic Visit     Hospital follow up/possible IVF to follow     Initial Vitals: /75  Pulse 91  Temp 97.7  F (36.5  C)  Ht 1.651 m (5' 5\")  Wt 82.8 kg (182 lb 8 oz)  SpO2 95%  BMI 30.37 kg/m2 Estimated body mass index is 30.37 kg/(m^2) as calculated from the following:    Height as of this encounter: 1.651 m (5' 5\").    Weight as of this encounter: 82.8 kg (182 lb 8 oz). Body surface area is 1.95 meters squared.  No Pain (0) Comment: Data Unavailable   No LMP for male patient.  Allergies reviewed: Yes  Medications reviewed: Yes    Medications: Medication refills not needed today.  Pharmacy name entered into [facility]:    [facility] [place] - [place], [place] - [address], [address]  [facility] - [facility], [place] - [address] [id number] - [place], [place] - [address], [place] AT [facility]        5 minutes for nursing intake (face to face time)     Kathleen Sandoval LPN              "

## 2018-04-02 NOTE — MR AVS SNAPSHOT
After Visit Summary   4/2/2018    Francisco Javier Cortes    MRN: [id number]           Patient Information     Date Of Birth          1940        Visit Information        Provider Department      4/2/2018 12:15 PM Joy Bush APRN CNP [facility]         Follow-ups after your visit        Your next 10 appointments already scheduled     Apr 02, 2018  1:00 PM CDT   Level 3 with BAY 8 INFUSION   [facility] ([facility]    [address] [place] [id number] [phone]            Apr 05, 2018  9:15 AM CDT   Return Visit with [facility]   [facility] ([facility])    [address] [place] [id number] [phone]            Apr 09, 2018  7:45 AM CDT   Return Visit with Erickson Adan MD   [facility])    [address] [place] [id number] [phone]            Apr 10, 2018  8:00 AM CDT   Return Visit with Jad Ball MD   [facility] ([facility])    [address] [place] [id number] [phone]              Who to contact     If you have questions or need follow up information about today's clinic visit or your schedule please contact [facility] directly at [phone].  Normal or non-critical lab and imaging results will be communicated to you by MyChart, letter or phone within 4 business days after the clinic has received the results. If you do not hear from us within 7 days, please contact the clinic through MyChart or phone. If you have a critical or abnormal lab result, we will notify you by phone as soon as possible.  Submit refill requests through [employer] or call your pharmacy and they will forward the refill request to us. Please allow 3 business days for your refill to be completed.          Additional Information About Your  "Visit        [employer] Information     [employer] gives you secure access to your electronic health record. If you see a primary care provider, you can also send messages to your care team and make appointments. If you have questions, please call your primary care clinic.  If you do not have a primary care provider, please call [phone] and they will assist you.      [employer] is an electronic gateway that provides easy, online access to your medical records. With crossvertise, you can request a clinic appointment, read your test results, renew a prescription or communicate with your care team.     To access your existing account, please contact your [facility] or call [phone] for assistance.        Care EveryWhere ID     This is your Care EveryWhere ID. This could be used by other organizations to access your [place] medical records  [id number]        Your Vitals Were     Pulse Temperature Height Pulse Oximetry BMI (Body Mass Index)       91 97.7  F (36.5  C) 1.651 m (5' 5\") 95% 30.37 kg/m2        Blood Pressure from Last 3 Encounters:   04/02/18 112/75   04/01/18 138/84   03/29/18 107/70    Weight from Last 3 Encounters:   04/02/18 82.8 kg (182 lb 8 oz)   04/01/18 83.8 kg (184 lb 12.8 oz)   03/29/18 78 kg (172 lb)              Today, you had the following     No orders found for display         Today's Medication Changes          These changes are accurate as of 4/2/18 12:42 PM.  If you have any questions, ask your nurse or doctor.               These medicines have changed or have updated prescriptions.        Dose/Directions    lisinopril 20 MG tablet   Commonly known as:  PRINIVIL/ZESTRIL   This may have changed:  when to take this   Used for:  Hypertension goal BP (blood pressure) < 130/80, Coronary artery disease involving native coronary artery of native heart without angina pectoris, Microalbuminuria        Dose:  20 mg   Take 1 tablet (20 mg) by mouth daily   Quantity:  90 " tablet   Refills:  3       omega 3 1000 MG Caps   This may have changed:  additional instructions   Used for:  Coronary artery disease involving native coronary artery of native heart with angina pectoris (H)        Dose:  1 g   Take 1 g by mouth 2 times daily   Quantity:  120 capsule   Refills:  6       rosuvastatin 40 MG tablet   Commonly known as:  CRESTOR   This may have changed:  when to take this   Used for:  Hyperlipidemia LDL goal <100, Coronary artery disease involving native coronary artery of native heart without angina pectoris        Dose:  40 mg   Take 1 tablet (40 mg) by mouth daily   Quantity:  90 tablet   Refills:  3                Primary Care Provider Office Phone # Fax #    Florian Alonzo [phone] [address]  [facility] [address]        Equal Access to Services     NANDA BARAJAS : Hadii rivera syedo Solina, waaxda luqadaha, qaybta kaalmada adeegyada, zhao lorenzo. So [facility] [place] [phone].    ATENCIÓN: Si habla español, tiene a ivan disposición servicios gratuitos de asistencia lingüística. [facility] [phone].    We comply with applicable federal civil rights laws and [place] laws. We do not discriminate on the basis of race, color, national origin, age, disability, sex, sexual orientation, or gender identity.            Thank you!     Thank you for choosing [facility]  for your care. Our goal is always to provide you with excellent care. Hearing back from our patients is one way we can continue to improve our services. Please take a few minutes to complete the written survey that you may receive in the mail after your visit with us. Thank you!             Your Updated Medication List - Protect others around you: Learn how to safely use, store and throw away your medicines at www.disposemymeds.org.          This list is accurate as of 4/2/18 12:42 PM.  Always use your most recent med list.                   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    Psoriasis       lisinopril 20 MG tablet    PRINIVIL/ZESTRIL    90 tablet    Take 1 tablet (20 mg) by mouth daily    Hypertension goal BP (blood pressure) < 130/80, Coronary artery disease involving native coronary artery of native heart without angina pectoris, Microalbuminuria       LORazepam 0.5 MG tablet    ATIVAN    30 tablet    Take 1 tablet (0.5 mg) by mouth every 4 hours as needed (Anxiety, Nausea/Vomiting or Sleep)    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)       MAALOX ADVANCED PO      Take 1 tablet as needed (Calcium carbonate 1000mg/Simethicone 80mg)    Chest pain       metoprolol succinate 100 MG 24 hr tablet    TOPROL-XL    90 tablet    Take 1 tablet (100 mg) by mouth daily    Hypertension goal BP (blood pressure) < 130/80, Coronary artery disease involving native coronary artery of native heart without angina pectoris       nitroGLYcerin 0.4 MG sublingual tablet    NITROSTAT    60 tablet    Place 1 tablet (0.4 mg) under the tongue every 5 minutes as needed up to 3 tablets per episode.    Chest pain due to myocardial ischemia, unspecified ischemic chest pain type (H), Coronary artery disease involving native coronary artery of native heart without angina pectoris       omega 3 1000 MG Caps     120 capsule    Take  1 g by mouth 2 times daily    Coronary artery disease involving native coronary artery of native heart with angina pectoris (H)       ondansetron 8 MG tablet    ZOFRAN    10 tablet    Take 1 tablet (8 mg) by mouth every 8 hours as needed (Nausea/Vomiting)    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)       PREVACID PO      Take 20 mg by mouth every evening Patient needs to use brand name Prevacid (generic version causes diarrhea)        * prochlorperazine 10 MG tablet    COMPAZINE    30 tablet    Take 1 tablet (10 mg) by mouth every 6 hours as needed for nausea or vomiting    Nausea       * prochlorperazine 10 MG tablet    COMPAZINE    30 tablet    Take 0.5 tablets (5 mg) by mouth every 6 hours as needed (Nausea/Vomiting)    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    Postoperative pain       TYLENOL 8 HOUR PO      Take 500 mg by mouth as needed        * Notice:  This list has 2 medication(s) that are the same as other medications prescribed for you. Read the directions carefully, and ask your doctor or other care provider to review them with you.

## 2018-04-02 NOTE — MR AVS SNAPSHOT
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)        Drug-induced neutropenia (H)        Nausea           Follow-ups after your visit        Your next 10 appointments already scheduled     Apr 02, 2018  1:00 PM CDT   Level 3 with BAY 8 INFUSION   [facility])    [address] [place] [id number]   [phone]            Apr 09, 2018  7:45 AM CDT   Return Visit with Erickson Adan MD   [facility])    [address] [place] [id number] [phone]            Apr 10, 2018  8:00 AM CDT   Return Visit with Jad Ball MD   [facility])    [address] [place] [id number] [phone]              Who to contact     If you have questions or need follow up information about today's clinic visit or your schedule please contact [facility] directly at [phone].  Normal or non-critical lab and imaging results will be communicated to you by MyChart, letter or phone within 4 business days after the clinic has received the results. If you do not hear from us within 7 days, please contact the clinic through MyChart or phone. If you have a critical or abnormal lab result, we will notify you by phone as soon as possible.  Submit refill requests through [employer] or call your pharmacy and they will forward the refill request to us. Please allow 3 business days for your refill to be completed.          Additional Information About Your Visit        [employer] Information     [employer] gives you secure access to your  electronic health record. If you see a primary care provider, you can also send messages to your care team and make appointments. If you have questions, please call your primary care clinic.  If you do not have a primary care provider, please call [phone] and they will assist you.      [employer] is an electronic gateway that provides easy, online access to your medical records. With [employer], you can request a clinic appointment, read your test results, renew a prescription or communicate with your care team.     To access your existing account, please contact your [facility] or call [phone] for assistance.        Care EveryWhere ID     This is your Care EveryWhere ID. This could be used by other organizations to access your [place] medical records  [id number]         Blood Pressure from Last 3 Encounters:   04/02/18 112/75   04/01/18 138/84   03/29/18 107/70    Weight from Last 3 Encounters:   04/02/18 82.8 kg (182 lb 8 oz)   04/01/18 83.8 kg (184 lb 12.8 oz)   03/29/18 78 kg (172 lb)              We Performed the Following     CBC with platelets differential     Comprehensive metabolic panel     Lactate Dehydrogenase     Phosphorus     Uric acid          Today's Medication Changes          These changes are accurate as of 4/2/18 12:18 PM.  If you have any questions, ask your nurse or doctor.               These medicines have changed or have updated prescriptions.        Dose/Directions    lisinopril 20 MG tablet   Commonly known as:  PRINIVIL/ZESTRIL   This may have changed:  when to take this   Used for:  Hypertension goal BP (blood pressure) < 130/80, Coronary artery disease involving native coronary artery of native heart without angina pectoris, Microalbuminuria        Dose:  20 mg   Take 1 tablet (20 mg) by mouth daily   Quantity:  90 tablet   Refills:  3       omega 3 1000 MG Caps   This may have changed:  additional instructions   Used for:  Coronary artery disease  involving native coronary artery of native heart with angina pectoris (H)        Dose:  1 g   Take 1 g by mouth 2 times daily   Quantity:  120 capsule   Refills:  6       rosuvastatin 40 MG tablet   Commonly known as:  CRESTOR   This may have changed:  when to take this   Used for:  Hyperlipidemia LDL goal <100, Coronary artery disease involving native coronary artery of native heart without angina pectoris        Dose:  40 mg   Take 1 tablet (40 mg) by mouth daily   Quantity:  90 tablet   Refills:  3                Primary Care Provider Office Phone # Fax #    Florian Alonzo [phone] [address]  [facility] [address]        Equal Access to Services     [facility]: Hadii rivera collazo hadasho Solina, waaxda luorlinadaha, qaybta kaalmada adebryantyada, zhao bazan . So [facility] [phone].    ATENCIÓN: Si habla español, tiene a ivan disposición servicios gratuitos de asistencia lingüística. [facility] [phone].    We comply with applicable federal civil rights laws and [place] laws. We do not discriminate on the basis of race, color, national origin, age, disability, sex, sexual orientation, or gender identity.            Thank you!     Thank you for choosing [facility]  for your care. Our goal is always to provide you with excellent care. Hearing back from our patients is one way we can continue to improve our services. Please take a few minutes to complete the written survey that you may receive in the mail after your visit with us. Thank you!             Your Updated Medication List - Protect others around you: Learn how to safely use, store and throw away your medicines at www.disposemymeds.org.          This list is accurate as of 4/2/18 12:18 PM.  Always use your most recent med list.                   Brand Name Dispense Instructions for use Diagnosis    allopurinol 300 MG tablet    ZYLOPRIM    30 tablet    Take 1 tablet (300 mg) by mouth  daily    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)       aspirin 81 MG tablet      1 TABLET EVERY MORNING        clindamycin 1 % lotion    CLEOCIN T    60 mL    Apply twice daily for 6 weeks to the groin    Rash       HYDROcodone-acetaminophen 5-325 MG per tablet    NORCO    15 tablet    Take 1 tablet by mouth every 6 hours as needed for severe pain maximum 6 tablet(s) per day    Postoperative pain       hydrocortisone valerate 0.2 % ointment    WEST-NINOSKA    45 g    Apply sparingly to affected area three times daily as needed.    Psoriasis       lisinopril 20 MG tablet    PRINIVIL/ZESTRIL    90 tablet    Take 1 tablet (20 mg) by mouth daily    Hypertension goal BP (blood pressure) < 130/80, Coronary artery disease involving native coronary artery of native heart without angina pectoris, Microalbuminuria       LORazepam 0.5 MG tablet    ATIVAN    30 tablet    Take 1 tablet (0.5 mg) by mouth every 4 hours as needed (Anxiety, Nausea/Vomiting or Sleep)    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)       MAALOX ADVANCED PO      Take 1 tablet as needed (Calcium carbonate 1000mg/Simethicone 80mg)    Chest pain       metoprolol succinate 100 MG 24 hr tablet    TOPROL-XL    90 tablet    Take 1 tablet (100 mg) by mouth daily    Hypertension goal BP (blood pressure) < 130/80, Coronary artery disease involving native coronary artery of native heart without angina pectoris       nitroGLYcerin 0.4 MG sublingual tablet    NITROSTAT    60 tablet    Place 1 tablet (0.4 mg) under the tongue every 5 minutes as needed up to 3 tablets per episode.    Chest pain due to myocardial ischemia, unspecified ischemic chest pain type (H), Coronary artery disease involving native coronary artery of native heart without angina pectoris       omega 3 1000 MG Caps     120 capsule    Take 1 g by mouth 2 times daily    Coronary artery disease involving native coronary artery of native heart with angina pectoris (H)        ondansetron 8 MG tablet    ZOFRAN    10 tablet    Take 1 tablet (8 mg) by mouth every 8 hours as needed (Nausea/Vomiting)    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)       PREVACID PO      Take 20 mg by mouth every evening Patient needs to use brand name Prevacid (generic version causes diarrhea)        * prochlorperazine 10 MG tablet    COMPAZINE    30 tablet    Take 1 tablet (10 mg) by mouth every 6 hours as needed for nausea or vomiting    Nausea       * prochlorperazine 10 MG tablet    COMPAZINE    30 tablet    Take 0.5 tablets (5 mg) by mouth every 6 hours as needed (Nausea/Vomiting)    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    Postoperative pain       TYLENOL 8 HOUR PO      Take 500 mg by mouth as needed        * Notice:  This list has 2 medication(s) that are the same as other medications prescribed for you. Read the directions carefully, and ask your doctor or other care provider to review them with you.

## 2018-04-02 NOTE — PROGRESS NOTES
Port needle left accessed for treatment. Tolerated lab draw without complaint. [place] drawn-Red/Green/Purple tubes. Double signed by patient and RN. See documentation flowsheet. Nieves Estrada, RN, BSN, OCN

## 2018-04-02 NOTE — LETTER
4/2/2018         RE: Francisco Javier Cortes  [address] [facility] [address]        Dear Colleague,    Thank you for referring your patient, Francisco Javier Cortes, to the [facility]. Please see a copy of my visit note below.    Oncology Follow Up Visit: April 2, 2018    Oncologist: Dr. Merry Auguste  PCP: Florian Alonzo    Diagnosis: Stage IV mantle cell lymphoma  Francisco Javier Cortes is a 76 yo  male presented in March 2018 with complaint of abdominal bloating and discomfort loss of appetite ×2 months. Initial imaging showed splenomegaly, extensive retroperitoneal mesenteric, inguinal and pelvic as well as retrocrural adenopathy and enlarging pulmonary nodules up to 18 mm. FNA proved mantle cell lymphoma-please see progress notes of 3/29/18 by Dr. Auguste for specific information.  Treatment:   3/29/18 began Bendamustine and Rituxan  - Hospitalized for TLS    Interval History: Mr. Cortes comes to clinic with wife for post hospital visit from TLS at [facility] from 3/29/18 -4/1/18. Patient states he is feeling so much better than last week and is appreciative of his time in the hospital. He did notice there was significant amount of fluid in his system including his legs and hands that is starting to improve he is now starting to get his appetite back as well. He denies any pain nausea shortness of breath or fevers or chills. He has had loose stools since he came home in the last day. He does feel weak but is better this morning than he was yesterday. And mentions he is not sleeping well due to the constant urination-noted regular leakage due to history of his prostate cancer and surgery and is just now starting to get some control of that now that he has diuresed. Admits he still getting to the shock of the diagnosis but family as being very supportive.  Rest of comprehensive and complete ROS is reviewed and is negative.   Past Medical History:   Diagnosis Date     CAD  "(coronary artery disease) 1/09    s/p stentsx2     Coronary artery disease involving native coronary artery of native heart without angina pectoris 12/22/2015     Dyslipidemia      Erectile dysfunction      GERD (gastroesophageal reflux disease)      Hearing problem One ear 1961     Hypertension      NSTEMI (non-ST elevated myocardial infarction) (H) 3/20/2014     Pneumonia      Prostate cancer (H)     prostatecomy 9 years ago, PSAs remain good     Status post percutaneous transluminal coronary angioplasty-Coronary angioplasty with STEPHEN to LCx 4/4/2014     Stented coronary artery     stents placed     Current Outpatient Prescriptions   Medication     allopurinol (ZYLOPRIM) 300 MG tablet     LORazepam (ATIVAN) 0.5 MG tablet     prochlorperazine (COMPAZINE) 10 MG tablet     ondansetron (ZOFRAN) 8 MG tablet     prochlorperazine (COMPAZINE) 10 MG tablet     HYDROcodone-acetaminophen (NORCO) 5-325 MG per tablet     senna-docusate (SENOKOT-S;PERICOLACE) 8.6-50 MG per tablet     lisinopril (PRINIVIL/ZESTRIL) 20 MG tablet     metoprolol succinate (TOPROL-XL) 100 MG 24 hr tablet     rosuvastatin (CRESTOR) 40 MG tablet     nitroGLYcerin (NITROSTAT) 0.4 MG sublingual tablet     clindamycin (CLEOCIN T) 1 % lotion     hydrocortisone valerate (WEST-NINOSKA) 0.2 % ointment     Lansoprazole (PREVACID PO)     omega 3 1000 MG CAPS     Acetaminophen (TYLENOL 8 HOUR PO)     MAALOX ADVANCED OR     ASPIRIN 81 MG OR TABS     No current facility-administered medications for this visit.      Allergies   Allergen Reactions     Niacin      Severe rash and itching     Prevacid [Lansoprazole] Diarrhea     Patient notes diarrhea with 30mg generic version. Patient does ok with 15mg in generic and brand name.     Shellfish Allergy      One kind       Physical Exam:/75  Pulse 91  Temp 97.7  F (36.5  C)  Ht 1.651 m (5' 5\")  Wt 82.8 kg (182 lb 8 oz)  SpO2 95%  BMI 30.37 kg/m2  ECOG PS- 1  Constitutional: Alert, moving well on own today and in " no distress.   ENT: Eyes bright , No mouth sores  Neck: Supple,  bilateral submandibular nodes noted as well as a subclavian. Previous biopsy site to the left neck is healed well  Cardiac: Heart rate and rhythm is regular and strong without murmur  Respiratory: Breathing easy. Lung sounds clear to auscultation  GI: Abdomen is rounded, mildly tender to the left upper quadrant though patient states it is less than previous exams, BS active. Cannot feel any masses or organomegaly  MS: Muscle tone normal, extremities normal with 1+ pitting edema up to below the knees bilaterally  Skin: No suspicious lesions or rashes  Neuro: Sensory grossly WNL, gait normal.   Lymph: Normal ant/post cervical, axillary, supraclavicular nodes  Psych: Mentation appears normal and affect normal/bright and smiling    Laboratory Results:    Ref. Range 3/31/2018 06:31 4/1/2018 05:35 4/2/2018 11:51   Sodium Latest Ref Range: 133 - 144 mmol/L 141 145 (H) 142   Potassium Latest Ref Range: 3.4 - 5.3 mmol/L 4.5 4.5 4.6   Chloride Latest Ref Range: 94 - 109 mmol/L 113 (H) 115 (H) 111 (H)   Carbon Dioxide Latest Ref Range: 20 - 32 mmol/L 20 18 (L) 21   Urea Nitrogen Latest Ref Range: 7 - 30 mg/dL 36 (H) 35 (H) 28   Creatinine Latest Ref Range: 0.66 - 1.25 mg/dL 1.39 (H) 1.41 (H) 1.35 (H)   GFR Estimate Latest Ref Range: >60 mL/min/1.7m2 49 (L) 49 (L) 51 (L)   GFR Estimate If Black Latest Ref Range: >60 mL/min/1.7m2 60 (L) 59 (L) 62   Calcium Latest Ref Range: 8.5 - 10.1 mg/dL 6.4 (L) 6.1 (L) 7.1 (L)   Anion Gap Latest Ref Range: 3 - 14 mmol/L 8 11 10   Phosphorus Latest Ref Range: 2.5 - 4.5 mg/dL 3.5 3.4 4.0   Albumin Latest Ref Range: 3.4 - 5.0 g/dL   3.0 (L)   Protein Total Latest Ref Range: 6.8 - 8.8 g/dL   6.1 (L)   Bilirubin Total Latest Ref Range: 0.2 - 1.3 mg/dL   0.6   Alkaline Phosphatase Latest Ref Range: 40 - 150 U/L   94   ALT Latest Ref Range: 0 - 70 U/L   36   AST Latest Ref Range: 0 - 45 U/L   69 (H)   Lactate Dehydrogenase Latest Ref  Range: 85 - 227 U/L   638 (H)   Uric Acid Latest Ref Range: 3.5 - 7.2 mg/dL 5.2 4.7 4.4   Glucose Latest Ref Range: 70 - 99 mg/dL 108 (H) 89 101 (H)   WBC Latest Ref Range: 4.0 - 11.0 10e9/L 14.5 (H) 14.4 (H) 12.1 (H)   Hemoglobin Latest Ref Range: 13.3 - 17.7 g/dL 11.4 (L) 11.1 (L) 13.1 (L)   Hematocrit Latest Ref Range: 40.0 - 53.0 % 34.8 (L) 33.8 (L) 40.6   Platelet Count Latest Ref Range: 150 - 450 10e9/L 75 (L) 77 (L) 86 (L)   RBC Count Latest Ref Range: 4.4 - 5.9 10e12/L 3.83 (L) 3.77 (L) 4.43   MCV Latest Ref Range: 78 - 100 fl 91 90 92   MCH Latest Ref Range: 26.5 - 33.0 pg 29.8 29.4 29.6   MCHC Latest Ref Range: 31.5 - 36.5 g/dL 32.8 32.8 32.3   RDW Latest Ref Range: 10.0 - 15.0 % 15.8 (H) 16.1 (H) 15.8 (H)   Diff Method Unknown Automated Method Manual Differential PENDING   % Neutrophils Latest Units: % 87.6 86.2    % Lymphocytes Latest Units: % 9.0 1.8    % Monocytes Latest Units: % 2.8 4.6    % Eosinophils Latest Units: % 0.0 0.0    % Basophils Latest Units: % 0.0 0.0        Assessment and Plan:   Stage IV mantle cell lymphoma with TLS hospitalization-patient is recently been diagnosed with lymphoma. He was hospitalized for TLS after start of treatment with bendamustine and Rituxan on 3/29/18.   He appears to have recovered quite well with labs noted today to be improving and the excess fluids from the hospital appear to be resolving.  There is no need for further fluids today and patient is expressing that his appetite is improving though still not taking fluids well, limiting fluids due to urinary dribbling. Reviewed with him that he needs to continue to drink fluids to keep the residual tumor breakdown from building in his system. He continues to take the allopurinol as ordered.  Encouraged short bouts of exercise to build his strength-does not appear a fall risk today due to weakness.  He is still having some loose stools but not needing Imodium at this time though did discuss treatment if greater  than 3 loose stools each day.  *Patient is still not back to his normal baseline and would suggest he return in 3 days for blood work of BMP with possible fluids of 1 L if creatinine greater than 1.5 or with elevated LD or uric acid higher than it is today.  His next bendamustine/Rituxan cycle will start on 426 and he will have a provider visit for review of appropriateness of treatment prior to infusion.  This was a 25 min visit with > 50% in counseling and coordinating care including education and management of concerns.    Joy Bush CNP      Again, thank you for allowing me to participate in the care of your patient.        Sincerely,        Joy Bush, KATELYN, APRN CNP

## 2018-04-05 NOTE — MR AVS SNAPSHOT
After Visit Summary   4/5/2018    Francisco Javier Cortes    MRN: [id number]           Patient Information     Date Of Birth          1940        Visit Information        Provider Department      4/5/2018 8:45 AM NURSE ONLY CANCER CENTER [facility]        Today's Diagnoses     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)        Drug-induced neutropenia (H)        Nausea           Follow-ups after your visit        Your next 10 appointments already scheduled     Apr 05, 2018  9:30 AM CDT   Level 2 with BAY 2 INFUSION   [facility])    [id number] [place] [address]   [phone]            Apr 09, 2018  7:30 AM CDT   Return Visit with NURSE ONLY CANCER CENTER   [facility])    [id number] [place] [address]   [phone]            Apr 09, 2018  7:45 AM CDT   Return Visit with Erickson Adan MD   [facility])    [address] [place] [address]   [phone]            Apr 23, 2018 10:30 AM CDT   (Arrive by 10:15 AM)   Nurse Only with MG IMAGING NURSE   [facility])    [address] [place] [address]   [phone]            Apr 23, 2018 10:45 AM CDT   (Arrive by 10:30 AM)   NM INJECTION with MGNMINJ1   [facility])    [id number] [place] [address]   [phone]            Apr 23, 2018 11:30 AM CDT   (Arrive by 11:15 AM)   NM SCAN3 with MGNM1   [facility])    [address] [place] [address]   [phone]            Apr 23, 2018 12:00 PM CDT   (Arrive by 11:45 AM)   Ekg Stress Nm Lexiscan with MG STRESS RM, MG IMAGING NURSE, MG PC CARD, MG CV TECH   [facility]  [facility]) [address] [address] [place] [address] [phone]              Who to contact     If you have questions or need follow up information about today's clinic visit or your schedule please contact [facility] directly at [phone].  Normal or non-critical lab and imaging results will be communicated to you by MyChart, letter or phone within 4 business days after the clinic has received the results. If you do not hear from us within 7 days, please contact the clinic through [employer] or phone. If you have a critical or abnormal lab result, we will notify you by phone as soon as possible.  Submit refill requests through [email or web address] or call your pharmacy and they will forward the refill request to us. Please allow 3 business days for your refill to be completed.          Additional Information About Your Visit        MyChart Information     [email or web address] gives you secure access to your electronic health record. If you see a primary care provider, you can also send messages to your care team and make appointments. If you have questions, please call your primary care clinic.  If you do not have a primary care provider, please call [phone] and they will assist you.      [email or web address] is an electronic gateway that provides easy, online access to your medical records. With [email or web address], you can request a clinic appointment, read your test results, renew a prescription or communicate with your care team.     To access your existing account, please contact your [facility] or call [phone] for assistance.        Care EveryWhere ID     This is your Care EveryWhere ID. This could be used by other organizations to access your [facility] medical records  [id number]         Blood Pressure from Last 3 Encounters:   04/02/18 112/75   04/01/18 138/84   03/29/18 107/70    Weight from Last 3 Encounters:   04/02/18 82.8 kg (182 lb 8 oz)   04/01/18 83.8 kg (184 lb 12.8 oz)    03/29/18 78 kg (172 lb)              We Performed the Following     CBC with platelets differential     Comprehensive metabolic panel     Lactate Dehydrogenase     Phosphorus     Uric acid          Today's Medication Changes          These changes are accurate as of 4/5/18  9:09 AM.  If you have any questions, ask your nurse or doctor.               These medicines have changed or have updated prescriptions.        Dose/Directions    lisinopril 20 MG tablet   Commonly known as:  PRINIVIL/ZESTRIL   This may have changed:  when to take this   Used for:  Hypertension goal BP (blood pressure) < 130/80, Coronary artery disease involving native coronary artery of native heart without angina pectoris, Microalbuminuria        Dose:  20 mg   Take 1 tablet (20 mg) by mouth daily   Quantity:  90 tablet   Refills:  3       omega 3 1000 MG Caps   This may have changed:  additional instructions   Used for:  Coronary artery disease involving native coronary artery of native heart with angina pectoris (H)        Dose:  1 g   Take 1 g by mouth 2 times daily   Quantity:  120 capsule   Refills:  6       rosuvastatin 40 MG tablet   Commonly known as:  CRESTOR   This may have changed:  when to take this   Used for:  Hyperlipidemia LDL goal <100, Coronary artery disease involving native coronary artery of native heart without angina pectoris        Dose:  40 mg   Take 1 tablet (40 mg) by mouth daily   Quantity:  90 tablet   Refills:  3                Primary Care Provider Office Phone # Fax #    Florian Alonzo [phone]       [address]  [facility] [address]        Equal Access to Services     NANDA BARAJAS AH: Alondra choi Solina, waaxda luqadaha, qaybta kaalmada zhao wu. So [place] [phone].    ATENCIÓN: Si habla español, tiene a ivan disposición servicios gratuitos de asistencia lingüística. Llame al [phone].    We comply with applicable federal  civil rights laws and [place] laws. We do not discriminate on the basis of race, color, national origin, age, disability, sex, sexual orientation, or gender identity.            Thank you!     Thank you for choosing [facility]  for your care. Our goal is always to provide you with excellent care. Hearing back from our patients is one way we can continue to improve our services. Please take a few minutes to complete the written survey that you may receive in the mail after your visit with us. Thank you!             Your Updated Medication List - Protect others around you: Learn how to safely use, store and throw away your medicines at www.disposemymeds.org.          This list is accurate as of 4/5/18  9:09 AM.  Always use your most recent med list.                   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    Psoriasis       lisinopril 20 MG tablet    PRINIVIL/ZESTRIL    90 tablet    Take 1 tablet (20 mg) by mouth daily    Hypertension goal BP (blood pressure) < 130/80, Coronary artery disease involving native coronary artery of native heart without angina pectoris, Microalbuminuria       LORazepam 0.5 MG tablet    ATIVAN    30 tablet    Take 1 tablet (0.5 mg) by mouth every 4 hours as needed (Anxiety, Nausea/Vomiting or Sleep)    Mantle cell lymphoma of lymph  nodes of multiple sites (H)       MAALOX ADVANCED PO      Take 1 tablet as needed (Calcium carbonate 1000mg/Simethicone 80mg)    Chest pain       metoprolol succinate 100 MG 24 hr tablet    TOPROL-XL    90 tablet    Take 1 tablet (100 mg) by mouth daily    Hypertension goal BP (blood pressure) < 130/80, Coronary artery disease involving native coronary artery of native heart without angina pectoris       nitroGLYcerin 0.4 MG sublingual tablet    NITROSTAT    60 tablet    Place 1 tablet (0.4 mg) under the tongue every 5 minutes as needed up to 3 tablets per episode.    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)       PREVACID PO      Take 20 mg by mouth every evening Patient needs to use brand name Prevacid (generic version causes diarrhea)        * prochlorperazine 10 MG tablet    COMPAZINE    30 tablet    Take 1 tablet (10 mg) by mouth every 6 hours as needed for nausea or vomiting    Nausea       * prochlorperazine 10 MG tablet    COMPAZINE    30 tablet    Take 0.5 tablets (5 mg) by mouth every 6 hours as needed (Nausea/Vomiting)    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)       rosuvastatin 40 MG tablet    CRESTOR    90 tablet    Take 1 tablet (40 mg) by mouth daily    Hyperlipidemia LDL goal <100, Coronary artery disease involving native coronary artery of native heart without angina pectoris       senna-docusate 8.6-50 MG per tablet    SENOKOT-S;PERICOLACE    30 tablet    Take 1-2 tablets by mouth 2  times daily Take while on oral narcotics to prevent or treat constipation.    Postoperative pain       TYLENOL 8 HOUR PO      Take 500 mg by mouth as needed        * Notice:  This list has 2 medication(s) that are the same as other medications prescribed for you. Read the directions carefully, and ask your doctor or other care provider to review them with you.

## 2018-04-09 NOTE — MR AVS SNAPSHOT
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)    -  1       Follow-ups after your visit        Your next 10 appointments already scheduled     Apr 23, 2018 10:30 AM CDT   (Arrive by 10:15 AM)   Nurse Only with MG IMAGING NURSE   [facility])    [id number] [place] [id number]   [phone]            Apr 23, 2018 10:45 AM CDT   (Arrive by 10:30 AM)   NM INJECTION with MGNMINJ1   [facility])    [id number] [place] [id number]   [phone]            Apr 23, 2018 11:30 AM CDT   (Arrive by 11:15 AM)   NM SCAN3 with MGNM1   [facility])    [id number] [place] [id number]   [phone]            Apr 23, 2018 12:00 PM CDT   (Arrive by 11:45 AM)   Ekg Stress Nm Lexiscan with MG STRESS RM, MG IMAGING NURSE, MG PC CARD, MG CV TECH   [facility])    [id number] [place] [phone]   [phone]            Apr 23, 2018 12:30 PM CDT   (Arrive by 12:15 PM)   NM MPI WITH LEXISCAN with MGNM1   [facility])    [id number] [place] [phone]   [phone]           For a ONE day exam: Allow 3-4 hours for test. For a TWO day exam: Allow  minutes PER day for test.  On the day of your resting scan: Please stop eating 3 hours before the test. You may drink water or juice.  On the day of your stress test: Stop all caffeine 12 hours before the test. This includes coffee, tea, soda pop, chocolate and certain medicines  (such as Anacin, Excedrin and NoDoz). Also avoid decaf coffee and tea, as these contain small amounts of caffeine.  Stop eating 3 hours before the test. You may drink water.  You may need to stop some medicines before the test. Follow your doctor s orders. - If you take a beta blocker: Do not take your beta-blocker on the day before your test, unless specifically told to by your doctor. And do not take it on the day of your test. Bring it with you to take after the test. - If you take Aggrenox or dipyridamole (Persantine, Permole), stop taking it 48 hours before your test. - If you take Viagra, Cialis or Levitra, stop taking it 48 hours before your test. - If you take theophylline or aminophylline, stop taking it 12 hours before your test.  Do not take nitrates on the day of your test. Do not wear your Nitro-Patch.  Please wear a loose two-piece outfit. If you will have an exercise test, bring rubber-soled walking shoes.  When you arrive, please tell us if you have a pacemaker or ICD (implantable defibrillator).  Please call your Imaging Department at your exam site with any questions.            Apr 23, 2018  1:30 PM CDT   Ech Complete with MGECHSHY, MG ECHO TECH   [facility]    [id number] [place] [address] [phone]           1. Please bring or wear a comfortable two-piece outfit. 2. You may eat, drink and take your normal medicines. 3. For any questions that cannot be answered, please contact the ordering physician              Who to contact     If you have questions or need follow up information about today's clinic visit or your schedule please contact [facility] directly at [phone].  Normal or non-critical lab and imaging results will be communicated to you by MyChart, letter or phone within 4 business days after the clinic has received the results. If you do not hear from us within 7 days, please contact the clinic  through [employer] or phone. If you have a critical or abnormal lab result, we will notify you by phone as soon as possible.  Submit refill requests through [employer] or call your pharmacy and they will forward the refill request to us. Please allow 3 business days for your refill to be completed.          Additional Information About Your Visit        [employer] Information     [employer] gives you secure access to your electronic health record. If you see a primary care provider, you can also send messages to your care team and make appointments. If you have questions, please call your primary care clinic.  If you do not have a primary care provider, please call [phone] and they will assist you.      [employer] is an electronic gateway that provides easy, online access to your medical records. With [employer], you can request a clinic appointment, read your test results, renew a prescription or communicate with your care team.     To access your existing account, please contact your [facility] or call [phone] for assistance.        Care EveryWhere ID     This is your Care EveryWhere ID. This could be used by other organizations to access your [facility] medical records  [id number]        Your Vitals Were     Pulse Temperature Respirations Pulse Oximetry BMI (Body Mass Index)       95 97.7  F (36.5  C) (Oral) 16 98% 27.54 kg/m2        Blood Pressure from Last 3 Encounters:   No data found for BP    Weight from Last 3 Encounters:   No data found for Wt              Today, you had the following     No orders found for display         Today's Medication Changes          These changes are accurate as of 4/9/18 11:59 PM.  If you have any questions, ask your nurse or doctor.               These medicines have changed or have updated prescriptions.        Dose/Directions    lisinopril 20 MG tablet   Commonly known as:  PRINIVIL/ZESTRIL   This may have changed:  when to take this   Used for:   Hypertension goal BP (blood pressure) < 130/80, Coronary artery disease involving native coronary artery of native heart without angina pectoris, Microalbuminuria        Dose:  20 mg   Take 1 tablet (20 mg) by mouth daily   Quantity:  90 tablet   Refills:  3       omega 3 1000 MG Caps   This may have changed:  additional instructions   Used for:  Coronary artery disease involving native coronary artery of native heart with angina pectoris (H)        Dose:  1 g   Take 1 g by mouth 2 times daily   Quantity:  120 capsule   Refills:  6       rosuvastatin 40 MG tablet   Commonly known as:  CRESTOR   This may have changed:  when to take this   Used for:  Hyperlipidemia LDL goal <100, Coronary artery disease involving native coronary artery of native heart without angina pectoris        Dose:  40 mg   Take 1 tablet (40 mg) by mouth daily   Quantity:  90 tablet   Refills:  3                Primary Care Provider Office Phone # Fax #    Florian Alonzo [phone] [address]  [facility] [address]        Equal Access to Services     NANDA BARAJAS : Hadii rivera collazo hadasho Soomaali, waaxda luqadaha, qaybta kaalmada adeegyada, zhao bazan . So [facility] [phone].    ATENCIÓN: Si habla español, tiene a ivan disposición servicios gratuitos de asistencia lingüística. Llame al [phone].    We comply with applicable federal civil rights laws and [place] laws. We do not discriminate on the basis of race, color, national origin, age, disability, sex, sexual orientation, or gender identity.            Thank you!     Thank you for choosing [facility]  for your care. Our goal is always to provide you with excellent care. Hearing back from our patients is one way we can continue to improve our services. Please take a few minutes to complete the written survey that you may receive in the mail after your visit with us. Thank you!             Your Updated Medication  List - Protect others around you: Learn how to safely use, store and throw away your medicines at www.disposemymeds.org.          This list is accurate as of 4/9/18 11:59 PM.  Always use your most recent med list.                   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)       PREVACID PO      Take 20 mg by mouth every evening Patient needs to use brand name Prevacid (generic version causes diarrhea)        * prochlorperazine 10 MG tablet    COMPAZINE    30 tablet    Take 1 tablet (10 mg) by mouth every 6 hours as needed for nausea or vomiting    Nausea       * prochlorperazine 10 MG tablet    COMPAZINE    30 tablet    Take 0.5 tablets (5 mg) by mouth every 6 hours as needed (Nausea/Vomiting)    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    Postoperative pain       TYLENOL 8 HOUR PO      Take 500 mg by mouth as needed        * Notice:  This list has 2 medication(s) that are the same as other medications prescribed for you. Read the directions carefully, and ask your doctor or other care provider to review them with you.

## 2018-04-09 NOTE — PROGRESS NOTES
FOLLOW-UP VISIT NOTE    PATIENT NAME: Francisco Javier Cortes MRN # [id number]  DATE OF VISIT: Apr 9, 2018 YOB: 1940    REFERRING PROVIDER: No referring provider defined for this encounter.    CANCER TYPE: Mantle cell lymphoma  STAGE: IVB  ECOG PS: 1    ONCOLOGY HISTORY:  77-year-old male with past medical history significant for prostate cancer status post prostatectomy, coronary disease status post stent placement, to complete develop symptoms of abdominal bloating/discomfort and loss of appetite for 2 months. Underwent imaging workup by primary care provider with CT abdominal and pelvis on 02/25/18 showing extensive intra- abdominal and inguinal lymphadenopathy with splenomegaly.    - 03/05/18 02 Seen by medical oncology. I ordered CT neck and CT chest which showed bulky mediastinal, hilar ,axillary and supraclavicular lymphadenopathy. Patient was referred to ENT for diagnostic biopsy. He underwent an FNA of the lymph node on 03/14 which came back with findings consistent with mantle cell lymphoma. 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 Proliferation index was estimated to be 50%. The marrow biopsy performed showed bone marrow involvement by mantle cell lymphoma 40-50%. Fish came back positive for translocation 11:14 consistent with mantle cell lymphoma. PET CT scan extensive hypermetabolic adenopathy of cervical, axillary, mediastinal, hilar, intra-abdominal, retroperitoneal inguinal but the largest myeloma rate of reason triglyceride was measuring 15.5 x 6.9 cm.    - 03/29/18  Seen by Dr. Lebron in my absence. Workup and findings were discussed and given progressive symptoms of anorexia and weight loss, he was started on bendamustine in combination with Rituxan along with allopurinol for tumor lysis syndrome prophylaxis. Lab work later that day revealed rising LDH with thrombocytopenia and he  was admitted to [facility] [place] for monitoring for TLS. Labs improvement and was discharged home.        SUBJECTIVE         PAST MEDICAL HISTORY     Past Medical History:   Diagnosis Date     CAD (coronary artery disease) 1/09    s/p stentsx2     Coronary artery disease involving native coronary artery of native heart without angina pectoris 12/22/2015     Dyslipidemia      Erectile dysfunction      GERD (gastroesophageal reflux disease)      Hearing problem One ear 1961     Hypertension      NSTEMI (non-ST elevated myocardial infarction) (H) 3/20/2014     Pneumonia      Prostate cancer (H)     prostatecomy 9 years ago, PSAs remain good     Status post percutaneous transluminal coronary angioplasty-Coronary angioplasty with STEPHEN to LCx 4/4/2014     Stented coronary artery     stents placed         CURRENT OUTPATIENT MEDICATIONS     Current Outpatient Prescriptions   Medication Sig Dispense Refill     allopurinol (ZYLOPRIM) 300 MG tablet Take 1 tablet (300 mg) by mouth daily 30 tablet 1     prochlorperazine (COMPAZINE) 10 MG tablet Take 0.5 tablets (5 mg) by mouth every 6 hours as needed (Nausea/Vomiting) 30 tablet 3     prochlorperazine (COMPAZINE) 10 MG tablet Take 1 tablet (10 mg) by mouth every 6 hours as needed for nausea or vomiting 30 tablet 1     senna-docusate (SENOKOT-S;PERICOLACE) 8.6-50 MG per tablet Take 1-2 tablets by mouth 2 times daily Take while on oral narcotics to prevent or treat constipation. 30 tablet 0     lisinopril (PRINIVIL/ZESTRIL) 20 MG tablet Take 1 tablet (20 mg) by mouth daily (Patient taking differently: Take 20 mg by mouth every morning ) 90 tablet 3     metoprolol succinate (TOPROL-XL) 100 MG 24 hr tablet Take 1 tablet (100 mg) by mouth daily 90 tablet 3     rosuvastatin (CRESTOR) 40 MG tablet Take 1 tablet (40 mg) by mouth daily (Patient taking differently: Take 40 mg by mouth every morning ) 90 tablet 3     clindamycin (CLEOCIN T) 1 % lotion Apply twice daily for 6 weeks to the  groin 60 mL 1     hydrocortisone valerate (WEST-NINOSKA) 0.2 % ointment Apply sparingly to affected area three times daily as needed. 45 g 3     Lansoprazole (PREVACID PO) Take 20 mg by mouth every evening Patient needs to use brand name Prevacid (generic version causes diarrhea)        Acetaminophen (TYLENOL 8 HOUR PO) Take 500 mg by mouth as needed        MAALOX ADVANCED OR Take 1 tablet as needed (Calcium carbonate 1000mg/Simethicone 80mg)       ASPIRIN 81 MG OR TABS 1 TABLET EVERY MORNING       LORazepam (ATIVAN) 0.5 MG tablet Take 1 tablet (0.5 mg) by mouth every 4 hours as needed (Anxiety, Nausea/Vomiting or Sleep) (Patient not taking: Reported on 4/9/2018) 30 tablet 3     ondansetron (ZOFRAN) 8 MG tablet Take 1 tablet (8 mg) by mouth every 8 hours as needed (Nausea/Vomiting) (Patient not taking: Reported on 4/9/2018) 10 tablet 3     HYDROcodone-acetaminophen (NORCO) 5-325 MG per tablet Take 1 tablet by mouth every 6 hours as needed for severe pain maximum 6 tablet(s) per day (Patient not taking: Reported on 4/9/2018) 15 tablet 0     nitroGLYcerin (NITROSTAT) 0.4 MG sublingual tablet Place 1 tablet (0.4 mg) under the tongue every 5 minutes as needed up to 3 tablets per episode. (Patient not taking: Reported on 4/9/2018) 60 tablet 6     omega 3 1000 MG CAPS Take 1 g by mouth 2 times daily (Patient taking differently: Take 1 g by mouth 2 times daily currently taking 1 tablet daily 3/5/18) 120 capsule 6        ALLERGIES     Allergies   Allergen Reactions     Niacin      Severe rash and itching     Prevacid [Lansoprazole] Diarrhea     Patient notes diarrhea with 30mg generic version. Patient does ok with 15mg in generic and brand name.     Shellfish Allergy      One kind        REVIEW OF SYSTEMS   As above in the HPI, o/w complete 12-point ROS was negative.     PHYSICAL EXAM   B/P: 108/72, T: 97.7, P: 95, R: 16  SpO2 Readings from Last 4 Encounters:   04/09/18 98%   04/02/18 95%   04/01/18 97%   03/29/18 95%     Wt  Readings from Last 3 Encounters:   04/09/18 75.1 kg (165 lb 8 oz)   04/02/18 82.8 kg (182 lb 8 oz)   04/01/18 83.8 kg (184 lb 12.8 oz)     GEN: NAD  EYES:PERRLA  Mouth/ENT: Oropharynx is clear.  NECK: cervical lymphadenopathy improved  LUNGS: clear bilaterally  CV: regular, no murmurs, rubs, or gallops  ABDOMEN: soft, non-tender, non-distended, normal bowel sounds  EXT: warm, well perfused, no edema  NEURO: alert  SKIN: no rashes     LABORATORY AND IMAGING STUDIES     Recent Labs   Lab Test  04/09/18   0740  04/05/18   0840   03/29/18 2005 03/20/14   1143   NA  141  141   < >  139   < >  140   POTASSIUM  4.0  4.2   < >  4.4   < >  4.6   CHLORIDE  107  108   < >  108   < >  105   CO2  25  25   < >  20   < >  23   ANIONGAP  9  8   < >  12   < >  12   BUN  15  16   < >  32*   < >  15   CR  1.04  1.13   < >  1.28*   < >  0.96   GLC  147*  123*   < >  167*   < >  104*   EVANGELINA  7.8*  8.0*   < >  7.3*   < >  9.4   MAG   --    --    --   1.9   --   2.3   PHOS  2.2*  3.0   < >  2.9   < >   --     < > = values in this interval not displayed.     Recent Labs   Lab Test  04/09/18   0740  04/05/18   0840  04/02/18   1151   WBC  5.8  7.9  12.1*   HGB  10.9*  11.3*  13.1*   PLT  94*  93*  86*   MCV  93  93  92   NEUTROPHIL  74.6  81.3  83.0     Recent Labs   Lab Test  04/09/18   0740  04/05/18   0840  04/02/18   1151   BILITOTAL  0.5  0.5  0.6   ALKPHOS  95  81  94   ALT  30  27  36   AST  64*  47*  69*   ALBUMIN  2.8*  2.6*  3.0*   LDH  476*  442*  638*     TSH   Date Value Ref Range Status   08/14/2017 2.06 0.40 - 4.00 mU/L Final       PATHOLOGY 03/22/18     FINAL DIAGNOSIS:   Lymph node, left level 5, excision:   - Mantle cell lymphoma, blastoid variant   - See comment    INTERPRETATION:   Lymph Node, Left level 5:        CD5-positive kappa monotypic B cells (94%), see comment        No aberrant immunophenotype on T cells     COMMENT:   The monotypic B-cell population present in this specimen has a similar   immunophenotype  as reported previously in the supraclavicular mass from this patient ([id number] and [id number]) and is compatible with involvement by the previously diagnosed mantle cell lymphoma. Large cells may not survive specimen processing. Morphologic correlation is required ([id number]).      FNA 03/14/18    CYTOLOGIC INTERPRETATION:     Left neck, supraclavicular mass, fine needle aspirate: Positive for   malignant cells; involved by mantle cell   lymphoma (see comment)      BM biopsy 03/21/18    TEST(S):   Unilateral Bone Marrow Biopsy/Aspiration     FINAL DIAGNOSIS:      - Involvement by mantle cell lymphoma (40-50%) see comment     - Hypercellular marrow for age with a cellularity of 60-70% with   trilineage hematopoiesis, atypical lymphoid aggregates comprising approximately 40 to 50% of marrow cellular composition     - Peripheral blood showing monocytosis, a small subset of atypical   lymphocytes and moderate thrombocytopenia with normal platelet morphology     FISH  RESULTS:  ABNORMAL               - IGH-CCND1 fusion (40.5%)     INTERPRETATION:   Of the interphase cells examined, 40.5% had an IGH-CCND1 gene fusion as   would result from a t(11;14). These findings are consistent with the reported pathologic diagnosis of bone marrow involvement by mantle cell   lymphoma, characterized in the lymph node specimen (see Previous Studies   below) by IGH-CCND1 gene fusion.    Results for orders placed or performed during the hospital encounter of 03/24/18   PET Oncology Whole Body    Narrative    Combined Report of:    PET and CT on  3/26/2018 7:56 AM :    1. PET of the neck, chest, abdomen, and pelvis.  2. PET CT Fusion for Attenuation Correction and Anatomical  Localization:    3. 3D MIP and PET-CT fused images were processed on an independent  workstation and archived to PACS and reviewed by a radiologist.    Technique:    1. PET: The patient received 14.65 mCi of F-18-FDG; the serum glucose  was 86 prior to administration,  body weight was 73.5 kg. Images were  evaluated in the axial, sagittal, and coronal planes as well as the  rotational whole body MIP. Images were acquired from the Vertex to the  Feet.    UPTAKE WAS MEASURED AT 66 MINUTES.     BACKGROUND:  Liver SUV max= 2.7,   Aorta Blood SUV Max: 2.     2. CT: CT only obtained for attenuation correction and not diagnostic  purposes.    INDICATION: Lymphoma, mantle cell, multiple sites (H)    ADDITIONAL INFORMATION OBTAINED FROM EMR: 77-year-old male with  history of prostate cancer, status post prostatectomy, now with new  diagnosis of mantle cell lymphoma.    COMPARISON: CTs dated 3/5/2018, 6/29/2015, 1/5/2015    FINDINGS:     HEAD/NECK:  Extensive hypermetabolic adenopathy at each level of the cervical  chain. The largest measures 3.3 x 3.8 cm, left supraclavicular level,  (series 3 image 94), max SUV 10.4.    CHEST:  Extensive axillary, mediastinal and hilar adenopathy. For example:  - 7.8 x 3.2 cm carinal lymph node (series 3 image 142), max SUV 9.2.  - 2.3 x 3.7 cm left axillary lymph node (series 3 image 112), max SUV  8.7.    1.4 cm nodule in the right middle lobe (series 3 image 142), max SUV  3.8. Additional smaller nodules are better characterized on dedicated  CT of the chest dated 3/5/2018.    The heart is not enlarged. There is no significant pericardial  effusion. Dense coronary calcifications. Small bilateral pleural  effusions.    ABDOMEN AND PELVIS:  The spleen is enlarged measuring up to 18 cm with increased FDG  avidity, max SUV 7.     Extensive intra-abdominal, retroperitoneal and inguinal adenopathy.  For example:  - Conglomerate of central mesenteric lymph nodes measures  approximately 15.5 x 6.9 cm (series 3 image 205), max SUV 7.  - Conglomerate of retroperitoneal lymph nodes measures approximately  7.5 x 4.7 cm (series 3 image 209), max SUV 8.4.  - 1.8 x 2.6 cm left inguinal lymph node (series 3 image 299), max SUV  7.6.  - Additional adenopathy is seen  along the iliac chains in the  presacral fat.    Small ascites. The bowel is nondilated. No pneumatosis or portal  venous gas. No pneumoperitoneum.    LOWER EXTREMITIES:   No abnormal masses or hypermetabolic lesions.    BONES:   There are no suspicious lytic or blastic osseous lesions.  There is no  abnormal FDG uptake in the skeleton.      Impression    IMPRESSION:   1. In this patient with new diagnosis of mantle cell lymphoma without  a diagnostic CT:  a. Extensive lymphadenopathy and disease involvement in the neck,  chest, abdomen and pelvis as above.  b. 1.4 cm pulmonary nodule in the right middle lobe, max SUV 3.8.  c. Splenomegaly with diffuse FDG avidity, concerning for diffuse  splenic involvement of lymphoma.  2. Small bilateral pleural effusions.  3. Small ascites.    I have personally reviewed the examination and initial interpretation  and I agree with the findings.    SHARATH VALLADARES MD         ASSESSMENT AND PLAN     77-year-old male with past medical history significant for prostate cancer status post prostatectomy, coronary disease status post stent placement, to complete develop symptoms of abdominal bloating/discomfort and loss of appetite for 2 months. Underwent imaging workup was noted to have diffuse extensive hypermetabolic lymphadenopathy with biopsy of the lymph node showing findings consistent with mantle cell lymphoma. Bone marrow biopsy showed 40-50 % involvement by lymphoma with FISH revealing t(11:14).    - Mantle cell lymphoma  Stage IV B  Started on Bendamustine 90 mg/m2 day 1 and 2 in combination with Rituxan 375 mg/m2 q.4 weeks- Cycle 1 on 03/29/18. Was admitted to the hospital subsequently for observation for tumor lysis syndrome and subsequently discharged home after improvement in blood work.  He has noticed improvement in cervical lymphadenopathy.  Plan is to proceed with 6 total cycles followed by repeat PET scan and have response achieved, consider proceeding with  maintenance Rituxan.  RTC in 3 weeks for cycle 2 day 1    - TLS   Labs continue to look improved  Continue with allopurinol. Encouraged oral hydration    - Weight loss  Recommended nutritional consultation. Patient would like to hold off on it for now    - Anemia/Thrombocytopenia  Indurated to recent chemotherapy versus marrow involvement by  lymphoma   Continue to monitor    - History of coronary artery disease   Following with cardiology and is scheduled for a stress test later this month.    Chart documentation with Dragon Voice recognition Software. Although reviewed after completion, some words and grammatical errors may remain.  Erickson Adan MD  Attending Physician   Hematology/Medical Oncology

## 2018-04-09 NOTE — Clinical Note
"    4/9/2018         RE: Francisco Javier Cortes  [address] [facility] [id number]        Dear Colleague,    Thank you for referring your patient, Francisco Javier Cortes, to the [facility]. Please see a copy of my visit note below.    Oncology Rooming Note    April 9, 2018 7:51 AM   Francisco Javier Cortes is a 77 year old male who presents for:    No chief complaint on file.    Initial Vitals: There were no vitals taken for this visit. Estimated body mass index is 30.37 kg/(m^2) as calculated from the following:    Height as of 4/2/18: 1.651 m (5' 5\").    Weight as of 4/2/18: 82.8 kg (182 lb 8 oz). There is no height or weight on file to calculate BSA.  Data Unavailable Comment: Data Unavailable   No LMP for male patient.  Allergies reviewed: Yes  Medications reviewed: Yes    Medications: Medication refills not needed today.  Pharmacy name entered into [facility]:    [facility] [place] - [place], [place] - [address] [address] [place] [address], [id number]  [facility] - [address]/[id number] - [place], [place] - [address] [facility] [place] [facility]    Clinical concerns: Check-in    Nieves Estrada RN                    FOLLOW-UP VISIT NOTE    PATIENT NAME: Francisco Javier Cortes MRN # [id number]  DATE OF VISIT: Apr 9, 2018 YOB: 1940    REFERRING PROVIDER: No referring provider defined for this encounter.    CANCER TYPE: Mantle cell lymphoma  STAGE: IVB  ECOG PS: 1    ONCOLOGY HISTORY:  77-year-old male with past medical history significant for prostate cancer status post prostatectomy, coronary disease status post stent placement, to complete develop symptoms of abdominal bloating/discomfort and loss of appetite for 2 months. Underwent imaging workup by primary care provider with CT abdominal and pelvis on 02/25/18 showing extensive intra- abdominal and inguinal lymphadenopathy with splenomegaly.    - 03/05/18 02 Seen by medical oncology. I ordered CT " neck and CT chest which showed bulky mediastinal, hilar ,axillary and supraclavicular lymphadenopathy. Patient was referred to ENT for diagnostic biopsy. He underwent an FNA of the lymph node on 03/14 which came back with findings consistent with mantle cell lymphoma. 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 Proliferation index was estimated to be 50%. The marrow biopsy performed showed bone marrow involvement by mantle cell lymphoma 40-50%. Fish came back positive for translocation 11:14 consistent with mantle cell lymphoma. PET CT scan extensive hypermetabolic adenopathy of cervical, axillary, mediastinal, hilar, intra-abdominal, retroperitoneal inguinal but the largest myeloma rate of reason triglyceride was measuring 15.5 x 6.9 cm.    - 03/29/18  Seen by Dr. Lebron in my absence. Workup and findings were discussed and given progressive symptoms of anorexia and weight loss, he was started on bendamustine in combination with Rituxan along with allopurinol for tumor lysis syndrome prophylaxis. Lab work later that day revealed rising LDH with thrombocytopenia and he was admitted to [facility] [place] for monitoring for TLS. Labs improvement and was discharged home.        SUBJECTIVE         PAST MEDICAL HISTORY     Past Medical History:   Diagnosis Date     CAD (coronary artery disease) 1/09    s/p stentsx2     Coronary artery disease involving native coronary artery of native heart without angina pectoris 12/22/2015     Dyslipidemia      Erectile dysfunction      GERD (gastroesophageal reflux disease)      Hearing problem One ear 1961     Hypertension      NSTEMI (non-ST elevated myocardial infarction) (H) 3/20/2014     Pneumonia      Prostate cancer (H)     prostatecomy 9 years ago, PSAs remain good     Status post percutaneous transluminal coronary angioplasty-Coronary angioplasty with STEPHEN to LCx  4/4/2014     Stented coronary artery     stents placed         CURRENT OUTPATIENT MEDICATIONS     Current Outpatient Prescriptions   Medication Sig Dispense Refill     allopurinol (ZYLOPRIM) 300 MG tablet Take 1 tablet (300 mg) by mouth daily 30 tablet 1     prochlorperazine (COMPAZINE) 10 MG tablet Take 0.5 tablets (5 mg) by mouth every 6 hours as needed (Nausea/Vomiting) 30 tablet 3     prochlorperazine (COMPAZINE) 10 MG tablet Take 1 tablet (10 mg) by mouth every 6 hours as needed for nausea or vomiting 30 tablet 1     senna-docusate (SENOKOT-S;PERICOLACE) 8.6-50 MG per tablet Take 1-2 tablets by mouth 2 times daily Take while on oral narcotics to prevent or treat constipation. 30 tablet 0     lisinopril (PRINIVIL/ZESTRIL) 20 MG tablet Take 1 tablet (20 mg) by mouth daily (Patient taking differently: Take 20 mg by mouth every morning ) 90 tablet 3     metoprolol succinate (TOPROL-XL) 100 MG 24 hr tablet Take 1 tablet (100 mg) by mouth daily 90 tablet 3     rosuvastatin (CRESTOR) 40 MG tablet Take 1 tablet (40 mg) by mouth daily (Patient taking differently: Take 40 mg by mouth every morning ) 90 tablet 3     clindamycin (CLEOCIN T) 1 % lotion Apply twice daily for 6 weeks to the groin 60 mL 1     hydrocortisone valerate (WEST-NINOSKA) 0.2 % ointment Apply sparingly to affected area three times daily as needed. 45 g 3     Lansoprazole (PREVACID PO) Take 20 mg by mouth every evening Patient needs to use brand name Prevacid (generic version causes diarrhea)        Acetaminophen (TYLENOL 8 HOUR PO) Take 500 mg by mouth as needed        MAALOX ADVANCED OR Take 1 tablet as needed (Calcium carbonate 1000mg/Simethicone 80mg)       ASPIRIN 81 MG OR TABS 1 TABLET EVERY MORNING       LORazepam (ATIVAN) 0.5 MG tablet Take 1 tablet (0.5 mg) by mouth every 4 hours as needed (Anxiety, Nausea/Vomiting or Sleep) (Patient not taking: Reported on 4/9/2018) 30 tablet 3     ondansetron (ZOFRAN) 8 MG tablet Take 1 tablet (8 mg) by mouth  every 8 hours as needed (Nausea/Vomiting) (Patient not taking: Reported on 4/9/2018) 10 tablet 3     HYDROcodone-acetaminophen (NORCO) 5-325 MG per tablet Take 1 tablet by mouth every 6 hours as needed for severe pain maximum 6 tablet(s) per day (Patient not taking: Reported on 4/9/2018) 15 tablet 0     nitroGLYcerin (NITROSTAT) 0.4 MG sublingual tablet Place 1 tablet (0.4 mg) under the tongue every 5 minutes as needed up to 3 tablets per episode. (Patient not taking: Reported on 4/9/2018) 60 tablet 6     omega 3 1000 MG CAPS Take 1 g by mouth 2 times daily (Patient taking differently: Take 1 g by mouth 2 times daily currently taking 1 tablet daily 3/5/18) 120 capsule 6        ALLERGIES     Allergies   Allergen Reactions     Niacin      Severe rash and itching     Prevacid [Lansoprazole] Diarrhea     Patient notes diarrhea with 30mg generic version. Patient does ok with 15mg in generic and brand name.     Shellfish Allergy      One kind        REVIEW OF SYSTEMS   As above in the HPI, o/w complete 12-point ROS was negative.     PHYSICAL EXAM   B/P: 108/72, T: 97.7, P: 95, R: 16  SpO2 Readings from Last 4 Encounters:   04/09/18 98%   04/02/18 95%   04/01/18 97%   03/29/18 95%     Wt Readings from Last 3 Encounters:   04/09/18 75.1 kg (165 lb 8 oz)   04/02/18 82.8 kg (182 lb 8 oz)   04/01/18 83.8 kg (184 lb 12.8 oz)     GEN: NAD  EYES:PERRLA  Mouth/ENT: Oropharynx is clear.  NECK: cervical lymphadenopathy improved  LUNGS: clear bilaterally  CV: regular, no murmurs, rubs, or gallops  ABDOMEN: soft, non-tender, non-distended, normal bowel sounds  EXT: warm, well perfused, no edema  NEURO: alert  SKIN: no rashes     LABORATORY AND IMAGING STUDIES     Recent Labs   Lab Test  04/09/18   0740  04/05/18   0840   03/29/18 2005 03/20/14   1143   NA  141  141   < >  139   < >  140   POTASSIUM  4.0  4.2   < >  4.4   < >  4.6   CHLORIDE  107  108   < >  108   < >  105   CO2  25  25   < >  20   < >  23   ANIONGAP  9  8   < >   12   < >  12   BUN  15  16   < >  32*   < >  15   CR  1.04  1.13   < >  1.28*   < >  0.96   GLC  147*  123*   < >  167*   < >  104*   EVANGELINA  7.8*  8.0*   < >  7.3*   < >  9.4   MAG   --    --    --   1.9   --   2.3   PHOS  2.2*  3.0   < >  2.9   < >   --     < > = values in this interval not displayed.     Recent Labs   Lab Test  04/09/18   0740  04/05/18   0840  04/02/18   1151   WBC  5.8  7.9  12.1*   HGB  10.9*  11.3*  13.1*   PLT  94*  93*  86*   MCV  93  93  92   NEUTROPHIL  74.6  81.3  83.0     Recent Labs   Lab Test  04/09/18   0740  04/05/18   0840  04/02/18   1151   BILITOTAL  0.5  0.5  0.6   ALKPHOS  95  81  94   ALT  30  27  36   AST  64*  47*  69*   ALBUMIN  2.8*  2.6*  3.0*   LDH  476*  442*  638*     TSH   Date Value Ref Range Status   08/14/2017 2.06 0.40 - 4.00 mU/L Final       PATHOLOGY 03/22/18     FINAL DIAGNOSIS:   Lymph node, left level 5, excision:   - Mantle cell lymphoma, blastoid variant   - See comment    INTERPRETATION:   Lymph Node, Left level 5:        CD5-positive kappa monotypic B cells (94%), see comment        No aberrant immunophenotype on T cells     COMMENT:   The monotypic B-cell population present in this specimen has a similar   immunophenotype as reported previously in the supraclavicular mass from this patient ([id number] and [id number]) and is compatible with involvement by the previously diagnosed mantle cell lymphoma. Large cells may not survive specimen processing. Morphologic correlation is required ([id number]).      FNA 03/14/18    CYTOLOGIC INTERPRETATION:     Left neck, supraclavicular mass, fine needle aspirate: Positive for   malignant cells; involved by mantle cell   lymphoma (see comment)      BM biopsy 03/21/18    TEST(S):   Unilateral Bone Marrow Biopsy/Aspiration     FINAL DIAGNOSIS:      - Involvement by mantle cell lymphoma (40-50%) see comment     - Hypercellular marrow for age with a cellularity of 60-70% with   trilineage hematopoiesis, atypical lymphoid  aggregates comprising approximately 40 to 50% of marrow cellular composition     - Peripheral blood showing monocytosis, a small subset of atypical   lymphocytes and moderate thrombocytopenia with normal platelet morphology     FISH  RESULTS:  ABNORMAL               - IGH-CCND1 fusion (40.5%)     INTERPRETATION:   Of the interphase cells examined, 40.5% had an IGH-CCND1 gene fusion as   would result from a t(11;14). These findings are consistent with the reported pathologic diagnosis of bone marrow involvement by mantle cell   lymphoma, characterized in the lymph node specimen (see Previous Studies   below) by IGH-CCND1 gene fusion.    Results for orders placed or performed during the hospital encounter of 03/24/18   PET Oncology Whole Body    Narrative    Combined Report of:    PET and CT on  3/26/2018 7:56 AM :    1. PET of the neck, chest, abdomen, and pelvis.  2. PET CT Fusion for Attenuation Correction and Anatomical  Localization:    3. 3D MIP and PET-CT fused images were processed on an independent  workstation and archived to PACS and reviewed by a radiologist.    Technique:    1. PET: The patient received 14.65 mCi of F-18-FDG; the serum glucose  was 86 prior to administration, body weight was 73.5 kg. Images were  evaluated in the axial, sagittal, and coronal planes as well as the  rotational whole body MIP. Images were acquired from the Vertex to the  Feet.    UPTAKE WAS MEASURED AT 66 MINUTES.     BACKGROUND:  Liver SUV max= 2.7,   Aorta Blood SUV Max: 2.     2. CT: CT only obtained for attenuation correction and not diagnostic  purposes.    INDICATION: Lymphoma, mantle cell, multiple sites (H)    ADDITIONAL INFORMATION OBTAINED FROM EMR: 77-year-old male with  history of prostate cancer, status post prostatectomy, now with new  diagnosis of mantle cell lymphoma.    COMPARISON: CTs dated 3/5/2018, 6/29/2015, 1/5/2015    FINDINGS:     HEAD/NECK:  Extensive hypermetabolic adenopathy at each level of the  cervical  chain. The largest measures 3.3 x 3.8 cm, left supraclavicular level,  (series 3 image 94), max SUV 10.4.    CHEST:  Extensive axillary, mediastinal and hilar adenopathy. For example:  - 7.8 x 3.2 cm carinal lymph node (series 3 image 142), max SUV 9.2.  - 2.3 x 3.7 cm left axillary lymph node (series 3 image 112), max SUV  8.7.    1.4 cm nodule in the right middle lobe (series 3 image 142), max SUV  3.8. Additional smaller nodules are better characterized on dedicated  CT of the chest dated 3/5/2018.    The heart is not enlarged. There is no significant pericardial  effusion. Dense coronary calcifications. Small bilateral pleural  effusions.    ABDOMEN AND PELVIS:  The spleen is enlarged measuring up to 18 cm with increased FDG  avidity, max SUV 7.     Extensive intra-abdominal, retroperitoneal and inguinal adenopathy.  For example:  - Conglomerate of central mesenteric lymph nodes measures  approximately 15.5 x 6.9 cm (series 3 image 205), max SUV 7.  - Conglomerate of retroperitoneal lymph nodes measures approximately  7.5 x 4.7 cm (series 3 image 209), max SUV 8.4.  - 1.8 x 2.6 cm left inguinal lymph node (series 3 image 299), max SUV  7.6.  - Additional adenopathy is seen along the iliac chains in the  presacral fat.    Small ascites. The bowel is nondilated. No pneumatosis or portal  venous gas. No pneumoperitoneum.    LOWER EXTREMITIES:   No abnormal masses or hypermetabolic lesions.    BONES:   There are no suspicious lytic or blastic osseous lesions.  There is no  abnormal FDG uptake in the skeleton.      Impression    IMPRESSION:   1. In this patient with new diagnosis of mantle cell lymphoma without  a diagnostic CT:  a. Extensive lymphadenopathy and disease involvement in the neck,  chest, abdomen and pelvis as above.  b. 1.4 cm pulmonary nodule in the right middle lobe, max SUV 3.8.  c. Splenomegaly with diffuse FDG avidity, concerning for diffuse  splenic involvement of lymphoma.  2. Small  bilateral pleural effusions.  3. Small ascites.    I have personally reviewed the examination and initial interpretation  and I agree with the findings.    SHARATH VALLADARES MD         ASSESSMENT AND PLAN     77-year-old male with past medical history significant for prostate cancer status post prostatectomy, coronary disease status post stent placement, to complete develop symptoms of abdominal bloating/discomfort and loss of appetite for 2 months. Underwent imaging workup was noted to have diffuse extensive hypermetabolic lymphadenopathy with biopsy of the lymph node showing findings consistent with mantle cell lymphoma. Bone marrow biopsy showed 40-50 % involvement by lymphoma with FISH revealing t(11:14).    - Mantle cell lymphoma  Stage IV B  Started on Bendamustine 90 mg/m2 day 1 and 2 in combination with Rituxan 375 mg/m2 q.4 weeks- Cycle 1 on 03/29/18. Was admitted to the hospital subsequently for observation for tumor lysis syndrome and subsequently discharged home after improvement in blood work.  He has noticed improvement in cervical lymphadenopathy.  Plan is to proceed with 6 total cycles followed by repeat PET scan and have response achieved, consider proceeding with maintenance Rituxan.  RTC in 3 weeks for cycle 2 day 1    - TLS   Labs continue to look improved  Continue with allopurinol. Encouraged oral hydration    - Weight loss  Recommended nutritional consultation. Patient would like to hold off on it for now    - Anemia/Thrombocytopenia  Indurated to recent chemotherapy versus marrow involvement by  lymphoma   Continue to monitor    - History of coronary artery disease   Following with cardiology and is scheduled for a stress test later this month.    Chart documentation with Dragon Voice recognition Software. Although reviewed after completion, some words and grammatical errors may remain.  Erickson Adan MD  Attending Physician   Hematology/Medical Oncology    Again, thank you for allowing me to  participate in the care of your patient.        Sincerely,        Erickson Adan MD

## 2018-04-09 NOTE — PROGRESS NOTES
Tolerated lab draw without complaint. [place] drawn-Red/Green/Purple tubes. Double signed by patient and RN. See documentation flowsheet. Nieves Estrada, RN, BSN, OCN

## 2018-04-09 NOTE — MR AVS SNAPSHOT
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)        Drug-induced neutropenia (H)        Nausea           Follow-ups after your visit        Your next 10 appointments already scheduled     Apr 23, 2018 10:30 AM CDT   (Arrive by 10:15 AM)   Nurse Only with MG IMAGING NURSE   [facility])    [id number] [place] [id number]   [phone]            Apr 23, 2018 10:45 AM CDT   (Arrive by 10:30 AM)   NM INJECTION with MGNMINJ1   [facility])    [id number] [place] [id number]   [phone]            Apr 23, 2018 11:30 AM CDT   (Arrive by 11:15 AM)   NM SCAN3 with MGNM1   [facility])    [id number] [place] [address]   [phone]            Apr 23, 2018 12:00 PM CDT   (Arrive by 11:45 AM)   Ekg Stress Nm Lexiscan with MG STRESS RM, MG IMAGING NURSE, MG PC CARD, MG CV TECH   [facility])    [id number] [place] [id number]   [phone]            Apr 23, 2018 12:30 PM CDT   (Arrive by 12:15 PM)   NM MPI WITH LEXISCAN with MGNM1   [facility])    [id number] [place] [address]   [phone]           For a ONE day exam: Allow 3-4 hours for test. For a TWO day exam: Allow  minutes PER day for test.  On the day of your resting scan: Please stop eating 3 hours before the test. You may drink water or juice.  On the day of your stress test: Stop all caffeine 12 hours before the test. This includes  coffee, tea, soda pop, chocolate and certain medicines (such as Anacin, Excedrin and NoDoz). Also avoid decaf coffee and tea, as these contain small amounts of caffeine.  Stop eating 3 hours before the test. You may drink water.  You may need to stop some medicines before the test. Follow your doctor s orders. - If you take a beta blocker: Do not take your beta-blocker on the day before your test, unless specifically told to by your doctor. And do not take it on the day of your test. Bring it with you to take after the test. - If you take Aggrenox or dipyridamole (Persantine, Permole), stop taking it 48 hours before your test. - If you take Viagra, Cialis or Levitra, stop taking it 48 hours before your test. - If you take theophylline or aminophylline, stop taking it 12 hours before your test.  Do not take nitrates on the day of your test. Do not wear your Nitro-Patch.  Please wear a loose two-piece outfit. If you will have an exercise test, bring rubber-soled walking shoes.  When you arrive, please tell us if you have a pacemaker or ICD (implantable defibrillator).  Please call your Imaging Department at your exam site with any questions.            Apr 23, 2018  1:30 PM CDT   Ech Complete with MG PERRY [employer] TECH   [facility]    [id number] [place] [id number] [phone]           1. Please bring or wear a comfortable two-piece outfit. 2. You may eat, drink and take your normal medicines. 3. For any questions that cannot be answered, please contact the ordering physician              Who to contact     If you have questions or need follow up information about today's clinic visit or your schedule please contact [facility] directly at [phone].  Normal or non-critical lab and imaging results will be communicated to you by MyChart, letter or phone within 4 business days after the clinic has received the results. If you do not  hear from us within 7 days, please contact the clinic through "[employer] or phone. If you have a critical or abnormal lab result, we will notify you by phone as soon as possible.  Submit refill requests through "[employer] or call your pharmacy and they will forward the refill request to us. Please allow 3 business days for your refill to be completed.          Additional Information About Your Visit        [employer] Information     "[employer] gives you secure access to your electronic health record. If you see a primary care provider, you can also send messages to your care team and make appointments. If you have questions, please call your primary care clinic.  If you do not have a primary care provider, please call [phone] and they will assist you.      "[employer] is an electronic gateway that provides easy, online access to your medical records. With "[employer] you can request a clinic appointment, read your test results, renew a prescription or communicate with your care team.     To access your existing account, please contact your [facility] or call [phone] for assistance.        Care EveryWhere ID     This is your Care EveryWhere ID. This could be used by other organizations to access your [place] medical records  [id number]         Blood Pressure from Last 3 Encounters:   04/02/18 112/75   04/01/18 138/84   03/29/18 107/70    Weight from Last 3 Encounters:   04/02/18 82.8 kg (182 lb 8 oz)   04/01/18 83.8 kg (184 lb 12.8 oz)   03/29/18 78 kg (172 lb)              We Performed the Following     CBC with platelets differential     Comprehensive metabolic panel     Lactate Dehydrogenase     Phosphorus     Uric acid          Today's Medication Changes          These changes are accurate as of 4/9/18  7:49 AM.  If you have any questions, ask your nurse or doctor.               These medicines have changed or have updated prescriptions.        Dose/Directions    lisinopril 20 MG tablet    Commonly known as:  PRINIVIL/ZESTRIL   This may have changed:  when to take this   Used for:  Hypertension goal BP (blood pressure) < 130/80, Coronary artery disease involving native coronary artery of native heart without angina pectoris, Microalbuminuria        Dose:  20 mg   Take 1 tablet (20 mg) by mouth daily   Quantity:  90 tablet   Refills:  3       omega 3 1000 MG Caps   This may have changed:  additional instructions   Used for:  Coronary artery disease involving native coronary artery of native heart with angina pectoris (H)        Dose:  1 g   Take 1 g by mouth 2 times daily   Quantity:  120 capsule   Refills:  6       rosuvastatin 40 MG tablet   Commonly known as:  CRESTOR   This may have changed:  when to take this   Used for:  Hyperlipidemia LDL goal <100, Coronary artery disease involving native coronary artery of native heart without angina pectoris        Dose:  40 mg   Take 1 tablet (40 mg) by mouth daily   Quantity:  90 tablet   Refills:  3                Primary Care Provider Office Phone # Fax #    Florian Alonzo [phone] [address] [facility] [address]        Equal Access to Services     : Hadii irvera ku hadasho Soomaali, waaxda luqadaha, qaybta kaalmada adeegyada, waxay nickin haykaiden bazan . So [facility] [phone].    ATENCIÓN: Si habla español, tiene a ivan disposición servicios gratuitos de asistencia lingüística. Llame al [phone].    We comply with applicable federal civil rights laws and [place] laws. We do not discriminate on the basis of race, color, national origin, age, disability, sex, sexual orientation, or gender identity.            Thank you!     Thank you for choosing [facility]  for your care. Our goal is always to provide you with excellent care. Hearing back from our patients is one way we can continue to improve our services. Please take a few minutes to complete the written survey that you  may receive in the mail after your visit with us. Thank you!             Your Updated Medication List - Protect others around you: Learn how to safely use, store and throw away your medicines at www.disposemymeds.org.          This list is accurate as of 4/9/18  7:49 AM.  Always use your most recent med list.                   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)       PREVACID PO      Take 20 mg by mouth every evening Patient needs to use brand name Prevacid (generic version causes diarrhea)        * prochlorperazine 10 MG tablet    COMPAZINE    30 tablet    Take 1 tablet (10 mg) by mouth every 6 hours as needed for nausea or vomiting    Nausea       * prochlorperazine 10 MG tablet    COMPAZINE    30 tablet    Take 0.5 tablets (5 mg) by mouth every 6 hours as needed (Nausea/Vomiting)    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    Postoperative pain       TYLENOL 8 HOUR PO      Take 500 mg by mouth as needed        * Notice:  This list has 2 medication(s) that are the same as other medications prescribed for you. Read the directions carefully, and ask your doctor or other care provider to review them with you.

## 2018-04-09 NOTE — PROGRESS NOTES
"Oncology Rooming Note    April 9, 2018 7:51 AM   Francisco Javier Cortes is a 77 year old male who presents for:    No chief complaint on file.    Initial Vitals: There were no vitals taken for this visit. Estimated body mass index is 30.37 kg/(m^2) as calculated from the following:    Height as of 4/2/18: 1.651 m (5' 5\").    Weight as of 4/2/18: 82.8 kg (182 lb 8 oz). There is no height or weight on file to calculate BSA.  Data Unavailable Comment: Data Unavailable   No LMP for male patient.  Allergies reviewed: Yes  Medications reviewed: Yes    Medications: Medication refills not needed today.  Pharmacy name entered into [facility]:    [facility] [place] - [place], [place] - [address], [address]  [facility] [place] - [address]  [id number] - [place], [place] [address] [place] [facility]    Clinical concerns: Check-in    Nieves Estrada RN                "

## 2018-04-10 NOTE — TELEPHONE ENCOUNTER
"Received call from patient's wife. Patient saw Dr Adan yesterday. Reports patient seems very weak, tired and unsteady. \"All he wants to do is sit around or sleep all day\" \"He is not eating or drinking\" Sleeps during the day and is up at night. Writer spoke to patient. States he had coffee, egg and waffle for breakfast, 4oz of water. Denies fever/chills, or dyspnea. Discussed increasing his fluids;try to drink 4 oz every hour. He reports he has mild dizziness with arising. Discussed what medications he is taking. Reports taking lorazepam and compazine every 8 hrs at the same time. Denies nausea but states he just has a poor appetite. Encouraged patient to stop taking lorazepam during the day and take compazine only if he has nausea. Try lorazepam at HS and decrease napping during the day. Patient and wife express understanding and in agreement with plan. Advised to call back if symptoms do not improve by tomorrow.  Zhane Barth  RN, BSN, OCN    "

## 2018-04-11 PROBLEM — R65.10 SIRS (SYSTEMIC INFLAMMATORY RESPONSE SYNDROME) (H): Status: ACTIVE | Noted: 2018-01-01

## 2018-04-11 NOTE — PHARMACY-VANCOMYCIN DOSING SERVICE
Pharmacy Vancomycin Initial Note  Date of Service 2018  Patient's  1940  77 year old, male    Indication: possible hcap    Current estimated CrCl = Estimated Creatinine Clearance: 55.5 mL/min (based on Cr of 1.04).    Creatinine for last 3 days  2018:  7:40 AM Creatinine 1.04 mg/dL    Recent Vancomycin Level(s) for last 3 days  No results found for requested labs within last 72 hours.      Vancomycin IV Administrations (past 72 hours)      No vancomycin orders with administrations in past 72 hours.                Nephrotoxins and other renal medications (Future)    Start     Dose/Rate Route Frequency Ordered Stop    18 0315  piperacillin-tazobactam (ZOSYN) 3.375 g vial to attach to  mL bag      3.375 g  over 30 Minutes Intravenous EVERY 6 HOURS 18 0311            Contrast Orders - past 72 hours     None                Plan:  1.  Start vancomycin  1500 mg IV q24h.   2.  Goal Trough Level: 15-20 mg/L   3.  Pharmacy will check trough levels as appropriate in 1-3 Days.    4. Serum creatinine levels will be ordered daily for the first week of therapy and at least twice weekly for subsequent weeks.    5. Harmony method utilized to dose vancomycin therapy: Method 2    Jay Crook

## 2018-04-11 NOTE — H&P
Heme Malignancy Admission H&P    CC: Fever    HPI: 77yoM w/ Hx of Prostate Ca s/p Prostatectomy, CAD/NSTEMI s/p stents (EF recently ~60%), and now recently diagnosis of Mantle Cell Lymphoma (IV B - Bendamustine/Rituxan started 3/29 - admitted 3/29-4/1 for TLS monitoring) presenting with SIRS.    Today felt more weak so wife prompted him to present to the ED. Also febrile to 102. He had an episode of diarrhea here upon arrival but he says this occurs after he's constipated for a couple day. ROS is negative for sore throat, neck stiffness, chest pain, cough, abdominal pain, dysuria, rashes. He did vomit once non bilious emesis today. He presented to [facility] ED where he was febrile and hypotensive to 90/60 which improved with ~2L IVFs. Lactate was 3 and improved to 2.1. Mg was replaced. Labs were notable for  near prior checks. iCa was low to 1, unsure if this was replaced. Has PORSCHE to 1.4. AST mildly up to 59. Trop negative. Urine and blood cultures drawn. UA showed hyaline casts. CXR showed R infrahilar infiltrate vs atelectasis so was given Vanco, Cefepime, and Azithromycin. Currently says he feels much improved from on presentation.    Recent Oncology Note by Dr. Adan Copied Below  77-year-old male with past medical history significant for prostate cancer status post prostatectomy, coronary disease status post stent placement, to complete develop symptoms of abdominal bloating/discomfort and loss of appetite for 2 months. Underwent imaging workup by primary care provider with CT abdominal and pelvis on 02/25/18 showing extensive intra- abdominal and inguinal lymphadenopathy with splenomegaly.     - 03/05/18 02 Seen by medical oncology. I ordered CT neck and CT chest which showed bulky mediastinal, hilar ,axillary and supraclavicular lymphadenopathy. Patient was referred to ENT for diagnostic biopsy. He underwent an FNA of the lymph node on 03/14 which came back with findings consistent with mantle cell lymphoma.  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 Proliferation index was estimated to be 50%. The marrow biopsy performed showed bone marrow involvement by mantle cell lymphoma 40-50%. Fish came back positive for translocation 11:14 consistent with mantle cell lymphoma. PET CT scan extensive hypermetabolic adenopathy of cervical, axillary, mediastinal, hilar, intra-abdominal, retroperitoneal inguinal but the largest myeloma rate of reason triglyceride was measuring 15.5 x 6.9 cm.     - 03/29/18  Seen by Dr. Lebron in my absence. Workup and findings were discussed and given progressive symptoms of anorexia and weight loss, he was started on bendamustine in combination with Rituxan along with allopurinol for tumor lysis syndrome prophylaxis. Lab work later that day revealed rising LDH with thrombocytopenia and he was admitted to [facility] for monitoring for TLS. Labs improvement and was discharged home.    Past Medical History:   Diagnosis Date     CAD (coronary artery disease) 1/09    s/p stentsx2     Coronary artery disease involving native coronary artery of native heart without angina pectoris 12/22/2015     Dyslipidemia      Erectile dysfunction      GERD (gastroesophageal reflux disease)      Hearing problem One ear 1961     Hypertension      NSTEMI (non-ST elevated myocardial infarction) (H) 3/20/2014     Pneumonia      Prostate cancer (H)     prostatecomy 9 years ago, PSAs remain good     Status post percutaneous transluminal coronary angioplasty-Coronary angioplasty with STEPHEN to LCx 4/4/2014     Stented coronary artery     stents placed     Past Surgical History:   Procedure Laterality Date     BIOPSY LYMPH NODE CERVICAL Left 3/22/2018    Procedure: BIOPSY LYMPH NODE CERVICAL;  Excisional Biopsy Left Cervical Lymph Node ;  Surgeon: Samia Gaviria MD;  Location: [facility] OR     C APPENDECTOMY       C  REMV PROSTATE,RETROPUB,RAD,TOT NODES  1999     CATARACT IOL, RT/LT       COLONOSCOPY       COLONOSCOPY Left 7/5/2016    Procedure: COMBINED COLONOSCOPY, SINGLE OR MULTIPLE BIOPSY/POLYPECTOMY BY BIOPSY;  Surgeon: Duane, William Charles, MD;  Location: [facility] OR     COLONOSCOPY WITH CO2 INSUFFLATION N/A 7/5/2016    Procedure: COLONOSCOPY WITH CO2 INSUFFLATION;  Surgeon: Duane, William Charles, MD;  Location: [facility] OR     ENT SURGERY  1980--1999     PHACOEMULSIFICATION CLEAR CORNEA WITH STANDARD INTRAOCULAR LENS IMPLANT Left 6/18/2015    Procedure: PHACOEMULSIFICATION CLEAR CORNEA WITH STANDARD INTRAOCULAR LENS IMPLANT;  Surgeon: John Zimmerman MD;  Location:  [facility]     PHACOEMULSIFICATION CLEAR CORNEA WITH STANDARD INTRAOCULAR LENS IMPLANT Right 7/16/2015    Procedure: PHACOEMULSIFICATION CLEAR CORNEA WITH STANDARD INTRAOCULAR LENS IMPLANT;  Surgeon: John Zimmerman MD;  Location:  [facility]     STENT, CORONARY, [place]  1/09, 2014    x2, x1 in 2014     TYMPANOPLASTY       VASCULAR SURGERY  2013    stents     Social History     Social History     Marital status:      Spouse name: N/A     Number of children: N/A     Years of education: N/A     Occupational History     Not on file.     Social History Main Topics     Smoking status: Never Smoker     Smokeless tobacco: Never Used     Alcohol use Yes      Comment: a glass of red wine a day     Drug use: No     Sexual activity: Not Currently     Partners: Female     Birth control/ protection: Post-menopausal     Other Topics Concern      Service Yes     [employer]     Blood Transfusions No     Caffeine Concern No     Occupational Exposure Yes          Hobby Hazards No     Sleep Concern No     Stress Concern No     Weight Concern No     Special Diet Yes     Back Care No     Exercise Yes     Seat Belt Yes     Self-Exams Yes     Parent/Sibling W/ Cabg, Mi Or Angioplasty Before 65f 55m? Yes     Social History Narrative     Current  Facility-Administered Medications   Medication     lactated ringers infusion     acetaminophen (TYLENOL) tablet 325-650 mg     acetaminophen (TYLENOL) tablet 325-650 mg     prochlorperazine (COMPAZINE) injection 5-10 mg    Or     prochlorperazine (COMPAZINE) tablet 5-10 mg     LORazepam (ATIVAN) injection 0.5-1 mg    Or     LORazepam (ATIVAN) tablet 0.5-1 mg     piperacillin-tazobactam (ZOSYN) 3.375 g vial to attach to  mL bag     lactated ringers BOLUS 1,000 mL     Allergies   Allergen Reactions     Niacin      Severe rash and itching     Prevacid [Lansoprazole] Diarrhea     Patient notes diarrhea with 30mg generic version. Patient does ok with 15mg in generic and brand name.     Shellfish Allergy      One kind     ===========================  Objective  /75  Temp 102.1  F (38.9  C) (Axillary)  Resp (!) 36  Wt 75.2 kg (165 lb 11.2 oz)  SpO2 95%  BMI 27.57 kg/m2  gen- elderly, nad, initially rigorous but improved during conversation  neuro- aox3, non focal, moving in bed normally  cv- tachy, no m/r/g, port site c/d/i  pulm- cta, non labored, on room air, RR charted at 30s prior but now he is breathing around low 20s comfortably  abdmn- soft, nd, faint tenderness in RUQ with palpation  extrm- warm, no edema  Stool visualized in toilet, loose, white-brown with no melena/hematochezia    Lab are pending. Reviewed labs from [facility] ED. Noted above.   EKG reviewed. Sinus Tachy.   Imaging reviewed. CXR prelim RML Pna.    ===========================    Assessment/Plan: 77yoM w/ Hx of Prostate Ca s/p Prostatectomy, CAD/NSTEMI s/p stents (EF recently ~60%), and now recently diagnosis of Mantle Cell Lymphoma (IV B - Bendamustine/Rituxan started 3/29 - admitted 3/29-4/1 for TLS monitoring) presenting with Sepsis likely due to HCAP.    #Possible HCAP- presenting with sepsis (hypotension, fever, tachy, lactate to 3; fluid responsive), improving s/p IVFs and Abx at [facility] ED. CXR showing RML Pna, final read pending. No  other localizing symptoms and the patient does not particularly have pulmonary symptoms either. Has diarrhea but no significant abdominal pain to support CDiff diagnosis. Has a faintly positive Naylor's but OSH labs show no indication of liver or biliary tract disease.   -LR bolus 1 L then 125/hr maintenance  -Vanc/Zosyn for HCAP coverage given recent hospital admission  -CMP, CBC, Lipase, Lactate, LDH, Uric Acid, INR  -Blood, Sputum, Urine Cx's  -Urine Strep Pna Ag  -CDiff given diarrhea   -Procal    #PORSCHE- due to hypovolemia, IVFs as above    #Emesis x1- QTc not prolonged, anti-emetics ordered, cont home prevacid    #Prior TLS concern- labs as above, cont home allopurinol    #Diarrhea/Constipation- as above, by his report he has constipation for a couple days then a bout of diarrhea, if CDiff negative then restart home bowel regimen    #CAD/NSTEMI Hx s/p stents  -cont home ASA and statin  -hold lisinopril, please restart ASAP when PORSCHE resolves  -reduce metoprolol from home 100 to 50 given sepsis, please increase to home dose when improved     Diet- High KCal and Protein  DVT PPx- Padua 5, Heparin SubQ give PORSCHE  Code- Full  Dispo- Pending improvement in infectious symptoms and transition to oral po regimen     Suzie Mcfarlane        ATTENDING ADDENDUM:    I discussed the patient with referring ER doctors on phone but did not see the patient.     Francisco Javier Cortes is a 77 year old year old male, with MCL and recent BR chemo, p/w hypotension and evidence of poor perfusion (elevated LA), transferred here for further management. S/p cefe, vanc, azithro. Suspicious infiltrate on CXR. BP resolved after fluids.     Plan: Sepsis unless proven otherwise. Work up and broad spectrum ABx.       piperacillin-tazobactam  3.375 g Intravenous Q6H     allopurinol  300 mg Oral Daily     aspirin EC  81 mg Oral QAM     pantoprazole  20 mg Oral QPM     metoprolol succinate  50 mg Oral Daily     rosuvastatin  40 mg Oral QAM      heparin  5,000 Units Subcutaneous Q8H     vancomycin (VANCOCIN) IV  1,500 mg Intravenous Q12H       Tyrone Reis

## 2018-04-11 NOTE — PLAN OF CARE
"Problem: Sepsis/Septic Shock (Adult)  Goal: Signs and Symptoms of Listed Potential Problems Will be Absent, Minimized or Managed (Sepsis/Septic Shock)  Signs and symptoms of listed potential problems will be absent, minimized or managed by discharge/transition of care (reference Sepsis/Septic Shock (Adult) CPG).   Outcome: No Change  Neuro: A&Ox4.   Cardiac: Tachycardic 100-120's. Resp 22-30. Tmax 103.1 ax.    Respiratory: Sating 95% on 2L NC. NP dry cough- no sputum.   GI/: Adequate urine output. BM X1, diarrhea (c.diff neg).   Diet/appetite: C/o reflux. Giving antiemetic. Fair PO- ate \"brunch\".   Activity:  Assist of 1/SBA, up to chair and bathroom  Pain: denies  Skin: Pale. Bruising  LDA's: Right PORT infusing. LR bolus infusing for Lactic of 3.1 @ 1800. - Also had 500cc NS Bolus today (Lact 2.8)    Plan: Continue with POC. Notify primary team with changes. Daughters and wife here today.      "

## 2018-04-11 NOTE — PROGRESS NOTES
"Hem Onc progress note:    Francisco Javier is 77yoM w/ Hx of Prostate Ca s/p Prostatectomy, CAD/NSTEMI s/p stents (EF recently ~60%), and now recently diagnosis of Mantle Cell Lymphoma (IV B - Bendamustine/Rituxan started 3/29 - admitted 3/29-4/1 for TLS monitoring) presented to ED as a transfer from ([facility])  with fever, weakness, chills and hypotension. He was admitted for Sepsis 2/2 possible PNA on rt middle lobe. He had lactate of 3 on admission which improved to 2.5 after IVF resuscitation. Pt felt much improved today     Subjective:    Had 1 episode of diarrhea and 1 episode emesis. Has not spiked fever since 5 am. Continues to have chills and rigors. No new symptoms otherwise. Has mild cough that is occasionally productive. No SOB, CP.       Objective:    Vital signs:  Temp: 101.3  F (38.5  C) Temp src: Axillary BP: 127/68   Heart Rate: 110 Resp: 24 SpO2: 94 % O2 Device: Nasal cannula Oxygen Delivery: 2 LPM   Weight: 75.1 kg (165 lb 9.1 oz)  Estimated body mass index is 27.55 kg/(m^2) as calculated from the following:    Height as of 4/2/18: 1.651 m (5' 5\").    Weight as of this encounter: 75.1 kg (165 lb 9.1 oz).    Gen: Well built and nourished, not in any acute distress  HEENT: MMM, no oral lesions, no pallor, icterus  Neck: supple,   Lymph: no cervical, sc, axillary LAD   CV: RRR, no murmurs  Resp: CTA  Abd: Soft, NTND, no HSM   Ext: mild LE edema   Neuro: AAOx4. Cranial nerves grossly intact. Strength 5/5 throughout.  Normal muscle tone. Sensations grossly intact. 3+ symmetric reflexes in Knee jt     CBC RESULTS:   Recent Labs   Lab Test  04/11/18   0354   WBC  9.7   RBC  3.41*   HGB  10.1*   HCT  31.2*   MCV  92   MCH  29.6   MCHC  32.4   RDW  16.7*   PLT  50*     Last Basic Metabolic Panel:  Lab Results   Component Value Date     04/11/2018      Lab Results   Component Value Date    POTASSIUM 4.2 04/11/2018     Lab Results   Component Value Date    CHLORIDE 110 04/11/2018     Lab Results   Component " Value Date    EVANGELINA 7.0 04/11/2018     Lab Results   Component Value Date    CO2 15 04/11/2018     Lab Results   Component Value Date    BUN 20 04/11/2018     Lab Results   Component Value Date    CR 1.02 04/11/2018     Lab Results   Component Value Date     04/11/2018     Assessment/Plan:     77yoM w/ Hx of Prostate Ca s/p Prostatectomy, CAD/NSTEMI s/p stents (EF recently ~60%), and now recently diagnosis of Mantle Cell Lymphoma (IV B - Bendamustine/Rituxan started 3/29 - admitted 3/29-4/1 for TLS monitoring) presenting with Sepsis likely due to HCAP.     #Sepsis from Possible HCAP- presenting with sepsis (hypotension, fever, tachy, lactate to 3; fluid responsive 2L RL), improving s/p IVFs and Abx at [facility] ED. CXR showing RML PNA.. No other localizing symptoms and the patient does not particularly have pulmonary symptoms either. Has diarrhea but no significant abdominal pain to support C.Diff diagnosis. Has a faintly positive Naylor's but OSH labs show no indication of liver or biliary tract disease.Procalcitonin is also neg.  WBC count in normal range and un impressive respiratory symptoms. UA without infection.He is on 2L NC.    -Continue Vanc/Zosyn D1 on 4/11/2018 for HCAP coverage given recent hospital admission, until Cx neg  -Follow Blood Cx, sputum Cx, Urine for Legionella and Strep Ag  -Continue IVF at 100 cc/hr RL, (got 500 cc bolus today)  -F/u lactic acid (3->2.3->2.5), next at 6 pm       #Stage IV B Mantle cell lymphoma s/p Cyc 1 BR (3/29)-Diagnosed in Feb 2018-Follows Dr. Adan   Pt had symptoms of abdominal bloating/discomfort and loss of appetite for 2 months. Underwent imaging workup by primary care provider with CT abdominal and pelvis on 02/25/18 showing extensive intra- abdominal and inguinal lymphadenopathy with splenomegaly. FNA of the lymph node on 03/14 which came back with findings consistent with mantle cell lymphoma. He underwent excisional biopsy of the left level V lymph node on  3/22/18 and pathology again consistent with mantle cell lymphoma blastoid variant. Proliferation index was estimated to be 50%. The marrow biopsy performed showed bone marrow involvement by mantle cell lymphoma 40-50%. Fish came back positive for translocation 11:14 consistent with mantle cell lymphoma. PET CT scan extensive hypermetabolic adenopathy of cervical, axillary, mediastinal, hilar, intra-abdominal, retroperitoneal inguinal but the largest measuring 15.5 x 6.9 cm.     Started on Bendamustine 90 mg/m2 day 1 and 2 in combination with Rituxan 375 mg/m2 q.4 weeks- Cycle 1 on 03/29/18. Was admitted to the hospital subsequently for observation for tumor lysis syndrome and subsequently discharged home after improvement in blood work.Plan is to proceed with 6 total cycles followed by repeat PET scan and have response achieved, consider proceeding with maintenance Rituxan.    -Currently he is Day 14. Counts look ok, no neutropenia, no TLS concerns.  -Will f/u with Dr. Adan for next cycle  -cont home allopurinol      #Emesis x1- QTc not prolonged, anti-emetics ordered, cont home prevacid  #Diarrhea/Constipation- as above, by his report he has constipation for a couple days then a bout of diarrhea, C.ff here is negative.   -Continue IVF as aove       #CAD/NSTEMI Hx s/p stents  -cont home ASA and statin  -hold lisinopril, please restart ASAP when BP improved (tomorrow)  -reduce metoprolol from home 100 to 50 given sepsis, please increase to home dose when improved      Diet- High KCal and Protein  DVT PPx- Padua 5, change from Heparin to Lovenox  Code- Full  Dispo- Pending improvement in infectious symptoms and transition to oral po regimen     Pt seen and plan discussed with Dr. Michaud.    Donald Flowers  Hem Onc fellow  [phone]     Heme Malignancy Service (7D) Attending Progress Note    I am assuming care for this patient for the next week as Dr. Tyrone Reis rotates off service. I met with Dr. Reis yesterday  and we discussed all progress and active problems for a smooth transition of care.    The patient was discussed on morning rounds with the nurses, mid-level provider, and house staff and seen and examined by me. All labs and imaging were reviewed. I reviewed events over the last 24 hours including vitals and flow sheets. I agree with the above note and have been responsible for the care plan and interpretation of progress. Overall, the patient is a 76 yo with mantle cell NHL admitted last PM with fever and a possible RLL infiltrate. He was started on IVF and antibiotics. He seems better this am. Fevers are down and a creat is lower to 1.02 from 1.4.     There is no evidence of mucositis or adenopathy on exam and lungs are clear. There is no edema or rash.    The patient has been informed on progress and all questions have been answered. We also discussed plans for the next few days. If better tomorrow, we might consider DC on oral antibiotics.    John Michaud MD

## 2018-04-11 NOTE — IP AVS SNAPSHOT
MRN:[id number]                      After Visit Summary   4/11/2018    Francisco Javier Cortes    MRN: [id number]           Thank you!     Thank you for choosing [facility] for your care. Our goal is always to provide you with excellent care. Hearing back from our patients is one way we can continue to improve our services. Please take a few minutes to complete the written survey that you may receive in the mail after you visit with us. Thank you!        Patient Information     Date Of Birth          1940        Designated Caregiver       Most Recent Value    Caregiver    Will someone help with your care after discharge? yes    Name of designated caregiver Court    Phone number of caregiver [phone]    Caregiver address [address] [facility]      About your hospital stay     You were admitted on:  April 11, 2018 You last received care in the:  [address] [facility]    You were discharged on:  April 15, 2018        Reason for your hospital stay       Health care associated Pneumonia                  Who to Call     For medical emergencies, please call 911.  For non-urgent questions about your medical care, please call your primary care provider or clinic, [phone]          Attending Provider     Provider Specialty    Tyrone Reis MD Hematology & Oncology       Primary Care Provider Office Phone # Fax #    Florian Alonzo [phone]      After Care Instructions     Activity       Your activity upon discharge: activity as tolerated            Diet       Follow this diet upon discharge: Orders Placed This Encounter      Calorie Counts      Snacks/Supplements Adult: Ensure Plus (Adult); Between Meals      High Kcal/High Protein Diet, ADULT                  Your next 10 appointments already scheduled     Apr 23, 2018 10:30 AM CDT   (Arrive by 10:15 AM)   Nurse Only with MG IMAGING NURSE   [facility]    [address]  [facility] [address]   [phone]            Apr 23, 2018 10:45 AM CDT   (Arrive by 10:30 AM)   NM INJECTION with MGNMINJ1   [facility])    [address] [place] [address]   [phone]            Apr 23, 2018 11:30 AM CDT   (Arrive by 11:15 AM)   NM SCAN3 with MGNM1   [facility])    [address] [place] [address]   [phone]            Apr 23, 2018 12:00 PM CDT   (Arrive by 11:45 AM)   Ekg Stress Nm Lexiscan with MG STRESS RM, MG IMAGING NURSE, MG PC CARD, MG CV TECH   [facility])    [address] [place] [address]   [phone]            Apr 23, 2018 12:30 PM CDT   (Arrive by 12:15 PM)   NM MPI WITH LEXISCAN with MGNM1   [facility])    [address] [place] [address]   [phone]           For a ONE day exam: Allow 3-4 hours for test. For a TWO day exam: Allow  minutes PER day for test.  On the day of your resting scan: Please stop eating 3 hours before the test. You may drink water or juice.  On the day of your stress test: Stop all caffeine 12 hours before the test. This includes coffee, tea, soda pop, chocolate and certain medicines (such as Anacin, Excedrin and NoDoz). Also avoid decaf coffee and tea, as these contain small amounts of caffeine.  Stop eating 3 hours before the test. You may drink water.  You may need to stop some medicines before the test. Follow your doctor s orders. - If you take a beta blocker: Do not take your beta-blocker on the day before your test, unless specifically told to by your doctor. And do not take it on the day of your test. Bring it with you to take after the test. - If you take Aggrenox or dipyridamole (Persantine, Permole), stop taking it 48 hours before your test. - If you take Viagra, Cialis or  Levitra, stop taking it 48 hours before your test. - If you take theophylline or aminophylline, stop taking it 12 hours before your test.  Do not take nitrates on the day of your test. Do not wear your Nitro-Patch.  Please wear a loose two-piece outfit. If you will have an exercise test, bring rubber-soled walking shoes.  When you arrive, please tell us if you have a pacemaker or ICD (implantable defibrillator).  Please call your Imaging Department at your exam site with any questions.            Apr 23, 2018  1:30 PM CDT   Ech Complete with MGECHR1, MG ECHO CTS Media   [facility]    [address] [place] [address] [phone]           1. Please bring or wear a comfortable two-piece outfit. 2. You may eat, drink and take your normal medicines. 3. For any questions that cannot be answered, please contact the ordering physician              Future tests that were ordered for you     Basic metabolic panel           CBC with platelets differential       Last Lab Result: Hemoglobin (g/dL)       Date                     Value                 04/15/2018               9.0 (L)          ----------                  Pending Results     Date and Time Order Name Status Description    4/14/2018 0820 EKG 12-lead, complete Preliminary     4/12/2018 1344 Blood culture Preliminary     4/11/2018 0356 Blood culture Preliminary     4/11/2018 0355 Blood culture yeast Preliminary     4/11/2018 0301 Blood culture Preliminary             Statement of Approval     Ordered          04/15/18 1049  I have reviewed and agree with all the recommendations and orders detailed in this document.  EFFECTIVE NOW     Approved and electronically signed by:  Donald Flowers MD             Admission Information     Date & Time Provider Department Dept. Phone    4/11/2018 Tyrone Reis MD [address] [facility] [place] [phone]      Your Vitals Were     Blood Pressure Pulse Temperature  Respirations Weight Pulse Oximetry    144/88 92 98.2  F (36.8  C) (Axillary) 18 81 kg (178 lb 9.6 oz) 95%    BMI (Body Mass Index)                   29.72 kg/m2           [employer] Information     [employer] gives you secure access to your electronic health record. If you see a primary care provider, you can also send messages to your care team and make appointments. If you have questions, please call your primary care clinic.  If you do not have a primary care provider, please call [phone] and they will assist you.        Care EveryWhere ID     This is your Care EveryWhere ID. This could be used by other organizations to access your [facility] medical records  [id number]        Equal Access to Services     NANDA BARAJAS : Alondra Cherry, kenia fiore, wong wu, zhao lorenzo. So [place] [phone].    ATENCIÓN: Si habla español, tiene a ivan disposición servicios gratuitos de asistencia lingüística. Llame al [phone].    We comply with applicable federal civil rights laws and [place] laws. We do not discriminate on the basis of race, color, national origin, age, disability, sex, sexual orientation, or gender identity.               Review of your medicines      START taking        Dose / Directions    levofloxacin 750 MG tablet   Commonly known as:  LEVAQUIN   Indication:  Healthcare-Associated Pneumonia   Used for:  HCAP (healthcare-associated pneumonia)        Dose:  750 mg   Start taking on:  4/16/2018   Take 1 tablet (750 mg) by mouth daily   Quantity:  9 tablet   Refills:  0         CONTINUE these medicines which may have CHANGED, or have new prescriptions. If we are uncertain of the size of tablets/capsules you have at home, strength may be listed as something that might have changed.        Dose / Directions    lisinopril 20 MG tablet   Commonly known as:  PRINIVIL/ZESTRIL   This may have changed:  when to take this   Used for:  Hypertension  goal BP (blood pressure) < 130/80, Coronary artery disease involving native coronary artery of native heart without angina pectoris, Microalbuminuria        Dose:  20 mg   Take 1 tablet (20 mg) by mouth daily   Quantity:  90 tablet   Refills:  3       omega 3 1000 MG Caps   This may have changed:  additional instructions   Used for:  Coronary artery disease involving native coronary artery of native heart with angina pectoris (H)        Dose:  1 g   Take 1 g by mouth 2 times daily   Quantity:  120 capsule   Refills:  6       prochlorperazine 10 MG tablet   Commonly known as:  COMPAZINE   This may have changed:  Another medication with the same name was removed. Continue taking this medication, and follow the directions you see here.   Used for:  Nausea        Dose:  10 mg   Take 1 tablet (10 mg) by mouth every 6 hours as needed for nausea or vomiting   Quantity:  30 tablet   Refills:  1       rosuvastatin 40 MG tablet   Commonly known as:  CRESTOR   This may have changed:  when to take this   Used for:  Hyperlipidemia LDL goal <100, Coronary artery disease involving native coronary artery of native heart without angina pectoris        Dose:  40 mg   Take 1 tablet (40 mg) by mouth daily   Quantity:  90 tablet   Refills:  3         CONTINUE these medicines which have NOT CHANGED        Dose / Directions    allopurinol 300 MG tablet   Commonly known as:  ZYLOPRIM   Used for:  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)        Dose:  0.5 mg   Take 1 tablet (0.5 mg) by mouth every 4 hours as needed (Anxiety, Nausea/Vomiting or Sleep)   Quantity:  30 tablet   Refills:  3       MAALOX ADVANCED PO   Used for:  Chest pain        Take 1 tablet as needed (Calcium carbonate 1000mg/Simethicone 80mg)   Refills:  0       metoprolol succinate 100 MG 24 hr tablet   Commonly known as:  TOPROL-XL   Used for:  Hypertension goal BP (blood pressure) < 130/80, Coronary artery disease involving native coronary artery of native heart without angina pectoris        Dose:  100 mg   Take 1 tablet (100 mg) by mouth daily   Quantity:  90 tablet   Refills:  3       nitroGLYcerin 0.4 MG sublingual tablet   Commonly known as:  NITROSTAT   Used for:  Chest pain due to myocardial ischemia, unspecified ischemic chest pain type (H), Coronary artery disease involving native coronary artery of native heart without angina pectoris        Dose:  0.4 mg   Place 1 tablet (0.4 mg) under the tongue every 5 minutes as needed up to 3 tablets per episode.   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   Quantity:  30 tablet   Refills:  0       TYLENOL 8 HOUR PO         Dose:  500 mg   Take 500 mg by mouth as needed   Refills:  0         STOP taking     HYDROcodone-acetaminophen 5-325 MG per tablet   Commonly known as:  NORCO                Where to get your medicines      These medications were sent to [facility] - [place], [place] - [address] [facility] [address] [facility]  [address], [facility] [address]     Phone:  [phone]     levofloxacin 750 MG tablet                Protect others around you: Learn how to safely use, store and throw away your medicines at www.disposemymeds.org.        ANTIBIOTIC INSTRUCTION     You've Been Prescribed an Antibiotic - Now What?  Your healthcare team thinks that you or your loved one might have an infection. Some infections can be treated with antibiotics, which are powerful, life-saving drugs. Like all medications, antibiotics have side effects and should only be used when necessary. There are some important things you should know about your antibiotic treatment.      Your healthcare team may run tests before you start taking an antibiotic.    Your team may take samples (e.g., from your blood, urine or other areas) to run tests to look for bacteria. These test can be important to determine if you need an antibiotic at all and, if you do, which antibiotic will work best.      Within a few days, your healthcare team might change or even stop your antibiotic.    Your team may start you on an antibiotic while they are working to find out what is making you sick.    Your team might change your antibiotic because test results show that a different antibiotic would be better to treat your infection.    In some cases, once your team has more information, they learn that you do not need an antibiotic at all. They may find out that you don't have an infection, or that the antibiotic you're taking won't work against your infection. For example, an infection caused by a virus can't be treated with antibiotics. Staying on an antibiotic  when you don't need it is more likely to be harmful than helpful.      You may experience side effects from your antibiotic.    Like all medications, antibiotics have side effects. Some of these can be serious.    Let you healthcare team know if you have any known allergies when you are admitted to the hospital.    One significant side effect of nearly all antibiotics is the risk of severe and sometimes deadly diarrhea caused by Clostridium difficile (C. Difficile). This occurs when a person takes antibiotics because some good germs are destroyed. Antibiotic use allows C. diificile to take over, putting patients at high risk for this serious infection.    As a patient or caregiver, it is important to understand your or your loved one's antibiotic treatment. It is especially important for caregivers to speak up when patients can't speak for themselves. Here are some important questions to ask your healthcare team.    What infection is this antibiotic treating and how do you know I have that infection?    What side effects might occur from this antibiotic?    How long will I need to take this antibiotic?    Is it safe to take this antibiotic with other medications or supplements (e.g., vitamins) that I am taking?     Are there any special directions I need to know about taking this antibiotic? For example, should I take it with food?    How will I be monitored to know whether my infection is responding to the antibiotic?    What tests may help to make sure the right antibiotic is prescribed for me?      Information provided by:  www.cdc.gov/getsmart  U.S. Department of Health and Human Services  Centers for disease Control and Prevention  National Center for Emerging and Zoonotic Infectious Diseases  Division of Healthcare Quality Promotion             Medication List: This is a list of all your medications and when to take them. Check marks below indicate your daily home schedule. Keep this list as a reference.       Medications           Morning Afternoon Evening Bedtime As Needed    allopurinol 300 MG tablet   Commonly known as:  ZYLOPRIM   Take 1 tablet (300 mg) by mouth daily   Last time this was given:  300 mg on 4/15/2018  8:46 AM                                aspirin 81 MG tablet   1 TABLET EVERY MORNING                                clindamycin 1 % lotion   Commonly known as:  CLEOCIN T   Apply twice daily for 6 weeks to the groin                                hydrocortisone valerate 0.2 % ointment   Commonly known as:  WEST-NINOSKA   Apply sparingly to affected area three times daily as needed.                                levofloxacin 750 MG tablet   Commonly known as:  LEVAQUIN   Take 1 tablet (750 mg) by mouth daily   Start taking on:  4/16/2018   Last time this was given:  750 mg on 4/15/2018  8:46 AM                                lisinopril 20 MG tablet   Commonly known as:  PRINIVIL/ZESTRIL   Take 1 tablet (20 mg) by mouth daily                                LORazepam 0.5 MG tablet   Commonly known as:  ATIVAN   Take 1 tablet (0.5 mg) by mouth every 4 hours as needed (Anxiety, Nausea/Vomiting or Sleep)                                MAALOX ADVANCED PO   Take 1 tablet as needed (Calcium carbonate 1000mg/Simethicone 80mg)                                metoprolol succinate 100 MG 24 hr tablet   Commonly known as:  TOPROL-XL   Take 1 tablet (100 mg) by mouth daily   Last time this was given:  50 mg on 4/15/2018  8:45 AM                                nitroGLYcerin 0.4 MG sublingual tablet   Commonly known as:  NITROSTAT   Place 1 tablet (0.4 mg) under the tongue every 5 minutes as needed up to 3 tablets per episode.                                omega 3 1000 MG Caps   Take 1 g by mouth 2 times daily                                ondansetron 8 MG tablet   Commonly known as:  ZOFRAN   Take 1 tablet (8 mg) by mouth every 8 hours as needed (Nausea/Vomiting)                                PREVACID PO   Take 20 mg  by mouth every evening Patient needs to use brand name Prevacid (generic version causes diarrhea)                                prochlorperazine 10 MG tablet   Commonly known as:  COMPAZINE   Take 1 tablet (10 mg) by mouth every 6 hours as needed for nausea or vomiting   Last time this was given:  10 mg on 4/14/2018  7:53 PM                                rosuvastatin 40 MG tablet   Commonly known as:  CRESTOR   Take 1 tablet (40 mg) by mouth daily   Last time this was given:  40 mg on 4/15/2018  8:46 AM                                senna-docusate 8.6-50 MG per tablet   Commonly known as:  SENOKOT-S;PERICOLACE   Take 1-2 tablets by mouth 2 times daily Take while on oral narcotics to prevent or treat constipation.                                TYLENOL 8 HOUR PO   Take 500 mg by mouth as needed

## 2018-04-11 NOTE — IP AVS SNAPSHOT
[address] [facility] [address]    Phone:  [phone]    Fax:  [phone]                                       After Visit Summary   4/11/2018    Francisco Javier Cortes    MRN: [id number]           After Visit Summary Signature Page     I have received my discharge instructions, and my questions have been answered. I have discussed any challenges I see with this plan with the nurse or doctor.    ..........................................................................................................................................  Patient/Patient Representative Signature      ..........................................................................................................................................  Patient Representative Print Name and Relationship to Patient    ..................................................               ................................................  Date                                            Time    ..........................................................................................................................................  Reviewed by Signature/Title    ...................................................              ..............................................  Date                                                            Time

## 2018-04-11 NOTE — TELEPHONE ENCOUNTER
Reason for Disposition    [1] Neutropenia known or suspected (e.g., recent cancer chemotherapy) AND [2] fever > 100.4 F (38.0 C)    Additional Information    Negative: Shock suspected (e.g., cold/pale/clammy skin, too weak to stand, low BP, rapid pulse)    Negative: Difficult to awaken or acting confused  (e.g., disoriented, slurred speech)    Negative: Bluish lips, tongue, or face now    Negative: New onset rash with many purple (or blood-colored) spots or dots    Negative: Sounds like a life-threatening emergency to the triager    Negative: Other symptom is present, see that guideline  (e.g., symptoms of cough, runny nose, sore throat, earache, abdominal pain, diarrhea, vomiting)    Negative: Fever > 104 F (40 C)    Protocols used: CANCER - FEVER-ADULT-    Court (wife) calls and says that her  has Lymphoma and has a fever = 102.4-oral. Court says that pt. Is weak and has been in bed today. Pt. Also vomited after dinner. Court says that she will have someone help her take Francisco Javier to the [facility] ER now.

## 2018-04-11 NOTE — PLAN OF CARE
Problem: Patient Care Overview  Goal: Plan of Care/Patient Progress Review   04/11/18 0652   OTHER   Plan Of Care Reviewed With patient   Plan of Care Review   Progress improving     98.4F, tachycardic , BP stable, 95% on 2L NC. Alert, oriented x4. PT reports feeling much better than he did yesterday morning. Up with assist of 1. Given a total of 2L LR bolus during shift (Given 3L fluid at [facility] per RN report). Started Zosyn. Finished Vancomycin (1st dose started prior to transfer from [facility]). Lactic Acid level 3.3, Md notified. Will draw Lactic acid again after final LR bolus finishes. Inconvenient of large watery stool x1. Emesis x1 when rigoring on admission. Chest Xray completed. EKG completed. RVP sent. VRE sample sent. Still needs urine sample, cdiff sample and sputum sample. 1g calcium gluconate given.     Problem: Sepsis/Septic Shock (Adult)  Goal: Signs and Symptoms of Listed Potential Problems Will be Absent, Minimized or Managed (Sepsis/Septic Shock)  Signs and symptoms of listed potential problems will be absent, minimized or managed by discharge/transition of care (reference Sepsis/Septic Shock (Adult) CPG).   04/11/18 0652   Sepsis/Septic Shock   Problems Assessed (Sepsis) all   Problems Present (Sepsis) hypoperfusion/hemodynamic instability;situational response

## 2018-04-11 NOTE — PROGRESS NOTES
Patient admitted to: 5432  Admitted from: [facility]  Arrived by: [facility]  Reason for admission: Hypotension, fevers, acute mental status changes  Patient accompanied by: N/A  Belongings: clothing at pt bedside  Teaching: Oriented to unit routines, call lights, to call before ambulating    Arrived at 0230 by ambulance, temp 102.1F, , /75, RR 36, SpO2 95 on room air. Pt was rigoring. Given warm blankets, tylenol, labs drawn, 1L LR bolus, zosyn and 1,500mg vancomycin dose completed (vancomyacin started at [facility] prior to transfer to [facility]). MD assessment preformed.

## 2018-04-12 NOTE — PLAN OF CARE
Problem: Patient Care Overview  Goal: Plan of Care/Patient Progress Review  Outcome: No Change  Tmax 103.0 F, RR up to 40's heart rate up to 140's and rigorsw/ fever. Tylenol given x3, demerol x1 w/ relief, blood culture sent down; lactic acid 5.7, see provider notification; evening lactic acid recheck 2.1, provider notified. Oxygen placed on patient during fevers for comfort due to SOB, O2 sats well maintained with fevers. Patient up w/ Ax1, having loose stools w/ small amounts of urine. Requires encouragement to eat, compazine given x1 before supper; calorie counts starting tomorrow. Showered this afternoon. No other complaints, continue to monitor closely.      04/12/18 1817   OTHER   Plan Of Care Reviewed With patient   Plan of Care Review   Progress no change       Problem: Sepsis/Septic Shock (Adult)  Goal: Signs and Symptoms of Listed Potential Problems Will be Absent, Minimized or Managed (Sepsis/Septic Shock)  Signs and symptoms of listed potential problems will be absent, minimized or managed by discharge/transition of care (reference Sepsis/Septic Shock (Adult) CPG).   Outcome: No Change   04/12/18 1817   Sepsis/Septic Shock   Problems Assessed (Sepsis) all   Problems Present (Sepsis) hypoxia/hypoxemia;infection progression;situational response;undernutrition

## 2018-04-12 NOTE — PROGRESS NOTES
sputum was induced with 10% hypertonic saline for 10 min. Patient has a dry cough. No sputum collected. A sterile cup is left at the bed side, in case the patient produces secretions. RN notified.

## 2018-04-12 NOTE — PLAN OF CARE
Problem: Patient Care Overview  Goal: Plan of Care/Patient Progress Review  Tmax 103.2F, tylenol given, 96% on 2L NC, RR 24, tachycardic HR 90-100s, BP stable. Alert, oriented x4. IVMF Lactated Ringers at 100ml/hr. Becomes dyspneic when getting up to bathroom. Most recent lactic acid level 1.7. Tylenol x1 for back pain. Sputum sample sent but sample was unsatisfactory. Loose brown stool x3.     Problem: Sepsis/Septic Shock (Adult)  Goal: Signs and Symptoms of Listed Potential Problems Will be Absent, Minimized or Managed (Sepsis/Septic Shock)  Signs and symptoms of listed potential problems will be absent, minimized or managed by discharge/transition of care (reference Sepsis/Septic Shock (Adult) CPG).    04/11/18 2000   Sepsis/Septic Shock   Problems Assessed (Sepsis) all   Problems Present (Sepsis) hypoperfusion/hemodynamic instability;infection progression;situational response

## 2018-04-12 NOTE — PROVIDER NOTIFICATION
Resident MD Rajwinder Ng paged regarding temperature spike to 102.1, patient respirations in upper 30's-40's, and heart rate in 140's. Demerol and tylenol given w/ rigor improvement, will send lactic acid and blood culture. Continue to monitor closely.

## 2018-04-12 NOTE — PROGRESS NOTES
CLINICAL NUTRITION SERVICES - ASSESSMENT NOTE     Nutrition Prescription    RECOMMENDATIONS FOR MDs/PROVIDERS TO ORDER:  None at this time    Malnutrition Status:    Severe malnutrition in the context of acute illness    Recommendations already ordered by Registered Dietitian (RD):  -Calorie counts    Future/Additional Recommendations:  1. If EN indicated, would recommend: TwoCal HN @ 40 mL/hr (960 mL/day) to provide 1920 kcals (30 kcal/kg/day), 81 g PRO (1.2 g/kg/day), 672 mL H2O, 210 g CHO and 5 g Fiber daily. + 15 mL liquid MVI for micronutrient needs + 30 mL water flush q4h for tube patency    2. If PN indicated, would recommend:  --Use dosing weight 65 kg  --Begin custom CPN, goal volume 1200 ml/day (or per MD/PharmD discretion) with initial 140g Dex daily (476 kcal, GIR = 1.5 mg/kg/min), 100 g AA daily (400 kcal), and 250 ml 20% IV lipids 3x/week.  Micro/Rx: Standard per PharmD  --ONLY once pt receives ~100% of initial continuous PN volume with K+/Mg++/Phos WNL, advance PN dex by 60 g every 1-2 days (pending lytes/Glu and Pharm D/RD discretion) to initial goal of 200g Dex (680 kcal) to increase provisions to 1294 kcals (20 kcal/kg/day), 1.5 g PRO/kg/day, GIR = 2.1 mg/kg/min with 17% kcals from Fat.       REASON FOR ASSESSMENT  Francisco Javier Cortes is a/an 77 year old male assessed by the dietitian for Admission Nutrition Risk Screen for unintentional loss of 10# or more in the past two months    NUTRITION HISTORY  Pt reports that starting Monday he became nauseous, inhibiting PO intake but prior to that seemed to be eating fine. He reports not noticing a significant weight loss in the recent past but acknowledges that any weight changes are likely r/t fluid status and that if he has lost weight that's fine by him as he believes that he could lose some weight.    CURRENT NUTRITION ORDERS  Diet: High Kcal/High Protein  Intake/Tolerance: Pt ate Cheerios, grapes, and orange juice this morning and said that went well  "without nausea, which was encouraging. He doesn't particularly like nutrition supplements but will drink them knowing that they will help him meet his goals.    LABS  Labs reviewed    MEDICATIONS  Medications reviewed    ANTHROPOMETRICS  Height: 5'5\"  Most Recent Weight: 79.3 kg (174 lb 13.2 oz)    IBW: 61.8 kg  BMI: 29; Overweight BMI 25-29.9  Weight History:   Wt Readings from Last 10 Encounters:   04/12/18 79.3 kg (174 lb 13.2 oz)   04/09/18 75.1 kg (165 lb 8 oz)   04/02/18 82.8 kg (182 lb 8 oz)   04/01/18 83.8 kg (184 lb 12.8 oz)   03/29/18 78 kg (172 lb)   03/22/18 73.5 kg (162 lb 0.6 oz)   03/16/18 81.2 kg (179 lb)   03/14/18 81.2 kg (179 lb)   03/05/18 82.1 kg (181 lb 1 oz)   02/23/18 82.1 kg (181 lb 1.6 oz)   7 kg, 9% weight loss over the past 2 months  Dosing Weight: 65 kg (adjusted based on lowest admit wt of 75.1 kg on 4/11, IBW = 61.8 kg)    ASSESSED NUTRITION NEEDS  Estimated Energy Needs: 9667-6925 kcals/day (25 - 30 kcals/kg)  Justification: Increased needs  Estimated Protein Needs: 78-98 grams protein/day (1.2 - 1.5 grams of pro/kg)  Justification: Increased needs  Estimated Fluid Needs: (1 mL/kcal)   Justification: Per provider pending fluid status    PHYSICAL FINDINGS  See malnutrition section below.  Poor skin turgor     MALNUTRITION  % Intake: </= 50% for >/= 5 days (severe)  % Weight Loss: > 7.5% in 3 months (severe)  Subcutaneous Fat Loss: None observed  Muscle Loss: Facial & jaw region, Thoracic region (clavicle, acromium bone, deltoid, trapezius, pectoral), Upper arm (bicep, tricep) and Lower arm  (forearm):  Mild  Fluid Accumulation/Edema: Mild  Malnutrition Diagnosis: Severe malnutrition in the context of acute illness    NUTRITION DIAGNOSIS  Inadequate protein-energy intake related to nausea as evidenced by pt report of no meaningful PO intake since before Monday, 4/9    INTERVENTIONS  Implementation  Nutrition education for nutrition relationship to health/disease   Calorie count  Enteral " Nutrition - Recs  Parenteral Nutrition/IV Fluids - Recs     Goals  Patient to consume % of nutritionally adequate meal trays TID, or the equivalent with supplements/snacks.     Monitoring/Evaluation  Progress toward goals will be monitored and evaluated per protocol.    Audra Lynne RDN, LD  Pgr: [phone]

## 2018-04-12 NOTE — PROGRESS NOTES
"Hem Onc progress note:    Francisco Javier is 77yoM w/ Hx of Prostate Ca s/p Prostatectomy, CAD/NSTEMI s/p stents (EF recently ~60%), and now recently diagnosis of Mantle Cell Lymphoma (IV B - Bendamustine/Rituxan started 3/29 - admitted 3/29-4/1 for TLS monitoring) presented to ED as a transfer from ([place] [facility])  with fever, weakness, chills and hypotension. He was admitted for Sepsis 2/2 possible PNA on rt middle lobe.      Subjective:    Patient reports ongoing improvement in symptoms. He continues to have some cough, sample collected. He had fever to 103.2 yesterday evening. He has not had further emesis (since episode yesterday) continues to have some nausea.     Objective:    Vital signs:  Temp: 99  F (37.2  C) Temp src: Axillary BP: 108/66   Heart Rate: 109 Resp: (!) 34 SpO2: 93 % O2 Device: Nasal cannula Oxygen Delivery: 2 LPM   Weight: 79.3 kg (174 lb 13.2 oz)  Estimated body mass index is 29.09 kg/(m^2) as calculated from the following:    Height as of 4/2/18: 1.651 m (5' 5\").    Weight as of this encounter: 79.3 kg (174 lb 13.2 oz).    Gen: Well built and nourished, not in any acute distress  HEENT: MMM, no oral lesions, no pallor, icterus  Neck: supple,   Lymph: no cervical, sc, axillary LAD   CV: RRR, no murmurs  Resp: Normal RR. Crackles in bilateral bases, R>L.   Abd: Soft, NTND, no HSM   Ext: mild LE edema   Neuro: AAOx4. Cranial nerves grossly intact.     CBC RESULTS:   Recent Labs   Lab Test  04/11/18   0354   WBC  9.7   RBC  3.41*   HGB  10.1*   HCT  31.2*   MCV  92   MCH  29.6   MCHC  32.4   RDW  16.7*   PLT  50*     Last Basic Metabolic Panel:  Lab Results   Component Value Date     04/11/2018      Lab Results   Component Value Date    POTASSIUM 4.2 04/11/2018     Lab Results   Component Value Date    CHLORIDE 110 04/11/2018     Lab Results   Component Value Date    EVANGELINA 7.0 04/11/2018     Lab Results   Component Value Date    CO2 15 04/11/2018     Lab Results   Component Value Date    BUN 20 " 04/11/2018     Lab Results   Component Value Date    CR 1.02 04/11/2018     Lab Results   Component Value Date     04/11/2018     Assessment/Plan:     77yoM w/ Hx of Prostate Ca s/p Prostatectomy, CAD/NSTEMI s/p stents (EF recently ~60%), and now recently diagnosis of Mantle Cell Lymphoma (IV B - Bendamustine/Rituxan started 3/29 - admitted 3/29-4/1 for TLS monitoring) presenting with Sepsis likely due to HCAP.     #Sepsis from Possible HCAP- presenting with sepsis (hypotension, fever, tachy, lactate to 3; fluid responsive 2L RL), improving s/p IVFs and Abx at [facility]. CXR showing RML PNA.. No other localizing symptoms and the patient does not particularly have pulmonary symptoms either. Has diarrhea but no significant abdominal pain to support C.Diff diagnosis. Has a faintly positive Naylor's but OSH labs show no indication of liver or biliary tract disease.Procalcitonin is also neg.  WBC count in normal range and un impressive respiratory symptoms. UA without infection.He is on 2L NC.  -Continue Vanc/Zosyn D1 on 4/11/2018 for HCAP coverage given recent hospital admission, anticipate can narrow tomorrow if comtinues to improve  -Follow Blood Cx, sputum Cx, Urine for Legionella and Strep Ag  -Continue IVF at 100 cc/hr RL       #Stage IV B Mantle cell lymphoma s/p Cyc 1 BR (3/29)-Diagnosed in Feb 2018-Follows Dr. Adan   Pt had symptoms of abdominal bloating/discomfort and loss of appetite for 2 months. Underwent imaging workup by primary care provider with CT abdominal and pelvis on 02/25/18 showing extensive intra- abdominal and inguinal lymphadenopathy with splenomegaly. FNA of the lymph node on 03/14 which came back with findings consistent with mantle cell lymphoma. 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 Proliferation index was estimated to be 50%. The marrow biopsy performed showed bone marrow  involvement by mantle cell lymphoma 40-50%. Fish came back positive for translocation 11:14 consistent with mantle cell lymphoma. PET CT scan extensive hypermetabolic adenopathy of cervical, axillary, mediastinal, hilar, intra-abdominal, retroperitoneal inguinal but the largest measuring 15.5 x 6.9 cm.     Started on Bendamustine 90 mg/m2 day 1 and 2 in combination with Rituxan 375 mg/m2 q.4 weeks- Cycle 1 on 03/29/18. Was admitted to the hospital subsequently for observation for tumor lysis syndrome and subsequently discharged home after improvement in blood work.Plan is to proceed with 6 total cycles followed by repeat PET scan and have response achieved, consider proceeding with maintenance Rituxan.    -Currently he is Day 14. Counts look ok, no neutropenia, no TLS concerns.  -Will f/u with Dr. Adan for next cycle  -cont home allopurinol      #Emesis x1- QTc not prolonged, anti-emetics ordered, cont home prevacid  #Diarrhea/Constipation- as above, by his report he has constipation for a couple days then a bout of diarrhea, C.ff here is negative.   -Continue IVF as above       #CAD/NSTEMI Hx s/p stents  -cont home ASA and statin  -hold lisinopril, will resume when BP improves  -reduce metoprolol from home 100 to 50 given sepsis     Diet- High KCal and Protein  DVT PPx- Padua 5, change from Heparin to Lovenox  Code- Full  Dispo- Pending improvement in infectious symptoms and transition to oral po regimen     Pt seen and plan discussed with Dr. Michaud.    Rajwinder Ng  Internal Medicine PGY2  [id number]       [facility] (7D) Attending Progress Note    The patient was discussed on morning rounds with the nurses, mid-level provider, and house staff and seen and examined by me. All labs and imaging were reviewed. I reviewed events over the last 24 hours including vitals and flow sheets. I agree with the above note and have been responsible for the care plan and interpretation of progress. Overall, the patient  is a 76 yo with mantle cell NHL admitted last PM with fever and a RLL infiltrate. He was started on IVF and antibiotics. Today he is feeling OK but he did have a Tmax of 103.2. BC are negative. A creat is down to 1.07.     There is no evidence of mucositis or adenopathy on exam and lungs show mild crackles. There is no edema or rash.     The patient has been informed on progress and all questions have been answered. We also discussed plans for the next few days. Given the fever, he is not ready to go and we discussed having at least 24 hours without fever before changing to PO antibiotics.     John Michaud MD

## 2018-04-12 NOTE — PROGRESS NOTES
[facility], [place]   Antimicrobial Management Team (AMT) Note              To: Heme/Onc  Unit: 5C  Allergies   Allergen Reactions     Niacin      Severe rash and itching     No Clinical Screening - See Comments      Prevacid [Lansoprazole] Diarrhea     Patient notes diarrhea with 30mg generic version. Patient does ok with 15mg in generic and brand name.     Shellfish Allergy      One kind     Brief Summary: Francisco Javier Cortes is a 77 year old male admitted on 4/11/2018 with fever and concerns for sepsis. Notably, he was recently diagnosed with mantle cell lymphoma and was admitted to [facility] from 3/29-4/1 for TLS monitoring (no antibiotics that admission) following his first cycle of chemotherapy with bendamustine and rituximab. His past medical history is otherwise significant for a history of prostate cancer s/p prostatectomy and CAD/STEMI s/p stents.    HPI: He presented to the [place] ED where he was noted to be febrile and hypotensive, improved with fluid resuscitation. Initial lactate was 3, improved to 2.1. Urine and blood cultures were drawn. CXR showed R infiltrate vs atelectasis so he was started on vancomycin, cefepime, and azithromycin and transferred to [facility]. On admission to [facility], labs were notable for a procalcitonin of 0.99 and lactate of 3.3. CXR showed possible new R middle lobe consolidation. He was started on Zosyn and vancomycin on admission. Since admission, he has endorsed chills, rigors, and an occasionally productive mild cough but reportedly feels better today. His WBC is WNL and he is on 2L oxygen.  Lactate has trended down to 1.7 and blood cultures are no growth to date. Sputum culture has not been successfully collected. UA on admission had few bacteria; culture in progress. RVP, urine Strep pneumo antigen, and urine Legionella antigen are negative. VRE swab pending. He had a few episodes of loose stool yesterday, and C Diff PCR is negative. Patient has been  afebrile since last evening but has been getting acetaminophen.    Assessment:   Possible pneumonia - HCAP - Patient has been afebrile but remains on oxygen and vitally stable. Respiratory symptoms are mild and improving. Cultures and workup done thus far are negative. No other source of infection has been identified. Patient is on day 2 of IV antibiotics with Zosyn and vancomycin. He meets HCAP criteria due to his recent hospital stay. Broad spectrum antibiotics are warranted to cover for potential multidrug-resistant organisms, however, there is no evidence of aspiration so patient does not require anaerobic coverage.  Suspicion for MRSA is very low based on lack of history of MRSA infection and minimal consolidation on imaging (reviewed by ID staff). MRSA nasal swab has not been collected.      Recommendations:  1. Discontinue vancomycin.   2. Change Zosyn to cefepime 2 g IV every 8 hours or levofloxacin 750 mg PO every 24 hours pending patient's clinical status.  3. Limit treatment duration to a total of 5-7 days pending clinical improvement.  4. If highly suspicious for MRSA infection, could consider obtaining MRSA nares to identify patient's MRSA colonization status.    Discussed with ID Staff - Dr. Manolo Finley, PharmD  PGY-2 Hematology/Oncology Pharmacy Resident    Current Anti-infective Orders:  Anti-infectives (Future)    Start     Dose/Rate Route Frequency Ordered Stop    04/11/18 0500  vancomycin (VANCOCIN) 1,500 mg in sodium chloride 0.9 % 250 mL intermittent infusion      1,500 mg  over 90 Minutes Intravenous EVERY 12 HOURS 04/11/18 0455      04/11/18 0315  piperacillin-tazobactam (ZOSYN) 3.375 g vial to attach to  mL bag      3.375 g  over 30 Minutes Intravenous EVERY 6 HOURS 04/11/18 0311            Vitals and other clinical features  Vitals       04/10 0700  -  04/11 0659 04/11 0700  -  04/12 0659 04/12 0700  -  04/12 1252   Most Recent    Temp ( F) 98.4 -  102.4    97.4 -   103.2    97.8 -  99     97.8 (36.6)    Heart Rate 102 -  125    86 -  134    74 -  109     93    Resp 22 -  (!)36    18 -  30    22 -  (!)34     22    /62 -  144/75    103/61 -  153/85    108/66 -  110/76     110/76    SpO2 (%) 93 -  98    92 -  99    93 -  97     97        Temperature curve:      Labs  Estimated Creatinine Clearance: 55.2 mL/min (based on Cr of 1.07).  Recent Labs   Lab Test  04/12/18 0315 04/11/18 0354 04/09/18   0740  04/05/18   0840  04/02/18   1151  04/01/18   0535   CR  1.07  1.02  1.04  1.13  1.35*  1.41*       Recent Labs   Lab Test  04/12/18 0315 04/11/18 0354 04/09/18   0740  04/05/18   0840  04/02/18   1151 04/01/18   0535  03/31/18   0631  03/29/18 2005   WBC  5.3  9.7  5.8  7.9  12.1*  14.4*  14.5*  11.4*   ANEU  4.5   --   4.3  6.4  10.1*  12.4*  12.7*  10.4*   ALYM  0.4*   --   0.5*  0.5*  1.3  0.3*  1.3  0.4*   KARELY  0.3   --   0.8  0.8  0.7  0.7  0.4  0.4   AEOS  0.1   --   0.1  0.1   --   0.0  0.0  0.0   HGB  8.7*  10.1*  10.9*  11.3*  13.1*  11.1*  11.4*  12.7*   HCT  27.2*  31.2*  33.8*  35.2*  40.6  33.8*  34.8*  39.2*   MCV  92  92  93  93  92  90  91  92   PLT  49*  50*  94*  93*  86*  77*  75*  42*       Recent Labs   Lab Test  04/12/18 0315 04/11/18 0354 04/09/18   0740  04/05/18   0840  04/02/18   1151  03/29/18 2005   BILITOTAL  0.6  0.6  0.5  0.5  0.6  0.6   ALKPHOS  69  76  95  81  94  110   ALBUMIN  2.0*  2.4*  2.8*  2.6*  3.0*  3.0*   AST  44  46*  64*  47*  69*  116*   ALT  23  25  30  27  36  35       Recent Labs   Lab Test  04/12/18   0315  04/11/18   1743   04/11/18   0354   LACT  1.7  3.1*   < >   --    PCAL   --    --    --   0.99    < > = values in this interval not displayed.     No lab results found.    Invalid input(s): AMIK    Culture results with specimen source  Culture Micro   Date Value Ref Range Status   04/12/2018 Canceled, Test credited  Final   04/12/2018 Duplicate request  Final   04/12/2018 (A)  Final    Canceled, Test  credited  >10 Squamous epithelial cells/low power field indicates oral contamination. Please   recollect.     04/12/2018 Called to  KETURAH DANIEL RN 4/12/18 0420. DANE    Final   04/11/2018 No growth  Final   04/11/2018 No growth after 1 day  Preliminary   04/11/2018 No growth after 1 day  Preliminary    Specimen Description   Date Value Ref Range Status   04/12/2018 Sputum  Final   04/12/2018 Sputum  Final   04/12/2018 Sputum  Final   04/12/2018 Sputum  Final   04/12/2018 Sputum  Final   04/11/2018 Urine  Final   04/11/2018 Urine  Final        Urine Studies     Recent Labs   Lab Test  04/11/18   1113   URINEPH  5.0   NITRITE  Negative   LEUKEST  Negative   WBCU  1       CSF Testing  No lab results found.    Respiratory Virus Testing    Respiratory Virus Source   Date Value Ref Range Status   04/11/2018 Nasopharyngeal  Final     Influenza A   Date Value Ref Range Status   04/11/2018 Negative NEG^Negative Final     Influenza A, H1   Date Value Ref Range Status   04/11/2018 Negative NEG^Negative Final     Influenza A, H3   Date Value Ref Range Status   04/11/2018 Negative NEG^Negative Final     Influenza A 2009 H1N1   Date Value Ref Range Status   04/11/2018 Negative NEG^Negative Final     Influenza B   Date Value Ref Range Status   04/11/2018 Negative NEG^Negative Final     Respiratory Syncytial Virus A   Date Value Ref Range Status   04/11/2018 Negative NEG^Negative Final     Respiratory Syncytial Virus B   Date Value Ref Range Status   04/11/2018 Negative NEG^Negative Final     Parainfluenza Virus 1   Date Value Ref Range Status   04/11/2018 Negative NEG^Negative Final     Parainfluenza Virus 2   Date Value Ref Range Status   04/11/2018 Negative NEG^Negative Final     Parainfluenza Virus 3   Date Value Ref Range Status   04/11/2018 Negative NEG^Negative Final     Human Metapneumovirus   Date Value Ref Range Status   04/11/2018 Negative NEG^Negative Final     Human Rhinovirus   Date Value Ref Range Status   04/11/2018  Negative NEG^Negative Final     Adenovirus Species B/E   Date Value Ref Range Status   04/11/2018 Negative NEG^Negative Final     Adenovirus Species C   Date Value Ref Range Status   04/11/2018 Negative NEG^Negative Final     Last check of C difficile  C Diff Toxin B PCR   Date Value Ref Range Status   04/11/2018 Negative NEG^Negative Final     Comment:     Negative: Clostridium difficile target DNA sequences NOT detected, presumed   negative for Clostridium difficile toxin B or the number of bacteria present   may be below the limit of detection for the test.  FDA approved assay performed using [id number] GeneXpert real-time PCR.  A negative result does not exclude actual disease due to Clostridium difficile   and may be due to improper collection, handling and storage of the specimen   or the number of organisms in the specimen is below the detection limit of the   assay.         Imaging:  Xr Chest Port 1 View    Result Date: 4/11/2018  XR CHEST PORT 1 VW  4/11/2018 3:20 AM  HISTORY: assess for pna; COMPARISON: CT chest 3/5/2018, chest radiograph 6/16/2014 FINDINGS: Single portable AP chest semiupright. Right IJ Port-A-Cath tip projects over the low SVC. Critics silhouette is stable. Extensive adenopathy, better seen on PET CT study of 3/24/2018. Right middle lobe nodule with possible new consolidation. No pleural effusion or pneumothorax.     IMPRESSION: 1. Possible new right middle lobe consolidation? 2. Tumor adenopathy of the margarita and mediastinum again noted. 3. Right greater than left hilar adenopathy similar to the comparison PET/CT. I have personally reviewed the examination and initial interpretation and I agree with the findings. SHARATH VALLADARES MD

## 2018-04-12 NOTE — PROGRESS NOTES
Responded to Rapid Response call. RRT was called due to high lactic acid. Patient was on room air, RR 22/min, SpO2 95%. Respiratory interventions not required. Patient was stabilized and remained on unit.    Hesham Sandhu, RT  4/12/2018 2:22 PM

## 2018-04-12 NOTE — PROVIDER NOTIFICATION
RRT called due to lactic acid of 5.7. Patient on RA, tachycardiac and tachypneic (RR 26-34) 2/2 fever (103.0 F). Rajwinder Ng MD assessed patient with resource float and RT, patient stable at this time. Will give 1 L bolus and recheck lactic acid level in couple hours. Continue to monitor closely.

## 2018-04-12 NOTE — PROVIDER NOTIFICATION
04/12/18 1400   Call Information   Date of Call 04/12/18   Time of Call 1415   Name of person requesting the team Jacquelyn FAITH   Title of person requesting team RN   RRT Arrival time 1414   Time RRT ended 1441   Reason for call   Type of RRT Adult   Primary reason for call Sepsis suspected   Sepsis Suspected Elevated Lactate level;Temperature <95 or >101;Heart Rate > 100;RR > 20, SaO2 <90% OR increasing O2 need   Was patient transferred from the ED, ICU, or PACU within last 24 hours prior to RRT call? No   SBAR   Situation Lactic 5.7, temp- 103, RR-40, HR-141   Background Mantle cell lymphoma, h/o protrate CA, h/o NSTEMI   Notable History/Conditions Cancer;Cardiac;Diabetes   Assessment HR down to 110, RR down to 28, Room air sats 95%   Interventions Fluid bolus   Adjustments to Recommend 1-Liter IV FB, repeat lactic   Patient Outcome   Patient Outcome Stabilized on unit   RRT Team   Attending/Primary/Covering Physician Dr Tomasa Berry Attending Physician notified 04/12/18   Physician(s) Dr Rajwinder Ng   Lead RN Nick FAITH   RT Hesham NUÑEZ        04/12/18 1400   Call Information   Date of Call 04/12/18   Time of Call 1415   Name of person requesting the team Jacquelyn FAITH   Title of person requesting team RN   RRT Arrival time 1414   Time RRT ended 1441   Reason for call   Type of RRT Adult   Primary reason for call Sepsis suspected   Sepsis Suspected Elevated Lactate level;Temperature <95 or >101;Heart Rate > 100;RR > 20, SaO2 <90% OR increasing O2 need   Was patient transferred from the ED, ICU, or PACU within last 24 hours prior to RRT call? No   SBAR   Situation Lactic 5.7, temp- 103, RR-40, HR-141   Background Mantle cell lymphoma, h/o protrate CA, h/o NSTEMI   Notable History/Conditions Cancer;Cardiac;Diabetes   Assessment HR down to 110, RR down to 28, Room air sats 95%   Interventions Fluid bolus   Adjustments to Recommend 1-Liter IV FB, repeat lactic   Patient Outcome   Patient Outcome Stabilized on unit    RRT Team   Attending/Primary/Covering Physician Dr Randolph   Date Attending Physician notified 04/12/18   Physician(s) Dr Rajwinder Ng   Lead RN Nick NUÑEZ

## 2018-04-13 NOTE — PROGRESS NOTES
HEME/ONC Progress Note   Francisco Javier Cortes ([id number]) admitted on 4/11/2018 04/13/2018         Assessment and Plan   77yoM w/ Hx of Prostate Ca s/p Prostatectomy, CAD/NSTEMI s/p stents (EF recently ~60%), and now recently diagnosis of Mantle Cell Lymphoma (IV B - Bendamustine/Rituxan started 3/29 - admitted 3/29-4/1 for TLS monitoring) presenting with Sepsis likely due to HCAP.      #HCAP  Patient with elevated cough, hypoxia, hypotension, tachycardia with imaging with RML infiltrate. He continues to have daily fevers despite vancomycin, zosyn and will add atypical coverage with azithromycin today. Note Legionella ag in urine negative. Will add MRSA swab for screening and if negative, discontinue vancomycin. Other cultures have been negative.   - Repeat CXR  - Azithromycin  - Zosyn  - MRSA swab --> if negative will d/c vanco     #Stage IV B Mantle cell lymphoma s/p Cyc 1 BR (3/29)-Diagnosed in Feb 2018-Follows Dr. Adan   Pt had symptoms of abdominal bloating/discomfort and loss of appetite for 2 months. Underwent imaging workup by primary care provider with CT abdominal and pelvis on 02/25/18 showing extensive intra- abdominal and inguinal lymphadenopathy with splenomegaly. FNA of the lymph node on 03/14 which came back with findings consistent with mantle cell lymphoma. 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 Proliferation index was estimated to be 50%. The marrow biopsy performed showed bone marrow involvement by mantle cell lymphoma 40-50%. Fish came back positive for translocation 11:14 consistent with mantle cell lymphoma. PET CT scan extensive hypermetabolic adenopathy of cervical, axillary, mediastinal, hilar, intra-abdominal, retroperitoneal inguinal but the largest measuring 15.5 x 6.9 cm.      Started on Bendamustine 90 mg/m2 day 1 and 2 in combination with Rituxan 375 mg/m2 q.4 weeks- Cycle 1 on  03/29/18. Was admitted to the hospital subsequently for observation for tumor lysis syndrome and subsequently discharged home after improvement in blood work.Plan is to proceed with 6 total cycles followed by repeat PET scan and have response achieved, consider proceeding with maintenance Rituxan.     -Currently he is Day 15. Counts look ok, no neutropenia, no TLS concerns.  -Will f/u with Dr. Adan for next cycle  -cont home allopurinol     #Emesis x1- QTc not prolonged, anti-emetics ordered, cont home prevacid  #Diarrhea/Constipation- as above, by his report he has constipation for a couple days then a bout of diarrhea, C.ff here is negative.        #CAD/NSTEMI Hx s/p stents  -cont home ASA and statin  -hold lisinopril, will resume when BP improves  -reduce metoprolol from home 100 to 50 given sepsis      Diet- High KCal and Protein  DVT PPx- Padua 5, Lovenox  Code- Full  Dispo- Pending improvement in infectious symptoms and transition to oral po regimen     Rajwinder Ng MD  Internal Medicine PGY2  [id number]    Patient was seen and discussed with attending physician Dr. Michaud who agrees with above assessment and plan.         Subjective   Patient reports that he is feeling well this morning. He is sitting up in chair, not using oxygen. Breathing feels okay this morning. He notes that he has 3-4 small loose bowel movements a day and reports that he has had some leaking of stool since he started his chemotherapy. No abdominal pain. No dysuria/hematuria.     4 point ROS otherwise negative         Medications   Listed below for reference         Vitals and Exam     Physical exam:  BP (!) 137/96  Pulse 92  Temp 98.4  F (36.9  C) (Oral)  Resp 18  Wt 80.3 kg (177 lb 0.5 oz)  SpO2 96%  BMI 29.46 kg/m2  Wt Readings from Last 2 Encounters:   04/13/18 80.3 kg (177 lb 0.5 oz)   04/09/18 75.1 kg (165 lb 8 oz)     I/O last 3 completed shifts:  In: 4815 [P.O.:1050; I.V.:2515; IV Piggyback:1250]  Out: 1300 [Urine:1200;  Stool:100]    Physical Exam:   General: male sitting up in chair, NAD.   HEENT: Sclerae non-icteric. MMM. PERRL, EOMI.  Cardiac: Normal rate, regular rhythm. No m/r/g. Normal S1, S2.  Pulm: Normal respiratory effort. Crackles in bilateral bases, R>L.   Abd: Soft, non distended, non tender abdomen. Normoactive bowel sounds. No hepatosplenomegaly.  Skin: No jaundice, no rash.  Extremities: No LE edema. No cyanosis. No clubbing.  Joints: No inflammation noted on joints.  Neuro: A&Ox3, no focal deficits. CNIII-XII grossly non-focal. Normal speech.   Psych: Euthymic mood, full affect         Labs   CBC  Recent Labs  Lab 04/13/18  0852 04/12/18 0315 04/11/18  0354 04/09/18  0740   WBC 4.7 5.3 9.7 5.8   RBC 3.58* 2.96* 3.41* 3.62*   HGB 10.6* 8.7* 10.1* 10.9*   HCT 33.0* 27.2* 31.2* 33.8*   MCV 92 92 92 93   MCH 29.6 29.4 29.6 30.1   MCHC 32.1 32.0 32.4 32.2   RDW 17.7* 17.2* 16.7* 15.9*   PLT 49* 49* 50* 94*       BMP  Recent Labs  Lab 04/13/18  0334 04/12/18 0315 04/11/18  0354 04/09/18  0740   * 144 143 141   POTASSIUM 3.7 3.9 4.2 4.0   CHLORIDE 115* 114* 110* 107   CO2 18* 22 15* 25   ANIONGAP  --  9 17* 9   GLC 78 95 141* 147*   BUN 19 19 20 15   CR 1.09 1.07 1.02 1.04   GFRESTIMATED 65 67 70 69   GFRESTBLACK 79 81 85 83   EVANGELINA 6.9* 6.9* 7.0* 7.8*   MAG  --   --  2.1  --    PHOS  --   --  3.4 2.2*        INR  Recent Labs  Lab 04/11/18  0354   INR 1.21*       Liver panel  Recent Labs  Lab 04/12/18 0315 04/11/18  0354 04/09/18  0740   PROTTOTAL 4.9* 5.5* 6.1*   ALBUMIN 2.0* 2.4* 2.8*   BILITOTAL 0.6 0.6 0.5   ALKPHOS 69 76 95   AST 44 46* 64*   ALT 23 25 30       Urinalysis  Recent Labs   Lab Test  04/11/18   1113   COLOR  Yellow   APPEARANCE  Clear   URINEGLC  Negative   URINEBILI  Negative   URINEKETONE  Negative   SG  1.020   UBLD  Small*   URINEPH  5.0   PROTEIN  30*   NITRITE  Negative   LEUKEST  Negative   RBCU  <1   WBCU  1       Cultures  Blood  - (4/11/18) x 2: NGTD  - (4/12/18) x 1: NGTD  Urine  (4/11/18): NGTD  Sputum (4/12/18): NGTD    Imaging/procedure results:  XR CHEST PORT 1 VW  4/11/2018 3:20 AM        IMPRESSION:   1. Possible new right middle lobe consolidation?  2. Tumor adenopathy of the margarita and mediastinum again noted.  3. Right greater than left hilar adenopathy similar to the comparison  PET/CT.    Medications  Current Facility-Administered Medications   Medication     azithromycin (ZITHROMAX) 500 mg in sodium chloride 0.9 % 250 mL intermittent infusion     acetaminophen (TYLENOL) tablet 325-650 mg     acetaminophen (TYLENOL) tablet 325-650 mg     prochlorperazine (COMPAZINE) injection 5-10 mg    Or     prochlorperazine (COMPAZINE) tablet 5-10 mg     LORazepam (ATIVAN) injection 0.5-1 mg    Or     LORazepam (ATIVAN) tablet 0.5-1 mg     piperacillin-tazobactam (ZOSYN) 3.375 g vial to attach to  mL bag     allopurinol (ZYLOPRIM) tablet 300 mg     aspirin EC EC tablet 81 mg     pantoprazole (PROTONIX) EC tablet 20 mg     metoprolol succinate (TOPROL-XL) 24 hr tablet 50 mg     rosuvastatin (CRESTOR) tablet 40 mg     vancomycin (VANCOCIN) 1,500 mg in sodium chloride 0.9 % 250 mL intermittent infusion     meperidine (DEMEROL) injection 25 mg     naloxone (NARCAN) injection 0.1-0.4 mg     enoxaparin (LOVENOX) injection 40 mg     [facility] Malignancy Service (7D) Attending Progress Note    The patient was discussed on morning rounds with the nurses, mid-level provider, and house staff and seen and examined by me. All labs and imaging were reviewed. I reviewed events over the last 24 hours including vitals and flow sheets. I agree with the above note and have been responsible for the care plan and interpretation of progress. Overall, the patient is a 76 yo with mantle cell NHL admitted last PM with fever and a RLL infiltrate. He was started on IVF and antibiotics. Today he is feeling OK but he did have a Tmax of 102.1. BC are negative. A creat is down to 1.09. Despite antibiotics, this is his thrid  day spiking a fever. We added azithro to his antibiotic regimen.      There is no evidence of mucositis or adenopathy on exam and lungs show mild crackles. There is no edema or rash.     The patient has been informed on progress and all questions have been answered. We also discussed plans for the next few days. Given the fever, he is not ready to go and we discussed having at least 24 hours without fever before changing to PO antibiotics.     John Michaud MD

## 2018-04-13 NOTE — PLAN OF CARE
Problem: Patient Care Overview  Goal: Plan of Care/Patient Progress Review  Afebrile, VSS on 2L NC, alert and oriented, tylenol x1 for back pain, 3 loose stools overnight, up with stand by assist, plans to order breakfast this morning.          04/12/18 1817   OTHER   Plan Of Care Reviewed With patient   Plan of Care Review   Progress no change       Problem: Sepsis/Septic Shock (Adult)  Goal: Signs and Symptoms of Listed Potential Problems Will be Absent, Minimized or Managed (Sepsis/Septic Shock)  Signs and symptoms of listed potential problems will be absent, minimized or managed by discharge/transition of care (reference Sepsis/Septic Shock (Adult) CPG).    04/12/18 2000   Sepsis/Septic Shock   Problems Assessed (Sepsis) all   Problems Present (Sepsis) hypoxia/hypoxemia;infection progression;situational response;undernutrition

## 2018-04-13 NOTE — PLAN OF CARE
Problem: Patient Care Overview  Goal: Plan of Care/Patient Progress Review  Outcome: No Change  VSS, A&O X4. Afebrile.  Repeat CXR in a.m.  Azithromycin added to abx coverage.  MRSA swab pending, if negative will dc vanco.  Dyspnea with exertion, on RA.  RR 20-24.  Denies SOB at rest.  Incontinent of stool and urine at times.  Up with SBA.  +1 edema to BLE.  Minimal appetite, just ordered lunch.  Jd counts started today.  Intermittent complaints of nausea, reports compazine only minimally effective.  MD added zofran.

## 2018-04-13 NOTE — PLAN OF CARE
Problem: Patient Care Overview  Goal: Plan of Care/Patient Progress Review   04/13/18 1817   OTHER   Plan Of Care Reviewed With patient   Plan of Care Review   Progress no change     /67 (BP Location: Right arm)  Pulse 92  Temp 98.6  F (37  C) (Oral)  Resp 20  Wt 80.3 kg (177 lb 0.5 oz)  SpO2 96%  BMI 29.46 kg/m2     Patient C/O nausea, given Zofran with relief.  On Regular diet and calorie count with poor appetite.  Only ate couple of bites of [place] toast and 10 % of hard boiled eggs.  Lungs diminished in bases, dyspnea with activity, sating mid 90s on room air.  Incontinent of urine, wears a depend, 2 BMs this shift.  Up with SBA.   Alert and oriented x 4.  Continue with POC.

## 2018-04-14 NOTE — PROGRESS NOTES
Calorie Count  Intake recorded for 4/13/18. Kcals: 44  Protein: 2g  # Meals Recorded bites of hard boiled egg and bites of Citizen of [place] toast.   # Supplements Recorded 0

## 2018-04-14 NOTE — PLAN OF CARE
Problem: Sepsis/Septic Shock (Adult)  Goal: Signs and Symptoms of Listed Potential Problems Will be Absent, Minimized or Managed (Sepsis/Septic Shock)  Signs and symptoms of listed potential problems will be absent, minimized or managed by discharge/transition of care (reference Sepsis/Septic Shock (Adult) CPG).   Outcome: No Change  Neuro: A&Ox4.   Cardiac: Irregular rhythm on assessment this AM. Tele put on-MD notified of tachy irregular HR. EKG ST premature supraventricular complexes. HR low 90's-low 100's. T max 99.7  Respiratory: Sating 96% on RA. No SOB. LS clear, diminished and fine crackles in bases. Lasix 20mg x1 given.   GI/: Adequate urine output. Diarrhea overnight, no episodes today. Imodium x2 given.   Diet/appetite: High jd High protein diet. Jd counts. Poor appetite.   Activity: SBA  Pain: At acceptable level on current regimen. Denies pain  Skin: BLE edema  LDA's: R port     Plan: IV antibiotics transitioned to PO. Possible discharge after weekend if afebrile. Continue with POC. Notify primary team with changes.     04/14/18 0745 04/14/18 9219   Sepsis/Septic Shock   Problems Assessed (Sepsis) --  all   Problems Present (Sepsis) situational response --           82

## 2018-04-14 NOTE — PLAN OF CARE
Problem: Patient Care Overview  Goal: Plan of Care/Patient Progress Review  s-pt continues to be afebrile w/iv antibiotics d/c'd and changed to oral route. Pt up to BR to freq 'get rid  Of this fluid so I can sleep' per pt after lasix given. States when he voids he also has a bm. Pt will be given 4th immodium dose at hs. Not wanting to ordered meal this gennaro-states the antiemetics do work.   b-pt with s/s of recovering status from infection  A-pt able to manage self well to and from BR at this time. Encouraged to call for help with this. If nees to. Emphasized need to have safe activity martín if not eating.    r-encourage pt to eat more and offered antiemetics atc to have better coverage of nausea- encourage freq small bland meal.

## 2018-04-14 NOTE — PROGRESS NOTES
HEME/ONC Progress Note   Francisco Javier Cortes ([id number]) admitted on 4/11/2018 04/14/2018         Assessment and Plan   77yoM w/ Hx of Prostate Ca s/p Prostatectomy, CAD/NSTEMI s/p stents (EF recently ~60%), and now recently diagnosis of Mantle Cell Lymphoma (IV B - Bendamustine/Rituxan started 3/29 - admitted 3/29-4/1 for TLS monitoring) presenting with Sepsis likely due to HCAP.      #HCAP  Patient with elevated cough, hypoxia, hypotension, tachycardia with imaging with RML infiltrate. He continues to have daily fevers despite vancomycin, zosyn and will add atypical coverage with azithromycin today. Note Legionella ag in urine negative. MRSA swab neg, DC Vancomycin on 4/13. Other cultures have been negative. Repeat CXR on 4/13 showed some atelectasis/infection in base and some fluid oon rt major fissure. Pt has not been febrile for the last 24 hrs.   -Stop zosyn and azithromycin (Zosyn 4/11 to 4/14), Azithromcyin 4/13 to 4/14), start  PO Levaquin 750 mg daily for a total of 2 weeks of Abx (upto 4/25)     #Stage IV B Mantle cell lymphoma s/p Cyc 1 BR (3/29)-Diagnosed in Feb 2018-Follows Dr. Adan   Pt had symptoms of abdominal bloating/discomfort and loss of appetite for 2 months. Underwent imaging workup by primary care provider with CT abdominal and pelvis on 02/25/18 showing extensive intra- abdominal and inguinal lymphadenopathy with splenomegaly. FNA of the lymph node on 03/14 which came back with findings consistent with mantle cell lymphoma. 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 Proliferation index was estimated to be 50%. The marrow biopsy performed showed bone marrow involvement by mantle cell lymphoma 40-50%. Fish came back positive for translocation 11:14 consistent with mantle cell lymphoma. PET CT scan extensive hypermetabolic adenopathy of cervical, axillary, mediastinal, hilar, intra-abdominal,  retroperitoneal inguinal but the largest measuring 15.5 x 6.9 cm.      Started on Bendamustine 90 mg/m2 day 1 and 2 in combination with Rituxan 375 mg/m2 q.4 weeks- Cycle 1 on 03/29/18. Was admitted to the hospital subsequently for observation for tumor lysis syndrome and subsequently discharged home after improvement in blood work.Plan is to proceed with 6 total cycles followed by repeat PET scan and have response achieved, consider proceeding with maintenance Rituxan.     -Currently he is Day 16. Counts look ok, no neutropenia, no TLS concerns.  -Will f/u with Dr. Adan for next cycle  -cont home allopurinol    Thrombocytopenia plt 43 today: from chemotherapy  -no bleeding, goal plt >10K     #Emesis x1- QTc not prolonged, anti-emetics ordered, cont home prevacid  #Diarrhea/Constipation- as above, by his report he has constipation for a couple days then a bout of diarrhea, C.ff here is negative. Diarrhea could be from chemo. Had loperomide yesterday which improved diarrhea.   -continue Loperomide prn       #CAD/NSTEMI Hx s/p stents  -cont home ASA and statin  -will give one dose of iv lasix today 20 mg iv  -Will resume home lisinopril tomorrow  -reduce metoprolol from home 100 to 50 given sepsis, will increase to home dose of 100 mg tomorrow      Diet- High KCal and Protein  DVT PPx- Padua 5, Lovenox  Code- Full  Dispo- Pending improvement in infectious symptoms and transition to oral po regimen         Patient was seen and discussed with attending physician Dr. Michaud who agrees with above assessment and plan.         Subjective   Patient reports that he is feeling well this morning. Breathing feels okay this morning. He notes that he has 3-4 small loose bowel movements a day and reports that he has had some leaking of stool since he started his chemotherapy. No abdominal pain. No dysuria/hematuria.     4 point ROS otherwise negative         Medications   Listed below for reference         Vitals and Exam      Physical exam:  /82 (BP Location: Right arm)  Pulse 92  Temp 98.7  F (37.1  C) (Axillary)  Resp 20  Wt 80.3 kg (177 lb 0.5 oz)  SpO2 97%  BMI 29.46 kg/m2  Wt Readings from Last 2 Encounters:   04/13/18 80.3 kg (177 lb 0.5 oz)   04/09/18 75.1 kg (165 lb 8 oz)     I/O last 3 completed shifts:  In: 950 [P.O.:360; I.V.:590]  Out: 1400 [Urine:1400]    Physical Exam:   General: male comfortable in bed  NAD.   HEENT: Sclerae non-icteric. MMM. PERRL, EOMI.  Cardiac: Normal rate, regular rhythm. No m/r/g. Normal S1, S2.  Pulm: Normal respiratory effort. Crackles in bilateral bases, R>L.   Abd: Soft, non distended, non tender abdomen. Normoactive bowel sounds. No hepatosplenomegaly.  Skin: No jaundice, no rash.  Extremities: some LE edema b/l above ankle pitting. No cyanosis. No clubbing.  Joints: No inflammation noted on joints.  Neuro: A&Ox3, no focal deficits. CNIII-XII grossly non-focal. Normal speech.   Psych: Euthymic mood, full affect         Labs   CBC    Recent Labs  Lab 04/13/18  0852 04/12/18 0315 04/11/18  0354 04/09/18  0740   WBC 4.7 5.3 9.7 5.8   RBC 3.58* 2.96* 3.41* 3.62*   HGB 10.6* 8.7* 10.1* 10.9*   HCT 33.0* 27.2* 31.2* 33.8*   MCV 92 92 92 93   MCH 29.6 29.4 29.6 30.1   MCHC 32.1 32.0 32.4 32.2   RDW 17.7* 17.2* 16.7* 15.9*   PLT 49* 49* 50* 94*       BMP    Recent Labs  Lab 04/14/18  0424 04/13/18  0334 04/12/18  0315 04/11/18  0354 04/09/18  0740   NA  --  145* 144 143 141   POTASSIUM  --  3.7 3.9 4.2 4.0   CHLORIDE  --  115* 114* 110* 107   CO2  --  18* 22 15* 25   ANIONGAP  --   --  9 17* 9   GLC  --  78 95 141* 147*   BUN  --  19 19 20 15   CR 1.16 1.09 1.07 1.02 1.04   GFRESTIMATED 61 65 67 70 69   GFRESTBLACK 74 79 81 85 83   EVANGELINA  --  6.9* 6.9* 7.0* 7.8*   MAG  --   --   --  2.1  --    PHOS  --   --   --  3.4 2.2*        INR    Recent Labs  Lab 04/11/18  0354   INR 1.21*       Liver panel    Recent Labs  Lab 04/12/18  0315 04/11/18  0354 04/09/18  0740   PROTTOTAL 4.9* 5.5* 6.1*    ALBUMIN 2.0* 2.4* 2.8*   BILITOTAL 0.6 0.6 0.5   ALKPHOS 69 76 95   AST 44 46* 64*   ALT 23 25 30       Urinalysis  Recent Labs   Lab Test  04/11/18   1113   COLOR  Yellow   APPEARANCE  Clear   URINEGLC  Negative   URINEBILI  Negative   URINEKETONE  Negative   SG  1.020   UBLD  Small*   URINEPH  5.0   PROTEIN  30*   NITRITE  Negative   LEUKEST  Negative   RBCU  <1   WBCU  1       Cultures  Blood  - (4/11/18) x 2: NGTD  - (4/12/18) x 1: NGTD  Urine (4/11/18): NGTD  Sputum (4/12/18): NGTD    Imaging/procedure results:  XR CHEST PORT 1 VW  4/11/2018 3:20 AM        IMPRESSION:   1. Possible new right middle lobe consolidation?  2. Tumor adenopathy of the margarita and mediastinum again noted.  3. Right greater than left hilar adenopathy similar to the comparison  PET/CT.    Medications  Current Facility-Administered Medications   Medication     azithromycin (ZITHROMAX) 500 mg in sodium chloride 0.9 % 250 mL intermittent infusion     acetaminophen (TYLENOL) tablet 325-650 mg     ondansetron (ZOFRAN-ODT) ODT tab 4 mg    Or     ondansetron (ZOFRAN) injection 4 mg     loperamide (IMODIUM) capsule 2 mg     prochlorperazine (COMPAZINE) injection 5-10 mg    Or     prochlorperazine (COMPAZINE) tablet 5-10 mg     LORazepam (ATIVAN) injection 0.5-1 mg    Or     LORazepam (ATIVAN) tablet 0.5-1 mg     piperacillin-tazobactam (ZOSYN) 3.375 g vial to attach to  mL bag     allopurinol (ZYLOPRIM) tablet 300 mg     aspirin EC EC tablet 81 mg     pantoprazole (PROTONIX) EC tablet 20 mg     metoprolol succinate (TOPROL-XL) 24 hr tablet 50 mg     rosuvastatin (CRESTOR) tablet 40 mg     meperidine (DEMEROL) injection 25 mg     naloxone (NARCAN) injection 0.1-0.4 mg     enoxaparin (LOVENOX) injection 40 mg     Heme Malignancy Service (7D) Attending Progress Note    The patient was discussed on morning rounds with the nurses, mid-level provider, and house staff and seen and examined by me. All labs and imaging were reviewed. I reviewed  events over the last 24 hours including vitals and flow sheets. I agree with the above note and have been responsible for the care plan and interpretation of progress. Overall, the patient is a 76 yo with mantle cell NHL admitted last PM with fever and a RLL infiltrate. He was started on IVF and antibiotics. Today he is feeling OK AND FOR THE FIRST TIME, he is 24 hours without fevers. BC are negative. A creat is down to 1.16. He seems to be making progress. We will switch him to po antibiotics today and if he remains afebrile, we may consider DC tomorrow.      There is no evidence of mucositis or adenopathy on exam and lungs show mild crackles. There is no edema or rash.     The patient has been informed on progress and all questions have been answered. We also discussed plans for the next few days.      John Michaud MD

## 2018-04-14 NOTE — PLAN OF CARE
Problem: Patient Care Overview  Goal: Plan of Care/Patient Progress Review  Outcome: Improving   04/14/18 0613   OTHER   Plan Of Care Reviewed With patient   Plan of Care Review   Progress progress toward functional goals as expected     A/VSS, afebrile. Pt denies pain. Imodium given x 2 for diarrhea. Pt had 3 bowel movements with incontinence overnight. Morning labs: Creat 1.16. No other labs ordered this morning. Will continue to monitor per plan of care.

## 2018-04-15 NOTE — PLAN OF CARE
Problem: Patient Care Overview  Goal: Plan of Care/Patient Progress Review  Outcome: Improving  Afebrile. AVSS. No complaints of pain, nausea or SOB. However RN noticed brief COLE after getting up to bathroom. Up independently. Minimal intake overnight. Reports little to no appetite. Slept overnight with no other complaints. Looking forward to possible D/C today. Continue to monitor.      04/15/18 0556   OTHER   Plan Of Care Reviewed With patient   Plan of Care Review   Progress improving       Problem: Sepsis/Septic Shock (Adult)  Goal: Signs and Symptoms of Listed Potential Problems Will be Absent, Minimized or Managed (Sepsis/Septic Shock)  Signs and symptoms of listed potential problems will be absent, minimized or managed by discharge/transition of care (reference Sepsis/Septic Shock (Adult) CPG).   Outcome: Improving       04/14/18 1700 04/15/18 0556   Sepsis/Septic Shock   Problems Assessed (Sepsis) all --    Problems Present (Sepsis) --  undernutrition

## 2018-04-15 NOTE — PROGRESS NOTES
Calorie Count  Intake recorded for: 4/14 Kcals: 25  Protein: 0g  # Meals Recorded: 50% applesauce   # Supplements Recorded: 0

## 2018-04-15 NOTE — DISCHARGE SUMMARY
[facility] Discharge Summary    Francisco Javier Cortes MRN# [id number]   Age: 77 year old YOB: 1940     Date of Admission:  4/11/2018  Date of Discharge::  4/15/2018  Admitting Physician:  Tyrone Reis MD  Discharge Physician:  Donald Flowers MD               Admission Diagnoses:   Health care associated pneumonia          Discharge Diagnosis:   Health care associated pneumonia         Procedures:   No procedures performed during this admission          Medications Prior to Admission:     Prescriptions Prior to Admission   Medication Sig Dispense Refill Last Dose     senna-docusate (SENOKOT-S;PERICOLACE) 8.6-50 MG per tablet Take 1-2 tablets by mouth 2 times daily Take while on oral narcotics to prevent or treat constipation. 30 tablet 0 Past Month at Unknown time     Acetaminophen (TYLENOL 8 HOUR PO) Take 500 mg by mouth as needed    Past Month at Unknown time     allopurinol (ZYLOPRIM) 300 MG tablet Take 1 tablet (300 mg) by mouth daily 30 tablet 1 Unknown at Unknown time     LORazepam (ATIVAN) 0.5 MG tablet Take 1 tablet (0.5 mg) by mouth every 4 hours as needed (Anxiety, Nausea/Vomiting or Sleep) (Patient not taking: Reported on 4/9/2018) 30 tablet 3 Unknown at Unknown time     ondansetron (ZOFRAN) 8 MG tablet Take 1 tablet (8 mg) by mouth every 8 hours as needed (Nausea/Vomiting) (Patient not taking: Reported on 4/9/2018) 10 tablet 3 Unknown at Unknown time     [DISCONTINUED] prochlorperazine (COMPAZINE) 10 MG tablet Take 0.5 tablets (5 mg) by mouth every 6 hours as needed (Nausea/Vomiting) 30 tablet 3 Unknown at Unknown time     prochlorperazine (COMPAZINE) 10 MG tablet Take 1 tablet (10 mg) by mouth every 6 hours as needed for nausea or vomiting 30 tablet 1 Unknown at Unknown time     [DISCONTINUED] HYDROcodone-acetaminophen (NORCO) 5-325 MG per tablet Take 1 tablet by mouth every 6 hours as needed for severe pain maximum 6 tablet(s) per day (Patient not taking: Reported on 4/9/2018) 15  tablet 0 Unknown at Unknown time     lisinopril (PRINIVIL/ZESTRIL) 20 MG tablet Take 1 tablet (20 mg) by mouth daily (Patient taking differently: Take 20 mg by mouth every morning ) 90 tablet 3 Unknown at Unknown time     metoprolol succinate (TOPROL-XL) 100 MG 24 hr tablet Take 1 tablet (100 mg) by mouth daily 90 tablet 3 Unknown at Unknown time     rosuvastatin (CRESTOR) 40 MG tablet Take 1 tablet (40 mg) by mouth daily (Patient taking differently: Take 40 mg by mouth every morning ) 90 tablet 3 Unknown at Unknown time     nitroGLYcerin (NITROSTAT) 0.4 MG sublingual tablet Place 1 tablet (0.4 mg) under the tongue every 5 minutes as needed up to 3 tablets per episode. (Patient not taking: Reported on 4/9/2018) 60 tablet 6 Unknown at Unknown time     clindamycin (CLEOCIN T) 1 % lotion Apply twice daily for 6 weeks to the groin 60 mL 1 Unknown at Unknown time     hydrocortisone valerate (WEST-NINOSKA) 0.2 % ointment Apply sparingly to affected area three times daily as needed. 45 g 3 Unknown at Unknown time     Lansoprazole (PREVACID PO) Take 20 mg by mouth every evening Patient needs to use brand name Prevacid (generic version causes diarrhea)    Unknown at Unknown time     omega 3 1000 MG CAPS Take 1 g by mouth 2 times daily (Patient taking differently: Take 1 g by mouth 2 times daily currently taking 1 tablet daily 3/5/18) 120 capsule 6 Unknown at Unknown time     MAALOX ADVANCED OR Take 1 tablet as needed (Calcium carbonate 1000mg/Simethicone 80mg)   Unknown at Unknown time     ASPIRIN 81 MG OR TABS 1 TABLET EVERY MORNING   Unknown at Unknown time             Discharge Medications:     Current Discharge Medication List      START taking these medications    Details   levofloxacin (LEVAQUIN) 750 MG tablet Take 1 tablet (750 mg) by mouth daily  Qty: 9 tablet, Refills: 0    Associated Diagnoses: HCAP (healthcare-associated pneumonia)         CONTINUE these medications which have NOT CHANGED    Details   senna-docusate  (SENOKOT-S;PERICOLACE) 8.6-50 MG per tablet Take 1-2 tablets by mouth 2 times daily Take while on oral narcotics to prevent or treat constipation.  Qty: 30 tablet, Refills: 0    Comments: While on narcotics  Associated Diagnoses: Postoperative pain      Acetaminophen (TYLENOL 8 HOUR PO) Take 500 mg by mouth as needed       allopurinol (ZYLOPRIM) 300 MG tablet Take 1 tablet (300 mg) by mouth daily  Qty: 30 tablet, Refills: 1    Associated Diagnoses: 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)      LORazepam (ATIVAN) 0.5 MG tablet Take 1 tablet (0.5 mg) by mouth every 4 hours as needed (Anxiety, Nausea/Vomiting or Sleep)  Qty: 30 tablet, Refills: 3    Associated Diagnoses: 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)      ondansetron (ZOFRAN) 8 MG tablet Take 1 tablet (8 mg) by mouth every 8 hours as needed (Nausea/Vomiting)  Qty: 10 tablet, Refills: 3    Associated Diagnoses: 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)      prochlorperazine (COMPAZINE) 10 MG tablet Take 1 tablet (10 mg) by mouth every 6 hours as needed for nausea or vomiting  Qty: 30 tablet, Refills: 1    Associated Diagnoses: Nausea      lisinopril (PRINIVIL/ZESTRIL) 20 MG tablet Take 1 tablet (20 mg) by mouth daily  Qty: 90 tablet, Refills: 3    Associated Diagnoses: Hypertension goal BP (blood pressure) < 130/80; Coronary artery disease involving native coronary artery of native heart without angina pectoris; Microalbuminuria      metoprolol succinate (TOPROL-XL) 100 MG 24 hr tablet Take 1 tablet (100 mg) by mouth daily  Qty: 90 tablet, Refills: 3    Associated Diagnoses: Hypertension goal BP (blood pressure) < 130/80; Coronary artery disease involving native coronary artery of native heart without angina pectoris      rosuvastatin (CRESTOR) 40 MG tablet Take 1 tablet (40 mg) by mouth daily  Qty: 90 tablet, Refills: 3    Associated Diagnoses: Hyperlipidemia LDL goal <100; Coronary artery disease involving  native coronary artery of native heart without angina pectoris      nitroGLYcerin (NITROSTAT) 0.4 MG sublingual tablet Place 1 tablet (0.4 mg) under the tongue every 5 minutes as needed up to 3 tablets per episode.  Qty: 60 tablet, Refills: 6    Associated Diagnoses: Chest pain due to myocardial ischemia, unspecified ischemic chest pain type (H); Coronary artery disease involving native coronary artery of native heart without angina pectoris      clindamycin (CLEOCIN T) 1 % lotion Apply twice daily for 6 weeks to the groin  Qty: 60 mL, Refills: 1    Associated Diagnoses: Rash      hydrocortisone valerate (WEST-NINOSKA) 0.2 % ointment Apply sparingly to affected area three times daily as needed.  Qty: 45 g, Refills: 3    Associated Diagnoses: Psoriasis      Lansoprazole (PREVACID PO) Take 20 mg by mouth every evening Patient needs to use brand name Prevacid (generic version causes diarrhea)       omega 3 1000 MG CAPS Take 1 g by mouth 2 times daily  Qty: 120 capsule, Refills: 6    Associated Diagnoses: Coronary artery disease involving native coronary artery of native heart with angina pectoris (H)      MAALOX ADVANCED OR Take 1 tablet as needed (Calcium carbonate 1000mg/Simethicone 80mg)    Associated Diagnoses: Chest pain      ASPIRIN 81 MG OR TABS 1 TABLET EVERY MORNING         STOP taking these medications       HYDROcodone-acetaminophen (NORCO) 5-325 MG per tablet Comments:   Reason for Stopping:                     Consultations:   No consultations were requested during this admission          Brief History of Illness:   77yoM w/ Hx of Prostate Ca s/p Prostatectomy, CAD/NSTEMI s/p stents (EF recently ~60%), and now recently diagnosis of Mantle Cell Lymphoma (IV B - Bendamustine/Rituxan started 3/29 - admitted 3/29-4/1 for TLS monitoring) presenting with SIRS.     PTA he felt more weak so wife prompted him to present to the ED. Also febrile to 102. He had an episode of diarrhea here upon arrival but he says this  occurs after he's constipated for a couple day. ROS is negative for sore throat, neck stiffness, chest pain, cough, abdominal pain, dysuria, rashes. He did vomit once non bilious emesis today. He presented to [facility] ED where he was febrile and hypotensive to 90/60 which improved with ~2L IVFs. Lactate was 3 and improved to 2.1. Mg was replaced. Labs were notable for  near prior checks. iCa was low to 1, unsure if this was replaced. Has PORSCHE to 1.4. AST mildly up to 59. Trop negative. Urine and blood cultures drawn. UA showed hyaline casts. CXR showed R infrahilar infiltrate vs atelectasis so was given Vanco, Cefepime, and Azithromycin.   The pt was admitted for sepsis from HCAP.          Hospital Course:   77yoM w/ Hx of Prostate Ca s/p Prostatectomy, CAD/NSTEMI s/p stents (EF recently ~60%), and now recently diagnosis of Mantle Cell Lymphoma (IV B - Bendamustine/Rituxan started 3/29 - admitted 3/29-4/1 for TLS monitoring) presenting with Sepsis likely due to HCAP.    He was initially on broad spectrum Abx which included Vancomycin and zosyn which was narrowed down to PO Levaquin. He had no growth on Cx. His Abx regimen was narrowed down to Po Levaquin with clinical improvement and with plan to finish a total course of 2 weeks      #HCAP  Patient with elevated cough, hypoxia, hypotension, tachycardia with imaging with RML infiltrate. He continues to have daily fevers despite vancomycin, zosyn and will add atypical coverage with azithromycin today. Note Legionella ag in urine negative. MRSA swab neg, DC Vancomycin on 4/13. Other cultures have been negative. Repeat CXR on 4/13 showed some atelectasis/infection in base and some fluid oon rt major fissure. Pt has not been febrile for the last 24 hrs.   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 Adan   Pt had symptoms of abdominal bloating/discomfort and loss of appetite for 2 months. Underwent imaging workup by primary care provider with CT abdominal and pelvis on 02/25/18 showing extensive intra- abdominal and inguinal lymphadenopathy with splenomegaly. FNA of the lymph node on 03/14 which came back with findings consistent with mantle cell lymphoma. 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 Proliferation index was estimated to be 50%. The marrow biopsy performed showed bone marrow involvement by mantle cell lymphoma 40-50%. Fish came back positive for translocation 11:14 consistent with mantle cell lymphoma. PET CT scan extensive hypermetabolic adenopathy of cervical, axillary, mediastinal, hilar, intra-abdominal, retroperitoneal inguinal but the largest measuring 15.5 x 6.9 cm.       Started on Bendamustine 90 mg/m2 day 1 and 2 in combination with Rituxan 375 mg/m2 q.4 weeks- Cycle 1 on 03/29/18. Was admitted to the hospital subsequently for observation for tumor lysis syndrome and subsequently discharged home after improvement in blood work.Plan is to proceed with 6 total cycles followed by repeat PET scan and have response achieved, consider proceeding with maintenance Rituxan.      -Currently he is Day 17. Counts look ok, no neutropenia, no TLS concerns.  -Will f/u with Dr. Adan for next cycle  -cont home allopurinol  -f/u with PCP of Dr. Adan office for count checks early this week in 2-3 days or PCP     Thrombocytopenia plt 37 today: from chemotherapy  -no bleeding, goal plt >10K  -count check in this week      #Emesis x1- QTc not prolonged, improved w anti-emetics, cont home prevacid  #Diarrhea/Constipation- as above, by his report he has constipation for a couple days then a bout of diarrhea, C.ff here is negative. Diarrhea could be from chemo. Had  loperomide yesterday which improved diarrhea.   -continue Loperomide prn       #CAD/NSTEMI Hx s/p stents  -cont home ASA and statin  -Rcd 1 dose of iv lasix 20 mg on 4/14 for mild edema  -Will resume home lisinopril   -reduce metoprolol from home 100 to 50 given sepsis, will increase to home dose of 100 mg tomorrow      Diet- High KCal and Protein  DVT PPx- Padua 5, Lovenox  Code- Full  Dispo- Home today     Patient was seen and discussed with attending physician Dr. Michaud who agrees with above assessment and plan.     Donald Flowers  Hem Onc fellow          Discharge Instructions and Follow-Up:   Discharge diet: Regular   Discharge activity: Activity as tolerated   Discharge follow-up: Follow up with primary care provider in 2-3 days           Discharge Disposition:   Discharged to home        Donald Flowers MD     [facility] (7D) Attending Discharge Note    The patient was discussed on morning rounds with the nurses, mid-level provider, and house staff and seen and examined by me. All labs and imaging were reviewed. I reviewed events over the last 24 hours including vitals and flow sheets. I agree with the above note and have been responsible for the care plan and interpretation of progress. Overall, the patient is a 76 yo with mantle cell NHL admitted last PM with fever and a RLL infiltrate. He was started on IVF and antibiotics. He is afebrile for two days now and on oral antibiotics. BC are negative. A creat is down to 1.07. He is ready to transition to outpatient care with follow-up at [place].     I spent a total of 40 minutes reviewing his hospital course and issues related to his lymphoma. We reviewed medications, parameters of when to call and follow up. We would like him to be seem in 2-4 days in [place] to check on his progress.     There is no evidence of mucositis or adenopathy on exam and lungs show mild crackles. There is no edema or rash.     The patient has been informed on  progress and all questions have been answered. We also discussed plans for the next few days.      John Michaud MD

## 2018-04-15 NOTE — PLAN OF CARE
Problem: Patient Care Overview  Goal: Plan of Care/Patient Progress Review  Outcome: Adequate for Discharge Date Met: 04/15/18  Afebrile, VSS. Pt denied pain/nausea/SOB. Discharge instruction reviewed with pt and daughter. Discharge to home. Will be followed in clinic.    Problem: Sepsis/Septic Shock (Adult)  Goal: Signs and Symptoms of Listed Potential Problems Will be Absent, Minimized or Managed (Sepsis/Septic Shock)  Signs and symptoms of listed potential problems will be absent, minimized or managed by discharge/transition of care (reference Sepsis/Septic Shock (Adult) CPG).   Outcome: Adequate for Discharge Date Met: 04/15/18   04/15/18 1130   Sepsis/Septic Shock   Problems Assessed (Sepsis) all   Problems Present (Sepsis) undernutrition

## 2018-04-16 NOTE — PROGRESS NOTES
Oncology Follow Up Visit: April 16, 2018    Oncologist: Dr. Merry Auguste  PCP: Florian Alonzo    Diagnosis: Stage IV mantle cell lymphoma  Francisco Javier Cortes is a 76 yo  male presented in March 2018 with complaint of abdominal bloating and discomfort loss of appetite ×2 months. Initial imaging showed splenomegaly, extensive retroperitoneal mesenteric, inguinal and pelvic as well as retrocrural adenopathy and enlarging pulmonary nodules up to 18 mm. FNA proved mantle cell lymphoma-please see progress notes of 3/29/18 by Dr. Auguste for specific information.  Treatment:   3/29/18 began Bendamustine and Rituxan  - Hospitalized for TLS  - Hospitalized for sepsis from pneumonia    Interval History: Mr. Crotes comes to clinic with wife for post hospital visit. Pt was hospitalized at [facility] from pneumonia leading to sepsis found on admission on 4/10-4/15/2018 after noting fevers with weakness. He started treatment with bendamustine/Rituxan on 3/29/2018. Today he is weak and wife is complaining that he is not eating well but is taking fluids. He has edema to legs that he has never had previously since hospitalization No further fevers and is taking his antibiotic as ordered. He has had regular bowel movement today and shares he feels he is stronger this after noon than this morning. No falls and no new rashes or pains. He denies SOB, chest pains. Wife is worried about future issues after next infusion of chemotherapy which was to be set for 4/30.    Rest of comprehensive and complete ROS is reviewed and is negative.   Past Medical History:   Diagnosis Date     CAD (coronary artery disease) 1/09    s/p stentsx2     Coronary artery disease involving native coronary artery of native heart without angina pectoris 12/22/2015     Dyslipidemia      Erectile dysfunction      GERD (gastroesophageal reflux disease)      Hearing problem One ear 1961     Hypertension      NSTEMI (non-ST elevated myocardial  "infarction) (H) 3/20/2014     Pneumonia      Prostate cancer (H)     prostatecomy 9 years ago, PSAs remain good     Status post percutaneous transluminal coronary angioplasty-Coronary angioplasty with STEPHEN to LCx 4/4/2014     Stented coronary artery     stents placed     Current Outpatient Prescriptions   Medication     levofloxacin (LEVAQUIN) 750 MG tablet     allopurinol (ZYLOPRIM) 300 MG tablet     LORazepam (ATIVAN) 0.5 MG tablet     ondansetron (ZOFRAN) 8 MG tablet     prochlorperazine (COMPAZINE) 10 MG tablet     senna-docusate (SENOKOT-S;PERICOLACE) 8.6-50 MG per tablet     lisinopril (PRINIVIL/ZESTRIL) 20 MG tablet     metoprolol succinate (TOPROL-XL) 100 MG 24 hr tablet     rosuvastatin (CRESTOR) 40 MG tablet     nitroGLYcerin (NITROSTAT) 0.4 MG sublingual tablet     clindamycin (CLEOCIN T) 1 % lotion     hydrocortisone valerate (WEST-NINOSKA) 0.2 % ointment     Lansoprazole (PREVACID PO)     omega 3 1000 MG CAPS     Acetaminophen (TYLENOL 8 HOUR PO)     MAALOX ADVANCED OR     ASPIRIN 81 MG OR TABS     No current facility-administered medications for this visit.      Facility-Administered Medications Ordered in Other Visits   Medication     heparin 100 UNIT/ML injection 5 mL     sodium chloride (PF) 0.9% PF flush 10-20 mL     Allergies   Allergen Reactions     Niacin      Severe rash and itching     No Clinical Screening - See Comments      Prevacid [Lansoprazole] Diarrhea     Patient notes diarrhea with 30mg generic version. Patient does ok with 15mg in generic and brand name.     Shellfish Allergy      One kind       Physical Exam:/71  Pulse 84  Temp 96.8  F (36  C)  Resp 15  Ht 1.651 m (5' 5\")  Wt 79.8 kg (176 lb)  BMI 29.29 kg/m2  ECOG PS- 1  Constitutional: Alert, moving slowly on own today and does appear weak but not in distress.   ENT: Eyes bright, No mouth sores  Neck: Supple,  bilateral submandibular nodes are no longer noted by this provider or pt. Previous biopsy site to the left neck is " healed well  Cardiac: Heart rate and rhythm is regular and strong without murmur  Respiratory: Breathing easy. Lung  Sounds with right adventitious sounds to auscultation but no cough and good effort.  GI: Abdomen is rounded, nontender, BS active. No masses or organomegaly  MS: Muscle tone fair with some weakness noted but is moving on own but weaker than previous, extremities normal with 1+ pitting edema up to knees bilaterally  Skin: No suspicious lesions or rashes  Neuro: Sensory grossly WNL, gait normal.   Lymph: Normal ant/post cervical, axillary, supraclavicular nodes  Psych: Mentation appears normal and affect normal/bright and smiling    Laboratory Results:   Results for orders placed or performed in visit on 04/16/18   *CBC with platelets differential   Result Value Ref Range    WBC 5.5 4.0 - 11.0 10e9/L    RBC Count 2.96 (L) 4.4 - 5.9 10e12/L    Hemoglobin 8.8 (L) 13.3 - 17.7 g/dL    Hematocrit 27.6 (L) 40.0 - 53.0 %    MCV 93 78 - 100 fl    MCH 29.7 26.5 - 33.0 pg    MCHC 31.9 31.5 - 36.5 g/dL    RDW 17.3 (H) 10.0 - 15.0 %    Platelet Count 44 (LL) 150 - 450 10e9/L    % Neutrophils 72.0 %    % Lymphocytes 20.0 %    % Monocytes 8.0 %    Absolute Neutrophil 4.0 1.6 - 8.3 10e9/L    Absolute Lymphocytes 1.1 0.8 - 5.3 10e9/L    Absolute Monocytes 0.4 0.0 - 1.3 10e9/L    RBC Morphology Normal     Platelet Estimate       Automated count confirmed.  Platelet morphology is normal.    Diff Method Manual Differential    Comprehensive metabolic panel   Result Value Ref Range    Sodium 144 133 - 144 mmol/L    Potassium 3.3 (L) 3.4 - 5.3 mmol/L    Chloride 111 (H) 94 - 109 mmol/L    Carbon Dioxide 23 20 - 32 mmol/L    Anion Gap 10 3 - 14 mmol/L    Glucose 110 (H) 70 - 99 mg/dL    Urea Nitrogen 18 7 - 30 mg/dL    Creatinine 0.97 0.66 - 1.25 mg/dL    GFR Estimate 75 >60 mL/min/1.7m2    GFR Estimate If Black >90 >60 mL/min/1.7m2    Calcium 7.5 (L) 8.5 - 10.1 mg/dL    Bilirubin Total 0.7 0.2 - 1.3 mg/dL    Albumin 2.4 (L)  3.4 - 5.0 g/dL    Protein Total 5.5 (L) 6.8 - 8.8 g/dL    Alkaline Phosphatase 85 40 - 150 U/L    ALT 25 0 - 70 U/L    AST 43 0 - 45 U/L     Assessment and Plan:   Stage IV mantle cell lymphoma with hospitalization for sepsis-patient was diagnosed with lymphoma ad began treatment on3/29 with Bendamustine and Rituxan He already had hospitalization for TLS post infusion and was improved until last week when fevers and weakness took him to the [facility] and was found to have sepsis.. He was hospitalized at [facility] from 4/10-4/15/2018 and was released on daily Levaquin. He has no further fevers and feels he is weak but stronger today then yesterday.   Pt is due for cycle 2 to start on 4/30 and due to recent illness and cytopenias noted today, not sure he will meet goals for cycle 2 as planned.   Will have pt return in 1 week for recheck of pneumonia with chest xray and CBC, BMP. Was told to have update in 3 days per TC or before with new symptoms.   Has standing appt for 4/30 with Dr Adan for review for treatment.   Continues with allopurinol  Hypokalemia- K+=3.3. Pt wants to trial increased potassium foods prior to replacement. Potassium rich foods reviewed.   Edema- has had edema worsen with hospitalization. P has cardiac history- on metoprolol and lisinopril but no diuretic. Recommended elevating feet and light exercise to help with edema issue prior to start of diuretic since he got by without this previously and would cause some further concern for potassium level.   Weakness post hospitalization- will put in referral for PT for home. Pt approves of this but feels he will be feeling stronger soon- reviewed he has right to cancel if he wishes. Reviewed that nutrition and adequate fluids will help to regain strength- encouraged smaller meals more often and offered sample of Ensure clear.   This was a 25 min visit with > 50% in counseling and coordinating care including education and management  of concerns.    Joy Bush,CNP

## 2018-04-16 NOTE — LETTER
4/16/2018         RE: Francisco Javier Cortes  [address] [facility] [id number]        Dear Colleague,    Thank you for referring your patient, Francisco Javier Cortes, to the [facility]. Please see a copy of my visit note below.    Oncology Follow Up Visit: April 16, 2018    Oncologist: Dr. Merry Auguste  PCP: Florian Alonzo    Diagnosis: Stage IV mantle cell lymphoma  Francisco Javier Cortes is a 78 yo  male presented in March 2018 with complaint of abdominal bloating and discomfort loss of appetite ×2 months. Initial imaging showed splenomegaly, extensive retroperitoneal mesenteric, inguinal and pelvic as well as retrocrural adenopathy and enlarging pulmonary nodules up to 18 mm. FNA proved mantle cell lymphoma-please see progress notes of 3/29/18 by Dr. Auguste for specific information.  Treatment:   3/29/18 began Bendamustine and Rituxan  - Hospitalized for TLS  - Hospitalized for sepsis from pneumonia    Interval History: Mr. Cortes comes to clinic with wife for post hospital visit. Pt was hospitalized at [facility] from pneumonia leading to sepsis found on admission on 4/10-4/15/2018 after noting fevers with weakness. He started treatment with bendamustine/Rituxan on 3/29/2018. Today he is weak and wife is complaining that he is not eating well but is taking fluids. He has edema to legs that he has never had previously since hospitalization No further fevers and is taking his antibiotic as ordered. He has had regular bowel movement today and shares he feels he is stronger this after noon than this morning. No falls and no new rashes or pains. He denies SOB, chest pains. Wife is worried about future issues after next infusion of chemotherapy which was to be set for 4/30.    Rest of comprehensive and complete ROS is reviewed and is negative.   Past Medical History:   Diagnosis Date     CAD (coronary artery disease) 1/09    s/p stentsx2     Coronary artery disease involving native  "coronary artery of native heart without angina pectoris 12/22/2015     Dyslipidemia      Erectile dysfunction      GERD (gastroesophageal reflux disease)      Hearing problem One ear 1961     Hypertension      NSTEMI (non-ST elevated myocardial infarction) (H) 3/20/2014     Pneumonia      Prostate cancer (H)     prostatecomy 9 years ago, PSAs remain good     Status post percutaneous transluminal coronary angioplasty-Coronary angioplasty with STEPHEN to LCx 4/4/2014     Stented coronary artery     stents placed     Current Outpatient Prescriptions   Medication     levofloxacin (LEVAQUIN) 750 MG tablet     allopurinol (ZYLOPRIM) 300 MG tablet     LORazepam (ATIVAN) 0.5 MG tablet     ondansetron (ZOFRAN) 8 MG tablet     prochlorperazine (COMPAZINE) 10 MG tablet     senna-docusate (SENOKOT-S;PERICOLACE) 8.6-50 MG per tablet     lisinopril (PRINIVIL/ZESTRIL) 20 MG tablet     metoprolol succinate (TOPROL-XL) 100 MG 24 hr tablet     rosuvastatin (CRESTOR) 40 MG tablet     nitroGLYcerin (NITROSTAT) 0.4 MG sublingual tablet     clindamycin (CLEOCIN T) 1 % lotion     hydrocortisone valerate (WEST-NINOSKA) 0.2 % ointment     Lansoprazole (PREVACID PO)     omega 3 1000 MG CAPS     Acetaminophen (TYLENOL 8 HOUR PO)     MAALOX ADVANCED OR     ASPIRIN 81 MG OR TABS     No current facility-administered medications for this visit.      Facility-Administered Medications Ordered in Other Visits   Medication     heparin 100 UNIT/ML injection 5 mL     sodium chloride (PF) 0.9% PF flush 10-20 mL     Allergies   Allergen Reactions     Niacin      Severe rash and itching     No Clinical Screening - See Comments      Prevacid [Lansoprazole] Diarrhea     Patient notes diarrhea with 30mg generic version. Patient does ok with 15mg in generic and brand name.     Shellfish Allergy      One kind       Physical Exam:/71  Pulse 84  Temp 96.8  F (36  C)  Resp 15  Ht 1.651 m (5' 5\")  Wt 79.8 kg (176 lb)  BMI 29.29 kg/m2  ECOG PS- " 1  Constitutional: Alert, moving slowly on own today and does appear weak but not in distress.   ENT: Eyes bright, No mouth sores  Neck: Supple,  bilateral submandibular nodes are no longer noted by this provider or pt. Previous biopsy site to the left neck is healed well  Cardiac: Heart rate and rhythm is regular and strong without murmur  Respiratory: Breathing easy. Lung  Sounds with right adventitious sounds to auscultation but no cough and good effort.  GI: Abdomen is rounded, nontender, BS active. No masses or organomegaly  MS: Muscle tone fair with some weakness noted but is moving on own but weaker than previous, extremities normal with 1+ pitting edema up to knees bilaterally  Skin: No suspicious lesions or rashes  Neuro: Sensory grossly WNL, gait normal.   Lymph: Normal ant/post cervical, axillary, supraclavicular nodes  Psych: Mentation appears normal and affect normal/bright and smiling    Laboratory Results:   Results for orders placed or performed in visit on 04/16/18   *CBC with platelets differential   Result Value Ref Range    WBC 5.5 4.0 - 11.0 10e9/L    RBC Count 2.96 (L) 4.4 - 5.9 10e12/L    Hemoglobin 8.8 (L) 13.3 - 17.7 g/dL    Hematocrit 27.6 (L) 40.0 - 53.0 %    MCV 93 78 - 100 fl    MCH 29.7 26.5 - 33.0 pg    MCHC 31.9 31.5 - 36.5 g/dL    RDW 17.3 (H) 10.0 - 15.0 %    Platelet Count 44 (LL) 150 - 450 10e9/L    % Neutrophils 72.0 %    % Lymphocytes 20.0 %    % Monocytes 8.0 %    Absolute Neutrophil 4.0 1.6 - 8.3 10e9/L    Absolute Lymphocytes 1.1 0.8 - 5.3 10e9/L    Absolute Monocytes 0.4 0.0 - 1.3 10e9/L    RBC Morphology Normal     Platelet Estimate       Automated count confirmed.  Platelet morphology is normal.    Diff Method Manual Differential    Comprehensive metabolic panel   Result Value Ref Range    Sodium 144 133 - 144 mmol/L    Potassium 3.3 (L) 3.4 - 5.3 mmol/L    Chloride 111 (H) 94 - 109 mmol/L    Carbon Dioxide 23 20 - 32 mmol/L    Anion Gap 10 3 - 14 mmol/L    Glucose 110 (H)  70 - 99 mg/dL    Urea Nitrogen 18 7 - 30 mg/dL    Creatinine 0.97 0.66 - 1.25 mg/dL    GFR Estimate 75 >60 mL/min/1.7m2    GFR Estimate If Black >90 >60 mL/min/1.7m2    Calcium 7.5 (L) 8.5 - 10.1 mg/dL    Bilirubin Total 0.7 0.2 - 1.3 mg/dL    Albumin 2.4 (L) 3.4 - 5.0 g/dL    Protein Total 5.5 (L) 6.8 - 8.8 g/dL    Alkaline Phosphatase 85 40 - 150 U/L    ALT 25 0 - 70 U/L    AST 43 0 - 45 U/L     Assessment and Plan:   Stage IV mantle cell lymphoma with hospitalization for sepsis-patient was diagnosed with lymphoma ad began treatment on3/29 with Bendamustine and Rituxan He already had hospitalization for TLS post infusion and was improved until last week when fevers and weakness took him to the [facility] and was found to have sepsis.. He was hospitalized at [facility] from 4/10-4/15/2018 and was released on daily Levaquin. He has no further fevers and feels he is weak but stronger today then yesterday.   Pt is due for cycle 2 to start on 4/30 and due to recent illness and cytopenias noted today, not sure he will meet goals for cycle 2 as planned.   Will have pt return in 1 week for recheck of pneumonia with chest xray and CBC, BMP. Was told to have update in 3 days per TC or before with new symptoms.   Has standing appt for 4/30 with Dr Adan for review for treatment.   Continues with allopurinol  Hypokalemia- K+=3.3. Pt wants to trial increased potassium foods prior to replacement. Potassium rich foods reviewed.   Edema- has had edema worsen with hospitalization. P has cardiac history- on metoprolol and lisinopril but no diuretic. Recommended elevating feet and light exercise to help with edema issue prior to start of diuretic since he got by without this previously and would cause some further concern for potassium level.   Weakness post hospitalization- will put in referral for PT for home. Pt approves of this but feels he will be feeling stronger soon- reviewed he has right to cancel if he  wishes. Reviewed that nutrition and adequate fluids will help to regain strength- encouraged smaller meals more often and offered sample of Ensure clear.   This was a 25 min visit with > 50% in counseling and coordinating care including education and management of concerns.    Joy Bush CNP      Again, thank you for allowing me to participate in the care of your patient.        Sincerely,        Joy Bush, KATELYN, APRN CNP

## 2018-04-16 NOTE — MR AVS SNAPSHOT
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  Home Care or Hospice will then contact the patient to schedule their appointment.**    If you do not hear from Home Care and Hospice, or you would like to call to schedule, please call the referring place of service that your provider has listed below.  ______________________________________________________________________    Your provider has referred you to: [facility]: [facility] [phone]   [email or web address]    Extended Emergency Contact Information  Primary Emergency Contact: FARRUKH SALEH  Address: [address] [place] [id number] [facility]  Home Phone: [phone]  Work Phone: none  Mobile Phone: [phone]  Relation: Spouse  Hearing or visual needs: None  Other needs: None  Preferred language: English   needed? No    Patient Anticipated Discharge Date: 4/15/2018   RN, PT, HHA to begin 24 - 48 hours after discharge.  PLEASE EVALUATE AND TREAT (Evaluation timeline is 24 - 48 hrs. Please call if there is need for a variance to this timeline).    REASON FOR REFERRAL: Assessment & Treatment: PT    ADDITIONAL SERVICES NEEDED:none    OTHER PERTINENT  INFORMATION: Patient was last seen by provider on 4/16/2018 for hospital follow up.    Current Outpatient Prescriptions:  levofloxacin (LEVAQUIN) 750 MG tablet, Take 1 tablet (750 mg) by mouth daily, Disp: 9 tablet, Rfl: 0  allopurinol (ZYLOPRIM) 300 MG tablet, Take 1 tablet (300 mg) by mouth daily, Disp: 30 tablet, Rfl: 1  LORazepam (ATIVAN) 0.5 MG tablet, Take 1 tablet (0.5 mg) by mouth every 4 hours as needed (Anxiety, Nausea/Vomiting or Sleep), Disp: 30 tablet, Rfl: 3  ondansetron (ZOFRAN) 8 MG tablet, Take 1 tablet (8 mg) by mouth every 8 hours as needed (Nausea/Vomiting), Disp: 10 tablet, Rfl: 3  prochlorperazine (COMPAZINE) 10 MG tablet, Take 1 tablet (10 mg) by mouth every 6 hours as needed for nausea or vomiting, Disp: 30 tablet, Rfl: 1  senna-docusate (SENOKOT-S;PERICOLACE) 8.6-50 MG per tablet, Take 1-2 tablets by mouth 2 times daily Take while on oral narcotics to prevent or treat constipation., Disp: 30 tablet, Rfl: 0  lisinopril (PRINIVIL/ZESTRIL) 20 MG tablet, Take 1 tablet (20 mg) by mouth daily (Patient taking differently: Take 20 mg by mouth every morning ), Disp: 90 tablet, Rfl: 3  metoprolol succinate (TOPROL-XL) 100 MG 24 hr tablet, Take 1 tablet (100 mg) by mouth daily, Disp: 90 tablet, Rfl: 3  rosuvastatin (CRESTOR) 40 MG tablet, Take 1 tablet (40 mg) by mouth daily (Patient taking differently: Take 40 mg by mouth every morning ), Disp: 90 tablet, Rfl: 3  nitroGLYcerin (NITROSTAT) 0.4 MG sublingual tablet, Place 1 tablet (0.4 mg) under the tongue every 5 minutes as needed up to 3 tablets per episode., Disp: 60 tablet, Rfl: 6  clindamycin (CLEOCIN T) 1 % lotion, Apply twice daily for 6 weeks to the groin, Disp: 60 mL, Rfl: 1  hydrocortisone valerate (WEST-NINOSKA) 0.2 % ointment, Apply sparingly to affected area three times daily as needed., Disp: 45 g, Rfl: 3  Lansoprazole (PREVACID PO), Take 20 mg by mouth every evening Patient needs to use brand name Prevacid (generic version causes diarrhea) ,  Disp: , Rfl:   omega 3 1000 MG CAPS, Take 1 g by mouth 2 times daily (Patient taking differently: Take 1 g by mouth 2 times daily currently taking 1 tablet daily 3/5/18), Disp: 120 capsule, Rfl: 6  Acetaminophen (TYLENOL 8 HOUR PO), Take 500 mg by mouth as needed , Disp: , Rfl:   MAALOX ADVANCED OR, Take 1 tablet as needed (Calcium carbonate 1000mg/Simethicone 80mg), Disp: , Rfl:   ASPIRIN 81 MG OR TABS, 1 TABLET EVERY MORNING, Disp: , Rfl:       Patient Active Problem List:     Hyperlipidemia LDL goal <100     Advanced directives, counseling/discussion     Prediabetes     Obesity (BMI 30-39.9)     Inflamed seborrheic keratosis     AK (actinic keratosis)     Basal cell carcinoma of neck     Microalbuminuria     Perianal dermatitis     NSTEMI (non-ST elevated myocardial infarction) (H)     S/P coronary angioplasty     Status post percutaneous transluminal coronary angioplasty-Coronary angioplasty with STEPHEN to LCx     Prostate Cancer, s/p prostatectomy     Pseudophakia of both eyes     Gastroesophageal reflux disease, esophagitis presence not specified     Coronary artery disease involving native coronary artery of native heart without angina pectoris     History of colonic polyps     Chronic diarrhea     Essential hypertension with goal blood pressure less than 140/90     Dyslipidemia     Erectile dysfunction, unspecified erectile dysfunction type     Elevated fasting blood sugar     Eczema, unspecified type     Squamous blepharitis of right upper eyelid     Elevated AST (SGOT)     Flatulence, eructation, and gas pain     Bloating     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)     Drug-induced neutropenia (H)     Nausea     Encounter for antineoplastic chemotherapy     Nonspecific ST-T wave electrocardiographic changes     Examination prior to chemotherapy     Tumor lysis syndrome     SIRS (systemic inflammatory response syndrome) (H)      Documentation of Face to Face and Certification for Home Health  Services    I certify that patient, Francisco Javier Cortes is under my care and that I, or a Nurse Practitioner or Physician's Assistant working with me, had a face-to-face encounter that meets the physician face-to-face encounter requirements with this patient on: 4/16/2018.    This encounter with the patient was in whole, or in part, for the following medical condition, which is the primary reason for Home Health Care: weak post sepsis- needs PT to increase strength.    I certify that, based on my findings, the following services are medically necessary Home Health Services: Physical Therapy    My clinical findings support the need for the above services because: weakness post sepsis  hospitalization    Further, I certify that my clinical findings support that this patient is homebound (i.e. absences from home require considerable and taxing effort and are for medical reasons or Religion services or infrequently or of short duration when for other reasons) because: Leaving home is medically contraindicated for the following reason(s): Infection risk / immunocompromised state where it is safer for them to receive services in the home..    Based on the above findings, I certify that this patient is confined to the home and needs intermittent skilled nursing care, physical therapy and/or speech therapy.  The patient is under my care, and I have initiated the establishment of the plan of care.  This patient will be followed by a physician who will periodically review the plan of care.    Physician/Provider to provide follow up care: Florian Alonzo certified Physician at time of discharge: Dr Erickson Adan    Please be aware that coverage of these services is subject to the terms and limitations of your health insurance plan.  Call member services at your health plan with any benefit or coverage questions.                  Your next 10 appointments already scheduled     Apr 23, 2018 10:30 AM CDT   (Arrive  by 10:15 AM)   Nurse Only with [facility]UR[facility]    [address] [place] [address]   [phone]            Apr 23, 2018 10:45 AM CDT   (Arrive by 10:30 AM)   NM INJECTION with MGNMINJ1   [facility]    [address] [place] [address]   [phone]            Apr 23, 2018 11:30 AM CDT   (Arrive by 11:15 AM)   NM SCAN3 with MGNM1   [facility])    [address] [place] [address]   [phone]            Apr 23, 2018 12:00 PM CDT   (Arrive by 11:45 AM)   Ekg Stress Nm Lexiscan with MG STRESS RM, MG IMAGING NURSE, MG PC CARD, MG CV TECH   [facility])    [address] [place] [address]   [phone]            Apr 23, 2018 12:30 PM CDT   (Arrive by 12:15 PM)   NM MPI WITH LEXISCAN with MGNM1   [facility])    [address] [place] [address]   [phone]           For a ONE day exam: Allow 3-4 hours for test. For a TWO day exam: Allow  minutes PER day for test.  On the day of your resting scan: Please stop eating 3 hours before the test. You may drink water or juice.  On the day of your stress test: Stop all caffeine 12 hours before the test. This includes coffee, tea, soda pop, chocolate and certain medicines (such as Anacin, Excedrin and NoDoz). Also avoid decaf coffee and tea, as these contain small amounts of caffeine.  Stop eating 3 hours before the test. You may drink water.  You may need to stop some medicines before the test. Follow your doctor s orders. - If you take a beta blocker: Do not take your beta-blocker on the day before your test, unless specifically told to by your doctor. And do not take it on the day of your test. Bring it with you to take after the test. - If you  take Aggrenox or dipyridamole (Persantine, Permole), stop taking it 48 hours before your test. - If you take Viagra, Cialis or Levitra, stop taking it 48 hours before your test. - If you take theophylline or aminophylline, stop taking it 12 hours before your test.  Do not take nitrates on the day of your test. Do not wear your Nitro-Patch.  Please wear a loose two-piece outfit. If you will have an exercise test, bring rubber-soled walking shoes.  When you arrive, please tell us if you have a pacemaker or ICD (implantable defibrillator).  Please call your Imaging Department at your exam site with any questions.            Apr 23, 2018  1:30 PM CDT   Ech Complete with MGSMITA MG Mand[place] TECH   [facility]    [address] [place] [address] [phone]           1. Please bring or wear a comfortable two-piece outfit. 2. You may eat, drink and take your normal medicines. 3. For any questions that cannot be answered, please contact the ordering physician              Who to contact     If you have questions or need follow up information about today's clinic visit or your schedule please contact [facility] directly at [phone].  Normal or non-critical lab and imaging results will be communicated to you by PassionTaghart, letter or phone within 4 business days after the clinic has received the results. If you do not hear from us within 7 days, please contact the clinic through PassionTag[employer] or phone. If you have a critical or abnormal lab result, we will notify you by phone as soon as possible.  Submit refill requests through AppShare or call your pharmacy and they will forward the refill request to us. Please allow 3 business days for your refill to be completed.          Additional Information About Your Visit        AppShare Information     App[employer] gives you secure access to your electronic health record. If you see a primary care provider, you can  "also send messages to your care team and make appointments. If you have questions, please call your primary care clinic.  If you do not have a primary care provider, please call [phone] and they will assist you.      [employer] is an electronic gateway that provides easy, online access to your medical records. With [employer], you can request a clinic appointment, read your test results, renew a prescription or communicate with your care team.     To access your existing account, please contact your [facility] or call [phone] for assistance.        Care EveryWhere ID     This is your Care EveryWhere ID. This could be used by other organizations to access your [facility] medical records  [id number]        Your Vitals Were     Pulse Temperature Respirations Height BMI (Body Mass Index)       84 96.8  F (36  C) 15 1.651 m (5' 5\") 29.29 kg/m2        Blood Pressure from Last 3 Encounters:   04/16/18 108/71   04/15/18 144/88   04/09/18 108/72    Weight from Last 3 Encounters:   04/16/18 79.8 kg (176 lb)   04/14/18 81 kg (178 lb 9.6 oz)   04/09/18 75.1 kg (165 lb 8 oz)              We Performed the Following     HOME CARE NURSING REFERRAL          Today's Medication Changes          These changes are accurate as of 4/16/18 11:24 PM.  If you have any questions, ask your nurse or doctor.               These medicines have changed or have updated prescriptions.        Dose/Directions    lisinopril 20 MG tablet   Commonly known as:  PRINIVIL/ZESTRIL   This may have changed:  when to take this   Used for:  Hypertension goal BP (blood pressure) < 130/80, Coronary artery disease involving native coronary artery of native heart without angina pectoris, Microalbuminuria        Dose:  20 mg   Take 1 tablet (20 mg) by mouth daily   Quantity:  90 tablet   Refills:  3       omega 3 1000 MG Caps   This may have changed:  additional instructions   Used for:  Coronary artery disease involving native " coronary artery of native heart with angina pectoris (H)        Dose:  1 g   Take 1 g by mouth 2 times daily   Quantity:  120 capsule   Refills:  6       rosuvastatin 40 MG tablet   Commonly known as:  CRESTOR   This may have changed:  when to take this   Used for:  Hyperlipidemia LDL goal <100, Coronary artery disease involving native coronary artery of native heart without angina pectoris        Dose:  40 mg   Take 1 tablet (40 mg) by mouth daily   Quantity:  90 tablet   Refills:  3                Primary Care Provider Office Phone # Fax #    Florian Alonzo [phone] [address]  [facility] [address]        Equal Access to Services     [facility]: Hadii aad zay hadasho Solina, waaxda luqadaha, qaybta kaalmada adebryantyada, zhao bazan . So [facility] [phone].    ATENCIÓN: Si habla español, tiene a ivan disposición servicios gratuitos de asistencia lingüística. Llame al [phone].    We comply with applicable federal civil rights laws and [place] laws. We do not discriminate on the basis of race, color, national origin, age, disability, sex, sexual orientation, or gender identity.            Thank you!     Thank you for choosing [facility]  for your care. Our goal is always to provide you with excellent care. Hearing back from our patients is one way we can continue to improve our services. Please take a few minutes to complete the written survey that you may receive in the mail after your visit with us. Thank you!             Your Updated Medication List - Protect others around you: Learn how to safely use, store and throw away your medicines at www.disposemymeds.org.          This list is accurate as of 4/16/18 11:24 PM.  Always use your most recent med list.                   Brand Name Dispense Instructions for use Diagnosis    allopurinol 300 MG tablet    ZYLOPRIM    30 tablet    Take 1 tablet (300 mg) by mouth daily    Mantle  cell lymphoma of lymph nodes of multiple sites (H)       aspirin 81 MG tablet      1 TABLET EVERY MORNING        clindamycin 1 % lotion    CLEOCIN T    60 mL    Apply twice daily for 6 weeks to the groin    Rash       hydrocortisone valerate 0.2 % ointment    WEST-NINOSKA    45 g    Apply sparingly to affected area three times daily as needed.    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)       PREVACID PO      Take 20 mg by mouth every evening Patient needs to use brand name Prevacid (generic version causes diarrhea)        prochlorperazine 10 MG tablet    COMPAZINE    30 tablet    Take 1 tablet (10 mg) by mouth every 6 hours as needed for nausea or vomiting    Nausea       rosuvastatin 40 MG tablet    CRESTOR    90 tablet    Take 1 tablet (40 mg) by mouth daily    Hyperlipidemia LDL goal <100, Coronary artery disease involving native coronary artery of native heart without angina pectoris       senna-docusate 8.6-50 MG per tablet    SENOKOT-S;PERICOLACE    30 tablet    Take 1-2 tablets by mouth 2 times daily Take while on oral narcotics to prevent or treat constipation.    Postoperative pain       TYLENOL 8 HOUR PO      Take 500 mg by mouth as needed

## 2018-04-16 NOTE — PROGRESS NOTES
"Patient's name and  were verified.  See Doc Flowsheet - IV assess for details.  IVAD accessed with 20G 3/4\" balderas gripper plus needle  blood return positive: YES  Site without redness, tenderness or swelling: YES  flushed with 30cc NS and 5cc 100u/ml heparin  Needle: De-accessed    Comments: Labs drawn.  Patient tolerated procedure without incident.    Zhane Barth  RN, BSN, OCN        "

## 2018-04-16 NOTE — PROGRESS NOTES
Hospital Discharge Care Coordination Note - Oncology    Date of Admission: 4/11/18    Date of Discharge: 4/15/18    Discharge Diagnosis: Health care associated pneumonia, sepsis     Facility of discharge: [facility]    Discharge disposition: Home with spouse, oral antibiotics (po Levaquin)     Clinic office visit appointment date: Patient will be seen in clinic today 4/16/18 with labs prior (CBC, CMP)     Using the date of discharge as day 1, the 30th day post discharge is 5/14/18     Cam Conner, RN, BSN, OCN  Oncology Care Coordinator  [facility]

## 2018-04-16 NOTE — MR AVS SNAPSHOT
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)           Follow-ups after your visit        Your next 10 appointments already scheduled     Apr 16, 2018  4:15 PM CDT   Return Visit with MARTHA Gonzales CNP   [facility])    [id number] [place] [address]   [phone]            Apr 23, 2018 10:30 AM CDT   (Arrive by 10:15 AM)   Nurse Only with MG IMAGING NURSE   [facility])    [address] [place] [address]   [phone]            Apr 23, 2018 10:45 AM CDT   (Arrive by 10:30 AM)   NM INJECTION with MGNMINJ1   [facility])    [address] [place] [address]   [phone]            Apr 23, 2018 11:30 AM CDT   (Arrive by 11:15 AM)   NM SCAN3 with MGNM1   [facility])    [address] [place] [address]   [phone]            Apr 23, 2018 12:00 PM CDT   (Arrive by 11:45 AM)   Ekg Stress Nm Lexiscan with MG STRESS RM, MG IMAGING NURSE, MG PC CARD, MG CV TECH   [facility])    [id number] [place] [address]   [phone]            Apr 23, 2018 12:30 PM CDT   (Arrive by 12:15 PM)   NM MPI WITH LEXISCAN with MGNM1   [facility])    [address] [place] [address]   [phone]           For a ONE day exam: Allow 3-4 hours for test. For a TWO day exam: Allow  minutes PER day for test.  On the day of your resting scan:  Please stop eating 3 hours before the test. You may drink water or juice.  On the day of your stress test: Stop all caffeine 12 hours before the test. This includes coffee, tea, soda pop, chocolate and certain medicines (such as Anacin, Excedrin and NoDoz). Also avoid decaf coffee and tea, as these contain small amounts of caffeine.  Stop eating 3 hours before the test. You may drink water.  You may need to stop some medicines before the test. Follow your doctor s orders. - If you take a beta blocker: Do not take your beta-blocker on the day before your test, unless specifically told to by your doctor. And do not take it on the day of your test. Bring it with you to take after the test. - If you take Aggrenox or dipyridamole (Persantine, Permole), stop taking it 48 hours before your test. - If you take Viagra, Cialis or Levitra, stop taking it 48 hours before your test. - If you take theophylline or aminophylline, stop taking it 12 hours before your test.  Do not take nitrates on the day of your test. Do not wear your Nitro-Patch.  Please wear a loose two-piece outfit. If you will have an exercise test, bring rubber-soled walking shoes.  When you arrive, please tell us if you have a pacemaker or ICD (implantable defibrillator).  Please call your Imaging Department at your exam site with any questions.            Apr 23, 2018  1:30 PM CDT   Ech Complete with MGECHR1   [facility]    [address] [place] [address] [phone]           1. Please bring or wear a comfortable two-piece outfit. 2. You may eat, drink and take your normal medicines. 3. For any questions that cannot be answered, please contact the ordering physician              Who to contact     If you have questions or need follow up information about today's clinic visit or your schedule please contact [facility] directly at [phone].  Normal or non-critical lab and  imaging results will be communicated to you by [employer], letter or phone within 4 business days after the clinic has received the results. If you do not hear from us within 7 days, please contact the clinic through [employer] or phone. If you have a critical or abnormal lab result, we will notify you by phone as soon as possible.  Submit refill requests through [employer] or call your pharmacy and they will forward the refill request to us. Please allow 3 business days for your refill to be completed.          Additional Information About Your Visit        [employer] Information     [employer] gives you secure access to your electronic health record. If you see a primary care provider, you can also send messages to your care team and make appointments. If you have questions, please call your primary care clinic.  If you do not have a primary care provider, please call [phone] and they will assist you.      [employer] is an electronic gateway that provides easy, online access to your medical records. With [employer], you can request a clinic appointment, read your test results, renew a prescription or communicate with your care team.     To access your existing account, please contact your [facility] or call [phone] for assistance.        Care EveryWhere ID     This is your Care EveryWhere ID. This could be used by other organizations to access your [place] medical records  [id number]         Blood Pressure from Last 3 Encounters:   04/15/18 144/88   04/09/18 108/72   04/02/18 112/75    Weight from Last 3 Encounters:   04/14/18 81 kg (178 lb 9.6 oz)   04/09/18 75.1 kg (165 lb 8 oz)   04/02/18 82.8 kg (182 lb 8 oz)              We Performed the Following     *CBC with platelets differential     Comprehensive metabolic panel          Today's Medication Changes          These changes are accurate as of 4/16/18  3:58 PM.  If you have any questions, ask your nurse or doctor.               These  medicines have changed or have updated prescriptions.        Dose/Directions    lisinopril 20 MG tablet   Commonly known as:  PRINIVIL/ZESTRIL   This may have changed:  when to take this   Used for:  Hypertension goal BP (blood pressure) < 130/80, Coronary artery disease involving native coronary artery of native heart without angina pectoris, Microalbuminuria        Dose:  20 mg   Take 1 tablet (20 mg) by mouth daily   Quantity:  90 tablet   Refills:  3       omega 3 1000 MG Caps   This may have changed:  additional instructions   Used for:  Coronary artery disease involving native coronary artery of native heart with angina pectoris (H)        Dose:  1 g   Take 1 g by mouth 2 times daily   Quantity:  120 capsule   Refills:  6       rosuvastatin 40 MG tablet   Commonly known as:  CRESTOR   This may have changed:  when to take this   Used for:  Hyperlipidemia LDL goal <100, Coronary artery disease involving native coronary artery of native heart without angina pectoris        Dose:  40 mg   Take 1 tablet (40 mg) by mouth daily   Quantity:  90 tablet   Refills:  3                Primary Care Provider Office Phone # Fax #    Florian Alonzo [phone] [address] [facility] [address]        Equal Access to Services     [facility]: Hadii rivera collazo hadasho Soomaali, waaxda luqadaha, qaybta kaalmada adeegyada, zhao bazan . So [facility] [phone].    ATENCIÓN: Si habla español, tiene a ivan disposición servicios gratuitos de asistencia lingüística. [facility] [phone].    We comply with applicable federal civil rights laws and [place] laws. We do not discriminate on the basis of race, color, national origin, age, disability, sex, sexual orientation, or gender identity.            Thank you!     Thank you for choosing [facility]  for your care. Our goal is always to provide you with excellent care. Hearing back from our patients is one way we  can continue to improve our services. Please take a few minutes to complete the written survey that you may receive in the mail after your visit with us. Thank you!             Your Updated Medication List - Protect others around you: Learn how to safely use, store and throw away your medicines at www.disposemymeds.org.          This list is accurate as of 4/16/18  3:58 PM.  Always use your most recent med list.                   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)       PREVACID PO      Take 20 mg by mouth every evening Patient needs to use brand name Prevacid (generic version causes diarrhea)        prochlorperazine 10 MG tablet    COMPAZINE    30 tablet    Take 1 tablet (10 mg) by mouth every 6 hours as needed for nausea or vomiting    Nausea       rosuvastatin 40 MG tablet    CRESTOR    90 tablet    Take 1 tablet (40 mg) by mouth daily    Hyperlipidemia LDL goal <100, Coronary artery disease involving native coronary artery of native heart without angina pectoris       senna-docusate 8.6-50 MG per tablet    SENOKOT-S;PERICOLACE    30 tablet    Take 1-2 tablets by mouth 2 times daily Take while on oral narcotics to prevent or treat constipation.    Postoperative pain       TYLENOL 8 HOUR PO      Take 500 mg by mouth as needed

## 2018-04-16 NOTE — NURSING NOTE
"Oncology Rooming Note    April 16, 2018 4:08 PM   Franicsco Javier Cortes is a 77 year old male who presents for:    Chief Complaint   Patient presents with     Oncology Clinic Visit     follow up discharge from hospital     Initial Vitals: /71  Pulse 84  Temp 96.8  F (36  C)  Resp 15  Ht 1.651 m (5' 5\")  Wt 79.8 kg (176 lb)  BMI 29.29 kg/m2 Estimated body mass index is 29.29 kg/(m^2) as calculated from the following:    Height as of this encounter: 1.651 m (5' 5\").    Weight as of this encounter: 79.8 kg (176 lb). Body surface area is 1.91 meters squared.  No Pain (0) Comment: Data Unavailable   No LMP for male patient.  Allergies reviewed: Yes  Medications reviewed: Yes    Medications: Medication refills not needed today.  Pharmacy name entered into [employer]:    [facility] [place] - [place], [place] - [address], [address]  [facility], [place] - [id number] - [place], [place] - [address] [facility] [place] [facility]        5 minutes for nursing intake (face to face time)     Sabiha Shine LPN              "

## 2018-04-20 PROBLEM — R06.09 DOE (DYSPNEA ON EXERTION): Status: ACTIVE | Noted: 2018-01-01

## 2018-04-20 NOTE — ED NOTES
[facility] [place]   ED Nurse to Floor Handoff     Francisco Javier Cortes is a 77 year old male who speaks English and lives alone,  in a home  They arrived in the ED by car from home    ED Chief Complaint: Nausea & Vomiting    ED Dx;   Final diagnoses:   Pleural effusions   Acute congestive heart failure, unspecified congestive heart failure type (H)   Weakness   Acidosis         Needed?: No    Allergies:   Allergies   Allergen Reactions     Niacin      Severe rash and itching     No Clinical Screening - See Comments      Prevacid [Lansoprazole] Diarrhea     Patient notes diarrhea with 30mg generic version. Patient does ok with 15mg in generic and brand name.     Shellfish Allergy      One kind   .  Past Medical Hx:   Past Medical History:   Diagnosis Date     CAD (coronary artery disease) 1/09    s/p stentsx2     Coronary artery disease involving native coronary artery of native heart without angina pectoris 12/22/2015     Dyslipidemia      Erectile dysfunction      GERD (gastroesophageal reflux disease)      Hearing problem One ear 1961     Hypertension      NSTEMI (non-ST elevated myocardial infarction) (H) 3/20/2014     Pneumonia      Prostate cancer (H)     prostatecomy 9 years ago, PSAs remain good     Status post percutaneous transluminal coronary angioplasty-Coronary angioplasty with STEPHEN to LCx 4/4/2014     Stented coronary artery     stents placed      Baseline Mental status: WDL  Current Mental Status changes: at basesline    Infection present or suspected this encounter: no  Sepsis suspected: No  Isolation type: No active isolations     Activity level - Baseline/Home:  Independent  Activity Level - Current:   Independent    Bariatric equipment needed?: No    In the ED these meds were given:   Medications   sodium chloride 0.9 % bag 500mL for CT scan flush use (91 mLs Intravenous Given 4/20/18 1128)   furosemide (LASIX) injection 40 mg (not administered)   0.9% sodium  chloride BOLUS (1,000 mLs Intravenous New Bag 4/20/18 1018)   ondansetron (ZOFRAN) injection 4 mg (4 mg Intravenous Given 4/20/18 1018)   HYDROmorphone (PF) (DILAUDID) injection 0.5 mg (0.5 mg Intravenous Given 4/20/18 1018)   iopamidol (ISOVUE-370) solution 62 mL (62 mLs Intravenous Given 4/20/18 1128)       Drips running?  No    Home pump  No    Current LDAs  Port A Cath Single 03/27/18 Right Chest wall (Active)   Number of days:24       Closed/Suction Drain 1 Left Neck Bulb  (Active)   Number of days:29       Rash 03/19/14 medial coccyx patch (Active)   Number of days:1493       Incision/Surgical Site 03/22/18 Left Neck (Active)   Number of days:29       Labs results:   Labs Ordered and Resulted from Time of ED Arrival Up to the Time of Departure from the ED   CBC WITH PLATELETS DIFFERENTIAL - Abnormal; Notable for the following:        Result Value    RBC Count 3.01 (*)     Hemoglobin 8.9 (*)     Hematocrit 27.6 (*)     RDW 18.5 (*)     Platelet Count 33 (*)     All other components within normal limits   COMPREHENSIVE METABOLIC PANEL - Abnormal; Notable for the following:     Glucose 106 (*)     Calcium 7.7 (*)     Albumin 2.6 (*)     Protein Total 5.4 (*)     AST 80 (*)     All other components within normal limits   LACTIC ACID WHOLE BLOOD - Abnormal; Notable for the following:     Lactic Acid 2.4 (*)     All other components within normal limits   UA MACROSCOPIC WITH REFLEX TO MICRO AND CULTURE - Abnormal; Notable for the following:     Blood Urine Small (*)     Protein Albumin Urine 10 (*)     Mucous Urine Present (*)     All other components within normal limits   NT PROBNP INPATIENT - Abnormal; Notable for the following:     N-Terminal Pro BNP Inpatient 3025 (*)     All other components within normal limits   LIPASE   TROPONIN I   LACTIC ACID WHOLE BLOOD   PATIENT CARE ORDER   STRICT INTAKE AND OUTPUT   BLOOD CULTURE       Imaging Studies:   Recent Results (from the past 24 hour(s))   Chest CT, IV  contrast only - PE protocol    Impression    IMPRESSION:   1. No evidence of pulmonary embolism.  2. Decrease in size of extensive mediastinal and axillary  lymphadenopathy, as well as decrease in size of pulmonary nodules.  3. Increased now moderate bilateral pleural effusions.       Recent vital signs:   /80  Resp 21  SpO2 99%    Cardiac Rhythm: Normal Sinus and Other  Pt needs tele? No  Skin/wound Issues: None    Code Status: Full Code    Pain control: good    Nausea control: good    Abnormal labs/tests/findings requiring intervention:     Family present during ED course? Yes   Family Comments/Social Situation comments:     Tasks needing completion: None    Yudy Marie RN  [facility]-- [phone] [place] [facility]  [phone] [facility]

## 2018-04-20 NOTE — TELEPHONE ENCOUNTER
Received vm from wife,Court. States patient is very lightheaded and unsteady. He feels like he is going to faint. She is not sure what to do. Court then called  while writer was on the phone with another patient. No provider available to see patient in clinic so advised to go to ED for evaluation. Court and patient agreed.  Zhane Barth  RN, BSN, OCN

## 2018-04-20 NOTE — IP AVS SNAPSHOT
[address] [address]    Phone:  [phone]                                       After Visit Summary   4/20/2018    Francisco Javier Cortes    MRN: [id number]           After Visit Summary Signature Page     I have received my discharge instructions, and my questions have been answered. I have discussed any challenges I see with this plan with the nurse or doctor.    ..........................................................................................................................................  Patient/Patient Representative Signature      ..........................................................................................................................................  Patient Representative Print Name and Relationship to Patient    ..................................................               ................................................  Date                                            Time    ..........................................................................................................................................  Reviewed by Signature/Title    ...................................................              ..............................................  Date                                                            Time

## 2018-04-20 NOTE — PHARMACY-ADMISSION MEDICATION HISTORY
Admission medication history interview status for the 4/20/2018 admission is complete. See Epic admission navigator for allergy information, pharmacy, prior to admission medications and immunization status.     Medication history interview sources:  Patient, [facility]    Changes made to PTA medication list (reason)  Added: Norco, Emla cream, ibuprofen, Maalox (all per [facility]).  Deleted: Fish oil, senna-docusate (both per patient).  Changed: Lansoprazole from QD to PRN, clindamycin lotion from BID to PRN.    Additional medication history information (including reliability of information, actions taken by pharmacist):  -Patient was a moderate historian. He knew most medication names and frequencies.  -Levofloxacin was started on 4/14/18 for HCaP.      Prior to Admission medications    Medication Sig Last Dose Taking? Auth Provider   Acetaminophen (TYLENOL 8 HOUR PO) Take 500 mg by mouth as needed  Past Week at Unknown time Yes Reported, Patient   allopurinol (ZYLOPRIM) 300 MG tablet Take 1 tablet (300 mg) by mouth daily 4/19/2018 Yes Merry Auguste MD   Alum & Mag Hydroxide-Simeth (ALUMINUM-MAGNESIUM-SIMETHICONE) 200-200-20 MG/5ML SUSP Take 5 mLs by mouth daily as needed 4/19/2018 Yes Unknown, Entered By History   ASPIRIN 81 MG OR TABS 1 TABLET EVERY MORNING 4/19/2018 Yes Reported, Patient   clindamycin (CLEOCIN T) 1 % lotion Apply twice daily for 6 weeks to the groin Past Week at Unknown time Yes Aby Rodriguez MD   HYDROcodone-acetaminophen (NORCO) 5-325 MG per tablet Take 1 tablet by mouth every 4 hours as needed 4/19/2018 Yes Unknown, Entered By History   hydrocortisone valerate (WEST-NINOSKA) 0.2 % ointment Apply sparingly to affected area three times daily as needed. Past Week at Unknown time Yes Florian Alonzo MD   ibuprofen (ADVIL/MOTRIN) 600 MG tablet Take 600 mg by mouth every 6 hours as needed Past Week at Unknown time Yes Unknown, Entered By History   Lansoprazole (PREVACID  PO) Take 20 mg by mouth every evening as needed Patient needs to use brand name Prevacid (generic version causes diarrhea)  4/19/2018 Yes Reported, Patient   levofloxacin (LEVAQUIN) 750 MG tablet Take 1 tablet (750 mg) by mouth daily 4/19/2018 Yes Donald Flowers MD   lidocaine-prilocaine (EMLA) cream Apply 1 Application topically as needed 4/19/2018 Yes Unknown, Entered By History   lisinopril (PRINIVIL/ZESTRIL) 20 MG tablet Take 1 tablet (20 mg) by mouth daily  Patient taking differently: Take 20 mg by mouth every morning  4/19/2018 Yes Florian Alonzo MD   LORazepam (ATIVAN) 0.5 MG tablet Take 1 tablet (0.5 mg) by mouth every 4 hours as needed (Anxiety, Nausea/Vomiting or Sleep) Past Week at Unknown time Yes Merry Auguste MD   metoprolol succinate (TOPROL-XL) 100 MG 24 hr tablet Take 1 tablet (100 mg) by mouth daily 4/19/2018 Yes Florian Alonzo MD   ondansetron (ZOFRAN) 8 MG tablet Take 1 tablet (8 mg) by mouth every 8 hours as needed (Nausea/Vomiting) Past Week at Unknown time Yes Merry Auguste MD   prochlorperazine (COMPAZINE) 10 MG tablet Take 1 tablet (10 mg) by mouth every 6 hours as needed for nausea or vomiting Past Week at Unknown time Yes Joy Bush APRN CNP   rosuvastatin (CRESTOR) 40 MG tablet Take 1 tablet (40 mg) by mouth daily  Patient taking differently: Take 40 mg by mouth every morning  4/19/2018 Yes Florian Alonzo MD   nitroGLYcerin (NITROSTAT) 0.4 MG sublingual tablet Place 1 tablet (0.4 mg) under the tongue every 5 minutes as needed up to 3 tablets per episode. More than a month at Unknown time  Florian Alonzo MD         Medication history completed by: Елена Shukla, PharmD

## 2018-04-20 NOTE — H&P
[facility], [place]    History and Physical  Hospitalist       Date of Admission:  4/20/2018  Date of Service (when I saw the patient): 04/20/18    Assessment & Plan   Francisco Javier Cortes is a 77 year old male with PMH CAD, HTN, HLP, prostate cancer s/p prostatectomy and recently diagnosed mantle cell lymphoma who received cycle #1 of BR on 3/29/19 who was recently hospitalized with HCAP (4/11-4/15) who presented to the ED with LE edema, COLE since his recent hospitalization.    #Hypervolemia, possible CHF: He states that his swelling began during his recent hospitalization as he received IVF. He received lasix 20 mg on 4/14. He has had COLE and feels some SOB with lying down. He feels this is stable to slightly improving since discharge. In the ED he had a LA of 2.4 and was given 1 L of NS. He has been satting 99% on RA. He is slightly tachy (104) but did not take his metoprolol today. BP is slightly high. He is not having chest pain. He had an elevated BNP of 3025. He has not had an Echo that I can find in records since 2014. He was scheduled for a stress echo earlier this week but did not go as he was not feeling well. He had a CT chest in the ED that was negative for PE but showed moderate b/l pleural effusions.   - Lasix 40 mg IV  - Echo  - Lymphedema for LE edema    #Stage IV B Mantle cell lymphoma s/p Cyc 1 BR (3/29)-Diagnosed in Feb 2018-Follows Dr. Adan   Pt had symptoms of abdominal bloating/discomfort and loss of appetite for 2 months. Underwent imaging workup by primary care provider with CT abdominal and pelvis on 02/25/18 showing extensive intra- abdominal and inguinal lymphadenopathy with splenomegaly. FNA of the lymph node on 03/14 which came back with findings consistent with mantle cell lymphoma. 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 Proliferation index was estimated to  be 50%. The marrow biopsy performed showed bone marrow involvement by mantle cell lymphoma 40-50%. Fish came back positive for translocation 11:14 consistent with mantle cell lymphoma. PET CT scan extensive hypermetabolic adenopathy of cervical, axillary, mediastinal, hilar, intra-abdominal, retroperitoneal inguinal but the largest measuring 15.5 x 6.9 cm.    Started on Bendamustine 90 mg/m2 day 1 and 2 in combination with Rituxan 375 mg/m2 q.4 weeks- Cycle 1 on 03/29/18. Was admitted to the hospital subsequently for observation for tumor lysis syndrome and subsequently discharged home after improvement in blood workPlan is to proceed with 6 total cycles followed by repeat PET scan and have response achieved, consider proceeding with maintenance Rituxan. CT chest shows decreased adenopathy   Currently he is Day 23. No neutropenia.   -Will f/u with Dr. Adan for next cycle  -cont home allopurinol    #HCAP: Was hospitalized from 4/11-4/15 for HCAP. D/c with levaquin 750 mg to complete on 4/25.   - Continue levaquin 750 mg daily through 4/24      #Thrombocytopenia and anemia 2/2 chemotherapy: plt 33 today, hgb 8.9  -no bleeding, goal plt >10K         #CAD/NSTEMI Hx s/p stents  -cont home statin, hold asa due to low plt count  -Hold home lisinopril today with addition of lasix, restart tomorrow if BP is stable  -Continue PTA metoprolol 100 mg daily\    FEN:  Regular diet  Continue PTA lansoprazole for GERD    DVT Prophylaxis: VTE Prophylaxis contraindicated due to thrombocytopenia  Code Status: Full Code    Disposition: Expected discharge in 1-2 days once edema is improved.    Nikki Beck PA-C  Hematology/Oncology  Pager: [phone]    Primary Care Physician   Florian Alonzo    Chief Complaint   Edema and COLE    History is obtained from the patient and chart review    History of Present Illness   Francisco Javier Cortes is a 77 year old male with PMH CAD, HTN, HLP, prostate cancer s/p prostatectomy and recently  diagnosed mantle cell lymphoma who received cycle #1 of BR on 3/29/19 who was recently hospitalized with HCAP (4/11-4/15) who presented to the ED with LE edema, COLE since his recent hospitalization. He states that his swelling began during his recent hospitalization as he received IVF. He received lasix 20 mg on 4/14. He has had COLE and feels some SOB with lying down. He feels this is stable to slightly improving since discharge. In the ED he had a LA of 2.4 and was given 1 L of NS. He has been satting 99% on RA. He is slightly tachy (104) but did not take his metoprolol today. BP is slightly high. He is not having chest pain. He had an elevated BNP of 3025. He has not had an Echo that I can find in records since 2014. He was scheduled for a stress echo earlier this week but did not go as he was not feeling well. He had a CT chest in the ED that was negative for PE but showed moderate b/l pleural effusions. He has not had any fevers or chills. Started HHPT this week. He is fatigued and slightly off balance but no falls. Eating ok. Some intermittent nausea, unsure which meds he takes and if they help. Urinating normally. Breathing is fine at rest, SOB with lying down and walking. No dysuria.     Past Medical History    I have reviewed this patient's medical history and updated it with pertinent information if needed.   Past Medical History:   Diagnosis Date     CAD (coronary artery disease) 1/09    s/p stentsx2     Coronary artery disease involving native coronary artery of native heart without angina pectoris 12/22/2015     Dyslipidemia      Erectile dysfunction      GERD (gastroesophageal reflux disease)      Hearing problem One ear 1961     Hypertension      NSTEMI (non-ST elevated myocardial infarction) (H) 3/20/2014     Pneumonia      Prostate cancer (H)     prostatecomy 9 years ago, PSAs remain good     Status post percutaneous transluminal coronary angioplasty-Coronary angioplasty with STEPHEN to LCx 4/4/2014      Stented coronary artery     stents placed       Past Surgical History   I have reviewed this patient's surgical history and updated it with pertinent information if needed.  Past Surgical History:   Procedure Laterality Date     BIOPSY LYMPH NODE CERVICAL Left 3/22/2018    Procedure: BIOPSY LYMPH NODE CERVICAL;  Excisional Biopsy Left Cervical Lymph Node ;  Surgeon: Samia Gaviria MD;  Location: [facility] OR     C APPENDECTOMY       C REMV PROSTATE,RETROPUB,RAD,TOT NODES       CATARACT IOL, RT/LT       COLONOSCOPY       COLONOSCOPY Left 2016    Procedure: COMBINED COLONOSCOPY, SINGLE OR MULTIPLE BIOPSY/POLYPECTOMY BY BIOPSY;  Surgeon: Duane, William Charles, MD;  Location: [facility] OR     COLONOSCOPY WITH CO2 INSUFFLATION N/A 2016    Procedure: COLONOSCOPY WITH CO2 INSUFFLATION;  Surgeon: Duane, William Charles, MD;  Location: [facility] OR     ENT SURGERY  --     PHACOEMULSIFICATION CLEAR CORNEA WITH STANDARD INTRAOCULAR LENS IMPLANT Left 2015    Procedure: PHACOEMULSIFICATION CLEAR CORNEA WITH STANDARD INTRAOCULAR LENS IMPLANT;  Surgeon: John Zimmerman MD;  Location:  [facility]     PHACOEMULSIFICATION CLEAR CORNEA WITH STANDARD INTRAOCULAR LENS IMPLANT Right 2015    Procedure: PHACOEMULSIFICATION CLEAR CORNEA WITH STANDARD INTRAOCULAR LENS IMPLANT;  Surgeon: John Zimmerman MD;  Location:  [facility]     STENT, CORONARY, [place]  1/09, 2014    x2, x1 in      TYMPANOPLASTY       VASCULAR SURGERY      stents       Prior to Admission Medications   Prior to Admission Medications   Prescriptions Last Dose Informant Patient Reported? Taking?   ASPIRIN 81 MG OR TABS 2018  Yes Yes   Si TABLET EVERY MORNING   Acetaminophen (TYLENOL 8 HOUR PO) Past Week at Unknown time  Yes Yes   Sig: Take 500 mg by mouth as needed    Alum & Mag Hydroxide-Simeth (ALUMINUM-MAGNESIUM-SIMETHICONE) 200-200-20 MG/5ML SUSP 2018  Yes Yes   Sig: Take 5 mLs by mouth daily as needed   HYDROcodone-acetaminophen  (NORCO) 5-325 MG per tablet 4/19/2018  Yes Yes   Sig: Take 1 tablet by mouth every 4 hours as needed   LORazepam (ATIVAN) 0.5 MG tablet Past Week at Unknown time  No Yes   Sig: Take 1 tablet (0.5 mg) by mouth every 4 hours as needed (Anxiety, Nausea/Vomiting or Sleep)   Lansoprazole (PREVACID PO) 4/19/2018  Yes Yes   Sig: Take 20 mg by mouth every evening as needed Patient needs to use brand name Prevacid (generic version causes diarrhea)    allopurinol (ZYLOPRIM) 300 MG tablet 4/19/2018  No Yes   Sig: Take 1 tablet (300 mg) by mouth daily   clindamycin (CLEOCIN T) 1 % lotion Past Week at Unknown time  No Yes   Sig: Apply twice daily for 6 weeks to the groin   hydrocortisone valerate (WEST-NINOSKA) 0.2 % ointment Past Week at Unknown time  No Yes   Sig: Apply sparingly to affected area three times daily as needed.   ibuprofen (ADVIL/MOTRIN) 600 MG tablet Past Week at Unknown time  Yes Yes   Sig: Take 600 mg by mouth every 6 hours as needed   levofloxacin (LEVAQUIN) 750 MG tablet 4/19/2018  No Yes   Sig: Take 1 tablet (750 mg) by mouth daily   lidocaine-prilocaine (EMLA) cream 4/19/2018  Yes Yes   Sig: Apply 1 Application topically as needed   lisinopril (PRINIVIL/ZESTRIL) 20 MG tablet 4/19/2018  No Yes   Sig: Take 1 tablet (20 mg) by mouth daily   Patient taking differently: Take 20 mg by mouth every morning    metoprolol succinate (TOPROL-XL) 100 MG 24 hr tablet 4/19/2018  No Yes   Sig: Take 1 tablet (100 mg) by mouth daily   nitroGLYcerin (NITROSTAT) 0.4 MG sublingual tablet More than a month at Unknown time  No No   Sig: Place 1 tablet (0.4 mg) under the tongue every 5 minutes as needed up to 3 tablets per episode.   ondansetron (ZOFRAN) 8 MG tablet Past Week at Unknown time  No Yes   Sig: Take 1 tablet (8 mg) by mouth every 8 hours as needed (Nausea/Vomiting)   prochlorperazine (COMPAZINE) 10 MG tablet Past Week at Unknown time  No Yes   Sig: Take 1 tablet (10 mg) by mouth every 6 hours as needed for nausea or  vomiting   rosuvastatin (CRESTOR) 40 MG tablet 4/19/2018  No Yes   Sig: Take 1 tablet (40 mg) by mouth daily   Patient taking differently: Take 40 mg by mouth every morning       Facility-Administered Medications: None     Allergies   Allergies   Allergen Reactions     Niacin      Severe rash and itching     Prevacid [Lansoprazole] Diarrhea     Patient notes diarrhea with 30mg generic version. Patient does ok with 20mg in generic and brand name.     Shellfish Allergy      One kind       Social History   I have reviewed this patient's social history and updated it with pertinent information if needed. Francisco Javier NARAYAN Sophia  reports that he has never smoked. He has never used smokeless tobacco. He reports that he drinks alcohol. He reports that he does not use illicit drugs.    Family History   I have reviewed this patient's family history and updated it with pertinent information if needed.   Family History   Problem Relation Age of Onset     Cardiovascular Mother      HEART DISEASE Mother      Cancer - colorectal Father      HEART DISEASE Father      Other Cancer Father      CANCER Father      Hypertension Father      Asthma Brother      Coronary Artery Disease Brother      Hypertension Brother      HEART DISEASE Brother      HEART DISEASE Son      Hypertension Son      CANCER Daughter      non hodgkins     Prostate Cancer Brother      Hypertension Brother      CANCER Brother      Prostate Cancer Maternal Grandfather      CANCER Maternal Grandfather      Muscular Disorder Maternal Grandfather      HEART DISEASE Paternal Grandmother      Hypertension Paternal Grandmother      Hypertension Sister        Review of Systems   The 10 point Review of Systems is negative other than noted in the HPI or here.     Physical Exam   Temp: 98.6  F (37  C) Temp src: Oral BP: 143/87   Heart Rate: 104 Resp: 18 SpO2: 97 % O2 Device: None (Room air)    Vital Signs with Ranges  Temp:  [98.6  F (37  C)] 98.6  F (37  C)  Heart Rate:  []  104  Resp:  [14-21] 18  BP: (127-153)/(74-97) 143/87  SpO2:  [90 %-99 %] 97 %  0 lbs 0 oz    Constitutional: Well appearing, no acute distress  Eyes: PERRL, EOMs intact, sclera anicteric, conjunctiva clear  HEENT: normocephalic, atraumatic, MMM, no mucositis or thrush  Respiratory:  Decreased breath sounds with bibasilar faint crackles b/l  Cardiovascular: RRR, no m/r/g  GI: soft, nondistended, nontender  Lymph/Hematologic: no palpable cervical, supraclavicular or axillary adenopathy  Genitourinary: deferred  Skin:  No visible rashes  Musculoskeletal: no swollen or erythematous joints, +2-3 b/l LE pitting edema  Neurologic: A&O x 4, no focal deficits, CN II-XII intact  Psychiatric: appropriate affect    Data   Data reviewed today:  I personally reviewed no images or EKG's today.    Recent Labs  Lab 04/20/18  0956 04/16/18  1550 04/15/18  0446   WBC 4.1 5.5 4.8   HGB 8.9* 8.8* 9.0*   MCV 92 93 92   PLT 33* 44* 37*    144 145*   POTASSIUM 3.8 3.3* 3.4   CHLORIDE 107 111* 112*   CO2 24 23 24   BUN 11 18 21   CR 0.86 0.97 1.07   ANIONGAP 10 10 8   EVANGELINA 7.7* 7.5* 7.2*   * 110* 84   ALBUMIN 2.6* 2.4*  --    PROTTOTAL 5.4* 5.5*  --    BILITOTAL 0.7 0.7  --    ALKPHOS 89 85  --    ALT 56 25  --    AST 80* 43  --    LIPASE 188  --   --    TROPI <0.015  --   --        Recent Results (from the past 24 hour(s))   Chest CT, IV contrast only - PE protocol    Narrative    EXAMINATION: Chest CT  4/20/2018 11:47 AM    CLINICAL HISTORY: chemo pt with mantle cell lymphoma, leg swelling,  nausea, recent pneumonia, see above;     COMPARISON: CT 3/5/2018, PET scan 3/24/2018.    Technique: Volumetric helical acquisition of the chest were obtained  following pulmonary embolism protocol from the lung apices through the  upper abdomen. The pulmonary arteries are well-opacified to evaluate  for pulmonary embolism.    3-dimensional post processed angiographic images were reconstructed,  archived to PACS and used in the  interpretation of this study.    FINDINGS:  Bolus is adequate for evaluation of pulmonary embolus. There is no  filling defects in the pulmonary arteries to suggest pulmonary  embolism. There is a small pericardial effusion. The heart is  prominent. The main pulmonary arteries is at the upper limits of  normal. Right-sided Port-A-Cath tip is in the right atrium. Narrowing  of the lower lobe bronchi bilaterally secondary to extensive  mediastinal lymphadenopathy.     Extensive mediastinal lymphadenopathy including a subcarinal lymph  node extending around the right bronchus measuring 3.8 x 6.2 cm  (series 9 image 121), previously 4.0 x 7.6 cm on PET scan 3/24/2018.  There is a 1.8 cm paratracheal lymph node (series 9 image 95),  previously 2.8 cm 3/24/2018. There is a 1.3 cm paratracheal lymph node  (series 9 image 78), previously 2.4 cm. There is a 10 mm left axillary  lymph node (series 9 image 72), previously 2.2 cm. There is a 9 mm  right axillary lymph node (series 9 image 74), previously 2.0 cm.  Limited evaluation of lower neck secondary to artifact from arms.  Previously demonstrated left level 4 lymph node measures 2.0 cm,  previously 3.4 cm (series 9 image 10).    Moderate bilateral pleural effusions, increased from prior study.  There is a 12 x 8 mm right middle lobe nodule (series 8 image 138),  previously 17 mm x 12 mm. There is a 10 x 15 mm pleural-based nodule  in the anterior left hemithorax (series 8 image 87), previously 15 x 9  mm. Groundglass opacification of the lungs in the posterior upper  lobes and the posterior lower lobes    Bones and soft tissues: No suspicious bone findings.     Partially imaged upper abdomen: Limited.      Impression    IMPRESSION:   1. No evidence of pulmonary embolism.  2. Decrease in size of extensive mediastinal and axillary  lymphadenopathy, as well as decrease in size of pulmonary nodules.  3. Increased now moderate bilateral pleural effusions, as well as  dependent  groundglass opacities, possibly representing pulmonary edema  versus aspiration.    I have personally reviewed the examination and initial interpretation  and I agree with the findings.    BUSTER CRUZ MD   .Patient has been seen, examined and evaluated by me independently. I have reviewed today's vital signs, medications, labs and imaging results. I have discussed the plan with the patient.  PE  Alert, oriented x3  In no acute distress  Vitals are stable  CVS and Pulmonary systems findings are normal  Labs  CBC low Hb and plt due to chemo or sabrina BM reserve  Lab Results   Component Value Date    WBC 4.1 04/20/2018     Lab Results   Component Value Date    RBC 3.01 04/20/2018     Lab Results   Component Value Date    HGB 8.9 04/20/2018     Lab Results   Component Value Date    HCT 27.6 04/20/2018     No components found for: MCT  Lab Results   Component Value Date    MCV 92 04/20/2018     Lab Results   Component Value Date    MCH 29.6 04/20/2018     Lab Results   Component Value Date    MCHC 32.2 04/20/2018     Lab Results   Component Value Date    RDW 18.5 04/20/2018     Lab Results   Component Value Date    PLT 33 04/20/2018     Last Basic Metabolic Panel:  Lab Results   Component Value Date     04/20/2018      Lab Results   Component Value Date    POTASSIUM 3.8 04/20/2018     Lab Results   Component Value Date    CHLORIDE 107 04/20/2018     Lab Results   Component Value Date    EVANGELINA 7.7 04/20/2018     Lab Results   Component Value Date    CO2 24 04/20/2018     Lab Results   Component Value Date    BUN 11 04/20/2018     Lab Results   Component Value Date    CR 0.86 04/20/2018     Lab Results   Component Value Date     04/20/2018        chemistry are not significant    MCL    He has some complications after BR, now some SOB and edema peripherally. Not really acute change, no chest pain, abdomeina pain, fevers, chils, appetite is not very good, ebnergy is OK    Chest CT shows pleural B effusion, exam  shows pretibial edema; give lasix, apparently he got already in ER and now urinating well    Atrial PCs? Monitor, his ECG was OK today. ECHO is ordered.  Monitor him tonight, D/C home?

## 2018-04-20 NOTE — IP AVS SNAPSHOT
MRN:[id number]                      After Visit Summary   4/20/2018    Francisco Javier Cortes    MRN: [id number]           Thank you!     Thank you for choosing [facility] for your care. Our goal is always to provide you with excellent care. Hearing back from our patients is one way we can continue to improve our services. Please take a few minutes to complete the written survey that you may receive in the mail after you visit with us. Thank you!        Patient Information     Date Of Birth          1940        Designated Caregiver       Most Recent Value    Caregiver    Will someone help with your care after discharge? yes    Name of designated caregiver Court Cortes    Phone number of caregiver [phone]    Caregiver address same      About your hospital stay     You were admitted on:  April 20, 2018 You last received care in the:  [address] [facility]    You were discharged on:  April 21, 2018        Reason for your hospital stay       Swollen legs and shortness of breath due to some fluid around the lungs. Improved with lasix (medicine that helps you urinate out fluid)                  Who to Call     For medical emergencies, please call 911.  For non-urgent questions about your medical care, please call your primary care provider or clinic, [phone]          Attending Provider     Provider Specialty    Romi Bowen MD Emergency Medicine    [place], MD [facility]       Primary Care Provider Office Phone # Fax #    Florian Alonzo [phone]      After Care Instructions     Activity       Your activity upon discharge: activity as tolerated            Diet       Follow this diet upon discharge: Regular, recommend a low salt diet            Discharge Instructions       New medications: lasix (furosemide) is a water pill which helps decrease your swelling, 20 mg daily. Take 10 mew potassium once per day as your potassium level can get low when taking  lasix. You should also use compression stockings to help with swelling. Stop taking levaquin as your CT scan shows resolution of pneumonia.   [facility] ([phone]) for temperature > 100.4, shortness of breath, chest pain, headaches, vision changes, bleeding, uncontrolled nausea, vomiting, diarrhea, or pain.                  Follow-up Appointments     Follow Up and recommended labs and tests       Follow up as scheduled in your oncology clinic on 4/23                  Your next 10 appointments already scheduled     Apr 23, 2018  2:45 PM CDT   LAB with LAB ONC [facility])    [id number] [place] [address]   [phone]           Please do not eat 10-12 hours before your appointment if you are coming in fasting for labs on lipids, cholesterol, or glucose (sugar). This does not apply to pregnant women. Water, hot tea and black coffee (with nothing added) are okay. Do not drink other fluids, diet soda or chew gum.            Apr 23, 2018  3:15 PM CDT   Return Visit with MARTHA Gonzales CNP   [facility])    [id number] [place] [address]   [phone]            Apr 30, 2018  8:00 AM CDT   Return Visit with [facility]   [facility])    [address] [facility] [address]   [phone]            Apr 30, 2018  8:45 AM CDT   Return Visit with Erickson Adan MD   [facility])    [address] [place] [address]   [phone]            Apr 30, 2018  9:30 AM CDT   Level 6 with BAY 4 INFUSION   [facility])    [address] [place] [address]   [phone]            May 01, 2018  8:00 AM CDT   Return Visit with Jad Ball MD   [facility]  [facility] ([facility])    [address] [place] [address]   [phone]            May 01, 2018  1:00 PM CDT   Level 2 with BAY 1 INFUSION   [facility])    [address] [facility] [address]   [phone]              Future tests that were ordered for you     Basic metabolic panel  (Ca, Cl, CO2, Creat, Gluc, K, Na, BUN)           CBC with platelets differential       Last Lab Result: Hemoglobin (g/dL)       Date                     Value                 04/21/2018               8.1 (L)          ----------                  Further instructions from your care team         Left- or Right- Side Congestive Heart Failure (CHF)    The heart is a large muscle. It is a pump that circulates blood throughout the body. Blood carries oxygen to all of the organs, including the brain, muscles, and skin. After your body takes the oxygen out of the blood, the blood returns to the heart. The right side of the heart collects the blood from the body and pumps it to the lungs. In the lungs, it gets fresh oxygen and gives up carbon dioxide. The oxygen-rich blood from the lungs then returns to the left side of the heart, where it is pumped back out to the rest of your body, starting the process all over.  Congestive heart failure (CHF) occurs when the heart muscle is weakened. This affects the pumping action of the heart. Heart failure can affect the right side of the heart or the left side. But heart failure may affect not only the right side of the heart or only the left side. Although it may have started on one side, it can and often eventually does affect both sides.  Right-side heart failure  When the right side of the heart is weakened, it can t handle the blood it is getting from the rest of the body. This blood returns to the heart through veins. When too much pressure builds up in the veins, fluid leaks out into the tissues. Gravity then  causes that fluid to move to those parts of the body that are the lowest. So one of the first symptoms of right-side CHF can include swelling in the feet and ankles. If the condition gets worse, the swelling can even go up past the knees. Sometimes it gets so severe, the liver can get congested as well.  Left-side heart failure  When the left side of the heart is weakened, it can t handle the blood it gets from the lungs. Pressure then builds up in the veins of the lungs, causing fluid to leak into the lung tissues. This may cause CHF and pulmonary edema. This causes you to feel short of breath, weak, or dizzy. These symptoms are often worse with exertion, such as when climbing stairs or walking up hills. Lying with your head flat is uncomfortable and can make your breathing worse. This may make sleeping difficult. You may need to use extra pillows to elevate your upper body to sleep well. The same is true when just resting during the daytime.  There are many causes of heart failure including:    Coronary artery disease    Past heart attack (also known as acute myocardial infarction, or AMI)    High blood pressure    Damaged heart valve    Diabetes    Obesity    Cigarette smoking    Alcohol abuse  Heart failure is a chronic condition. There is no cure. The purpose of medical treatment is to improve the pumping action of the heart. The main way to do this is to remove excess water from the body. A number of medicines can help reach this goal, improve symptoms, and prevent the heart from becoming weaker. Sometimes, heart failure can become so severe that a device is placed in the heart to help with pumping. Another major goal is to better treat the causes of heart failure, such as diabetes and high blood pressure, by making changes in your lifestyle and maximizing medical control when needed.  Home care  Follow these guidelines when caring for yourself at home:    Check your weight every day. This is very important  because a sudden increase in weight gain could mean worsening heart failure. Keep these things in mind:  ? Use the same scale every day.  ? Weigh yourself at the same time every day.  ? Make sure the scale is on a hard floor surface, not on a rug or carpet.  ? Keep a record of your weight every day so your healthcare provider can see it. If you are not given a log sheet for this, keep a separate journal for this purpose.     Cut back on the amount of salt (sodium) you eat. Follow your healthcare provider's recommendation on how much salt or sodium you should have each day.  ? Avoid high-salt foods. These include olives, pickles, smoked meats, salted potato chips, and most prepared foods.  ? Don't add salt to your food at the table. Use only small amounts of salt when cooking.  ? Read the labels carefully on food packages to learn how much salt or sodium is in each serving in the package. Remember, a can or package of food may contain more than 1 serving. So if you eat all the food in the package, you may be getting more salt than you think.    Follow your healthcare provider's recommendations about how much fluid you should have. Be aware that some foods, such as soup, pudding, and juicy fruits like oranges or melons, contain liquid. You'll need to count the liquid in those foods as part of your daily fluid intake. Your provider can help you with this.    Stop smoking.    Cut back on how much alcohol you drink.    Lose weight if you are overweight. The excess weight adds a lot of stress on the workload of the heart.    Stay active. Talk with your provider about an exercise program that is safe for your heart.    Keep your feet elevated to reduce swelling. Ask your provider about support hose as a preventive treatment for daytime leg swelling.  Besides taking your medicine as instructed, an important part of treatment is lifestyle changes. These include diet, physical activity, stopping smoking, and weight  control.  Improve your diet by including more fresh foods, cutting back on how much sugar and saturated fat you eat, and eating fewer processed foods and less salt.  Follow-up care  Follow up with your healthcare provider, or as advised.  Make sure to keep any appointments that were made for you. These can help better control your congestive heart failure. You will need to follow up with your provider on a routine basis to make sure your heart failure is well managed.  If an X-ray, ECG, or other tests were done, you will be told of any new findings that may affect your care.  Call 911  Call 911 if you:    Become severely short of breath    Feel lightheaded, or feel like you might pass out or faint    Have chest pain or discomfort that is different than usual, the medicines your doctor told you to use for this don't help, or the pain lasts longer than 10 to 15 minutes    You suddenly develop a rapid heart rate  When to seek medical advice  The following may be signs that your heart failure is getting worse. Call your healthcare provider right away if any of these happen:    Sudden weight gain. This means 3 or more pounds in one day, or 5 or more pounds in 1 week.    Trouble breathing not related to being active    New or increased swelling of your legs or ankles    Swelling or pain in your abdomen    Breathing trouble at night. This means waking up short of breath or needing more pillows to breathe.    Frequent coughing that doesn t go away    Feeling much more tired than usual  Date Last Reviewed: 1/4/2016 2000-2017 [employer]. [address] [place] [address], [place] [address]. All rights reserved. This information is not intended as a substitute for professional medical care. Always follow your healthcare professional's instructions.      Contact Numbers    [facility] Main Line: [phone]  [facility] Triage:  [phone]    Call triage with chills and/or temperature greater than or equal to 100.4, uncontrolled  nausea/vomiting, diarrhea, constipation, dizziness, shortness of breath, chest pain, bleeding, unexplained bruising, or any new/concerning symptoms, questions/concerns.     If you are having any concerning symptoms or wish to speak to a provider before your next infusion visit, please call your care coordinator or triage to notify them so we can adequately serve you.         General Recommendations To Control Heart Failure When You Get Home       Heart Failure Instructions for Patients and Families: Please read and check off each of these important instructions as you do them when you get home.          Weight and Symptoms       ___ Put a scale in your bathroom.    ___ Post a weight chart or calendar next to your scale.    ___ Weigh yourself everyday as soon as you get up in the morning (before breakfast).  You should only be wearing your pajamas.  Write your weight on the chart/calendar.  ___ Bring your weight chart/calendar with you to all appointments.  ___ Call your doctor or nurse practitioner if you gain 2 pounds (in 1 day) or 5 pounds in (1 week) from your goal  good  weight.  Your good weight is also called your  dry  weight.  Your doctor or nurse will tell you what your good weight should be.    ___ Call your doctor or nurse practitioner if you have shortness of breath that gets worse over time, leg swelling or fatigue.       Medications and Diet       ___ Make sure to take your medication as prescribed.    ___ Bring a current list of your medication and all of your medicine bottles with you to all appointments.    ___ Limit fluids if you still have swelling or shortness of breath, or if your doctor tells you to do so.    ___ Eat less than 2000 mg of sodium (salt) every day. Read food labels, and do not add salt to meals. Remember, if you eat less salt you retain less fluid.  ___ Follow a heart healthy diet that is low in saturated fat.        Activity and Suggested Lifestyle Changes      ___ Stay active.  Talk to your doctor about an exercise program that is safe for your heart.  ___ Stop smoking. Reduce alcohol use.      ___ Lose weight if you are overweight. Extra weight puts a lot of stress on the heart.                 Control for Leg Swelling     ___ Keep your legs elevated (raised) as needed for swelling. If swelling is uncomfortable or elevation doesn t help, ask your doctor about using ACE wraps or support stockings.        What is the [facility]?  Cardiomyopathy  Optimization  Rehabilitation  Education    The [facility] is a heart failure specialty clinic within [facility].  It is an outpatient disease management program that is based on a phase-by-phase approach, which is tailored to each patient s individual needs.  The cardiologist, nurse practitioner, physician assistant and nurses provide an ongoing outpatient care and treatment plan that guides heart failure and cardiomyopathy patients from evaluation and education to stabilization. This team works with your current primary care doctor and cardiologist to help you:    - Avoid hospitalizations  - Slow the progression of the disease  - Improve length and quality of life  - Know who and when to call if heart failure symptoms appear  - Receive easy access to quality health care and advice  - Better understand your condition and treatment  - Decrease the tremendous cost burden of heart failure care  - Detect future heart problems before they become life threatening                                 Follow-up Appointments     ___ An appointment with the [facility] may already have been made for you (see section   Your next appointments already scheduled ).  If you have a question about your appointment, would like to speak with a C.O.R.E. nurse, or would like to become a [facility] Clinic patient, please call one of the following locations:     - [facility] ([place]):                                              [phone]  - [facility] ([place]):                                            [phone]  - [facility] ([place]):                 [phone]      ___ Your [facility] Clinic Team will continue to educate you on your heart failure and may adjust medications based on your vital signs, lab work, and how you are feeling.  Therefore, it is very important to bring the following to all C.O.R.E. appointments:    - An accurate list of your medications  - Your medicine bottles  - Your weight chart/calendar                   Pending Results     Date and Time Order Name Status Description    4/20/2018 1800 EKG 12-lead, tracing only Preliminary     4/20/2018 1600 ECHO COMPLETE WITH OPTISON In process     4/20/2018 1008 EKG 12-lead, tracing only Preliminary     4/20/2018 1006 Blood culture ONE site Preliminary             Statement of Approval     Ordered          04/21/18 1150  I have reviewed and agree with all the recommendations and orders detailed in this document.  EFFECTIVE NOW     Approved and electronically signed by:  Nikki Beck PA-C             Admission Information     Date & Time Provider Department Dept. Phone    4/20/2018 Phyllis Lee MD [address] [facility] [phone]      Your Vitals Were     Blood Pressure Temperature Respirations Weight Pulse Oximetry BMI (Body Mass Index)    129/65 (BP Location: Right arm) 98.5  F (36.9  C) (Oral) 20 73.5 kg (162 lb 1.6 oz) 96% 26.97 kg/m2      MyChart Information     [employer] gives you secure access to your electronic health record. If you see a primary care provider, you can also send messages to your care team and make appointments. If you have questions, please call your primary care clinic.  If you do not have a primary care provider, please call [phone] and they will assist you.        Care EveryWhere ID     This is your Care EveryWhere ID. This could be used by other organizations to  access your [facility] medical records  [id number]        Equal Access to Services     NANDA BARAJAS : Hadii aad ku hadvasukaren Cherry, waaxda luqadaha, qaybta kageoffdonn wu, zhao brucesayraalex lorenzo. So [facility] [phone].    ATENCIÓN: Si habla español, tiene a ivan disposición servicios gratuitos de asistencia lingüística. Llame al [phone].    We comply with applicable federal civil rights laws and [place] laws. We do not discriminate on the basis of race, color, national origin, age, disability, sex, sexual orientation, or gender identity.               Review of your medicines      START taking        Dose / Directions    furosemide 20 MG tablet   Commonly known as:  LASIX   Used for:  Acute congestive heart failure, unspecified congestive heart failure type (H)        Dose:  20 mg   Take 1 tablet (20 mg) by mouth daily   Quantity:  30 tablet   Refills:  0       potassium chloride SA 10 MEQ CR tablet   Commonly known as:  K-DUR/KLOR-CON M   Used for:  Acute congestive heart failure, unspecified congestive heart failure type (H)        Dose:  10 mEq   Take 1 tablet (10 mEq) by mouth daily   Quantity:  30 tablet   Refills:  0         CONTINUE these medicines which may have CHANGED, or have new prescriptions. If we are uncertain of the size of tablets/capsules you have at home, strength may be listed as something that might have changed.        Dose / Directions    lisinopril 20 MG tablet   Commonly known as:  PRINIVIL/ZESTRIL   This may have changed:  when to take this   Used for:  Hypertension goal BP (blood pressure) < 130/80, Coronary artery disease involving native coronary artery of native heart without angina pectoris, Microalbuminuria        Dose:  20 mg   Take 1 tablet (20 mg) by mouth daily   Quantity:  90 tablet   Refills:  3       rosuvastatin 40 MG tablet   Commonly known as:  CRESTOR   This may have changed:  when to take this   Used for:  Hyperlipidemia LDL goal <100, Coronary  artery disease involving native coronary artery of native heart without angina pectoris        Dose:  40 mg   Take 1 tablet (40 mg) by mouth daily   Quantity:  90 tablet   Refills:  3         CONTINUE these medicines which have NOT CHANGED        Dose / Directions    allopurinol 300 MG tablet   Commonly known as:  ZYLOPRIM   Used for:  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)        Dose:  300 mg   Take 1 tablet (300 mg) by mouth daily   Quantity:  30 tablet   Refills:  1       Aluminum-Magnesium-Simethicone 200-200-20 MG/5ML Susp        Dose:  5 mL   Take 5 mLs by mouth daily as needed   Refills:  0       aspirin 81 MG tablet        1 TABLET EVERY MORNING   Refills:  0       clindamycin 1 % lotion   Commonly known as:  CLEOCIN T   Used for:  Rash        Apply twice daily for 6 weeks to the groin   Quantity:  60 mL   Refills:  1       HYDROcodone-acetaminophen 5-325 MG per tablet   Commonly known as:  NORCO        Dose:  1 tablet   Take 1 tablet by mouth every 4 hours as needed   Refills:  0       hydrocortisone valerate 0.2 % ointment   Commonly known as:  WEST-NINOSKA   Used for:  Psoriasis        Apply sparingly to affected area three times daily as needed.   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)        Dose:  0.5 mg   Take 1 tablet (0.5 mg) by mouth every 4 hours as needed (Anxiety, Nausea/Vomiting or Sleep)   Quantity:  30 tablet   Refills:  3       metoprolol succinate 100 MG 24 hr tablet   Commonly known as:  TOPROL-XL   Used for:  Hypertension goal BP (blood pressure) < 130/80, Coronary artery disease involving native coronary artery of native heart without angina pectoris        Dose:  100 mg   Take 1 tablet (100 mg) by mouth daily    Quantity:  90 tablet   Refills:  3       nitroGLYcerin 0.4 MG sublingual tablet   Commonly known as:  NITROSTAT   Used for:  Chest pain due to myocardial ischemia, unspecified ischemic chest pain type (H), Coronary artery disease involving native coronary artery of native heart without angina pectoris        Dose:  0.4 mg   Place 1 tablet (0.4 mg) under the tongue every 5 minutes as needed up to 3 tablets per episode.   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)        Dose:  8 mg   Take 1 tablet (8 mg) by mouth every 8 hours as needed (Nausea/Vomiting)   Quantity:  10 tablet   Refills:  3       PREVACID PO        Dose:  20 mg   Take 20 mg by mouth every evening as needed Patient needs to use brand name Prevacid (generic version causes diarrhea)   Refills:  0       prochlorperazine 10 MG tablet   Commonly known as:  COMPAZINE   Used for:  Nausea        Dose:  10 mg   Take 1 tablet (10 mg) by mouth every 6 hours as needed for nausea or vomiting   Quantity:  30 tablet   Refills:  1       TYLENOL 8 HOUR PO        Dose:  500 mg   Take 500 mg by mouth as needed   Refills:  0         STOP taking     ibuprofen 600 MG tablet   Commonly known as:  ADVIL/MOTRIN           levofloxacin 750 MG tablet   Commonly known as:  LEVAQUIN                Where to get your medicines      These medications were sent to [facility] [place], [place] - [address] [facility] [facility]  [address] [facility] [address] [facility] [address]     Phone:  [phone]     furosemide 20 MG tablet    potassium chloride SA 10 MEQ CR tablet                Protect others around you: Learn how to safely use, store and throw away your medicines at www.disposemymeds.org.        Information about OPIOIDS     PRESCRIPTION OPIOIDS: WHAT YOU NEED TO KNOW   You have a prescription for an opioid (narcotic) pain medicine. Opioids can cause  addiction. If you have a history of chemical dependency of any type, you are at a higher risk of becoming addicted to opioids. Only take this medicine after all other options have been tried. Take it for as short a time and as few doses as possible.     Do not:    Drive. If you drive while taking these medicines, you could be arrested for driving under the influence (DUI).    Operate heavy machinery    Do any other dangerous activities while taking these medicines.     Drink any alcohol while taking these medicines.      Take with any other medicines that contain acetaminophen. Read all labels carefully. Look for the word  acetaminophen  or  Tylenol.  Ask your pharmacist if you have questions or are unsure.    Store your pills in a secure place, locked if possible. We will not replace any lost or stolen medicine. If you don t finish your medicine, please throw away (dispose) as directed by your pharmacist. The [place] [employer] has more information about safe disposal: https://www.pca.[facility].us/living-green/managing-unwanted-medications    All opioids tend to cause constipation. Drink plenty of water and eat foods that have a lot of fiber, such as fruits, vegetables, prune juice, apple juice and high-fiber cereal. Take a laxative (Miralax, milk of magnesia, Colace, Senna) if you don t move your bowels at least every other day.              Medication List: This is a list of all your medications and when to take them. Check marks below indicate your daily home schedule. Keep this list as a reference.      Medications           Morning Afternoon Evening Bedtime As Needed    allopurinol 300 MG tablet   Commonly known as:  ZYLOPRIM   Take 1 tablet (300 mg) by mouth daily   Last time this was given:  300 mg on 4/21/2018  8:01 AM            800                       Aluminum-Magnesium-Simethicone 200-200-20 MG/5ML Susp   Take 5 mLs by mouth daily as needed                                   aspirin 81 MG  tablet   1 TABLET EVERY MORNING            800                       clindamycin 1 % lotion   Commonly known as:  CLEOCIN T   Apply twice daily for 6 weeks to the groin            800           800               furosemide 20 MG tablet   Commonly known as:  LASIX   Take 1 tablet (20 mg) by mouth daily            800                       HYDROcodone-acetaminophen 5-325 MG per tablet   Commonly known as:  NORCO   Take 1 tablet by mouth every 4 hours as needed                                   hydrocortisone valerate 0.2 % ointment   Commonly known as:  WEST-NINOSKA   Apply sparingly to affected area three times daily as needed.                                   lidocaine-prilocaine cream   Commonly known as:  EMLA   Apply 1 Application topically as needed                                   lisinopril 20 MG tablet   Commonly known as:  PRINIVIL/ZESTRIL   Take 1 tablet (20 mg) by mouth daily            800                       LORazepam 0.5 MG tablet   Commonly known as:  ATIVAN   Take 1 tablet (0.5 mg) by mouth every 4 hours as needed (Anxiety, Nausea/Vomiting or Sleep)                                   metoprolol succinate 100 MG 24 hr tablet   Commonly known as:  TOPROL-XL   Take 1 tablet (100 mg) by mouth daily   Last time this was given:  100 mg on 4/21/2018  8:01 AM            800                       nitroGLYcerin 0.4 MG sublingual tablet   Commonly known as:  NITROSTAT   Place 1 tablet (0.4 mg) under the tongue every 5 minutes as needed up to 3 tablets per episode.                                   ondansetron 8 MG tablet   Commonly known as:  ZOFRAN   Take 1 tablet (8 mg) by mouth every 8 hours as needed (Nausea/Vomiting)                                   potassium chloride SA 10 MEQ CR tablet   Commonly known as:  K-DUR/KLOR-CON M   Take 1 tablet (10 mEq) by mouth daily            800                       PREVACID PO   Take 20 mg by mouth every evening as needed Patient needs to use brand name Prevacid  (generic version causes diarrhea)                                   prochlorperazine 10 MG tablet   Commonly known as:  COMPAZINE   Take 1 tablet (10 mg) by mouth every 6 hours as needed for nausea or vomiting   Last time this was given:  5 mg on 4/20/2018  4:30 PM                                   rosuvastatin 40 MG tablet   Commonly known as:  CRESTOR   Take 1 tablet (40 mg) by mouth daily   Last time this was given:  40 mg on 4/20/2018  9:53 PM                        1000           TYLENOL 8 HOUR PO   Take 500 mg by mouth as needed

## 2018-04-20 NOTE — TELEPHONE ENCOUNTER
Francisco Javier called and stated he isn't feeling well. I spoke with Zhane and Zhane directed me to tell Francisco Javier to go to the ER. Francisco Javier stated he feels like he is going to faint.     He also told me he cancelled his Echo for Monday because he didn't feel it was as important right now. His wife asked if he needed that to continue his treatment. I will send Loco a message.     Willow Mendieta

## 2018-04-20 NOTE — ED TRIAGE NOTES
Pt presents to ED for a complaint of nausea, vomiting, and generalized malaise since last night. Pt receiving chemotherapy, last dose of chemo was about three weeks ago.

## 2018-04-20 NOTE — ED PROVIDER NOTES
[facility] EMERGENCY DEPARTMENT ([facility])  4/20/18 ED 19 9:58 AM   History     Chief Complaint   Patient presents with     Nausea & Vomiting     The history is provided by the patient, medical records and the spouse.     Francisco Javier Cortes is a 77 year old male with a past medical history of mantle cell lymphoma on chemotherapy, who presents with bilateral leg pain and swelling that kept him from sleeping last night.  He also complains of nausea and vomiting and generalized malaise and weakness.  Patient states that he had a hospitalization from 4/11-4/15/18 for high fevers.  At that time he was found to healthcare associated pneumonia and was given IV antibiotics and fluids.  During his admission his legs became markedly swollen and have remained persistently swollen even after discharge.  When he was trying to sleep last night he had 9 out of 10 pain in his calves bilaterally. Wife states that he did complain all night of pain in his calves bilaterally as well as back pain.  She states that he typically spends all day laying down, and this is contributing to his back pain. This morning he felt very nauseated and vomited his pain pills. He tried antiemetics without improvement.    He is having shortness of breath with exertion but this improves with rest.  Patient states that the leg swelling is from the chemotherapy. He is on hydrocodone for pain which he takes nightly. No fever, diaphoresis. He had 4 bowel movements this morning, no diarrhea.     As for patient's ongoing cancer treatment, wife states that he is still undergoing chemo, and his last round was 3 weeks ago. Wife states he is scheduled for treatment on the 30th and 1st. He was scheduled for stress test on Monday at [facility] but cancelled this.     I have reviewed the Medications, Allergies, Past Medical and Surgical History, and Social History in the [facility] system.  PAST MEDICAL HISTORY:   Past Medical History:   Diagnosis Date     CAD  (coronary artery disease) 1/09    s/p stentsx2     Coronary artery disease involving native coronary artery of native heart without angina pectoris 12/22/2015     Dyslipidemia      Erectile dysfunction      GERD (gastroesophageal reflux disease)      Hearing problem One ear 1961     Hypertension      NSTEMI (non-ST elevated myocardial infarction) (H) 3/20/2014     Pneumonia      Prostate cancer (H)     prostatecomy 9 years ago, PSAs remain good     Status post percutaneous transluminal coronary angioplasty-Coronary angioplasty with STEPHEN to LCx 4/4/2014     Stented coronary artery     stents placed       PAST SURGICAL HISTORY:   Past Surgical History:   Procedure Laterality Date     BIOPSY LYMPH NODE CERVICAL Left 3/22/2018    Procedure: BIOPSY LYMPH NODE CERVICAL;  Excisional Biopsy Left Cervical Lymph Node ;  Surgeon: Samia Gaviria MD;  Location: [facility] OR     C APPENDECTOMY       C REMV PROSTATE,RETROPUB,RAD,TOT NODES  1999     CATARACT IOL, RT/LT       COLONOSCOPY       COLONOSCOPY Left 7/5/2016    Procedure: COMBINED COLONOSCOPY, SINGLE OR MULTIPLE BIOPSY/POLYPECTOMY BY BIOPSY;  Surgeon: Duane, William Charles, MD;  Location: [facility] OR     COLONOSCOPY WITH CO2 INSUFFLATION N/A 7/5/2016    Procedure: COLONOSCOPY WITH CO2 INSUFFLATION;  Surgeon: Duane, William Charles, MD;  Location: [facility] OR     ENT SURGERY  1980--1999     PHACOEMULSIFICATION CLEAR CORNEA WITH STANDARD INTRAOCULAR LENS IMPLANT Left 6/18/2015    Procedure: PHACOEMULSIFICATION CLEAR CORNEA WITH STANDARD INTRAOCULAR LENS IMPLANT;  Surgeon: John Zimmerman MD;  Location:  [facility]     PHACOEMULSIFICATION CLEAR CORNEA WITH STANDARD INTRAOCULAR LENS IMPLANT Right 7/16/2015    Procedure: PHACOEMULSIFICATION CLEAR CORNEA WITH STANDARD INTRAOCULAR LENS IMPLANT;  Surgeon: John Zimmerman MD;  Location:  [facility]     STENT, CORONARY, [place]  1/09, 2014    x2, x1 in 2014     TYMPANOPLASTY       VASCULAR SURGERY  2013    stents       FAMILY HISTORY:   Family  History   Problem Relation Age of Onset     Cardiovascular Mother      HEART DISEASE Mother      Cancer - colorectal Father      HEART DISEASE Father      Other Cancer Father      CANCER Father      Hypertension Father      Asthma Brother      Coronary Artery Disease Brother      Hypertension Brother      HEART DISEASE Brother      HEART DISEASE Son      Hypertension Son      CANCER Daughter      non hodgkins     Prostate Cancer Brother      Hypertension Brother      CANCER Brother      Prostate Cancer Maternal Grandfather      CANCER Maternal Grandfather      Muscular Disorder Maternal Grandfather      HEART DISEASE Paternal Grandmother      Hypertension Paternal Grandmother      Hypertension Sister        SOCIAL HISTORY:   Social History   Substance Use Topics     Smoking status: Never Smoker     Smokeless tobacco: Never Used     Alcohol use Yes      Comment: a glass of red wine a day       Patient's Medications   New Prescriptions    No medications on file   Previous Medications    ACETAMINOPHEN (TYLENOL 8 HOUR PO)    Take 500 mg by mouth as needed     ALLOPURINOL (ZYLOPRIM) 300 MG TABLET    Take 1 tablet (300 mg) by mouth daily    ALUM & MAG HYDROXIDE-SIMETH (ALUMINUM-MAGNESIUM-SIMETHICONE) 200-200-20 MG/5ML SUSP    Take 5 mLs by mouth daily as needed    ASPIRIN 81 MG OR TABS    1 TABLET EVERY MORNING    CLINDAMYCIN (CLEOCIN T) 1 % LOTION    Apply twice daily for 6 weeks to the groin    HYDROCODONE-ACETAMINOPHEN (NORCO) 5-325 MG PER TABLET    Take 1 tablet by mouth every 4 hours as needed    HYDROCORTISONE VALERATE (WEST-NINOSKA) 0.2 % OINTMENT    Apply sparingly to affected area three times daily as needed.    IBUPROFEN (ADVIL/MOTRIN) 600 MG TABLET    Take 600 mg by mouth every 6 hours as needed    LANSOPRAZOLE (PREVACID PO)    Take 20 mg by mouth every evening as needed Patient needs to use brand name Prevacid (generic version causes diarrhea)     LEVOFLOXACIN (LEVAQUIN) 750 MG TABLET    Take 1 tablet (750 mg) by  mouth daily    LIDOCAINE-PRILOCAINE (EMLA) CREAM    Apply 1 Application topically as needed    LISINOPRIL (PRINIVIL/ZESTRIL) 20 MG TABLET    Take 1 tablet (20 mg) by mouth daily    LORAZEPAM (ATIVAN) 0.5 MG TABLET    Take 1 tablet (0.5 mg) by mouth every 4 hours as needed (Anxiety, Nausea/Vomiting or Sleep)    METOPROLOL SUCCINATE (TOPROL-XL) 100 MG 24 HR TABLET    Take 1 tablet (100 mg) by mouth daily    NITROGLYCERIN (NITROSTAT) 0.4 MG SUBLINGUAL TABLET    Place 1 tablet (0.4 mg) under the tongue every 5 minutes as needed up to 3 tablets per episode.    ONDANSETRON (ZOFRAN) 8 MG TABLET    Take 1 tablet (8 mg) by mouth every 8 hours as needed (Nausea/Vomiting)    PROCHLORPERAZINE (COMPAZINE) 10 MG TABLET    Take 1 tablet (10 mg) by mouth every 6 hours as needed for nausea or vomiting    ROSUVASTATIN (CRESTOR) 40 MG TABLET    Take 1 tablet (40 mg) by mouth daily   Modified Medications    No medications on file   Discontinued Medications    MAALOX ADVANCED OR    Take 1 tablet as needed (Calcium carbonate 1000mg/Simethicone 80mg)    OMEGA 3 1000 MG CAPS    Take 1 g by mouth 2 times daily    SENNA-DOCUSATE (SENOKOT-S;PERICOLACE) 8.6-50 MG PER TABLET    Take 1-2 tablets by mouth 2 times daily Take while on oral narcotics to prevent or treat constipation.          Allergies   Allergen Reactions     Niacin      Severe rash and itching     Prevacid [Lansoprazole] Diarrhea     Patient notes diarrhea with 30mg generic version. Patient does ok with 20mg in generic and brand name.     Shellfish Allergy      One kind      Review of Systems   Constitutional: Negative for fatigue and fever.   Eyes: Negative.    Respiratory: Positive for shortness of breath (with exertion).    Cardiovascular: Positive for leg swelling. Negative for chest pain.   Gastrointestinal: Positive for nausea and vomiting (x1). Negative for abdominal pain.   Genitourinary: Negative.    Musculoskeletal: Negative for neck pain.        Leg swelling   Skin:  Negative for rash.   Neurological: Negative for headaches.   Psychiatric/Behavioral: Positive for sleep disturbance.   All other systems reviewed and are negative.      Physical Exam   BP: (!) 153/97  Heart Rate: 109  Temp: 98.6  F (37  C)  Resp: 16  SpO2: 99 %    /87  Temp 98.6  F (37  C) (Oral)  Resp 18  SpO2 97%   Physical Exam  Physical Exam   Constitutional:   well nourished, well developed, resting comfortably   HENT:   Head: Normocephalic and atraumatic.   Eyes: Conjunctivae are normal. Pupils are equal, round, and reactive to light.   pharynx has no erythema or exudate, mucous membranes are moist  Neck:   no adenopathy, no bony tenderness  Chest: port in right chest  Cardiovascular: tachycardic without murmurs or gallops  Pulmonary/Chest: bibasilar crackles with no wheezes or retractions.  Decreased breath sounds at the bases.  No respiratory distress.  GI: Soft with good bowel sounds.  Non-tender, non-distended, with no guarding, no rebound, no peritoneal signs.   Back:  No bony or CVA tenderness   Musculoskeletal: 3-4 mm pitting edema of bilateral lower extremities  Skin: Skin is warm and dry. No rash noted.   Neurological: alert and oriented to person, place, and time. Nonfocal exam  Psychiatric:  normal mood and affect.   ED Course     ED Course     Procedures             EKG Interpretation:      Interpreted by Romi Bowen  Time reviewed: 1025 am  Symptoms at time of EKG: see hpi   Rhythm: normal sinus   Rate: 100-110  Axis: Normal  Ectopy: premature atrial contraction  Conduction: Prolonged QT, with a QT C of 487 ms, short RI of 108 ms  ST Segments/ T Waves: No acute ischemic changes  Q Waves: none  Comparison to prior: Unchanged from 4/14/2018    Clinical Impression: Normal sinus rhythm, rate of 100 bpm, with PACs, short RI and prolonged QT                Critical Care time:  none     Lactate is greater than 1.9 due to CHF and vomiting, at this time there is no sign of severe sepsis or  septic shock.       Results for orders placed or performed during the hospital encounter of 04/20/18 (from the past 24 hour(s))   CBC with platelets differential   Result Value Ref Range    WBC 4.1 4.0 - 11.0 10e9/L    RBC Count 3.01 (L) 4.4 - 5.9 10e12/L    Hemoglobin 8.9 (L) 13.3 - 17.7 g/dL    Hematocrit 27.6 (L) 40.0 - 53.0 %    MCV 92 78 - 100 fl    MCH 29.6 26.5 - 33.0 pg    MCHC 32.2 31.5 - 36.5 g/dL    RDW 18.5 (H) 10.0 - 15.0 %    Platelet Count 33 (LL) 150 - 450 10e9/L    Diff Method Automated Method     % Neutrophils 70.6 %    % Lymphocytes 19.4 %    % Monocytes 8.0 %    % Eosinophils 1.0 %    % Basophils 0.0 %    % Immature Granulocytes 1.0 %    Nucleated RBCs 0 0 /100    Absolute Neutrophil 2.9 1.6 - 8.3 10e9/L    Absolute Lymphocytes 0.8 0.8 - 5.3 10e9/L    Absolute Monocytes 0.3 0.0 - 1.3 10e9/L    Absolute Eosinophils 0.0 0.0 - 0.7 10e9/L    Absolute Basophils 0.0 0.0 - 0.2 10e9/L    Abs Immature Granulocytes 0.0 0 - 0.4 10e9/L    Absolute Nucleated RBC 0.0    Comprehensive metabolic panel   Result Value Ref Range    Sodium 141 133 - 144 mmol/L    Potassium 3.8 3.4 - 5.3 mmol/L    Chloride 107 94 - 109 mmol/L    Carbon Dioxide 24 20 - 32 mmol/L    Anion Gap 10 3 - 14 mmol/L    Glucose 106 (H) 70 - 99 mg/dL    Urea Nitrogen 11 7 - 30 mg/dL    Creatinine 0.86 0.66 - 1.25 mg/dL    GFR Estimate 85 >60 mL/min/1.7m2    GFR Estimate If Black >90 >60 mL/min/1.7m2    Calcium 7.7 (L) 8.5 - 10.1 mg/dL    Bilirubin Total 0.7 0.2 - 1.3 mg/dL    Albumin 2.6 (L) 3.4 - 5.0 g/dL    Protein Total 5.4 (L) 6.8 - 8.8 g/dL    Alkaline Phosphatase 89 40 - 150 U/L    ALT 56 0 - 70 U/L    AST 80 (H) 0 - 45 U/L   Lactic acid whole blood   Result Value Ref Range    Lactic Acid 2.4 (H) 0.7 - 2.0 mmol/L   Lipase   Result Value Ref Range    Lipase 188 73 - 393 U/L   Nt probnp inpatient   Result Value Ref Range    N-Terminal Pro BNP Inpatient 3025 (H) 0 - 1800 pg/mL   Troponin I   Result Value Ref Range    Troponin I ES <0.015  0.000 - 0.045 ug/L   Blood culture ONE site   Result Value Ref Range    Specimen Description Blood Portacath     Special Requests Received in aerobic bottle only     Culture Micro No growth after 1 hour    EKG 12-lead, tracing only   Result Value Ref Range    Interpretation ECG Click View Image link to view waveform and result    Chest CT, IV contrast only - PE protocol    Narrative    EXAMINATION: Chest CT  4/20/2018 11:47 AM    CLINICAL HISTORY: chemo pt with mantle cell lymphoma, leg swelling,  nausea, recent pneumonia, see above;     COMPARISON: CT 3/5/2018, PET scan 3/24/2018.    Technique: Volumetric helical acquisition of the chest were obtained  following pulmonary embolism protocol from the lung apices through the  upper abdomen. The pulmonary arteries are well-opacified to evaluate  for pulmonary embolism.    3-dimensional post processed angiographic images were reconstructed,  archived to PACS and used in the interpretation of this study.    FINDINGS:  Bolus is adequate for evaluation of pulmonary embolus. There is no  filling defects in the pulmonary arteries to suggest pulmonary  embolism. There is a small pericardial effusion. The heart is  prominent. The main pulmonary arteries is at the upper limits of  normal. Right-sided Port-A-Cath tip is in the right atrium. Narrowing  of the lower lobe bronchi bilaterally secondary to extensive  mediastinal lymphadenopathy.     Extensive mediastinal lymphadenopathy including a subcarinal lymph  node extending around the right bronchus measuring 3.8 x 6.2 cm  (series 9 image 121), previously 4.0 x 7.6 cm on PET scan 3/24/2018.  There is a 1.8 cm paratracheal lymph node (series 9 image 95),  previously 2.8 cm 3/24/2018. There is a 1.3 cm paratracheal lymph node  (series 9 image 78), previously 2.4 cm. There is a 10 mm left axillary  lymph node (series 9 image 72), previously 2.2 cm. There is a 9 mm  right axillary lymph node (series 9 image 74), previously 2.0  cm.  Limited evaluation of lower neck secondary to artifact from arms.  Previously demonstrated left level 4 lymph node measures 2.0 cm,  previously 3.4 cm (series 9 image 10).    Moderate bilateral pleural effusions, increased from prior study.  There is a 12 x 8 mm right middle lobe nodule (series 8 image 138),  previously 17 mm x 12 mm. There is a 10 x 15 mm pleural-based nodule  in the anterior left hemithorax (series 8 image 87), previously 15 x 9  mm. Groundglass opacification of the lungs in the posterior upper  lobes and the posterior lower lobes    Bones and soft tissues: No suspicious bone findings.     Partially imaged upper abdomen: Limited.      Impression    IMPRESSION:   1. No evidence of pulmonary embolism.  2. Decrease in size of extensive mediastinal and axillary  lymphadenopathy, as well as decrease in size of pulmonary nodules.  3. Increased now moderate bilateral pleural effusions, as well as  dependent groundglass opacities, possibly representing pulmonary edema  versus aspiration.    I have personally reviewed the examination and initial interpretation  and I agree with the findings.    BUSTER CRUZ MD   UA reflex to Microscopic and Culture   Result Value Ref Range    Color Urine Light Yellow     Appearance Urine Clear     Glucose Urine Negative NEG^Negative mg/dL    Bilirubin Urine Negative NEG^Negative    Ketones Urine Negative NEG^Negative mg/dL    Specific Gravity Urine 1.016 1.003 - 1.035    Blood Urine Small (A) NEG^Negative    pH Urine 7.0 5.0 - 7.0 pH    Protein Albumin Urine 10 (A) NEG^Negative mg/dL    Urobilinogen mg/dL Normal 0.0 - 2.0 mg/dL    Nitrite Urine Negative NEG^Negative    Leukocyte Esterase Urine Negative NEG^Negative    Source Midstream Urine     RBC Urine <1 0 - 2 /HPF    WBC Urine 1 0 - 5 /HPF    Mucous Urine Present (A) NEG^Negative /LPF     Medications   sodium chloride 0.9 % bag 500mL for CT scan flush use (91 mLs Intravenous Given 4/20/18 1122)   0.9% sodium  chloride BOLUS (0 mLs Intravenous Stopped 4/20/18 1531)   ondansetron (ZOFRAN) injection 4 mg (4 mg Intravenous Given 4/20/18 1018)   HYDROmorphone (PF) (DILAUDID) injection 0.5 mg (0.5 mg Intravenous Given 4/20/18 1018)   iopamidol (ISOVUE-370) solution 62 mL (62 mLs Intravenous Given 4/20/18 1128)   furosemide (LASIX) injection 40 mg (40 mg Intravenous Given 4/20/18 1538)     Consults  Oncology: Called (Malignant Heme) (04/20/18 1247)    Assessments & Plan (with Medical Decision Making)       I have reviewed the nursing notes.  EMERGENCY DEPARTMENT COURSE: Patient was seen and examined at 0958 am. EKG shows   Normal sinus rhythm, rate of 100 bpm, with PACs, short KS and prolonged QT    Laboratory studies show anemia, with a hemoglobin of 8.9.  He is thrombocytopenic, with platelet count of 33,000.  Comprehensive metabolic panel shows a mildly elevated AST of 80.  He does have a lactic acidosis, with a lactate of 2.4.  BNP is elevated at 3025. Troponin I is <0.015. UA is nitrite and LCE negative. Blood culture x 1 were drawn and are pending.   Chest CT shows IMPRESSION:   1. No evidence of pulmonary embolism.  2. Decrease in size of extensive mediastinal and axillary  lymphadenopathy, as well as decrease in size of pulmonary nodules.  3. Increased now moderate bilateral pleural effusions.    Patient is a 77-year-old male on chemotherapy for mantle cell lymphoma here with generalized weakness, nausea, vomiting, and leg pain and swelling.  Chest CT shows increasing pleural effusions and laboratory studies suggest possible congestive heart failure.   This may be related to his chemotherapy medication or to his recent pneumonia, at which time he received multiple doses of IV fluids. His lactate is also elevated today.   He will be admitted to the oncology service under the care of Dr. Lee for further evaluation and treatment.  I also treated the patient with 40 mg IV of Lasix prior to admission.    I have reviewed  the findings, diagnosis, plan and need for follow up with the patient.    New Prescriptions    No medications on file       Final diagnoses:   Pleural effusions   Acute congestive heart failure, unspecified congestive heart failure type (H)   Weakness   Acidosis   I, Christina Gallegos, am serving as a trained medical scribe to document services personally performed by Romi Bowen MD based on the provider's statements to me on April 20, 2018.  This document has been checked and approved by the attending provider.    I, Romi Bowen MD, was physically present and have reviewed and verified the accuracy of this note documented by Christina Gallegos, medical scribe.     This note was created in part by the use of Dragon voice recognition dictation system. Inadvertent grammatical errors and typographical errors may still exist.  Romi Bowen MD      4/20/2018   [facility], [place], EMERGENCY DEPARTMENT     Romi Bowen MD  04/20/18 1559

## 2018-04-21 NOTE — PLAN OF CARE
Problem: Patient Care Overview  Goal: Discharge Needs Assessment  Outcome: Adequate for Discharge Date Met: 04/21/18  Francisco Javier said he is feeling better today.  LS clear up and fine crackles at bases - improved from yesterday.  AP regular.  Did eat breakfast and had been walking around in the halls.  Echo done but not resulted at time of DC - plan to review echo results at his appt on Monday.  Reviewed DC instructions and medications.  Reviewed CHF instructions of weight every am, low NA diet, and feet elevated.  Written material was provided along with clinic phone numbers.  IVAD heplocked and needle removed.

## 2018-04-21 NOTE — PLAN OF CARE
Problem: Patient Care Overview  Goal: Plan of Care/Patient Progress Review  Outcome: No Change    1900-0730: VSS. Afebrile. Denied pain, nausea and vomiting. EKG showed normal sinus tachycardia with no PACs. Apical pulse still irregular. ECHO to be done today. BLE edema present. Lymphedema to see patient today. UOP adequate. Patient doesn't have an appetite and didn't have any dinner. O2 on via NC at 2 LPM per pt's request for comfort. Patient didn't sleep much but declined any interventions. Up ad joseph walking the halls. Discharge in 1-2 days. Continue with POC.

## 2018-04-21 NOTE — PLAN OF CARE
Problem: Patient Care Overview  Goal: Plan of Care/Patient Progress Review  Edema/7D: Evaluation orders received, per chart review pt may discharge in 1-2 days. Due to inability to follow-up on skin integrity and determine appropriate fit for compression secondary to short length of stay, not appropriate to initiate compression at this time. Recommend pt follow up with OP/HH lymphedema services as needed for edema management and long term garment needs. Will defer and complete orders at this time, please re-consult if change in medical course.

## 2018-04-21 NOTE — DISCHARGE SUMMARY
[facility], [place]    Discharge Summary  Hospitalist    Date of Admission:  4/20/2018  Date of Discharge:  4/21/2018  Discharging Provider: Nikki Beck  Date of Service (when I saw the patient): 04/21/18    Discharge Diagnoses   B/l LE edema and pleural effusions, possible CHF (Echo pending)   Anemia and thrombocytopenia 2/2 chemotherapy    History of Present Illness   Francisco Javier Cortes is a 77 year old male with PMH CAD, HTN, HLP, prostate cancer s/p prostatectomy and recently diagnosed mantle cell lymphoma who received cycle #1 of BR on 3/29/19 who was recently hospitalized with HCAP (4/11-4/15) who presented to the ED with LE edema, COLE since his recent hospitalization.    Hospital Course   Francisco Javier Cortes was admitted on 4/20/2018.  The following problems were addressed during his hospitalization:    #Hypervolemia, possible CHF: He states that his swelling began during his recent hospitalization as he received IVF. He received lasix 20 mg on 4/14. He has had COLE and feels some SOB with lying down. He feels this is stable to slightly improving since discharge. In the ED he had a LA of 2.4 and was given 1 L of NS. He has been satting 99% on RA. He is slightly tachy (104) but did not take his metoprolol today. BP is slightly high. He is not having chest pain. He had an elevated BNP of 3025. He has not had an Echo that I can find in records since 2014. He was scheduled for a stress echo earlier this week but did not go as he was not feeling well. He had a CT chest in the ED that was negative for PE but showed moderate b/l pleural effusions. Given IV lasix 40 mg x1 with decrease in weight of 4 lbs and improvement of COLE and edema. Echo done but result pending at time of discharge  - Compression stockings  - F/u in clinic on 4/23 to review results of Echo   - Lasix 20 mg daily and potassium 10 meq daily  - Check BMP at clinic f/u on 4/23     #Stage IV B Mantle cell lymphoma s/p Cyc 1  BR (3/29)-Diagnosed in Feb 2018-Follows Dr. Adan   Pt had symptoms of abdominal bloating/discomfort and loss of appetite for 2 months. Underwent imaging workup by primary care provider with CT abdominal and pelvis on 02/25/18 showing extensive intra- abdominal and inguinal lymphadenopathy with splenomegaly. FNA of the lymph node on 03/14 which came back with findings consistent with mantle cell lymphoma. 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 Proliferation index was estimated to be 50%. The marrow biopsy performed showed bone marrow involvement by mantle cell lymphoma 40-50%. Fish came back positive for translocation 11:14 consistent with mantle cell lymphoma. PET CT scan extensive hypermetabolic adenopathy of cervical, axillary, mediastinal, hilar, intra-abdominal, retroperitoneal inguinal but the largest measuring 15.5 x 6.9 cm.   Started on Bendamustine 90 mg/m2 day 1 and 2 in combination with Rituxan 375 mg/m2 q.4 weeks- Cycle 1 on 03/29/18. Was admitted to the hospital subsequently for observation for tumor lysis syndrome and subsequently discharged home after improvement in blood workPlan is to proceed with 6 total cycles followed by repeat PET scan and have response achieved, consider proceeding with maintenance Rituxan. CT chest shows decreased adenopathy. Currently he is Day 24. No neutropenia.   -Will f/u with Dr. Adan for next cycle  -cont home allopurinol     #HCAP: Was hospitalized from 4/11-4/15 for HCAP. D/c with levaquin 750 mg to complete on 4/25.   - D/c levaquin and CT showed no residual PNA and QTc increased to 500      #Thrombocytopenia and anemia 2/2 chemotherapy: plt 33 today, hgb 8.9  -no bleeding, goal plt >10K       #CAD/NSTEMI Hx s/p stents  -cont home statin, hold asa due to low plt count  -Continue PTA lisinopril  -Continue PTA metoprolol 100 mg daily    Nikki Khalil  THONY Beck  Hematology/Oncology  Pager: [phone]    Significant Results and Procedures     Pending Results   Unresulted Labs Ordered in the Past 30 Days of this Admission     Date and Time Order Name Status Description    4/20/2018 1006 Blood culture ONE site Preliminary     4/11/2018 0355 Blood culture yeast Preliminary           Code Status   Full Code       Primary Care Physician   Florian Alonzo    Constitutional: Well appearing, no acute distress  Eyes: PERRL, EOMs intact, sclera anicteric, conjunctiva clear  HEENT: normocephalic, atraumatic, MMM, no mucositis or thrush  Respiratory:  Decreased breath sounds b/l bases. No crackles  Cardiovascular: RRR, no m/r/g  GI: soft, nondistended, nontender  Skin:  No visible rashes  Musculoskeletal: no swollen or erythematous joints, +1 b/l LE pitting edema  Neurologic: A&O x 4, no focal deficits, CN II-XII intact  Psychiatric: appropriate affect    Discharge Disposition   Discharged to home  Condition at discharge: Good    Consultations This Hospital Stay   MEDICATION HISTORY IP PHARMACY CONSULT  PHYSICAL THERAPY ADULT IP CONSULT  LYMPHEDEMA THERAPY IP CONSULT    Time Spent on this Encounter     Discharge Orders     CBC with platelets differential   Last Lab Result: Hemoglobin (g/dL)      Date                     Value                04/21/2018               8.1 (L)          ----------     Basic metabolic panel  (Ca, Cl, CO2, Creat, Gluc, K, Na, BUN)     Reason for your hospital stay   Swollen legs and shortness of breath due to some fluid around the lungs. Improved with lasix (medicine that helps you urinate out fluid)     Follow Up and recommended labs and tests   Follow up as scheduled in your oncology clinic on 4/23     Activity   Your activity upon discharge: activity as tolerated     Discharge Instructions   New medications: lasix (furosemide) is a water pill which helps decrease your swelling, 20 mg daily. Take 10 mew potassium once per day as your potassium  level can get low when taking lasix. You should also use compression stockings to help with swelling. Stop taking levaquin as your CT scan shows resolution of pneumonia.   [facility] ([phone]) for temperature > 100.4, shortness of breath, chest pain, headaches, vision changes, bleeding, uncontrolled nausea, vomiting, diarrhea, or pain.     Full Code     Diet   Follow this diet upon discharge: Regular, recommend a low salt diet       Discharge Medications   Current Discharge Medication List      START taking these medications    Details   furosemide (LASIX) 20 MG tablet Take 1 tablet (20 mg) by mouth daily  Qty: 30 tablet, Refills: 0    Associated Diagnoses: Acute congestive heart failure, unspecified congestive heart failure type (H)      potassium chloride SA (K-DUR/KLOR-CON M) 10 MEQ CR tablet Take 1 tablet (10 mEq) by mouth daily  Qty: 30 tablet, Refills: 0    Associated Diagnoses: Acute congestive heart failure, unspecified congestive heart failure type (H)         CONTINUE these medications which have NOT CHANGED    Details   Acetaminophen (TYLENOL 8 HOUR PO) Take 500 mg by mouth as needed       allopurinol (ZYLOPRIM) 300 MG tablet Take 1 tablet (300 mg) by mouth daily  Qty: 30 tablet, Refills: 1    Associated Diagnoses: 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)      Alum & Mag Hydroxide-Simeth (ALUMINUM-MAGNESIUM-SIMETHICONE) 200-200-20 MG/5ML SUSP Take 5 mLs by mouth daily as needed      ASPIRIN 81 MG OR TABS 1 TABLET EVERY MORNING      clindamycin (CLEOCIN T) 1 % lotion Apply twice daily for 6 weeks to the groin  Qty: 60 mL, Refills: 1    Associated Diagnoses: Rash      HYDROcodone-acetaminophen (NORCO) 5-325 MG per tablet Take 1 tablet by mouth every 4 hours as needed      hydrocortisone valerate (WEST-NINOSKA) 0.2 % ointment Apply sparingly to affected area three times daily as needed.  Qty: 45 g, Refills: 3    Associated Diagnoses: Psoriasis      Lansoprazole  (PREVACID PO) Take 20 mg by mouth every evening as needed Patient needs to use brand name Prevacid (generic version causes diarrhea)       lidocaine-prilocaine (EMLA) cream Apply 1 Application topically as needed      lisinopril (PRINIVIL/ZESTRIL) 20 MG tablet Take 1 tablet (20 mg) by mouth daily  Qty: 90 tablet, Refills: 3    Associated Diagnoses: Hypertension goal BP (blood pressure) < 130/80; Coronary artery disease involving native coronary artery of native heart without angina pectoris; Microalbuminuria      LORazepam (ATIVAN) 0.5 MG tablet Take 1 tablet (0.5 mg) by mouth every 4 hours as needed (Anxiety, Nausea/Vomiting or Sleep)  Qty: 30 tablet, Refills: 3    Associated Diagnoses: 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)      metoprolol succinate (TOPROL-XL) 100 MG 24 hr tablet Take 1 tablet (100 mg) by mouth daily  Qty: 90 tablet, Refills: 3    Associated Diagnoses: Hypertension goal BP (blood pressure) < 130/80; Coronary artery disease involving native coronary artery of native heart without angina pectoris      ondansetron (ZOFRAN) 8 MG tablet Take 1 tablet (8 mg) by mouth every 8 hours as needed (Nausea/Vomiting)  Qty: 10 tablet, Refills: 3    Associated Diagnoses: 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)      prochlorperazine (COMPAZINE) 10 MG tablet Take 1 tablet (10 mg) by mouth every 6 hours as needed for nausea or vomiting  Qty: 30 tablet, Refills: 1    Associated Diagnoses: Nausea      rosuvastatin (CRESTOR) 40 MG tablet Take 1 tablet (40 mg) by mouth daily  Qty: 90 tablet, Refills: 3    Associated Diagnoses: Hyperlipidemia LDL goal <100; Coronary artery disease involving native coronary artery of native heart without angina pectoris      nitroGLYcerin (NITROSTAT) 0.4 MG sublingual tablet Place 1 tablet (0.4 mg) under the tongue every 5 minutes as needed up to 3 tablets per episode.  Qty: 60 tablet, Refills: 6    Associated Diagnoses: Chest pain due to myocardial  ischemia, unspecified ischemic chest pain type (H); Coronary artery disease involving native coronary artery of native heart without angina pectoris         STOP taking these medications       ibuprofen (ADVIL/MOTRIN) 600 MG tablet Comments:   Reason for Stopping:         levofloxacin (LEVAQUIN) 750 MG tablet Comments:   Reason for Stopping:             Allergies   Allergies   Allergen Reactions     Niacin      Severe rash and itching     Prevacid [Lansoprazole] Diarrhea     Patient notes diarrhea with 30mg generic version. Patient does ok with 20mg in generic and brand name.     Shellfish Allergy      One kind     Data   Most Recent 3 CBC's:  Recent Labs   Lab Test  04/21/18   0534  04/20/18   0956  04/16/18   1550   WBC  3.1*  4.1  5.5   HGB  8.1*  8.9*  8.8*   MCV  92  92  93   PLT  37*  33*  44*      Most Recent 3 BMP's:  Recent Labs   Lab Test  04/21/18   0534  04/20/18   0956  04/16/18   1550   NA  142  141  144   POTASSIUM  3.5  3.8  3.3*   CHLORIDE  106  107  111*   CO2  25  24  23   BUN  14  11  18   CR  0.95  0.86  0.97   ANIONGAP  12  10  10   EVANGELINA  7.6*  7.7*  7.5*   GLC  80  106*  110*     Most Recent 2 LFT's:  Recent Labs   Lab Test  04/20/18   0956  04/16/18   1550   AST  80*  43   ALT  56  25   ALKPHOS  89  85   BILITOTAL  0.7  0.7     Most Recent INR's and Anticoagulation Dosing History:  Anticoagulation Dose History     Recent Dosing and Labs Latest Ref Rng & Units 1/7/2009 3/19/2014 3/29/2018 3/31/2018 4/1/2018 4/11/2018    INR 0.86 - 1.14 0.94 0.96 1.15(H) 1.09 1.14 1.21(H)        Most Recent 3 Troponin's:  Recent Labs   Lab Test  04/20/18   0956  03/20/14   1143  03/19/14   2219   TROPI  <0.015  8.380*  22.400*     Most Recent Cholesterol Panel:  Recent Labs   Lab Test  08/14/17   0706   CHOL  153   LDL  81   HDL  32*   TRIG  202*     Most Recent 6 Bacteria Isolates From Any Culture (See EPIC Reports for Culture Details):  Recent Labs   Lab Test  04/20/18   1016  04/12/18   1355  04/12/18   0907   04/12/18   0317  04/12/18   0315  04/11/18   1113   CULT  No growth after 18 hours  No growth  Moderate growth  Normal negro    Canceled, Test credited  Duplicate request  Canceled, Test credited  >10 Squamous epithelial cells/low power field indicates oral contamination. Please   recollect.  *  Called to  KETURAH DANIEL RN 4/12/18 0420. DANE    No growth     Most Recent TSH, T4 and A1c Labs:  Recent Labs   Lab Test  02/21/18   1240  08/14/17   0706   TSH   --   2.06   A1C  5.8  5.6     Results for orders placed or performed during the hospital encounter of 04/20/18   Chest CT, IV contrast only - PE protocol    Narrative    EXAMINATION: Chest CT  4/20/2018 11:47 AM    CLINICAL HISTORY: chemo pt with mantle cell lymphoma, leg swelling,  nausea, recent pneumonia, see above;     COMPARISON: CT 3/5/2018, PET scan 3/24/2018.    Technique: Volumetric helical acquisition of the chest were obtained  following pulmonary embolism protocol from the lung apices through the  upper abdomen. The pulmonary arteries are well-opacified to evaluate  for pulmonary embolism.    3-dimensional post processed angiographic images were reconstructed,  archived to PACS and used in the interpretation of this study.    FINDINGS:  Bolus is adequate for evaluation of pulmonary embolus. There is no  filling defects in the pulmonary arteries to suggest pulmonary  embolism. There is a small pericardial effusion. The heart is  prominent. The main pulmonary arteries is at the upper limits of  normal. Right-sided Port-A-Cath tip is in the right atrium. Narrowing  of the lower lobe bronchi bilaterally secondary to extensive  mediastinal lymphadenopathy.     Extensive mediastinal lymphadenopathy including a subcarinal lymph  node extending around the right bronchus measuring 3.8 x 6.2 cm  (series 9 image 121), previously 4.0 x 7.6 cm on PET scan 3/24/2018.  There is a 1.8 cm paratracheal lymph node (series 9 image 95),  previously 2.8 cm 3/24/2018. There  is a 1.3 cm paratracheal lymph node  (series 9 image 78), previously 2.4 cm. There is a 10 mm left axillary  lymph node (series 9 image 72), previously 2.2 cm. There is a 9 mm  right axillary lymph node (series 9 image 74), previously 2.0 cm.  Limited evaluation of lower neck secondary to artifact from arms.  Previously demonstrated left level 4 lymph node measures 2.0 cm,  previously 3.4 cm (series 9 image 10).    Moderate bilateral pleural effusions, increased from prior study.  There is a 12 x 8 mm right middle lobe nodule (series 8 image 138),  previously 17 mm x 12 mm. There is a 10 x 15 mm pleural-based nodule  in the anterior left hemithorax (series 8 image 87), previously 15 x 9  mm. Groundglass opacification of the lungs in the posterior upper  lobes and the posterior lower lobes    Bones and soft tissues: No suspicious bone findings.     Partially imaged upper abdomen: Limited.      Impression    IMPRESSION:   1. No evidence of pulmonary embolism.  2. Decrease in size of extensive mediastinal and axillary  lymphadenopathy, as well as decrease in size of pulmonary nodules.  3. Increased now moderate bilateral pleural effusions, as well as  dependent groundglass opacities, possibly representing pulmonary edema  versus aspiration.    I have personally reviewed the examination and initial interpretation  and I agree with the findings.    BUSTER CRUZ MD       Patient has been seen, examined and evaluated by me independently. I have reviewed today's vital signs, medications, labs and imaging results. I have discussed the plan with the patient.  Physical Exam:  Alert, oriented x 3   In no acute distress  Vitals are stable  CVS and Pulmonary systems findings are normal  Labs  CBC is mildly pancytopenic and chemistry is not significant  Lab Results   Component Value Date    WBC 3.1 04/21/2018     Lab Results   Component Value Date    RBC 2.72 04/21/2018     Lab Results   Component Value Date    HGB 8.1  04/21/2018     Lab Results   Component Value Date    HCT 25.0 04/21/2018     No components found for: MCT  Lab Results   Component Value Date    MCV 92 04/21/2018     Lab Results   Component Value Date    MCH 29.8 04/21/2018     Lab Results   Component Value Date    MCHC 32.4 04/21/2018     Lab Results   Component Value Date    RDW 18.7 04/21/2018     Lab Results   Component Value Date    PLT 37 04/21/2018     Last Basic Metabolic Panel:  Lab Results   Component Value Date     04/21/2018      Lab Results   Component Value Date    POTASSIUM 3.5 04/21/2018     Lab Results   Component Value Date    CHLORIDE 106 04/21/2018     Lab Results   Component Value Date    EVANGELINA 7.6 04/21/2018     Lab Results   Component Value Date    CO2 25 04/21/2018     Lab Results   Component Value Date    BUN 14 04/21/2018     Lab Results   Component Value Date    CR 0.95 04/21/2018     Lab Results   Component Value Date    GLC 80 04/21/2018         He is doing well, afebrile.  Diuresis helped SOB, volume overload, contiune oral outpatient.  He has moderate PHTN by ECHO, sent an EPIC message to   I spent >30 minutes to organize/discuss his outpatient care, appointments, and meds.  Phyllis Lee MD

## 2018-04-21 NOTE — PLAN OF CARE
Problem: Hematological Disorder  Goal: Hematological Disorder  Patient comorbidity will be monitored for signs and symptoms of Hematogical condition.  Problems will be absent, minimized or managed by discharge/transition of care.   Outcome: No Change  Francisco Javier was admitted with pleural effusion and ?CHF this afternoon. Alert and oriented X4 and steady on feet.  LS with fine crackles in bases.  Did receive IV Lasix while in ER and has had good urine output.  +2 pitting edema in LE and pt states he could barely get his shoes on.  C/O nausea, po Compazine given.  AP irregular, pt assessed by  Dr Johnson and EKG was ordered.

## 2018-04-21 NOTE — DISCHARGE INSTRUCTIONS
Left- or Right- Side Congestive Heart Failure (CHF)    The heart is a large muscle. It is a pump that circulates blood throughout the body. Blood carries oxygen to all of the organs, including the brain, muscles, and skin. After your body takes the oxygen out of the blood, the blood returns to the heart. The right side of the heart collects the blood from the body and pumps it to the lungs. In the lungs, it gets fresh oxygen and gives up carbon dioxide. The oxygen-rich blood from the lungs then returns to the left side of the heart, where it is pumped back out to the rest of your body, starting the process all over.  Congestive heart failure (CHF) occurs when the heart muscle is weakened. This affects the pumping action of the heart. Heart failure can affect the right side of the heart or the left side. But heart failure may affect not only the right side of the heart or only the left side. Although it may have started on one side, it can and often eventually does affect both sides.  Right-side heart failure  When the right side of the heart is weakened, it can t handle the blood it is getting from the rest of the body. This blood returns to the heart through veins. When too much pressure builds up in the veins, fluid leaks out into the tissues. Gravity then causes that fluid to move to those parts of the body that are the lowest. So one of the first symptoms of right-side CHF can include swelling in the feet and ankles. If the condition gets worse, the swelling can even go up past the knees. Sometimes it gets so severe, the liver can get congested as well.  Left-side heart failure  When the left side of the heart is weakened, it can t handle the blood it gets from the lungs. Pressure then builds up in the veins of the lungs, causing fluid to leak into the lung tissues. This may cause CHF and pulmonary edema. This causes you to feel short of breath, weak, or dizzy. These symptoms are often worse with exertion,  such as when climbing stairs or walking up hills. Lying with your head flat is uncomfortable and can make your breathing worse. This may make sleeping difficult. You may need to use extra pillows to elevate your upper body to sleep well. The same is true when just resting during the daytime.  There are many causes of heart failure including:    Coronary artery disease    Past heart attack (also known as acute myocardial infarction, or AMI)    High blood pressure    Damaged heart valve    Diabetes    Obesity    Cigarette smoking    Alcohol abuse  Heart failure is a chronic condition. There is no cure. The purpose of medical treatment is to improve the pumping action of the heart. The main way to do this is to remove excess water from the body. A number of medicines can help reach this goal, improve symptoms, and prevent the heart from becoming weaker. Sometimes, heart failure can become so severe that a device is placed in the heart to help with pumping. Another major goal is to better treat the causes of heart failure, such as diabetes and high blood pressure, by making changes in your lifestyle and maximizing medical control when needed.  Home care  Follow these guidelines when caring for yourself at home:    Check your weight every day. This is very important because a sudden increase in weight gain could mean worsening heart failure. Keep these things in mind:  ? Use the same scale every day.  ? Weigh yourself at the same time every day.  ? Make sure the scale is on a hard floor surface, not on a rug or carpet.  ? Keep a record of your weight every day so your healthcare provider can see it. If you are not given a log sheet for this, keep a separate journal for this purpose.     Cut back on the amount of salt (sodium) you eat. Follow your healthcare provider's recommendation on how much salt or sodium you should have each day.  ? Avoid high-salt foods. These include olives, pickles, smoked meats, salted potato  chips, and most prepared foods.  ? Don't add salt to your food at the table. Use only small amounts of salt when cooking.  ? Read the labels carefully on food packages to learn how much salt or sodium is in each serving in the package. Remember, a can or package of food may contain more than 1 serving. So if you eat all the food in the package, you may be getting more salt than you think.    Follow your healthcare provider's recommendations about how much fluid you should have. Be aware that some foods, such as soup, pudding, and juicy fruits like oranges or melons, contain liquid. You'll need to count the liquid in those foods as part of your daily fluid intake. Your provider can help you with this.    Stop smoking.    Cut back on how much alcohol you drink.    Lose weight if you are overweight. The excess weight adds a lot of stress on the workload of the heart.    Stay active. Talk with your provider about an exercise program that is safe for your heart.    Keep your feet elevated to reduce swelling. Ask your provider about support hose as a preventive treatment for daytime leg swelling.  Besides taking your medicine as instructed, an important part of treatment is lifestyle changes. These include diet, physical activity, stopping smoking, and weight control.  Improve your diet by including more fresh foods, cutting back on how much sugar and saturated fat you eat, and eating fewer processed foods and less salt.  Follow-up care  Follow up with your healthcare provider, or as advised.  Make sure to keep any appointments that were made for you. These can help better control your congestive heart failure. You will need to follow up with your provider on a routine basis to make sure your heart failure is well managed.  If an X-ray, ECG, or other tests were done, you will be told of any new findings that may affect your care.  Call 911  Call 911 if you:    Become severely short of breath    Feel lightheaded, or feel  like you might pass out or faint    Have chest pain or discomfort that is different than usual, the medicines your doctor told you to use for this don't help, or the pain lasts longer than 10 to 15 minutes    You suddenly develop a rapid heart rate  When to seek medical advice  The following may be signs that your heart failure is getting worse. Call your healthcare provider right away if any of these happen:    Sudden weight gain. This means 3 or more pounds in one day, or 5 or more pounds in 1 week.    Trouble breathing not related to being active    New or increased swelling of your legs or ankles    Swelling or pain in your abdomen    Breathing trouble at night. This means waking up short of breath or needing more pillows to breathe.    Frequent coughing that doesn t go away    Feeling much more tired than usual  Date Last Reviewed: 1/4/2016 2000-2017 [employer]. [address] [place] [address]. All rights reserved. This information is not intended as a substitute for professional medical care. Always follow your healthcare professional's instructions.      Contact Numbers    [facility]: [phone]  [facility] [place]:  [phone]    Call triage with chills and/or temperature greater than or equal to 100.4, uncontrolled nausea/vomiting, diarrhea, constipation, dizziness, shortness of breath, chest pain, bleeding, unexplained bruising, or any new/concerning symptoms, questions/concerns.     If you are having any concerning symptoms or wish to speak to a provider before your next infusion visit, please call your care coordinator or triage to notify them so we can adequately serve you.

## 2018-04-23 NOTE — MR AVS SNAPSHOT
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)    -  1       Follow-ups after your visit        Your next 10 appointments already scheduled     Apr 30, 2018  8:00 AM CDT   Return Visit with [facility]   [facility]    [address] [place] [id number]   [phone]            Apr 30, 2018  8:45 AM CDT   Return Visit with Erickson Adan MD   [facility]    [address] [place] [id number]   [phone]            Apr 30, 2018  9:30 AM CDT   Level 6 with BAY 4 INFUSION   [facility]    [address] [place] [address]   [phone]            May 01, 2018  8:00 AM CDT   Return Visit with Jad Ball MD   [facility])    [address] [place] [address]   [phone]            May 01, 2018  1:00 PM CDT   Level 2 with BAY 1 INFUSION   [facility])    [address] [place] [id number] [phone]              Who to contact     If you have questions or need follow up information about today's clinic visit or your schedule please contact [facility] directly at [phone].  Normal or non-critical lab and imaging results will be communicated to you by MyChart, letter or phone within 4 business days after the clinic has received the results. If you do not hear from us within 7 days, please contact the clinic through MyChart or phone. If you have a critical or  abnormal lab result, we will notify you by phone as soon as possible.  Submit refill requests through retickr or call your pharmacy and they will forward the refill request to us. Please allow 3 business days for your refill to be completed.          Additional Information About Your Visit        [facility] Information     retickr gives you secure access to your electronic health record. If you see a primary care provider, you can also send messages to your care team and make appointments. If you have questions, please call your primary care clinic.  If you do not have a primary care provider, please call [phone] and they will assist you.      retickr is an electronic gateway that provides easy, online access to your medical records. With retickr, you can request a clinic appointment, read your test results, renew a prescription or communicate with your care team.     To access your existing account, please contact your [facility] or call [phone] for assistance.        Care EveryWhere ID     This is your Care EveryWhere ID. This could be used by other organizations to access your [facility] medical records  [id number]         Blood Pressure from Last 3 Encounters:   04/23/18 108/72   04/21/18 129/65   04/16/18 108/71    Weight from Last 3 Encounters:   04/23/18 72.6 kg (160 lb)   04/21/18 73.5 kg (162 lb 1.6 oz)   04/16/18 79.8 kg (176 lb)              Today, you had the following     No orders found for display         Today's Medication Changes          These changes are accurate as of 4/23/18  3:20 PM.  If you have any questions, ask your nurse or doctor.               These medicines have changed or have updated prescriptions.        Dose/Directions    lisinopril 20 MG tablet   Commonly known as:  PRINIVIL/ZESTRIL   This may have changed:  when to take this   Used for:  Hypertension goal BP (blood pressure) < 130/80, Coronary artery disease involving native coronary artery  of native heart without angina pectoris, Microalbuminuria        Dose:  20 mg   Take 1 tablet (20 mg) by mouth daily   Quantity:  90 tablet   Refills:  3       rosuvastatin 40 MG tablet   Commonly known as:  CRESTOR   This may have changed:  when to take this   Used for:  Hyperlipidemia LDL goal <100, Coronary artery disease involving native coronary artery of native heart without angina pectoris        Dose:  40 mg   Take 1 tablet (40 mg) by mouth daily   Quantity:  90 tablet   Refills:  3                Primary Care Provider Office Phone # Fax #    Florian Alonzo [phone] [address]  [facility] [address]        Equal Access to Services     [facility]: Hadii rivera collazo hadasho Solina, waaxda luqadaha, qaybta kaalmada adebryantyada, zhao bazan . So [facility] [phone].    ATENCIÓN: Si habla español, tiene a ivan disposición servicios gratuitos de asistencia lingüística. Llame al [phone].    We comply with applicable federal civil rights laws and [place] laws. We do not discriminate on the basis of race, color, national origin, age, disability, sex, sexual orientation, or gender identity.            Thank you!     Thank you for choosing [facility]  for your care. Our goal is always to provide you with excellent care. Hearing back from our patients is one way we can continue to improve our services. Please take a few minutes to complete the written survey that you may receive in the mail after your visit with us. Thank you!             Your Updated Medication List - Protect others around you: Learn how to safely use, store and throw away your medicines at www.disposemymeds.org.          This list is accurate as of 4/23/18  3:20 PM.  Always use your most recent med list.                   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)       Aluminum-Magnesium-Simethicone 200-200-20 MG/5ML Susp      Take 5 mLs by mouth daily as needed        aspirin 81 MG tablet      1 TABLET EVERY MORNING        clindamycin 1 % lotion    CLEOCIN T    60 mL    Apply twice daily for 6 weeks to the groin    Rash       furosemide 20 MG tablet    LASIX    30 tablet    Take 1 tablet (20 mg) by mouth daily    Acute congestive heart failure, unspecified congestive heart failure type (H)       HYDROcodone-acetaminophen 5-325 MG per tablet    NORCO     Take 1 tablet by mouth every 4 hours as needed        hydrocortisone valerate 0.2 % ointment    WEST-NINOSKA    45 g    Apply sparingly to affected area three times daily as needed.    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)       potassium chloride SA 10 MEQ CR tablet    K-DUR/KLOR-CON M    30 tablet    Take 1 tablet (10 mEq) by mouth daily    Acute congestive heart failure, unspecified congestive heart failure type (H)       PREVACID PO      Take 20 mg by mouth every evening as needed Patient needs to use brand name Prevacid (generic version causes diarrhea)        prochlorperazine 10 MG tablet    COMPAZINE    30 tablet    Take 1 tablet (10 mg) by mouth every 6 hours as needed for nausea or vomiting    Nausea       rosuvastatin 40 MG tablet    CRESTOR    90 tablet    Take 1 tablet (40 mg) by mouth daily    Hyperlipidemia LDL goal <100, Coronary artery disease involving native coronary artery of native heart without angina pectoris       TYLENOL 8 HOUR PO      Take 500 mg by mouth as needed

## 2018-04-23 NOTE — MR AVS SNAPSHOT
After Visit Summary   4/23/2018    Francisco Javier Cortes    MRN: [id number]           Patient Information     Date Of Birth          1940        Visit Information        Provider Department      4/23/2018 3:15 PM Joy Bush APRN CNP [facility]        Today's Diagnoses     Acute on chronic congestive heart failure, unspecified congestive heart failure type (H)    -  1    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)        Acute posthemorrhagic anemia        Thrombocytopenia (H)        Generalized muscle weakness        Pneumonia due to infectious organism, unspecified laterality, unspecified part of lung           Follow-ups after your visit        Your next 10 appointments already scheduled     Apr 30, 2018  8:00 AM CDT   Return Visit with [facility]   [facility] ([facility])    [address] [place] [id number] [phone]            Apr 30, 2018  8:45 AM CDT   Return Visit with Erickson Adan MD   [facility])    [address] [place] [id number] [phone]            Apr 30, 2018  9:30 AM CDT   Level 6 with BAY 4 INFUSION   [facility] ([facility])    [address] [facility] [address]   [phone]            May 01, 2018  8:00 AM CDT   Return Visit with Jad Ball MD   [facility] ([facility])    [address] [place] [address]   [phone]            May 01, 2018  1:00 PM CDT   Level 2 with BAY 1 INFUSION   [facility] ([facility])    [address] [place] [address]   [phone]              Who to contact     If you have questions or need follow up information about today's clinic visit or your schedule please contact [facility] directly at  "[phone].  Normal or non-critical lab and imaging results will be communicated to you by [employer], letter or phone within 4 business days after the clinic has received the results. If you do not hear from us within 7 days, please contact the clinic through [employer] or phone. If you have a critical or abnormal lab result, we will notify you by phone as soon as possible.  Submit refill requests through [employer] or call your pharmacy and they will forward the refill request to us. Please allow 3 business days for your refill to be completed.          Additional Information About Your Visit        [employer] Information     [employer] gives you secure access to your electronic health record. If you see a primary care provider, you can also send messages to your care team and make appointments. If you have questions, please call your primary care clinic.  If you do not have a primary care provider, please call [phone] and they will assist you.      [employer] is an electronic gateway that provides easy, online access to your medical records. With [employer], you can request a clinic appointment, read your test results, renew a prescription or communicate with your care team.     To access your existing account, please contact your [facility] or call [phone] for assistance.        Care EveryWhere ID     This is your Care EveryWhere ID. This could be used by other organizations to access your [facility] medical records  [id number]        Your Vitals Were     Pulse Temperature Height Pulse Oximetry BMI (Body Mass Index)       96 97.8  F (36.6  C) 1.651 m (5' 5\") 97% 26.63 kg/m2        Blood Pressure from Last 3 Encounters:   04/23/18 108/72   04/21/18 129/65   04/16/18 108/71    Weight from Last 3 Encounters:   04/23/18 72.6 kg (160 lb)   04/21/18 73.5 kg (162 lb 1.6 oz)   04/16/18 79.8 kg (176 lb)              Today, you had the following     No orders found for display         Today's " Medication Changes          These changes are accurate as of 4/23/18 10:21 PM.  If you have any questions, ask your nurse or doctor.               These medicines have changed or have updated prescriptions.        Dose/Directions    lisinopril 20 MG tablet   Commonly known as:  PRINIVIL/ZESTRIL   This may have changed:  when to take this   Used for:  Hypertension goal BP (blood pressure) < 130/80, Coronary artery disease involving native coronary artery of native heart without angina pectoris, Microalbuminuria        Dose:  20 mg   Take 1 tablet (20 mg) by mouth daily   Quantity:  90 tablet   Refills:  3       rosuvastatin 40 MG tablet   Commonly known as:  CRESTOR   This may have changed:  when to take this   Used for:  Hyperlipidemia LDL goal <100, Coronary artery disease involving native coronary artery of native heart without angina pectoris        Dose:  40 mg   Take 1 tablet (40 mg) by mouth daily   Quantity:  90 tablet   Refills:  3                Primary Care Provider Office Phone # Fax #    Florian Alonzo [phone]       [address]  [facility] [address]        Equal Access to Services     [facility]: Hadii aad ku hadasho Soomaali, waaxda luqadaha, qaybta kaalmada adeegyada, waxay idiin haychrisn sharron bazan . So [facility] [phone].    ATENCIÓN: Si habla español, tiene a ivan disposición servicios gratuitos de asistencia lingüística. Llame al [phone].    We comply with applicable federal civil rights laws and [place] laws. We do not discriminate on the basis of race, color, national origin, age, disability, sex, sexual orientation, or gender identity.            Thank you!     Thank you for choosing [facility]  for your care. Our goal is always to provide you with excellent care. Hearing back from our patients is one way we can continue to improve our services. Please take a few minutes to complete the written survey that you may receive in the  mail after your visit with us. Thank you!             Your Updated Medication List - Protect others around you: Learn how to safely use, store and throw away your medicines at www.disposemymeds.org.          This list is accurate as of 4/23/18 10:21 PM.  Always use your most recent med list.                   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)       Aluminum-Magnesium-Simethicone 200-200-20 MG/5ML Susp      Take 5 mLs by mouth daily as needed        aspirin 81 MG tablet      1 TABLET EVERY MORNING        clindamycin 1 % lotion    CLEOCIN T    60 mL    Apply twice daily for 6 weeks to the groin    Rash       furosemide 20 MG tablet    LASIX    30 tablet    Take 1 tablet (20 mg) by mouth daily    Acute congestive heart failure, unspecified congestive heart failure type (H)       HYDROcodone-acetaminophen 5-325 MG per tablet    NORCO     Take 1 tablet by mouth every 4 hours as needed        hydrocortisone valerate 0.2 % ointment    WEST-NINOSKA    45 g    Apply sparingly to affected area three times daily as needed.    Psoriasis       lidocaine-prilocaine cream    EMLA     Apply 1 Application topically as needed        lisinopril 20 MG tablet    PRINIVIL/ZESTRIL    90 tablet    Take 1 tablet (20 mg) by mouth daily    Hypertension goal BP (blood pressure) < 130/80, Coronary artery disease involving native coronary artery of native heart without angina pectoris, Microalbuminuria       LORazepam 0.5 MG tablet    ATIVAN    30 tablet    Take 1 tablet (0.5 mg) by mouth every 4 hours as needed (Anxiety, Nausea/Vomiting or Sleep)    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)       metoprolol succinate 100 MG 24 hr tablet    TOPROL-XL    90 tablet    Take 1 tablet (100 mg) by mouth daily    Hypertension goal BP (blood pressure) < 130/80, Coronary  artery disease involving native coronary artery of native heart without angina pectoris       nitroGLYcerin 0.4 MG sublingual tablet    NITROSTAT    60 tablet    Place 1 tablet (0.4 mg) under the tongue every 5 minutes as needed up to 3 tablets per episode.    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)       potassium chloride SA 10 MEQ CR tablet    K-DUR/KLOR-CON M    30 tablet    Take 1 tablet (10 mEq) by mouth daily    Acute congestive heart failure, unspecified congestive heart failure type (H)       PREVACID PO      Take 20 mg by mouth every evening as needed Patient needs to use brand name Prevacid (generic version causes diarrhea)        prochlorperazine 10 MG tablet    COMPAZINE    30 tablet    Take 1 tablet (10 mg) by mouth every 6 hours as needed for nausea or vomiting    Nausea       rosuvastatin 40 MG tablet    CRESTOR    90 tablet    Take 1 tablet (40 mg) by mouth daily    Hyperlipidemia LDL goal <100, Coronary artery disease involving native coronary artery of native heart without angina pectoris       TYLENOL 8 HOUR PO      Take 500 mg by mouth as needed

## 2018-04-23 NOTE — LETTER
4/23/2018         RE: Francisco Javier Cortes  [address] [facility] [address]        Dear Colleague,    Thank you for referring your patient, Francisco Javier Cortes, to the [facility]. Please see a copy of my visit note below.    Oncology Follow Up Visit: April 23, 2018    Oncologist: Dr. Erickson Adan  PCP: Florian Alonzo    Diagnosis: Stage IV mantle cell lymphoma  Francisco Javier Cortes is a 78 yo  male presented in March 2018 with complaint of abdominal bloating and discomfort loss of appetite ×2 months. Initial imaging showed splenomegaly, extensive retroperitoneal mesenteric, inguinal and pelvic as well as retrocrural adenopathy and enlarging pulmonary nodules up to 18 mm. FNA proved mantle cell lymphoma-please see progress notes of 3/29/18 by Dr. Auguste for specific information.  Treatment:   3/29/18 began Bendamustine and Rituxan  - Hospitalized for TLS  - Hospitalized for sepsis from pneumonia  - hospitalized for CHF 4/20-21    Interval History: Mr. Cortes comes to clinic with son, Eloy for post hospital visit. Pt was hospitalized 4/20-4/21/2018 for CHF. He states today he is still weak but walking on own shorter distances but no falls- he states home physical therapist is coming to home tomorrow. His appetite is decreased but he is taking fluids well. Bowels are moving well. Edema is improved and he shares his SOB is improved. He denies nausea, fevers, neuropathy and states he is sleeping well. He is now on potassium as well as continuing lasix use. He denies chest pain, bleeding symptoms.  Rest of comprehensive and complete ROS is reviewed and is negative.   Past Medical History:   Diagnosis Date     CAD (coronary artery disease) 1/09    s/p stentsx2     Coronary artery disease involving native coronary artery of native heart without angina pectoris 12/22/2015     Dyslipidemia      Erectile dysfunction      GERD (gastroesophageal reflux disease)      Hearing problem One ear 1961  "    Hypertension      NSTEMI (non-ST elevated myocardial infarction) (H) 3/20/2014     Pneumonia      Prostate cancer (H)     prostatecomy 9 years ago, PSAs remain good     Status post percutaneous transluminal coronary angioplasty-Coronary angioplasty with STEPHEN to LCx 4/4/2014     Stented coronary artery     stents placed     Current Outpatient Prescriptions   Medication     Acetaminophen (TYLENOL 8 HOUR PO)     allopurinol (ZYLOPRIM) 300 MG tablet     Alum & Mag Hydroxide-Simeth (ALUMINUM-MAGNESIUM-SIMETHICONE) 200-200-20 MG/5ML SUSP     ASPIRIN 81 MG OR TABS     clindamycin (CLEOCIN T) 1 % lotion     furosemide (LASIX) 20 MG tablet     HYDROcodone-acetaminophen (NORCO) 5-325 MG per tablet     hydrocortisone valerate (WEST-NINOSKA) 0.2 % ointment     Lansoprazole (PREVACID PO)     lidocaine-prilocaine (EMLA) cream     lisinopril (PRINIVIL/ZESTRIL) 20 MG tablet     LORazepam (ATIVAN) 0.5 MG tablet     metoprolol succinate (TOPROL-XL) 100 MG 24 hr tablet     ondansetron (ZOFRAN) 8 MG tablet     potassium chloride SA (K-DUR/KLOR-CON M) 10 MEQ CR tablet     prochlorperazine (COMPAZINE) 10 MG tablet     rosuvastatin (CRESTOR) 40 MG tablet     nitroGLYcerin (NITROSTAT) 0.4 MG sublingual tablet     No current facility-administered medications for this visit.      Facility-Administered Medications Ordered in Other Visits   Medication     heparin 100 UNIT/ML injection 5 mL     sodium chloride (PF) 0.9% PF flush 10-20 mL     Allergies   Allergen Reactions     Niacin      Severe rash and itching     Prevacid [Lansoprazole] Diarrhea     Patient notes diarrhea with 30mg generic version. Patient does ok with 20mg in generic and brand name.     Shellfish Allergy      One kind       Physical Exam:/72  Pulse 96  Temp 97.8  F (36.6  C)  Ht 1.651 m (5' 5\")  Wt 72.6 kg (160 lb)  SpO2 97%  BMI 26.63 kg/m2  ECOG PS- 1  Constitutional: Alert, moving slowly on own today and appears weak. cooperative   ENT: Eyes bright, No mouth " sores  Neck: Supple,  no nodes are noted.  Cardiac: Heart rate and rhythm is regular and strong without murmur  Respiratory: Breathing easy. Lung  Sounds clear.No cough and good effort.  GI: Abdomen is rounded, nontender, BS active. No masses or organomegaly  MS: Muscle tone fair with some weakness noted - moving on own to exam table. Extremities normal with 1+ pitting edema to mid calf bilaterally  Skin: No suspicious lesions, rashes,weeping  Neuro: Sensory grossly WNL, gait normal.   Lymph: Normal ant/post cervical, axillary, supraclavicular nodes  Psych: Mentation appears normal and affect normal/bright with good conversation.    Laboratory Results:   Results for orders placed or performed in visit on 04/23/18   CBC with platelets differential   Result Value Ref Range    WBC 3.4 (L) 4.0 - 11.0 10e9/L    RBC Count 2.79 (L) 4.4 - 5.9 10e12/L    Hemoglobin 8.4 (L) 13.3 - 17.7 g/dL    Hematocrit 26.3 (L) 40.0 - 53.0 %    MCV 94 78 - 100 fl    MCH 30.1 26.5 - 33.0 pg    MCHC 31.9 31.5 - 36.5 g/dL    RDW 18.6 (H) 10.0 - 15.0 %    Platelet Count 65 (L) 150 - 450 10e9/L    % Neutrophils 69.0 %    % Lymphocytes 17.0 %    % Monocytes 9.0 %    % Eosinophils 5.0 %    Absolute Neutrophil 2.3 1.6 - 8.3 10e9/L    Absolute Lymphocytes 0.6 (L) 0.8 - 5.3 10e9/L    Absolute Monocytes 0.3 0.0 - 1.3 10e9/L    Absolute Eosinophils 0.2 0.0 - 0.7 10e9/L    RBC Morphology Normal     Platelet Estimate       Automated count confirmed.  Platelet morphology is normal.    Diff Method Manual Differential    Basic metabolic panel  (Ca, Cl, CO2, Creat, Gluc, K, Na, BUN)   Result Value Ref Range    Sodium 139 133 - 144 mmol/L    Potassium 3.7 3.4 - 5.3 mmol/L    Chloride 105 94 - 109 mmol/L    Carbon Dioxide 25 20 - 32 mmol/L    Anion Gap 9 3 - 14 mmol/L    Glucose 92 70 - 99 mg/dL    Urea Nitrogen 13 7 - 30 mg/dL    Creatinine 0.93 0.66 - 1.25 mg/dL    GFR Estimate 79 >60 mL/min/1.7m2    GFR Estimate If Black >90 >60 mL/min/1.7m2    Calcium 7.6  (L) 8.5 - 10.1 mg/dL     Assessment and Plan:   CHF- Pt was hospitalized at [facility] from 4/20-4/21/2018 with pleural effusions and elevated BNP. He was diuresed and at discharge, cardiac echo testing showed no pericardial effusion. Oxygen saturation is improved and edema improving with daily use of lasix 20 mg with daily oral potassium, and elevation of the legs- we will continue as above. He admits he is not using the compression hose regularly- encouraged use to help move fluids.   He continues with Lisinopril and Metoprolol as previous.   Stage IV mantle cell lymphoma -pt was diagnosed with lymphoma ad began treatment on 3/29 with Bendamustine/Rituxan He had previous hospitalization for TLS post infusion and later for pneumonia with sepsis at from 4/10-4/15/2018 and was released on daily Levaquin which was stopped with most recent CHF hospitalization for CHF.    Pt is due for cycle 2 to start on 4/30 and will have review with Dr Adan though discussed that he will need to recover strength, blood counts and CHF concerns .    Weakness post hospitalization- Physical Therapy scheduled to review patients ability tomorrow at his home. Encouraged short exertion more often.  Anemia and thrombocytopenia- related to chemotherapy. Now seeing some recovery- expect slow recovery - ANC is normal.  Aspirin is on hold with thrombocytopenia with platelets <100.  Recent Pneumonia- previous hospitalization- was finishing Levaquin but with finding of resolution with imaging, Levaquin was stopped.   Nutrition- appetite is decreased and weight is decreased- related to fluid loss as well as poor intake. Discussed need for better intake- may need to eat smaller meals more often.may use supplements or other high protein foods.  This was a 25 min visit with > 50% in counseling and coordinating care including education and management of concerns.    Joy Bush,CNP      Again, thank you for allowing me to participate in the care of your  patient.        Sincerely,        Joy Bush, NP, APRN CNP

## 2018-04-23 NOTE — PROGRESS NOTES
Oncology Follow Up Visit: April 23, 2018    Oncologist: Dr. Erickson Adan  PCP: Florian Alonzo    Diagnosis: Stage IV mantle cell lymphoma  Francisco Javier Cortes is a 76 yo  male presented in March 2018 with complaint of abdominal bloating and discomfort loss of appetite ×2 months. Initial imaging showed splenomegaly, extensive retroperitoneal mesenteric, inguinal and pelvic as well as retrocrural adenopathy and enlarging pulmonary nodules up to 18 mm. FNA proved mantle cell lymphoma-please see progress notes of 3/29/18 by Dr. Auguste for specific information.  Treatment:   3/29/18 began Bendamustine and Rituxan  - Hospitalized for TLS  - Hospitalized for sepsis from pneumonia  - hospitalized for CHF 4/20-21    Interval History: Mr. Cortes comes to clinic with son, Eloy for post hospital visit. Pt was hospitalized 4/20-4/21/2018 for CHF. He states today he is still weak but walking on own shorter distances but no falls- he states home physical therapist is coming to home tomorrow. His appetite is decreased but he is taking fluids well. Bowels are moving well. Edema is improved and he shares his SOB is improved. He denies nausea, fevers, neuropathy and states he is sleeping well. He is now on potassium as well as continuing lasix use. He denies chest pain, bleeding symptoms.  Rest of comprehensive and complete ROS is reviewed and is negative.   Past Medical History:   Diagnosis Date     CAD (coronary artery disease) 1/09    s/p stentsx2     Coronary artery disease involving native coronary artery of native heart without angina pectoris 12/22/2015     Dyslipidemia      Erectile dysfunction      GERD (gastroesophageal reflux disease)      Hearing problem One ear 1961     Hypertension      NSTEMI (non-ST elevated myocardial infarction) (H) 3/20/2014     Pneumonia      Prostate cancer (H)     prostatecomy 9 years ago, PSAs remain good     Status post percutaneous transluminal coronary angioplasty-Coronary angioplasty  "with STEPHEN to LCx 4/4/2014     Stented coronary artery     stents placed     Current Outpatient Prescriptions   Medication     Acetaminophen (TYLENOL 8 HOUR PO)     allopurinol (ZYLOPRIM) 300 MG tablet     Alum & Mag Hydroxide-Simeth (ALUMINUM-MAGNESIUM-SIMETHICONE) 200-200-20 MG/5ML SUSP     ASPIRIN 81 MG OR TABS     clindamycin (CLEOCIN T) 1 % lotion     furosemide (LASIX) 20 MG tablet     HYDROcodone-acetaminophen (NORCO) 5-325 MG per tablet     hydrocortisone valerate (WEST-NINOSKA) 0.2 % ointment     Lansoprazole (PREVACID PO)     lidocaine-prilocaine (EMLA) cream     lisinopril (PRINIVIL/ZESTRIL) 20 MG tablet     LORazepam (ATIVAN) 0.5 MG tablet     metoprolol succinate (TOPROL-XL) 100 MG 24 hr tablet     ondansetron (ZOFRAN) 8 MG tablet     potassium chloride SA (K-DUR/KLOR-CON M) 10 MEQ CR tablet     prochlorperazine (COMPAZINE) 10 MG tablet     rosuvastatin (CRESTOR) 40 MG tablet     nitroGLYcerin (NITROSTAT) 0.4 MG sublingual tablet     No current facility-administered medications for this visit.      Facility-Administered Medications Ordered in Other Visits   Medication     heparin 100 UNIT/ML injection 5 mL     sodium chloride (PF) 0.9% PF flush 10-20 mL     Allergies   Allergen Reactions     Niacin      Severe rash and itching     Prevacid [Lansoprazole] Diarrhea     Patient notes diarrhea with 30mg generic version. Patient does ok with 20mg in generic and brand name.     Shellfish Allergy      One kind       Physical Exam:/72  Pulse 96  Temp 97.8  F (36.6  C)  Ht 1.651 m (5' 5\")  Wt 72.6 kg (160 lb)  SpO2 97%  BMI 26.63 kg/m2  ECOG PS- 1  Constitutional: Alert, moving slowly on own today and appears weak. cooperative   ENT: Eyes bright, No mouth sores  Neck: Supple,  no nodes are noted.  Cardiac: Heart rate and rhythm is regular and strong without murmur  Respiratory: Breathing easy. Lung  Sounds clear.No cough and good effort.  GI: Abdomen is rounded, nontender, BS active. No masses or " organomegaly  MS: Muscle tone fair with some weakness noted - moving on own to exam table. Extremities normal with 1+ pitting edema to mid calf bilaterally  Skin: No suspicious lesions, rashes,weeping  Neuro: Sensory grossly WNL, gait normal.   Lymph: Normal ant/post cervical, axillary, supraclavicular nodes  Psych: Mentation appears normal and affect normal/bright with good conversation.    Laboratory Results:   Results for orders placed or performed in visit on 04/23/18   CBC with platelets differential   Result Value Ref Range    WBC 3.4 (L) 4.0 - 11.0 10e9/L    RBC Count 2.79 (L) 4.4 - 5.9 10e12/L    Hemoglobin 8.4 (L) 13.3 - 17.7 g/dL    Hematocrit 26.3 (L) 40.0 - 53.0 %    MCV 94 78 - 100 fl    MCH 30.1 26.5 - 33.0 pg    MCHC 31.9 31.5 - 36.5 g/dL    RDW 18.6 (H) 10.0 - 15.0 %    Platelet Count 65 (L) 150 - 450 10e9/L    % Neutrophils 69.0 %    % Lymphocytes 17.0 %    % Monocytes 9.0 %    % Eosinophils 5.0 %    Absolute Neutrophil 2.3 1.6 - 8.3 10e9/L    Absolute Lymphocytes 0.6 (L) 0.8 - 5.3 10e9/L    Absolute Monocytes 0.3 0.0 - 1.3 10e9/L    Absolute Eosinophils 0.2 0.0 - 0.7 10e9/L    RBC Morphology Normal     Platelet Estimate       Automated count confirmed.  Platelet morphology is normal.    Diff Method Manual Differential    Basic metabolic panel  (Ca, Cl, CO2, Creat, Gluc, K, Na, BUN)   Result Value Ref Range    Sodium 139 133 - 144 mmol/L    Potassium 3.7 3.4 - 5.3 mmol/L    Chloride 105 94 - 109 mmol/L    Carbon Dioxide 25 20 - 32 mmol/L    Anion Gap 9 3 - 14 mmol/L    Glucose 92 70 - 99 mg/dL    Urea Nitrogen 13 7 - 30 mg/dL    Creatinine 0.93 0.66 - 1.25 mg/dL    GFR Estimate 79 >60 mL/min/1.7m2    GFR Estimate If Black >90 >60 mL/min/1.7m2    Calcium 7.6 (L) 8.5 - 10.1 mg/dL     Assessment and Plan:   CHF- Pt was hospitalized at [facility] from 4/20-4/21/2018 with pleural effusions and elevated BNP. He was diuresed and at discharge, cardiac echo testing showed no pericardial effusion. Oxygen saturation  is improved and edema improving with daily use of lasix 20 mg with daily oral potassium, and elevation of the legs- we will continue as above. He admits he is not using the compression hose regularly- encouraged use to help move fluids.   He continues with Lisinopril and Metoprolol as previous.   Stage IV mantle cell lymphoma -pt was diagnosed with lymphoma ad began treatment on 3/29 with Bendamustine/Rituxan He had previous hospitalization for TLS post infusion and later for pneumonia with sepsis at from 4/10-4/15/2018 and was released on daily Levaquin which was stopped with most recent CHF hospitalization for CHF.    Pt is due for cycle 2 to start on 4/30 and will have review with Dr Adan though discussed that he will need to recover strength, blood counts and CHF concerns .    Weakness post hospitalization- Physical Therapy scheduled to review patients ability tomorrow at his home. Encouraged short exertion more often.  Anemia and thrombocytopenia- related to chemotherapy. Now seeing some recovery- expect slow recovery - ANC is normal.  Aspirin is on hold with thrombocytopenia with platelets <100.  Recent Pneumonia- previous hospitalization- was finishing Levaquin but with finding of resolution with imaging, Levaquin was stopped.   Nutrition- appetite is decreased and weight is decreased- related to fluid loss as well as poor intake. Discussed need for better intake- may need to eat smaller meals more often.may use supplements or other high protein foods.  This was a 25 min visit with > 50% in counseling and coordinating care including education and management of concerns.    Joy Bush,CNP

## 2018-04-25 NOTE — TELEPHONE ENCOUNTER
"Called patient and told him that Dr Ball would still like him to have the shellie stress test if he is feeling up to it. He states that the doctors in the hospital told him he should not do it because he had  \"afib and congenital heart disease \".  Patient also stated he did not feel he could do the stress test. Assured him that it was not a physical stress test but a chemical one. He then repeated he was told not to do it by the doctors. He did confirm that he would be seeing Dr Ball on 5/1/18.   "

## 2018-04-27 NOTE — TELEPHONE ENCOUNTER
Received call from Shaw, physical therapist at [facility] with update on patient's condition. Patient was admitted to [facility] 4/20/18 for COLE and edema. Patient was started on Lasix and discharged on 4/21/18. Today, PT was out to see patient and /66 (sitting) 90/50 (standing). Shaw requested a home care nurse visit for further assessment. Shaw was calling to inform Dr. Adan. Patient has an appointment with Dr. Adan on Monday 4/30/18 prior to infusion.

## 2018-04-30 NOTE — MR AVS SNAPSHOT
After Visit Summary   4/30/2018    Francisco Javier Cortes    MRN: [id number]           Patient Information     Date Of Birth          1940        Visit Information        Provider Department      4/30/2018 9:30 AM [place] [facility]        Today's Diagnoses     Drug-induced neutropenia (H)    -  1    Nausea        Encounter for antineoplastic chemotherapy        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)        Flatulence, eructation, and gas pain           Follow-ups after your visit        Your next 10 appointments already scheduled     May 01, 2018  8:00 AM CDT   Return Visit with Jad Ball MD   [facility])    [id number] [place] [address]   [phone]            May 01, 2018  1:00 PM CDT   Level 2 with [place] [facility] ([facility])    [id number] [place] [address]   [phone]            May 07, 2018 11:30 AM CDT   Return Visit with [facility]   [facility]    [id number] [place] [address]   [phone]            May 14, 2018  9:45 AM CDT   Return Visit with [facility]   [facility])    [id number] [place] [address]   [phone]            May 14, 2018 10:45 AM CDT   Return Visit with Erickson Adan MD   [facility])    [id number] [place] [address]   [phone]              Future tests that were ordered for you today     Open Standing Orders        Priority Remaining Interval Expires Ordered    CBC with platelets differential Routine 2/2 q week 4/30/2019 4/30/2018          Open Future Orders        Priority Expected Expires Ordered    Comprehensive metabolic panel Routine 5/14/2018 4/30/2019 4/30/2018             Who to contact     If you have questions or need follow up information about today's clinic visit or your schedule please contact [facility] directly at [phone].  Normal or non-critical lab and imaging results will be communicated to you by [employer], letter or phone within 4 business days after the clinic has received the results. If you do not hear from us within 7 days, please contact the clinic through [employer] or phone. If you have a critical or abnormal lab result, we will notify you by phone as soon as possible.  Submit refill requests through [employer] or call your pharmacy and they will forward the refill request to us. Please allow 3 business days for your refill to be completed.          Additional Information About Your Visit        [employer] Information     [employer] gives you secure access to your electronic health record. If you see a primary care provider, you can also send messages to your care team and make appointments. If you have questions, please call your primary care clinic.  If you do not have a primary care provider, please call [phone] and they will assist you.      [employer] is an electronic gateway that provides easy, online access to your medical records. With [employer], you can request a clinic appointment, read your test results, renew a prescription or communicate with your care team.     To access your existing account, please contact your [facility] or call [phone] for assistance.        Care EveryWhere ID     This is your Care EveryWhere ID. This could be used by other organizations to access your [place] medical records  [id number]         Blood Pressure from Last 3 Encounters:   04/30/18 97/69   04/23/18 108/72   04/21/18 129/65    Weight from Last 3 Encounters:   04/30/18 72.6 kg (160 lb)   04/23/18 72.6 kg (160 lb)   04/21/18 73.5 kg (162 lb 1.6 oz)              Today, you had the following     No orders found  for display         Today's Medication Changes          These changes are accurate as of 4/30/18  2:17 PM.  If you have any questions, ask your nurse or doctor.               These medicines have changed or have updated prescriptions.        Dose/Directions    lisinopril 20 MG tablet   Commonly known as:  PRINIVIL/ZESTRIL   This may have changed:  when to take this   Used for:  Hypertension goal BP (blood pressure) < 130/80, Coronary artery disease involving native coronary artery of native heart without angina pectoris, Microalbuminuria        Dose:  20 mg   Take 1 tablet (20 mg) by mouth daily   Quantity:  90 tablet   Refills:  3       rosuvastatin 40 MG tablet   Commonly known as:  CRESTOR   This may have changed:  when to take this   Used for:  Hyperlipidemia LDL goal <100, Coronary artery disease involving native coronary artery of native heart without angina pectoris        Dose:  40 mg   Take 1 tablet (40 mg) by mouth daily   Quantity:  90 tablet   Refills:  3            Where to get your medicines      These medications were sent to [facility] - [place], [place] - [address], [address]  [address], [address], [facility] [address]     Phone:  [phone]     ondansetron 8 MG tablet                Primary Care Provider Office Phone # Fax #    Florian Alonzo [phone]       [address]  [facility] [address]        Equal Access to Services     NANDA BARAJAS : Hadii rivera ku hadasho Soomaali, waaxda luqadaha, qaybta kaalmada adeegyada, zhao lorenzo. So [facility] [phone].    ATENCIÓN: Si habla español, tiene a ivan disposición servicios gratuitos de asistencia lingüística. Llame al [phone].    We comply with applicable federal civil rights laws and [place] laws. We do not discriminate on the basis of race, color, national origin, age, disability, sex, sexual orientation, or gender identity.            Thank you!     Thank  you for choosing [facility]  for your care. Our goal is always to provide you with excellent care. Hearing back from our patients is one way we can continue to improve our services. Please take a few minutes to complete the written survey that you may receive in the mail after your visit with us. Thank you!             Your Updated Medication List - Protect others around you: Learn how to safely use, store and throw away your medicines at www.disposemymeds.org.          This list is accurate as of 4/30/18  2:17 PM.  Always use your most recent med list.                   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)       Aluminum-Magnesium-Simethicone 200-200-20 MG/5ML Susp      Take 5 mLs by mouth daily as needed        aspirin 81 MG tablet      1 TABLET EVERY MORNING        clindamycin 1 % lotion    CLEOCIN T    60 mL    Apply twice daily for 6 weeks to the groin    Rash       furosemide 20 MG tablet    LASIX    30 tablet    Take 1 tablet (20 mg) by mouth daily    Acute congestive heart failure, unspecified congestive heart failure type (H)       HYDROcodone-acetaminophen 5-325 MG per tablet    NORCO     Take 1 tablet by mouth every 4 hours as needed        hydrocortisone valerate 0.2 % ointment    WEST-NINOSKA    45 g    Apply sparingly to affected area three times daily as needed.    Psoriasis       lidocaine-prilocaine cream    EMLA     Apply 1 Application topically as needed        lisinopril 20 MG tablet    PRINIVIL/ZESTRIL    90 tablet    Take 1 tablet (20 mg) by mouth daily    Hypertension goal BP (blood pressure) < 130/80, Coronary artery disease involving native coronary artery of native heart without angina pectoris, Microalbuminuria       LORazepam 0.5 MG tablet    ATIVAN    30 tablet    Take 1 tablet (0.5 mg) by mouth every 4  hours as needed (Anxiety, Nausea/Vomiting or Sleep)    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)       metoprolol succinate 100 MG 24 hr tablet    TOPROL-XL    90 tablet    Take 1 tablet (100 mg) by mouth daily    Hypertension goal BP (blood pressure) < 130/80, Coronary artery disease involving native coronary artery of native heart without angina pectoris       nitroGLYcerin 0.4 MG sublingual tablet    NITROSTAT    60 tablet    Place 1 tablet (0.4 mg) under the tongue every 5 minutes as needed up to 3 tablets per episode.    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)       potassium chloride SA 10 MEQ CR tablet    K-DUR/KLOR-CON M    30 tablet    Take 1 tablet (10 mEq) by mouth daily    Acute congestive heart failure, unspecified congestive heart failure type (H)       PREVACID PO      Take 20 mg by mouth every evening as needed Patient needs to use brand name Prevacid (generic version causes diarrhea)        prochlorperazine 10 MG tablet    COMPAZINE    30 tablet    Take 1 tablet (10 mg) by mouth every 6 hours as needed for nausea or vomiting    Nausea       rosuvastatin 40 MG tablet    CRESTOR    90 tablet    Take 1 tablet (40 mg) by mouth daily    Hyperlipidemia LDL goal <100, Coronary artery disease involving native coronary artery of native heart without angina pectoris       TYLENOL 8 HOUR PO      Take 500 mg by mouth as needed

## 2018-04-30 NOTE — Clinical Note
4/30/2018         RE: Francisco Javier Cortes  [address] [id number]        Dear Colleague,    Thank you for referring your patient, Francisco Javier Cortes, to the [facility]. Please see a copy of my visit note below.      FOLLOW-UP VISIT NOTE    PATIENT NAME: Francisco Javier Cortes MRN # [id number]  DATE OF VISIT: Apr 30, 2018 YOB: 1940    REFERRING PROVIDER: No referring provider defined for this encounter.    CANCER TYPE: Mantle cell lymphoma  STAGE: IVB  ECOG PS: 1    ONCOLOGY HISTORY:  77-year-old male with past medical history significant for prostate cancer status post prostatectomy, coronary disease status post stent placement, to complete develop symptoms of abdominal bloating/discomfort and loss of appetite for 2 months. Underwent imaging workup by primary care provider with CT abdominal and pelvis on 02/25/18 showing extensive intra- abdominal and inguinal lymphadenopathy with splenomegaly.    - 03/05/18 02 Seen by medical oncology. I ordered CT neck and CT chest which showed bulky mediastinal, hilar ,axillary and supraclavicular lymphadenopathy. Patient was referred to ENT for diagnostic biopsy. He underwent an FNA of the lymph node on 03/14 which came back with findings consistent with mantle cell lymphoma. 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 Proliferation index was estimated to be 50%. The marrow biopsy performed showed bone marrow involvement by mantle cell lymphoma 40-50%. Fish came back positive for translocation 11:14 consistent with mantle cell lymphoma. PET CT scan extensive hypermetabolic adenopathy of cervical, axillary, mediastinal, hilar, intra-abdominal, retroperitoneal inguinal but the largest myeloma rate of reason triglyceride was measuring 15.5 x 6.9 cm.    - 03/29/18  Started on bendamustine in combination with Rituxan along with  allopurinol for tumor lysis syndrome prophylaxis.     - Hospitalized for TLS monitoring  - Hospitalized for sepsis from pneumonia  - Hospitalized for CHF 04/20 - 04/21        SUBJECTIVE     He is here for follow-up prior to cycle #2. Patient is accompanied in the clinic today by her wife. He has noticed improvement in energy levels and the last one week. Denies fevers/chills, productive cough, worsening dyspnea, lower extremity edema. Has noticed low blood pressures at home along with dizziness on standing and has stopped taking one of his blood pressure medications. Patient is following up with cardiology tomorrow       PAST MEDICAL HISTORY     Past Medical History:   Diagnosis Date     CAD (coronary artery disease) 1/09    s/p stentsx2     Coronary artery disease involving native coronary artery of native heart without angina pectoris 12/22/2015     Dyslipidemia      Erectile dysfunction      GERD (gastroesophageal reflux disease)      Hearing problem One ear 1961     Hypertension      NSTEMI (non-ST elevated myocardial infarction) (H) 3/20/2014     Pneumonia      Prostate cancer (H)     prostatecomy 9 years ago, PSAs remain good     Status post percutaneous transluminal coronary angioplasty-Coronary angioplasty with STEPHEN to LCx 4/4/2014     Stented coronary artery     stents placed         CURRENT OUTPATIENT MEDICATIONS     Current Outpatient Prescriptions   Medication Sig Dispense Refill     Acetaminophen (TYLENOL 8 HOUR PO) Take 500 mg by mouth as needed        allopurinol (ZYLOPRIM) 300 MG tablet Take 1 tablet (300 mg) by mouth daily 30 tablet 1     Alum & Mag Hydroxide-Simeth (ALUMINUM-MAGNESIUM-SIMETHICONE) 200-200-20 MG/5ML SUSP Take 5 mLs by mouth daily as needed       ASPIRIN 81 MG OR TABS 1 TABLET EVERY MORNING       clindamycin (CLEOCIN T) 1 % lotion Apply twice daily for 6 weeks to the groin 60 mL 1     furosemide (LASIX) 20 MG tablet Take 1 tablet (20 mg) by mouth daily 30 tablet 0      HYDROcodone-acetaminophen (NORCO) 5-325 MG per tablet Take 1 tablet by mouth every 4 hours as needed       hydrocortisone valerate (WEST-NINOSKA) 0.2 % ointment Apply sparingly to affected area three times daily as needed. 45 g 3     Lansoprazole (PREVACID PO) Take 20 mg by mouth every evening as needed Patient needs to use brand name Prevacid (generic version causes diarrhea)        lidocaine-prilocaine (EMLA) cream Apply 1 Application topically as needed       lisinopril (PRINIVIL/ZESTRIL) 20 MG tablet Take 1 tablet (20 mg) by mouth daily (Patient taking differently: Take 20 mg by mouth every morning ) 90 tablet 3     LORazepam (ATIVAN) 0.5 MG tablet Take 1 tablet (0.5 mg) by mouth every 4 hours as needed (Anxiety, Nausea/Vomiting or Sleep) 30 tablet 3     metoprolol succinate (TOPROL-XL) 100 MG 24 hr tablet Take 1 tablet (100 mg) by mouth daily 90 tablet 3     nitroGLYcerin (NITROSTAT) 0.4 MG sublingual tablet Place 1 tablet (0.4 mg) under the tongue every 5 minutes as needed up to 3 tablets per episode. 60 tablet 6     ondansetron (ZOFRAN) 8 MG tablet Take 1 tablet (8 mg) by mouth every 8 hours as needed (Nausea/Vomiting) 10 tablet 3     potassium chloride SA (K-DUR/KLOR-CON M) 10 MEQ CR tablet Take 1 tablet (10 mEq) by mouth daily 30 tablet 0     prochlorperazine (COMPAZINE) 10 MG tablet Take 1 tablet (10 mg) by mouth every 6 hours as needed for nausea or vomiting 30 tablet 1     rosuvastatin (CRESTOR) 40 MG tablet Take 1 tablet (40 mg) by mouth daily (Patient taking differently: Take 40 mg by mouth every morning ) 90 tablet 3        ALLERGIES     Allergies   Allergen Reactions     Niacin      Severe rash and itching     Prevacid [Lansoprazole] Diarrhea     Patient notes diarrhea with 30mg generic version. Patient does ok with 20mg in generic and brand name.     Shellfish Allergy      One kind        REVIEW OF SYSTEMS   As above in the HPI, o/w complete 12-point ROS was negative.     PHYSICAL EXAM   B/P: 108/72,  T: 97.7, P: 95, R: 16  SpO2 Readings from Last 4 Encounters:   04/30/18 98%   04/23/18 97%   04/21/18 96%   04/15/18 95%     Wt Readings from Last 3 Encounters:   04/30/18 72.6 kg (160 lb)   04/23/18 72.6 kg (160 lb)   04/21/18 73.5 kg (162 lb 1.6 oz)     GEN: NAD  EYES:PERRLA  Mouth/ENT: Oropharynx is clear.  NECK: cervical lymphadenopathy improved  LUNGS: clear bilaterally  CV: regular, no murmurs, rubs, or gallops  ABDOMEN: soft, non-tender, non-distended, normal bowel sounds  EXT: warm, well perfused, no edema  NEURO: alert  SKIN: no rashes     LABORATORY AND IMAGING STUDIES     Recent Labs   Lab Test  04/09/18   0740  04/05/18   0840   03/29/18 2005 03/20/14   1143   NA  141  141   < >  139   < >  140   POTASSIUM  4.0  4.2   < >  4.4   < >  4.6   CHLORIDE  107  108   < >  108   < >  105   CO2  25  25   < >  20   < >  23   ANIONGAP  9  8   < >  12   < >  12   BUN  15  16   < >  32*   < >  15   CR  1.04  1.13   < >  1.28*   < >  0.96   GLC  147*  123*   < >  167*   < >  104*   EVANGELINA  7.8*  8.0*   < >  7.3*   < >  9.4   MAG   --    --    --   1.9   --   2.3   PHOS  2.2*  3.0   < >  2.9   < >   --     < > = values in this interval not displayed.     Recent Labs   Lab Test  04/09/18   0740  04/05/18   0840  04/02/18   1151   WBC  5.8  7.9  12.1*   HGB  10.9*  11.3*  13.1*   PLT  94*  93*  86*   MCV  93  93  92   NEUTROPHIL  74.6  81.3  83.0     Recent Labs   Lab Test  04/09/18   0740  04/05/18   0840  04/02/18   1151   BILITOTAL  0.5  0.5  0.6   ALKPHOS  95  81  94   ALT  30  27  36   AST  64*  47*  69*   ALBUMIN  2.8*  2.6*  3.0*   LDH  476*  442*  638*     TSH   Date Value Ref Range Status   08/14/2017 2.06 0.40 - 4.00 mU/L Final       PATHOLOGY 03/22/18     FINAL DIAGNOSIS:   Lymph node, left level 5, excision:   - Mantle cell lymphoma, blastoid variant   - See comment    INTERPRETATION:   Lymph Node, Left level 5:        CD5-positive kappa monotypic B cells (94%), see comment        No aberrant immunophenotype  on T cells     COMMENT:   The monotypic B-cell population present in this specimen has a similar   immunophenotype as reported previously in the supraclavicular mass from this patient ([id number] and [id number]) and is compatible with involvement by the previously diagnosed mantle cell lymphoma. Large cells may not survive specimen processing. Morphologic correlation is required ([id number]).      FNA 03/14/18    CYTOLOGIC INTERPRETATION:     Left neck, supraclavicular mass, fine needle aspirate: Positive for   malignant cells; involved by mantle cell   lymphoma (see comment)      BM biopsy 03/21/18    TEST(S):   Unilateral Bone Marrow Biopsy/Aspiration     FINAL DIAGNOSIS:      - Involvement by mantle cell lymphoma (40-50%) see comment     - Hypercellular marrow for age with a cellularity of 60-70% with   trilineage hematopoiesis, atypical lymphoid aggregates comprising approximately 40 to 50% of marrow cellular composition     - Peripheral blood showing monocytosis, a small subset of atypical   lymphocytes and moderate thrombocytopenia with normal platelet morphology     FISH  RESULTS:  ABNORMAL               - IGH-CCND1 fusion (40.5%)     INTERPRETATION:   Of the interphase cells examined, 40.5% had an IGH-CCND1 gene fusion as   would result from a t(11;14). These findings are consistent with the reported pathologic diagnosis of bone marrow involvement by mantle cell   lymphoma, characterized in the lymph node specimen (see Previous Studies   below) by IGH-CCND1 gene fusion.    Results for orders placed or performed during the hospital encounter of 03/24/18   PET Oncology Whole Body    Narrative    Combined Report of:    PET and CT on  3/26/2018 7:56 AM :    1. PET of the neck, chest, abdomen, and pelvis.  2. PET CT Fusion for Attenuation Correction and Anatomical  Localization:    3. 3D MIP and PET-CT fused images were processed on an independent  workstation and archived to PACS and reviewed by a  radiologist.    Technique:    1. PET: The patient received 14.65 mCi of F-18-FDG; the serum glucose  was 86 prior to administration, body weight was 73.5 kg. Images were  evaluated in the axial, sagittal, and coronal planes as well as the  rotational whole body MIP. Images were acquired from the Vertex to the  Feet.    UPTAKE WAS MEASURED AT 66 MINUTES.     BACKGROUND:  Liver SUV max= 2.7,   Aorta Blood SUV Max: 2.     2. CT: CT only obtained for attenuation correction and not diagnostic  purposes.    INDICATION: Lymphoma, mantle cell, multiple sites (H)    ADDITIONAL INFORMATION OBTAINED FROM EMR: 77-year-old male with  history of prostate cancer, status post prostatectomy, now with new  diagnosis of mantle cell lymphoma.    COMPARISON: CTs dated 3/5/2018, 6/29/2015, 1/5/2015    FINDINGS:     HEAD/NECK:  Extensive hypermetabolic adenopathy at each level of the cervical  chain. The largest measures 3.3 x 3.8 cm, left supraclavicular level,  (series 3 image 94), max SUV 10.4.    CHEST:  Extensive axillary, mediastinal and hilar adenopathy. For example:  - 7.8 x 3.2 cm carinal lymph node (series 3 image 142), max SUV 9.2.  - 2.3 x 3.7 cm left axillary lymph node (series 3 image 112), max SUV  8.7.    1.4 cm nodule in the right middle lobe (series 3 image 142), max SUV  3.8. Additional smaller nodules are better characterized on dedicated  CT of the chest dated 3/5/2018.    The heart is not enlarged. There is no significant pericardial  effusion. Dense coronary calcifications. Small bilateral pleural  effusions.    ABDOMEN AND PELVIS:  The spleen is enlarged measuring up to 18 cm with increased FDG  avidity, max SUV 7.     Extensive intra-abdominal, retroperitoneal and inguinal adenopathy.  For example:  - Conglomerate of central mesenteric lymph nodes measures  approximately 15.5 x 6.9 cm (series 3 image 205), max SUV 7.  - Conglomerate of retroperitoneal lymph nodes measures approximately  7.5 x 4.7 cm (series 3  image 209), max SUV 8.4.  - 1.8 x 2.6 cm left inguinal lymph node (series 3 image 299), max SUV  7.6.  - Additional adenopathy is seen along the iliac chains in the  presacral fat.    Small ascites. The bowel is nondilated. No pneumatosis or portal  venous gas. No pneumoperitoneum.    LOWER EXTREMITIES:   No abnormal masses or hypermetabolic lesions.    BONES:   There are no suspicious lytic or blastic osseous lesions.  There is no  abnormal FDG uptake in the skeleton.      Impression    IMPRESSION:   1. In this patient with new diagnosis of mantle cell lymphoma without  a diagnostic CT:  a. Extensive lymphadenopathy and disease involvement in the neck,  chest, abdomen and pelvis as above.  b. 1.4 cm pulmonary nodule in the right middle lobe, max SUV 3.8.  c. Splenomegaly with diffuse FDG avidity, concerning for diffuse  splenic involvement of lymphoma.  2. Small bilateral pleural effusions.  3. Small ascites.    I have personally reviewed the examination and initial interpretation  and I agree with the findings.    SHARATH VALLADARES MD         ASSESSMENT AND PLAN     77-year-old male with past medical history significant for prostate cancer status post prostatectomy, coronary disease status post stent placement, to complete develop symptoms of abdominal bloating/discomfort and loss of appetite for 2 months. Underwent imaging workup was noted to have diffuse extensive hypermetabolic lymphadenopathy with biopsy of the lymph node showing findings consistent with mantle cell lymphoma. Bone marrow biopsy showed 40-50 % involvement by lymphoma with FISH revealing t(11:14).    - Mantle cell lymphoma  Stage IV B  Started on Bendamustine 90 mg/m2 day 1 and 2 in combination with Rituxan 375 mg/m2 q.4 weeks- Cycle 1 on 03/29/18.     Proceed with cycle 2 day 1  Plan is to proceed with 6 total cycles followed by repeat PET scan and have response achieved, consider proceeding with maintenance Rituxan.    - TLS   Continue with  allopurinol. Encouraged oral hydration    - Anemia/Thrombocytopenia  Indurated to recent chemotherapy versus marrow involvement by  lymphoma   Continue to monitor    -CAD/HTN/CHF  Patient is on Lasix, metoprolol and lisinopril  Low blood pressure today, advised to hold off on antihypertensives and discuss it with cardiology on appointment tomorrow     We'll obtain weekly labs and have him return to clinic in 2 weeks for follow-up to assess toxicity.    Chart documentation with Dragon Voice recognition Software. Although reviewed after completion, some words and grammatical errors may remain.  Erickson Adan MD  Attending Physician   Hematology/Medical Oncology    Again, thank you for allowing me to participate in the care of your patient.        Sincerely,        Erickson Adan MD

## 2018-04-30 NOTE — MR AVS SNAPSHOT
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)        Flatulence, eructation, and gas pain        Encounter for antineoplastic chemotherapy        Nausea        Drug-induced neutropenia (H)           Follow-ups after your visit        Your next 10 appointments already scheduled     May 01, 2018  1:00 PM CDT   Level 2 with BAY 1 INFUSION   [facility] ([facility]    [address] [place] [address]   [phone]            May 07, 2018 11:30 AM CDT   Return Visit with NURSE ONLY CANCER CENTER   [facility] ([facility])    [address] [place] [address]   [phone]            May 10, 2018 11:30 AM CDT   CORE Enrollment with Grisel Antunez NP   [facility] ([facility])    [address] [place] [address] 2nd Floor  [facility] [address]   [phone]            May 14, 2018  9:45 AM CDT   Return Visit with NURSE ONLY CANCER CENTER   [facility] ([facility])    [address] [place] [address]   [phone]            May 14, 2018 10:45 AM CDT   Return Visit with Erickson Adan MD   [facility])    [address] [place] [address]   [phone]            May 24, 2018 10:00 AM CDT   Level 1 with NL INFUSION CHAIR 1   [facility] ([facility])    [address] [place] [address] [place] [address]   [phone]            May 24, 2018 10:30 AM CDT   Return Visit with Erickson Adan MD   [facility]  [address] [address]   [phone]            May 29, 2018 12:30 PM CDT   Level 3 with BAY 2 INFUSION   [facility]    [address] [facility] [address] [phone]            May 30, 2018  1:00 PM CDT   Level 2 with BAY 8 INFUSION   [facility] ([facility])    [address] [facility] [address] [phone]              Future tests that were ordered for you today     Open Standing Orders        Priority Remaining Interval Expires Ordered    CBC with platelets differential Routine 2/2 q week 4/30/2019 4/30/2018          Open Future Orders        Priority Expected Expires Ordered    Comprehensive metabolic panel Routine 5/14/2018 4/30/2019 4/30/2018            Who to contact     If you have questions or need follow up information about today's clinic visit or your schedule please contact [facility] directly at [phone].  Normal or non-critical lab and imaging results will be communicated to you by M[employer]ix[employer], letter or phone within 4 business days after the clinic has received the results. If you do not hear from us within 7 days, please contact the clinic through [employer] or phone. If you have a critical or abnormal lab result, we will notify you by phone as soon as possible.  Submit refill requests through [employer] or call your pharmacy and they will forward the refill request to us. Please allow 3 business days for your refill to be completed.          Additional Information About Your Visit        Miiixhart Information     [employer] gives you secure access to your electronic health record. If you see a primary care provider, you can also send messages to your care team and make appointments. If you have questions, please call your primary care clinic.  If you do not have a primary care provider, please call [phone] and they will assist  "you.      [employer] is an electronic gateway that provides easy, online access to your medical records. With [employer], you can request a clinic appointment, read your test results, renew a prescription or communicate with your care team.     To access your existing account, please contact your [facility] or call [phone] for assistance.        Care EveryWhere ID     This is your Care EveryWhere ID. This could be used by other organizations to access your [facility] medical records  [id number]        Your Vitals Were     Pulse Temperature Height Pulse Oximetry BMI (Body Mass Index)       105 98.1  F (36.7  C) 1.651 m (5' 5\") 98% 26.63 kg/m2        Blood Pressure from Last 3 Encounters:   05/01/18 146/78   04/30/18 97/69   04/23/18 108/72    Weight from Last 3 Encounters:   05/01/18 73 kg (161 lb)   04/30/18 72.6 kg (160 lb)   04/23/18 72.6 kg (160 lb)                 Today's Medication Changes          These changes are accurate as of 4/30/18 11:59 PM.  If you have any questions, ask your nurse or doctor.               These medicines have changed or have updated prescriptions.        Dose/Directions    lisinopril 20 MG tablet   Commonly known as:  PRINIVIL/ZESTRIL   This may have changed:  when to take this   Used for:  Hypertension goal BP (blood pressure) < 130/80, Coronary artery disease involving native coronary artery of native heart without angina pectoris, Microalbuminuria        Dose:  20 mg   Take 1 tablet (20 mg) by mouth daily   Quantity:  90 tablet   Refills:  3       rosuvastatin 40 MG tablet   Commonly known as:  CRESTOR   This may have changed:  when to take this   Used for:  Hyperlipidemia LDL goal <100, Coronary artery disease involving native coronary artery of native heart without angina pectoris        Dose:  40 mg   Take 1 tablet (40 mg) by mouth daily   Quantity:  90 tablet   Refills:  3            Where to get your medicines      These medications were sent to " [facility] - [place], [place] - [address]  [address], [facility] [address]     Phone:  [phone]     ondansetron 8 MG tablet                Primary Care Provider Office Phone # Fax #    Jovitamarlonjoshua Alonzo [phone]       [address]  [facility] [address]        Equal Access to Services     NANDA BARAJAS : Hadii aad ku hadasho Soomaali, waaxda luqadaha, qaybta kaalmada adeegyada, waxay idiin hayaan adeeg kharash la'kaiden . So [facility] [phone].    ATENCIÓN: Si habla español, tiene a ivan disposición servicios gratuitos de asistencia lingüística. [facility] [phone].    We comply with applicable federal civil rights laws and [place] laws. We do not discriminate on the basis of race, color, national origin, age, disability, sex, sexual orientation, or gender identity.            Thank you!     Thank you for choosing [facility]  for your care. Our goal is always to provide you with excellent care. Hearing back from our patients is one way we can continue to improve our services. Please take a few minutes to complete the written survey that you may receive in the mail after your visit with us. Thank you!             Your Updated Medication List - Protect others around you: Learn how to safely use, store and throw away your medicines at www.disposemymeds.org.          This list is accurate as of 4/30/18 11:59 PM.  Always use your most recent med list.                   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)       potassium chloride SA 10 MEQ CR tablet    K-DUR/KLOR-CON M    30 tablet    Take 1 tablet (10 mEq) by mouth daily    Acute congestive heart failure, unspecified congestive heart failure type (H)       PREVACID PO      Take 20 mg by mouth every evening as needed Patient needs to use brand name Prevacid  (generic version causes diarrhea)        prochlorperazine 10 MG tablet    COMPAZINE    30 tablet    Take 1 tablet (10 mg) by mouth every 6 hours as needed for nausea or vomiting    Nausea       rosuvastatin 40 MG tablet    CRESTOR    90 tablet    Take 1 tablet (40 mg) by mouth daily    Hyperlipidemia LDL goal <100, Coronary artery disease involving native coronary artery of native heart without angina pectoris       TYLENOL 8 HOUR PO      Take 500 mg by mouth as needed

## 2018-04-30 NOTE — PROGRESS NOTES
FOLLOW-UP VISIT NOTE    PATIENT NAME: Francisco Javier Cortes MRN # [id number]  DATE OF VISIT: Apr 30, 2018 YOB: 1940    REFERRING PROVIDER: No referring provider defined for this encounter.    CANCER TYPE: Mantle cell lymphoma  STAGE: IVB  ECOG PS: 1    ONCOLOGY HISTORY:  77-year-old male with past medical history significant for prostate cancer status post prostatectomy, coronary disease status post stent placement, to complete develop symptoms of abdominal bloating/discomfort and loss of appetite for 2 months. Underwent imaging workup by primary care provider with CT abdominal and pelvis on 02/25/18 showing extensive intra- abdominal and inguinal lymphadenopathy with splenomegaly.    - 03/05/18 02 Seen by medical oncology. I ordered CT neck and CT chest which showed bulky mediastinal, hilar ,axillary and supraclavicular lymphadenopathy. Patient was referred to ENT for diagnostic biopsy. He underwent an FNA of the lymph node on 03/14 which came back with findings consistent with mantle cell lymphoma. 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 Proliferation index was estimated to be 50%. The marrow biopsy performed showed bone marrow involvement by mantle cell lymphoma 40-50%. Fish came back positive for translocation 11:14 consistent with mantle cell lymphoma. PET CT scan extensive hypermetabolic adenopathy of cervical, axillary, mediastinal, hilar, intra-abdominal, retroperitoneal inguinal but the largest myeloma rate of reason triglyceride was measuring 15.5 x 6.9 cm.    - 03/29/18  Started on bendamustine in combination with Rituxan along with allopurinol for tumor lysis syndrome prophylaxis.     - Hospitalized for TLS monitoring  - Hospitalized for sepsis from pneumonia  - Hospitalized for CHF 04/20 - 04/21        SUBJECTIVE     He is here for follow-up prior to cycle #2. Patient is accompanied in the  clinic today by her wife. He has noticed improvement in energy levels and the last one week. Denies fevers/chills, productive cough, worsening dyspnea, lower extremity edema. Has noticed low blood pressures at home along with dizziness on standing and has stopped taking one of his blood pressure medications. Patient is following up with cardiology tomorrow       PAST MEDICAL HISTORY     Past Medical History:   Diagnosis Date     CAD (coronary artery disease) 1/09    s/p stentsx2     Coronary artery disease involving native coronary artery of native heart without angina pectoris 12/22/2015     Dyslipidemia      Erectile dysfunction      GERD (gastroesophageal reflux disease)      Hearing problem One ear 1961     Hypertension      NSTEMI (non-ST elevated myocardial infarction) (H) 3/20/2014     Pneumonia      Prostate cancer (H)     prostatecomy 9 years ago, PSAs remain good     Status post percutaneous transluminal coronary angioplasty-Coronary angioplasty with STEPHEN to LCx 4/4/2014     Stented coronary artery     stents placed         CURRENT OUTPATIENT MEDICATIONS     Current Outpatient Prescriptions   Medication Sig Dispense Refill     Acetaminophen (TYLENOL 8 HOUR PO) Take 500 mg by mouth as needed        allopurinol (ZYLOPRIM) 300 MG tablet Take 1 tablet (300 mg) by mouth daily 30 tablet 1     Alum & Mag Hydroxide-Simeth (ALUMINUM-MAGNESIUM-SIMETHICONE) 200-200-20 MG/5ML SUSP Take 5 mLs by mouth daily as needed       ASPIRIN 81 MG OR TABS 1 TABLET EVERY MORNING       clindamycin (CLEOCIN T) 1 % lotion Apply twice daily for 6 weeks to the groin 60 mL 1     furosemide (LASIX) 20 MG tablet Take 1 tablet (20 mg) by mouth daily 30 tablet 0     HYDROcodone-acetaminophen (NORCO) 5-325 MG per tablet Take 1 tablet by mouth every 4 hours as needed       hydrocortisone valerate (WEST-NINOSKA) 0.2 % ointment Apply sparingly to affected area three times daily as needed. 45 g 3     Lansoprazole (PREVACID PO) Take 20 mg by mouth  every evening as needed Patient needs to use brand name Prevacid (generic version causes diarrhea)        lidocaine-prilocaine (EMLA) cream Apply 1 Application topically as needed       lisinopril (PRINIVIL/ZESTRIL) 20 MG tablet Take 1 tablet (20 mg) by mouth daily (Patient taking differently: Take 20 mg by mouth every morning ) 90 tablet 3     LORazepam (ATIVAN) 0.5 MG tablet Take 1 tablet (0.5 mg) by mouth every 4 hours as needed (Anxiety, Nausea/Vomiting or Sleep) 30 tablet 3     metoprolol succinate (TOPROL-XL) 100 MG 24 hr tablet Take 1 tablet (100 mg) by mouth daily 90 tablet 3     nitroGLYcerin (NITROSTAT) 0.4 MG sublingual tablet Place 1 tablet (0.4 mg) under the tongue every 5 minutes as needed up to 3 tablets per episode. 60 tablet 6     ondansetron (ZOFRAN) 8 MG tablet Take 1 tablet (8 mg) by mouth every 8 hours as needed (Nausea/Vomiting) 10 tablet 3     potassium chloride SA (K-DUR/KLOR-CON M) 10 MEQ CR tablet Take 1 tablet (10 mEq) by mouth daily 30 tablet 0     prochlorperazine (COMPAZINE) 10 MG tablet Take 1 tablet (10 mg) by mouth every 6 hours as needed for nausea or vomiting 30 tablet 1     rosuvastatin (CRESTOR) 40 MG tablet Take 1 tablet (40 mg) by mouth daily (Patient taking differently: Take 40 mg by mouth every morning ) 90 tablet 3        ALLERGIES     Allergies   Allergen Reactions     Niacin      Severe rash and itching     Prevacid [Lansoprazole] Diarrhea     Patient notes diarrhea with 30mg generic version. Patient does ok with 20mg in generic and brand name.     Shellfish Allergy      One kind        REVIEW OF SYSTEMS   As above in the HPI, o/w complete 12-point ROS was negative.     PHYSICAL EXAM   B/P: 108/72, T: 97.7, P: 95, R: 16  SpO2 Readings from Last 4 Encounters:   04/30/18 98%   04/23/18 97%   04/21/18 96%   04/15/18 95%     Wt Readings from Last 3 Encounters:   04/30/18 72.6 kg (160 lb)   04/23/18 72.6 kg (160 lb)   04/21/18 73.5 kg (162 lb 1.6 oz)     GEN:  NAD  EYES:PERRLA  Mouth/ENT: Oropharynx is clear.  NECK: cervical lymphadenopathy improved  LUNGS: clear bilaterally  CV: regular, no murmurs, rubs, or gallops  ABDOMEN: soft, non-tender, non-distended, normal bowel sounds  EXT: warm, well perfused, no edema  NEURO: alert  SKIN: no rashes     LABORATORY AND IMAGING STUDIES     Recent Labs   Lab Test  04/09/18   0740  04/05/18   0840   03/29/18 2005 03/20/14   1143   NA  141  141   < >  139   < >  140   POTASSIUM  4.0  4.2   < >  4.4   < >  4.6   CHLORIDE  107  108   < >  108   < >  105   CO2  25  25   < >  20   < >  23   ANIONGAP  9  8   < >  12   < >  12   BUN  15  16   < >  32*   < >  15   CR  1.04  1.13   < >  1.28*   < >  0.96   GLC  147*  123*   < >  167*   < >  104*   EVANGELINA  7.8*  8.0*   < >  7.3*   < >  9.4   MAG   --    --    --   1.9   --   2.3   PHOS  2.2*  3.0   < >  2.9   < >   --     < > = values in this interval not displayed.     Recent Labs   Lab Test  04/09/18   0740  04/05/18   0840  04/02/18   1151   WBC  5.8  7.9  12.1*   HGB  10.9*  11.3*  13.1*   PLT  94*  93*  86*   MCV  93  93  92   NEUTROPHIL  74.6  81.3  83.0     Recent Labs   Lab Test  04/09/18   0740  04/05/18   0840  04/02/18   1151   BILITOTAL  0.5  0.5  0.6   ALKPHOS  95  81  94   ALT  30  27  36   AST  64*  47*  69*   ALBUMIN  2.8*  2.6*  3.0*   LDH  476*  442*  638*     TSH   Date Value Ref Range Status   08/14/2017 2.06 0.40 - 4.00 mU/L Final       PATHOLOGY 03/22/18     FINAL DIAGNOSIS:   Lymph node, left level 5, excision:   - Mantle cell lymphoma, blastoid variant   - See comment    INTERPRETATION:   Lymph Node, Left level 5:        CD5-positive kappa monotypic B cells (94%), see comment        No aberrant immunophenotype on T cells     COMMENT:   The monotypic B-cell population present in this specimen has a similar   immunophenotype as reported previously in the supraclavicular mass from this patient ([id number] and [id number]) and is compatible with involvement by the  previously diagnosed mantle cell lymphoma. Large cells may not survive specimen processing. Morphologic correlation is required ([id number]).      FNA 03/14/18    CYTOLOGIC INTERPRETATION:     Left neck, supraclavicular mass, fine needle aspirate: Positive for   malignant cells; involved by mantle cell   lymphoma (see comment)      BM biopsy 03/21/18    TEST(S):   Unilateral Bone Marrow Biopsy/Aspiration     FINAL DIAGNOSIS:      - Involvement by mantle cell lymphoma (40-50%) see comment     - Hypercellular marrow for age with a cellularity of 60-70% with   trilineage hematopoiesis, atypical lymphoid aggregates comprising approximately 40 to 50% of marrow cellular composition     - Peripheral blood showing monocytosis, a small subset of atypical   lymphocytes and moderate thrombocytopenia with normal platelet morphology     FISH  RESULTS:  ABNORMAL               - IGH-CCND1 fusion (40.5%)     INTERPRETATION:   Of the interphase cells examined, 40.5% had an IGH-CCND1 gene fusion as   would result from a t(11;14). These findings are consistent with the reported pathologic diagnosis of bone marrow involvement by mantle cell   lymphoma, characterized in the lymph node specimen (see Previous Studies   below) by IGH-CCND1 gene fusion.    Results for orders placed or performed during the hospital encounter of 03/24/18   PET Oncology Whole Body    Narrative    Combined Report of:    PET and CT on  3/26/2018 7:56 AM :    1. PET of the neck, chest, abdomen, and pelvis.  2. PET CT Fusion for Attenuation Correction and Anatomical  Localization:    3. 3D MIP and PET-CT fused images were processed on an independent  workstation and archived to PACS and reviewed by a radiologist.    Technique:    1. PET: The patient received 14.65 mCi of F-18-FDG; the serum glucose  was 86 prior to administration, body weight was 73.5 kg. Images were  evaluated in the axial, sagittal, and coronal planes as well as the  rotational whole body MIP.  Images were acquired from the Vertex to the  Feet.    UPTAKE WAS MEASURED AT 66 MINUTES.     BACKGROUND:  Liver SUV max= 2.7,   Aorta Blood SUV Max: 2.     2. CT: CT only obtained for attenuation correction and not diagnostic  purposes.    INDICATION: Lymphoma, mantle cell, multiple sites (H)    ADDITIONAL INFORMATION OBTAINED FROM EMR: 77-year-old male with  history of prostate cancer, status post prostatectomy, now with new  diagnosis of mantle cell lymphoma.    COMPARISON: CTs dated 3/5/2018, 6/29/2015, 1/5/2015    FINDINGS:     HEAD/NECK:  Extensive hypermetabolic adenopathy at each level of the cervical  chain. The largest measures 3.3 x 3.8 cm, left supraclavicular level,  (series 3 image 94), max SUV 10.4.    CHEST:  Extensive axillary, mediastinal and hilar adenopathy. For example:  - 7.8 x 3.2 cm carinal lymph node (series 3 image 142), max SUV 9.2.  - 2.3 x 3.7 cm left axillary lymph node (series 3 image 112), max SUV  8.7.    1.4 cm nodule in the right middle lobe (series 3 image 142), max SUV  3.8. Additional smaller nodules are better characterized on dedicated  CT of the chest dated 3/5/2018.    The heart is not enlarged. There is no significant pericardial  effusion. Dense coronary calcifications. Small bilateral pleural  effusions.    ABDOMEN AND PELVIS:  The spleen is enlarged measuring up to 18 cm with increased FDG  avidity, max SUV 7.     Extensive intra-abdominal, retroperitoneal and inguinal adenopathy.  For example:  - Conglomerate of central mesenteric lymph nodes measures  approximately 15.5 x 6.9 cm (series 3 image 205), max SUV 7.  - Conglomerate of retroperitoneal lymph nodes measures approximately  7.5 x 4.7 cm (series 3 image 209), max SUV 8.4.  - 1.8 x 2.6 cm left inguinal lymph node (series 3 image 299), max SUV  7.6.  - Additional adenopathy is seen along the iliac chains in the  presacral fat.    Small ascites. The bowel is nondilated. No pneumatosis or portal  venous gas. No  pneumoperitoneum.    LOWER EXTREMITIES:   No abnormal masses or hypermetabolic lesions.    BONES:   There are no suspicious lytic or blastic osseous lesions.  There is no  abnormal FDG uptake in the skeleton.      Impression    IMPRESSION:   1. In this patient with new diagnosis of mantle cell lymphoma without  a diagnostic CT:  a. Extensive lymphadenopathy and disease involvement in the neck,  chest, abdomen and pelvis as above.  b. 1.4 cm pulmonary nodule in the right middle lobe, max SUV 3.8.  c. Splenomegaly with diffuse FDG avidity, concerning for diffuse  splenic involvement of lymphoma.  2. Small bilateral pleural effusions.  3. Small ascites.    I have personally reviewed the examination and initial interpretation  and I agree with the findings.    SHARATH VALLADARES MD         ASSESSMENT AND PLAN     77-year-old male with past medical history significant for prostate cancer status post prostatectomy, coronary disease status post stent placement, to complete develop symptoms of abdominal bloating/discomfort and loss of appetite for 2 months. Underwent imaging workup was noted to have diffuse extensive hypermetabolic lymphadenopathy with biopsy of the lymph node showing findings consistent with mantle cell lymphoma. Bone marrow biopsy showed 40-50 % involvement by lymphoma with FISH revealing t(11:14).    - Mantle cell lymphoma  Stage IV B  Started on Bendamustine 90 mg/m2 day 1 and 2 in combination with Rituxan 375 mg/m2 q.4 weeks- Cycle 1 on 03/29/18.     Proceed with cycle 2 day 1  Plan is to proceed with 6 total cycles followed by repeat PET scan and have response achieved, consider proceeding with maintenance Rituxan.    - TLS   Continue with allopurinol. Encouraged oral hydration    - Anemia/Thrombocytopenia  Indurated to recent chemotherapy versus marrow involvement by  lymphoma   Continue to monitor    -CAD/HTN/CHF  Patient is on Lasix, metoprolol and lisinopril  Low blood pressure today, advised to hold  off on antihypertensives and discuss it with cardiology on appointment tomorrow     We'll obtain weekly labs and have him return to clinic in 2 weeks for follow-up to assess toxicity.    Chart documentation with Dragon Voice recognition Software. Although reviewed after completion, some words and grammatical errors may remain.  Erickson Adan MD  Attending Physician   Hematology/Medical Oncology

## 2018-04-30 NOTE — MR AVS SNAPSHOT
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)        Flatulence, eructation, and gas pain           Follow-ups after your visit        Your next 10 appointments already scheduled     Apr 30, 2018  9:30 AM CDT   Level 6 with BAY 4 INFUSION   [facility]    [address] [place] [id number] [phone]            May 01, 2018  8:00 AM CDT   Return Visit with Jad Ball MD   [facility])    [address] [place] [id number] [phone]            May 01, 2018  1:00 PM CDT   Level 2 with BAY 1 INFUSION   [facility])    [address] [place] [address] [id number] [phone]              Who to contact     If you have questions or need follow up information about today's clinic visit or your schedule please contact [facility] directly at [phone].  Normal or non-critical lab and imaging results will be communicated to you by MyChart, letter or phone within 4 business days after the clinic has received the results. If you do not hear from us within 7 days, please contact the clinic through MyChart or phone. If you have a critical or abnormal lab result, we will notify you by phone as soon as possible.  Submit refill requests through [employer] or call your pharmacy and they will forward the refill request to us. Please allow 3 business days for your refill to be completed.          Additional Information  About Your Visit        [employer] Information     [employer] gives you secure access to your electronic health record. If you see a primary care provider, you can also send messages to your care team and make appointments. If you have questions, please call your primary care clinic.  If you do not have a primary care provider, please call [phone] and they will assist you.      [employer] is an electronic gateway that provides easy, online access to your medical records. With [employer], you can request a clinic appointment, read your test results, renew a prescription or communicate with your care team.     To access your existing account, please contact your [facility] or call [phone] for assistance.        Care EveryWhere ID     This is your Care EveryWhere ID. This could be used by other organizations to access your [facility] medical records  [id number]         Blood Pressure from Last 3 Encounters:   04/30/18 97/69   04/23/18 108/72   04/21/18 129/65    Weight from Last 3 Encounters:   04/30/18 72.6 kg (160 lb)   04/23/18 72.6 kg (160 lb)   04/21/18 73.5 kg (162 lb 1.6 oz)              We Performed the Following     CBC with platelets differential     Comprehensive metabolic panel     Lactate Dehydrogenase     Phosphorus     Uric acid          Today's Medication Changes          These changes are accurate as of 4/30/18  8:52 AM.  If you have any questions, ask your nurse or doctor.               These medicines have changed or have updated prescriptions.        Dose/Directions    lisinopril 20 MG tablet   Commonly known as:  PRINIVIL/ZESTRIL   This may have changed:  when to take this   Used for:  Hypertension goal BP (blood pressure) < 130/80, Coronary artery disease involving native coronary artery of native heart without angina pectoris, Microalbuminuria        Dose:  20 mg   Take 1 tablet (20 mg) by mouth daily   Quantity:  90 tablet   Refills:  3       rosuvastatin 40 MG  tablet   Commonly known as:  CRESTOR   This may have changed:  when to take this   Used for:  Hyperlipidemia LDL goal <100, Coronary artery disease involving native coronary artery of native heart without angina pectoris        Dose:  40 mg   Take 1 tablet (40 mg) by mouth daily   Quantity:  90 tablet   Refills:  3                Primary Care Provider Office Phone # Fax #    Florian Alonzo [phone] [address]  [facility] [address]        Equal Access to Services     NANDA BARAJAS : Hadii aad ku hadasho Soomaali, waaxda luqadaha, qaybta kaalmada adeegyada, waxay idiin hayaan adeeg kharash la'kaiden . [facility] [phone].    ATENCIÓN: Si habla español, tiene a ivan disposición servicios gratuitos de asistencia lingüística. [facility] [phone].    We comply with applicable federal civil rights laws and [place] laws. We do not discriminate on the basis of race, color, national origin, age, disability, sex, sexual orientation, or gender identity.            Thank you!     Thank you for choosing [facility]  for your care. Our goal is always to provide you with excellent care. Hearing back from our patients is one way we can continue to improve our services. Please take a few minutes to complete the written survey that you may receive in the mail after your visit with us. Thank you!             Your Updated Medication List - Protect others around you: Learn how to safely use, store and throw away your medicines at www.disposemymeds.org.          This list is accurate as of 4/30/18  8:52 AM.  Always use your most recent med list.                   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)       Aluminum-Magnesium-Simethicone 200-200-20 MG/5ML Susp      Take 5 mLs by mouth daily as needed        aspirin 81 MG  tablet      1 TABLET EVERY MORNING        clindamycin 1 % lotion    CLEOCIN T    60 mL    Apply twice daily for 6 weeks to the groin    Rash       furosemide 20 MG tablet    LASIX    30 tablet    Take 1 tablet (20 mg) by mouth daily    Acute congestive heart failure, unspecified congestive heart failure type (H)       HYDROcodone-acetaminophen 5-325 MG per tablet    NORCO     Take 1 tablet by mouth every 4 hours as needed        hydrocortisone valerate 0.2 % ointment    WEST-NINOSKA    45 g    Apply sparingly to affected area three times daily as needed.    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)        potassium chloride SA 10 MEQ CR tablet    K-DUR/KLOR-CON M    30 tablet    Take 1 tablet (10 mEq) by mouth daily    Acute congestive heart failure, unspecified congestive heart failure type (H)       PREVACID PO      Take 20 mg by mouth every evening as needed Patient needs to use brand name Prevacid (generic version causes diarrhea)        prochlorperazine 10 MG tablet    COMPAZINE    30 tablet    Take 1 tablet (10 mg) by mouth every 6 hours as needed for nausea or vomiting    Nausea       rosuvastatin 40 MG tablet    CRESTOR    90 tablet    Take 1 tablet (40 mg) by mouth daily    Hyperlipidemia LDL goal <100, Coronary artery disease involving native coronary artery of native heart without angina pectoris       TYLENOL 8 HOUR PO      Take 500 mg by mouth as needed

## 2018-04-30 NOTE — PROGRESS NOTES
Port needle left accessed for treatment. Tolerated lab draw without complaint. [place] drawn-Red/Green/Purple tubes. Double signed by patient and RN. See documentation flowsheet. Nieves Estrada, RN, BSN, OCN

## 2018-04-30 NOTE — PROGRESS NOTES
Infusion Nursing Note:  Francisco Javier Cortes presents today for rituxan .    Patient seen by provider today: Yes: Dr Adan   present during visit today: Not Applicable.    Note: N/A.    Intravenous Access:  Implanted Port.    Treatment Conditions:  Lab Results   Component Value Date    HGB 9.4 04/30/2018     Lab Results   Component Value Date    WBC 5.4 04/30/2018      Lab Results   Component Value Date    ANEU 3.1 04/30/2018     Lab Results   Component Value Date     04/30/2018      Lab Results   Component Value Date     04/30/2018                   Lab Results   Component Value Date    POTASSIUM 3.9 04/30/2018           Lab Results   Component Value Date    MAG 2.1 04/11/2018            Lab Results   Component Value Date    CR 0.89 04/30/2018                   Lab Results   Component Value Date    EVANGELINA 8.4 04/30/2018                Lab Results   Component Value Date    BILITOTAL 0.4 04/30/2018           Lab Results   Component Value Date    ALBUMIN 2.7 04/30/2018                    Lab Results   Component Value Date    ALT 43 04/30/2018           Lab Results   Component Value Date    AST 44 04/30/2018       Results reviewed, labs MET treatment parameters, ok to proceed with treatment.      Post Infusion Assessment:  Patient tolerated infusion without incident.  Site patent and intact, free from redness, edema or discomfort.  No evidence of extravasations.  Access discontinued per protocol.    Discharge Plan:   Patient and/or family verbalized understanding of discharge instructions and all questions answered.    Sheri Webb RN

## 2018-04-30 NOTE — NURSING NOTE
"Oncology Rooming Note    April 30, 2018 8:39 AM   Francisco Javier Cortes is a 77 year old male who presents for:    Chief Complaint   Patient presents with     Oncology Clinic Visit     follow up prior to treatment     Initial Vitals: BP 97/69  Pulse 105  Temp 98.1  F (36.7  C)  Ht 1.651 m (5' 5\")  Wt 72.6 kg (160 lb)  SpO2 98%  BMI 26.63 kg/m2 Estimated body mass index is 26.63 kg/(m^2) as calculated from the following:    Height as of this encounter: 1.651 m (5' 5\").    Weight as of this encounter: 72.6 kg (160 lb). Body surface area is 1.82 meters squared.  No Pain (0) Comment: Data Unavailable   No LMP for male patient.  Allergies reviewed: Yes  Medications reviewed: Yes    Medications: MEDICATION REFILLS NEEDED TODAY. Provider was notified.  Pharmacy name entered into [facility]:    [facility] [place] - [place], [place] - [address], [address]  [facility] - [facility] [place] - [address] [id number] - [place] - [address] [facility] [place], [place] [facility]        5 minutes for nursing intake (face to face time)     Kathleen Sandoval LPN              "

## 2018-05-01 NOTE — MR AVS SNAPSHOT
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)        Flatulence, eructation, and gas pain           Follow-ups after your visit        Your next 10 appointments already scheduled     May 07, 2018 11:30 AM CDT   [facility]    [address] [place] [address]   [phone]            May 14, 2018  9:45 AM CDT   [facility]   [facility]    [address] [place] [address]   [phone]            May 14, 2018 10:45 AM CDT   Return Visit with Erickson Adan MD   [facility])    [address] [place] [address]   [phone]            May 24, 2018 10:00 AM CDT   Level 1 with NL INFUSION CHAIR 1   [facility] ([facility])    [address] [place] [address]  [place] [address]   [phone]            May 24, 2018 10:30 AM CDT   Return Visit with Erickson Adan MD   [facility] ([facility])    [address] [place] [address]   [phone]            May 29, 2018 12:30 PM CDT   Level 3 with BAY 2 INFUSION   [facility]    [address] [place] [address]   [phone]            May 30, 2018  1:00 PM CDT   Level 2 with BAY 8 INFUSION   [facility]    [address] [place] [address]  [facility] [address]   [phone]              Future tests that were ordered for you today     Open Standing Orders        Priority Remaining Interval Expires Ordered    CBC with platelets differential Routine 2/2 q week 4/30/2019 4/30/2018          Open Future Orders        Priority Expected Expires Ordered    Comprehensive metabolic panel Routine 5/14/2018 4/30/2019 4/30/2018            Who to contact     If you have questions or need follow up information about today's clinic visit or your schedule please contact [facility] directly at [phone].  Normal or non-critical lab and imaging results will be communicated to you by Meetricshart, letter or phone within 4 business days after the clinic has received the results. If you do not hear from us within 7 days, please contact the clinic through Constant Insightt or phone. If you have a critical or abnormal lab result, we will notify you by phone as soon as possible.  Submit refill requests through [employer] or call your pharmacy and they will forward the refill request to us. Please allow 3 business days for your refill to be completed.          Additional Information About Your Visit        MeetricsharNodePing Information     [employer] gives you secure access to your electronic health record. If you see a primary care provider, you can also send messages to your care team and make appointments. If you have questions, please call your primary care clinic.  If you do not have a primary care provider, please call [phone] and they will assist you.      [employer] is an electronic gateway that provides easy, online access to your medical records. With [employer], you can request a clinic appointment, read your test results, renew a prescription or communicate with your care team.     To access your existing account, please contact your [facility] or call [phone] for assistance.        Care EveryWhere ID     This is your Care EveryWhere ID.  This could be used by other organizations to access your [facility] medical records  [id number]        Your Vitals Were     Pulse Temperature Respirations Pulse Oximetry          96 98.8  F (37.1  C) (Oral) 20 96%         Blood Pressure from Last 3 Encounters:   05/01/18 120/76   05/01/18 146/78   04/30/18 97/69    Weight from Last 3 Encounters:   05/01/18 73 kg (161 lb)   04/30/18 72.6 kg (160 lb)   04/23/18 72.6 kg (160 lb)              Today, you had the following     No orders found for display         Today's Medication Changes          These changes are accurate as of 5/1/18  2:33 PM.  If you have any questions, ask your nurse or doctor.               Start taking these medicines.        Dose/Directions    COMPRESSION STOCKINGS   Used for:  Acute congestive heart failure, unspecified congestive heart failure type (H)   Started by:  Jad Ball MD        Dose:  1 each   1 each continuous prn Bilateral knee high compression stockings   Quantity:  2 each   Refills:  0         These medicines have changed or have updated prescriptions.        Dose/Directions    rosuvastatin 40 MG tablet   Commonly known as:  CRESTOR   This may have changed:  when to take this   Used for:  Hyperlipidemia LDL goal <100, Coronary artery disease involving native coronary artery of native heart without angina pectoris        Dose:  40 mg   Take 1 tablet (40 mg) by mouth daily   Quantity:  90 tablet   Refills:  3            Where to get your medicines      These medications were sent to [facility] [place], [place] - [address], [address]  [address], [address], [facility] [address]     Phone:  [phone]     COMPRESSION STOCKINGS    furosemide 20 MG tablet                Primary Care Provider Office Phone # Fax #    Florian Alonzo [phone]       [address]  [facility] [address]        Equal Access to Services     NANDA BARAJAS : Alondra Cherry,  waaxda luqadaha, qaybta kaalmada bryce, zhao salazaraajay ah. So [facility] [phone].    ATENCIÓN: Si santino flores, tiene a ivan disposición servicios gratuitos de asistencia lingüística. Micaela al [phone].    We comply with applicable federal civil rights laws and [place] laws. We do not discriminate on the basis of race, color, national origin, age, disability, sex, sexual orientation, or gender identity.            Thank you!     Thank you for choosing [facility]  for your care. Our goal is always to provide you with excellent care. Hearing back from our patients is one way we can continue to improve our services. Please take a few minutes to complete the written survey that you may receive in the mail after your visit with us. Thank you!             Your Updated Medication List - Protect others around you: Learn how to safely use, store and throw away your medicines at www.disposemymeds.org.          This list is accurate as of 5/1/18  2:33 PM.  Always use your most recent med list.                   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)       Aluminum-Magnesium-Simethicone 200-200-20 MG/5ML Susp      Take 5 mLs by mouth daily as needed        aspirin 81 MG tablet      1 TABLET EVERY MORNING        clindamycin 1 % lotion    CLEOCIN T    60 mL    Apply twice daily for 6 weeks to the groin    Rash       COMPRESSION STOCKINGS     2 each    1 each continuous prn Bilateral knee high compression stockings    Acute congestive heart failure, unspecified congestive heart failure type (H)       furosemide 20 MG tablet    LASIX    60 tablet    Take 1 tablet (20 mg) by mouth daily    Acute congestive heart failure, unspecified congestive heart failure type (H)       HYDROcodone-acetaminophen 5-325 MG per tablet     NORCO     Take 1 tablet by mouth every 4 hours as needed        hydrocortisone valerate 0.2 % ointment    WEST-NINOSKA    45 g    Apply sparingly to affected area three times daily as needed.    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)       potassium chloride SA 10 MEQ CR tablet    K-DUR/KLOR-CON M    30 tablet    Take 1 tablet (10 mEq) by mouth daily    Acute congestive heart failure, unspecified congestive heart failure type (H)       PREVACID PO      Take 20 mg by mouth every evening as needed Patient needs to use brand name Prevacid (generic version causes diarrhea)        prochlorperazine 10 MG tablet     COMPAZINE    30 tablet    Take 1 tablet (10 mg) by mouth every 6 hours as needed for nausea or vomiting    Nausea       rosuvastatin 40 MG tablet    CRESTOR    90 tablet    Take 1 tablet (40 mg) by mouth daily    Hyperlipidemia LDL goal <100, Coronary artery disease involving native coronary artery of native heart without angina pectoris       TYLENOL 8 HOUR PO      Take 500 mg by mouth as needed

## 2018-05-01 NOTE — PROGRESS NOTES
Infusion Nursing Note:  Francisco Javier Cortes presents today for D2 Bendamustine.    Patient seen by provider today: No   present during visit today: Not Applicable.    Note: ONPRO  Was placed on patient's: right side of abdomen.    Patient education included: what patient can expect after application, what colored lights mean on the device, when to remove device, when and where to call with questions or issues, all patients questions answered and that Neulasta administration will occur at 445pm 5/2/18.    Patient tolerated administration well.  .    Intravenous Access:  Implanted Port.    Treatment Conditions:  Not Applicable.      Post Infusion Assessment:  Patient tolerated infusion without incident.  Blood return noted pre and post infusion.  Access discontinued per protocol.    Discharge Plan:   Patient will return 5/29/18 for next chemo appointment.   Patient discharged in stable condition accompanied by: self and wife.  Departure Mode: Ambulatory.    Teresita Adams RN

## 2018-05-01 NOTE — TELEPHONE ENCOUNTER
Patient stated he is very overwhelmed with appointments, care takers coming to his home(on Mon/Thur) and having to travel to so many places. Wife stated to me that we need to coordinate the patient's appointments better so he isn't traveling to so many clinics, and they absolutely refuse to go anywhere near the [facility]. Patient may reschedule or cancel the CORE appointment with Grisel on 5/10/18 at 1130, in [place]. He will have his labs drawn on 5/7/18, he will have Onc labs drawn at the same time, to eliminate additional appointments or draws.     I did give the patient my name and phone number just in case he would need to reschedule the appointment.    Kavitha ALVAREZ [facility] [place]~Specialty/Med Surg   [phone]

## 2018-05-01 NOTE — NURSING NOTE
"Francisco Javier Cortes's goals for this visit include:   Chief Complaint   Patient presents with     RECHECK     Follow up       He requests these members of his care team be copied on today's visit information: PCP    PCP: Florian Alonzo    Referring Provider:  No referring provider defined for this encounter.    Chief Complaint   Patient presents with     RECHECK     Follow up       Initial /78 (BP Location: Left arm, Patient Position: Chair, Cuff Size: Adult Regular)  Pulse 74  Wt 73 kg (161 lb)  SpO2 97%  BMI 26.79 kg/m2 Estimated body mass index is 26.79 kg/(m^2) as calculated from the following:    Height as of 4/30/18: 1.651 m (5' 5\").    Weight as of this encounter: 73 kg (161 lb).  Medication Reconciliation: complete         Medication Refills: none        Michelle Mckeon CMA        "

## 2018-05-01 NOTE — PROGRESS NOTES
May 1, 2018      Florian Alonzo MD   [address] [address]      Dear Dr. Alonzo:      It was a pleasure participating in the care of your patient, Mr. Francisco Javier Cortes.  As you know, he is a 77-year-old gentleman who I see today after some recent hospitalizations for CHF.      His past medical history is significant for the followin.  Prediabetes.   2.  Hypertension.   3.  Hyperlipidemia.   4.  Gastroesophageal reflux disease.   5.  Chronic diarrhea.   6.  Prostate cancer, status post prostatectomy.   7.  Basal cell carcinoma of the neck.   8.  Erectile dysfunction.   9.  Peptic ulcer disease.    10.  Cataract surgery.   11.  Appendectomy.   12.  Mantle cell lymphoma, currently undergoing chemotherapy.      His cardiac history is significant for known coronary disease and normal LV systolic function.  He originally experienced a single episode of chest discomfort manifested by tingling in the middle of his chest with pressure at rest.  He had stenting of his right coronary artery and posterolateral branch on 2009.  His symptoms were completely relieved; however, recurrent chest discomfort while driving in  led to an angiogram 2014 that revealed the followin.  Left main minimal disease.   2.  LAD, 30% to 40% distal stenosis.   3.  Circumflex 90% stenosis in the mid-portion.   4.  RCA patent stents with mild disease.   5.  Drug-eluting stent was placed in the circumflex coronary.      Symptoms were relieved and he has not had recurrent symptoms.      Since our last visit 2017, he has been treated for mantle cell lymphoma.  His first IV infusion was accompanied by significant side effects and prominent infusion of fluid was performed.  He then got pneumonia which hospitalized him for 5 days and he had more IV fluids.  He gained about 20 pounds and had bilateral pleural effusions with prominent swelling in ankles and feet,  accompanied by shortness of breath.  He was hospitalized in late April and diuresed for 20 pounds, which helped relieve his symptoms.  He feels better now, but he is still weak and recovering.  He is currently undergoing his second infusion of 6 of IV chemotherapy.        He denies chest pain.  His shortness of breath has resolved.  He denies dyspnea on exertion.  He denies PND or orthopnea.  He does still have some lower extremity edema.  He denies other palpitations, syncope or near-syncope.      In terms of his present medications, he is takin.  Allopurinol.    2.  Aspirin 81 mg a day.   3.  Lasix 20 mg a day.   4.  Potassium 10 mEq a day.   5.  Rosuvastatin 40 mg a day.     6.  He has holding lisinopril for now.      PHYSICAL EXAMINATION:     VITAL SIGNS:  His blood pressure here is 140/80.  At home, it runs in the 106/70 range.  Pulse is 90 and regular.  Weight is 161 pounds.   NECK:  Exam reveals a normal jugular venous pressure, no significant hepatojugular reflux is identified.   LUNGS:  Clear to auscultation.  Respiratory effort is normal.   CARDIAC:  Reveals a regular rate and rhythm.  No obvious murmur or gallop appreciated.   ABDOMEN:  Belly soft, nontender.   EXTREMITIES:  Significant for +2-3 pitting edema.      Echocardiogram 2018 reveals an ejection fraction of 65% to 70% with significantly elevated pulmonary pressures.      LABORATORY DATA:  On 2018, potassium 3.9, GFR normal.        IMPRESSION:      Francisco Javier is a 77-year-old gentleman with several active cardiac issues:     1.  Recent episodes of fluid overload secondary to iatrogenic IV fluid overhydration.      The patient was infused with copious amounts of IV fluid to clear toxins from initial chemotherapy and also from antibiotics for pneumonia and hospitalization.  However, with aggressive diuresis, he has lost 20 pounds and the swelling in his feet and ankles have improved, as well as the shortness of breath.  His fluid  status today does not appear grossly overloaded; however, he still has some residual lower extremity edema.  Further measures will be pursued.     2.  Coronary artery disease.      He had stenting of his right coronary artery and RODGER branch in  and drug-eluting stent placement in the circumflex in .  He has not had recurrent symptoms since  and will continue to monitor his current progress.     3.  Hypertension.      He is actually somewhat hypotensive as of late and his lisinopril had to be discontinued due to low blood pressures.  Will continue to monitor.        PLAN:     1.  Compression stockings to help with lower extremity edema.     2.  Continue Lasix 20 mg a day and he will keep track of his daily weights and call for possible Lasix titration if his weight should go above 165 pounds.     3.  CORE clinic consultation to monitor fluid status during his 6 month course of chemotherapy and to help prevent further episodes of CHF during this time.      Once again, it was a pleasure participating in the care of your patient, Mr. Francisco Javier Saleh.  Please feel free to contact me at any time if there any questions regarding his care in the future.         Sincerely,      DICK ACEVEDO MD             D: 2018   T: 2018   MT: ELA      Name:     FRANCISCO JAVIER SALEH   MRN:      [id number]        Account:      [id number]   :      1940      Document: [id number]       cc: Florian Alonzo MD

## 2018-05-01 NOTE — MR AVS SNAPSHOT
After Visit Summary   5/1/2018    Francisco Javier Cortes    MRN: [id number]           Patient Information     Date Of Birth          1940        Visit Information        Provider Department      5/1/2018 8:00 AM Jad Ball MD [facility]        Today's Diagnoses     Acute congestive heart failure, unspecified congestive heart failure type (H)          Care Instructions      The following is a summary of your office visit:    Medications started today: none    Medications stopped today: none    Medication dose change: none    Nurse contact information: Jennifer Soto RN  Cardiology Care Coordinator  [phone] Phone  [phone] Fax    Appointments made today: Schedule with CORE clinic in about 2-3 weeks    Patient instructions: Follow up will be determined by CORE clinic. Go to pharmacy today to be fitted for compression stoc    If you have had any blood work, imaging or other testing completed we will be in touch within 1-2 weeks regarding the results. If you have any questions, concerns or need to schedule a follow up, please contact us at [phone]. If you are needing refills please contact your pharmacy. For urgent after hour care please call the [facility] at [phone] or the [facility] at [phone] and ask to speak to the cardiologist on call.    It was a pleasure meeting with you today. Please let us know if there is anything else we can do for you so that we can be sure you are leaving completely satisfied with your care experience.     Your Cardiology Team at [facility]  RN Care Coordinator: Jennifer Martinez                    Follow-ups after your visit        Your next 10 appointments already scheduled     May 01, 2018  1:00 PM CDT   Level 2 with [address] ([facility])    [address] [place] [address] [phone]            May  07, 2018 11:30 AM CDT   [facility]    [address] [facility] [address]   [phone]            May 14, 2018  9:45 AM CDT   Return Visit with [facility]   [facility]    [address] [facility] [address]   [phone]            May 14, 2018 10:45 AM CDT   [facility] with Erickson Adan [facility]   [facility]    [address] [facility] [address]   [phone]            May 24, 2018 10:00 AM CDT   Level 1 with NL INFUSION CHAIR 1   [facility] ([facility])    [address] [place] [address]  [place] [address]   [phone]            May 24, 2018 10:30 AM CDT   Return Visit with Erickson Adan MD   [facility]    [address] [place] [address]   [phone]            May 29, 2018 12:30 PM CDT   Level 3 with BAY 2 INFUSION   [facility]    [address] [facility] [address] [phone]            May 30, 2018  1:00 PM CDT   Level 2 with BAY 8 INFUSION   [facility]    [address] [facility] [address]   [phone]              Future tests that were ordered for you today     Open Standing Orders        Priority Remaining Interval Expires Ordered    CBC with platelets differential Routine 2/2 q week 4/30/2019 4/30/2018          Open Future Orders        Priority Expected Expires Ordered    Comprehensive metabolic panel Routine 5/14/2018 4/30/2019 4/30/2018            Who to contact     If you have questions or need follow up information about today's clinic visit or your schedule please contact [facility] directly at [phone].  Normal or  non-critical lab and imaging results will be communicated to you by [email or web address], letter or phone within 4 business days after the clinic has received the results. If you do not hear from us within 7 days, please contact the clinic through BookThatDoc or phone. If you have a critical or abnormal lab result, we will notify you by phone as soon as possible.  Submit refill requests through BookThatDoc or call your pharmacy and they will forward the refill request to us. Please allow 3 business days for your refill to be completed.          Additional Information About Your Visit        BookThatDoc Information     BookThatDoc gives you secure access to your electronic health record. If you see a primary care provider, you can also send messages to your care team and make appointments. If you have questions, please call your primary care clinic.  If you do not have a primary care provider, please call [phone] and they will assist you.      BookThatDoc is an electronic gateway that provides easy, online access to your medical records. With BookThatDoc, you can request a clinic appointment, read your test results, renew a prescription or communicate with your care team.     To access your existing account, please contact your [facility] or call [phone] for assistance.        Care EveryWhere ID     This is your Care EveryWhere ID. This could be used by other organizations to access your [facility] medical records  [id number]        Your Vitals Were     Pulse Pulse Oximetry BMI (Body Mass Index)             74 97% 26.79 kg/m2          Blood Pressure from Last 3 Encounters:   05/01/18 146/78   04/30/18 97/69   04/23/18 108/72    Weight from Last 3 Encounters:   05/01/18 73 kg (161 lb)   04/30/18 72.6 kg (160 lb)   04/23/18 72.6 kg (160 lb)              We Performed the Following     Compression stockings          Today's Medication Changes          These changes are accurate as of 5/1/18  8:51 AM.  If you have any  questions, ask your nurse or doctor.               These medicines have changed or have updated prescriptions.        Dose/Directions    rosuvastatin 40 MG tablet   Commonly known as:  CRESTOR   This may have changed:  when to take this   Used for:  Hyperlipidemia LDL goal <100, Coronary artery disease involving native coronary artery of native heart without angina pectoris        Dose:  40 mg   Take 1 tablet (40 mg) by mouth daily   Quantity:  90 tablet   Refills:  3            Where to get your medicines      These medications were sent to [facility] - [place], [place] - [address]  [address], [facility] [address]     Phone:  [phone]     furosemide 20 MG tablet                Primary Care Provider Office Phone # Fax #    Florian Alonzo [phone]       [address]  [facility] [address]        Equal Access to Services     [facility]: Hadii rivera collazo hadasho Soomaali, waaxda luqadaha, qaybta kaalmada adeegyada, zhao romeroin haykaiden bazan . So [facility] [phone].    ATENCIÓN: Si habla español, tiene a ivan disposición servicios gratuitos de asistencia lingüística. Dyloname al [phone].    We comply with applicable federal civil rights laws and [place] laws. We do not discriminate on the basis of race, color, national origin, age, disability, sex, sexual orientation, or gender identity.            Thank you!     Thank you for choosing [facility]  for your care. Our goal is always to provide you with excellent care. Hearing back from our patients is one way we can continue to improve our services. Please take a few minutes to complete the written survey that you may receive in the mail after your visit with us. Thank you!             Your Updated Medication List - Protect others around you: Learn how to safely use, store and throw away your medicines at www.disposemymeds.org.          This list is  accurate as of 5/1/18  8:51 AM.  Always use your most recent med list.                   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)       Aluminum-Magnesium-Simethicone 200-200-20 MG/5ML Susp      Take 5 mLs by mouth daily as needed        aspirin 81 MG tablet      1 TABLET EVERY MORNING        clindamycin 1 % lotion    CLEOCIN T    60 mL    Apply twice daily for 6 weeks to the groin    Rash       furosemide 20 MG tablet    LASIX    60 tablet    Take 1 tablet (20 mg) by mouth daily    Acute congestive heart failure, unspecified congestive heart failure type (H)       HYDROcodone-acetaminophen 5-325 MG per tablet    NORCO     Take 1 tablet by mouth every 4 hours as needed        hydrocortisone valerate 0.2 % ointment    WEST-NINOSKA    45 g    Apply sparingly to affected area three times daily as needed.    Psoriasis       lidocaine-prilocaine cream    EMLA     Apply 1 Application topically as needed        lisinopril 20 MG tablet    PRINIVIL/ZESTRIL    90 tablet    Take 1 tablet (20 mg) by mouth daily    Hypertension goal BP (blood pressure) < 130/80, Coronary artery disease involving native coronary artery of native heart without angina pectoris, Microalbuminuria       LORazepam 0.5 MG tablet    ATIVAN    30 tablet    Take 1 tablet (0.5 mg) by mouth every 4 hours as needed (Anxiety, Nausea/Vomiting or Sleep)    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)       metoprolol succinate 100 MG 24 hr tablet    TOPROL-XL    90 tablet    Take 1 tablet (100 mg) by mouth daily    Hypertension goal BP (blood pressure) < 130/80, Coronary artery disease involving native coronary artery of native heart without angina pectoris       nitroGLYcerin 0.4 MG sublingual tablet    NITROSTAT    60 tablet    Place 1 tablet (0.4 mg) under the tongue every 5 minutes as  needed up to 3 tablets per episode.    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)       potassium chloride SA 10 MEQ CR tablet    K-DUR/KLOR-CON M    30 tablet    Take 1 tablet (10 mEq) by mouth daily    Acute congestive heart failure, unspecified congestive heart failure type (H)       PREVACID PO      Take 20 mg by mouth every evening as needed Patient needs to use brand name Prevacid (generic version causes diarrhea)        prochlorperazine 10 MG tablet    COMPAZINE    30 tablet    Take 1 tablet (10 mg) by mouth every 6 hours as needed for nausea or vomiting    Nausea       rosuvastatin 40 MG tablet    CRESTOR    90 tablet    Take 1 tablet (40 mg) by mouth daily    Hyperlipidemia LDL goal <100, Coronary artery disease involving native coronary artery of native heart without angina pectoris       TYLENOL 8 HOUR PO      Take 500 mg by mouth as needed

## 2018-05-01 NOTE — PATIENT INSTRUCTIONS
The following is a summary of your office visit:    Medications started today: none    Medications stopped today: none    Medication dose change: none    Nurse contact information: Jennifer Soto RN  Cardiology Care Coordinator  [phone] Phone  [phone] Fax    Appointments made today: Schedule with CORE clinic in about 2-3 weeks    Patient instructions: Follow up will be determined by CORE clinic. Go to pharmacy today to be fitted for compression stoc    If you have had any blood work, imaging or other testing completed we will be in touch within 1-2 weeks regarding the results. If you have any questions, concerns or need to schedule a follow up, please contact us at [phone]. If you are needing refills please contact your pharmacy. For urgent after hour care please call the [facility] at [phone] or the [facility] at [phone] and ask to speak to the cardiologist on call.    It was a pleasure meeting with you today. Please let us know if there is anything else we can do for you so that we can be sure you are leaving completely satisfied with your care experience.     Your Cardiology Team at [facility]  RN Care Coordinator: Jennifer STEPHENS: Michelle

## 2018-05-03 NOTE — TELEPHONE ENCOUNTER
Received call from [facility].  Request to extend therapy x 2 weeks.  Pt has weakness and recently had fall at home - no injury.  Ok given to extend 2 additional weeks.    Beti Caballero RN

## 2018-05-07 NOTE — PROGRESS NOTES
Tolerated lab draw without complaint. Union drawn-Red/Green/Purple tubes. Double signed by patient and RN. See documentation flowsheet. Nieves Estrada, RN, BSN, OCN

## 2018-05-07 NOTE — MR AVS SNAPSHOT
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)        Drug-induced neutropenia (H)        Nausea           Follow-ups after your visit        Your next 10 appointments already scheduled     May 14, 2018  9:45 AM CDT   Return Visit with NURSE ONLY CANCER CENTER   [facility] ([facility])    [address] [place] [address]   [phone]            May 14, 2018 10:45 AM CDT   Return Visit with Erickson Adan MD   [facility]    [address] [place] [address]   [phone]            May 29, 2018  9:00 AM CDT   Return Visit with NURSE ONLY CANCER CENTER   [facility] ([facility])    [address] [place] [address]   [phone]            May 29, 2018  9:45 AM CDT   Return Visit with Mayte Mcneil MD   [facility] ([facility])    [address] [place] [address]   [phone]            May 29, 2018 12:30 PM CDT   Level 3 with BAY 2 INFUSION   [facility]    [address] [place] [address]   [phone]            May 30, 2018  1:00 PM CDT   Level 2 with BAY 8 INFUSION   [facility] ([facility])    [address] [place] [address]   [phone]              Who to contact     If you have questions or need follow up information about today's clinic visit or your schedule please contact [facility] directly at [phone].  Normal or non-critical lab and imaging results  will be communicated to you by AppSpotrhart, letter or phone within 4 business days after the clinic has received the results. If you do not hear from us within 7 days, please contact the clinic through Synovex or phone. If you have a critical or abnormal lab result, we will notify you by phone as soon as possible.  Submit refill requests through Synovex or call your pharmacy and they will forward the refill request to us. Please allow 3 business days for your refill to be completed.          Additional Information About Your Visit        Synovex Information     Synovex gives you secure access to your electronic health record. If you see a primary care provider, you can also send messages to your care team and make appointments. If you have questions, please call your primary care clinic.  If you do not have a primary care provider, please call [phone] and they will assist you.      Synovex is an electronic gateway that provides easy, online access to your medical records. With Synovex, you can request a clinic appointment, read your test results, renew a prescription or communicate with your care team.     To access your existing account, please contact your [facility] or call [phone] for assistance.        Care EveryWhere ID     This is your Care EveryWhere ID. This could be used by other organizations to access your [facility] medical records  [id number]         Blood Pressure from Last 3 Encounters:   05/01/18 120/76   05/01/18 146/78   04/30/18 97/69    Weight from Last 3 Encounters:   05/01/18 73 kg (161 lb)   04/30/18 72.6 kg (160 lb)   04/23/18 72.6 kg (160 lb)              We Performed the Following     CBC with platelets differential     Comprehensive metabolic panel     Lactate Dehydrogenase     Phosphorus     Uric acid          Today's Medication Changes          These changes are accurate as of 5/7/18 12:28 PM.  If you have any questions, ask your nurse or doctor.                These medicines have changed or have updated prescriptions.        Dose/Directions    rosuvastatin 40 MG tablet   Commonly known as:  CRESTOR   This may have changed:  when to take this   Used for:  Hyperlipidemia LDL goal <100, Coronary artery disease involving native coronary artery of native heart without angina pectoris        Dose:  40 mg   Take 1 tablet (40 mg) by mouth daily   Quantity:  90 tablet   Refills:  3                Primary Care Provider Office Phone # Fax #    Florian Alonzo [phone] [address] [facility] [address]        Equal Access to Services     [facility]: Hadii aad ku hadasho Soomaali, waaxda luqadaha, qaybta kaalmada adeegyada, waxay nickin haykaiden bazan . So [facility] [phone].    ATENCIÓN: Si habla español, tiene a ivan disposición servicios gratuitos de asistencia lingüística. [facility] [phone].    We comply with applicable federal civil rights laws and [place] laws. We do not discriminate on the basis of race, color, national origin, age, disability, sex, sexual orientation, or gender identity.            Thank you!     Thank you for choosing [facility]  for your care. Our goal is always to provide you with excellent care. Hearing back from our patients is one way we can continue to improve our services. Please take a few minutes to complete the written survey that you may receive in the mail after your visit with us. Thank you!             Your Updated Medication List - Protect others around you: Learn how to safely use, store and throw away your medicines at www.disposemymeds.org.          This list is accurate as of 5/7/18 12:28 PM.  Always use your most recent med list.                   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)        Aluminum-Magnesium-Simethicone 200-200-20 MG/5ML Susp      Take 5 mLs by mouth daily as needed        aspirin 81 MG tablet      1 TABLET EVERY MORNING        clindamycin 1 % lotion    CLEOCIN T    60 mL    Apply twice daily for 6 weeks to the groin    Rash       COMPRESSION STOCKINGS     2 each    1 each continuous prn Bilateral knee high compression stockings    Acute congestive heart failure, unspecified congestive heart failure type (H)       furosemide 20 MG tablet    LASIX    60 tablet    Take 1 tablet (20 mg) by mouth daily    Acute congestive heart failure, unspecified congestive heart failure type (H)       HYDROcodone-acetaminophen 5-325 MG per tablet    NORCO     Take 1 tablet by mouth every 4 hours as needed        hydrocortisone valerate 0.2 % ointment    WEST-NINOSKA    45 g    Apply sparingly to affected area three times daily as needed.    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)       potassium chloride SA 10 MEQ CR tablet    K-DUR/KLOR-CON M    30 tablet    Take 1 tablet (10 mEq) by mouth daily    Acute congestive heart failure, unspecified congestive heart failure type (H)       PREVACID PO      Take 20 mg by mouth every evening as needed Patient needs to use brand name Prevacid (generic version causes diarrhea)        prochlorperazine 10 MG tablet    COMPAZINE    30 tablet    Take 1 tablet (10 mg) by mouth every 6 hours as needed for nausea or vomiting    Nausea       rosuvastatin 40 MG tablet    CRESTOR    90 tablet    Take 1 tablet (40 mg) by mouth daily    Hyperlipidemia LDL goal <100, Coronary artery disease involving native coronary artery of native heart without angina pectoris       TYLENOL 8 HOUR PO      Take 500 mg by mouth as needed

## 2018-05-14 PROBLEM — E86.0 DEHYDRATION: Status: ACTIVE | Noted: 2018-01-01

## 2018-05-14 NOTE — PROGRESS NOTES
FOLLOW-UP VISIT NOTE    PATIENT NAME: Francisco Javier Cortes MRN # [id number]  DATE OF VISIT: May 14, 2018 YOB: 1940    REFERRING PROVIDER: No referring provider defined for this encounter.    CANCER TYPE: Mantle cell lymphoma  STAGE: IVB  ECOG PS: 1    ONCOLOGY HISTORY:  77-year-old male with past medical history significant for prostate cancer status post prostatectomy, coronary disease status post stent placement, to complete develop symptoms of abdominal bloating/discomfort and loss of appetite for 2 months. Underwent imaging workup by primary care provider with CT abdominal and pelvis on 02/25/18 showing extensive intra- abdominal and inguinal lymphadenopathy with splenomegaly.    - 03/05/18 02 Seen by medical oncology. I ordered CT neck and CT chest which showed bulky mediastinal, hilar ,axillary and supraclavicular lymphadenopathy. Patient was referred to ENT for diagnostic biopsy. He underwent an FNA of the lymph node on 03/14 which came back with findings consistent with mantle cell lymphoma. 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 Proliferation index was estimated to be 50%. The marrow biopsy performed showed bone marrow involvement by mantle cell lymphoma 40-50%. Fish came back positive for translocation 11:14 consistent with mantle cell lymphoma. PET CT scan extensive hypermetabolic adenopathy of cervical, axillary, mediastinal, hilar, intra-abdominal, retroperitoneal inguinal but the largest myeloma rate of reason triglyceride was measuring 15.5 x 6.9 cm.    - 03/29/18  Started on bendamustine in combination with Rituxan along with allopurinol for tumor lysis syndrome prophylaxis.     - Hospitalized for TLS monitoring  - Hospitalized for sepsis from pneumonia  - Hospitalized for CHF 04/20 - 04/21    - 05/01/15 Second cycle     SUBJECTIVE     He is here for follow-up after cycle 2 for  toxicity evaluation. Complains of decreased appetite, weight loss and generalized fatigue which lasted until last Saturday and since then has noticed improvement in's symptoms. Denies fever/chills,, dyspnea, abdominal pain, nausea/vomiting, diarrhea.      PAST MEDICAL HISTORY     Past Medical History:   Diagnosis Date     CAD (coronary artery disease) 1/09    s/p stentsx2     Coronary artery disease involving native coronary artery of native heart without angina pectoris 12/22/2015     Dyslipidemia      Erectile dysfunction      GERD (gastroesophageal reflux disease)      Hearing problem One ear 1961     Hypertension      NSTEMI (non-ST elevated myocardial infarction) (H) 3/20/2014     Pneumonia      Prostate cancer (H)     prostatecomy 9 years ago, PSAs remain good     Status post percutaneous transluminal coronary angioplasty-Coronary angioplasty with STEPHEN to LCx 4/4/2014     Stented coronary artery     stents placed         CURRENT OUTPATIENT MEDICATIONS     Current Outpatient Prescriptions   Medication Sig Dispense Refill     Acetaminophen (TYLENOL 8 HOUR PO) Take 500 mg by mouth as needed        ASPIRIN 81 MG OR TABS 1 TABLET EVERY MORNING       clindamycin (CLEOCIN T) 1 % lotion Apply twice daily for 6 weeks to the groin 60 mL 1     furosemide (LASIX) 20 MG tablet Take 1 tablet (20 mg) by mouth daily 60 tablet 6     hydrocortisone valerate (WEST-NINOSKA) 0.2 % ointment Apply sparingly to affected area three times daily as needed. 45 g 3     Lansoprazole (PREVACID PO) Take 20 mg by mouth every evening as needed Patient needs to use brand name Prevacid (generic version causes diarrhea)        lidocaine-prilocaine (EMLA) cream Apply 1 Application topically as needed       lisinopril (PRINIVIL/ZESTRIL) 20 MG tablet Take 1 tablet (20 mg) by mouth daily 90 tablet 3     LORazepam (ATIVAN) 0.5 MG tablet Take 1 tablet (0.5 mg) by mouth every 4 hours as needed (Anxiety, Nausea/Vomiting or Sleep) 30 tablet 3     metoprolol  succinate (TOPROL-XL) 100 MG 24 hr tablet Take 1 tablet (100 mg) by mouth daily 90 tablet 3     ondansetron (ZOFRAN) 8 MG tablet Take 1 tablet (8 mg) by mouth every 8 hours as needed (Nausea/Vomiting) 10 tablet 3     prochlorperazine (COMPAZINE) 10 MG tablet Take 1 tablet (10 mg) by mouth every 6 hours as needed for nausea or vomiting 30 tablet 1     rosuvastatin (CRESTOR) 40 MG tablet Take 1 tablet (40 mg) by mouth daily (Patient taking differently: Take 40 mg by mouth every morning ) 90 tablet 3     allopurinol (ZYLOPRIM) 300 MG tablet Take 1 tablet (300 mg) by mouth daily (Patient not taking: Reported on 5/14/2018) 30 tablet 1     Alum & Mag Hydroxide-Simeth (ALUMINUM-MAGNESIUM-SIMETHICONE) 200-200-20 MG/5ML SUSP Take 5 mLs by mouth daily as needed       COMPRESSION STOCKINGS 1 each continuous prn Bilateral knee high compression stockings 2 each 0     HYDROcodone-acetaminophen (NORCO) 5-325 MG per tablet Take 1 tablet by mouth every 4 hours as needed       nitroGLYcerin (NITROSTAT) 0.4 MG sublingual tablet Place 1 tablet (0.4 mg) under the tongue every 5 minutes as needed up to 3 tablets per episode. (Patient not taking: Reported on 5/1/2018) 60 tablet 6     potassium chloride SA (K-DUR/KLOR-CON M) 10 MEQ CR tablet Take 1 tablet (10 mEq) by mouth daily (Patient not taking: Reported on 5/14/2018) 30 tablet 0        ALLERGIES     Allergies   Allergen Reactions     Niacin      Severe rash and itching     Prevacid [Lansoprazole] Diarrhea     Patient notes diarrhea with 30mg generic version. Patient does ok with 20mg in generic and brand name.     Shellfish Allergy      One kind        REVIEW OF SYSTEMS   As above in the HPI, o/w complete 12-point ROS was negative.     PHYSICAL EXAM   B/P: 108/72, T: 97.7, P: 95, R: 16  SpO2 Readings from Last 4 Encounters:   05/14/18 99%   05/01/18 96%   05/01/18 97%   04/30/18 98%     Wt Readings from Last 3 Encounters:   05/14/18 67.6 kg (149 lb)   05/01/18 73 kg (161 lb)    04/30/18 72.6 kg (160 lb)     GEN: NAD  EYES:PERRLA  Mouth/ENT: Oropharynx is clear.  NECK: cervical lymphadenopathy non palpable  LUNGS: clear bilaterally  CV: regular, no murmurs, rubs, or gallops  ABDOMEN: soft, non-tender, non-distended,  EXT: warm, no edema  NEURO: alert  SKIN: no rashes     LABORATORY AND IMAGING STUDIES     Lab Results   Component Value Date    WBC 13.4 05/14/2018     Lab Results   Component Value Date    RBC 3.01 05/14/2018     Lab Results   Component Value Date    HGB 9.8 05/14/2018     Lab Results   Component Value Date    HCT 30.5 05/14/2018     No components found for: MCT  Lab Results   Component Value Date     05/14/2018     Lab Results   Component Value Date    MCH 32.6 05/14/2018     Lab Results   Component Value Date    MCHC 32.1 05/14/2018     Lab Results   Component Value Date    RDW 21.6 05/14/2018     Lab Results   Component Value Date     05/14/2018     Recent Labs   Lab Test  05/14/18   0955  05/07/18   1136   NA  141  142   POTASSIUM  4.2  4.0   CHLORIDE  109  105   CO2  24  27   ANIONGAP  8  10   GLC  141*  134*   BUN  18  15   CR  1.00  1.11   EVANGELINA  7.8*  8.3*           ASSESSMENT AND PLAN     77-year-old male with past medical history significant for prostate cancer status post prostatectomy, coronary disease status post stent placement, to complete develop symptoms of abdominal bloating/discomfort and loss of appetite for 2 months. Underwent imaging workup was noted to have diffuse extensive hypermetabolic lymphadenopathy with biopsy of the lymph node showing findings consistent with mantle cell lymphoma. Bone marrow biopsy showed 40-50 % involvement by lymphoma with FISH revealing t(11:14).    - Mantle cell lymphoma  Stage IV B  Started on Bendamustine 90 mg/m2 day 1 and 2 in combination with Rituxan 375 mg/m2 q.4 weeks- Plan is to proceed with 6 total cycles followed by repeat PET scan and have response achieved, consider proceeding with maintenance  Rituxan.  s/p 2 cycles  Complains of decreased appetite, 10 pound weight loss and fatigue. Last treatment which has started to get better in the last few days. Discussed consideration of reducing dose of bendamustine for better tolerability. Patient will think about it and let us know prior to next treatment.    - Low Blood pressure- likely dehydration  IV fluids today    - TLS prophylaxis  Continue with allopurinol. Encouraged oral hydration    - Anemia/Thrombocytopenia  Chemotherapy versus marrow involvement by  lymphoma   Continue to monitor    - Leucocytosis  Likely secondary to neulasta  No s/s of infection    -CAD/HTN/CHF  Patient is on Lasix, metoprolol and lisinopril    We'll  have him return to clinic in 2 weeks for follow-up with Dr. Parker prior to cycle 3. Will obtain interim CT scans after 3 cycles to assess response.    Chart documentation with Dragon Voice recognition Software. Although reviewed after completion, some words and grammatical errors may remain.  Erickson Adan MD  Attending Physician   Hematology/Medical Oncology

## 2018-05-14 NOTE — NURSING NOTE
"Oncology Rooming Note    May 14, 2018 10:22 AM   Francisco Javier Cortes is a 77 year old male who presents for:    Chief Complaint   Patient presents with     Oncology Clinic Visit     follow up wit hsymprom assess     Initial Vitals: /68  Pulse 99  Temp 98.4  F (36.9  C)  Ht 1.651 m (5' 5\")  Wt 67.6 kg (149 lb)  SpO2 99%  BMI 24.79 kg/m2 Estimated body mass index is 24.79 kg/(m^2) as calculated from the following:    Height as of this encounter: 1.651 m (5' 5\").    Weight as of this encounter: 67.6 kg (149 lb). Body surface area is 1.76 meters squared.  No Pain (0) Comment: Data Unavailable   No LMP for male patient.  Allergies reviewed: Yes  Medications reviewed: Yes    Medications: Medication refills not needed today.  Pharmacy name entered into Performable:    [facility] [place] - [place], [place] - [address], [address]  [facility] [place] - [address] [facility] [id number] - [place], [place] - [address] [facility] [place], [place] [facility]        5 minutes for nursing intake (face to face time)     Kathleen Sandoval LPN              "

## 2018-05-14 NOTE — PROGRESS NOTES
"Patient's name and  were verified.  See Doc Flowsheet - IV assess for details.  IVAD accessed with 20G 3/4\" balderas gripper plus needle  blood return positive: YES  Site without redness, tenderness or swelling: YES  flushed with 30cc NS and 5cc 100u/ml heparin  Needle: Left accessed just in case    Comments: Labs drawn.  Patient tolerated procedure without incident.    Zhane Barth  RN, BSN, OCN        "

## 2018-05-14 NOTE — MR AVS SNAPSHOT
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)           Follow-ups after your visit        Your next 10 appointments already scheduled     May 14, 2018 10:45 AM CDT   [facility] Visit with Erickson Adan MD   [facility]    [id number] [place] [address]   [phone]            May 29, 2018  9:00 AM CDT   Return Visit with [facility] ONLY [facility]CER CENTER   [facility]    [id number] [place] [address]   [phone]            May 29, 2018  9:45 AM CDT   Return Visit with Mayte Mcneil MD   [facility]    [id number] [place] [address]   [phone]            May 29, 2018 12:30 PM CDT   Level 3 with BAY 2 INFUSION   [facility]    [id number] [place] [address]   [phone]            May 30, 2018  1:00 PM CDT   Level 2 with BAY 8 INFUSION   [facility]    [id number] [place] [address]   [phone]              Who to contact     If you have questions or need follow up information about today's clinic visit or your schedule please contact [facility] directly at [phone].  Normal or non-critical lab and imaging results will be communicated to you by MyChart, letter or phone within 4 business days after the clinic has received the results. If you do not hear from us within 7 days, please contact the clinic through MyChart or phone. If you have a critical or abnormal lab  result, we will notify you by phone as soon as possible.  Submit refill requests through [employer] or call your pharmacy and they will forward the refill request to us. Please allow 3 business days for your refill to be completed.          Additional Information About Your Visit        [employer] Information     [employer] gives you secure access to your electronic health record. If you see a primary care provider, you can also send messages to your care team and make appointments. If you have questions, please call your primary care clinic.  If you do not have a primary care provider, please call [phone] and they will assist you.      [employer] is an electronic gateway that provides easy, online access to your medical records. With [employer], you can request a clinic appointment, read your test results, renew a prescription or communicate with your care team.     To access your existing account, please contact your [facility] or call [phone] for assistance.        Care EveryWhere ID     This is your Care EveryWhere ID. This could be used by other organizations to access your [facility] medical records  [id number]         Blood Pressure from Last 3 Encounters:   05/01/18 120/76   05/01/18 146/78   04/30/18 97/69    Weight from Last 3 Encounters:   05/01/18 73 kg (161 lb)   04/30/18 72.6 kg (160 lb)   04/23/18 72.6 kg (160 lb)              We Performed the Following     CBC with platelets differential     Comprehensive metabolic panel          Today's Medication Changes          These changes are accurate as of 5/14/18 10:04 AM.  If you have any questions, ask your nurse or doctor.               These medicines have changed or have updated prescriptions.        Dose/Directions    rosuvastatin 40 MG tablet   Commonly known as:  CRESTOR   This may have changed:  when to take this   Used for:  Hyperlipidemia LDL goal <100, Coronary artery disease involving native coronary artery of native  heart without angina pectoris        Dose:  40 mg   Take 1 tablet (40 mg) by mouth daily   Quantity:  90 tablet   Refills:  3                Primary Care Provider Office Phone # Fax #    Florian Alonzo [phone] [address] [facility] [address]        Equal Access to Services     [facility] : Hadii aad ku hadasho Soomaali, waaxda luqadaha, qaybta kaalmada adeegyada, waxay idiin hayaan adebryant miranda layanelyn . So [facility] [phone].    ATENCIÓN: Si habla español, tiene a ivan disposición servicios gratuitos de asistencia lingüística. Dyloname al [phone].    We comply with applicable federal civil rights laws and [place] laws. We do not discriminate on the basis of race, color, national origin, age, disability, sex, sexual orientation, or gender identity.            Thank you!     Thank you for choosing [facility]  for your care. Our goal is always to provide you with excellent care. Hearing back from our patients is one way we can continue to improve our services. Please take a few minutes to complete the written survey that you may receive in the mail after your visit with us. Thank you!             Your Updated Medication List - Protect others around you: Learn how to safely use, store and throw away your medicines at www.disposemymeds.org.          This list is accurate as of 5/14/18 10:04 AM.  Always use your most recent med list.                   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)       Aluminum-Magnesium-Simethicone 200-200-20 MG/5ML Susp      Take 5 mLs by mouth daily as needed        aspirin 81 MG tablet      1 TABLET EVERY MORNING        clindamycin 1 % lotion    CLEOCIN T    60 mL    Apply twice daily for 6 weeks to the groin    Rash       COMPRESSION STOCKINGS     2 each    1 each  continuous prn Bilateral knee high compression stockings    Acute congestive heart failure, unspecified congestive heart failure type (H)       furosemide 20 MG tablet    LASIX    60 tablet    Take 1 tablet (20 mg) by mouth daily    Acute congestive heart failure, unspecified congestive heart failure type (H)       HYDROcodone-acetaminophen 5-325 MG per tablet    NORCO     Take 1 tablet by mouth every 4 hours as needed        hydrocortisone valerate 0.2 % ointment    WEST-NINOSKA    45 g    Apply sparingly to affected area three times daily as needed.    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)       potassium  chloride SA 10 MEQ CR tablet    K-DUR/KLOR-CON M    30 tablet    Take 1 tablet (10 mEq) by mouth daily    Acute congestive heart failure, unspecified congestive heart failure type (H)       PREVACID PO      Take 20 mg by mouth every evening as needed Patient needs to use brand name Prevacid (generic version causes diarrhea)        prochlorperazine 10 MG tablet    COMPAZINE    30 tablet    Take 1 tablet (10 mg) by mouth every 6 hours as needed for nausea or vomiting    Nausea       rosuvastatin 40 MG tablet    CRESTOR    90 tablet    Take 1 tablet (40 mg) by mouth daily    Hyperlipidemia LDL goal <100, Coronary artery disease involving native coronary artery of native heart without angina pectoris       TYLENOL 8 HOUR PO      Take 500 mg by mouth as needed

## 2018-05-14 NOTE — Clinical Note
5/14/2018         RE: Francisco Javier Cortes  [address] [facility] [address]        Dear Colleague,    Thank you for referring your patient, Francisco Javier Cortes, to the [facility]. Please see a copy of my visit note below.      FOLLOW-UP VISIT NOTE    PATIENT NAME: Francisco Javier Cortes MRN # [id number]  DATE OF VISIT: May 14, 2018 YOB: 1940    REFERRING PROVIDER: No referring provider defined for this encounter.    CANCER TYPE: Mantle cell lymphoma  STAGE: IVB  ECOG PS: 1    ONCOLOGY HISTORY:  77-year-old male with past medical history significant for prostate cancer status post prostatectomy, coronary disease status post stent placement, to complete develop symptoms of abdominal bloating/discomfort and loss of appetite for 2 months. Underwent imaging workup by primary care provider with CT abdominal and pelvis on 02/25/18 showing extensive intra- abdominal and inguinal lymphadenopathy with splenomegaly.    - 03/05/18 02 Seen by medical oncology. I ordered CT neck and CT chest which showed bulky mediastinal, hilar ,axillary and supraclavicular lymphadenopathy. Patient was referred to ENT for diagnostic biopsy. He underwent an FNA of the lymph node on 03/14 which came back with findings consistent with mantle cell lymphoma. 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 Proliferation index was estimated to be 50%. The marrow biopsy performed showed bone marrow involvement by mantle cell lymphoma 40-50%. Fish came back positive for translocation 11:14 consistent with mantle cell lymphoma. PET CT scan extensive hypermetabolic adenopathy of cervical, axillary, mediastinal, hilar, intra-abdominal, retroperitoneal inguinal but the largest myeloma rate of reason triglyceride was measuring 15.5 x 6.9 cm.    - 03/29/18  Started on bendamustine in combination with Rituxan along with  allopurinol for tumor lysis syndrome prophylaxis.     - Hospitalized for TLS monitoring  - Hospitalized for sepsis from pneumonia  - Hospitalized for CHF 04/20 - 04/21    - 05/01/15 Second cycle     SUBJECTIVE     He is here for follow-up after cycle 2 for toxicity evaluation. Complains of decreased appetite, weight loss and generalized fatigue which lasted until last Saturday and since then has noticed improvement in's symptoms. Denies fever/chills,, dyspnea, abdominal pain, nausea/vomiting, diarrhea.      PAST MEDICAL HISTORY     Past Medical History:   Diagnosis Date     CAD (coronary artery disease) 1/09    s/p stentsx2     Coronary artery disease involving native coronary artery of native heart without angina pectoris 12/22/2015     Dyslipidemia      Erectile dysfunction      GERD (gastroesophageal reflux disease)      Hearing problem One ear 1961     Hypertension      NSTEMI (non-ST elevated myocardial infarction) (H) 3/20/2014     Pneumonia      Prostate cancer (H)     prostatecomy 9 years ago, PSAs remain good     Status post percutaneous transluminal coronary angioplasty-Coronary angioplasty with STEPHEN to LCx 4/4/2014     Stented coronary artery     stents placed         CURRENT OUTPATIENT MEDICATIONS     Current Outpatient Prescriptions   Medication Sig Dispense Refill     Acetaminophen (TYLENOL 8 HOUR PO) Take 500 mg by mouth as needed        ASPIRIN 81 MG OR TABS 1 TABLET EVERY MORNING       clindamycin (CLEOCIN T) 1 % lotion Apply twice daily for 6 weeks to the groin 60 mL 1     furosemide (LASIX) 20 MG tablet Take 1 tablet (20 mg) by mouth daily 60 tablet 6     hydrocortisone valerate (WEST-NINOSKA) 0.2 % ointment Apply sparingly to affected area three times daily as needed. 45 g 3     Lansoprazole (PREVACID PO) Take 20 mg by mouth every evening as needed Patient needs to use brand name Prevacid (generic version causes diarrhea)        lidocaine-prilocaine (EMLA) cream Apply 1 Application topically as  needed       lisinopril (PRINIVIL/ZESTRIL) 20 MG tablet Take 1 tablet (20 mg) by mouth daily 90 tablet 3     LORazepam (ATIVAN) 0.5 MG tablet Take 1 tablet (0.5 mg) by mouth every 4 hours as needed (Anxiety, Nausea/Vomiting or Sleep) 30 tablet 3     metoprolol succinate (TOPROL-XL) 100 MG 24 hr tablet Take 1 tablet (100 mg) by mouth daily 90 tablet 3     ondansetron (ZOFRAN) 8 MG tablet Take 1 tablet (8 mg) by mouth every 8 hours as needed (Nausea/Vomiting) 10 tablet 3     prochlorperazine (COMPAZINE) 10 MG tablet Take 1 tablet (10 mg) by mouth every 6 hours as needed for nausea or vomiting 30 tablet 1     rosuvastatin (CRESTOR) 40 MG tablet Take 1 tablet (40 mg) by mouth daily (Patient taking differently: Take 40 mg by mouth every morning ) 90 tablet 3     allopurinol (ZYLOPRIM) 300 MG tablet Take 1 tablet (300 mg) by mouth daily (Patient not taking: Reported on 5/14/2018) 30 tablet 1     Alum & Mag Hydroxide-Simeth (ALUMINUM-MAGNESIUM-SIMETHICONE) 200-200-20 MG/5ML SUSP Take 5 mLs by mouth daily as needed       COMPRESSION STOCKINGS 1 each continuous prn Bilateral knee high compression stockings 2 each 0     HYDROcodone-acetaminophen (NORCO) 5-325 MG per tablet Take 1 tablet by mouth every 4 hours as needed       nitroGLYcerin (NITROSTAT) 0.4 MG sublingual tablet Place 1 tablet (0.4 mg) under the tongue every 5 minutes as needed up to 3 tablets per episode. (Patient not taking: Reported on 5/1/2018) 60 tablet 6     potassium chloride SA (K-DUR/KLOR-CON M) 10 MEQ CR tablet Take 1 tablet (10 mEq) by mouth daily (Patient not taking: Reported on 5/14/2018) 30 tablet 0        ALLERGIES     Allergies   Allergen Reactions     Niacin      Severe rash and itching     Prevacid [Lansoprazole] Diarrhea     Patient notes diarrhea with 30mg generic version. Patient does ok with 20mg in generic and brand name.     Shellfish Allergy      One kind        REVIEW OF SYSTEMS   As above in the HPI, o/w complete 12-point ROS was  negative.     PHYSICAL EXAM   B/P: 108/72, T: 97.7, P: 95, R: 16  SpO2 Readings from Last 4 Encounters:   05/14/18 99%   05/01/18 96%   05/01/18 97%   04/30/18 98%     Wt Readings from Last 3 Encounters:   05/14/18 67.6 kg (149 lb)   05/01/18 73 kg (161 lb)   04/30/18 72.6 kg (160 lb)     GEN: NAD  EYES:PERRLA  Mouth/ENT: Oropharynx is clear.  NECK: cervical lymphadenopathy non palpable  LUNGS: clear bilaterally  CV: regular, no murmurs, rubs, or gallops  ABDOMEN: soft, non-tender, non-distended,  EXT: warm, no edema  NEURO: alert  SKIN: no rashes     LABORATORY AND IMAGING STUDIES     Lab Results   Component Value Date    WBC 13.4 05/14/2018     Lab Results   Component Value Date    RBC 3.01 05/14/2018     Lab Results   Component Value Date    HGB 9.8 05/14/2018     Lab Results   Component Value Date    HCT 30.5 05/14/2018     No components found for: MCT  Lab Results   Component Value Date     05/14/2018     Lab Results   Component Value Date    MCH 32.6 05/14/2018     Lab Results   Component Value Date    MCHC 32.1 05/14/2018     Lab Results   Component Value Date    RDW 21.6 05/14/2018     Lab Results   Component Value Date     05/14/2018     Recent Labs   Lab Test  05/14/18   0955  05/07/18   1136   NA  141  142   POTASSIUM  4.2  4.0   CHLORIDE  109  105   CO2  24  27   ANIONGAP  8  10   GLC  141*  134*   BUN  18  15   CR  1.00  1.11   EVANGELINA  7.8*  8.3*           ASSESSMENT AND PLAN     77-year-old male with past medical history significant for prostate cancer status post prostatectomy, coronary disease status post stent placement, to complete develop symptoms of abdominal bloating/discomfort and loss of appetite for 2 months. Underwent imaging workup was noted to have diffuse extensive hypermetabolic lymphadenopathy with biopsy of the lymph node showing findings consistent with mantle cell lymphoma. Bone marrow biopsy showed 40-50 % involvement by lymphoma with FISH revealing t(11:14).    - Mantle  cell lymphoma  Stage IV B  Started on Bendamustine 90 mg/m2 day 1 and 2 in combination with Rituxan 375 mg/m2 q.4 weeks- Plan is to proceed with 6 total cycles followed by repeat PET scan and have response achieved, consider proceeding with maintenance Rituxan.  s/p 2 cycles  Complains of decreased appetite, 10 pound weight loss and fatigue. Last treatment which has started to get better in the last few days. Discussed consideration of reducing dose of bendamustine for better tolerability. Patient will think about it and let us know prior to next treatment.    - Low Blood pressure- likely dehydration  IV fluids today    - TLS prophylaxis  Continue with allopurinol. Encouraged oral hydration    - Anemia/Thrombocytopenia  Chemotherapy versus marrow involvement by  lymphoma   Continue to monitor    - Leucocytosis  Likely secondary to neulasta  No s/s of infection    -CAD/HTN/CHF  Patient is on Lasix, metoprolol and lisinopril    We'll  have him return to clinic in 2 weeks for follow-up with Dr. Parker prior to cycle 3. Will obtain interim CT scans after 3 cycles to assess response.    Chart documentation with Dragon Voice recognition Software. Although reviewed after completion, some words and grammatical errors may remain.  Erickson Adan MD  Attending Physician   Hematology/Medical Oncology    Again, thank you for allowing me to participate in the care of your patient.        Sincerely,        Erickson Adan MD

## 2018-05-14 NOTE — MR AVS SNAPSHOT
After Visit Summary   5/14/2018    Francisco Javier Cortes    MRN: [id number]           Patient Information     Date Of Birth          1940        Visit Information        Provider Department      5/14/2018 11:00 AM BAY 2 INFUSION [facility]        Today's Diagnoses     Dehydration    -  1       Follow-ups after your visit        Your next 10 appointments already scheduled     May 29, 2018  9:00 AM CDT   Return Visit with [facility]   [facility]    [id number] [place] [id number] [phone]            May 29, 2018  9:45 AM CDT   Return Visit with Mayte Mcneil MD   [facility] ([facility])    [address] [place] [id number] [phone]            May 29, 2018 12:30 PM CDT   Level 3 with BAY 2 INFUSION   [facility] ([facility])    [address] [place] [id number] [phone]            May 30, 2018  1:00 PM CDT   Level 2 with BAY 8 INFUSION   [facility] ([facility])    [address] [place] [id number] [phone]              Who to contact     If you have questions or need follow up information about today's clinic visit or your schedule please contact [facility] [[email or web address]] directly at [phone].  Normal or non-critical lab and imaging results will be communicated to you by MyChart, letter or phone within 4 business days after the clinic has received the results. If you do not hear from us within 7 days, please contact the clinic through MyChart or phone. If you have a critical or abnormal lab result, we will notify you by phone as soon as possible.  Submit refill requests through [employer] or call your pharmacy and they will forward the refill request to us. Please allow 3 business days for your refill to be completed.          Additional  Information About Your Visit        [employer] Information     [employer] gives you secure access to your electronic health record. If you see a primary care provider, you can also send messages to your care team and make appointments. If you have questions, please call your primary care clinic.  If you do not have a primary care provider, please call [phone] and they will assist you.      [employer] is an electronic gateway that provides easy, online access to your medical records. With [employer], you can request a clinic appointment, read your test results, renew a prescription or communicate with your care team.     To access your existing account, please contact your [facility] or call [phone] for assistance.        Care EveryWhere ID     This is your Care EveryWhere ID. This could be used by other organizations to access your [facility] medical records  [id number]         Blood Pressure from Last 3 Encounters:   05/14/18 100/68   05/01/18 120/76   05/01/18 146/78    Weight from Last 3 Encounters:   05/14/18 67.6 kg (149 lb)   05/01/18 73 kg (161 lb)   04/30/18 72.6 kg (160 lb)              Today, you had the following     No orders found for display         Today's Medication Changes          These changes are accurate as of 5/14/18  4:07 PM.  If you have any questions, ask your nurse or doctor.               These medicines have changed or have updated prescriptions.        Dose/Directions    rosuvastatin 40 MG tablet   Commonly known as:  CRESTOR   This may have changed:  when to take this   Used for:  Hyperlipidemia LDL goal <100, Coronary artery disease involving native coronary artery of native heart without angina pectoris        Dose:  40 mg   Take 1 tablet (40 mg) by mouth daily   Quantity:  90 tablet   Refills:  3                Primary Care Provider Office Phone # Fax #    Florian Alonzo [phone] [address]  [facility] [address]         Equal Access to Services     [facility] : Hadii rivera collazo yahairakaren Dilip, waaxda luqadaha, qaybta kaalmadonn wu, zhao lorenzo. So [facility] [phone].    ATENCIÓN: Si habla español, tiene a ivan disposición servicios gratuitos de asistencia lingüística. Micaela al [phone].    We comply with applicable federal civil rights laws and [place] laws. We do not discriminate on the basis of race, color, national origin, age, disability, sex, sexual orientation, or gender identity.            Thank you!     Thank you for choosing [facility]  for your care. Our goal is always to provide you with excellent care. Hearing back from our patients is one way we can continue to improve our services. Please take a few minutes to complete the written survey that you may receive in the mail after your visit with us. Thank you!             Your Updated Medication List - Protect others around you: Learn how to safely use, store and throw away your medicines at www.disposemymeds.org.          This list is accurate as of 5/14/18  4:07 PM.  Always use your most recent med list.                   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)       Aluminum-Magnesium-Simethicone 200-200-20 MG/5ML Susp      Take 5 mLs by mouth daily as needed        aspirin 81 MG tablet      1 TABLET EVERY MORNING        clindamycin 1 % lotion    CLEOCIN T    60 mL    Apply twice daily for 6 weeks to the groin    Rash       COMPRESSION STOCKINGS     2 each    1 each continuous prn Bilateral knee high compression stockings    Acute congestive heart failure, unspecified congestive heart failure type (H)       furosemide 20 MG tablet    LASIX    60 tablet    Take 1 tablet (20 mg) by mouth daily    Acute congestive heart failure, unspecified  congestive heart failure type (H)       HYDROcodone-acetaminophen 5-325 MG per tablet    NORCO     Take 1 tablet by mouth every 4 hours as needed        hydrocortisone valerate 0.2 % ointment    WEST-NINOSKA    45 g    Apply sparingly to affected area three times daily as needed.    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)       potassium chloride SA 10 MEQ CR tablet    K-DUR/KLOR-CON M    30 tablet    Take 1 tablet (10 mEq) by mouth daily    Acute congestive heart failure, unspecified congestive heart failure type (H)       PREVACID PO      Take 20 mg by mouth every evening as needed Patient needs to use  brand name Prevacid (generic version causes diarrhea)        prochlorperazine 10 MG tablet    COMPAZINE    30 tablet    Take 1 tablet (10 mg) by mouth every 6 hours as needed for nausea or vomiting    Nausea       rosuvastatin 40 MG tablet    CRESTOR    90 tablet    Take 1 tablet (40 mg) by mouth daily    Hyperlipidemia LDL goal <100, Coronary artery disease involving native coronary artery of native heart without angina pectoris       TYLENOL 8 HOUR PO      Take 500 mg by mouth as needed

## 2018-05-14 NOTE — PROGRESS NOTES
Infusion Nursing Note:  Francisco Javierjay Cortes presents today for IVF.    Patient seen by provider today: Yes: Dr Adan   present during visit today: Not Applicable.    Note: N/A.    Intravenous Access:  Implanted Port.    Treatment Conditions:  Not Applicable.      Post Infusion Assessment:  Patient tolerated infusion without incident.  Site patent and intact, free from redness, edema or discomfort.  Access discontinued per protocol.    Discharge Plan:   Patient will return 5/29/18 for next appointment.   Patient discharged in stable condition accompanied by: wife.  Departure Mode: cane.    Patricia Villegas RN

## 2018-05-14 NOTE — MR AVS SNAPSHOT
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)    [id number] [place] [address]   [phone]            May 29, 2018 12:30 PM CDT   Level 3 with BAY 2 INFUSION   [facility])    [id number] [place] [id number]   [phone]            May 29, 2018  1:00 PM CDT   New Visit with Miriam Hill RD   [facility])    [id number] [place] [address]   [phone]            May 30, 2018  1:00 PM CDT   Level 2 with BAY 8 INFUSION   [facility])    [address] [place] [address]   [phone]            Jun 21, 2018  9:15 AM CDT   CT CHEST/ABDOMEN/PELVIS W CONTRAST with MGCT1   [facility])    [address] [place] [id number] [phone]           Please bring any scans or X-rays taken at other hospitals, if similar tests were done. Also bring a list of your medicines, including vitamins, minerals and over-the-counter drugs. It is safest to leave personal items at home.  Be sure to tell your doctor:   If you have any  allergies.   If there s any chance you are pregnant.   If you are breastfeeding.  How to prepare:   Do not eat or drink for 2 hours before your exam. If you need to take medicine, you may take it with small sips of water. (We may ask you to take liquid medicine as well.)   Please wear loose clothing, such as a sweat suit or jogging clothes. Avoid snaps, zippers and other metal. We may ask you to undress and put on a hospital gown.  Please arrive 30 minutes early for your CT. Once in the department you might be asked to drink water 15-20 minutes prior to your exam.  If indicated you may be asked to drink an oral contrast in advance of your CT.  If this is the case, the imaging team will let you know or be in contact with you prior to your appointment  Patients over 70 or patients with diabetes or kidney problems:   If you haven t had a blood test (creatinine test) within the last 30 days, the Cardiologist/Radiologist may require you to get this test prior to your exam.  If you have diabetes:   Continue to take your metformin medication on the day of your exam  If you have any questions, please call the Imaging Department where you will have your exam.            Jun 21, 2018  9:45 AM CDT   CT SOFT TISSUE NECK W CONTRAST with MGCT1   [facility] ([facility])    [address] [place] [address] [phone]           Please bring any scans or X-rays taken at other hospitals, if similar tests were done. Also bring a list of your medicines, including vitamins, minerals and over-the-counter drugs. It is safest to leave personal items at home.  Be sure to tell your doctor:   If you have any allergies.   If there s any chance you are pregnant.   If you are breastfeeding.    If you have diabetes as your medication may need to be adjusted for this exam.  You will have contrast for this exam. To prepare:   Do not eat or drink for 2 hours before your exam. If you need to take  medicine, you may take it with small sips of water. (We may ask you to take liquid medicine as well.)   The day before your exam, drink extra fluids at least six 8-ounce glasses (unless your doctor tells you to restrict your fluids).  Patients over 70 or patients with diabetes or kidney problems:   If you haven t had a blood test (creatinine test) within the last 30 days, the Cardiologist/Radiologist may require you to get this test prior to your exam.  Please wear loose clothing, such as a sweat suit or jogging clothes. Avoid snaps, zippers and other metal. We may ask you to undress and put on a hospital gown.  If you have any questions, please call the Imaging Department where you will have your exam.            Jun 25, 2018 10:00 AM CDT   Return Visit with [facility]   [facility])    [address] [place] [id number] [phone]            Jun 25, 2018 10:45 AM CDT   Return Visit with Erickson Adan MD   [facility] ([facility])    [id number] [place] [id number] [phone]            Jun 25, 2018 11:30 AM CDT   Level 3 with BAY 4 INFUSION   [facility])    [address] [place] [id number] [phone]              Future tests that were ordered for you today     Open Future Orders        Priority Expected Expires Ordered    CT Chest/Abdomen/Pelvis w Contrast Routine  5/18/2019 5/18/2018    CT Soft Tissue Neck w Contrast Routine  5/18/2019 5/18/2018            Who to contact     If you have questions or need follow up information about today's clinic visit or your schedule please contact [facility] directly at [phone].  Normal or non-critical lab and imaging results will be communicated to you by MyChart, letter or phone within 4 business days after the clinic has received the results. If you do not  "hear from us within 7 days, please contact the clinic through [employer] or phone. If you have a critical or abnormal lab result, we will notify you by phone as soon as possible.  Submit refill requests through [employer] or call your pharmacy and they will forward the refill request to us. Please allow 3 business days for your refill to be completed.          Additional Information About Your Visit        [employer] Mobile App Information     [employer] gives you secure access to your electronic health record. If you see a primary care provider, you can also send messages to your care team and make appointments. If you have questions, please call your primary care clinic.  If you do not have a primary care provider, please call [phone] and they will assist you.      [employer] is an electronic gateway that provides easy, online access to your medical records. With [employer], you can request a clinic appointment, read your test results, renew a prescription or communicate with your care team.     To access your existing account, please contact your [facility] or call [phone] for assistance.        Care EveryWhere ID     This is your Care EveryWhere ID. This could be used by other organizations to access your [facility] medical records  [id number]        Your Vitals Were     Pulse Temperature Height Pulse Oximetry BMI (Body Mass Index)       99 98.4  F (36.9  C) 1.651 m (5' 5\") 99% 24.79 kg/m2        Blood Pressure from Last 3 Encounters:   05/14/18 100/68   05/01/18 120/76   05/01/18 146/78    Weight from Last 3 Encounters:   05/14/18 67.6 kg (149 lb)   05/01/18 73 kg (161 lb)   04/30/18 72.6 kg (160 lb)              Today, you had the following     No orders found for display         Today's Medication Changes          These changes are accurate as of 5/14/18 11:59 PM.  If you have any questions, ask your nurse or doctor.               These medicines have changed or have updated prescriptions.        " Dose/Directions    rosuvastatin 40 MG tablet   Commonly known as:  CRESTOR   This may have changed:  when to take this   Used for:  Hyperlipidemia LDL goal <100, Coronary artery disease involving native coronary artery of native heart without angina pectoris        Dose:  40 mg   Take 1 tablet (40 mg) by mouth daily   Quantity:  90 tablet   Refills:  3                Primary Care Provider Office Phone # Fax #    Florian Alonzo [phone] [address]  [facility] [address]        Equal Access to Services     NANDA BARAJAS : Hadii aad ku hadasho Soomaali, waaxda luqadaha, qaybta kaalmada adeegyada, waxay idiin hayaan adeeg kharaalex bazan . So [facility] [phone].    ATENCIÓN: Si habla español, tiene a ivan disposición servicios gratuitos de asistencia lingüística. [facility] [phone].    We comply with applicable federal civil rights laws and [place] laws. We do not discriminate on the basis of race, color, national origin, age, disability, sex, sexual orientation, or gender identity.            Thank you!     Thank you for choosing [facility]  for your care. Our goal is always to provide you with excellent care. Hearing back from our patients is one way we can continue to improve our services. Please take a few minutes to complete the written survey that you may receive in the mail after your visit with us. Thank you!             Your Updated Medication List - Protect others around you: Learn how to safely use, store and throw away your medicines at www.disposemymeds.org.          This list is accurate as of 5/14/18 11:59 PM.  Always use your most recent med list.                   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)       Aluminum-Magnesium-Simethicone 200-200-20 MG/5ML Susp      Take 5 mLs by mouth  daily as needed        aspirin 81 MG tablet      1 TABLET EVERY MORNING        clindamycin 1 % lotion    CLEOCIN T    60 mL    Apply twice daily for 6 weeks to the groin    Rash       COMPRESSION STOCKINGS     2 each    1 each continuous prn Bilateral knee high compression stockings    Acute congestive heart failure, unspecified congestive heart failure type (H)       furosemide 20 MG tablet    LASIX    60 tablet    Take 1 tablet (20 mg) by mouth daily    Acute congestive heart failure, unspecified congestive heart failure type (H)       HYDROcodone-acetaminophen 5-325 MG per tablet    NORCO     Take 1 tablet by mouth every 4 hours as needed        hydrocortisone valerate 0.2 % ointment    WEST-NINOSKA    45 g    Apply sparingly to affected area three times daily as needed.    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)       potassium chloride SA 10 MEQ CR tablet    K-DUR/KLOR-CON M    30 tablet    Take 1 tablet (10 mEq) by mouth daily    Acute congestive heart failure, unspecified congestive heart failure type (H)       PREVACID PO      Take 20 mg by mouth every evening as needed Patient needs to use brand name Prevacid (generic version causes diarrhea)        prochlorperazine 10 MG tablet    COMPAZINE    30 tablet    Take 1 tablet (10 mg) by mouth every 6 hours as needed for nausea or vomiting    Nausea       rosuvastatin 40 MG tablet    CRESTOR    90 tablet    Take 1 tablet (40 mg) by mouth daily    Hyperlipidemia LDL goal <100, Coronary artery disease involving native coronary artery of native heart without angina pectoris       TYLENOL 8 HOUR PO      Take 500 mg by mouth as needed

## 2018-05-18 NOTE — PROGRESS NOTES
Reviewed requests from [facility] with Dr. Adan, who is agreeable with plan for patient to start over the counter iron supplement, as well as dressing changes for pressure ulcer.  Telephone orders were provided to Jeannette with [facility].    Cam Conner RN, BSN, OCN  Oncology Care Coordinator  [facility]

## 2018-05-18 NOTE — PROGRESS NOTES
Received telephone call from Jeannette, nurse with [facility], calling with requests for patient.  Patient is requesting to start on an oral iron supplement to help with his anemia and fatigue - patient had stated to home care nurse that his wife takes an iron supplement, and he would like to take one as well.     Jeannette is also requesting wound care orders for new Stage II pressure ulcer found on patient's bottom.  She would like to use a hydrocolloid dressing with a barrier cream, changed 2-3 times per week.      Message routed to Dr. Adan to review home care order requests.    Cam Conner, RN, BSN, OCN  Oncology Care Coordinator  [facility]

## 2018-05-29 NOTE — MR AVS SNAPSHOT
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)        Flatulence, eructation, and gas pain           Follow-ups after your visit        Your next 10 appointments already scheduled     May 29, 2018 12:30 PM CDT   Level 3 with BAY 2 INFUSION   [facility]    [address] [place] [id number]   [phone]            May 29, 2018  1:00 PM CDT   New Visit with Miriam Hill RD   [facility])    [id number] [place] [address]   [phone]            May 30, 2018  1:00 PM CDT   Level 2 with BAY 8 INFUSION   [facility])    [address] [place] [address]   [phone]            Jun 21, 2018  9:15 AM CDT   CT CHEST/ABDOMEN/PELVIS W CONTRAST with MGCT1   [facility])    [address] [place] [id number] [phone]           Please bring any scans or X-rays taken at other hospitals, if similar tests were done. Also bring a list of your medicines, including vitamins, minerals and over-the-counter drugs. It is safest to leave personal items at home.  Be sure to tell your doctor:   If you have any allergies.   If there s any chance you are pregnant.   If you are breastfeeding.  How to prepare:   Do not eat or drink for 2 hours before your exam. If you need to take medicine, you may take it with small sips of water. (We may ask you to take liquid  medicine as well.)   Please wear loose clothing, such as a sweat suit or jogging clothes. Avoid snaps, zippers and other metal. We may ask you to undress and put on a hospital gown.  Please arrive 30 minutes early for your CT. Once in the department you might be asked to drink water 15-20 minutes prior to your exam.  If indicated you may be asked to drink an oral contrast in advance of your CT.  If this is the case, the imaging team will let you know or be in contact with you prior to your appointment  Patients over 70 or patients with diabetes or kidney problems:   If you haven t had a blood test (creatinine test) within the last 30 days, the Cardiologist/Radiologist may require you to get this test prior to your exam.  If you have diabetes:   Continue to take your metformin medication on the day of your exam  If you have any questions, please call the Imaging Department where you will have your exam.            Jun 21, 2018  9:45 AM CDT   CT SOFT TISSUE NECK W CONTRAST with MGCT1   [facility]    [address] [place] [address] [phone]           Please bring any scans or X-rays taken at other hospitals, if similar tests were done. Also bring a list of your medicines, including vitamins, minerals and over-the-counter drugs. It is safest to leave personal items at home.  Be sure to tell your doctor:   If you have any allergies.   If there s any chance you are pregnant.   If you are breastfeeding.    If you have diabetes as your medication may need to be adjusted for this exam.  You will have contrast for this exam. To prepare:   Do not eat or drink for 2 hours before your exam. If you need to take medicine, you may take it with small sips of water. (We may ask you to take liquid medicine as well.)   The day before your exam, drink extra fluids at least six 8-ounce glasses (unless your doctor tells you to restrict your fluids).  Patients over 70 or  patients with diabetes or kidney problems:   If you haven t had a blood test (creatinine test) within the last 30 days, the Cardiologist/Radiologist may require you to get this test prior to your exam.  Please wear loose clothing, such as a sweat suit or jogging clothes. Avoid snaps, zippers and other metal. We may ask you to undress and put on a hospital gown.  If you have any questions, please call the Imaging Department where you will have your exam.            Jun 25, 2018 10:00 AM CDT   [facility] Visit [facility]   [facility]    [address] [place] [address]   [phone]            Jun 25, 2018 10:45 AM CDT   Return Visit with Erickson Adan MD   [facility]    [address] [place] [address]   [phone]            Jun 25, 2018 11:30 AM CDT   Level 3 with BAY 4 INFUSION   [facility]    [address] [place] [id number] [phone]            Jun 26, 2018 12:00 PM CDT   Level 2 with BAY 10 INFUSION   [facility]    [address] [place] [address]   [phone]              Who to contact     If you have questions or need follow up information about today's clinic visit or your schedule please contact [facility] directly at [phone].  Normal or non-critical lab and imaging results will be communicated to you by MyChart, letter or phone within 4 business days after the clinic has received the results. If you do not hear from us within 7 days, please contact the clinic through MyChart or phone. If you have a critical or abnormal lab result, we will notify you by phone as soon as possible.  Submit refill requests through [employer] or call your pharmacy and they will forward the refill request to us. Please allow 3 business  days for your refill to be completed.          Additional Information About Your Visit        [employer] Information     [employer] gives you secure access to your electronic health record. If you see a primary care provider, you can also send messages to your care team and make appointments. If you have questions, please call your primary care clinic.  If you do not have a primary care provider, please call [phone] and they will assist you.      [employer] is an electronic gateway that provides easy, online access to your medical records. With [employer], you can request a clinic appointment, read your test results, renew a prescription or communicate with your care team.     To access your existing account, please contact your [facility] or call [phone] for assistance.        Care EveryWhere ID     This is your Care EveryWhere ID. This could be used by other organizations to access your [facility] medical records  [id number]         Blood Pressure from Last 3 Encounters:   05/29/18 109/73   05/14/18 100/68   05/01/18 120/76    Weight from Last 3 Encounters:   05/29/18 71.2 kg (157 lb)   05/14/18 67.6 kg (149 lb)   05/01/18 73 kg (161 lb)              We Performed the Following     CBC with platelets differential     Comprehensive metabolic panel     Lactate Dehydrogenase     Phosphorus     Uric acid          Today's Medication Changes          These changes are accurate as of 5/29/18 11:57 AM.  If you have any questions, ask your nurse or doctor.               These medicines have changed or have updated prescriptions.        Dose/Directions    rosuvastatin 40 MG tablet   Commonly known as:  CRESTOR   This may have changed:  when to take this   Used for:  Hyperlipidemia LDL goal <100, Coronary artery disease involving native coronary artery of native heart without angina pectoris        Dose:  40 mg   Take 1 tablet (40 mg) by mouth daily   Quantity:  90 tablet   Refills:  3                 Primary Care Provider Office Phone # Fax #    Florian Alonzo [phone] [address]  [facility] [address]        Equal Access to Services     NANDA BARAJAS : Hadcelio rivera ku yahairao Sonormaali, waqida luqadaha, qaybta kaalmada adesadi, zhao salazarkaiden lorenzo. So [facility] [phone].    ATENCIÓN: Si habla español, tiene a ivan disposición servicios gratuitos de asistencia lingüística. Llame al [phone].    We comply with applicable federal civil rights laws and [place] laws. We do not discriminate on the basis of race, color, national origin, age, disability, sex, sexual orientation, or gender identity.            Thank you!     Thank you for choosing [facility]  for your care. Our goal is always to provide you with excellent care. Hearing back from our patients is one way we can continue to improve our services. Please take a few minutes to complete the written survey that you may receive in the mail after your visit with us. Thank you!             Your Updated Medication List - Protect others around you: Learn how to safely use, store and throw away your medicines at www.disposemymeds.org.          This list is accurate as of 5/29/18 11:57 AM.  Always use your most recent med list.                   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)       Aluminum-Magnesium-Simethicone 200-200-20 MG/5ML Susp      Take 5 mLs by mouth daily as needed        aspirin 81 MG tablet      1 TABLET EVERY MORNING        Benzethonium Chloride 0.1 % Liqd     237 mL    Cleanse with wound cleanser and apply barrier paste and hydrocolloid dressing, change every 2-3 days as needed    Decubitus ulcer of buttock, unspecified laterality, unspecified ulcer stage       clindamycin 1 % lotion    CLEOCIN T    60 mL  "   Apply twice daily for 6 weeks to the groin    Rash       COMPRESSION STOCKINGS     2 each    1 each continuous prn Bilateral knee high compression stockings    Acute congestive heart failure, unspecified congestive heart failure type (H)       furosemide 20 MG tablet    LASIX    60 tablet    Take 1 tablet (20 mg) by mouth daily    Acute congestive heart failure, unspecified congestive heart failure type (H)       Gauze Bandage 4\" Misc     5 each    Cleanse with wound cleanser and apply barrier paste and hydrocolloid dressing, change every 2-3 days as needed    Decubitus ulcer of buttock, unspecified laterality, unspecified ulcer stage       HYDROcodone-acetaminophen 5-325 MG per tablet    NORCO     Take 1 tablet by mouth every 4 hours as needed        hydrocortisone valerate 0.2 % ointment    WEST-NINOSKA    45 g    Apply sparingly to affected area three times daily as needed.    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)       potassium chloride SA 10 MEQ CR tablet    K-DUR/KLOR-CON M    30 tablet    Take 1 tablet (10 mEq) by mouth daily    Acute congestive heart failure, unspecified congestive heart failure type (H)       PREVACID PO      Take 20 mg by mouth every evening as needed Patient needs to use brand name Prevacid (generic version causes diarrhea)        prochlorperazine 10 MG tablet    COMPAZINE    30 tablet    Take 1 tablet (10 mg) by mouth every 6 hours as needed for nausea or vomiting    Nausea       rosuvastatin 40 MG tablet    CRESTOR    90 tablet    Take 1 tablet (40 mg) by mouth daily    Hyperlipidemia LDL goal <100, Coronary artery disease involving native coronary artery of native heart without angina pectoris       TYLENOL 8 HOUR PO      Take 500 mg by mouth as needed        zinc oxide - white petrolatum 20-51% Pste topical paste     170 g    Cleanse with wound cleanser and apply barrier paste and hydrocolloid dressing, change every 2-3 days as needed    Decubitus ulcer of buttock, unspecified laterality, unspecified ulcer stage

## 2018-05-29 NOTE — NURSING NOTE
"Oncology Rooming Note    May 29, 2018 9:44 AM   Francisco Javier Cortes is a 77 year old male who presents for:    Chief Complaint   Patient presents with     Oncology Clinic Visit     prior to tx     Initial Vitals: /73  Pulse 87  Temp 98  F (36.7  C)  Resp 15  Ht 1.651 m (5' 5\")  Wt 71.2 kg (157 lb)  SpO2 98%  BMI 26.13 kg/m2 Estimated body mass index is 26.13 kg/(m^2) as calculated from the following:    Height as of this encounter: 1.651 m (5' 5\").    Weight as of this encounter: 71.2 kg (157 lb). Body surface area is 1.81 meters squared.  Moderate Pain (5) Comment: Hip pain. nothing taken.    No LMP for male patient.  Allergies reviewed: Yes  Medications reviewed: Yes    Medications: Medication refills not needed today.  Pharmacy name entered into [employer]:    [facility] [place] - [place], [place] - [address], [address]  [facility], [place]  [address] [id number] - [place], [place] - [address] [facility] [place], [place] [facility]        5 minutes for nursing intake (face to face time)     Sabiha Shine LPN              "

## 2018-05-29 NOTE — PROGRESS NOTES
FOLLOW-UP VISIT NOTE    PATIENT NAME: Francisco Javier Cortes MRN # [id number]  DATE OF VISIT: May 29, 2018 YOB: 1940    REFERRING PROVIDER: No referring provider defined for this encounter.    CANCER TYPE: Mantle cell lymphoma  STAGE: IVB  ECOG PS: 2    ONCOLOGY HISTORY:    I have reviewed his history from previous records and have copied and updated it accordingly. I am seeing him in the absence of Dr Adan.    77-year-old male with past medical history significant for prostate cancer status post prostatectomy, coronary disease status post stent placement, to complete develop symptoms of abdominal bloating/discomfort and loss of appetite for 2 months. Underwent imaging workup by primary care provider with CT abdominal and pelvis on 02/25/18 showing extensive intra- abdominal and inguinal lymphadenopathy with splenomegaly.    - 03/05/18  Seen by medical oncology.  CT neck and CT chest showed bulky mediastinal, hilar ,axillary and supraclavicular lymphadenopathy. Patient was referred to ENT for diagnostic biopsy. He underwent an FNA of the lymph node on 03/14 which came back with findings consistent with mantle cell lymphoma. 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 Proliferation index was estimated to be 50%. The marrow biopsy performed showed bone marrow involvement by mantle cell lymphoma 40-50%. Fish came back positive for translocation 11:14 consistent with mantle cell lymphoma. PET CT scan extensive hypermetabolic adenopathy of cervical, axillary, mediastinal, hilar, intra-abdominal, retroperitoneal inguinal but the largest Conglomerate of central mesenteric lymph nodes was measuring 15.5 x 6.9 cm.    - 03/29/18  Started on bendamustine in combination with Rituxan along with allopurinol for tumor lysis syndrome prophylaxis.     - Hospitalized for TLS monitoring  - Hospitalized for sepsis from  pneumonia  - Hospitalized for CHF 04/20 - 04/21    - 05/01/15 Second cycle   C#3 5/29/2018     SUBJECTIVE     He comes in today accompanied by his wife and tells me that he is overall doing better. He has much better appetite and he has gained several pounds over the last couple of weeks. He denies any nausea or vomiting. No mouth sores. Denies any abdominal pain. No diarrhea or vision. He thinks the swellings have gone down and he denies any new swellings. No infections or fevers or night sweats. He thinks his energy is a little bit better although still feels fatigued and resting most of the time. No neuropathy. No bleeding. He noticed a sore in his buttock area and he is applying zinc oxide on it and it has improved. He has completed allopurinol and is asking if he should continue taking it    ECOG 2    ROS:  A comprehensive ROS was otherwise neg    I reviewed other history in EPIC as below      PAST MEDICAL HISTORY     Past Medical History:   Diagnosis Date     CAD (coronary artery disease) 1/09    s/p stentsx2     Coronary artery disease involving native coronary artery of native heart without angina pectoris 12/22/2015     Dyslipidemia      Erectile dysfunction      GERD (gastroesophageal reflux disease)      Hearing problem One ear 1961     Hypertension      NSTEMI (non-ST elevated myocardial infarction) (H) 3/20/2014     Pneumonia      Prostate cancer (H)     prostatecomy 9 years ago, PSAs remain good     Status post percutaneous transluminal coronary angioplasty-Coronary angioplasty with STEPHEN to LCx 4/4/2014     Stented coronary artery     stents placed         CURRENT OUTPATIENT MEDICATIONS     Current Outpatient Prescriptions   Medication Sig Dispense Refill     Acetaminophen (TYLENOL 8 HOUR PO) Take 500 mg by mouth as needed        allopurinol (ZYLOPRIM) 300 MG tablet Take 1 tablet (300 mg) by mouth daily (Patient not taking: Reported on 5/14/2018) 30 tablet 1     Alum & Mag Hydroxide-Simeth  "(ALUMINUM-MAGNESIUM-SIMETHICONE) 200-200-20 MG/5ML SUSP Take 5 mLs by mouth daily as needed       ASPIRIN 81 MG OR TABS 1 TABLET EVERY MORNING       Benzethonium Chloride 0.1 % LIQD Cleanse with wound cleanser and apply barrier paste and hydrocolloid dressing, change every 2-3 days as needed 237 mL 3     clindamycin (CLEOCIN T) 1 % lotion Apply twice daily for 6 weeks to the groin 60 mL 1     COMPRESSION STOCKINGS 1 each continuous prn Bilateral knee high compression stockings 2 each 0     furosemide (LASIX) 20 MG tablet Take 1 tablet (20 mg) by mouth daily 60 tablet 6     Gauze Pads & Dressings (GAUZE BANDAGE 4\") MISC Cleanse with wound cleanser and apply barrier paste and hydrocolloid dressing, change every 2-3 days as needed 5 each 3     HYDROcodone-acetaminophen (NORCO) 5-325 MG per tablet Take 1 tablet by mouth every 4 hours as needed       hydrocortisone valerate (WEST-NINOSKA) 0.2 % ointment Apply sparingly to affected area three times daily as needed. 45 g 3     Lansoprazole (PREVACID PO) Take 20 mg by mouth every evening as needed Patient needs to use brand name Prevacid (generic version causes diarrhea)        lidocaine-prilocaine (EMLA) cream Apply 1 Application topically as needed       lisinopril (PRINIVIL/ZESTRIL) 20 MG tablet Take 1 tablet (20 mg) by mouth daily 90 tablet 3     LORazepam (ATIVAN) 0.5 MG tablet Take 1 tablet (0.5 mg) by mouth every 4 hours as needed (Anxiety, Nausea/Vomiting or Sleep) 30 tablet 3     metoprolol succinate (TOPROL-XL) 100 MG 24 hr tablet Take 1 tablet (100 mg) by mouth daily 90 tablet 3     nitroGLYcerin (NITROSTAT) 0.4 MG sublingual tablet Place 1 tablet (0.4 mg) under the tongue every 5 minutes as needed up to 3 tablets per episode. (Patient not taking: Reported on 5/1/2018) 60 tablet 6     ondansetron (ZOFRAN) 8 MG tablet Take 1 tablet (8 mg) by mouth every 8 hours as needed (Nausea/Vomiting) 10 tablet 3     potassium chloride SA (K-DUR/KLOR-CON M) 10 MEQ CR tablet Take " 1 tablet (10 mEq) by mouth daily (Patient not taking: Reported on 5/14/2018) 30 tablet 0     prochlorperazine (COMPAZINE) 10 MG tablet Take 1 tablet (10 mg) by mouth every 6 hours as needed for nausea or vomiting 30 tablet 1     rosuvastatin (CRESTOR) 40 MG tablet Take 1 tablet (40 mg) by mouth daily (Patient taking differently: Take 40 mg by mouth every morning ) 90 tablet 3     zinc oxide - white petrolatum (CRITIC-AID THICK MOIST BARRIER) 20-51% PSTE topical paste Cleanse with wound cleanser and apply barrier paste and hydrocolloid dressing, change every 2-3 days as needed 170 g 3        ALLERGIES     Allergies   Allergen Reactions     Niacin      Severe rash and itching     Prevacid [Lansoprazole] Diarrhea     Patient notes diarrhea with 30mg generic version. Patient does ok with 20mg in generic and brand name.     Shellfish Allergy      One kind        REVIEW OF SYSTEMS   As above in the HPI, o/w complete 12-point ROS was negative.     PHYSICAL EXAM   B/P: 108/72, T: 97.7, P: 95, R: 16  SpO2 Readings from Last 4 Encounters:   05/14/18 99%   05/01/18 96%   05/01/18 97%   04/30/18 98%     Wt Readings from Last 3 Encounters:   05/14/18 67.6 kg (149 lb)   05/01/18 73 kg (161 lb)   04/30/18 72.6 kg (160 lb)     CONSTITUTIONAL: no acute distress  EYES: PERRLA, there is mild pallor but no icterus  ENT/MOUTH: no mouth lesions. Ears normal  CVS: s1s2 no m r g .   RESPIRATORY: clear to auscultation b/l  GI: soft non tender no hepatosplenomegaly  NEURO: AAOX3  Grossly non focal neuro exam  INTEGUMENT: He has a rash which looks erythematous in the middle of the gluteal fold but there is no skin sloughing. It looks like a diaper rash  LYMPHATIC: I can probably feel very small left submandibular lymph nodes and minimally enlarged bilateral inguinal lymph nodes. No other palpable lymph nodes  MUSCULOSKELETAL: Unremarkable. No bony tenderness.   EXTREMITIES: Trace edema  PSYCH: Mentation, mood and affect are normal. Decision  making capacity is intact     LABORATORY AND IMAGING STUDIES     Reviewed   Results for DIDIER SALEH (MRN [id number]) as of 5/29/2018 10:09   Ref. Range 5/29/2018 08:58   Sodium Latest Ref Range: 133 - 144 mmol/L 142   Potassium Latest Ref Range: 3.4 - 5.3 mmol/L 4.1   Chloride Latest Ref Range: 94 - 109 mmol/L 109   Carbon Dioxide Latest Ref Range: 20 - 32 mmol/L 25   Urea Nitrogen Latest Ref Range: 7 - 30 mg/dL 17   Creatinine Latest Ref Range: 0.66 - 1.25 mg/dL 0.91   GFR Estimate Latest Ref Range: >60 mL/min/1.7m2 81   GFR Estimate If Black Latest Ref Range: >60 mL/min/1.7m2 >90   Calcium Latest Ref Range: 8.5 - 10.1 mg/dL 8.3 (L)   Anion Gap Latest Ref Range: 3 - 14 mmol/L 8   Phosphorus Latest Ref Range: 2.5 - 4.5 mg/dL 2.5   Albumin Latest Ref Range: 3.4 - 5.0 g/dL 2.8 (L)   Protein Total Latest Ref Range: 6.8 - 8.8 g/dL 5.5 (L)   Bilirubin Total Latest Ref Range: 0.2 - 1.3 mg/dL 0.4   Alkaline Phosphatase Latest Ref Range: 40 - 150 U/L 77   ALT Latest Ref Range: 0 - 70 U/L 21   AST Latest Ref Range: 0 - 45 U/L 37   Lactate Dehydrogenase Latest Ref Range: 85 - 227 U/L 297 (H)   Uric Acid Latest Ref Range: 3.5 - 7.2 mg/dL 6.2   Glucose Latest Ref Range: 70 - 99 mg/dL 107 (H)   WBC Latest Ref Range: 4.0 - 11.0 10e9/L 7.1   Hemoglobin Latest Ref Range: 13.3 - 17.7 g/dL 9.5 (L)   Hematocrit Latest Ref Range: 40.0 - 53.0 % 29.7 (L)   Platelet Count Latest Ref Range: 150 - 450 10e9/L 78 (L)   RBC Count Latest Ref Range: 4.4 - 5.9 10e12/L 2.79 (L)   MCV Latest Ref Range: 78 - 100 fl 107 (H)   MCH Latest Ref Range: 26.5 - 33.0 pg 34.1 (H)   MCHC Latest Ref Range: 31.5 - 36.5 g/dL 32.0   RDW Latest Ref Range: 10.0 - 15.0 % 21.1 (H)   Diff Method Unknown Automated Method   % Neutrophils Latest Units: % 38.0   % Lymphocytes Latest Units: % 55.0   % Monocytes Latest Units: % 7.0   Absolute Neutrophil Latest Ref Range: 1.6 - 8.3 10e9/L 2.7   Absolute Lymphocytes Latest Ref Range: 0.8 - 5.3 10e9/L 3.9   Absolute Monocytes  Latest Ref Range: 0.0 - 1.3 10e9/L 0.5      ASSESSMENT AND PLAN     77-year-old male with past medical history significant for prostate cancer status post prostatectomy, coronary disease status post stent placement, to complete develop symptoms of abdominal bloating/discomfort and loss of appetite for 2 months. Underwent imaging workup was noted to have diffuse extensive hypermetabolic lymphadenopathy with biopsy of the lymph node showing findings consistent with mantle cell lymphoma. Bone marrow biopsy showed 40-50 % involvement by lymphoma with FISH revealing t(11:14).    - Mantle cell lymphoma  Stage IV B  Started on Bendamustine 90 mg/m2 day 1 and 2 in combination with Rituxan 375 mg/m2 q.4 weeks-   We will proceed with cycle #3 today. He is tolerating chemotherapy well and this cycle went better than last time.   He will get repeat his scans after this and will follow with Dr. Adan    - TLS prophylaxis  He has completed allopurinol and I will not repeat it again. Currently he does not have any evidence of tumor lysis syndrome    - Anemia/Thrombocytopenia  This is most likely chemotherapy-induced and at this time we will continue to observe. I advised him that if he starts noticing any abnormal bleeding then he should let us know.     - A rash on the buttocks.   Continue local care with zinc oxide. At this time there is no evidence of infection    -Nutrition.   He lost several pounds but gained back quite a few over the last 2 weeks. I encouraged him to make sure that he eats healthy and he is going to follow-up with nutritionist later today.     -Fatigue.   This is likely due to the underlying lymphoma as well as chemotherapy.  I advised him to keep himself active and go out for walks and exercises regularly to help with the fatigue.     He will return to clinic as a scheduled    All questions answered and he is agreeable with the plan    Chart documentation with Dragon Voice recognition Software. Although  reviewed after completion, some words and grammatical errors may remain.  Mayte Mcneil

## 2018-05-29 NOTE — LETTER
5/29/2018         RE: Francisco Javier Cortes  [address] [id number]        Dear Colleague,    Thank you for referring your patient, Francisco Javier Cortes, to the [facility]. Please see a copy of my visit note below.      FOLLOW-UP VISIT NOTE    PATIENT NAME: Francisco Javier Cortes MRN # [id number]  DATE OF VISIT: May 29, 2018 YOB: 1940    REFERRING PROVIDER: No referring provider defined for this encounter.    CANCER TYPE: Mantle cell lymphoma  STAGE: IVB  ECOG PS: 2    ONCOLOGY HISTORY:    I have reviewed his history from previous records and have copied and updated it accordingly. I am seeing him in the absence of Dr Adan.    77-year-old male with past medical history significant for prostate cancer status post prostatectomy, coronary disease status post stent placement, to complete develop symptoms of abdominal bloating/discomfort and loss of appetite for 2 months. Underwent imaging workup by primary care provider with CT abdominal and pelvis on 02/25/18 showing extensive intra- abdominal and inguinal lymphadenopathy with splenomegaly.    - 03/05/18  Seen by medical oncology.  CT neck and CT chest showed bulky mediastinal, hilar ,axillary and supraclavicular lymphadenopathy. Patient was referred to ENT for diagnostic biopsy. He underwent an FNA of the lymph node on 03/14 which came back with findings consistent with mantle cell lymphoma. 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 Proliferation index was estimated to be 50%. The marrow biopsy performed showed bone marrow involvement by mantle cell lymphoma 40-50%. Fish came back positive for translocation 11:14 consistent with mantle cell lymphoma. PET CT scan extensive hypermetabolic adenopathy of cervical, axillary, mediastinal, hilar, intra-abdominal, retroperitoneal inguinal but the largest Conglomerate of central  mesenteric lymph nodes was measuring 15.5 x 6.9 cm.    - 03/29/18  Started on bendamustine in combination with Rituxan along with allopurinol for tumor lysis syndrome prophylaxis.     - Hospitalized for TLS monitoring  - Hospitalized for sepsis from pneumonia  - Hospitalized for CHF 04/20 - 04/21    - 05/01/15 Second cycle   C#3 5/29/2018     SUBJECTIVE     He comes in today accompanied by his wife and tells me that he is overall doing better. He has much better appetite and he has gained several pounds over the last couple of weeks. He denies any nausea or vomiting. No mouth sores. Denies any abdominal pain. No diarrhea or vision. He thinks the swellings have gone down and he denies any new swellings. No infections or fevers or night sweats. He thinks his energy is a little bit better although still feels fatigued and resting most of the time. No neuropathy. No bleeding. He noticed a sore in his buttock area and he is applying zinc oxide on it and it has improved. He has completed allopurinol and is asking if he should continue taking it    ECOG 2    ROS:  A comprehensive ROS was otherwise neg    I reviewed other history in EPIC as below      PAST MEDICAL HISTORY     Past Medical History:   Diagnosis Date     CAD (coronary artery disease) 1/09    s/p stentsx2     Coronary artery disease involving native coronary artery of native heart without angina pectoris 12/22/2015     Dyslipidemia      Erectile dysfunction      GERD (gastroesophageal reflux disease)      Hearing problem One ear 1961     Hypertension      NSTEMI (non-ST elevated myocardial infarction) (H) 3/20/2014     Pneumonia      Prostate cancer (H)     prostatecomy 9 years ago, PSAs remain good     Status post percutaneous transluminal coronary angioplasty-Coronary angioplasty with STEPHEN to LCx 4/4/2014     Stented coronary artery     stents placed         CURRENT OUTPATIENT MEDICATIONS     Current Outpatient Prescriptions   Medication Sig Dispense Refill  "    Acetaminophen (TYLENOL 8 HOUR PO) Take 500 mg by mouth as needed        allopurinol (ZYLOPRIM) 300 MG tablet Take 1 tablet (300 mg) by mouth daily (Patient not taking: Reported on 5/14/2018) 30 tablet 1     Alum & Mag Hydroxide-Simeth (ALUMINUM-MAGNESIUM-SIMETHICONE) 200-200-20 MG/5ML SUSP Take 5 mLs by mouth daily as needed       ASPIRIN 81 MG OR TABS 1 TABLET EVERY MORNING       Benzethonium Chloride 0.1 % LIQD Cleanse with wound cleanser and apply barrier paste and hydrocolloid dressing, change every 2-3 days as needed 237 mL 3     clindamycin (CLEOCIN T) 1 % lotion Apply twice daily for 6 weeks to the groin 60 mL 1     COMPRESSION STOCKINGS 1 each continuous prn Bilateral knee high compression stockings 2 each 0     furosemide (LASIX) 20 MG tablet Take 1 tablet (20 mg) by mouth daily 60 tablet 6     Gauze Pads & Dressings (GAUZE BANDAGE 4\") MISC Cleanse with wound cleanser and apply barrier paste and hydrocolloid dressing, change every 2-3 days as needed 5 each 3     HYDROcodone-acetaminophen (NORCO) 5-325 MG per tablet Take 1 tablet by mouth every 4 hours as needed       hydrocortisone valerate (WEST-NINOSKA) 0.2 % ointment Apply sparingly to affected area three times daily as needed. 45 g 3     Lansoprazole (PREVACID PO) Take 20 mg by mouth every evening as needed Patient needs to use brand name Prevacid (generic version causes diarrhea)        lidocaine-prilocaine (EMLA) cream Apply 1 Application topically as needed       lisinopril (PRINIVIL/ZESTRIL) 20 MG tablet Take 1 tablet (20 mg) by mouth daily 90 tablet 3     LORazepam (ATIVAN) 0.5 MG tablet Take 1 tablet (0.5 mg) by mouth every 4 hours as needed (Anxiety, Nausea/Vomiting or Sleep) 30 tablet 3     metoprolol succinate (TOPROL-XL) 100 MG 24 hr tablet Take 1 tablet (100 mg) by mouth daily 90 tablet 3     nitroGLYcerin (NITROSTAT) 0.4 MG sublingual tablet Place 1 tablet (0.4 mg) under the tongue every 5 minutes as needed up to 3 tablets per episode. " (Patient not taking: Reported on 5/1/2018) 60 tablet 6     ondansetron (ZOFRAN) 8 MG tablet Take 1 tablet (8 mg) by mouth every 8 hours as needed (Nausea/Vomiting) 10 tablet 3     potassium chloride SA (K-DUR/KLOR-CON M) 10 MEQ CR tablet Take 1 tablet (10 mEq) by mouth daily (Patient not taking: Reported on 5/14/2018) 30 tablet 0     prochlorperazine (COMPAZINE) 10 MG tablet Take 1 tablet (10 mg) by mouth every 6 hours as needed for nausea or vomiting 30 tablet 1     rosuvastatin (CRESTOR) 40 MG tablet Take 1 tablet (40 mg) by mouth daily (Patient taking differently: Take 40 mg by mouth every morning ) 90 tablet 3     zinc oxide - white petrolatum (CRITIC-AID THICK MOIST BARRIER) 20-51% PSTE topical paste Cleanse with wound cleanser and apply barrier paste and hydrocolloid dressing, change every 2-3 days as needed 170 g 3        ALLERGIES     Allergies   Allergen Reactions     Niacin      Severe rash and itching     Prevacid [Lansoprazole] Diarrhea     Patient notes diarrhea with 30mg generic version. Patient does ok with 20mg in generic and brand name.     Shellfish Allergy      One kind        REVIEW OF SYSTEMS   As above in the HPI, o/w complete 12-point ROS was negative.     PHYSICAL EXAM   B/P: 108/72, T: 97.7, P: 95, R: 16  SpO2 Readings from Last 4 Encounters:   05/14/18 99%   05/01/18 96%   05/01/18 97%   04/30/18 98%     Wt Readings from Last 3 Encounters:   05/14/18 67.6 kg (149 lb)   05/01/18 73 kg (161 lb)   04/30/18 72.6 kg (160 lb)     CONSTITUTIONAL: no acute distress  EYES: PERRLA, there is mild pallor but no icterus  ENT/MOUTH: no mouth lesions. Ears normal  CVS: s1s2 no m r g .   RESPIRATORY: clear to auscultation b/l  GI: soft non tender no hepatosplenomegaly  NEURO: AAOX3  Grossly non focal neuro exam  INTEGUMENT: He has a rash which looks erythematous in the middle of the gluteal fold but there is no skin sloughing. It looks like a diaper rash  LYMPHATIC: I can probably feel very small left  submandibular lymph nodes and minimally enlarged bilateral inguinal lymph nodes. No other palpable lymph nodes  MUSCULOSKELETAL: Unremarkable. No bony tenderness.   EXTREMITIES: Trace edema  PSYCH: Mentation, mood and affect are normal. Decision making capacity is intact     LABORATORY AND IMAGING STUDIES     Reviewed   Results for DIDIER SALEH (MRN [id number]) as of 5/29/2018 10:09   Ref. Range 5/29/2018 08:58   Sodium Latest Ref Range: 133 - 144 mmol/L 142   Potassium Latest Ref Range: 3.4 - 5.3 mmol/L 4.1   Chloride Latest Ref Range: 94 - 109 mmol/L 109   Carbon Dioxide Latest Ref Range: 20 - 32 mmol/L 25   Urea Nitrogen Latest Ref Range: 7 - 30 mg/dL 17   Creatinine Latest Ref Range: 0.66 - 1.25 mg/dL 0.91   GFR Estimate Latest Ref Range: >60 mL/min/1.7m2 81   GFR Estimate If Black Latest Ref Range: >60 mL/min/1.7m2 >90   Calcium Latest Ref Range: 8.5 - 10.1 mg/dL 8.3 (L)   Anion Gap Latest Ref Range: 3 - 14 mmol/L 8   Phosphorus Latest Ref Range: 2.5 - 4.5 mg/dL 2.5   Albumin Latest Ref Range: 3.4 - 5.0 g/dL 2.8 (L)   Protein Total Latest Ref Range: 6.8 - 8.8 g/dL 5.5 (L)   Bilirubin Total Latest Ref Range: 0.2 - 1.3 mg/dL 0.4   Alkaline Phosphatase Latest Ref Range: 40 - 150 U/L 77   ALT Latest Ref Range: 0 - 70 U/L 21   AST Latest Ref Range: 0 - 45 U/L 37   Lactate Dehydrogenase Latest Ref Range: 85 - 227 U/L 297 (H)   Uric Acid Latest Ref Range: 3.5 - 7.2 mg/dL 6.2   Glucose Latest Ref Range: 70 - 99 mg/dL 107 (H)   WBC Latest Ref Range: 4.0 - 11.0 10e9/L 7.1   Hemoglobin Latest Ref Range: 13.3 - 17.7 g/dL 9.5 (L)   Hematocrit Latest Ref Range: 40.0 - 53.0 % 29.7 (L)   Platelet Count Latest Ref Range: 150 - 450 10e9/L 78 (L)   RBC Count Latest Ref Range: 4.4 - 5.9 10e12/L 2.79 (L)   MCV Latest Ref Range: 78 - 100 fl 107 (H)   MCH Latest Ref Range: 26.5 - 33.0 pg 34.1 (H)   MCHC Latest Ref Range: 31.5 - 36.5 g/dL 32.0   RDW Latest Ref Range: 10.0 - 15.0 % 21.1 (H)   Diff Method Unknown Automated Method   %  Neutrophils Latest Units: % 38.0   % Lymphocytes Latest Units: % 55.0   % Monocytes Latest Units: % 7.0   Absolute Neutrophil Latest Ref Range: 1.6 - 8.3 10e9/L 2.7   Absolute Lymphocytes Latest Ref Range: 0.8 - 5.3 10e9/L 3.9   Absolute Monocytes Latest Ref Range: 0.0 - 1.3 10e9/L 0.5      ASSESSMENT AND PLAN     77-year-old male with past medical history significant for prostate cancer status post prostatectomy, coronary disease status post stent placement, to complete develop symptoms of abdominal bloating/discomfort and loss of appetite for 2 months. Underwent imaging workup was noted to have diffuse extensive hypermetabolic lymphadenopathy with biopsy of the lymph node showing findings consistent with mantle cell lymphoma. Bone marrow biopsy showed 40-50 % involvement by lymphoma with FISH revealing t(11:14).    - Mantle cell lymphoma  Stage IV B  Started on Bendamustine 90 mg/m2 day 1 and 2 in combination with Rituxan 375 mg/m2 q.4 weeks-   We will proceed with cycle #3 today. He is tolerating chemotherapy well and this cycle went better than last time.   He will get repeat his scans after this and will follow with Dr. Adan    - TLS prophylaxis  He has completed allopurinol and I will not repeat it again. Currently he does not have any evidence of tumor lysis syndrome    - Anemia/Thrombocytopenia  This is most likely chemotherapy-induced and at this time we will continue to observe. I advised him that if he starts noticing any abnormal bleeding then he should let us know.     - A rash on the buttocks.   Continue local care with zinc oxide. At this time there is no evidence of infection    -Nutrition.   He lost several pounds but gained back quite a few over the last 2 weeks. I encouraged him to make sure that he eats healthy and he is going to follow-up with nutritionist later today.     -Fatigue.   This is likely due to the underlying lymphoma as well as chemotherapy.  I advised him to keep himself active  and go out for walks and exercises regularly to help with the fatigue.     He will return to clinic as a scheduled    All questions answered and he is agreeable with the plan    Chart documentation with Dragon Voice recognition Software. Although reviewed after completion, some words and grammatical errors may remain.  Mayte Mcneil      Again, thank you for allowing me to participate in the care of your patient.        Sincerely,        Mayte Mcneil MD

## 2018-05-29 NOTE — MR AVS SNAPSHOT
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)        Flatulence, eructation, and gas pain           Follow-ups after your visit        Your next 10 appointments already scheduled     May 30, 2018  1:00 PM CDT   Level 2 with BAY 8 INFUSION   [facility] ([facility])    [address] [place] [address]   [phone]            Jun 21, 2018  9:15 AM CDT   CT CHEST/ABDOMEN/PELVIS W CONTRAST with MGCT1   [facility] ([facility])    [address] [place] [id number] [phone]           Please bring any scans or X-rays taken at other hospitals, if similar tests were done. Also bring a list of your medicines, including vitamins, minerals and over-the-counter drugs. It is safest to leave personal items at home.  Be sure to tell your doctor:   If you have any allergies.   If there s any chance you are pregnant.   If you are breastfeeding.  How to prepare:   Do not eat or drink for 2 hours before your exam. If you need to take medicine, you may take it with small sips of water. (We may ask you to take liquid medicine as well.)   Please wear loose clothing, such as a sweat suit or jogging clothes. Avoid snaps, zippers and other metal. We may ask you to undress and put on a hospital gown.  Please arrive 30 minutes early for your CT. Once in the department you might be asked to drink water 15-20 minutes prior to your exam.  If indicated you may be asked to drink an oral contrast in advance of your CT.  If this is the case,  the imaging team will let you know or be in contact with you prior to your appointment  Patients over 70 or patients with diabetes or kidney problems:   If you haven t had a blood test (creatinine test) within the last 30 days, the Cardiologist/Radiologist may require you to get this test prior to your exam.  If you have diabetes:   Continue to take your metformin medication on the day of your exam  If you have any questions, please call the Imaging Department where you will have your exam.            Jun 21, 2018  9:45 AM CDT   CT SOFT TISSUE NECK W CONTRAST with MGCT1   [facility] ([facility])    [address] [place] [address] [phone]           Please bring any scans or X-rays taken at other hospitals, if similar tests were done. Also bring a list of your medicines, including vitamins, minerals and over-the-counter drugs. It is safest to leave personal items at home.  Be sure to tell your doctor:   If you have any allergies.   If there s any chance you are pregnant.   If you are breastfeeding.    If you have diabetes as your medication may need to be adjusted for this exam.  You will have contrast for this exam. To prepare:   Do not eat or drink for 2 hours before your exam. If you need to take medicine, you may take it with small sips of water. (We may ask you to take liquid medicine as well.)   The day before your exam, drink extra fluids at least six 8-ounce glasses (unless your doctor tells you to restrict your fluids).  Patients over 70 or patients with diabetes or kidney problems:   If you haven t had a blood test (creatinine test) within the last 30 days, the Cardiologist/Radiologist may require you to get this test prior to your exam.  Please wear loose clothing, such as a sweat suit or jogging clothes. Avoid snaps, zippers and other metal. We may ask you to undress and put on a hospital gown.  If you have any questions, please call the Imaging  Department where you will have your exam.            Jun 25, 2018 10:00 AM CDT   Return Visit with NURSE ONLY CANCER CENTER   [facility] ([facility])    [address] [facility] [address]   [phone]            Jun 25, 2018 10:45 AM CDT   Return Visit with Erickson Adan MD   [facility] ([facility])    [address] [facility] [address]   [phone]            Jun 25, 2018 11:30 AM CDT   Level 3 with BAY 4 INFUSION   [facility] ([facility])    [address] [facility] [address]   [phone]            Jun 26, 2018 12:00 PM CDT   Level 2 with BAY 10 INFUSION   [facility] ([facility])    [address] [facility] [address]   [phone]              Who to contact     If you have questions or need follow up information about today's clinic visit or your schedule please contact [facility] directly at [phone].  Normal or non-critical lab and imaging results will be communicated to you by MyChart, letter or phone within 4 business days after the clinic has received the results. If you do not hear from us within 7 days, please contact the clinic through [employer] or phone. If you have a critical or abnormal lab result, we will notify you by phone as soon as possible.  Submit refill requests through [employer] or call your pharmacy and they will forward the refill request to us. Please allow 3 business days for your refill to be completed.          Additional Information About Your Visit        [email or web address] [email or web address] Information     [employer] gives you secure access to your electronic health record. If you see a primary care provider, you can also send messages to your care team and make appointments. If you have questions, please call your primary care clinic.  If you do not have a primary care provider, please call  [phone] and they will assist you.      [employer] is an electronic gateway that provides easy, online access to your medical records. With [employer], you can request a clinic appointment, read your test results, renew a prescription or communicate with your care team.     To access your existing account, please contact your [facility] or call [phone] for assistance.        Care EveryWhere ID     This is your Care EveryWhere ID. This could be used by other organizations to access your [facility] medical records  [id number]        Your Vitals Were     Pulse Temperature Pulse Oximetry             94 98.4  F (36.9  C) (Oral) 98%          Blood Pressure from Last 3 Encounters:   05/29/18 96/62   05/29/18 109/73   05/14/18 100/68    Weight from Last 3 Encounters:   05/29/18 71.2 kg (157 lb)   05/14/18 67.6 kg (149 lb)   05/01/18 73 kg (161 lb)              Today, you had the following     No orders found for display         Today's Medication Changes          These changes are accurate as of 5/29/18  4:04 PM.  If you have any questions, ask your nurse or doctor.               These medicines have changed or have updated prescriptions.        Dose/Directions    rosuvastatin 40 MG tablet   Commonly known as:  CRESTOR   This may have changed:  when to take this   Used for:  Hyperlipidemia LDL goal <100, Coronary artery disease involving native coronary artery of native heart without angina pectoris        Dose:  40 mg   Take 1 tablet (40 mg) by mouth daily   Quantity:  90 tablet   Refills:  3                Primary Care Provider Office Phone # Fax #    Florian Alonzo [phone]       [address]  [facility] [address]        Equal Access to Services     NANDA BARAJAS : Alondra Cherry, kenia fiore, zhao mckeon. [facility] [phone].    ATENCIÓN: Si habla español, tiene a ivan disposición  servicios gratuitos de asistencia lingüística. Micaela renner [phone].    We comply with applicable federal civil rights laws and [place] laws. We do not discriminate on the basis of race, color, national origin, age, disability, sex, sexual orientation, or gender identity.            Thank you!     Thank you for choosing [facility]  for your care. Our goal is always to provide you with excellent care. Hearing back from our patients is one way we can continue to improve our services. Please take a few minutes to complete the written survey that you may receive in the mail after your visit with us. Thank you!             Your Updated Medication List - Protect others around you: Learn how to safely use, store and throw away your medicines at www.disposemymeds.org.          This list is accurate as of 5/29/18  4:04 PM.  Always use your most recent med list.                   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)       Aluminum-Magnesium-Simethicone 200-200-20 MG/5ML Susp      Take 5 mLs by mouth daily as needed        aspirin 81 MG tablet      1 TABLET EVERY MORNING        Benzethonium Chloride 0.1 % Liqd     237 mL    Cleanse with wound cleanser and apply barrier paste and hydrocolloid dressing, change every 2-3 days as needed    Decubitus ulcer of buttock, unspecified laterality, unspecified ulcer stage       clindamycin 1 % lotion    CLEOCIN T    60 mL    Apply twice daily for 6 weeks to the groin    Rash       COMPRESSION STOCKINGS     2 each    1 each continuous prn Bilateral knee high compression stockings    Acute congestive heart failure, unspecified congestive heart failure type (H)       furosemide 20 MG tablet    LASIX    60 tablet    Take 1 tablet (20 mg) by mouth daily    Acute congestive heart failure, unspecified  "congestive heart failure type (H)       Gauze Bandage 4\" Misc     5 each    Cleanse with wound cleanser and apply barrier paste and hydrocolloid dressing, change every 2-3 days as needed    Decubitus ulcer of buttock, unspecified laterality, unspecified ulcer stage       HYDROcodone-acetaminophen 5-325 MG per tablet    NORCO     Take 1 tablet by mouth every 4 hours as needed        hydrocortisone valerate 0.2 % ointment    WEST-NINOSKA    45 g    Apply sparingly to affected area three times daily as needed.    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)       potassium chloride SA 10 MEQ CR tablet    " K-DUR/KLOR-CON M    30 tablet    Take 1 tablet (10 mEq) by mouth daily    Acute congestive heart failure, unspecified congestive heart failure type (H)       PREVACID PO      Take 20 mg by mouth every evening as needed Patient needs to use brand name Prevacid (generic version causes diarrhea)        prochlorperazine 10 MG tablet    COMPAZINE    30 tablet    Take 1 tablet (10 mg) by mouth every 6 hours as needed for nausea or vomiting    Nausea       rosuvastatin 40 MG tablet    CRESTOR    90 tablet    Take 1 tablet (40 mg) by mouth daily    Hyperlipidemia LDL goal <100, Coronary artery disease involving native coronary artery of native heart without angina pectoris       TYLENOL 8 HOUR PO      Take 500 mg by mouth as needed        zinc oxide - white petrolatum 20-51% Pste topical paste     170 g    Cleanse with wound cleanser and apply barrier paste and hydrocolloid dressing, change every 2-3 days as needed    Decubitus ulcer of buttock, unspecified laterality, unspecified ulcer stage

## 2018-05-29 NOTE — LETTER
5/29/2018         RE: Francisco Javier Cortes  [address] [facility] [address]        Dear Colleague,    Thank you for referring your patient, Francisco Javier Cortes, to the [facility]. Please see a copy of my visit note below.    Nutrition Services:     Met with patient briefly today 2/2 positive screen from oncology distress screening tool.  He is concerned with his ability to eat (10) and concerned with his weight (10).     He reports that his primary barrier effecting his eating is lack of appetite.  He reports that for 2 weeks after chemo his appetite is poor.   Recently, he his appetite has improved.  He reports that he has been eating more and has gained weight.       Wt Readings from Last 5 Encounters:   05/29/18 71.2 kg (157 lb)   05/14/18 67.6 kg (149 lb)   05/01/18 73 kg (161 lb)   04/30/18 72.6 kg (160 lb)   04/23/18 72.6 kg (160 lb)     Labs reviewed 5/29:  Albumin: 2.8 (low)  Protein: 5.5 (low)  ANC: 2.7    Weight gain may be reflective of fluid retention 2/2 CHF.  He feels as thought he his is not eating enough to have gained 8 lbs in 2 weeks.     Interventions:   Reviewed calorie/protein needs for weight maintenance.   Reviewed high calorie/high protein recipes and protein source.    Encouraged pt to aim for at least 2000kcal and 75g protein/day.      Provided pt with RD contact information.      Miriam Hill RD, LD        Again, thank you for allowing me to participate in the care of your patient.        Sincerely,        Miriam Hill RD

## 2018-05-29 NOTE — MR AVS SNAPSHOT
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)    -  1    Prediabetes           Follow-ups after your visit        Your next 10 appointments already scheduled     May 30, 2018  1:00 PM CDT   Level 2 with BAY 8 INFUSION   [facility] [[email or web address]] ([facility] [[email or web address]])    [id number] [place] [id number] [phone]            Jun 21, 2018  9:15 AM CDT   CT CHEST/ABDOMEN/PELVIS W CONTRAST with MGCT1   [facility])    [address] [place] [id number] [phone]           Please bring any scans or X-rays taken at other hospitals, if similar tests were done. Also bring a list of your medicines, including vitamins, minerals and over-the-counter drugs. It is safest to leave personal items at home.  Be sure to tell your doctor:   If you have any allergies.   If there s any chance you are pregnant.   If you are breastfeeding.  How to prepare:   Do not eat or drink for 2 hours before your exam. If you need to take medicine, you may take it with small sips of water. (We may ask you to take liquid medicine as well.)   Please wear loose clothing, such as a sweat suit or jogging clothes. Avoid snaps, zippers and other metal. We may ask you to undress and put on a hospital gown.  Please arrive 30 minutes early for your CT. Once in the department you might be asked to drink water 15-20 minutes prior to your exam.  If indicated you may be asked to drink an oral contrast in advance of your CT.  If this is the case, the imaging team will let you know or be in contact with you prior to your appointment  Patients over 70 or  patients with diabetes or kidney problems:   If you haven t had a blood test (creatinine test) within the last 30 days, the Cardiologist/Radiologist may require you to get this test prior to your exam.  If you have diabetes:   Continue to take your metformin medication on the day of your exam  If you have any questions, please call the Imaging Department where you will have your exam.            Jun 21, 2018  9:45 AM CDT   CT SOFT TISSUE NECK W CONTRAST with MGCT1   [facility]    [address] [place] [address] [phone]           Please bring any scans or X-rays taken at other hospitals, if similar tests were done. Also bring a list of your medicines, including vitamins, minerals and over-the-counter drugs. It is safest to leave personal items at home.  Be sure to tell your doctor:   If you have any allergies.   If there s any chance you are pregnant.   If you are breastfeeding.    If you have diabetes as your medication may need to be adjusted for this exam.  You will have contrast for this exam. To prepare:   Do not eat or drink for 2 hours before your exam. If you need to take medicine, you may take it with small sips of water. (We may ask you to take liquid medicine as well.)   The day before your exam, drink extra fluids at least six 8-ounce glasses (unless your doctor tells you to restrict your fluids).  Patients over 70 or patients with diabetes or kidney problems:   If you haven t had a blood test (creatinine test) within the last 30 days, the Cardiologist/Radiologist may require you to get this test prior to your exam.  Please wear loose clothing, such as a sweat suit or jogging clothes. Avoid snaps, zippers and other metal. We may ask you to undress and put on a hospital gown.  If you have any questions, please call the Imaging Department where you will have your exam.            Jun 25, 2018 10:00 AM CDT   Return Visit with NURSE ONLY  [facility]    [address] [place] [address]   [phone]            Jun 25, 2018 10:45 AM CDT   [facility] with Erickson Adan [facility]   [facility]    [address] [place] [address]   [phone]            Jun 25, 2018 11:30 AM CDT   Level 3 with BAY 4 INFUSION   [facility]    [address] [place] [address]   [phone]            Jun 26, 2018 12:00 PM CDT   Level 2 with BAY 10 INFUSION   [facility]    [address] [place] [id number] [phone]              Who to contact     If you have questions or need follow up information about today's clinic visit or your schedule please contact [facility] directly at [phone].  Normal or non-critical lab and imaging results will be communicated to you by [employer], letter or phone within 4 business days after the clinic has received the results. If you do not hear from us within 7 days, please contact the clinic through [employer] or phone. If you have a critical or abnormal lab result, we will notify you by phone as soon as possible.  Submit refill requests through [employer] or call your pharmacy and they will forward the refill request to us. Please allow 3 business days for your refill to be completed.          Additional Information About Your Visit        [employer] Information     [employer] gives you secure access to your electronic health record. If you see a primary care provider, you can also send messages to your care team and make appointments. If you have questions, please call your primary care clinic.  If you do not have a primary care provider, please call [phone] and they will assist you.      [employer] is an electronic gateway that provides easy, online access to your  medical records. With [employer], you can request a clinic appointment, read your test results, renew a prescription or communicate with your care team.     To access your existing account, please contact your [facility] or call [phone] for assistance.        Care EveryWhere ID     This is your Care EveryWhere ID. This could be used by other organizations to access your [place] medical records  [id number]         Blood Pressure from Last 3 Encounters:   05/29/18 96/62   05/29/18 109/73   05/14/18 100/68    Weight from Last 3 Encounters:   05/29/18 71.2 kg (157 lb)   05/14/18 67.6 kg (149 lb)   05/01/18 73 kg (161 lb)              Today, you had the following     No orders found for display         Today's Medication Changes          These changes are accurate as of 5/29/18  2:21 PM.  If you have any questions, ask your nurse or doctor.               These medicines have changed or have updated prescriptions.        Dose/Directions    rosuvastatin 40 MG tablet   Commonly known as:  CRESTOR   This may have changed:  when to take this   Used for:  Hyperlipidemia LDL goal <100, Coronary artery disease involving native coronary artery of native heart without angina pectoris        Dose:  40 mg   Take 1 tablet (40 mg) by mouth daily   Quantity:  90 tablet   Refills:  3                Primary Care Provider Office Phone # Fax #    Florian Alonzo [phone]       [address]  [facility] [address]        Equal Access to Services     NANDA BARAJAS AH: Hadii rivera collazo hadasho Sonormaali, waaxda luqadaha, qaybta kaalmada adeegyada, zhao lorenzo. So [facility] [phone].    ATENCIÓN: Si habla español, tiene a ivan disposición servicios gratuitos de asistencia lingüística. Llame al [phone].    We comply with applicable federal civil rights laws and [place] laws. We do not discriminate on the basis of race, color, national origin, age, disability,  "sex, sexual orientation, or gender identity.            Thank you!     Thank you for choosing [facility]  for your care. Our goal is always to provide you with excellent care. Hearing back from our patients is one way we can continue to improve our services. Please take a few minutes to complete the written survey that you may receive in the mail after your visit with us. Thank you!             Your Updated Medication List - Protect others around you: Learn how to safely use, store and throw away your medicines at www.disposemymeds.org.          This list is accurate as of 5/29/18  2:21 PM.  Always use your most recent med list.                   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)       Aluminum-Magnesium-Simethicone 200-200-20 MG/5ML Susp      Take 5 mLs by mouth daily as needed        aspirin 81 MG tablet      1 TABLET EVERY MORNING        Benzethonium Chloride 0.1 % Liqd     237 mL    Cleanse with wound cleanser and apply barrier paste and hydrocolloid dressing, change every 2-3 days as needed    Decubitus ulcer of buttock, unspecified laterality, unspecified ulcer stage       clindamycin 1 % lotion    CLEOCIN T    60 mL    Apply twice daily for 6 weeks to the groin    Rash       COMPRESSION STOCKINGS     2 each    1 each continuous prn Bilateral knee high compression stockings    Acute congestive heart failure, unspecified congestive heart failure type (H)       furosemide 20 MG tablet    LASIX    60 tablet    Take 1 tablet (20 mg) by mouth daily    Acute congestive heart failure, unspecified congestive heart failure type (H)       Gauze Bandage 4\" Misc     5 each    Cleanse with wound cleanser and apply barrier paste and hydrocolloid dressing, change every 2-3 days as needed    Decubitus ulcer of buttock, unspecified " laterality, unspecified ulcer stage       HYDROcodone-acetaminophen 5-325 MG per tablet    NORCO     Take 1 tablet by mouth every 4 hours as needed        hydrocortisone valerate 0.2 % ointment    WEST-NINOSKA    45 g    Apply sparingly to affected area three times daily as needed.    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)       potassium chloride SA 10 MEQ CR tablet    K-DUR/KLOR-CON M    30 tablet    Take 1 tablet (10 mEq) by mouth daily    Acute congestive heart failure, unspecified congestive heart failure type (H)       PREVACID PO      Take 20 mg by mouth every evening as needed Patient needs to use  brand name Prevacid (generic version causes diarrhea)        prochlorperazine 10 MG tablet    COMPAZINE    30 tablet    Take 1 tablet (10 mg) by mouth every 6 hours as needed for nausea or vomiting    Nausea       rosuvastatin 40 MG tablet    CRESTOR    90 tablet    Take 1 tablet (40 mg) by mouth daily    Hyperlipidemia LDL goal <100, Coronary artery disease involving native coronary artery of native heart without angina pectoris       TYLENOL 8 HOUR PO      Take 500 mg by mouth as needed        zinc oxide - white petrolatum 20-51% Pste topical paste     170 g    Cleanse with wound cleanser and apply barrier paste and hydrocolloid dressing, change every 2-3 days as needed    Decubitus ulcer of buttock, unspecified laterality, unspecified ulcer stage

## 2018-05-29 NOTE — PROGRESS NOTES
Power port needle left accessed for treatment. Tolerated lab draw without complaint. [place] drawn-Red/Greenx2/Purple tubes. Double signed by patient and RN. See documentation flowsheet. Nieves Estrada, RN, BSN, OCN

## 2018-05-29 NOTE — PROGRESS NOTES
Nutrition Services:     Met with patient briefly today 2/2 positive screen from oncology distress screening tool.  He is concerned with his ability to eat (10) and concerned with his weight (10).     He reports that his primary barrier effecting his eating is lack of appetite.  He reports that for 2 weeks after chemo his appetite is poor.   Recently, he his appetite has improved.  He reports that he has been eating more and has gained weight.       Wt Readings from Last 5 Encounters:   05/29/18 71.2 kg (157 lb)   05/14/18 67.6 kg (149 lb)   05/01/18 73 kg (161 lb)   04/30/18 72.6 kg (160 lb)   04/23/18 72.6 kg (160 lb)     Labs reviewed 5/29:  Albumin: 2.8 (low)  Protein: 5.5 (low)  ANC: 2.7    Weight gain may be reflective of fluid retention 2/2 CHF.  He feels as thought he his is not eating enough to have gained 8 lbs in 2 weeks.     Interventions:   Reviewed calorie/protein needs for weight maintenance.   Reviewed high calorie/high protein recipes and protein source.    Encouraged pt to aim for at least 2000kcal and 75g protein/day.      Provided pt with RD contact information.      Miriam Hill RD, LD

## 2018-05-29 NOTE — MR AVS SNAPSHOT
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)    -  1      Care Instructions    Chemo today    RTC as scheduled              Follow-ups after your visit        Your next 10 appointments already scheduled     May 29, 2018 12:30 PM CDT   Level 3 with BAY 2 INFUSION   [facility])    [id number] [place] [id number]   [phone]            May 29, 2018  1:00 PM CDT   New Visit with Miriam Hill RD   [facility])    [id number] [place] [id number]   [phone]            May 30, 2018  1:00 PM CDT   Level 2 with BAY 8 INFUSION   [facility])    [address] [place] [address]   [phone]            Jun 21, 2018  9:15 AM CDT   CT CHEST/ABDOMEN/PELVIS W CONTRAST with MGCT1   [facility])    [address] [place] [address]   [phone]           Please bring any scans or X-rays taken at other hospitals, if similar tests were done. Also bring a list of your medicines, including vitamins, minerals and over-the-counter drugs. It is safest to leave personal items at home.  Be sure to tell your doctor:   If you have any allergies.   If there s any chance you are pregnant.   If you are breastfeeding.  How to prepare:   Do not eat or drink for 2 hours before your exam. If you need to take medicine, you may take it with small sips of water. (We may ask you to take liquid medicine as well.)   Please wear loose clothing, such as a sweat suit or jogging clothes.  Avoid snaps, zippers and other metal. We may ask you to undress and put on a hospital gown.  Please arrive 30 minutes early for your CT. Once in the department you might be asked to drink water 15-20 minutes prior to your exam.  If indicated you may be asked to drink an oral contrast in advance of your CT.  If this is the case, the imaging team will let you know or be in contact with you prior to your appointment  Patients over 70 or patients with diabetes or kidney problems:   If you haven t had a blood test (creatinine test) within the last 30 days, the Cardiologist/Radiologist may require you to get this test prior to your exam.  If you have diabetes:   Continue to take your metformin medication on the day of your exam  If you have any questions, please call the Imaging Department where you will have your exam.            Jun 21, 2018  9:45 AM CDT   CT SOFT TISSUE NECK W CONTRAST with MGCT1   [facility]    [id number] [place] [id number] [phone]           Please bring any scans or X-rays taken at other hospitals, if similar tests were done. Also bring a list of your medicines, including vitamins, minerals and over-the-counter drugs. It is safest to leave personal items at home.  Be sure to tell your doctor:   If you have any allergies.   If there s any chance you are pregnant.   If you are breastfeeding.    If you have diabetes as your medication may need to be adjusted for this exam.  You will have contrast for this exam. To prepare:   Do not eat or drink for 2 hours before your exam. If you need to take medicine, you may take it with small sips of water. (We may ask you to take liquid medicine as well.)   The day before your exam, drink extra fluids at least six 8-ounce glasses (unless your doctor tells you to restrict your fluids).  Patients over 70 or patients with diabetes or kidney problems:   If you haven t had a blood test (creatinine  test) within the last 30 days, the Cardiologist/Radiologist may require you to get this test prior to your exam.  Please wear loose clothing, such as a sweat suit or jogging clothes. Avoid snaps, zippers and other metal. We may ask you to undress and put on a hospital gown.  If you have any questions, please call the Imaging Department where you will have your exam.            Jun 25, 2018 10:00 AM CDT   Return Visit with [facility]   [facility]    [address] [place] [address]   [phone]            Jun 25, 2018 10:45 AM CDT   Return Visit with Erickson Adan MD   [facility] ([facility])    [address] [place] [address]   [phone]            Jun 25, 2018 11:30 AM CDT   Level 3 with BAY 4 INFUSION   [facility] ([facility])    [address] [place] [id number] [phone]            Jun 26, 2018 12:00 PM CDT   Level 2 with BAY 10 INFUSION   [facility])    [address] [place] [id number] [phone]              Who to contact     If you have questions or need follow up information about today's clinic visit or your schedule please contact [facility] directly at [phone].  Normal or non-critical lab and imaging results will be communicated to you by MyChart, letter or phone within 4 business days after the clinic has received the results. If you do not hear from us within 7 days, please contact the clinic through [employer] or phone. If you have a critical or abnormal lab result, we will notify you by phone as soon as possible.  Submit refill requests through [employer] or call your pharmacy and they will forward the refill request to us. Please allow 3 business days for your refill to be completed.          Additional Information About Your Visit    "     MyChart Information     [facility] [employer] gives you secure access to your electronic health record. If you see a primary care provider, you can also send messages to your care team and make appointments. If you have questions, please call your primary care clinic.  If you do not have a primary care provider, please call [phone] and they will assist you.      [facility] [employer] is an electronic gateway that provides easy, online access to your medical records. With [facility], you can request a clinic appointment, read your test results, renew a prescription or communicate with your care team.     To access your existing account, please contact your [facility] or call [phone] for assistance.        Care EveryWhere ID     This is your Care EveryWhere ID. This could be used by other organizations to access your [facility] medical records  [id number]        Your Vitals Were     Pulse Temperature Respirations Height Pulse Oximetry BMI (Body Mass Index)    87 98  F (36.7  C) 15 1.651 m (5' 5\") 98% 26.13 kg/m2       Blood Pressure from Last 3 Encounters:   05/29/18 109/73   05/14/18 100/68   05/01/18 120/76    Weight from Last 3 Encounters:   05/29/18 71.2 kg (157 lb)   05/14/18 67.6 kg (149 lb)   05/01/18 73 kg (161 lb)              Today, you had the following     No orders found for display         Today's Medication Changes          These changes are accurate as of 5/29/18 10:18 AM.  If you have any questions, ask your nurse or doctor.               These medicines have changed or have updated prescriptions.        Dose/Directions    rosuvastatin 40 MG tablet   Commonly known as:  CRESTOR   This may have changed:  when to take this   Used for:  Hyperlipidemia LDL goal <100, Coronary artery disease involving native coronary artery of native heart without angina pectoris        Dose:  40 mg   Take 1 tablet (40 mg) by mouth daily   Quantity:  90 tablet   Refills:  3                Primary Care " Provider Office Phone # Fax #    Florian Alonzo [phone] [address]  [facility] [address]        Equal Access to Services     TERESETERENCE JENN : Hadii rivera ku yahairao Dilip, waaxda luqadaha, qaybta kaalmada adesadi, zhao farleyjay maura. So [facility] [phone].    ATENCIÓN: Si habla español, tiene a ivan disposición servicios gratuitos de asistencia lingüística. Llame al [phone].    We comply with applicable federal civil rights laws and [place] laws. We do not discriminate on the basis of race, color, national origin, age, disability, sex, sexual orientation, or gender identity.            Thank you!     Thank you for choosing [facility]  for your care. Our goal is always to provide you with excellent care. Hearing back from our patients is one way we can continue to improve our services. Please take a few minutes to complete the written survey that you may receive in the mail after your visit with us. Thank you!             Your Updated Medication List - Protect others around you: Learn how to safely use, store and throw away your medicines at www.disposemymeds.org.          This list is accurate as of 5/29/18 10:18 AM.  Always use your most recent med list.                   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)       Aluminum-Magnesium-Simethicone 200-200-20 MG/5ML Susp      Take 5 mLs by mouth daily as needed        aspirin 81 MG tablet      1 TABLET EVERY MORNING        Benzethonium Chloride 0.1 % Liqd     237 mL    Cleanse with wound cleanser and apply barrier paste and hydrocolloid dressing, change every 2-3 days as needed    Decubitus ulcer of buttock, unspecified laterality, unspecified ulcer stage       clindamycin 1 % lotion    CLEOCIN T    60 mL    Apply twice  "daily for 6 weeks to the groin    Rash       COMPRESSION STOCKINGS     2 each    1 each continuous prn Bilateral knee high compression stockings    Acute congestive heart failure, unspecified congestive heart failure type (H)       furosemide 20 MG tablet    LASIX    60 tablet    Take 1 tablet (20 mg) by mouth daily    Acute congestive heart failure, unspecified congestive heart failure type (H)       Gauze Bandage 4\" Misc     5 each    Cleanse with wound cleanser and apply barrier paste and hydrocolloid dressing, change every 2-3 days as needed    Decubitus ulcer of buttock, unspecified laterality, unspecified ulcer stage       HYDROcodone-acetaminophen 5-325 MG per tablet    NORCO     Take 1 tablet by mouth every 4 hours as needed        hydrocortisone valerate 0.2 % ointment    WEST-NINOSKA    45 g    Apply sparingly to affected area three times daily as needed.    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)       potassium chloride SA 10 MEQ CR tablet    K-DUR/KLOR-CON M    30 tablet    Take 1 tablet (10 mEq) by mouth daily    Acute congestive heart failure, unspecified congestive heart failure type (H)       PREVACID PO      Take 20 mg by mouth every evening as needed Patient needs to use brand name Prevacid (generic version causes diarrhea)        prochlorperazine 10 MG tablet    COMPAZINE    30 tablet    Take 1 tablet (10 mg) by mouth every 6 hours as needed for nausea or vomiting    Nausea       rosuvastatin 40 MG tablet    CRESTOR    90 tablet    Take 1 tablet (40 mg) by mouth daily    Hyperlipidemia LDL goal <100, Coronary artery disease involving native coronary artery of native heart without angina pectoris       TYLENOL 8 HOUR PO      Take 500 mg by mouth as needed        zinc oxide - white petrolatum 20-51% Pste topical paste     170 g    Cleanse with wound cleanser and apply barrier paste and hydrocolloid dressing, change every 2-3 days as needed    Decubitus ulcer of buttock, unspecified laterality, unspecified ulcer stage

## 2018-05-29 NOTE — PROGRESS NOTES
Infusion Nursing Note:  Francisco Javier Cortes presents today for C3D1 Rituxan and Bendamustine.    Patient seen by provider today: Yes: Dr. Mcneil   present during visit today: Not Applicable.    Note: Pt c/o sore on buttocks, fatigue and nausea after last cycle, see Dr. Mcneil's note for assessment.    Intravenous Access:  Implanted Port.    Treatment Conditions:  Lab Results   Component Value Date    HGB 9.5 05/29/2018     Lab Results   Component Value Date    WBC 7.1 05/29/2018      Lab Results   Component Value Date    ANEU 2.7 05/29/2018     Lab Results   Component Value Date    PLT 78 05/29/2018      Lab Results   Component Value Date     05/29/2018                   Lab Results   Component Value Date    POTASSIUM 4.1 05/29/2018           Lab Results   Component Value Date    MAG 2.1 04/11/2018            Lab Results   Component Value Date    CR 0.91 05/29/2018                   Lab Results   Component Value Date    EVANGELINA 8.3 05/29/2018                Lab Results   Component Value Date    BILITOTAL 0.4 05/29/2018           Lab Results   Component Value Date    ALBUMIN 2.8 05/29/2018                    Lab Results   Component Value Date    ALT 21 05/29/2018           Lab Results   Component Value Date    AST 37 05/29/2018       Results reviewed, labs MET treatment parameters, ok to proceed with treatment.    Infusion rates:  Started at 200cc/hr x 30 minutes then increased rate to 400mls/hr for remainder.      Post Infusion Assessment:  Patient tolerated infusion without incident.  Blood return noted pre and post infusion.  Site patent and intact, free from redness, edema or discomfort.  No evidence of extravasations.    Discharge Plan:   Patient discharged in stable condition accompanied by: wife.  Departure Mode: Ambulatory.  Pt will RTC C3D2 Bendamustine/Onpro .    Rodo Chirinos RN

## 2018-05-30 NOTE — PROGRESS NOTES
Infusion Nursing Note:  Francisco Javier Cortes presents today for C3D2 Bendamustine and On-Pro.    Patient seen by provider today: No   present during visit today: Not Applicable.    Note: Pt says he had a good night, nausea improved, see flow sheet for assessment.    ONPRO  Was placed on patient's: right side of abdomen.    Was placed at 2:00 PM    ONPRO injector device Lot number: [id number]    Patient education included: what patient can expect after application, what colored lights mean on the device, when to remove device, when and where to call with questions or issues, all patients questions answered and that Neulasta administration will occur at 5:00pm.    Patient tolerated administration well.    Intravenous Access:  Implanted Port.    Treatment Conditions:  Lab Results   Component Value Date    HGB 9.5 05/29/2018     Lab Results   Component Value Date    WBC 7.1 05/29/2018      Lab Results   Component Value Date    ANEU 2.7 05/29/2018     Lab Results   Component Value Date    PLT 78 05/29/2018      Results reviewed, labs MET treatment parameters, ok to proceed with treatment.      Post Infusion Assessment:  Patient tolerated infusion without incident.  Patient tolerated injection without incident.  Blood return noted pre and post infusion.  Site patent and intact, free from redness, edema or discomfort.  No evidence of extravasations.  Access discontinued per protocol.    Discharge Plan:   Patient discharged in stable condition accompanied by: wife.  Departure Mode: Ambulatory.  Pt will RTC 6/24/18 C4D1 Rituxan/Bendamustine.    Rodo Chirinos RN

## 2018-05-30 NOTE — MR AVS SNAPSHOT
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 Also bring a list of your medicines, including vitamins, minerals and over-the-counter drugs. It is safest to leave personal items at home.  Be sure to tell your doctor:   If you have any allergies.   If there s any chance you are pregnant.   If you are breastfeeding.  How to prepare:   Do not eat or drink for 2 hours before your exam. If you need to take medicine, you may take it with small sips of water. (We may ask you to take liquid medicine as well.)   Please wear loose clothing, such as a sweat suit or jogging clothes. Avoid snaps, zippers and other metal. We may ask you to undress and put on a hospital gown.  Please arrive 30 minutes early for your CT. Once in the department you might be asked to drink water 15-20 minutes prior to your exam.  If indicated you may be asked to drink an oral contrast in advance of your CT.  If this is the case, the imaging team will let you know or be in contact with you prior to your appointment  Patients over 70 or patients with diabetes or kidney problems:   If you haven t had a blood test (creatinine  test) within the last 30 days, the Cardiologist/Radiologist may require you to get this test prior to your exam.  If you have diabetes:   Continue to take your metformin medication on the day of your exam  If you have any questions, please call the Imaging Department where you will have your exam.            Jun 21, 2018  9:45 AM CDT   CT SOFT TISSUE NECK W CONTRAST with MGCT1   [facility])    [address] [place] [address] [phone]           Please bring any scans or X-rays taken at other hospitals, if similar tests were done. Also bring a list of your medicines, including vitamins, minerals and over-the-counter drugs. It is safest to leave personal items at home.  Be sure to tell your doctor:   If you have any allergies.   If there s any chance you are pregnant.   If you are breastfeeding.    If you have diabetes as your medication may need to be adjusted for this exam.  You will have contrast for this exam. To prepare:   Do not eat or drink for 2 hours before your exam. If you need to take medicine, you may take it with small sips of water. (We may ask you to take liquid medicine as well.)   The day before your exam, drink extra fluids at least six 8-ounce glasses (unless your doctor tells you to restrict your fluids).  Patients over 70 or patients with diabetes or kidney problems:   If you haven t had a blood test (creatinine test) within the last 30 days, the Cardiologist/Radiologist may require you to get this test prior to your exam.  Please wear loose clothing, such as a sweat suit or jogging clothes. Avoid snaps, zippers and other metal. We may ask you to undress and put on a hospital gown.  If you have any questions, please call the Imaging Department where you will have your exam.            Jun 25, 2018 10:00 AM CDT   Return Visit with N[facility]SE [facility]   [facility])    [address]  [facility] [address]   [phone]            Jun 25, 2018 10:45 AM CDT   [facility]    [address] [place] [id number]   [phone]            Jun 25, 2018 11:30 AM CDT   Level 3 with BAY 4 INFUSION   [facility]    [address] [place] [id number] [phone]            Jun 26, 2018 12:00 PM CDT   Level 2 with BAY 10 INFUSION   [facility]    [address] [place] [id number]   [phone]              Who to contact     If you have questions or need follow up information about today's clinic visit or your schedule please contact [facility] directly at [phone].  Normal or non-critical lab and imaging results will be communicated to you by PM Pediatricshart, letter or phone within 4 business days after the clinic has received the results. If you do not hear from us within 7 days, please contact the clinic through [employer] or phone. If you have a critical or abnormal lab result, we will notify you by phone as soon as possible.  Submit refill requests through [employer] or call your pharmacy and they will forward the refill request to us. Please allow 3 business days for your refill to be completed.          Additional Information About Your Visit        [employer] Information     [employer] gives you secure access to your electronic health record. If you see a primary care provider, you can also send messages to your care team and make appointments. If you have questions, please call your primary care clinic.  If you do not have a primary care provider, please call [phone] and they will assist you.      [employer] is an electronic gateway that provides easy, online access to your medical records. With [employer], you can request a clinic appointment, read your test  results, renew a prescription or communicate with your care team.     To access your existing account, please contact your [facility] or call [phone] for assistance.        Care EveryWhere ID     This is your Care EveryWhere ID. This could be used by other organizations to access your [place] medical records  [id number]        Your Vitals Were     Pulse Temperature Pulse Oximetry             81 97.6  F (36.4  C) (Oral) 98%          Blood Pressure from Last 3 Encounters:   05/30/18 96/64   05/29/18 96/62   05/29/18 109/73    Weight from Last 3 Encounters:   05/29/18 71.2 kg (157 lb)   05/14/18 67.6 kg (149 lb)   05/01/18 73 kg (161 lb)              Today, you had the following     No orders found for display         Today's Medication Changes          These changes are accurate as of 5/30/18  2:48 PM.  If you have any questions, ask your nurse or doctor.               These medicines have changed or have updated prescriptions.        Dose/Directions    rosuvastatin 40 MG tablet   Commonly known as:  CRESTOR   This may have changed:  when to take this   Used for:  Hyperlipidemia LDL goal <100, Coronary artery disease involving native coronary artery of native heart without angina pectoris        Dose:  40 mg   Take 1 tablet (40 mg) by mouth daily   Quantity:  90 tablet   Refills:  3                Primary Care Provider Office Phone # Fax #    Florian Alonzo [phone]       [address]  [facility] [address]        Equal Access to Services     NANDA BARAJAS AH: Hadii rivera Cherry, waaxda macarena, qaybta kaalmadonn wu, zhao lorenzo. So [facility] [phone].    ATENCIÓN: Si habla español, tiene a ivan disposición servicios gratuitos de asistencia lingüística. Llame al [phone].    We comply with applicable federal civil rights laws and [place] laws. We do not discriminate on the basis of race, color, national  "origin, age, disability, sex, sexual orientation, or gender identity.            Thank you!     Thank you for choosing [facility]  for your care. Our goal is always to provide you with excellent care. Hearing back from our patients is one way we can continue to improve our services. Please take a few minutes to complete the written survey that you may receive in the mail after your visit with us. Thank you!             Your Updated Medication List - Protect others around you: Learn how to safely use, store and throw away your medicines at www.disposemymeds.org.          This list is accurate as of 5/30/18  2:48 PM.  Always use your most recent med list.                   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)       Aluminum-Magnesium-Simethicone 200-200-20 MG/5ML Susp      Take 5 mLs by mouth daily as needed        aspirin 81 MG tablet      1 TABLET EVERY MORNING        Benzethonium Chloride 0.1 % Liqd     237 mL    Cleanse with wound cleanser and apply barrier paste and hydrocolloid dressing, change every 2-3 days as needed    Decubitus ulcer of buttock, unspecified laterality, unspecified ulcer stage       clindamycin 1 % lotion    CLEOCIN T    60 mL    Apply twice daily for 6 weeks to the groin    Rash       COMPRESSION STOCKINGS     2 each    1 each continuous prn Bilateral knee high compression stockings    Acute congestive heart failure, unspecified congestive heart failure type (H)       furosemide 20 MG tablet    LASIX    60 tablet    Take 1 tablet (20 mg) by mouth daily    Acute congestive heart failure, unspecified congestive heart failure type (H)       Gauze Bandage 4\" Misc     5 each    Cleanse with wound cleanser and apply barrier paste and hydrocolloid dressing, change every 2-3 days as needed    Decubitus ulcer of " buttock, unspecified laterality, unspecified ulcer stage       HYDROcodone-acetaminophen 5-325 MG per tablet    NORCO     Take 1 tablet by mouth every 4 hours as needed        hydrocortisone valerate 0.2 % ointment    WEST-NINOSKA    45 g    Apply sparingly to affected area three times daily as needed.    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)       potassium chloride SA 10 MEQ CR tablet    K-DUR/KLOR-CON M    30 tablet    Take 1 tablet (10 mEq) by mouth daily    Acute congestive heart failure, unspecified congestive heart failure type (H)       PREVACID PO      Take 20 mg by mouth every evening as needed  Patient needs to use brand name Prevacid (generic version causes diarrhea)        prochlorperazine 10 MG tablet    COMPAZINE    30 tablet    Take 1 tablet (10 mg) by mouth every 6 hours as needed for nausea or vomiting    Nausea       rosuvastatin 40 MG tablet    CRESTOR    90 tablet    Take 1 tablet (40 mg) by mouth daily    Hyperlipidemia LDL goal <100, Coronary artery disease involving native coronary artery of native heart without angina pectoris       TYLENOL 8 HOUR PO      Take 500 mg by mouth as needed        zinc oxide - white petrolatum 20-51% Pste topical paste     170 g    Cleanse with wound cleanser and apply barrier paste and hydrocolloid dressing, change every 2-3 days as needed    Decubitus ulcer of buttock, unspecified laterality, unspecified ulcer stage

## 2018-06-01 NOTE — TELEPHONE ENCOUNTER
[facility] Call Center    Phone Message    May a detailed message be left on voicemail: yes    Reason for Call: Order(s): Home Care Orders: Other: wound care orders- Nurse requesting new verbal wound care orders as follows: Cleanse  left check buttocks new pressure ulcer with nina cleanse, pat aguilar, use barrier cream on irritated skin as needed and apply padded Tegaderm every 2 to 3 days. Family perform wound dressing changes non nursing days. Requesting an order for a wound care nurse visit next week to check pressure ulcers. Frequency 1 x 1 and 1 prn. Please advise.    Action Taken: Message routed to:  Primary Care p [id number]

## 2018-06-01 NOTE — TELEPHONE ENCOUNTER
Routed to Dr. Alonzo.    Adrienne Gordon RN,   [facility], [facility]

## 2018-06-05 NOTE — TELEPHONE ENCOUNTER
Corine is calling to request verbal orders. Please advise [phone]. A detailed message with the orders can be left.

## 2018-06-08 NOTE — TELEPHONE ENCOUNTER
[facility] Call Center    Phone Message    May a detailed message be left on voicemail: yes    Reason for Call: Other: update for doctor- patient is still have some chest pain with walking back from the mailbox which resolved with rest, no other episodes since then.  patient needs to keep his nitro with him at all times.  this is an fyi- please call back if you need to      Action Taken: Message routed to:  Primary Care p [id number]

## 2018-06-08 NOTE — TELEPHONE ENCOUNTER
Routed FYI from home care nurse to Dr. Alonzo and covering providers.      Adrienne Gordon RN,   [facility], [facility]

## 2018-06-11 NOTE — TELEPHONE ENCOUNTER
Called JONATHAN Pool with [facility].       To clarify, she states this was a one time occurrence, has not happened since.  He did not have his Nitro tablets with him.  Corine advised he keep his nitroglycerine tabs with him at all times.  He agreed to plan.    Corine also notes this was just after he had chemo treatment.    Will route this message to Dr. Ball as FYI as requested by Dr. Alonzo.    Leticia Neal RN, [facility]

## 2018-06-12 NOTE — TELEPHONE ENCOUNTER
Called Corine back, she is asking for 1 more PRN visit for Skilled nursing to see patient for wound care.  Per RN protocol, gave this order.  Leticia Neal RN, [facility]

## 2018-06-12 NOTE — TELEPHONE ENCOUNTER
Verbal orders given. Please note the update from the encounter from earlier today 6/12/18. Louann Moreno RN

## 2018-06-12 NOTE — TELEPHONE ENCOUNTER
[facility] Call Center    Phone Message: Corine Torres a detailed message be left on voicemail: yes    Reason for Call: Order(s): Home Care Orders: Skilled Nursing: Once weekly for one week.  One visit as needed.  Please advise.  Thank you.     Action Taken: Message routed to:  Primary Care p [id number]

## 2018-06-12 NOTE — TELEPHONE ENCOUNTER
[facility] Call [facility]    Phone Message    May a detailed message be left on voicemail: no    Reason for Call: Order(s): Home Care Orders: Other: one time visit wound nurse for today needed asap. Please give home care nurse a call back to discuss [phone]    Action Taken: Message routed to:  Primary Care p [id number]

## 2018-06-13 NOTE — PROGRESS NOTES
[facility] now requests orders and shares plan of care/discharge summaries for some patients through [employer].  Please REPLY TO THIS MESSAGE OR ROUTE BACK TO THE AUTHOR in order to give authorization for orders when needed.  This is considered a verbal order, you will still receive a faxed copy of orders for signature.  Thank you for your assistance in improving collaboration for our patients.    ORDER    Wound nurse assessment to unstageable pressure ulcers that appear to be worsening.  Recommend applying iodoflex to liquefy slough, cover with foam dressing such as mepilex, ok to cover with moisture barrier cream to protect from moisture and triad paste if dressing does not stick.  Leticia Escalona, JONATHAN, CWON

## 2018-06-15 NOTE — PROGRESS NOTES
Gave verbal orders to home care nurse Corine,for skilled nursin a week for 8 weeks, 1 time a week 1 week, 4 as needed visits for wounds care assessment and education.    Adrienne Gordon RN,   [facility]            The following meds were verified with home health nurse and removed from the home health care list as they are not on current med list:  -COMPAZINE did not have, was using after chemo-  Using lorazepam and zofran instead  -levofloxacin; removed form  med list.      The following meds were verified with home health nurse and current in med list.  -using topical ointment as needed, cleocin. Derm change AS needed  -Lorazapam, as needed after chemo nausea     -zofran  -using Maalox and previcid taking 20 over the counter,

## 2018-06-15 NOTE — PROGRESS NOTES
Routing to Dr. Durand covering providers to advise:    Home care nurse reports the patient is not taking the following medications since his first round of chemo in May 2018:  THESE MEDS ARE STILL ON CURRENT MED LIST   -Allopurinol (ordered by Dr. Auguste for mantle cell lymphoma)  -Furosemide (ordered by Cardiology Dr. Ball)  -Potassium  (from hospital discharge)  -Lisinopril.  Patient not taking, he states this was discontinued because blood pressure is low and he was dizzy.  Home care nurse states that is true and she did call to get parameters for his metoprolol but patient states he is not taking the lisinopril. (ordered by Dr. Alonzo)    Also home care nurse reports that patient is not compliant with taking daily weights.   Home care has been taking weights when they go to see patient and encourage him he needs to do this daily.  5/4/18 151.3#  5/18/18  152.4#  6/8/18   157#  6/15/2018 157.4#  Adrienne Gordon RN,   [facility]

## 2018-06-15 NOTE — PROGRESS NOTES
Called Corine back.  Left message with phone number to call back.  Let her know we need to clarify the medications.    Leticia Neal RN, [facility]

## 2018-06-15 NOTE — PROGRESS NOTES
[facility] Call Center    Phone Message    May a detailed message be left on voicemail: yes    Reason for Call: Other[facility] med rec. Orders for skilled nursing  2 a week for 8 weeks, 1 time a week 1 week, 4 as needed visits for wounds care assessment and education. Corine also would like to discuss medication list- \  No longer taking allopurinol- discontinued after first round of chemo, using topical ointment as needed, cleocin. Lorazapam, COMPAZINE not taking furosemide discontinued after first round of chemo levofloxacin; using Maalox and previcid taking 20 over the counter, not taking potassium discontinued after first round of chemo. Discontinued lisinopril.    Please advise.  Action Taken: Message routed to:  Primary Care p [id number]

## 2018-06-17 NOTE — PROGRESS NOTES
Took the lisinopril, lasix and potassium off list and will route to Dr Ball so he is aware as well    Will route this message as well to Dr Mcneil so he is aware as was start for  Tumor lysis prophylaxis- he started Allopurinol 300 mg PO daily   Did not take this off his list as will have oncology assess this medication need

## 2018-06-18 NOTE — PROGRESS NOTES
Left detailed message informin home care nurse, Kerri Pool took lisinopril, lasix and potassium off of his current med list.  It was routed to  in cardiology to update him.    Also he will follow up with Dr. Adan in oncology regarding if he needs to continue the allopurinol on 6/25/18.    If further questions contact nurse.      Adrienne Gordon RN,   [facility]

## 2018-06-18 NOTE — PROGRESS NOTES
Dr Adan, I did not refill his Allopurinol when I saw him in your absence.   He will follow with you   Thanks   Mayte Mcneil

## 2018-06-21 NOTE — TELEPHONE ENCOUNTER
"Left message at both phone numbers. Left phone number and asked patient to call back about how he is doing off of his cardiac medications.     Patient called back. Advised him I was checking on his condition since he stopped the lasix, potassium, and furosemide. He states he was taken off the lisinopril while in the hospital because his BP was too low. He states that he ran out of lasix and since his \"water \" was gone he stopped taking it.   Denies shortness of breath, edema, chest pain, orthopnea, and cough. He was instructed that if he developed any of these symptoms or developed chest pain at rest he needed to go to the ED. He understood.  "

## 2018-06-22 NOTE — TELEPHONE ENCOUNTER
[facility] Call Center    Phone Message    May a detailed message be left on voicemail: yes    Reason for Call: Other: Patient fell in the garage sitting on a stool and when he got up he tripped on the stool and fell backwards. Landed on his bottom and then he hit his head after. happened yesterday afternoon. Patient has an abrasion on the back of his head, measuring 3.5 centimeters by 3 centimeters, it is open to air. patient has 3/10 pain on his left butt cheek and has been using 500 milligrams of tylenol for the pain. patient's neuros are intact, denies headaches, light headedness and dizziness.     Action Taken: Message routed to:  Primary Care p [id number]

## 2018-06-25 NOTE — Clinical Note
6/25/2018         RE: Francisco Javier Cortes  [address] [address]        Dear Colleague,    Thank you for referring your patient, Francisco Javier Cortes, to the [facility]. Please see a copy of my visit note below.      FOLLOW-UP VISIT NOTE    PATIENT NAME: Francisco Javier Cortes MRN # [id number]  DATE OF VISIT: Jun 25, 2018 YOB: 1940    REFERRING PROVIDER: No referring provider defined for this encounter.    CANCER TYPE: Mantle cell lymphoma  STAGE: IVB  ECOG PS: 1    ONCOLOGY HISTORY:  77-year-old male with past medical history significant for prostate cancer status post prostatectomy, coronary disease status post stent placement, to complete develop symptoms of abdominal bloating/discomfort and loss of appetite for 2 months. Underwent imaging workup by primary care provider with CT abdominal and pelvis on 02/25/18 showing extensive intra- abdominal and inguinal lymphadenopathy with splenomegaly.    - 03/05/18 02 Seen by medical oncology. I ordered CT neck and CT chest which showed bulky mediastinal, hilar ,axillary and supraclavicular lymphadenopathy. Patient was referred to ENT for diagnostic biopsy. He underwent an FNA of the lymph node on 03/14 which came back with findings consistent with mantle cell lymphoma. 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 Proliferation index was estimated to be 50%. The marrow biopsy performed showed bone marrow involvement by mantle cell lymphoma 40-50%. Fish came back positive for translocation 11:14 consistent with mantle cell lymphoma. PET CT scan extensive hypermetabolic adenopathy of cervical, axillary, mediastinal, hilar, intra-abdominal, retroperitoneal inguinal but the largest myeloma rate of reason triglyceride was measuring 15.5 x 6.9 cm.    - 03/29/18  Started on bendamustine in combination with Rituxan along with  allopurinol for tumor lysis syndrome prophylaxis.     - Hospitalized for TLS monitoring  - Hospitalized for sepsis from pneumonia  - Hospitalized for CHF 04/20 - 04/21    - 04/30/18 Second cycle   - 05/29/18 Third cycle       SUBJECTIVE     Patient's hearing clinic today for follow-up prior to cycle 4 of chemotherapy. Doing well overall. And she levels have been improved slightly. Complains of dyspnea on exertion. Denies fever/chills, abdominal pain, nausea/vomiting or any other complaints      PAST MEDICAL HISTORY     Past Medical History:   Diagnosis Date     CAD (coronary artery disease) 1/09    s/p stentsx2     Coronary artery disease involving native coronary artery of native heart without angina pectoris 12/22/2015     Dyslipidemia      Erectile dysfunction      GERD (gastroesophageal reflux disease)      Hearing problem One ear 1961     Hypertension      NSTEMI (non-ST elevated myocardial infarction) (H) 3/20/2014     Pneumonia      Prostate cancer (H)     prostatecomy 9 years ago, PSAs remain good     Status post percutaneous transluminal coronary angioplasty-Coronary angioplasty with STEPHEN to LCx 4/4/2014     Stented coronary artery     stents placed         CURRENT OUTPATIENT MEDICATIONS     Current Outpatient Prescriptions   Medication Sig Dispense Refill     Acetaminophen (TYLENOL 8 HOUR PO) Take 500 mg by mouth as needed        allopurinol (ZYLOPRIM) 300 MG tablet Take 1 tablet (300 mg) by mouth daily (Patient not taking: Reported on 5/14/2018) 30 tablet 1     Alum & Mag Hydroxide-Simeth (ALUMINUM-MAGNESIUM-SIMETHICONE) 200-200-20 MG/5ML SUSP Take 5 mLs by mouth daily as needed       ASPIRIN 81 MG OR TABS 1 TABLET EVERY MORNING       Benzethonium Chloride 0.1 % LIQD Cleanse with wound cleanser and apply barrier paste and hydrocolloid dressing, change every 2-3 days as needed 237 mL 3     clindamycin (CLEOCIN T) 1 % lotion Apply twice daily for 6 weeks to the groin 60 mL 1     COMPRESSION STOCKINGS 1 each  "continuous prn Bilateral knee high compression stockings 2 each 0     Gauze Pads & Dressings (GAUZE BANDAGE 4\") MISC Cleanse with wound cleanser and apply barrier paste and hydrocolloid dressing, change every 2-3 days as needed 5 each 3     HYDROcodone-acetaminophen (NORCO) 5-325 MG per tablet Take 1 tablet by mouth every 4 hours as needed       hydrocortisone valerate (WEST-NINOSKA) 0.2 % ointment Apply sparingly to affected area three times daily as needed. 45 g 3     Lansoprazole (PREVACID PO) Take 20 mg by mouth every evening as needed Patient needs to use brand name Prevacid (generic version causes diarrhea)        lidocaine-prilocaine (EMLA) cream Apply 1 Application topically as needed       LORazepam (ATIVAN) 0.5 MG tablet Take 1 tablet (0.5 mg) by mouth every 4 hours as needed (Anxiety, Nausea/Vomiting or Sleep) 30 tablet 3     metoprolol succinate (TOPROL-XL) 100 MG 24 hr tablet Take 1 tablet (100 mg) by mouth daily 90 tablet 3     nitroGLYcerin (NITROSTAT) 0.4 MG sublingual tablet Place 1 tablet (0.4 mg) under the tongue every 5 minutes as needed up to 3 tablets per episode. (Patient not taking: Reported on 5/1/2018) 60 tablet 6     ondansetron (ZOFRAN) 8 MG tablet Take 1 tablet (8 mg) by mouth every 8 hours as needed (Nausea/Vomiting) 10 tablet 3     prochlorperazine (COMPAZINE) 10 MG tablet Take 1 tablet (10 mg) by mouth every 6 hours as needed for nausea or vomiting 30 tablet 1     rosuvastatin (CRESTOR) 40 MG tablet Take 1 tablet (40 mg) by mouth daily (Patient taking differently: Take 40 mg by mouth every morning ) 90 tablet 3     zinc oxide - white petrolatum (CRITIC-AID THICK MOIST BARRIER) 20-51% PSTE topical paste Cleanse with wound cleanser and apply barrier paste and hydrocolloid dressing, change every 2-3 days as needed 170 g 3        ALLERGIES     Allergies   Allergen Reactions     Niacin      Severe rash and itching     Prevacid [Lansoprazole] Diarrhea     Patient notes diarrhea with 30mg " generic version. Patient does ok with 20mg in generic and brand name.     Shellfish Allergy      One kind        REVIEW OF SYSTEMS   As above in the HPI, o/w complete 12-point ROS was negative.     PHYSICAL EXAM   B/P: 108/72, T: 97.7, P: 95, R: 16  SpO2 Readings from Last 4 Encounters:   06/25/18 97%   05/30/18 98%   05/29/18 98%   05/29/18 98%     Wt Readings from Last 3 Encounters:   06/25/18 72.7 kg (160 lb 4.8 oz)   05/29/18 71.2 kg (157 lb)   05/14/18 67.6 kg (149 lb)     GEN: NAD  EYES:PERRLA  Mouth/ENT: Oropharynx is clear.  NECK: cervical lymphadenopathy non palpable  LUNGS: Crisp breath sounds at lung bases bilaterally  CV: S1S2  ABDOMEN: soft, non-tender, non-distended,  EXT: 1+ edema bilaterally  NEURO: alert  SKIN: no rashes     LABORATORY AND IMAGING STUDIES     Lab Results   Component Value Date    WBC 8.0 06/25/2018     Lab Results   Component Value Date    RBC 3.19 06/25/2018     Lab Results   Component Value Date    HGB 11.0 06/25/2018     Lab Results   Component Value Date    HCT 34.6 06/25/2018     No components found for: MCT  Lab Results   Component Value Date     06/25/2018     Lab Results   Component Value Date    MCH 34.5 06/25/2018     Lab Results   Component Value Date    MCHC 31.8 06/25/2018     Lab Results   Component Value Date    RDW 16.7 06/25/2018     Lab Results   Component Value Date     06/25/2018       Recent Labs   Lab Test  06/25/18   1000  05/29/18   0858   NA  143  142   POTASSIUM  3.8  4.1   CHLORIDE  109  109   CO2  26  25   ANIONGAP  8  8   GLC  129*  107*   BUN  13  17   CR  0.94  0.91   EVANGELINA  8.3*  8.3*     CT scans from 06/21/18    Impression:  \Interval improvement in the bilateral cervical and intraparotid  lymphadenopathy compared to 3/5/2018 suggestive of treatment response.      IMPRESSION: In this patient with history of mantle cell lymphoma:  1. Diffuse lymphadenopathy in the chest, abdomen, pelvis and inguinal  regions overall grossly stable from  the prior exams on 4/20/2018 and  3/24/2018.  2. Small pericardial effusion. Large right and moderate to large left  pleural effusions with overlying atelectasis, slightly increased from  the prior 4/20/2018 CT exam.  3. Stable bilateral pulmonary nodules. No new or enlarging pulmonary  nodules.  4. Stable liver hypodensities, too small to characterize, likely  representing cysts. No new or enlarging liver lesions.  5. Mild to moderate volume of ascites, increased from the prior  studies.  6. Colonic diverticulosis      ASSESSMENT AND PLAN     77-year-old male with past medical history significant for prostate cancer status post prostatectomy, coronary disease status post stent placement, to complete develop symptoms of abdominal bloating/discomfort and loss of appetite for 2 months. Underwent imaging workup was noted to have diffuse extensive hypermetabolic lymphadenopathy with biopsy of the lymph node showing findings consistent with mantle cell lymphoma. Bone marrow biopsy showed 40-50 % involvement by lymphoma with FISH revealing t(11:14).    - Mantle cell lymphoma  Stage IV B  Started on Bendamustine 90 mg/m2 day 1 and 2 in combination with Rituxan 375 mg/m2 q.4 weeks- Plan is to proceed with 6 total cycles followed by repeat PET scan and have response achieved, consider proceeding with maintenance Rituxan.  s/p 3 cycles with CT scans showing decrease in his cervical lymphadenopathy along with overall stable adenopathy in chest abdomen and pelvis  We'll continue with above treatment plan. Proceed with cycle 4 today    - Pleural effusion  Plans of dyspnea on exertion. He has stopped taking Lasix for the last 1 month. I will resume Lasix at 20 mg daily. Patient was advised to call cardiology office as well    - TLS prophylaxis  Continue with allopurinol. Encouraged oral hydration    - Anemia/Thrombocytopenia  Chemotherapy versus marrow involvement by lymphoma   Continue to monitor    -CAD/HTN/CHF  Resume Lasix  today   Continuemetoprolol and lisinopril    Clinic in 2 weeks with GO for mid-cycle check. Return to clinic in 4 weeks with me for office visit, labs, cycle 5 of chemotherapy.    Chart documentation with Dragon Voice recognition Software. Although reviewed after completion, some words and grammatical errors may remain.  Erickson Adan MD  Attending Physician   Hematology/Medical Oncology    Again, thank you for allowing me to participate in the care of your patient.        Sincerely,        Erickson Adan MD

## 2018-06-25 NOTE — MR AVS SNAPSHOT
After Visit Summary   6/25/2018    Francisco Javier Cortes    MRN: [id number]           Patient Information     Date Of Birth          1940        Visit Information        Provider Department      6/25/2018 11:30 AM [place] [facility]        Today's Diagnoses     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)    -  1    Drug-induced neutropenia (H)        Nausea        Encounter for antineoplastic chemotherapy        Flatulence, eructation, and gas pain           Follow-ups after your visit        Your next 10 appointments already scheduled     Jun 26, 2018 12:00 PM CDT   Level 1 with [address] ([facility])    [address] [address] [place] [id number] [phone]              Future tests that were ordered for you today     Open Future Orders        Priority Expected Expires Ordered    *CBC with platelets differential Routine  6/25/2019 6/25/2018            Who to contact     If you have questions or need follow up information about today's clinic visit or your schedule please contact [facility] directly at [phone].  Normal or non-critical lab and imaging results will be communicated to you by [employer], letter or phone within 4 business days after the clinic has received the results. If you do not hear from us within 7 days, please contact the clinic through [employer] or phone. If you have a critical or abnormal lab result, we will notify you by phone as soon as possible.  Submit refill requests through [employer] or call your pharmacy and they will forward the refill request to us. Please allow 3 business days for your refill to be completed.          Additional Information About Your Visit        [employer] Information     [employer] gives you secure access to your electronic health record. If you see a primary care provider, you can also send messages to your care team and make appointments. If  you have questions, please call your primary care clinic.  If you do not have a primary care provider, please call [phone] and they will assist you.      [employer] is an electronic gateway that provides easy, online access to your medical records. With [employer], you can request a clinic appointment, read your test results, renew a prescription or communicate with your care team.     To access your existing account, please contact your [facility] or call [phone] for assistance.        Care EveryWhere ID     This is your Care EveryWhere ID. This could be used by other organizations to access your [facility] medical records  [id number]         Blood Pressure from Last 3 Encounters:   06/25/18 126/71   05/30/18 96/64   05/29/18 96/62    Weight from Last 3 Encounters:   06/25/18 72.7 kg (160 lb 4.8 oz)   05/29/18 71.2 kg (157 lb)   05/14/18 67.6 kg (149 lb)              We Performed the Following     CBC with platelets differential          Today's Medication Changes          These changes are accurate as of 6/25/18  3:40 PM.  If you have any questions, ask your nurse or doctor.               Start taking these medicines.        Dose/Directions    furosemide 20 MG tablet   Commonly known as:  LASIX   Used for:  Hypervolemia, unspecified hypervolemia type   Started by:  Erickson Adan MD        Dose:  20 mg   Take 1 tablet (20 mg) by mouth daily   Quantity:  180 tablet   Refills:  3       potassium chloride SA 10 MEQ CR tablet   Commonly known as:  K-DUR/KLOR-CON M   Used for:  Hypervolemia, unspecified hypervolemia type   Started by:  Erickson Adan MD        Dose:  10 mEq   Take 1 tablet (10 mEq) by mouth daily   Quantity:  30 tablet   Refills:  1         These medicines have changed or have updated prescriptions.        Dose/Directions    rosuvastatin 40 MG tablet   Commonly known as:  CRESTOR   This may have changed:  when to take this   Used for:  Hyperlipidemia LDL goal <100,  Coronary artery disease involving native coronary artery of native heart without angina pectoris        Dose:  40 mg   Take 1 tablet (40 mg) by mouth daily   Quantity:  90 tablet   Refills:  3            Where to get your medicines      These medications were sent to [facility] - [place], [place] - [address], [address]  [address], [address], [facility] [address]     Phone:  [phone]     allopurinol 300 MG tablet    furosemide 20 MG tablet    potassium chloride SA 10 MEQ CR tablet                Primary Care Provider Office Phone # Fax #    Florian Alonzo [phone]       [address]  [facility] [address]        Equal Access to Services     TERENCE BARAJAS : Hadii rivera syedo Solina, waaxda luqadaha, qaybta kaalmada adebryantyada, zhao bazan . So [facility] [phone].    ATENCIÓN: Si habla español, tiene a ivan disposición servicios gratuitos de asistencia lingüística. [facility] [phone].    We comply with applicable federal civil rights laws and [place] laws. We do not discriminate on the basis of race, color, national origin, age, disability, sex, sexual orientation, or gender identity.            Thank you!     Thank you for choosing [facility]  for your care. Our goal is always to provide you with excellent care. Hearing back from our patients is one way we can continue to improve our services. Please take a few minutes to complete the written survey that you may receive in the mail after your visit with us. Thank you!             Your Updated Medication List - Protect others around you: Learn how to safely use, store and throw away your medicines at www.disposemymeds.org.          This list is accurate as of 6/25/18  3:40 PM.  Always use your most recent med list.                   Brand Name Dispense Instructions for use Diagnosis    allopurinol 300 MG tablet    ZYLOPRIM    30 tablet    Take 1 tablet  "(300 mg) by mouth daily    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)       Aluminum-Magnesium-Simethicone 200-200-20 MG/5ML Susp      Take 5 mLs by mouth daily as needed        aspirin 81 MG tablet      1 TABLET EVERY MORNING        Benzethonium Chloride 0.1 % Liqd     237 mL    Cleanse with wound cleanser and apply barrier paste and hydrocolloid dressing, change every 2-3 days as needed    Decubitus ulcer of buttock, unspecified laterality, unspecified ulcer stage       clindamycin 1 % lotion    CLEOCIN T    60 mL    Apply twice daily for 6 weeks to the groin    Rash       COMPRESSION STOCKINGS     2 each    1 each continuous prn Bilateral knee high compression stockings    Acute congestive heart failure, unspecified congestive heart failure type (H)       furosemide 20 MG tablet    LASIX    180 tablet    Take 1 tablet (20 mg) by mouth daily    Hypervolemia, unspecified hypervolemia type       Gauze Bandage 4\" Misc     5 each    Cleanse with wound cleanser and apply barrier paste and hydrocolloid dressing, change every 2-3 days as needed    Decubitus ulcer of buttock, unspecified laterality, unspecified ulcer stage       HYDROcodone-acetaminophen 5-325 MG per tablet    NORCO     Take 1 tablet by mouth every 4 hours as needed        hydrocortisone valerate 0.2 % ointment    WEST-NINOSKA    45 g    Apply sparingly to affected area three times daily as needed.    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)       metoprolol succinate 100 MG 24 hr tablet    TOPROL-XL    90 tablet    Take 1 tablet (100 mg) by mouth daily    Hypertension goal BP (blood pressure) < 130/80, Coronary artery disease involving native coronary artery of native heart without " angina pectoris       nitroGLYcerin 0.4 MG sublingual tablet    NITROSTAT    60 tablet    Place 1 tablet (0.4 mg) under the tongue every 5 minutes as needed up to 3 tablets per episode.    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)       potassium chloride SA 10 MEQ CR tablet    K-DUR/KLOR-CON M    30 tablet    Take 1 tablet (10 mEq) by mouth daily    Hypervolemia, unspecified hypervolemia type       PREVACID PO      Take 20 mg by mouth every evening as needed Patient needs to use brand name Prevacid (generic version causes diarrhea)        prochlorperazine 10 MG tablet    COMPAZINE    30 tablet    Take 1 tablet (10 mg) by mouth every 6 hours as needed for nausea or vomiting    Nausea       rosuvastatin 40 MG tablet    CRESTOR    90 tablet    Take 1 tablet (40 mg) by mouth daily    Hyperlipidemia LDL goal <100, Coronary artery disease involving native coronary artery of native heart without angina pectoris       TYLENOL 8 HOUR PO      Take 500 mg by mouth as needed        zinc oxide - white petrolatum 20-51% Pste topical paste     170 g    Cleanse with wound cleanser and apply barrier paste and hydrocolloid dressing, change every 2-3 days as needed    Decubitus ulcer of buttock, unspecified laterality, unspecified ulcer stage

## 2018-06-25 NOTE — PROGRESS NOTES
Infusion Nursing Note:  Francisco Javier Cortes presents today for C4,D1 of Rituxan/Bendamustine.  Patient seen by provider today: Yes: Dr Adan   present during visit today: Not Applicable.    Note: Pt states he is doing ok today, has had some pain to the sacral area due to a wound that is being tajen care of by home health.    Intravenous Access:  Implanted Port.    Treatment Conditions:  Lab Results   Component Value Date    HGB 11.0 06/25/2018     Lab Results   Component Value Date    WBC 8.0 06/25/2018      Lab Results   Component Value Date    ANEU 5.3 06/25/2018     Lab Results   Component Value Date     06/25/2018      Lab Results   Component Value Date     06/25/2018                   Lab Results   Component Value Date    POTASSIUM 3.8 06/25/2018           Lab Results   Component Value Date    MAG 2.1 04/11/2018            Lab Results   Component Value Date    CR 0.94 06/25/2018                   Lab Results   Component Value Date    EVANGELINA 8.3 06/25/2018                Lab Results   Component Value Date    BILITOTAL 0.2 06/25/2018           Lab Results   Component Value Date    ALBUMIN 3.1 06/25/2018                    Lab Results   Component Value Date    ALT 16 06/25/2018           Lab Results   Component Value Date    AST 33 06/25/2018       Results reviewed, labs MET treatment parameters, ok to proceed with treatment.      Post Infusion Assessment:  Patient tolerated infusion without incident.  Blood return noted pre and post infusion.  Site patent and intact, free from redness, edema or discomfort.  No evidence of extravasations.  Access discontinued per protocol.    Discharge Plan:   Return 06/26/18 for C4,D2 of Bendamustine.  Discharge instructions reviewed with: Patient and Family.  Patient and/or family verbalized understanding of discharge instructions and all questions answered.  Patient discharged in stable condition accompanied by: self and wife.  Departure Mode:  Ambulatory.    Viv Winston, RN

## 2018-06-25 NOTE — MR AVS SNAPSHOT
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)        Encounter for antineoplastic chemotherapy        Flatulence, eructation, and gas pain        Nausea           Follow-ups after your visit        Your next 10 appointments already scheduled     Jul 10, 2018  1:45 PM CDT   Return Visit with MARTHA Gonzales CNP   [facility]    [address] [facility] [address] [phone]            Jul 23, 2018  8:00 AM CDT   Return Visit with [facility]   [facility]    [address] [facility] [address] [phone]            Jul 23, 2018  8:45 AM CDT   Return Visit with Erickson Adan MD   [facility]    [address] [facility] [address] [phone]            Jul 23, 2018  9:30 AM CDT   Level 3 with BAY 1 INFUSION   [facility]    [address] [facility] [address] [phone]            Jul 24, 2018 11:00 AM CDT   Level 1 with BAY 5 INFUSION   [facility]    [address] [place] [address] [phone]              Future tests that were ordered for you today     Open Future Orders        Priority Expected Expires Ordered    *CBC with platelets differential Routine  6/25/2019 6/25/2018            Who to contact     If you have questions or need follow up information about today's clinic visit or  your schedule please contact [facility] directly at [phone].  Normal or non-critical lab and imaging results will be communicated to you by [employer]light[employer], letter or phone within 4 business days after the clinic has received the results. If you do not hear from us within 7 days, please contact the clinic through [employer] or phone. If you have a critical or abnormal lab result, we will notify you by phone as soon as possible.  Submit refill requests through [employer] or call your pharmacy and they will forward the refill request to us. Please allow 3 business days for your refill to be completed.          Additional Information About Your Visit        Stunn Information     Stunn gives you secure access to your electronic health record. If you see a primary care provider, you can also send messages to your care team and make appointments. If you have questions, please call your primary care clinic.  If you do not have a primary care provider, please call [phone] and they will assist you.      [employer] is an electronic gateway that provides easy, online access to your medical records. With [employer], you can request a clinic appointment, read your test results, renew a prescription or communicate with your care team.     To access your existing account, please contact your [facility] or call [phone] for assistance.        Care EveryWhere ID     This is your Care EveryWhere ID. This could be used by other organizations to access your [facility] medical records  [id number]        Your Vitals Were     Pulse Temperature Pulse Oximetry BMI (Body Mass Index)          67 97.2  F (36.2  C) (Oral) 97% 26.68 kg/m2         Blood Pressure from Last 3 Encounters:   06/26/18 116/77   06/25/18 126/71   05/30/18 96/64    Weight from Last 3 Encounters:   06/26/18 72.6 kg (160 lb)   06/25/18 72.7 kg (160 lb 4.8 oz)   05/29/18 71.2 kg (157 lb)                 Today's Medication  Changes          These changes are accurate as of 6/25/18 11:59 PM.  If you have any questions, ask your nurse or doctor.               Start taking these medicines.        Dose/Directions    furosemide 20 MG tablet   Commonly known as:  LASIX   Used for:  Hypervolemia, unspecified hypervolemia type   Started by:  Erickson Adan MD        Dose:  20 mg   Take 1 tablet (20 mg) by mouth daily   Quantity:  180 tablet   Refills:  3       potassium chloride SA 10 MEQ CR tablet   Commonly known as:  K-DUR/KLOR-CON M   Used for:  Hypervolemia, unspecified hypervolemia type   Started by:  Erickson Adan MD        Dose:  10 mEq   Take 1 tablet (10 mEq) by mouth daily   Quantity:  30 tablet   Refills:  1         These medicines have changed or have updated prescriptions.        Dose/Directions    rosuvastatin 40 MG tablet   Commonly known as:  CRESTOR   This may have changed:  when to take this   Used for:  Hyperlipidemia LDL goal <100, Coronary artery disease involving native coronary artery of native heart without angina pectoris        Dose:  40 mg   Take 1 tablet (40 mg) by mouth daily   Quantity:  90 tablet   Refills:  3            Where to get your medicines      These medications were sent to [facility] - [place], [place] - [address]  [address], [facility] [address]     Phone:  [phone]     allopurinol 300 MG tablet    furosemide 20 MG tablet    potassium chloride SA 10 MEQ CR tablet                Primary Care Provider Office Phone # Fax #    Florian Alonzo [phone]       [address]  [facility] [address]        Equal Access to Services     [facility] JENN AH: Hadii rivera syedo Solina, waaxda luqadaha, qaybta kaalmada adeegyada, zhao lorenzo. So [facility] [phone].    ATENCIÓN: Si habla español, tiene a ivan disposición servicios gratuitos de asistencia lingüística. Llame al [phone].    We comply  "with applicable federal civil rights laws and [place] laws. We do not discriminate on the basis of race, color, national origin, age, disability, sex, sexual orientation, or gender identity.            Thank you!     Thank you for choosing [facility]  for your care. Our goal is always to provide you with excellent care. Hearing back from our patients is one way we can continue to improve our services. Please take a few minutes to complete the written survey that you may receive in the mail after your visit with us. Thank you!             Your Updated Medication List - Protect others around you: Learn how to safely use, store and throw away your medicines at www.disposemymeds.org.          This list is accurate as of 6/25/18 11:59 PM.  Always use your most recent med list.                   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)       Aluminum-Magnesium-Simethicone 200-200-20 MG/5ML Susp      Take 5 mLs by mouth daily as needed        aspirin 81 MG tablet      1 TABLET EVERY MORNING        Benzethonium Chloride 0.1 % Liqd     237 mL    Cleanse with wound cleanser and apply barrier paste and hydrocolloid dressing, change every 2-3 days as needed    Decubitus ulcer of buttock, unspecified laterality, unspecified ulcer stage       clindamycin 1 % lotion    CLEOCIN T    60 mL    Apply twice daily for 6 weeks to the groin    Rash       COMPRESSION STOCKINGS     2 each    1 each continuous prn Bilateral knee high compression stockings    Acute congestive heart failure, unspecified congestive heart failure type (H)       furosemide 20 MG tablet    LASIX    180 tablet    Take 1 tablet (20 mg) by mouth daily    Hypervolemia, unspecified hypervolemia type       Gauze Bandage 4\" Misc     5 each    Cleanse with wound cleanser and apply barrier " paste and hydrocolloid dressing, change every 2-3 days as needed    Decubitus ulcer of buttock, unspecified laterality, unspecified ulcer stage       HYDROcodone-acetaminophen 5-325 MG per tablet    NORCO     Take 1 tablet by mouth every 4 hours as needed        hydrocortisone valerate 0.2 % ointment    WEST-NINOSKA    45 g    Apply sparingly to affected area three times daily as needed.    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)       potassium chloride SA 10 MEQ CR tablet    K-DUR/KLOR-CON M    30 tablet    Take 1 tablet (10 mEq) by mouth daily    Hypervolemia, unspecified hypervolemia type       PREVACID PO      Take 20 mg by mouth every evening as needed Patient needs to use brand name Prevacid (generic version causes diarrhea)        prochlorperazine 10 MG tablet    COMPAZINE    30 tablet    Take 1 tablet (10 mg) by mouth every 6 hours as needed for nausea or vomiting     Nausea       rosuvastatin 40 MG tablet    CRESTOR    90 tablet    Take 1 tablet (40 mg) by mouth daily    Hyperlipidemia LDL goal <100, Coronary artery disease involving native coronary artery of native heart without angina pectoris       TYLENOL 8 HOUR PO      Take 500 mg by mouth as needed        zinc oxide - white petrolatum 20-51% Pste topical paste     170 g    Cleanse with wound cleanser and apply barrier paste and hydrocolloid dressing, change every 2-3 days as needed    Decubitus ulcer of buttock, unspecified laterality, unspecified ulcer stage

## 2018-06-25 NOTE — TELEPHONE ENCOUNTER
Patient states he is doing fine.  He states no complications and doesn't feel he needs to come in.  Informed patient if things change to call us back.    Adrienne Gordon RN,   [facility], [facility]

## 2018-06-25 NOTE — NURSING NOTE
"Oncology Rooming Note    June 25, 2018 11:06 AM   Francisco Javier Cortes is a 78 year old male who presents for:    Chief Complaint   Patient presents with     Oncology Clinic Visit     prior to tx     Initial Vitals: /71  Pulse 67  Temp 97.2  F (36.2  C) (Oral)  Wt 72.7 kg (160 lb 4.8 oz)  SpO2 97%  BMI 26.68 kg/m2 Estimated body mass index is 26.68 kg/(m^2) as calculated from the following:    Height as of 5/29/18: 1.651 m (5' 5\").    Weight as of this encounter: 72.7 kg (160 lb 4.8 oz). Body surface area is 1.83 meters squared.  No Pain (0) Comment: Data Unavailable   No LMP for male patient.  Allergies reviewed: Yes  Medications reviewed: Yes    Medications: MEDICATION REFILLS NEEDED TODAY. Provider was notified.  Pharmacy name entered into [employer]:    [facility] [place] - [place], [place] - [address], [address]  [facility], [address]/[facility]Y [address][id number] - [place], [place] - [address] [facility] [place] [facility]    Clinical concerns: cough, fell 4 days ago and hit his head.  Saw visiting nurse the following day. Dr. Adan was notified.    5 minutes for nursing intake (face to face time)     Rodo Chirinos RN              "

## 2018-06-25 NOTE — PROGRESS NOTES
FOLLOW-UP VISIT NOTE    PATIENT NAME: Francisco Javier Cortes MRN # [id number]  DATE OF VISIT: Jun 25, 2018 YOB: 1940    REFERRING PROVIDER: No referring provider defined for this encounter.    CANCER TYPE: Mantle cell lymphoma  STAGE: IVB  ECOG PS: 1    ONCOLOGY HISTORY:  77-year-old male with past medical history significant for prostate cancer status post prostatectomy, coronary disease status post stent placement, to complete develop symptoms of abdominal bloating/discomfort and loss of appetite for 2 months. Underwent imaging workup by primary care provider with CT abdominal and pelvis on 02/25/18 showing extensive intra- abdominal and inguinal lymphadenopathy with splenomegaly.    - 03/05/18 02 Seen by medical oncology. I ordered CT neck and CT chest which showed bulky mediastinal, hilar ,axillary and supraclavicular lymphadenopathy. Patient was referred to ENT for diagnostic biopsy. He underwent an FNA of the lymph node on 03/14 which came back with findings consistent with mantle cell lymphoma. 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 Proliferation index was estimated to be 50%. The marrow biopsy performed showed bone marrow involvement by mantle cell lymphoma 40-50%. Fish came back positive for translocation 11:14 consistent with mantle cell lymphoma. PET CT scan extensive hypermetabolic adenopathy of cervical, axillary, mediastinal, hilar, intra-abdominal, retroperitoneal inguinal but the largest myeloma rate of reason triglyceride was measuring 15.5 x 6.9 cm.    - 03/29/18  Started on bendamustine in combination with Rituxan along with allopurinol for tumor lysis syndrome prophylaxis.     - Hospitalized for TLS monitoring  - Hospitalized for sepsis from pneumonia  - Hospitalized for CHF 04/20 - 04/21    - 04/30/18 Second cycle   - 05/29/18 Third cycle       SUBJECTIVE     Patient's hearing  "clinic today for follow-up prior to cycle 4 of chemotherapy. Doing well overall. And she levels have been improved slightly. Complains of dyspnea on exertion. Denies fever/chills, abdominal pain, nausea/vomiting or any other complaints      PAST MEDICAL HISTORY     Past Medical History:   Diagnosis Date     CAD (coronary artery disease) 1/09    s/p stentsx2     Coronary artery disease involving native coronary artery of native heart without angina pectoris 12/22/2015     Dyslipidemia      Erectile dysfunction      GERD (gastroesophageal reflux disease)      Hearing problem One ear 1961     Hypertension      NSTEMI (non-ST elevated myocardial infarction) (H) 3/20/2014     Pneumonia      Prostate cancer (H)     prostatecomy 9 years ago, PSAs remain good     Status post percutaneous transluminal coronary angioplasty-Coronary angioplasty with STEPHEN to LCx 4/4/2014     Stented coronary artery     stents placed         CURRENT OUTPATIENT MEDICATIONS     Current Outpatient Prescriptions   Medication Sig Dispense Refill     Acetaminophen (TYLENOL 8 HOUR PO) Take 500 mg by mouth as needed        allopurinol (ZYLOPRIM) 300 MG tablet Take 1 tablet (300 mg) by mouth daily (Patient not taking: Reported on 5/14/2018) 30 tablet 1     Alum & Mag Hydroxide-Simeth (ALUMINUM-MAGNESIUM-SIMETHICONE) 200-200-20 MG/5ML SUSP Take 5 mLs by mouth daily as needed       ASPIRIN 81 MG OR TABS 1 TABLET EVERY MORNING       Benzethonium Chloride 0.1 % LIQD Cleanse with wound cleanser and apply barrier paste and hydrocolloid dressing, change every 2-3 days as needed 237 mL 3     clindamycin (CLEOCIN T) 1 % lotion Apply twice daily for 6 weeks to the groin 60 mL 1     COMPRESSION STOCKINGS 1 each continuous prn Bilateral knee high compression stockings 2 each 0     Gauze Pads & Dressings (GAUZE BANDAGE 4\") MISC Cleanse with wound cleanser and apply barrier paste and hydrocolloid dressing, change every 2-3 days as needed 5 each 3     " HYDROcodone-acetaminophen (NORCO) 5-325 MG per tablet Take 1 tablet by mouth every 4 hours as needed       hydrocortisone valerate (WEST-NINOSKA) 0.2 % ointment Apply sparingly to affected area three times daily as needed. 45 g 3     Lansoprazole (PREVACID PO) Take 20 mg by mouth every evening as needed Patient needs to use brand name Prevacid (generic version causes diarrhea)        lidocaine-prilocaine (EMLA) cream Apply 1 Application topically as needed       LORazepam (ATIVAN) 0.5 MG tablet Take 1 tablet (0.5 mg) by mouth every 4 hours as needed (Anxiety, Nausea/Vomiting or Sleep) 30 tablet 3     metoprolol succinate (TOPROL-XL) 100 MG 24 hr tablet Take 1 tablet (100 mg) by mouth daily 90 tablet 3     nitroGLYcerin (NITROSTAT) 0.4 MG sublingual tablet Place 1 tablet (0.4 mg) under the tongue every 5 minutes as needed up to 3 tablets per episode. (Patient not taking: Reported on 5/1/2018) 60 tablet 6     ondansetron (ZOFRAN) 8 MG tablet Take 1 tablet (8 mg) by mouth every 8 hours as needed (Nausea/Vomiting) 10 tablet 3     prochlorperazine (COMPAZINE) 10 MG tablet Take 1 tablet (10 mg) by mouth every 6 hours as needed for nausea or vomiting 30 tablet 1     rosuvastatin (CRESTOR) 40 MG tablet Take 1 tablet (40 mg) by mouth daily (Patient taking differently: Take 40 mg by mouth every morning ) 90 tablet 3     zinc oxide - white petrolatum (CRITIC-AID THICK MOIST BARRIER) 20-51% PSTE topical paste Cleanse with wound cleanser and apply barrier paste and hydrocolloid dressing, change every 2-3 days as needed 170 g 3        ALLERGIES     Allergies   Allergen Reactions     Niacin      Severe rash and itching     Prevacid [Lansoprazole] Diarrhea     Patient notes diarrhea with 30mg generic version. Patient does ok with 20mg in generic and brand name.     Shellfish Allergy      One kind        REVIEW OF SYSTEMS   As above in the HPI, o/w complete 12-point ROS was negative.     PHYSICAL EXAM   B/P: 108/72, T: 97.7, P: 95, R:  16  SpO2 Readings from Last 4 Encounters:   06/25/18 97%   05/30/18 98%   05/29/18 98%   05/29/18 98%     Wt Readings from Last 3 Encounters:   06/25/18 72.7 kg (160 lb 4.8 oz)   05/29/18 71.2 kg (157 lb)   05/14/18 67.6 kg (149 lb)     GEN: NAD  EYES:PERRLA  Mouth/ENT: Oropharynx is clear.  NECK: cervical lymphadenopathy non palpable  LUNGS: Crisp breath sounds at lung bases bilaterally  CV: S1S2  ABDOMEN: soft, non-tender, non-distended,  EXT: 1+ edema bilaterally  NEURO: alert  SKIN: no rashes     LABORATORY AND IMAGING STUDIES     Lab Results   Component Value Date    WBC 8.0 06/25/2018     Lab Results   Component Value Date    RBC 3.19 06/25/2018     Lab Results   Component Value Date    HGB 11.0 06/25/2018     Lab Results   Component Value Date    HCT 34.6 06/25/2018     No components found for: MCT  Lab Results   Component Value Date     06/25/2018     Lab Results   Component Value Date    MCH 34.5 06/25/2018     Lab Results   Component Value Date    MCHC 31.8 06/25/2018     Lab Results   Component Value Date    RDW 16.7 06/25/2018     Lab Results   Component Value Date     06/25/2018       Recent Labs   Lab Test  06/25/18   1000  05/29/18   0858   NA  143  142   POTASSIUM  3.8  4.1   CHLORIDE  109  109   CO2  26  25   ANIONGAP  8  8   GLC  129*  107*   BUN  13  17   CR  0.94  0.91   EVANGELINA  8.3*  8.3*     CT scans from 06/21/18    Impression:  \Interval improvement in the bilateral cervical and intraparotid  lymphadenopathy compared to 3/5/2018 suggestive of treatment response.      IMPRESSION: In this patient with history of mantle cell lymphoma:  1. Diffuse lymphadenopathy in the chest, abdomen, pelvis and inguinal  regions overall grossly stable from the prior exams on 4/20/2018 and  3/24/2018.  2. Small pericardial effusion. Large right and moderate to large left  pleural effusions with overlying atelectasis, slightly increased from  the prior 4/20/2018 CT exam.  3. Stable bilateral pulmonary  nodules. No new or enlarging pulmonary  nodules.  4. Stable liver hypodensities, too small to characterize, likely  representing cysts. No new or enlarging liver lesions.  5. Mild to moderate volume of ascites, increased from the prior  studies.  6. Colonic diverticulosis      ASSESSMENT AND PLAN     77-year-old male with past medical history significant for prostate cancer status post prostatectomy, coronary disease status post stent placement, to complete develop symptoms of abdominal bloating/discomfort and loss of appetite for 2 months. Underwent imaging workup was noted to have diffuse extensive hypermetabolic lymphadenopathy with biopsy of the lymph node showing findings consistent with mantle cell lymphoma. Bone marrow biopsy showed 40-50 % involvement by lymphoma with FISH revealing t(11:14).    - Mantle cell lymphoma  Stage IV B  Started on Bendamustine 90 mg/m2 day 1 and 2 in combination with Rituxan 375 mg/m2 q.4 weeks- Plan is to proceed with 6 total cycles followed by repeat PET scan and have response achieved, consider proceeding with maintenance Rituxan.  s/p 3 cycles with CT scans showing decrease in his cervical lymphadenopathy along with overall stable adenopathy in chest abdomen and pelvis  We'll continue with above treatment plan. Proceed with cycle 4 today    - Pleural effusion  Plans of dyspnea on exertion. He has stopped taking Lasix for the last 1 month. I will resume Lasix at 20 mg daily. Patient was advised to call cardiology office as well    - TLS prophylaxis  Continue with allopurinol. Encouraged oral hydration    - Anemia/Thrombocytopenia  Chemotherapy versus marrow involvement by lymphoma   Continue to monitor    -CAD/HTN/CHF  Resume Lasix today   Continuemetoprolol and lisinopril    Clinic in 2 weeks with GO for mid-cycle check. Return to clinic in 4 weeks with me for office visit, labs, cycle 5 of chemotherapy.    Chart documentation with Dragon Voice recognition Software. Although  reviewed after completion, some words and grammatical errors may remain.  Erickson Adan MD  Attending Physician   Hematology/Medical Oncology

## 2018-06-25 NOTE — PROGRESS NOTES
Power port needle left accessed for treatment. Tolerated lab draw without complaint. Channing drawn-Red/Green/Purple tubes. Double signed by patient and RN. See documentation flowsheet. Nieves Estrada, RN, BSN, OCN

## 2018-06-25 NOTE — TELEPHONE ENCOUNTER
Routed to Dr. Alonzo's covering providers.      Left message TO SEE HOW PATIENT IS DOING and to return call to clinic and ask to speak with RN.        Adrienne Gordon RN,   [facility], [facility]

## 2018-06-25 NOTE — TELEPHONE ENCOUNTER
Francisco Javier returned a call to the Primary Clinic.  He will wait for a call back.  Phone: [phone] is the best contact.  Thank you.

## 2018-06-25 NOTE — MR AVS SNAPSHOT
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)        Flatulence, eructation, and gas pain           Follow-ups after your visit        Your next 10 appointments already scheduled     Jun 25, 2018 10:45 AM CDT   Return Visit with Erickson Adan MD   [facility] ([facility]    [address] [place] [id number] [phone]            Jun 25, 2018 11:30 AM CDT   Level 3 with BAY 4 INFUSION   [facility])    [address] [place] [id number] [phone]            Jun 26, 2018 12:00 PM CDT   Level 1 with BAY 10 INFUSION   [facility])    [address] [place] [id number] [phone]              Who to contact     If you have questions or need follow up information about today's clinic visit or your schedule please contact [facility] directly at [phone].  Normal or non-critical lab and imaging results will be communicated to you by MyChart, letter or phone within 4 business days after the clinic has received the results. If you do not hear from us within 7 days, please contact the clinic through MyChart or phone. If you have a critical or abnormal lab result, we will notify you by phone as soon as possible.  Submit refill requests through AFAR or call your pharmacy and they will forward the refill request to us. Please allow 3 business days for your refill to be completed.          Additional Information  About Your Visit        51edjharWaffle Information     Absio gives you secure access to your electronic health record. If you see a primary care provider, you can also send messages to your care team and make appointments. If you have questions, please call your primary care clinic.  If you do not have a primary care provider, please call [phone] and they will assist you.      Absio is an electronic gateway that provides easy, online access to your medical records. With Absio, you can request a clinic appointment, read your test results, renew a prescription or communicate with your care team.     To access your existing account, please contact your [facility] or call [phone] for assistance.        Care EveryWhere ID     This is your Care EveryWhere ID. This could be used by other organizations to access your [place] medical records  [id number]         Blood Pressure from Last 3 Encounters:   05/30/18 96/64   05/29/18 96/62   05/29/18 109/73    Weight from Last 3 Encounters:   05/29/18 71.2 kg (157 lb)   05/14/18 67.6 kg (149 lb)   05/01/18 73 kg (161 lb)              We Performed the Following     Comprehensive metabolic panel     Lactate Dehydrogenase     Uric acid          Today's Medication Changes          These changes are accurate as of 6/25/18 10:20 AM.  If you have any questions, ask your nurse or doctor.               These medicines have changed or have updated prescriptions.        Dose/Directions    rosuvastatin 40 MG tablet   Commonly known as:  CRESTOR   This may have changed:  when to take this   Used for:  Hyperlipidemia LDL goal <100, Coronary artery disease involving native coronary artery of native heart without angina pectoris        Dose:  40 mg   Take 1 tablet (40 mg) by mouth daily   Quantity:  90 tablet   Refills:  3                Primary Care Provider Office Phone # Fax #    Florian Alonzo [phone] [address]  [facility] [address]        Equal Access to Services     [facility] : Hadii rivera collazo steph Solina, waqida luqadaha, qaybta kaalmada sharronmanasdonn, zhao brucesayraalex lorenzo. So [facility] [phone].    ATENCIÓN: Si habla español, tiene a ivan disposición servicios gratuitos de asistencia lingüística. Micaela al [phone].    We comply with applicable federal civil rights laws and [place] laws. We do not discriminate on the basis of race, color, national origin, age, disability, sex, sexual orientation, or gender identity.            Thank you!     Thank you for choosing [facility]  for your care. Our goal is always to provide you with excellent care. Hearing back from our patients is one way we can continue to improve our services. Please take a few minutes to complete the written survey that you may receive in the mail after your visit with us. Thank you!             Your Updated Medication List - Protect others around you: Learn how to safely use, store and throw away your medicines at www.disposemymeds.org.          This list is accurate as of 6/25/18 10:20 AM.  Always use your most recent med list.                   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)       Aluminum-Magnesium-Simethicone 200-200-20 MG/5ML Susp      Take 5 mLs by mouth daily as needed        aspirin 81 MG tablet      1 TABLET EVERY MORNING        Benzethonium Chloride 0.1 % Liqd     237 mL    Cleanse with wound cleanser and apply barrier paste and hydrocolloid dressing, change every 2-3 days as needed    Decubitus ulcer of buttock, unspecified laterality, unspecified ulcer stage       clindamycin 1 % lotion    CLEOCIN T    60 mL    Apply twice daily for 6 weeks to the groin    Rash       COMPRESSION STOCKINGS     2 each    1 each continuous prn  "Bilateral knee high compression stockings    Acute congestive heart failure, unspecified congestive heart failure type (H)       Gauze Bandage 4\" Misc     5 each    Cleanse with wound cleanser and apply barrier paste and hydrocolloid dressing, change every 2-3 days as needed    Decubitus ulcer of buttock, unspecified laterality, unspecified ulcer stage       HYDROcodone-acetaminophen 5-325 MG per tablet    NORCO     Take 1 tablet by mouth every 4 hours as needed        hydrocortisone valerate 0.2 % ointment    WEST-NINOSKA    45 g    Apply sparingly to affected area three times daily as needed.    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)       PREVACID PO      Take 20 mg by mouth every evening as needed Patient needs to use brand name Prevacid (generic version causes diarrhea)        prochlorperazine 10 MG tablet    COMPAZINE    30 tablet    Take 1 tablet (10 mg) by mouth " every 6 hours as needed for nausea or vomiting    Nausea       rosuvastatin 40 MG tablet    CRESTOR    90 tablet    Take 1 tablet (40 mg) by mouth daily    Hyperlipidemia LDL goal <100, Coronary artery disease involving native coronary artery of native heart without angina pectoris       TYLENOL 8 HOUR PO      Take 500 mg by mouth as needed        zinc oxide - white petrolatum 20-51% Pste topical paste     170 g    Cleanse with wound cleanser and apply barrier paste and hydrocolloid dressing, change every 2-3 days as needed    Decubitus ulcer of buttock, unspecified laterality, unspecified ulcer stage

## 2018-06-26 NOTE — MR AVS SNAPSHOT
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)        Flatulence, eructation, and gas pain           Follow-ups after your visit        Your next 10 appointments already scheduled     Jul 10, 2018  1:45 PM CDT   Return Visit with MARTHA Gonzales CNP   [facility] ([facility])    [address] [place] [address]   [phone]            Jul 23, 2018  8:00 AM CDT   Return Visit with NURSE ONLY CANCER CENTER   [facility] ([facility])    [address] [facility] [address]   [phone]            Jul 23, 2018  8:45 AM CDT   Return Visit with Erickson Adan MD   [facility] ([facility])    [address] [facility] [address]   [phone]            Jul 23, 2018  9:30 AM CDT   Level 3 with BAY 1 INFUSION   [facility] ([facility])    [address] [facility] [address]   [phone]            Jul 24, 2018 11:00 AM CDT   Level 1 with BAY 5 INFUSION   [facility] ([facility])    [address] [facility] [address]   [phone]            Aug 20, 2018  8:45 AM CDT   Return Visit with NURSE ONLY CANCER CENTER   [facility] ([facility])    [address] [facility] [address]   [phone]            Aug 20, 2018  9:15 AM CDT   Return Visit with MARTHA Gonzales CNP   [facility]  [facility])    [address] [place] [address]   [phone]            Aug 20, 2018 10:00 AM CDT   Level 3 with BAY 6 INFUSION   [facility] ([facility])    [address] [place] [address] [phone]            Aug 21, 2018 11:00 AM CDT   Level 1 with BAY 5 INFUSION   [facility] ([facility])    [address] [place] [id number] [phone]              Future tests that were ordered for you today     Open Future Orders        Priority Expected Expires Ordered    *CBC with platelets differential Routine  6/25/2019 6/25/2018            Who to contact     If you have questions or need follow up information about today's clinic visit or your schedule please contact [facility] directly at [phone].  Normal or non-critical lab and imaging results will be communicated to you by Tyroshart, letter or phone within 4 business days after the clinic has received the results. If you do not hear from us within 7 days, please contact the clinic through Solst or phone. If you have a critical or abnormal lab result, we will notify you by phone as soon as possible.  Submit refill requests through Nepris or call your pharmacy and they will forward the refill request to us. Please allow 3 business days for your refill to be completed.          Additional Information About Your Visit        Nepris Information     Nepris gives you secure access to your electronic health record. If you see a primary care provider, you can also send messages to your care team and make appointments. If you have questions, please call your primary care clinic.  If you do not have a primary care provider, please call [phone] and they will assist you.      Nepris is an electronic gateway that provides easy, online access to your medical records. With Nepris, you can request a clinic appointment, read your test  results, renew a prescription or communicate with your care team.     To access your existing account, please contact your [facility] or call [phone] for assistance.        Care EveryWhere ID     This is your Care EveryWhere ID. This could be used by other organizations to access your [place] medical records  [id number]        Your Vitals Were     Pulse Temperature Respirations Pulse Oximetry BMI (Body Mass Index)       86 97.8  F (36.6  C) (Oral) 18 98% 26.63 kg/m2        Blood Pressure from Last 3 Encounters:   06/26/18 116/77   06/25/18 126/71   05/30/18 96/64    Weight from Last 3 Encounters:   06/26/18 72.6 kg (160 lb)   06/25/18 72.7 kg (160 lb 4.8 oz)   05/29/18 71.2 kg (157 lb)              Today, you had the following     No orders found for display         Today's Medication Changes          These changes are accurate as of 6/26/18  2:40 PM.  If you have any questions, ask your nurse or doctor.               These medicines have changed or have updated prescriptions.        Dose/Directions    rosuvastatin 40 MG tablet   Commonly known as:  CRESTOR   This may have changed:  when to take this   Used for:  Hyperlipidemia LDL goal <100, Coronary artery disease involving native coronary artery of native heart without angina pectoris        Dose:  40 mg   Take 1 tablet (40 mg) by mouth daily   Quantity:  90 tablet   Refills:  3                Primary Care Provider Office Phone # Fax #    Florian Alonzo [phone]       [address]  [facility] [address]        Equal Access to Services     NANDA BARAJAS : Hadii aad ku hadasho Solina, waaxda luqadaha, qaybta kaalmada bryce, zhao lorenzo. So [facility] [phone].    ATENCIÓN: Si habla español, tiene a ivan disposición servicios gratuitos de asistencia lingüística. Llame al [phone].    We comply with applicable federal civil rights laws and [place] laws. We do not  "discriminate on the basis of race, color, national origin, age, disability, sex, sexual orientation, or gender identity.            Thank you!     Thank you for choosing [facility]  for your care. Our goal is always to provide you with excellent care. Hearing back from our patients is one way we can continue to improve our services. Please take a few minutes to complete the written survey that you may receive in the mail after your visit with us. Thank you!             Your Updated Medication List - Protect others around you: Learn how to safely use, store and throw away your medicines at www.disposemymeds.org.          This list is accurate as of 6/26/18  2:40 PM.  Always use your most recent med list.                   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)       Aluminum-Magnesium-Simethicone 200-200-20 MG/5ML Susp      Take 5 mLs by mouth daily as needed        aspirin 81 MG tablet      1 TABLET EVERY MORNING        Benzethonium Chloride 0.1 % Liqd     237 mL    Cleanse with wound cleanser and apply barrier paste and hydrocolloid dressing, change every 2-3 days as needed    Decubitus ulcer of buttock, unspecified laterality, unspecified ulcer stage       clindamycin 1 % lotion    CLEOCIN T    60 mL    Apply twice daily for 6 weeks to the groin    Rash       COMPRESSION STOCKINGS     2 each    1 each continuous prn Bilateral knee high compression stockings    Acute congestive heart failure, unspecified congestive heart failure type (H)       furosemide 20 MG tablet    LASIX    180 tablet    Take 1 tablet (20 mg) by mouth daily    Hypervolemia, unspecified hypervolemia type       Gauze Bandage 4\" Misc     5 each    Cleanse with wound cleanser and apply barrier paste and hydrocolloid dressing, change every 2-3 days as needed    " Decubitus ulcer of buttock, unspecified laterality, unspecified ulcer stage       HYDROcodone-acetaminophen 5-325 MG per tablet    NORCO     Take 1 tablet by mouth every 4 hours as needed        hydrocortisone valerate 0.2 % ointment    WEST-NINOSKA    45 g    Apply sparingly to affected area three times daily as needed.    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)       potassium chloride SA 10 MEQ CR tablet    K-DUR/KLOR-CON M    30 tablet    Take 1 tablet (10 mEq) by mouth daily    Hypervolemia, unspecified hypervolemia type       PREVACID PO      Take 20 mg by mouth every evening as needed Patient needs to use brand name Prevacid (generic version causes diarrhea)        prochlorperazine 10 MG tablet    COMPAZINE    30 tablet    Take 1 tablet (10 mg) by mouth every 6 hours as needed for nausea or vomiting    Nausea       rosuvastatin 40 MG tablet    CRESTOR    90 tablet     Take 1 tablet (40 mg) by mouth daily    Hyperlipidemia LDL goal <100, Coronary artery disease involving native coronary artery of native heart without angina pectoris       TYLENOL 8 HOUR PO      Take 500 mg by mouth as needed        zinc oxide - white petrolatum 20-51% Pste topical paste     170 g    Cleanse with wound cleanser and apply barrier paste and hydrocolloid dressing, change every 2-3 days as needed    Decubitus ulcer of buttock, unspecified laterality, unspecified ulcer stage

## 2018-06-26 NOTE — PROGRESS NOTES
Infusion Nursing Note:  Francisco Javier Cortes presents today for C4,D2 of Bendamustine/Onpro  Patient seen by provider today: No   present during visit today: Not Applicable.    Note:Pt states he did well last night,denies any problems today.  Will treat as ordered.    Intravenous Access:  Implanted Port from 06/25/18    Treatment Conditions:  Lab Results   Component Value Date    HGB 11.0 06/25/2018     Lab Results   Component Value Date    WBC 8.0 06/25/2018      Lab Results   Component Value Date    ANEU 5.3 06/25/2018     Lab Results   Component Value Date     06/25/2018      Lab Results   Component Value Date     06/25/2018                   Lab Results   Component Value Date    POTASSIUM 3.8 06/25/2018           Lab Results   Component Value Date    MAG 2.1 04/11/2018            Lab Results   Component Value Date    CR 0.94 06/25/2018                   Lab Results   Component Value Date    EVANGELINA 8.3 06/25/2018                Lab Results   Component Value Date    BILITOTAL 0.2 06/25/2018           Lab Results   Component Value Date    ALBUMIN 3.1 06/25/2018                    Lab Results   Component Value Date    ALT 16 06/25/2018           Lab Results   Component Value Date    AST 33 06/25/2018       Results reviewed, labs MET treatment parameters, ok to proceed with treatment.      Post Infusion Assessment:  Patient tolerated infusion without incident.  Blood return noted pre and post infusion.  Site patent and intact, free from redness, edema or discomfort.  No evidence of extravasations.  Access discontinued per protocol.    ONPRO  Was placed on patient's: right side of abdomen.    Was placed at 1:22 PM    ONPRO injector device Lot number: [id number]    Patient education included: what patient can expect after application, what colored lights mean on the device, when to remove device, when and where to call with questions or issues, all patients questions answered and that Neulasta administration  will occur at 4:22 pm on 06/27/18.    Patient tolerated administration well.    Discharge Plan:   Return in 2 weeks with Joy and 4 weeks with Dr Adan - pt instructed to make more appointments.  Discharge instructions reviewed with: Patient.  Patient and/or family verbalized understanding of discharge instructions and all questions answered.  Patient discharged in stable condition accompanied by: self.  Departure Mode: Ambulatory.    Viv Winston RN

## 2018-07-02 NOTE — PROGRESS NOTES
Patient's wife, Court, called to report that Francisco Javier has been having a cough/congestion coming from his chest for one week now - since chemotherapy last week.  He has been using Coricidin which helps some and wanted to verify that this was acceptable and should he be doing anything else.  No fever; sputum is clear.  Reviewed with Dr. Keller; advised to stay with Coricidin; if things worsen such as fever develops or drainage becomes more yellowish/green he should contact our office.

## 2018-07-05 NOTE — MR AVS SNAPSHOT
After Visit Summary   7/5/2018    Francisco Javier Cortes    MRN: [id number]           Patient Information     Date Of Birth          1940        Visit Information        Provider Department      7/5/2018 1:35 PM Linda Graham NP [facility]        Today's Diagnoses     SOB (shortness of breath)    -  1    Dizziness        Abdominal distention           Follow-ups after your visit        Your next 10 appointments already scheduled     Jul 10, 2018  1:45 PM CDT   Return Visit with MARTHA Gonzales CNP   [facility] ([facility])    [address] [facility] [address]   [phone]            Jul 23, 2018  8:00 AM CDT   Return Visit with [facility]   [facility] ([facility])    [address] [facility] [address]   [phone]            Jul 23, 2018  8:45 AM CDT   Return Visit with Erickson Adan MD   [facility] ([facility])    [address] [facility] [address]   [phone]            Jul 23, 2018  9:30 AM CDT   Level 3 with BAY 1 INFUSION   [facility] ([facility])    [address] [facility] [address]   [phone]            Jul 24, 2018 11:00 AM CDT   Level 1 with BAY 5 INFUSION   [facility] ([facility])    [address] [facility] [address]   [phone]            Aug 20, 2018  8:45 AM CDT   Return Visit with [facility]   [facility] ([facility])    [address] [facility] [address]   [phone]            Aug 20, 2018  9:15 AM CDT   Return Visit with MARTHA Gonzales CNP   [facility] ([facility])    [address] [facility] [address]   [phone]            Aug 20, 2018 10:00 AM CDT   Level 3 with BAY 6  INFUSION   [facility]    [address] [facility] [address] [phone]            Aug 21, 2018 11:00 AM CDT   Level 1 with BAY 5 [facility] ([facility])    [address] [facility] [id number] [phone]              Who to contact     If you have questions or need follow up information about today's clinic visit or your schedule please contact [facility] directly at [phone].  Normal or non-critical lab and imaging results will be communicated to you by [employer], letter or phone within 4 business days after the clinic has received the results. If you do not hear from us within 7 days, please contact the clinic through [employer] or phone. If you have a critical or abnormal lab result, we will notify you by phone as soon as possible.  Submit refill requests through [employer] or call your pharmacy and they will forward the refill request to us. Please allow 3 business days for your refill to be completed.          Additional Information About Your Visit        [employer] Information     [employer] gives you secure access to your electronic health record. If you see a primary care provider, you can also send messages to your care team and make appointments. If you have questions, please call your primary care clinic.  If you do not have a primary care provider, please call [phone] and they will assist you.        Care EveryWhere ID     This is your Care EveryWhere ID. This could be used by other organizations to access your [facility] medical records  [id number]        Your Vitals Were     Pulse Temperature Respirations Pulse Oximetry BMI (Body Mass Index)       100 97.4  F (36.3  C) (Oral) 24 93% 26.44 kg/m2        Blood Pressure from Last 3 Encounters:   07/05/18 116/72   06/26/18 116/77   06/25/18 126/71    Weight from Last 3 Encounters:   07/05/18 158 lb 14.4 oz (72.1 kg)    06/26/18 160 lb (72.6 kg)   06/25/18 160 lb 4.8 oz (72.7 kg)              Today, you had the following     No orders found for display         Today's Medication Changes          These changes are accurate as of 7/5/18 10:35 PM.  If you have any questions, ask your nurse or doctor.               These medicines have changed or have updated prescriptions.        Dose/Directions    rosuvastatin 40 MG tablet   Commonly known as:  CRESTOR   This may have changed:  when to take this   Used for:  Hyperlipidemia LDL goal <100, Coronary artery disease involving native coronary artery of native heart without angina pectoris        Dose:  40 mg   Take 1 tablet (40 mg) by mouth daily   Quantity:  90 tablet   Refills:  3                Primary Care Provider Office Phone # Fax #    Florian Alonzo [phone] [address]  [facility] [address]        Equal Access to Services     NANDA [facility] : Hadii rivera syedo Solina, waaxda luqadaha, qaybta kaalmada adeegyada, zhao bazan . So [facility] [phone].    ATENCIÓN: Si habla español, tiene a ivan disposición servicios gratuitos de asistencia lingüística. Llame al [phone].    We comply with applicable federal civil rights laws and [place] laws. We do not discriminate on the basis of race, color, national origin, age, disability, sex, sexual orientation, or gender identity.            Thank you!     Thank you for choosing [facility]  for your care. Our goal is always to provide you with excellent care. Hearing back from our patients is one way we can continue to improve our services. Please take a few minutes to complete the written survey that you may receive in the mail after your visit with us. Thank you!             Your Updated Medication List - Protect others around you: Learn how to safely use, store and throw away your medicines at www.disposemymeds.org.          This list is accurate as  "of 7/5/18 10:35 PM.  Always use your most recent med list.                   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)       Aluminum-Magnesium-Simethicone 200-200-20 MG/5ML Susp      Take 5 mLs by mouth daily as needed        aspirin 81 MG tablet      1 TABLET EVERY MORNING        Benzethonium Chloride 0.1 % Liqd     237 mL    Cleanse with wound cleanser and apply barrier paste and hydrocolloid dressing, change every 2-3 days as needed    Decubitus ulcer of buttock, unspecified laterality, unspecified ulcer stage       clindamycin 1 % lotion    CLEOCIN T    60 mL    Apply twice daily for 6 weeks to the groin    Rash       COMPRESSION STOCKINGS     2 each    1 each continuous prn Bilateral knee high compression stockings    Acute congestive heart failure, unspecified congestive heart failure type (H)       furosemide 20 MG tablet    LASIX    180 tablet    Take 1 tablet (20 mg) by mouth daily    Hypervolemia, unspecified hypervolemia type       Gauze Bandage 4\" Misc     5 each    Cleanse with wound cleanser and apply barrier paste and hydrocolloid dressing, change every 2-3 days as needed    Decubitus ulcer of buttock, unspecified laterality, unspecified ulcer stage       HYDROcodone-acetaminophen 5-325 MG per tablet    NORCO     Take 1 tablet by mouth every 4 hours as needed        hydrocortisone valerate 0.2 % ointment    WEST-NINOSKA    45 g    Apply sparingly to affected area three times daily as needed.    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)       potassium chloride SA 10 MEQ CR tablet    K-DUR/KLOR-CON M    30 tablet    Take 1 tablet (10 mEq) by mouth daily    Hypervolemia, unspecified hypervolemia type       PREVACID PO      Take 20 mg by mouth every evening as needed Patient needs to use brand name Prevacid (generic version causes diarrhea)        prochlorperazine 10 MG tablet    COMPAZINE    30 tablet    Take 1 tablet (10 mg) by mouth every 6 hours as needed for nausea or vomiting    Nausea       rosuvastatin 40 MG tablet    CRESTOR    90 tablet    Take 1 tablet (40 mg) by mouth daily    Hyperlipidemia LDL goal <100, Coronary artery disease involving native coronary artery of native heart without angina pectoris       TYLENOL 8 HOUR PO      Take 500 mg by mouth as needed        zinc oxide - white petrolatum 20-51% Pste topical paste     170 g    Cleanse with wound cleanser and apply barrier paste and hydrocolloid dressing, change every 2-3 days as needed    Decubitus ulcer of buttock, unspecified laterality, unspecified ulcer stage

## 2018-07-05 NOTE — PROGRESS NOTES
"Patient's wife, Court Cortes called today to report the following.       1.  No appetite, drinking some.   2.  SOB on activity.  No trouble breathing when laying down.     3.  Stomach \"bloated.\"  Belly firm, tender to lower right side.  Pain rated between 2-3. This is a new symptom.     4.  Last bowel movement was July 5th, stool is soft.     5.  Weight at home.  6/28 is 159#, today is 156#.     6.   Denies fever.   7.  Continues to have a cough.  Clear sputum.    Sent Dr Adan inbasket.      Dr Adan advice:  For RLQ pain and tenderness which is new, he needs to be seen to rule out acute intraabdominal issues. Can go to an urgent care to get evaluated.    Called and spoke with patient, Bipin.  Patient agreed with plan and will go to urgent care.  Seble Lynn RN BSN OCN PHN    "

## 2018-07-05 NOTE — NURSING NOTE
"RN Triage:     Chief Complaint   Patient presents with     Breathing Problem     patient is SOB with bending over, gets SOB with working awhile, patient notes chest pain with bending, notes that he feels like he could passout.       Initial There were no vitals taken for this visit. Estimated body mass index is 26.63 kg/(m^2) as calculated from the following:    Height as of 5/29/18: 5' 5\" (1.651 m).    Weight as of 6/26/18: 160 lb (72.6 kg).  BP completed using cuff size: regular      Triage Dispo: patient was seen by provider    Intervention: O2 placed by MA 2 liters via nasal    Anette Olivera RN   "

## 2018-07-06 NOTE — PROGRESS NOTES
SUBJECTIVE:   Francisco Javier Cortes is a 78 year old male presenting with a chief complaint of   Chief Complaint   Patient presents with     Breathing Problem     patient is SOB with bending over, gets SOB with working awhile, patient notes chest pain with bending, notes that he feels like he could passout.       He is an established patient of [facility].    SOB, Dizziness    Onset of symptoms was 4 hour(s) ago.  Course of illness is worsening.    Severity severe  Current and Associated symptoms: shortness of breath and dizziness, abdomen more distended  Treatment measures tried include None tried.  Predisposing factors include: Patient on spike motherapy       Review of Systems   Constitutional: Negative for chills, diaphoresis and fever.   HENT: Negative for congestion, ear pain, rhinorrhea and sore throat.    Respiratory: Positive for shortness of breath. Negative for cough.    Gastrointestinal: Positive for abdominal distention. Negative for diarrhea, nausea and vomiting.   Neurological: Positive for dizziness.   All other systems reviewed and are negative.      Past Medical History:   Diagnosis Date     CAD (coronary artery disease) 1/09    s/p stentsx2     Coronary artery disease involving native coronary artery of native heart without angina pectoris 12/22/2015     Dyslipidemia      Erectile dysfunction      GERD (gastroesophageal reflux disease)      Hearing problem One ear 1961     Hypertension      NSTEMI (non-ST elevated myocardial infarction) (H) 3/20/2014     Pneumonia      Prostate cancer (H)     prostatecomy 9 years ago, PSAs remain good     Status post percutaneous transluminal coronary angioplasty-Coronary angioplasty with STEPHEN to LCx 4/4/2014     Stented coronary artery     stents placed     Family History   Problem Relation Age of Onset     Cardiovascular Mother      HEART DISEASE Mother      Cancer - colorectal Father      HEART DISEASE Father      Other Cancer Father      Cancer Father      Hypertension  "Father      Asthma Brother      Coronary Artery Disease Brother      Hypertension Brother      HEART DISEASE Brother      HEART DISEASE Son      Hypertension Son      Cancer Daughter      non hodgkins     Prostate Cancer Brother      Hypertension Brother      Cancer Brother      Prostate Cancer Maternal Grandfather      Cancer Maternal Grandfather      Muscular Disorder Maternal Grandfather      HEART DISEASE Paternal Grandmother      Hypertension Paternal Grandmother      Hypertension Sister      Current Outpatient Prescriptions   Medication Sig Dispense Refill     Acetaminophen (TYLENOL 8 HOUR PO) Take 500 mg by mouth as needed        allopurinol (ZYLOPRIM) 300 MG tablet Take 1 tablet (300 mg) by mouth daily 30 tablet 1     Alum & Mag Hydroxide-Simeth (ALUMINUM-MAGNESIUM-SIMETHICONE) 200-200-20 MG/5ML SUSP Take 5 mLs by mouth daily as needed       ASPIRIN 81 MG OR TABS 1 TABLET EVERY MORNING       Benzethonium Chloride 0.1 % LIQD Cleanse with wound cleanser and apply barrier paste and hydrocolloid dressing, change every 2-3 days as needed 237 mL 3     clindamycin (CLEOCIN T) 1 % lotion Apply twice daily for 6 weeks to the groin 60 mL 1     COMPRESSION STOCKINGS 1 each continuous prn Bilateral knee high compression stockings 2 each 0     furosemide (LASIX) 20 MG tablet Take 1 tablet (20 mg) by mouth daily 180 tablet 3     Gauze Pads & Dressings (GAUZE BANDAGE 4\") MISC Cleanse with wound cleanser and apply barrier paste and hydrocolloid dressing, change every 2-3 days as needed 5 each 3     HYDROcodone-acetaminophen (NORCO) 5-325 MG per tablet Take 1 tablet by mouth every 4 hours as needed       hydrocortisone valerate (WEST-NINOSKA) 0.2 % ointment Apply sparingly to affected area three times daily as needed. 45 g 3     Lansoprazole (PREVACID PO) Take 20 mg by mouth every evening as needed Patient needs to use brand name Prevacid (generic version causes diarrhea)        lidocaine-prilocaine (EMLA) cream Apply 1 " Application topically as needed       LORazepam (ATIVAN) 0.5 MG tablet Take 1 tablet (0.5 mg) by mouth every 4 hours as needed (Anxiety, Nausea/Vomiting or Sleep) 30 tablet 3     metoprolol succinate (TOPROL-XL) 100 MG 24 hr tablet Take 1 tablet (100 mg) by mouth daily 90 tablet 3     nitroGLYcerin (NITROSTAT) 0.4 MG sublingual tablet Place 1 tablet (0.4 mg) under the tongue every 5 minutes as needed up to 3 tablets per episode. (Patient not taking: Reported on 5/1/2018) 60 tablet 6     ondansetron (ZOFRAN) 8 MG tablet Take 1 tablet (8 mg) by mouth every 8 hours as needed (Nausea/Vomiting) 10 tablet 3     potassium chloride SA (K-DUR/KLOR-CON M) 10 MEQ CR tablet Take 1 tablet (10 mEq) by mouth daily 30 tablet 1     prochlorperazine (COMPAZINE) 10 MG tablet Take 1 tablet (10 mg) by mouth every 6 hours as needed for nausea or vomiting 30 tablet 1     rosuvastatin (CRESTOR) 40 MG tablet Take 1 tablet (40 mg) by mouth daily (Patient taking differently: Take 40 mg by mouth every morning ) 90 tablet 3     zinc oxide - white petrolatum (CRITIC-AID THICK MOIST BARRIER) 20-51% PSTE topical paste Cleanse with wound cleanser and apply barrier paste and hydrocolloid dressing, change every 2-3 days as needed 170 g 3     Social History   Substance Use Topics     Smoking status: Never Smoker     Smokeless tobacco: Never Used     Alcohol use Yes      Comment: a glass of red wine a day       OBJECTIVE  /72 (BP Location: Right arm, Patient Position: Chair, Cuff Size: Adult Regular)  Pulse 100  Temp 97.4  F (36.3  C) (Oral)  Resp 24  Wt 158 lb 14.4 oz (72.1 kg)  SpO2 93%  BMI 26.44 kg/m2    Physical Exam   Constitutional: He appears distressed.   Cardiovascular: Normal rate and normal heart sounds.    Pulmonary/Chest: He is in respiratory distress.   Abdominal: He exhibits distension (And firm. ). There is tenderness.   Neurological: He is alert.   Psychiatric: He has a normal mood and affect. His speech is normal.        ASSESSMENT:      ICD-10-CM    1. SOB (shortness of breath) R06.02    2. Dizziness R42    3. Abdominal distention R14.0           Differential Diagnosis:  Pneumonia, Pulmonary edema, ascitis is now, CHF he is    Serious Comorbid Conditions:  Adult: Ca    PLAN:  I have discussed the clinical presentation with the Wife and patient and the need for urgent emergency care. The  wife would like to drive patient to the hospital.  But Francisco Javier's oxygen saturation is is spo2 91%, and appears to tire easily with conversation, trying to catch a breath. Oxygen nasal canula 4 l started. Ambulance called to take patient to the hospital, [facility] ER informed, and patient is transferred to [facility] ER.     {There are no Patient Instructions on file for this visit.

## 2018-07-09 NOTE — TELEPHONE ENCOUNTER
[facility] Call Center    Phone Message    May a detailed message be left on voicemail: yes    Reason for Call: Other: verbal orders needed for wound care on bottom (he was recently hospitalized)- 1x a week for 1 week, 2x a week for 3 weeks, 1x a week for two weeks, and 3 as needed, also phs teaching     Action Taken: Message routed to:  Primary Care p [phone]

## 2018-07-09 NOTE — TELEPHONE ENCOUNTER
Verbal order given for continuation of home care nursing and wound assessment after hospitalization.    Adrienne Gordon RN,   [facility]

## 2018-07-09 NOTE — TELEPHONE ENCOUNTER
RN will route request for orders for wound care to providers as this is not in RN protocol     Leticia Neal RN, [facility]

## 2018-07-10 NOTE — LETTER
7/10/2018         RE: Francisco Javier Cortes  [address] [facility] [id number]        Dear Colleague,    Thank you for referring your patient, Francisco Javier Cortes, to the [facility]. Please see a copy of my visit note below.    Oncology Follow Up Visit: July 10, 2018    Oncologist: Dr. Erickson Adan  PCP: Florian Alonzo    Diagnosis: Stage IV mantle cell lymphoma  Francisco Javier Cortes is a 79 yo  male presented in March 2018 with complaint of abdominal bloating and discomfort loss of appetite ×2 months. Initial imaging showed splenomegaly, extensive retroperitoneal mesenteric, inguinal and pelvic as well as retrocrural adenopathy and enlarging pulmonary nodules up to 18 mm. FNA proved mantle cell lymphoma-please see progress notes of 3/29/18 by Dr. Auguste for specific information.  Treatment:   3/29/18 began Bendamustine and Rituxan  - Hospitalized for TLS  - Hospitalized for sepsis from pneumonia  - hospitalized for CHF 4/20-21    Interval History: Mr. Cortes comes to clinic for symptom review while in treatment with rituximab/bendamustine for his lymphoma. He received cycle 4 on 6/25/2018. He was seen at [facility] for hypoxia with bilateral pleural effusion on 7/5/2018.He spent 2 days in hospital and thoracentesis was completed of 1.5 L from right lung.Today, pt states he is starting to feel stronger and is up puttering in house and yard - using Lasix to help with fluid load in body. Wife shares he is not eating well - still having nausea and poor appetite though feels the nausea is normal for him at this point in treatment cycle. He is not using the antiemetics. He admits to some SOB with exertion but improved after thoracentesis of the 1.5 L from the chest- feels he is still improving at this time. He denies fevers, neuropathy, trouble sleeping or depression. Reports buttock wound is healing now to point of only needing barrier cream- is careful to sleep on sides at  "hospital.  Magnesium replaced at hospitalization per wife.Bowel and bladder are normal.   Rest of comprehensive and complete ROS is reviewed and is negative.   Past Medical History:   Diagnosis Date     CAD (coronary artery disease) 1/09    s/p stentsx2     Coronary artery disease involving native coronary artery of native heart without angina pectoris 12/22/2015     Dyslipidemia      Erectile dysfunction      GERD (gastroesophageal reflux disease)      Hearing problem One ear 1961     Hypertension      NSTEMI (non-ST elevated myocardial infarction) (H) 3/20/2014     Pneumonia      Prostate cancer (H)     prostatecomy 9 years ago, PSAs remain good     Status post percutaneous transluminal coronary angioplasty-Coronary angioplasty with STEPHEN to LCx 4/4/2014     Stented coronary artery     stents placed     Current Outpatient Prescriptions   Medication     Acetaminophen (TYLENOL 8 HOUR PO)     allopurinol (ZYLOPRIM) 300 MG tablet     Alum & Mag Hydroxide-Simeth (ALUMINUM-MAGNESIUM-SIMETHICONE) 200-200-20 MG/5ML SUSP     ASPIRIN 81 MG OR TABS     Benzethonium Chloride 0.1 % LIQD     clindamycin (CLEOCIN T) 1 % lotion     COMPRESSION STOCKINGS     furosemide (LASIX) 20 MG tablet     Gauze Pads & Dressings (GAUZE BANDAGE 4\") MISC     HYDROcodone-acetaminophen (NORCO) 5-325 MG per tablet     hydrocortisone valerate (WEST-NINOSKA) 0.2 % ointment     Lansoprazole (PREVACID PO)     lidocaine-prilocaine (EMLA) cream     LORazepam (ATIVAN) 0.5 MG tablet     metoprolol succinate (TOPROL-XL) 100 MG 24 hr tablet     nitroGLYcerin (NITROSTAT) 0.4 MG sublingual tablet     ondansetron (ZOFRAN) 8 MG tablet     potassium chloride SA (K-DUR/KLOR-CON M) 10 MEQ CR tablet     prochlorperazine (COMPAZINE) 10 MG tablet     rosuvastatin (CRESTOR) 40 MG tablet     zinc oxide - white petrolatum (CRITIC-AID THICK MOIST BARRIER) 20-51% PSTE topical paste     No current facility-administered medications for this visit.      Allergies   Allergen " "Reactions     Niacin      Severe rash and itching     Prevacid [Lansoprazole] Diarrhea     Patient notes diarrhea with 30mg generic version. Patient does ok with 20mg in generic and brand name.     Shellfish Allergy      One kind       Physical Exam:/75  Pulse 106  Temp 97.5  F (36.4  C)  Resp 16  Ht 1.651 m (5' 5\")  Wt 69.9 kg (154 lb)  BMI 25.63 kg/m2   ECOG PS- 1  Constitutional: Alert, moving slowly on own today and appears weak. Cooperative   ENT: Eyes bright, No mouth sores  Neck: Supple, no nodes are noted.  Cardiac: Heart rate and rhythm is regular and strong without murmur  Respiratory: Breathing easy. Lung  Sounds  With decreased breath sound to right lower lung yet but left sounding clear. .No cough and good effort.  GI: Abdomen is rounded, nontender, BS active. No masses or organomegaly  MS: Muscle tone fair with some weakness noted - moving on own to exam table. Extremities normal with 1+ pitting edema to mid calf bilaterally  Skin: No suspicious lesions, rashes,weeping  Neuro: Sensory grossly WNL, gait normal.   Lymph: Normal ant/post cervical, axillary, supraclavicular nodes  Psych: Mentation appears normal and affect normal/bright with good conversation.    Laboratory Results:   Results for orders placed or performed in visit on 07/10/18   CMP - Comprehensive Metabolic Panel   Result Value Ref Range    Sodium 139 133 - 144 mmol/L    Potassium 4.3 3.4 - 5.3 mmol/L    Chloride 105 94 - 109 mmol/L    Carbon Dioxide 27 20 - 32 mmol/L    Anion Gap 7 3 - 14 mmol/L    Glucose 90 70 - 99 mg/dL    Urea Nitrogen 12 7 - 30 mg/dL    Creatinine 1.02 0.66 - 1.25 mg/dL    GFR Estimate 70 >60 mL/min/1.7m2    GFR Estimate If Black 85 >60 mL/min/1.7m2    Calcium 8.7 8.5 - 10.1 mg/dL    Bilirubin Total 0.4 0.2 - 1.3 mg/dL    Albumin 3.2 (L) 3.4 - 5.0 g/dL    Protein Total 6.3 (L) 6.8 - 8.8 g/dL    Alkaline Phosphatase 96 40 - 150 U/L    ALT 20 0 - 70 U/L    AST 35 0 - 45 U/L   Magnesium   Result Value Ref " Range    Magnesium 2.0 1.6 - 2.3 mg/dL   CBC with platelets differential   Result Value Ref Range    WBC 8.4 4.0 - 11.0 10e9/L    RBC Count 3.27 (L) 4.4 - 5.9 10e12/L    Hemoglobin 11.4 (L) 13.3 - 17.7 g/dL    Hematocrit 35.3 (L) 40.0 - 53.0 %     (H) 78 - 100 fl    MCH 34.9 (H) 26.5 - 33.0 pg    MCHC 32.3 31.5 - 36.5 g/dL    RDW 16.2 (H) 10.0 - 15.0 %    Platelet Count 147 (L) 150 - 450 10e9/L    Diff Method Automated Method     % Neutrophils 80.7 %    % Lymphocytes 8.6 %    % Monocytes 6.8 %    % Eosinophils 1.1 %    % Basophils 0.4 %    % Immature Granulocytes 2.4 %    Absolute Neutrophil 6.8 1.6 - 8.3 10e9/L    Absolute Lymphocytes 0.7 (L) 0.8 - 5.3 10e9/L    Absolute Monocytes 0.6 0.0 - 1.3 10e9/L    Absolute Eosinophils 0.1 0.0 - 0.7 10e9/L    Absolute Basophils 0.0 0.0 - 0.2 10e9/L    Abs Immature Granulocytes 0.2 0 - 0.4 10e9/L     Assessment and Plan:   Recent hospitalization for hypoxia with pleural effusion- Pt was seen at [facility] from 7/5-7/7/2018 and thoracentesis was preformed with 1.5 L drained from right lung but was note to have some effusion to left lung as well. He is now noted to have improved breathing but is weak.   Home care and PT scheduled to return to his home. Encouraged short exertion more often.  Will get chest xray to monitor effusion next week when pt is in for cardiology visit since breathing improving we will wait until then.  Warned if increased SOB then may need to be seen sooner.   Nutrition- pt has nausea and los of appetite which may or may not be normal routine post treatment for him. Recommended use of antiemetics on regular basis over next week to improve intake. Sample of supplements given for pt to trial. Has had discussion with dietician in recent past but Miriam is aware of new issues with discussion today.    CHF- pt using lasix for help with symptoms. He has follow up set with Dr Ball on 7/17/2018.  Stage IV mantle cell lymphoma -pt was diagnosed with lymphoma ad began  treatment on 3/29 with Bendamustine/Rituxan.He has had recurrent hospitalizations between infusions for TLS, CHF and now recurrent pleural effusion and hypoxia.   He is next due for cycle 5 on 7/23/2018..    Anemia and thrombocytopenia- related to chemotherapy. Now seeing some recovery and should be improved by next infusion date.   This was a 30 min visit with > 50% in counseling and coordinating care including education and management of concerns.    Joy Bush,CNP      Again, thank you for allowing me to participate in the care of your patient.        Sincerely,        Joy Bush, NP, APRN CNP

## 2018-07-10 NOTE — PROGRESS NOTES
Oncology Follow Up Visit: July 10, 2018    Oncologist: Dr. Erickson Adan  PCP: Florian Alonzo    Diagnosis: Stage IV mantle cell lymphoma  Francisco Javier Cortes is a 79 yo  male presented in March 2018 with complaint of abdominal bloating and discomfort loss of appetite ×2 months. Initial imaging showed splenomegaly, extensive retroperitoneal mesenteric, inguinal and pelvic as well as retrocrural adenopathy and enlarging pulmonary nodules up to 18 mm. FNA proved mantle cell lymphoma-please see progress notes of 3/29/18 by Dr. Auguste for specific information.  Treatment:   3/29/18 began Bendamustine and Rituxan  - Hospitalized for TLS  - Hospitalized for sepsis from pneumonia  - hospitalized for CHF 4/20-21    Interval History: Mr. Cortes comes to clinic for symptom review while in treatment with rituximab/bendamustine for his lymphoma. He received cycle 4 on 6/25/2018. He was seen at [facility] for hypoxia with bilateral pleural effusion on 7/5/2018.He spent 2 days in hospital and thoracentesis was completed of 1.5 L from right lung.Today, pt states he is starting to feel stronger and is up puttering in house and yard - using Lasix to help with fluid load in body. Wife shares he is not eating well - still having nausea and poor appetite though feels the nausea is normal for him at this point in treatment cycle. He is not using the antiemetics. He admits to some SOB with exertion but improved after thoracentesis of the 1.5 L from the chest- feels he is still improving at this time. He denies fevers, neuropathy, trouble sleeping or depression. Reports buttock wound is healing now to point of only needing barrier cream- is careful to sleep on sides at hospital.  Magnesium replaced at hospitalization per wife.Bowel and bladder are normal.   Rest of comprehensive and complete ROS is reviewed and is negative.   Past Medical History:   Diagnosis Date     CAD (coronary artery disease) 1/09    s/p stentsx2      "Coronary artery disease involving native coronary artery of native heart without angina pectoris 12/22/2015     Dyslipidemia      Erectile dysfunction      GERD (gastroesophageal reflux disease)      Hearing problem One ear 1961     Hypertension      NSTEMI (non-ST elevated myocardial infarction) (H) 3/20/2014     Pneumonia      Prostate cancer (H)     prostatecomy 9 years ago, PSAs remain good     Status post percutaneous transluminal coronary angioplasty-Coronary angioplasty with STEPHEN to LCx 4/4/2014     Stented coronary artery     stents placed     Current Outpatient Prescriptions   Medication     Acetaminophen (TYLENOL 8 HOUR PO)     allopurinol (ZYLOPRIM) 300 MG tablet     Alum & Mag Hydroxide-Simeth (ALUMINUM-MAGNESIUM-SIMETHICONE) 200-200-20 MG/5ML SUSP     ASPIRIN 81 MG OR TABS     Benzethonium Chloride 0.1 % LIQD     clindamycin (CLEOCIN T) 1 % lotion     COMPRESSION STOCKINGS     furosemide (LASIX) 20 MG tablet     Gauze Pads & Dressings (GAUZE BANDAGE 4\") MISC     HYDROcodone-acetaminophen (NORCO) 5-325 MG per tablet     hydrocortisone valerate (WEST-NINOSKA) 0.2 % ointment     Lansoprazole (PREVACID PO)     lidocaine-prilocaine (EMLA) cream     LORazepam (ATIVAN) 0.5 MG tablet     metoprolol succinate (TOPROL-XL) 100 MG 24 hr tablet     nitroGLYcerin (NITROSTAT) 0.4 MG sublingual tablet     ondansetron (ZOFRAN) 8 MG tablet     potassium chloride SA (K-DUR/KLOR-CON M) 10 MEQ CR tablet     prochlorperazine (COMPAZINE) 10 MG tablet     rosuvastatin (CRESTOR) 40 MG tablet     zinc oxide - white petrolatum (CRITIC-AID THICK MOIST BARRIER) 20-51% PSTE topical paste     No current facility-administered medications for this visit.      Allergies   Allergen Reactions     Niacin      Severe rash and itching     Prevacid [Lansoprazole] Diarrhea     Patient notes diarrhea with 30mg generic version. Patient does ok with 20mg in generic and brand name.     Shellfish Allergy      One kind       Physical Exam:/75  " "Pulse 106  Temp 97.5  F (36.4  C)  Resp 16  Ht 1.651 m (5' 5\")  Wt 69.9 kg (154 lb)  BMI 25.63 kg/m2   ECOG PS- 1  Constitutional: Alert, moving slowly on own today and appears weak. Cooperative   ENT: Eyes bright, No mouth sores  Neck: Supple, no nodes are noted.  Cardiac: Heart rate and rhythm is regular and strong without murmur  Respiratory: Breathing easy. Lung  Sounds  With decreased breath sound to right lower lung yet but left sounding clear. .No cough and good effort.  GI: Abdomen is rounded, nontender, BS active. No masses or organomegaly  MS: Muscle tone fair with some weakness noted - moving on own to exam table. Extremities normal with 1+ pitting edema to mid calf bilaterally  Skin: No suspicious lesions, rashes,weeping  Neuro: Sensory grossly WNL, gait normal.   Lymph: Normal ant/post cervical, axillary, supraclavicular nodes  Psych: Mentation appears normal and affect normal/bright with good conversation.    Laboratory Results:   Results for orders placed or performed in visit on 07/10/18   CMP - Comprehensive Metabolic Panel   Result Value Ref Range    Sodium 139 133 - 144 mmol/L    Potassium 4.3 3.4 - 5.3 mmol/L    Chloride 105 94 - 109 mmol/L    Carbon Dioxide 27 20 - 32 mmol/L    Anion Gap 7 3 - 14 mmol/L    Glucose 90 70 - 99 mg/dL    Urea Nitrogen 12 7 - 30 mg/dL    Creatinine 1.02 0.66 - 1.25 mg/dL    GFR Estimate 70 >60 mL/min/1.7m2    GFR Estimate If Black 85 >60 mL/min/1.7m2    Calcium 8.7 8.5 - 10.1 mg/dL    Bilirubin Total 0.4 0.2 - 1.3 mg/dL    Albumin 3.2 (L) 3.4 - 5.0 g/dL    Protein Total 6.3 (L) 6.8 - 8.8 g/dL    Alkaline Phosphatase 96 40 - 150 U/L    ALT 20 0 - 70 U/L    AST 35 0 - 45 U/L   Magnesium   Result Value Ref Range    Magnesium 2.0 1.6 - 2.3 mg/dL   CBC with platelets differential   Result Value Ref Range    WBC 8.4 4.0 - 11.0 10e9/L    RBC Count 3.27 (L) 4.4 - 5.9 10e12/L    Hemoglobin 11.4 (L) 13.3 - 17.7 g/dL    Hematocrit 35.3 (L) 40.0 - 53.0 %     (H) 78 - " 100 fl    MCH 34.9 (H) 26.5 - 33.0 pg    MCHC 32.3 31.5 - 36.5 g/dL    RDW 16.2 (H) 10.0 - 15.0 %    Platelet Count 147 (L) 150 - 450 10e9/L    Diff Method Automated Method     % Neutrophils 80.7 %    % Lymphocytes 8.6 %    % Monocytes 6.8 %    % Eosinophils 1.1 %    % Basophils 0.4 %    % Immature Granulocytes 2.4 %    Absolute Neutrophil 6.8 1.6 - 8.3 10e9/L    Absolute Lymphocytes 0.7 (L) 0.8 - 5.3 10e9/L    Absolute Monocytes 0.6 0.0 - 1.3 10e9/L    Absolute Eosinophils 0.1 0.0 - 0.7 10e9/L    Absolute Basophils 0.0 0.0 - 0.2 10e9/L    Abs Immature Granulocytes 0.2 0 - 0.4 10e9/L     Assessment and Plan:   Recent hospitalization for hypoxia with pleural effusion- Pt was seen at [facility] [place] [place] from 7/5-7/7/2018 and thoracentesis was preformed with 1.5 L drained from right lung but was note to have some effusion to left lung as well. He is now noted to have improved breathing but is weak.   Home care and PT scheduled to return to his home. Encouraged short exertion more often.  Will get chest xray to monitor effusion next week when pt is in for cardiology visit since breathing improving we will wait until then.  Warned if increased SOB then may need to be seen sooner.   Nutrition- pt has nausea and los of appetite which may or may not be normal routine post treatment for him. Recommended use of antiemetics on regular basis over next week to improve intake. Sample of supplements given for pt to trial. Has had discussion with dietician in recent past but Miriam is aware of new issues with discussion today.    CHF- pt using lasix for help with symptoms. He has follow up set with Dr Ball on 7/17/2018.  Stage IV mantle cell lymphoma -pt was diagnosed with lymphoma ad began treatment on 3/29 with Bendamustine/Rituxan.He has had recurrent hospitalizations between infusions for TLS, CHF and now recurrent pleural effusion and hypoxia.   He is next due for cycle 5 on 7/23/2018..    Anemia and thrombocytopenia- related to chemotherapy.  Now seeing some recovery and should be improved by next infusion date.   This was a 30 min visit with > 50% in counseling and coordinating care including education and management of concerns.    Joy Bush,CNP

## 2018-07-10 NOTE — NURSING NOTE
"Oncology Rooming Note    July 10, 2018 1:50 PM   Francisco Javier Cortes is a 78 year old male who presents for:    Chief Complaint   Patient presents with     Oncology Clinic Visit     2 week follow up mid cycle     Initial Vitals: /75  Pulse 106  Temp 97.5  F (36.4  C)  Resp 16  Ht 1.651 m (5' 5\")  Wt 69.9 kg (154 lb)  BMI 25.63 kg/m2 Estimated body mass index is 25.63 kg/(m^2) as calculated from the following:    Height as of this encounter: 1.651 m (5' 5\").    Weight as of this encounter: 69.9 kg (154 lb). Body surface area is 1.79 meters squared.  No Pain (0) Comment: Data Unavailable   No LMP for male patient.  Allergies reviewed: Yes  Medications reviewed: Yes    Medications: Medication refills not needed today.  Pharmacy name entered into [employer]:    [facility] [place] - [place], [place] - [address], [id number]  [facility] - [facility] [place] - [address] [id number] - [place], [place] - [address] [address] [place] [facility]        5 minutes for nursing intake (face to face time)     Kathleen Sandoval LPN              "

## 2018-07-10 NOTE — MR AVS SNAPSHOT
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 (Include vitamins, minerals and over-thecounter medicines.) Leave your valuables at home.  Tell your doctor if there is a chance you may be pregnant.  You do not need to do anything special for this exam.            Jul 16, 2018  3:30 PM CDT   Return Visit with MARTHA Casillas CNP   [facility])    [address] [place] [address]   [phone]            Jul 17, 2018  9:30 AM CDT   Return Visit with Jad Ball MD   [facility])    [address] [place] [address]   [phone]            Jul 23, 2018  8:00 AM CDT   Return Visit with [facility]   [facility])    [address] [place] [address]   [phone]            Jul 23, 2018  8:45 AM CDT   Return Visit with Erickson Adan MD   [facility])    [address] [place] [address]   [phone]            Jul 23, 2018   9:30 AM CDT   Level 3 with BAY [facility]   [facility]    [address] [place] [id number] [phone]            Jul 24, 2018 11:00 AM CDT   Level 1 with BAY 5 [facility]FUSION   [facility]    [address] [facility] [address] [phone]            Aug 20, 2018  8:45 AM CDT   [facility] [place] [facility]   [facility]    [address] [place] [address]   [phone]            Aug 20, 2018  9:15 AM CDT   [facility] Visit with MARTHA Gonzales CNP   [facility]    [address] [place] [address]   [phone]            Aug 20, 2018 10:00 AM CDT   Level 3 with BAY 6 INFUSION   [facility]    [address] [place] [address]   [phone]              Who to contact     If you have questions or need follow up information about today's clinic visit or your schedule please contact [facility] directly at [phone].  Normal or non-critical lab and imaging results will be communicated to you by Fanearhart, letter or phone within 4 business days after the clinic has received the results. If you do not hear from us within 7 days, please contact the clinic through Fanearhart or phone. If you have a critical or abnormal lab result, we will notify you by phone as soon as possible.  Submit refill requests through ANDalyze or call your pharmacy and they will forward the refill request to us. Please allow 3 business days for your refill to be completed.          Additional Information About Your Visit        ANDalyze Information     ANDalyze gives you secure access to your electronic health record. If you see a primary care provider, you can also send messages to your care team and make  "appointments. If you have questions, please call your primary care clinic.  If you do not have a primary care provider, please call [phone] and they will assist you.      Mippin is an electronic gateway that provides easy, online access to your medical records. With Mippin, you can request a clinic appointment, read your test results, renew a prescription or communicate with your care team.     To access your existing account, please contact your [facility] or call [phone] for assistance.        Care EveryWhere ID     This is your Care EveryWhere ID. This could be used by other organizations to access your [place] medical records  [id number]        Your Vitals Were     Pulse Temperature Respirations Height BMI (Body Mass Index)       106 97.5  F (36.4  C) 16 1.651 m (5' 5\") 25.63 kg/m2        Blood Pressure from Last 3 Encounters:   07/10/18 106/75   07/05/18 116/72   06/26/18 116/77    Weight from Last 3 Encounters:   07/10/18 69.9 kg (154 lb)   07/05/18 72.1 kg (158 lb 14.4 oz)   06/26/18 72.6 kg (160 lb)              We Performed the Following     CBC with platelets differential     CMP - Comprehensive Metabolic Panel     Magnesium          Today's Medication Changes          These changes are accurate as of 7/10/18 11:59 PM.  If you have any questions, ask your nurse or doctor.               These medicines have changed or have updated prescriptions.        Dose/Directions    metoprolol succinate 100 MG 24 hr tablet   Commonly known as:  TOPROL-XL   This may have changed:  how much to take   Used for:  Hypertension goal BP (blood pressure) < 130/80, Coronary artery disease involving native coronary artery of native heart without angina pectoris        Dose:  100 mg   Take 1 tablet (100 mg) by mouth daily   Quantity:  90 tablet   Refills:  3       rosuvastatin 40 MG tablet   Commonly known as:  CRESTOR   This may have changed:  when to take this   Used for:  " Hyperlipidemia LDL goal <100, Coronary artery disease involving native coronary artery of native heart without angina pectoris        Dose:  40 mg   Take 1 tablet (40 mg) by mouth daily   Quantity:  90 tablet   Refills:  3                Primary Care Provider Office Phone # Fax #    Florian Alonzo [phone] [address]  [facility] [address]        Equal Access to Services     [facility]: Hadii aad ku hadasho Soomaali, waaxda luqadaha, qaybta kaalmada adeegyada, waxay idiin hayaan adeeg kharash la'aan . So [facility] [phone].    ATENCIÓN: Si habla español, tiene a ivan disposición servicios gratuitos de asistencia lingüística. Llame al [phone].    We comply with applicable federal civil rights laws and [place] laws. We do not discriminate on the basis of race, color, national origin, age, disability, sex, sexual orientation, or gender identity.            Thank you!     Thank you for choosing [facility]  for your care. Our goal is always to provide you with excellent care. Hearing back from our patients is one way we can continue to improve our services. Please take a few minutes to complete the written survey that you may receive in the mail after your visit with us. Thank you!             Your Updated Medication List - Protect others around you: Learn how to safely use, store and throw away your medicines at www.disposemymeds.org.          This list is accurate as of 7/10/18 11:59 PM.  Always use your most recent med list.                   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)       Aluminum-Magnesium-Simethicone 200-200-20 MG/5ML Susp      Take 5 mLs by mouth daily as needed        aspirin 81 MG tablet      1 TABLET EVERY MORNING        Benzethonium Chloride 0.1 % Liqd     237 mL     "Cleanse with wound cleanser and apply barrier paste and hydrocolloid dressing, change every 2-3 days as needed    Decubitus ulcer of buttock, unspecified laterality, unspecified ulcer stage       clindamycin 1 % lotion    CLEOCIN T    60 mL    Apply twice daily for 6 weeks to the groin    Rash       COMPRESSION STOCKINGS     2 each    1 each continuous prn Bilateral knee high compression stockings    Acute congestive heart failure, unspecified congestive heart failure type (H)       * furosemide 20 MG tablet    LASIX    180 tablet    Take 1 tablet (20 mg) by mouth daily    Hypervolemia, unspecified hypervolemia type       * furosemide 20 MG tablet    LASIX    30 tablet    Take 1 tablet (20 mg) by mouth daily    Acute diastolic congestive heart failure (H), Pleural effusion, 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)       potassium chloride SA 10 MEQ CR tablet    K-DUR/KLOR-CON M    30 tablet    Take 1 tablet (10 mEq) by mouth daily    Hypervolemia, unspecified hypervolemia type       PREVACID PO      Take 20 mg by mouth every evening as needed Patient needs to use brand name Prevacid (generic version causes diarrhea)        prochlorperazine 10 MG tablet    COMPAZINE    30 tablet    Take 1 tablet (10 mg) by mouth every 6 hours as needed for nausea or vomiting    Nausea       rosuvastatin 40 MG tablet    CRESTOR    90 tablet    Take 1 tablet (40 mg) by mouth daily    Hyperlipidemia LDL goal <100, Coronary artery disease involving native coronary artery of native heart without angina pectoris       TYLENOL 8 HOUR PO      Take 500 mg by mouth as needed        zinc oxide - white petrolatum 20-51% Pste topical paste     170 g    Cleanse with wound cleanser and apply barrier paste and hydrocolloid dressing, change every 2-3 days as needed    Decubitus ulcer of buttock, unspecified laterality, unspecified ulcer stage       * Notice:  This list has 2 medication(s) that are the same as other medications prescribed for you. Read the directions carefully, and ask your doctor or other care provider to review them with you.

## 2018-07-10 NOTE — MR AVS SNAPSHOT
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)    -  1       Follow-ups after your visit        Your next 10 appointments already scheduled     Jul 16, 2018  3:30 PM CDT   Return Visit with MARTHA Casillas CNP   [facility])    [address] [facility] [address]   [phone]            Jul 17, 2018  9:30 AM CDT   Return Visit with Jad Ball MD   [facility])    [address] [facility] [address]   [phone]            Jul 23, 2018  8:00 AM CDT   Return Visit with [facility]   [facility]    [address] [facility] [address]   [phone]            Jul 23, 2018  8:45 AM CDT   Return Visit with Erickson Adan MD   [facility])    [address] [facility] [address]   [phone]            Jul 23, 2018  9:30 AM CDT   Level 3 with BAY 1 INFUSION   [facility]    [address] [facility] [address]   [phone]            Jul 24, 2018 11:00 AM CDT   Level 1 with BAY 5 INFUSION   [facility]    [address] [facility] [address]   [phone]            Aug 20, 2018  8:45 AM CDT   Return Visit with [facility]   [facility]    [address] [facility] [address]   [phone]            Aug 20, 2018  9:15 AM CDT   Return Visit with MARTHA Gonzales  CNP   [facility]    [address] [facility] [address] [phone]            Aug 20, 2018 10:00 AM CDT   Level 3 with BAY 6 INFUSION   [facility]    [address] [facility] [address] [phone]            Aug 21, 2018 11:00 AM CDT   Level 1 with BAY 5 INFUSION   [facility]    [address] [place] [id number] [phone]              Who to contact     If you have questions or need follow up information about today's clinic visit or your schedule please contact [facility] directly at [phone].  Normal or non-critical lab and imaging results will be communicated to you by Algotochiphart, letter or phone within 4 business days after the clinic has received the results. If you do not hear from us within 7 days, please contact the clinic through Algotochiphart or phone. If you have a critical or abnormal lab result, we will notify you by phone as soon as possible.  Submit refill requests through dbTwang or call your pharmacy and they will forward the refill request to us. Please allow 3 business days for your refill to be completed.          Additional Information About Your Visit        Algotochiphart Information     dbTwang gives you secure access to your electronic health record. If you see a primary care provider, you can also send messages to your care team and make appointments. If you have questions, please call your primary care clinic.  If you do not have a primary care provider, please call [phone] and they will assist you.      dbTwang is an electronic gateway that provides easy, online access to your medical records. With dbTwang, you can request a clinic appointment, read your test results, renew a prescription or communicate with your care team.     To access your existing account, please contact your  [facility] or call [phone] for assistance.        Care EveryWhere ID     This is your Care EveryWhere ID. This could be used by other organizations to access your [place] medical records  [id number]         Blood Pressure from Last 3 Encounters:   07/10/18 106/75   07/05/18 116/72   06/26/18 116/77    Weight from Last 3 Encounters:   07/10/18 69.9 kg (154 lb)   07/05/18 72.1 kg (158 lb 14.4 oz)   06/26/18 72.6 kg (160 lb)              Today, you had the following     No orders found for display         Today's Medication Changes          These changes are accurate as of 7/10/18  3:04 PM.  If you have any questions, ask your nurse or doctor.               These medicines have changed or have updated prescriptions.        Dose/Directions    metoprolol succinate 100 MG 24 hr tablet   Commonly known as:  TOPROL-XL   This may have changed:  how much to take   Used for:  Hypertension goal BP (blood pressure) < 130/80, Coronary artery disease involving native coronary artery of native heart without angina pectoris        Dose:  100 mg   Take 1 tablet (100 mg) by mouth daily   Quantity:  90 tablet   Refills:  3       rosuvastatin 40 MG tablet   Commonly known as:  CRESTOR   This may have changed:  when to take this   Used for:  Hyperlipidemia LDL goal <100, Coronary artery disease involving native coronary artery of native heart without angina pectoris        Dose:  40 mg   Take 1 tablet (40 mg) by mouth daily   Quantity:  90 tablet   Refills:  3                Primary Care Provider Office Phone # Fax #    Florian Alonzo [phone]       [address]  [facility] [address]        Equal Access to Services     [facility] : Hadii rivera collazo hadasho Solina, waaxda luqadaha, qaybta kaalmada zhao wu. [facility] [phone].    ATENCIÓN: Si habla español, tiene a ivan disposición servicios gratuitos de asistencia lingüística.  "Micaela al [phone].    We comply with applicable federal civil rights laws and [place] laws. We do not discriminate on the basis of race, color, national origin, age, disability, sex, sexual orientation, or gender identity.            Thank you!     Thank you for choosing [facility]  for your care. Our goal is always to provide you with excellent care. Hearing back from our patients is one way we can continue to improve our services. Please take a few minutes to complete the written survey that you may receive in the mail after your visit with us. Thank you!             Your Updated Medication List - Protect others around you: Learn how to safely use, store and throw away your medicines at www.disposemymeds.org.          This list is accurate as of 7/10/18  3:04 PM.  Always use your most recent med list.                   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)       Aluminum-Magnesium-Simethicone 200-200-20 MG/5ML Susp      Take 5 mLs by mouth daily as needed        aspirin 81 MG tablet      1 TABLET EVERY MORNING        Benzethonium Chloride 0.1 % Liqd     237 mL    Cleanse with wound cleanser and apply barrier paste and hydrocolloid dressing, change every 2-3 days as needed    Decubitus ulcer of buttock, unspecified laterality, unspecified ulcer stage       clindamycin 1 % lotion    CLEOCIN T    60 mL    Apply twice daily for 6 weeks to the groin    Rash       COMPRESSION STOCKINGS     2 each    1 each continuous prn Bilateral knee high compression stockings    Acute congestive heart failure, unspecified congestive heart failure type (H)       furosemide 20 MG tablet    LASIX    180 tablet    Take 1 tablet (20 mg) by mouth daily    Hypervolemia, unspecified hypervolemia type       Gauze Bandage 4\" Misc     5 each    Cleanse " with wound cleanser and apply barrier paste and hydrocolloid dressing, change every 2-3 days as needed    Decubitus ulcer of buttock, unspecified laterality, unspecified ulcer stage       HYDROcodone-acetaminophen 5-325 MG per tablet    NORCO     Take 1 tablet by mouth every 4 hours as needed        hydrocortisone valerate 0.2 % ointment    [place]    45 g    Apply sparingly to affected area three times daily as needed.    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)       potassium chloride SA 10 MEQ CR tablet    K-DUR/KLOR-CON M    30 tablet    Take 1 tablet (10 mEq) by mouth daily    Hypervolemia, unspecified hypervolemia type       PREVACID PO      Take 20 mg by mouth every evening as needed Patient needs to use brand name Prevacid (generic version causes diarrhea)        prochlorperazine 10 MG tablet    COMPAZINE    30 tablet    Take 1 tablet (10 mg) by mouth every 6  hours as needed for nausea or vomiting    Nausea       rosuvastatin 40 MG tablet    CRESTOR    90 tablet    Take 1 tablet (40 mg) by mouth daily    Hyperlipidemia LDL goal <100, Coronary artery disease involving native coronary artery of native heart without angina pectoris       TYLENOL 8 HOUR PO      Take 500 mg by mouth as needed        zinc oxide - white petrolatum 20-51% Pste topical paste     170 g    Cleanse with wound cleanser and apply barrier paste and hydrocolloid dressing, change every 2-3 days as needed    Decubitus ulcer of buttock, unspecified laterality, unspecified ulcer stage

## 2018-07-12 NOTE — TELEPHONE ENCOUNTER
Called and spoke to patient. Dr Ball has reviewed the hospital notes and Joy Manzanares's note from Tues. He feels that from a cardiac standpoint the patient is stable and does not need to be seen today. However, if he is feeling more short of breath, then he should go to the ED to be evaluated. He may need another thoracentesis. He should keep his appointment on 7/18, and he also plans to have a repeat chest xray. He does not know if his pulse is irregular. His wife was on the phone extension and heard plan. He understands and will monitor his condition.

## 2018-07-12 NOTE — TELEPHONE ENCOUNTER
[facility] Call Center    Phone Message    May a detailed message be left on voicemail: yes    Reason for Call: Symptoms or Concerns     If patient has red-flag symptoms, warm transfer to triage line    Current symptom or concern: Patient is scheduled on 07/17/18 with Dr. Ball and was recently d/c from [facility] on Saturday and was advised to get into see a cardiologist sooner than what he is scheduled. Please advise.    Action Taken: Message routed to:  Adult Clinics: Cardiology p [id number]

## 2018-07-13 NOTE — TELEPHONE ENCOUNTER
[facility] Call Center    Phone Message    May a detailed message be left on voicemail: yes    Reason for Call: Other: patient will be discharged from home care- fyi to doctor; also homecare nurse wants new/updated wound care orders- patient doesn't have any faugh on the wound anymore, just a pressure ulcer on L (.8 by .3 cm) upper butt cheek- wound care orders to read: cleanse with wound cleanser or warm soapy water, apply barrier cream to fredy wound skin and then padded foam dressing.    to change every 2-3 days and as needed, if foam dressing doesn't stay or if wound heals- okay to cleanse with wound cleanser or warm soapy water and apply barrier cream for protection. Okay for care giver to perform.    Action Taken: Message routed to:  Primary Care p [id number]

## 2018-07-13 NOTE — TELEPHONE ENCOUNTER
Gave verbal order for wound care instructions below to home care nurse Corine.    She wanted to left provider know that her last appt with patient is on Wednesday 7/18 so if any questions to contact her.    She will be discharging patient with same wound care instructions:  Wife to do wound care.  Home care has been getting supplies from [employer] through the home care but since discharging him he will need to get an order for the Mepilex from provider to local pharmacy.  He sees Kerri Quispe on Monday 7/16/18.  She also asks if there are any med changes to help him to update his medication sheet as this has been very helpful for patient in taking his medications.  He will bring in the sheet to the office visit Monday.    Routing to Kerri Quispe as FYI for Mondays office visit.    Next 5 appointments (look out 90 days)     Jul 16, 2018  3:30 PM CDT   Return Visit with MARTHA Casillas CNP   [facility] ([facility])    [address] [place] [id number]   [phone]              Adrienne Gordon RN,   [facility]

## 2018-07-13 NOTE — TELEPHONE ENCOUNTER
Please initiate orders as recommended:    wound care orders to read: cleanse with wound cleanser or warm soapy water, apply barrier cream to fredy wound skin and then padded foam dressing.    to change every 2-3 days and as needed, if foam dressing doesn't stay or if wound heals- okay to cleanse with wound cleanser or warm soapy water and apply barrier cream for protection. Okay for care giver to perform.

## 2018-07-16 NOTE — PROGRESS NOTES
SUBJECTIVE:   Francisco Javier Cortes is a 78 year old male who presents to clinic today for the following health issues:      ED/UC Followup:    Facility:  [facility]  Date of visit: Released on 7/7/18  Reason for visit: Breathing concerns - removed 1.5 liters of fluid from Right lung  Current Status: Feels a little better can breathe better but still feels weak     HOSPITAL COURSE:  This is a 78-year-old gentleman with history of mantle cell lymphoma, CAD, s/pstents x2, HTN, prostate cancer for which he underwent prostatectomy about 10 years ago, and numerous other medical conditions.      The patient had abdominal bloating and discomfort as well as loss of appetite for two months back in 2/2018. CT abd/pelvis on 2/25/18, which showed extensive intraabdominal inguinal lymphadenopathy as well as splenomegaly. CT neck and chest 3/18 showed bulky mediastinal, hilar, axillary and supraclavicular LAD. Subsequent FNA of a lymph node on 3/24/18 revealed mantle cell lymphoma. He then underwent extensive biopsy and other testing that confirmed the mantle cell lymphoma. On March 29, 2018, he was started on bendamustine + Rituxan chemotherapy, q 4 weeks for a total of 6 cycles. He received his 4th cycle last week. He is also on allopurinol for tumor lysis syndrome prophylaxis. He required admission to the [facility] 3 times since he was initiated on chemotherapy (initially for TLS monitoring, 2nd time for sepsis due to pneumonia and the 3rd and final time was from 4/20 to 4/21/18, for CHF exacerbation).      He has been having SOB since he was initiated on chemotherapy. He [place] [facility] complaining of worsening of his SOB. He denies cough but says he has sinus drainage. He denies fever or chills. He denies nausea or vomiting. He denies chest pain or palpitations. He states he is on Lasix for recurrent pleural effusion. CXR in the ED showed bilateral pleural effusion, right worse than left, as well as bibasilar  opacification suspicious for pneumonia. The patient's D-dimer was elevated and that has prompted further workup with CT chest with IV contrast that was negative for PE or pneumonia, but did confirm the bilateral pleural effusion, large on the right side and small on the left.   He was set up for a thoracentesis which was performed on 7/6/2018 of the right lung. He had 1.5 L removed. Per radiology the fluid appeared chylous. Triglyceride level was elevated and pleural fluid. I did discuss this with pulmonary service over at [facility]. We believe the chylous fluid is due to his lymphoma. He will need to continue following up with his oncologist in treating his lymphoma. Patient's repeat x-ray shows that pleural effusion is significantly diminished on the right. He is no longer complaining of shortness of breath. He is now on room air.     Patient is here for follow up after hospitalization for SOB  He continues to be SOB but is better than when he was initially admitted  Has a history of mantle cell lymphoma and was noted on exam to have pleural effusion   Has appointment with cardiologist tomorrow and saw oncology last Monday  CXR is pending with oncology   I have reviewed the patient's Past Medical History, Problem List and Medication List.  I have updated the patient's Problem List and Medication List.    Problem list and histories reviewed & adjusted, as indicated.  Additional history: as documented    Patient Active Problem List   Diagnosis     Hyperlipidemia LDL goal <100     Advanced directives, counseling/discussion     Prediabetes     Obesity (BMI 30-39.9)     Inflamed seborrheic keratosis     AK (actinic keratosis)     Basal cell carcinoma of neck     Microalbuminuria     Perianal dermatitis     NSTEMI (non-ST elevated myocardial infarction) (H)     S/P coronary angioplasty     Status post percutaneous transluminal coronary angioplasty-Coronary angioplasty with STEPHEN to LCx     Prostate Cancer, s/p prostatectomy      Pseudophakia of both eyes     Gastroesophageal reflux disease, esophagitis presence not specified     Coronary artery disease involving native coronary artery of native heart without angina pectoris     History of colonic polyps     Chronic diarrhea     Essential hypertension with goal blood pressure less than 140/90     Dyslipidemia     Erectile dysfunction, unspecified erectile dysfunction type     Elevated fasting blood sugar     Eczema, unspecified type     Squamous blepharitis of right upper eyelid     Elevated AST (SGOT)     Flatulence, eructation, and gas pain     Bloating     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)     Drug-induced neutropenia (H)     Nausea     Encounter for antineoplastic chemotherapy     Nonspecific ST-T wave electrocardiographic changes     Examination prior to chemotherapy     Tumor lysis syndrome     SIRS (systemic inflammatory response syndrome) (H)     COLE (dyspnea on exertion)     Dehydration     Past Surgical History:   Procedure Laterality Date     BIOPSY LYMPH NODE CERVICAL Left 3/22/2018    Procedure: BIOPSY LYMPH NODE CERVICAL;  Excisional Biopsy Left Cervical Lymph Node ;  Surgeon: Samia Gaviria MD;  Location: [facility] OR     C APPENDECTOMY       C REMV PROSTATE,RETROPUB,RAD,TOT NODES  1999     CATARACT IOL, RT/LT       COLONOSCOPY       COLONOSCOPY Left 7/5/2016    Procedure: COMBINED COLONOSCOPY, SINGLE OR MULTIPLE BIOPSY/POLYPECTOMY BY BIOPSY;  Surgeon: Duane, William Charles, MD;  Location: [facility] OR     COLONOSCOPY WITH CO2 INSUFFLATION N/A 7/5/2016    Procedure: COLONOSCOPY WITH CO2 INSUFFLATION;  Surgeon: Duane, William Charles, MD;  Location:  OR     ENT SURGERY  1980--1999     PHACOEMULSIFICATION CLEAR CORNEA WITH STANDARD INTRAOCULAR LENS IMPLANT Left 6/18/2015    Procedure: PHACOEMULSIFICATION CLEAR CORNEA WITH STANDARD INTRAOCULAR LENS IMPLANT;  Surgeon: John Zimmerman MD;  Location[facility]     PHACOEMULSIFICATION CLEAR CORNEA WITH  STANDARD INTRAOCULAR LENS IMPLANT Right 7/16/2015    Procedure: PHACOEMULSIFICATION CLEAR CORNEA WITH STANDARD INTRAOCULAR LENS IMPLANT;  Surgeon: John Zimmerman MD;  Location:  [facility]     STENT, CORONARY, [place]  1/09, 2014    x2, x1 in 2014     TYMPANOPLASTY       VASCULAR SURGERY  2013    stents       Social History   Substance Use Topics     Smoking status: Never Smoker     Smokeless tobacco: Never Used     Alcohol use Yes      Comment: a glass of red wine a day     Family History   Problem Relation Age of Onset     Cardiovascular Mother      HEART DISEASE Mother      Cancer - colorectal Father      HEART DISEASE Father      Other Cancer Father      Cancer Father      Hypertension Father      Asthma Brother      Coronary Artery Disease Brother      Hypertension Brother      HEART DISEASE Brother      HEART DISEASE Son      Hypertension Son      Cancer Daughter      non hodgkins     Prostate Cancer Brother      Hypertension Brother      Cancer Brother      Prostate Cancer Maternal Grandfather      Cancer Maternal Grandfather      Muscular Disorder Maternal Grandfather      HEART DISEASE Paternal Grandmother      Hypertension Paternal Grandmother      Hypertension Sister          Current Outpatient Prescriptions   Medication Sig Dispense Refill     Acetaminophen (TYLENOL 8 HOUR PO) Take 500 mg by mouth as needed        allopurinol (ZYLOPRIM) 300 MG tablet Take 1 tablet (300 mg) by mouth daily 30 tablet 1     Alum & Mag Hydroxide-Simeth (ALUMINUM-MAGNESIUM-SIMETHICONE) 200-200-20 MG/5ML SUSP Take 5 mLs by mouth daily as needed       ASPIRIN 81 MG OR TABS 1 TABLET EVERY MORNING       Benzethonium Chloride 0.1 % LIQD Cleanse with wound cleanser and apply barrier paste and hydrocolloid dressing, change every 2-3 days as needed 237 mL 3     clindamycin (CLEOCIN T) 1 % lotion Apply twice daily for 6 weeks to the groin 60 mL 1     COMPRESSION STOCKINGS 1 each continuous prn Bilateral knee high compression stockings  "2 each 0     furosemide (LASIX) 20 MG tablet Take 1 tablet (20 mg) by mouth daily 30 tablet      furosemide (LASIX) 20 MG tablet Take 1 tablet (20 mg) by mouth daily 180 tablet 3     Gauze Pads & Dressings (GAUZE BANDAGE 4\") MISC Cleanse with wound cleanser and apply barrier paste and hydrocolloid dressing, change every 2-3 days as needed 5 each 3     HYDROcodone-acetaminophen (NORCO) 5-325 MG per tablet Take 1 tablet by mouth every 4 hours as needed       hydrocortisone valerate (WEST-NINOSKA) 0.2 % ointment Apply sparingly to affected area three times daily as needed. 45 g 3     Lansoprazole (PREVACID PO) Take 20 mg by mouth every evening as needed Patient needs to use brand name Prevacid (generic version causes diarrhea)        lidocaine-prilocaine (EMLA) cream Apply 1 Application topically as needed       LORazepam (ATIVAN) 0.5 MG tablet Take 1 tablet (0.5 mg) by mouth every 4 hours as needed (Anxiety, Nausea/Vomiting or Sleep) 30 tablet 3     metoprolol succinate (TOPROL-XL) 100 MG 24 hr tablet Take 1 tablet (100 mg) by mouth daily (Patient taking differently: Take 25 mg by mouth daily ) 90 tablet 3     nitroGLYcerin (NITROSTAT) 0.4 MG sublingual tablet Place 1 tablet (0.4 mg) under the tongue every 5 minutes as needed up to 3 tablets per episode. 60 tablet 6     ondansetron (ZOFRAN) 8 MG tablet Take 1 tablet (8 mg) by mouth every 8 hours as needed (Nausea/Vomiting) 10 tablet 3     potassium chloride SA (K-DUR/KLOR-CON M) 10 MEQ CR tablet Take 1 tablet (10 mEq) by mouth daily 30 tablet 1     prochlorperazine (COMPAZINE) 10 MG tablet Take 1 tablet (10 mg) by mouth every 6 hours as needed for nausea or vomiting 30 tablet 1     rosuvastatin (CRESTOR) 40 MG tablet Take 1 tablet (40 mg) by mouth daily (Patient taking differently: Take 40 mg by mouth every morning ) 90 tablet 3     zinc oxide - white petrolatum (CRITIC-AID THICK MOIST BARRIER) 20-51% PSTE topical paste Cleanse with wound cleanser and apply barrier paste " and hydrocolloid dressing, change every 2-3 days as needed 170 g 3     Allergies   Allergen Reactions     Niacin      Severe rash and itching     Prevacid [Lansoprazole] Diarrhea     Patient notes diarrhea with 30mg generic version. Patient does ok with 20mg in generic and brand name.     Shellfish Allergy      One kind     BP Readings from Last 3 Encounters:   07/16/18 94/68   07/10/18 106/75   07/05/18 116/72    Wt Readings from Last 3 Encounters:   07/16/18 157 lb 1.6 oz (71.3 kg)   07/10/18 154 lb (69.9 kg)   07/05/18 158 lb 14.4 oz (72.1 kg)             Labs reviewed in EPIC    Reviewed and updated as needed this visit by clinical staff       Reviewed and updated as needed this visit by Provider         ROS:  CONSTITUTIONAL:NEGATIVE for fever, chills, change in weight and POSITIVE  for fatigue  ENT/MOUTH: NEGATIVE for ear, mouth and throat problems. Does have some postnasal drainage   RESP:POSITIVE for dyspnea on exertion and NEGATIVE for hemoptysis  CV: NEGATIVE for chest pain, palpitations or peripheral edema  GI: NEGATIVE for nausea, poor appetite and vomiting  MUSCULOSKELETAL: NEGATIVE for significant arthralgias or myalgia  NEURO: NEGATIVE for weakness, dizziness or paresthesias  HEME/ALLERGY/IMMUNE: POSITIVE  for lymphoma  and NEGATIVE for anemia and night sweats    OBJECTIVE:     BP 94/68 (BP Location: Right arm, Patient Position: Chair, Cuff Size: Adult Regular)  Pulse 102  Temp 97.4  F (36.3  C) (Temporal)  Wt 157 lb 1.6 oz (71.3 kg)  SpO2 95%  BMI 26.14 kg/m2  Body mass index is 26.14 kg/(m^2).   Wt Readings from Last 4 Encounters:   07/16/18 157 lb 1.6 oz (71.3 kg)   07/10/18 154 lb (69.9 kg)   07/05/18 158 lb 14.4 oz (72.1 kg)   06/26/18 160 lb (72.6 kg)       GENERAL APPEARANCE: alert, no distress and fatigued  NECK: no adenopathy  sinuses nontender, post nasal drip noted and nasal mucosa congested  RESP: lungs clear to auscultation - no rales, rhonchi or wheezes  CV: regular rates and rhythm,  no murmur, click or rub and no irregular beats  MS: extremities normal- no gross deformities noted and peripheral pulses normal  SKIN: Skin color, texture, turgor normal.   NEURO: Normal strength and tone, mentation intact and speech normal  PSYCH: mentation appears normal and affect normal/bright    Diagnostic Test Results:  Results for orders placed or performed in visit on 07/10/18   CMP - Comprehensive Metabolic Panel   Result Value Ref Range    Sodium 139 133 - 144 mmol/L    Potassium 4.3 3.4 - 5.3 mmol/L    Chloride 105 94 - 109 mmol/L    Carbon Dioxide 27 20 - 32 mmol/L    Anion Gap 7 3 - 14 mmol/L    Glucose 90 70 - 99 mg/dL    Urea Nitrogen 12 7 - 30 mg/dL    Creatinine 1.02 0.66 - 1.25 mg/dL    GFR Estimate 70 >60 mL/min/1.7m2    GFR Estimate If Black 85 >60 mL/min/1.7m2    Calcium 8.7 8.5 - 10.1 mg/dL    Bilirubin Total 0.4 0.2 - 1.3 mg/dL    Albumin 3.2 (L) 3.4 - 5.0 g/dL    Protein Total 6.3 (L) 6.8 - 8.8 g/dL    Alkaline Phosphatase 96 40 - 150 U/L    ALT 20 0 - 70 U/L    AST 35 0 - 45 U/L   Magnesium   Result Value Ref Range    Magnesium 2.0 1.6 - 2.3 mg/dL   CBC with platelets differential   Result Value Ref Range    WBC 8.4 4.0 - 11.0 10e9/L    RBC Count 3.27 (L) 4.4 - 5.9 10e12/L    Hemoglobin 11.4 (L) 13.3 - 17.7 g/dL    Hematocrit 35.3 (L) 40.0 - 53.0 %     (H) 78 - 100 fl    MCH 34.9 (H) 26.5 - 33.0 pg    MCHC 32.3 31.5 - 36.5 g/dL    RDW 16.2 (H) 10.0 - 15.0 %    Platelet Count 147 (L) 150 - 450 10e9/L    Diff Method Automated Method     % Neutrophils 80.7 %    % Lymphocytes 8.6 %    % Monocytes 6.8 %    % Eosinophils 1.1 %    % Basophils 0.4 %    % Immature Granulocytes 2.4 %    Absolute Neutrophil 6.8 1.6 - 8.3 10e9/L    Absolute Lymphocytes 0.7 (L) 0.8 - 5.3 10e9/L    Absolute Monocytes 0.6 0.0 - 1.3 10e9/L    Absolute Eosinophils 0.1 0.0 - 0.7 10e9/L    Absolute Basophils 0.0 0.0 - 0.2 10e9/L    Abs Immature Granulocytes 0.2 0 - 0.4 10e9/L       ASSESSMENT/PLAN:     Francisco Javier was seen  today for recheck.    Diagnoses and all orders for this visit:    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)    Pleural effusion    Postnasal discharge  -     loratadine (CLARITIN) 10 MG tablet; Take 1 tablet (10 mg) by mouth daily  -     fluticasone (FLONASE) 50 MCG/ACT spray; Spray 1-2 sprays into both nostrils daily    PLAN:    Patient needs to follow up in if no improvement,or sooner if worsening of symptoms or other symptoms develop.  Keep follow up with cardiologist and oncologist as planned.  See Patient Instructions    MARTHA Casillas CNP  [facility]

## 2018-07-16 NOTE — PATIENT INSTRUCTIONS
PLAN:   1.   Symptomatic therapy suggested: Continue current medications.    2.  Orders Placed This Encounter   Medications     loratadine (CLARITIN) 10 MG tablet     Sig: Take 1 tablet (10 mg) by mouth daily     Dispense:  30 tablet     Refill:  1     fluticasone (FLONASE) 50 MCG/ACT spray     Sig: Spray 1-2 sprays into both nostrils daily     Dispense:  1 Bottle     Refill:  11       3. Patient needs to follow up in if no improvement,or sooner if worsening of symptoms or other symptoms develop.  Keep follow up with cardiologist and oncologist as planned.     It was a pleasure seeing you today at the [facility]. Thank you for allowing us to care for you today. We truly hope we provided you with the excellent service you deserve. Please let us know if there is anything else we can do for you so we can be sure you are leaving completley satisfied with your care experience.       General information about your clinic   Clinic Hours Lab Hours (Appointments are required)   Mon-Thurs: 7:30 AM - 7 PM Mon-Thurs: 7:30 AM - 7 PM   Fri: 7:30 AM - 5 PM Fri: 7:30 AM - 5 PM        After Hours Nurse Advise & Appts:  [facility]visors: [phone]  [facility] On Call: to make appointments anytime: [phone] On Call Physician: call [phone] and answering service will page the on call physician.        For urgent appointments, please call [phone] and ask for the triage nurse or your care team clinic nurse.  How to contact my care team:  [facility]: [email or web address]   Phone: [phone]   Fax: [phone]       [facility]:   Phone: [phone]  Hours: 8:00 AM - 6:00 PM  Medication Refills:  Call your pharmacy and they will forward the refill to us. Please allow 3 business days for your refills to be completed.       Normal or non-critical lab and imaging results will be communicated to you by MyChart, letter or phone within 7 days.  If you do not hear from us within 10  days, please call the clinic. If you have a critical or abnormal lab result, we will notify you by phone as soon as possible.       We now have PWIC (Pediatric Walk in Care)  Monday-Friday from 7:30-4. Simply walk in and be seen for your urgent needs like cough, fever, rash, diarrhea or vomiting, pink eye, UTI. No appointments needed. Ask one of the team for more information      -Your Care Team:    Dr. Anshul Kelley - Internal Medicine/Pediatrics   Dr. Florian Alonzo - Family Medicine  Dr. Melissa Casiano - Pediatrics  Dr. Yudy Watters - Pediatrics  Alla Quispe CNP - Family Practice Nurse Practitioner

## 2018-07-16 NOTE — MR AVS SNAPSHOT
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   Symptomatic therapy suggested: Continue current medications.    2.  Orders Placed This Encounter   Medications     loratadine (CLARITIN) 10 MG tablet     Sig: Take 1 tablet (10 mg) by mouth daily     Dispense:  30 tablet     Refill:  1     fluticasone (FLONASE) 50 MCG/ACT spray     Sig: Spray 1-2 sprays into both nostrils daily     Dispense:  1 Bottle     Refill:  11       3. Patient needs to follow up in if no improvement,or sooner if worsening of symptoms or other symptoms develop.  Keep follow up with cardiologist and oncologist as planned.     It was a pleasure seeing you today at the [facility]. Thank you for allowing us to care for you today. We truly hope we provided you with the excellent service you deserve. Please let us know if there is anything else we can do for you so we can be sure you are leaving completley satisfied with your care experience.       General information about your clinic   Clinic Hours Lab Hours (Appointments are required)   Mon-Thurs: 7:30 AM - 7 PM Mon-Thurs: 7:30 AM - 7 PM   Fri: 7:30 AM - 5 PM Fri: 7:30 AM - 5 PM        After Hours Nurse Advise & Appts:  [facility] Nurse Advisors: [phone]  Joe On Call: to make appointments anytime: [phone] On Call Physician: call [phone] and answering service will page the on call physician.        For urgent appointments, please call [phone] and ask for the triage nurse or your care team clinic nurse.  How to contact my care  team:  Mike: [email or web address]   Phone: [phone]   Fax: [phone]       [facility]:   Phone: [phone]  Hours: 8:00 AM - 6:00 PM  Medication Refills:  Call your pharmacy and they will forward the refill to us. Please allow 3 business days for your refills to be completed.       Normal or non-critical lab and imaging results will be communicated to you by MyChart, letter or phone within 7 days.  If you do not hear from us within 10 days, please call the clinic. If you have a critical or abnormal lab result, we will notify you by phone as soon as possible.       We now have PWIC (Pediatric Walk in Care)  Monday-Friday from 7:30-4. Simply walk in and be seen for your urgent needs like cough, fever, rash, diarrhea or vomiting, pink eye, UTI. No appointments needed. Ask one of the team for more information      -Your Care Team:    Dr. Anshul Kelley - Internal Medicine/Pediatrics   Dr. Florian Alonzo - Family Medicine  Dr. Melissa Casiano - Pediatrics  Dr. Yudy Watters - Pediatrics  Alla Quispe CNP - Family Practice Nurse Practitioner                           Follow-ups after your visit        Your next 10 appointments already scheduled     Jul 17, 2018  9:30 AM CDT   Return Visit with Jad Ball MD   [facility])    [address] [place] [address]   [phone]            Jul 23, 2018  8:00 AM CDT   Return Visit with [facility]   [facility])    [address] [facility] [address]   [phone]            Jul 23, 2018  8:45 AM CDT   Return Visit with Erickson Adan MD   [facility])    [address] [place] [address]   [phone]            Jul 23, 2018  9:30 AM CDT   Level 3 with BAY 1 INFUSION   [facility])    [address] [place] [address]  [place] [address]   [phone]            Jul 24, 2018 11:00 AM CDT   Level 1 with BAY 5 INFUSION   [facility]    [address] [facility] [address]   [phone]            Aug 20, 2018  8:45 AM CDT   Return Visit with [facility]   [facility]    [address] [facility] [address]   [phone]            Aug 20, 2018  9:15 AM CDT   Return Visit with MARTHA Gonzales CNP   [facility]    [address] [facility] [address]   [phone]            Aug 20, 2018 10:00 AM CDT   Level 3 with BAY 6 INFUSION   [facility]    [address] [facility] [address]   [phone]            Aug 21, 2018 11:00 AM CDT   Level 1 with BAY 5 INFUSION   [facility]    [address] [facility] [address]   [phone]              Who to contact     If you have questions or need follow up information about today's clinic visit or your schedule please contact [facility] directly at [phone].  Normal or non-critical lab and imaging results will be communicated to you by MyChart, letter or phone within 4 business days after the clinic has received the results. If you do not hear from us within 7 days, please contact the clinic through [employer] or phone. If you have a critical or abnormal lab result, we will notify you by phone as soon as possible.  Submit refill requests through [employer] or call your pharmacy and they will forward the refill request to us. Please allow 3 business days for your refill to be completed.          Additional Information About Your Visit        [employer] Information     [employer] gives you secure access to your electronic health record. If you see a primary care provider, you  can also send messages to your care team and make appointments. If you have questions, please call your primary care clinic.  If you do not have a primary care provider, please call [phone] and they will assist you.      [employer] is an electronic gateway that provides easy, online access to your medical records. With [employer], you can request a clinic appointment, read your test results, renew a prescription or communicate with your care team.     To access your existing account, please contact your [facility] or call [phone] for assistance.        Care EveryWhere ID     This is your Care EveryWhere ID. This could be used by other organizations to access your [place] medical records  [id number]        Your Vitals Were     Pulse Temperature Pulse Oximetry BMI (Body Mass Index)          102 97.4  F (36.3  C) (Temporal) 95% 26.14 kg/m2         Blood Pressure from Last 3 Encounters:   07/16/18 94/68   07/10/18 106/75   07/05/18 116/72    Weight from Last 3 Encounters:   07/16/18 157 lb 1.6 oz (71.3 kg)   07/10/18 154 lb (69.9 kg)   07/05/18 158 lb 14.4 oz (72.1 kg)              Today, you had the following     No orders found for display         Today's Medication Changes          These changes are accurate as of 7/16/18  4:11 PM.  If you have any questions, ask your nurse or doctor.               Start taking these medicines.        Dose/Directions    fluticasone 50 MCG/ACT spray   Commonly known as:  FLONASE   Used for:  Postnasal discharge   Started by:  Alla Quispe APRN CNP        Dose:  1-2 spray   Spray 1-2 sprays into both nostrils daily   Quantity:  1 Bottle   Refills:  11       loratadine 10 MG tablet   Commonly known as:  CLARITIN   Used for:  Postnasal discharge   Started by:  Alla Quispe APRN CNP        Dose:  10 mg   Take 1 tablet (10 mg) by mouth daily   Quantity:  30 tablet   Refills:  1         These medicines have changed or have updated  prescriptions.        Dose/Directions    metoprolol succinate 100 MG 24 hr tablet   Commonly known as:  TOPROL-XL   This may have changed:  how much to take   Used for:  Hypertension goal BP (blood pressure) < 130/80, Coronary artery disease involving native coronary artery of native heart without angina pectoris        Dose:  100 mg   Take 1 tablet (100 mg) by mouth daily   Quantity:  90 tablet   Refills:  3       rosuvastatin 40 MG tablet   Commonly known as:  CRESTOR   This may have changed:  when to take this   Used for:  Hyperlipidemia LDL goal <100, Coronary artery disease involving native coronary artery of native heart without angina pectoris        Dose:  40 mg   Take 1 tablet (40 mg) by mouth daily   Quantity:  90 tablet   Refills:  3            Where to get your medicines      These medications were sent to [facility] - [place], [place] - [address]  [address], [facility] [address]     Phone:  [phone]     fluticasone 50 MCG/ACT spray         Some of these will need a paper prescription and others can be bought over the counter.  Ask your nurse if you have questions.     You don't need a prescription for these medications     loratadine 10 MG tablet                Primary Care Provider Office Phone # Fax #    Florian Alonzo [phone]       [address]  [facility] [address]        Equal Access to Services     NANDA BARAJAS AH: Hadii rivera collazo hadasho Soomaali, waaxda luqadaha, qaybta kaalmada adeegyada, zhao lorenzo. So [facility] [phone].    ATENCIÓN: Si habla español, tiene a ivan disposición servicios gratuitos de asistencia lingüística. Llame al [phone].    We comply with applicable federal civil rights laws and [place] laws. We do not discriminate on the basis of race, color, national origin, age, disability, sex, sexual orientation, or gender identity.            Thank you!     Thank  you for choosing [facility]  for your care. Our goal is always to provide you with excellent care. Hearing back from our patients is one way we can continue to improve our services. Please take a few minutes to complete the written survey that you may receive in the mail after your visit with us. Thank you!             Your Updated Medication List - Protect others around you: Learn how to safely use, store and throw away your medicines at www.disposemymeds.org.          This list is accurate as of 7/16/18  4:11 PM.  Always use your most recent med list.                   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)       Aluminum-Magnesium-Simethicone 200-200-20 MG/5ML Susp      Take 5 mLs by mouth daily as needed        aspirin 81 MG tablet      1 TABLET EVERY MORNING        Benzethonium Chloride 0.1 % Liqd     237 mL    Cleanse with wound cleanser and apply barrier paste and hydrocolloid dressing, change every 2-3 days as needed    Decubitus ulcer of buttock, unspecified laterality, unspecified ulcer stage       clindamycin 1 % lotion    CLEOCIN T    60 mL    Apply twice daily for 6 weeks to the groin    Rash       COMPRESSION STOCKINGS     2 each    1 each continuous prn Bilateral knee high compression stockings    Acute congestive heart failure, unspecified congestive heart failure type (H)       fluticasone 50 MCG/ACT spray    FLONASE    1 Bottle    Spray 1-2 sprays into both nostrils daily    Postnasal discharge       * furosemide 20 MG tablet    LASIX    180 tablet    Take 1 tablet (20 mg) by mouth daily    Hypervolemia, unspecified hypervolemia type       * furosemide 20 MG tablet    LASIX    30 tablet    Take 1 tablet (20 mg) by mouth daily    Acute diastolic congestive heart failure (H), Pleural effusion, Mantle cell lymphoma of  "lymph nodes of multiple sites (H)       Gauze Bandage 4\" Misc     5 each    Cleanse with wound cleanser and apply barrier paste and hydrocolloid dressing, change every 2-3 days as needed    Decubitus ulcer of buttock, unspecified laterality, unspecified ulcer stage       HYDROcodone-acetaminophen 5-325 MG per tablet    NORCO     Take 1 tablet by mouth every 4 hours as needed        hydrocortisone valerate 0.2 % ointment    WEST-NINOSKA    45 g    Apply sparingly to affected area three times daily as needed.    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)       potassium chloride SA 10 MEQ CR tablet    K-DUR/KLOR-CON M    30 tablet    Take 1 tablet (10 mEq) by mouth daily    Hypervolemia, unspecified hypervolemia type       PREVACID PO      Take 20 mg by mouth " every evening as needed Patient needs to use brand name Prevacid (generic version causes diarrhea)        prochlorperazine 10 MG tablet    COMPAZINE    30 tablet    Take 1 tablet (10 mg) by mouth every 6 hours as needed for nausea or vomiting    Nausea       rosuvastatin 40 MG tablet    CRESTOR    90 tablet    Take 1 tablet (40 mg) by mouth daily    Hyperlipidemia LDL goal <100, Coronary artery disease involving native coronary artery of native heart without angina pectoris       TYLENOL 8 HOUR PO      Take 500 mg by mouth as needed        zinc oxide - white petrolatum 20-51% Pste topical paste     170 g    Cleanse with wound cleanser and apply barrier paste and hydrocolloid dressing, change every 2-3 days as needed    Decubitus ulcer of buttock, unspecified laterality, unspecified ulcer stage       * Notice:  This list has 2 medication(s) that are the same as other medications prescribed for you. Read the directions carefully, and ask your doctor or other care provider to review them with you.

## 2018-07-17 NOTE — NURSING NOTE
Francisco Javier Cortes's goals for this visit include:   Chief Complaint   Patient presents with     RECHECK     He requests these members of his care team be copied on today's visit information: Florian Alonzo    PCP: Florian Alonzo    Referring Provider:  No referring provider defined for this encounter.    /79 (BP Location: Left arm, Patient Position: Sitting, Cuff Size: Adult Regular)  Pulse 104  Wt 157 lb 11.2 oz (71.5 kg)  SpO2 93%  BMI 26.24 kg/m2    Do you need any medication refills at today's visit? none  Miguel Luong, CMA

## 2018-07-17 NOTE — PROGRESS NOTES
2018            Florian Alonzo MD   [address] [address]      Patient:  Francisco Javier Cortes   MRN:  [id number]   :  1940      Dear Dr. Alonzo:      It was a pleasure participating in the care of your patient, Mr. Francisco Javier Cortes.  As you know, he is a 78-year-old gentleman who I see today for pleural effusions.      His past medical history is significant for the followin.  Prediabetes.   2.  Hypertension.   3.  Hyperlipidemia.   4.  Gastroesophageal reflux disease.   5.  Chronic diarrhea.   6.  Prostate cancer, status post prostatectomy.   7.  Basal cell carcinoma of the neck.   8.  Erectile dysfunction.   9.  Peptic ulcer disease.   10.  Cataract surgery.   11.  Appendectomy.   12.  Mantle cell lymphoma, currently undergoing chemotherapy complicated by a recent pleural effusion, status post right lung thoracentesis for 1.5 liters on 2018.   13.  His cardiac history is significant for known coronary disease and normal LV systolic function.  He originally experienced a single episode of chest discomfort manifested by tingling in the middle of his chest with pressure at rest.  He had stenting of the right coronary artery and posterolateral branch 2009.  Symptoms were completely relieved.  However, recurrent chest discomfort while driving in  led to an angiogram on 2014 that revealed the following:       Left main minimal disease.   LAD, 30% to 40% distal stenosis.   Circumflex 90% stenosis in the mid-portion.   RCA patent stents with mild disease.   A drug-eluting stent was placed in the circumflex coronary artery.      As you remember, he has been treated for mantle cell lymphoma and he was initially seen secondary to infusion of copious amounts of fluid with chemotherapy which put him into congestive heart failure.  After diuresis, his symptoms of weight gain and swelling with shortness of breath had resolved.      I saw him  2018, and since then, he received more chemotherapy and had a hospitalization for pleural effusions.  At that time, he was not noted to be in gross congestive heart failure, but rather had fluid accumulation in his lungs, likely secondary to his chemotherapy.  He had 1.5 liters drained from his right lung on 2018.  The patient mentions possible atrial fibrillation, but reviewing all of his EKGs and discharge summaries, I do not find documentation of the rhythm.  After thoracentesis, the patient is now able to breathe much better when he lies down and when he lies on his right side.  He is slowly regaining his energy and he is scheduled for 2 more chemotherapeutic treatments each lasting 2 or 3 days for infusion in the near future.      He otherwise denies new chest pain.  He denies gross PND or orthopnea at present.  He denies gross edema.  His weight has been stable in the 155 pound range.  His blood pressures are well controlled at home.  He denies palpitations, syncope or near-syncope.      In terms of his present medications, he is takin.  Aspirin 81 mg a day.   2.  Lasix 20 mg a day.   3.  Prevacid.     4.  Metoprolol 25 mg a day.   5.  Crestor 40 mg a day.      PHYSICAL EXAMINATION:     VITAL SIGNS:  His blood pressure is 120/80 with a pulse of 100.  His weight is 157 pounds.   NECK:  Exam reveals relatively normal jugular venous pressure, no hepatojugular reflux is identified.   LUNGS:  Reveal dull breath sounds at bilateral bases with positive egophony.   CARDIAC:  Exam reveals a regular rate and rhythm.  No obvious murmur or gallop appreciated.   ABDOMEN:  Belly soft, somewhat distended, but nontender.   EXTREMITIES:  Without significant edema.      Echocardiogram 2018 reveals an ejection fraction of 65%  to 70% with significant elevation of pulmonary pressures.      LABORATORY DATA:  On 07/10/2018, potassium 4.3, GFR normal.        IMPRESSION:      Francisco Javier is a 78-year-old gentleman  with several active issues:     1.  Coronary artery disease.      He had stenting of his right coronary artery and RODGER branch in 2009 and drug-eluting stent placement in the circumflex in 2014.  He has not had recurrent symptoms.  Will continue to monitor current progress.     2.  Hypertension.  Blood pressures are actually well controlled on less medication.     3.  History of fluid overload secondary to iatrogenic IV fluid overhydration.  He was infused with copious amounts of IV fluid with his initial chemotherapeutic treatments.  This has not been an issue as of late and his jugular venous pressure today is not grossly elevated.  Will continue to monitor.     4.  Bilateral pleural effusions secondary to chemotherapeutic treatments.  He required a right lung thoracentesis of 1.5 liters on 07/05/2018.  By exam today, he has residual pleural effusions at both bases.  He is mildly short of breath at baseline.  His clinical status is certainly tenuous.        PLAN:      1.  We had a discussion today regarding his current medical status.  The patient is in a difficult tenuous position in terms of facing future chemotherapeutic treatments.      He has ready displayed predisposition to accumulating pleural effusions with his chemotherapy and I would anticipate that with future treatments similar episodes could certainly occur.  Therefore, proceeding with caution, watching for signs and symptoms that he has shown in the past would certainly be prudent.      He is currently in a frail physical state, and hospitalization would not be entirely unexpected in the near future should he receive additional chemotherapy considering his current weakened respiratory status and overall general strength.      From a heart failure standpoint, simply further monitoring via watching daily weights, and edema, and breathing is already being performed by his home nursing.  He is being well cared for, however proceeding with future  treatments slowly and with caution would certainly be advised.      Once again, it was a pleasure participating in the care of your patient, Mr. Francisco Javier Saleh.  Please feel free to contact me at any time if you have any questions regarding his care in the future.         Sincerely,      DICK ACEVEDO MD             D: 2018   T: 2018   MT: ELA      Name:     FRANCISCO JAVIER SALEH   MRN:      [id number]        Account:      [id number]   :      1940      Document: [id number]       cc: Florian Alonzo MD

## 2018-07-17 NOTE — MR AVS SNAPSHOT
After Visit Summary   7/17/2018    Francisco Javier Cortes    MRN: [id number]           Patient Information     Date Of Birth          1940        Visit Information        Provider Department      7/17/2018 9:30 AM Jad Ball MD [facility]        Today's Diagnoses     SOB (shortness of breath)    -  1       Follow-ups after your visit        Your next 10 appointments already scheduled     Jul 23, 2018  8:00 AM CDT   [facility]   [facility]    [address] [facility] [address]   [phone]            Jul 23, 2018  8:45 AM CDT   [facility] with Erickson Adan MD   [facility]    [address] [facility] [address]   [phone]            Jul 23, 2018  9:30 AM CDT   Level 3 with BAY 1 INFUSION   [facility])    [address] [facility] [address]   [phone]            Jul 24, 2018 11:00 AM CDT   Level 1 with BAY 5 INFUSION   [facility]    [address] [facility] [address]   [phone]            Aug 20, 2018  8:45 AM CDT   Return Visit with [facility]   [facility]    [address] [facility] [address]   [phone]            Aug 20, 2018  9:15 AM CDT   [facility] Visit with MARTHA Gonzales CNP   [facility]    [address] [facility] [address]   [phone]            Aug 20, 2018 10:00 AM CDT   Level 3 with BAY 6 INFUSION   [facility]    [address] [facility] [address]   [phone]            Aug 21, 2018 11:00 AM CDT   Level 1 with BAY 5 INFUSION   [facility] ([facility] [address] [place] [address] [phone]              Who to contact     If you have questions or need follow up information about today's clinic visit or your schedule please contact [facility] directly at [phone].  Normal or non-critical lab and imaging results will be communicated to you by MyChart, letter or phone within 4 business days after the clinic has received the results. If you do not hear from us within 7 days, please contact the clinic through [employer] or phone. If you have a critical or abnormal lab result, we will notify you by phone as soon as possible.  Submit refill requests through [employer] or call your pharmacy and they will forward the refill request to us. Please allow 3 business days for your refill to be completed.          Additional Information About Your Visit        MyChart Information     [employer] gives you secure access to your electronic health record. If you see a primary care provider, you can also send messages to your care team and make appointments. If you have questions, please call your primary care clinic.  If you do not have a primary care provider, please call [phone] and they will assist you.      [employer] is an electronic gateway that provides easy, online access to your medical records. With [employer], you can request a clinic appointment, read your test results, renew a prescription or communicate with your care team.     To access your existing account, please contact your [facility] or call [phone] for assistance.        Care EveryWhere ID     This is your Care EveryWhere ID. This could be used by other organizations to access your [place] medical records  [id number]        Your Vitals Were     Pulse Pulse Oximetry BMI (Body Mass Index)             104 93% 26.24 kg/m2          Blood Pressure from Last 3 Encounters:   07/17/18 120/79   07/16/18 94/68   07/10/18 106/75    Weight  from Last 3 Encounters:   07/17/18 71.5 kg (157 lb 11.2 oz)   07/16/18 71.3 kg (157 lb 1.6 oz)   07/10/18 69.9 kg (154 lb)              Today, you had the following     No orders found for display         Today's Medication Changes          These changes are accurate as of 7/17/18 10:47 AM.  If you have any questions, ask your nurse or doctor.               These medicines have changed or have updated prescriptions.        Dose/Directions    metoprolol succinate 100 MG 24 hr tablet   Commonly known as:  TOPROL-XL   This may have changed:  how much to take   Used for:  Hypertension goal BP (blood pressure) < 130/80, Coronary artery disease involving native coronary artery of native heart without angina pectoris        Dose:  100 mg   Take 1 tablet (100 mg) by mouth daily   Quantity:  90 tablet   Refills:  3       rosuvastatin 40 MG tablet   Commonly known as:  CRESTOR   This may have changed:  when to take this   Used for:  Hyperlipidemia LDL goal <100, Coronary artery disease involving native coronary artery of native heart without angina pectoris        Dose:  40 mg   Take 1 tablet (40 mg) by mouth daily   Quantity:  90 tablet   Refills:  3                Primary Care Provider Office Phone # Fax #    Florian Alonzo [phone] [address] [facility] [address]        Equal Access to Services     NANDA BARAJAS AH: Hadii rivera collazo hadasho Soomaali, waaxda luqadaha, qaybta kaalmada adeegyada, zhao lorenzo. So [facility] [phone].    ATENCIÓN: Si habla español, tiene a ivan disposición servicios gratuitos de asistencia lingüística. Llame al [phone].    We comply with applicable federal civil rights laws and [place] laws. We do not discriminate on the basis of race, color, national origin, age, disability, sex, sexual orientation, or gender identity.            Thank you!     Thank you for choosing [facility]  for your care. Our goal is always  "to provide you with excellent care. Hearing back from our patients is one way we can continue to improve our services. Please take a few minutes to complete the written survey that you may receive in the mail after your visit with us. Thank you!             Your Updated Medication List - Protect others around you: Learn how to safely use, store and throw away your medicines at www.disposemymeds.org.          This list is accurate as of 7/17/18 10:47 AM.  Always use your most recent med list.                   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)       Aluminum-Magnesium-Simethicone 200-200-20 MG/5ML Susp      Take 5 mLs by mouth daily as needed        aspirin 81 MG tablet      1 TABLET EVERY MORNING        Benzethonium Chloride 0.1 % Liqd     237 mL    Cleanse with wound cleanser and apply barrier paste and hydrocolloid dressing, change every 2-3 days as needed    Decubitus ulcer of buttock, unspecified laterality, unspecified ulcer stage       clindamycin 1 % lotion    CLEOCIN T    60 mL    Apply twice daily for 6 weeks to the groin    Rash       COMPRESSION STOCKINGS     2 each    1 each continuous prn Bilateral knee high compression stockings    Acute congestive heart failure, unspecified congestive heart failure type (H)       fluticasone 50 MCG/ACT spray    FLONASE    1 Bottle    Spray 1-2 sprays into both nostrils daily    Postnasal discharge       * furosemide 20 MG tablet    LASIX    180 tablet    Take 1 tablet (20 mg) by mouth daily    Hypervolemia, unspecified hypervolemia type       * furosemide 20 MG tablet    LASIX    30 tablet    Take 1 tablet (20 mg) by mouth daily    Acute diastolic congestive heart failure (H), Pleural effusion, 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)       potassium chloride SA 10 MEQ CR tablet    K-DUR/KLOR-CON M    30 tablet    Take 1 tablet (10 mEq) by mouth daily    Hypervolemia, unspecified hypervolemia type       PREVACID PO      Take 20 mg by mouth every evening as needed Patient needs to use brand name Prevacid (generic  version causes diarrhea)        prochlorperazine 10 MG tablet    COMPAZINE    30 tablet    Take 1 tablet (10 mg) by mouth every 6 hours as needed for nausea or vomiting    Nausea       rosuvastatin 40 MG tablet    CRESTOR    90 tablet    Take 1 tablet (40 mg) by mouth daily    Hyperlipidemia LDL goal <100, Coronary artery disease involving native coronary artery of native heart without angina pectoris       TYLENOL 8 HOUR PO      Take 500 mg by mouth as needed        zinc oxide - white petrolatum 20-51% Pste topical paste     170 g    Cleanse with wound cleanser and apply barrier paste and hydrocolloid dressing, change every 2-3 days as needed    Decubitus ulcer of buttock, unspecified laterality, unspecified ulcer stage       * Notice:  This list has 2 medication(s) that are the same as other medications prescribed for you. Read the directions carefully, and ask your doctor or other care provider to review them with you.

## 2018-07-23 NOTE — PROGRESS NOTES
Power port needle left accessed for treatment. Tolerated lab draw without complaint. Port site scrubbed with Chloraprep for 30 seconds. Accessed using sterile technique. [place] drawn-Red/Green/Purple tubes. Double signed by patient and RN. See documentation flowsheet. Nieves Estrada, RN, BSN, OCN

## 2018-07-23 NOTE — NURSING NOTE
"Oncology Rooming Note    July 23, 2018 8:49 AM   Francisco Javier Cortes is a 78 year old male who presents for:    Chief Complaint   Patient presents with     Oncology Clinic Visit     follow up      Initial Vitals: BP 97/70  Pulse 109  Temp 97.6  F (36.4  C) (Oral)  Resp 22  Ht 1.651 m (5' 5\")  Wt 72.1 kg (159 lb 0.3 oz)  SpO2 95%  BMI 26.46 kg/m2 Estimated body mass index is 26.46 kg/(m^2) as calculated from the following:    Height as of this encounter: 1.651 m (5' 5\").    Weight as of this encounter: 72.1 kg (159 lb 0.3 oz). Body surface area is 1.82 meters squared.  Data Unavailable Comment: Data Unavailable   No LMP for male patient.  Allergies reviewed: Yes  Medications reviewed: Yes    Medications: Medication refills not needed today.  Pharmacy name entered into [employer]:    [facility] [place] - [place], [place] - [address]  [facility] [place] - [address] [id number] - [place], [place] - [address], [place] AT [facility]      5 minutes for nursing intake (face to face time)     Phoebe Byrnes LPN            "

## 2018-07-23 NOTE — PROGRESS NOTES
"Infusion Nursing Note:  Francisco Javier Cortes presents today for Rituxan/ Bendamustine.    Patient seen by provider today: Yes: Dr. Adan   present during visit today: Not Applicable.    Note: Spoke with Pharmacy today regarding dose reduced bendamustine and appropriate follow up. Per Pharmacy request, will have care coordinator follow up with Francisco Javier on Wednesday or Thursday for symptom check.    Intravenous Access:  Implanted Port.    Treatment Conditions:  Lab Results   Component Value Date    HGB 11.7 07/23/2018     Lab Results   Component Value Date    WBC 8.3 07/23/2018      Lab Results   Component Value Date    ANEU 6.1 07/23/2018     Lab Results   Component Value Date     07/23/2018      Lab Results   Component Value Date     07/23/2018                   Lab Results   Component Value Date    POTASSIUM 3.9 07/23/2018           Lab Results   Component Value Date    MAG 2.0 07/10/2018            Lab Results   Component Value Date    CR 1.03 07/23/2018                   Lab Results   Component Value Date    EVANGELINA 8.7 07/23/2018                Lab Results   Component Value Date    BILITOTAL 0.3 07/23/2018           Lab Results   Component Value Date    ALBUMIN 3.1 07/23/2018                    Lab Results   Component Value Date    ALT 24 07/23/2018           Lab Results   Component Value Date    AST 49 07/23/2018       Results reviewed, labs MET treatment parameters, ok to proceed with treatment.  Rheumatology Infusion Checklist: PRIOR TO INFUSION OF BIOLOGICAL MEDICATIONS OR ANY OF THESE AS LISTED: Rituxan (rituximab) \".rheumbiologicalchecklist\"    Prior to Infusion of biological medications or any of these as listed:    1. Elevated temperature, fever, chills, productive cough or abnormal vital signs, night sweats, coughing up blood or sputum, no appetite or abnormal vital signs : NO    2. Open wounds or new incisions: NO    3. Recent hospitalization: NO    4.  Recent surgeries:  NO    5. Any upcoming " surgeries or dental procedures?:NO    6. Any current or recent bouts of illness or infection? On any antibiotics? : NO    7. Any new, sudden or worsening abdominal pain :NO    8. Vaccination within 4 weeks? Patient or someone in the household is scheduled to receive vaccination? No live virus vaccines prior to or during treatment :NO    9. Any nervous system diseases [i.e. multiple sclerosis, Guillain-Harmans, seizures, neurological  changes]: NO    10. Pregnant or breast feeding; or plans on pregnancy in the future: NO    11. Signs of worsening depression or suicidal ideations while taking benlysta:NO    12. New-onset medical symptoms: NO    13.  New cancer diagnosis or on chemotherapy or radiation NO    14.  Evaluate for any sign of active TB [Unexplained weight loss, Loss of appetite, Night sweats, Fever, Fatigue, Chills, Coughing for 3 weeks or longer, Hemoptysis (coughing up blood), Chest pain]: NO    **Note: If answered yes to any of the above, hold the infusion and contact ordering rheumatologist or on-call rheumatologist.   .      Post Infusion Assessment:  Patient tolerated infusion without incident.  Site patent and intact, free from redness, edema or discomfort.  No evidence of extravasations.  Access discontinued per protocol.  EDUCATION POST BIOLOGICAL/CHEMOTHERAPY INFUSION  Call the triage nurse at your clinic or seek medical attention if you have chills and/or temperature greater than or equal to 100.5, uncontrolled nausea/vomiting, diarrhea, constipation, dizziness, shortness of breath, chest pain, heart palpitations, weakness or any other new or concerning symptoms, questions or concerns.  You can not have any live virus vaccines prior to or during treatment or up to 6 months post infusion.  If you have an upcoming surgery, medical procedure or dental procedure during treatment, this should be discussed with your ordering physician and your surgeon/dentist.  If you are having any concerning symptom,  if you are unsure if you should get your next infusion or wish to speak to a provider before your next infusion, please call your care coordinator or triage nurse at your clinic to notify them so we can adequately serve you.\      Discharge Plan:   Discharge instructions reviewed with: Patient.  Patient and/or family verbalized understanding of discharge instructions and all questions answered.  Patient discharged in stable condition accompanied by: wife.  Departure Mode: Ambulatory.    Teresita Stallworth RN

## 2018-07-23 NOTE — MR AVS SNAPSHOT
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)        Flatulence, eructation, and gas pain           Follow-ups after your visit        Your next 10 appointments already scheduled     Jul 23, 2018  9:30 AM CDT   Level 3 with BAY 1 INFUSION   [facility] ([facility])    [address] [place] [address]   [phone]            Jul 24, 2018 11:00 AM CDT   Level 1 with BAY 5 INFUSION   [facility] ([facility])    [address] [place] [address]   [phone]            Aug 20, 2018  8:45 AM CDT   Return Visit with NURSE ONLY CANCER CENTER   [facility] ([facility])    [address] [place] [address]   [phone]            Aug 20, 2018  9:15 AM CDT   Return Visit with MARTHA [facility] ([facility])    [address] [facility] [address]   [phone]            Aug 20, 2018 10:00 AM CDT   Level 3 with BAY 6 INFUSION   [facility])    [address] [place] [address]   [phone]            Aug 21, 2018 11:00 AM CDT   Level 1 with BAY 5 INFUSION   [facility])    [address] [place] [address]   [phone]              Who to contact     If you have questions or need follow up information about today's clinic visit or your schedule please contact [facility] directly at [phone].  Normal or non-critical lab and imaging results will be communicated to you by [employer]ist [employer]hart, letter or phone within 4 business days after the clinic has received the results. If you do not hear from us within 7 days, please contact the clinic through [employer] or phone. If you have a critical or abnormal lab result, we will notify you by phone as soon as possible.  Submit refill requests through Taaz or call your pharmacy and they will forward the refill request to us. Please allow 3 business days for your refill to be completed.          Additional Information About Your Visit        Communication Specialist [employer]har[employer]agFilms Information     Taaz gives you secure access to your electronic health record. If you see a primary care provider, you can also send messages to your care team and make appointments. If you have questions, please call your primary care clinic.  If you do not have a primary care provider, please call [phone] and they will assist you.      Taaz is an electronic gateway that provides easy, online access to your medical records. With Taaz, you can request a clinic appointment, read your test results, renew a prescription or communicate with your care team.     To access your existing account, please contact your [facility] or call [phone] for assistance.        Care EveryWhere ID     This is your Care EveryWhere ID. This could be used by other organizations to access your [facility] medical records  [id number]         Blood Pressure from Last 3 Encounters:   07/23/18 97/70   07/17/18 120/79   07/16/18 94/68    Weight from Last 3 Encounters:   07/23/18 72.1 kg (159 lb 0.3 oz)   07/17/18 71.5 kg (157 lb 11.2 oz)   07/16/18 71.3 kg (157 lb 1.6 oz)              We Performed the Following     *CBC with platelets differential     Comprehensive metabolic panel     Lactate Dehydrogenase     Uric acid          Today's Medication Changes          These  changes are accurate as of 7/23/18  9:13 AM.  If you have any questions, ask your nurse or doctor.               These medicines have changed or have updated prescriptions.        Dose/Directions    metoprolol succinate 100 MG 24 hr tablet   Commonly known as:  TOPROL-XL   This may have changed:  how much to take   Used for:  Hypertension goal BP (blood pressure) < 130/80, Coronary artery disease involving native coronary artery of native heart without angina pectoris        Dose:  100 mg   Take 1 tablet (100 mg) by mouth daily   Quantity:  90 tablet   Refills:  3       rosuvastatin 40 MG tablet   Commonly known as:  CRESTOR   This may have changed:  when to take this   Used for:  Hyperlipidemia LDL goal <100, Coronary artery disease involving native coronary artery of native heart without angina pectoris        Dose:  40 mg   Take 1 tablet (40 mg) by mouth daily   Quantity:  90 tablet   Refills:  3                Primary Care Provider Office Phone # Fax #    Florian Alonzo [phone]       [address]  [facility] [place] [address]        Equal Access to Services     [facility] : Hadii aad ku hadasho Soomaali, waaxda luqadaha, qaybta kaalmada adeegyada, waxay nickin haykaiden bazan . So [facility] [phone].    ATENCIÓN: Si habla español, tiene a ivan disposición servicios gratuitos de asistencia lingüística. [facility] [phone].    We comply with applicable federal civil rights laws and [place] laws. We do not discriminate on the basis of race, color, national origin, age, disability, sex, sexual orientation, or gender identity.            Thank you!     Thank you for choosing [facility]  for your care. Our goal is always to provide you with excellent care. Hearing back from our patients is one way we can continue to improve our services. Please take a few minutes to complete the written survey that you may receive in the mail after your visit with us. Thank you!    "          Your Updated Medication List - Protect others around you: Learn how to safely use, store and throw away your medicines at www.disposemymeds.org.          This list is accurate as of 7/23/18  9:13 AM.  Always use your most recent med list.                   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)       Aluminum-Magnesium-Simethicone 200-200-20 MG/5ML Susp      Take 5 mLs by mouth daily as needed        aspirin 81 MG tablet      1 TABLET EVERY MORNING        Benzethonium Chloride 0.1 % Liqd     237 mL    Cleanse with wound cleanser and apply barrier paste and hydrocolloid dressing, change every 2-3 days as needed    Decubitus ulcer of buttock, unspecified laterality, unspecified ulcer stage       clindamycin 1 % lotion    CLEOCIN T    60 mL    Apply twice daily for 6 weeks to the groin    Rash       COMPRESSION STOCKINGS     2 each    1 each continuous prn Bilateral knee high compression stockings    Acute congestive heart failure, unspecified congestive heart failure type (H)       fluticasone 50 MCG/ACT spray    FLONASE    1 Bottle    Spray 1-2 sprays into both nostrils daily    Postnasal discharge       * furosemide 20 MG tablet    LASIX    180 tablet    Take 1 tablet (20 mg) by mouth daily    Hypervolemia, unspecified hypervolemia type       * furosemide 20 MG tablet    LASIX    30 tablet    Take 1 tablet (20 mg) by mouth daily    Acute diastolic congestive heart failure (H), Pleural effusion, 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)       potassium chloride SA 10 MEQ CR tablet    K-DUR/KLOR-CON M    30 tablet    Take 1 tablet (10 mEq) by mouth daily    Hypervolemia, unspecified hypervolemia type       PREVACID PO      Take 20 mg by mouth every evening as needed Patient needs to use brand name Prevacid (generic version causes diarrhea)        prochlorperazine 10 MG tablet    COMPAZINE    30 tablet    Take 1 tablet (10 mg) by mouth every 6 hours as needed for nausea or vomiting    Nausea       rosuvastatin 40 MG tablet    CRESTOR    90 tablet    Take 1 tablet (40  mg) by mouth daily    Hyperlipidemia LDL goal <100, Coronary artery disease involving native coronary artery of native heart without angina pectoris       TYLENOL 8 HOUR PO      Take 500 mg by mouth as needed        zinc oxide - white petrolatum 20-51% Pste topical paste     170 g    Cleanse with wound cleanser and apply barrier paste and hydrocolloid dressing, change every 2-3 days as needed    Decubitus ulcer of buttock, unspecified laterality, unspecified ulcer stage       * Notice:  This list has 2 medication(s) that are the same as other medications prescribed for you. Read the directions carefully, and ask your doctor or other care provider to review them with you.

## 2018-07-23 NOTE — MR AVS SNAPSHOT
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)    -  1    Flatulence, eructation, and gas pain        Encounter for antineoplastic chemotherapy        Nausea        Drug-induced neutropenia (H)           Follow-ups after your visit        Your next 10 appointments already scheduled     Aug 06, 2018  9:45 AM CDT   Return Visit with MARTHA Gonzales CNP   [facility] ([facility])    [address] [facility] [address] [phone]            Aug 20, 2018  8:45 AM CDT   Return Visit with NURSE ONLY CANCER CENTER   [facility] ([facility])    [address] [facility] [address] [phone]            Aug 20, 2018  9:15 AM CDT   Return Visit with MARTHA Gonzales CNP   [facility] ([facility])    [address] [facility] [address] [phone]            Aug 20, 2018 10:00 AM CDT   Level 3 with BAY 6 INFUSION   [facility] ([facility])    [address] [facility] [address]   [phone]            Aug 21, 2018 11:00 AM CDT   Level 1 with BAY 5 INFUSION   [facility] ([facility])    [address] [facility] [address] [phone]            Aug 28, 2018  8:15 AM CDT   Return Visit with NURSE ONLY CANCER CENTER   [facility] ([facility])    [address] [facility] [address] [phone]            Aug 28, 2018  9:15 AM CDT   Return Visit with MARTHA Gonzales CNP   On license of [facility]  [facility])    [address] [place] [id number] [phone]            Sep 04, 2018  1:30 PM CDT   PE NPET ONCOLOGY (EYES TO THIGHS) with [email or web address]   [facility] ([facility])    [address] [place] [id number] [phone]           Tell your doctor:   If there is any chance you may be pregnant or if you are breastfeeding.   If you have problems lying in small spaces (claustrophobia). If you do, your doctor may give you medicine to help you relax. If you have diabetes:   Have your exam early in the morning. Your blood glucose will go up as the day goes by.   Your glucose level must be 180 or less at the start of the exam. Please take any medicines you need to ensure this blood glucose level. 24 hours before your scan: Don t do any heavy exercise. (No jogging, aerobics or other workouts.) Exercise will make your pictures less accurate. 6 hours before your scan:   Stop all food and liquids (except water).   Do not chew gum or suck on mints.   If you need to take medicine with food, you may take it with a few crackers.  Please call your Imaging Department at your exam site with any questions.            Sep 10, 2018 10:45 AM CDT   Return Visit with Erickson Adan MD   [facility] ([facility])    [address] [place] [id number] [phone]              Future tests that were ordered for you today     Open Future Orders        Priority Expected Expires Ordered    PET Oncology (Eyes to Thighs) Routine  7/23/2019 7/23/2018            Who to contact     If you have questions or need follow up information about today's clinic visit or your schedule please contact [facility] directly at [phone].  Normal or non-critical lab and imaging results will be communicated to you by MyChart, letter or phone within 4 business days after the clinic has received the results. If you do not  "hear from us within 7 days, please contact the clinic through [email or web address] or phone. If you have a critical or abnormal lab result, we will notify you by phone as soon as possible.  Submit refill requests through [email or web address] or call your pharmacy and they will forward the refill request to us. Please allow 3 business days for your refill to be completed.          Additional Information About Your Visit        PathAR[employer]aphase Information     [email or web address] gives you secure access to your electronic health record. If you see a primary care provider, you can also send messages to your care team and make appointments. If you have questions, please call your primary care clinic.  If you do not have a primary care provider, please call [phone] and they will assist you.      [email or web address] is an electronic gateway that provides easy, online access to your medical records. With [email or web address], you can request a clinic appointment, read your test results, renew a prescription or communicate with your care team.     To access your existing account, please contact your [facility] or call [phone] for assistance.        Care EveryWhere ID     This is your Care EveryWhere ID. This could be used by other organizations to access your [facility] medical records  [id number]        Your Vitals Were     Pulse Temperature Respirations Height Pulse Oximetry BMI (Body Mass Index)    109 97.6  F (36.4  C) (Oral) 22 1.651 m (5' 5\") 95% 26.46 kg/m2       Blood Pressure from Last 3 Encounters:   07/23/18 97/70   07/17/18 120/79   07/16/18 94/68    Weight from Last 3 Encounters:   07/23/18 72.1 kg (159 lb 0.3 oz)   07/17/18 71.5 kg (157 lb 11.2 oz)   07/16/18 71.3 kg (157 lb 1.6 oz)                 Today's Medication Changes          These changes are accurate as of 7/23/18 11:59 PM.  If you have any questions, ask your nurse or doctor.               These medicines have changed or have updated prescriptions.        Dose/Directions    " metoprolol succinate 100 MG 24 hr tablet   Commonly known as:  TOPROL-XL   This may have changed:  how much to take   Used for:  Hypertension goal BP (blood pressure) < 130/80, Coronary artery disease involving native coronary artery of native heart without angina pectoris        Dose:  100 mg   Take 1 tablet (100 mg) by mouth daily   Quantity:  90 tablet   Refills:  3       rosuvastatin 40 MG tablet   Commonly known as:  CRESTOR   This may have changed:  when to take this   Used for:  Hyperlipidemia LDL goal <100, Coronary artery disease involving native coronary artery of native heart without angina pectoris        Dose:  40 mg   Take 1 tablet (40 mg) by mouth daily   Quantity:  90 tablet   Refills:  3                Primary Care Provider Office Phone # Fax #    Florian Alonzo [phone] [address] [facility] [address]        Equal Access to Services     [facility] : Hadii aad ku hadasho Solina, waaxda luqadaha, qaybta kaalmada adebryantyadonn, zhao bazan . So [facility] [phone].    ATENCIÓN: Si habla español, tiene a ivan disposición servicios gratuitos de asistencia lingüística. [facility] [phone].    We comply with applicable federal civil rights laws and [place] laws. We do not discriminate on the basis of race, color, national origin, age, disability, sex, sexual orientation, or gender identity.            Thank you!     Thank you for choosing [facility]  for your care. Our goal is always to provide you with excellent care. Hearing back from our patients is one way we can continue to improve our services. Please take a few minutes to complete the written survey that you may receive in the mail after your visit with us. Thank you!             Your Updated Medication List - Protect others around you: Learn how to safely use, store and throw away your medicines at www.disposemymeds.org.          This list is accurate as of 7/23/18  "11:59 PM.  Always use your most recent med list.                   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)       Aluminum-Magnesium-Simethicone 200-200-20 MG/5ML Susp      Take 5 mLs by mouth daily as needed        aspirin 81 MG tablet      1 TABLET EVERY MORNING        Benzethonium Chloride 0.1 % Liqd     237 mL    Cleanse with wound cleanser and apply barrier paste and hydrocolloid dressing, change every 2-3 days as needed    Decubitus ulcer of buttock, unspecified laterality, unspecified ulcer stage       clindamycin 1 % lotion    CLEOCIN T    60 mL    Apply twice daily for 6 weeks to the groin    Rash       COMPRESSION STOCKINGS     2 each    1 each continuous prn Bilateral knee high compression stockings    Acute congestive heart failure, unspecified congestive heart failure type (H)       fluticasone 50 MCG/ACT spray    FLONASE    1 Bottle    Spray 1-2 sprays into both nostrils daily    Postnasal discharge       * furosemide 20 MG tablet    LASIX    180 tablet    Take 1 tablet (20 mg) by mouth daily    Hypervolemia, unspecified hypervolemia type       * furosemide 20 MG tablet    LASIX    30 tablet    Take 1 tablet (20 mg) by mouth daily    Acute diastolic congestive heart failure (H), Pleural effusion, 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)       potassium chloride SA 10 MEQ CR tablet    K-DUR/KLOR-CON M    30 tablet    Take 1 tablet (10 mEq) by mouth daily    Hypervolemia, unspecified hypervolemia type       PREVACID PO      Take 20 mg by mouth every evening as needed Patient needs to use brand name Prevacid (generic version causes diarrhea)        prochlorperazine 10 MG tablet    COMPAZINE    30 tablet    Take 1 tablet (10 mg) by mouth every 6 hours as needed for nausea or vomiting    Nausea       rosuvastatin 40 MG tablet    CRESTOR    90 tablet    Take 1 tablet (40 mg) by mouth daily    Hyperlipidemia LDL goal <100, Coronary artery disease involving native coronary artery of native heart without angina pectoris       TYLENOL 8 HOUR PO      Take 500 mg by mouth as  needed        zinc oxide - white petrolatum 20-51% Pste topical paste     170 g    Cleanse with wound cleanser and apply barrier paste and hydrocolloid dressing, change every 2-3 days as needed    Decubitus ulcer of buttock, unspecified laterality, unspecified ulcer stage       * Notice:  This list has 2 medication(s) that are the same as other medications prescribed for you. Read the directions carefully, and ask your doctor or other care provider to review them with you.

## 2018-07-23 NOTE — Clinical Note
7/23/2018         RE: Francisco Javier Cortes  [address] [facility] [id number]        Dear Colleague,    Thank you for referring your patient, Francisco Javier Cortes, to the [facility]. Please see a copy of my visit note below.      FOLLOW-UP VISIT NOTE    PATIENT NAME: Francisco Javier Cortes MRN # [id number]  DATE OF VISIT: Jul 23, 2018 YOB: 1940    REFERRING PROVIDER: No referring provider defined for this encounter.    CANCER TYPE: Mantle cell lymphoma  STAGE: IVB  ECOG PS: 1    ONCOLOGY HISTORY:  77-year-old male with past medical history significant for prostate cancer status post prostatectomy, coronary disease status post stent placement, to complete develop symptoms of abdominal bloating/discomfort and loss of appetite for 2 months. Underwent imaging workup by primary care provider with CT abdominal and pelvis on 02/25/18 showing extensive intra- abdominal and inguinal lymphadenopathy with splenomegaly.    - 03/05/18 02 Seen by medical oncology. I ordered CT neck and CT chest which showed bulky mediastinal, hilar ,axillary and supraclavicular lymphadenopathy. Patient was referred to ENT for diagnostic biopsy. He underwent an FNA of the lymph node on 03/14 which came back with findings consistent with mantle cell lymphoma. 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 Proliferation index was estimated to be 50%. The marrow biopsy performed showed bone marrow involvement by mantle cell lymphoma 40-50%. Fish came back positive for translocation 11:14 consistent with mantle cell lymphoma. PET CT scan extensive hypermetabolic adenopathy of cervical, axillary, mediastinal, hilar, intra-abdominal, retroperitoneal inguinal with largest measuring 15.5 x 6.9 cm.    - 03/29/18  Started on bendamustine in combination with Rituxan along with allopurinol for tumor lysis syndrome prophylaxis.      - Hospitalized for TLS monitoring  - Hospitalized for sepsis from pneumonia  - Hospitalized for CHF 04/20 - 04/21    - 04/30/18 Second cycle   - 05/29/18 Third cycle   - 06/25/18 4th cycle    -07/05-07/07 Hospitalized for Symptomatic pleural effusion.       SUBJECTIVE     Patient's hearing clinic today for follow-up prior to cycle 5 of chemotherapy. Complains of generalized fatigue and decreased energy levels. Dyspnea has improved after thoracentesis on previous hospitalization. Denies fever/chills, productive cough, chest pain, lower extremity edema, orthopnea, PND or any other complaints      PAST MEDICAL HISTORY     Past Medical History:   Diagnosis Date     CAD (coronary artery disease) 1/09    s/p stentsx2     Coronary artery disease involving native coronary artery of native heart without angina pectoris 12/22/2015     Dyslipidemia      Erectile dysfunction      GERD (gastroesophageal reflux disease)      Hearing problem One ear 1961     Hypertension      NSTEMI (non-ST elevated myocardial infarction) (H) 3/20/2014     Pneumonia      Prostate cancer (H)     prostatecomy 9 years ago, PSAs remain good     Status post percutaneous transluminal coronary angioplasty-Coronary angioplasty with STEPHEN to LCx 4/4/2014     Stented coronary artery     stents placed         CURRENT OUTPATIENT MEDICATIONS     Current Outpatient Prescriptions   Medication Sig Dispense Refill     Acetaminophen (TYLENOL 8 HOUR PO) Take 500 mg by mouth as needed        allopurinol (ZYLOPRIM) 300 MG tablet Take 1 tablet (300 mg) by mouth daily 30 tablet 1     Alum & Mag Hydroxide-Simeth (ALUMINUM-MAGNESIUM-SIMETHICONE) 200-200-20 MG/5ML SUSP Take 5 mLs by mouth daily as needed       ASPIRIN 81 MG OR TABS 1 TABLET EVERY MORNING       Benzethonium Chloride 0.1 % LIQD Cleanse with wound cleanser and apply barrier paste and hydrocolloid dressing, change every 2-3 days as needed 237 mL 3     clindamycin (CLEOCIN T) 1 % lotion Apply twice daily for  "6 weeks to the groin 60 mL 1     COMPRESSION STOCKINGS 1 each continuous prn Bilateral knee high compression stockings 2 each 0     fluticasone (FLONASE) 50 MCG/ACT spray Spray 1-2 sprays into both nostrils daily 1 Bottle 11     furosemide (LASIX) 20 MG tablet Take 1 tablet (20 mg) by mouth daily 180 tablet 3     Gauze Pads & Dressings (GAUZE BANDAGE 4\") MISC Cleanse with wound cleanser and apply barrier paste and hydrocolloid dressing, change every 2-3 days as needed 5 each 3     HYDROcodone-acetaminophen (NORCO) 5-325 MG per tablet Take 1 tablet by mouth every 4 hours as needed       hydrocortisone valerate (WEST-NINOSKA) 0.2 % ointment Apply sparingly to affected area three times daily as needed. 45 g 3     Lansoprazole (PREVACID PO) Take 20 mg by mouth every evening as needed Patient needs to use brand name Prevacid (generic version causes diarrhea)        lidocaine-prilocaine (EMLA) cream Apply 1 Application topically as needed       loratadine (CLARITIN) 10 MG tablet Take 1 tablet (10 mg) by mouth daily 30 tablet 1     LORazepam (ATIVAN) 0.5 MG tablet Take 1 tablet (0.5 mg) by mouth every 4 hours as needed (Anxiety, Nausea/Vomiting or Sleep) 30 tablet 3     metoprolol succinate (TOPROL-XL) 100 MG 24 hr tablet Take 1 tablet (100 mg) by mouth daily (Patient taking differently: Take 25 mg by mouth daily ) 90 tablet 3     potassium chloride SA (K-DUR/KLOR-CON M) 10 MEQ CR tablet Take 1 tablet (10 mEq) by mouth daily 30 tablet 1     prochlorperazine (COMPAZINE) 10 MG tablet Take 1 tablet (10 mg) by mouth every 6 hours as needed for nausea or vomiting 30 tablet 1     rosuvastatin (CRESTOR) 40 MG tablet Take 1 tablet (40 mg) by mouth daily (Patient taking differently: Take 40 mg by mouth every morning ) 90 tablet 3     zinc oxide - white petrolatum (CRITIC-AID THICK MOIST BARRIER) 20-51% PSTE topical paste Cleanse with wound cleanser and apply barrier paste and hydrocolloid dressing, change every 2-3 days as needed 170 " g 3     furosemide (LASIX) 20 MG tablet Take 1 tablet (20 mg) by mouth daily 30 tablet      nitroGLYcerin (NITROSTAT) 0.4 MG sublingual tablet Place 1 tablet (0.4 mg) under the tongue every 5 minutes as needed up to 3 tablets per episode. 60 tablet 6     ondansetron (ZOFRAN) 8 MG tablet Take 1 tablet (8 mg) by mouth every 8 hours as needed (Nausea/Vomiting) (Patient not taking: Reported on 7/23/2018) 10 tablet 3        ALLERGIES     Allergies   Allergen Reactions     Niacin      Severe rash and itching     Prevacid [Lansoprazole] Diarrhea     Patient notes diarrhea with 30mg generic version. Patient does ok with 20mg in generic and brand name.     Shellfish Allergy      One kind        REVIEW OF SYSTEMS   As above in the HPI, o/w complete 12-point ROS was negative.     PHYSICAL EXAM   B/P: 108/72, T: 97.7, P: 95, R: 16  SpO2 Readings from Last 4 Encounters:   07/23/18 95%   07/17/18 93%   07/16/18 95%   07/05/18 93%     Wt Readings from Last 3 Encounters:   07/23/18 72.1 kg (159 lb 0.3 oz)   07/17/18 71.5 kg (157 lb 11.2 oz)   07/16/18 71.3 kg (157 lb 1.6 oz)     GEN: NAD  EYES:PERRLA  Mouth/ENT: Oropharynx is clear.  NECK: cervical lymphadenopathy non palpable  LUNGS: breath sounds sound  CV: S1S2  ABDOMEN: soft, non-tender, non-distended,  EXT: 1+ edema bilaterally  NEURO: alert  SKIN: no rashes     LABORATORY AND IMAGING STUDIES     Lab Results   Component Value Date    WBC 8.3 07/23/2018     Lab Results   Component Value Date    RBC 3.38 07/23/2018     Lab Results   Component Value Date    HGB 11.7 07/23/2018     Lab Results   Component Value Date    HCT 36.2 07/23/2018     No components found for: MCT  Lab Results   Component Value Date     07/23/2018     Lab Results   Component Value Date    MCH 34.6 07/23/2018     Lab Results   Component Value Date    MCHC 32.3 07/23/2018     Lab Results   Component Value Date    RDW 15.9 07/23/2018     Lab Results   Component Value Date     07/23/2018      Recent Labs   Lab Test  07/23/18   0818  07/10/18   1442   NA  142  139   POTASSIUM  3.9  4.3   CHLORIDE  105  105   CO2  28  27   ANIONGAP  9  7   GLC  135*  90   BUN  15  12   CR  1.03  1.02   EVANGELINA  8.7  8.7       CT scans from 06/21/18    Impression:  \Interval improvement in the bilateral cervical and intraparotid  lymphadenopathy compared to 3/5/2018 suggestive of treatment response.      IMPRESSION: In this patient with history of mantle cell lymphoma:  1. Diffuse lymphadenopathy in the chest, abdomen, pelvis and inguinal  regions overall grossly stable from the prior exams on 4/20/2018 and  3/24/2018.  2. Small pericardial effusion. Large right and moderate to large left  pleural effusions with overlying atelectasis, slightly increased from  the prior 4/20/2018 CT exam.  3. Stable bilateral pulmonary nodules. No new or enlarging pulmonary  nodules.  4. Stable liver hypodensities, too small to characterize, likely  representing cysts. No new or enlarging liver lesions.  5. Mild to moderate volume of ascites, increased from the prior  studies.  6. Colonic diverticulosis      ASSESSMENT AND PLAN     77-year-old male with past medical history significant for prostate cancer status post prostatectomy, coronary disease status post stent placement, to complete develop symptoms of abdominal bloating/discomfort and loss of appetite for 2 months. Underwent imaging workup was noted to have diffuse extensive hypermetabolic lymphadenopathy with biopsy of the lymph node showing findings consistent with mantle cell lymphoma. Bone marrow biopsy showed 40-50 % involvement by lymphoma with FISH revealing t(11:14).    - Mantle cell lymphoma  Stage IV B  Started on Bendamustine 90 mg/m2 day 1 and 2 in combination with Rituxan 375 mg/m2 q.4 weeks- Plan is to proceed with 6 total cycles followed by repeat PET scan and if complete  response achieved, consider proceeding with maintenance Rituxan.   CT scans after 3 cycles showing  decrease in his cervical lymphadenopathy along with overall stable adenopathy in chest abdomen and pelvis  Given old age, frailty and multiple hospital admissions, discussed doing a 20 % dose reduction in Bendamustine dose to 75 mg/m2  to improve tolerability. Pt understood and agreed to plan of care.     - Pleural effusion  S/p thoracentesis  Currently on lasix daily    - TLS prophylaxis  Continue with allopurinol. Encouraged oral hydration    - Anemia/Thrombocytopenia  Chemotherapy versus marrow involvement by lymphoma   Continue to monitor    -CAD/HTN/CHF  Continue lasix, metoprolol and lisinopril    Clinic in 2 weeks with GO for mid-cycle check.   Return to clinic in 4 weeks with GO  for office visit, labs, cycle 6 of chemotherapy.     RTC with me in about 2 months for ov, labs, PET and BM biopsy prior to assess response.      Chart documentation with Dragon Voice recognition Software. Although reviewed after completion, some words and grammatical errors may remain.  Erickson Adan MD  Attending Physician   Hematology/Medical Oncology    Again, thank you for allowing me to participate in the care of your patient.        Sincerely,        Erickson Adan MD

## 2018-07-23 NOTE — PROGRESS NOTES
FOLLOW-UP VISIT NOTE    PATIENT NAME: Francisco Javier Cortes MRN # [id number]  DATE OF VISIT: Jul 23, 2018 YOB: 1940    REFERRING PROVIDER: No referring provider defined for this encounter.    CANCER TYPE: Mantle cell lymphoma  STAGE: IVB  ECOG PS: 1    ONCOLOGY HISTORY:  77-year-old male with past medical history significant for prostate cancer status post prostatectomy, coronary disease status post stent placement, to complete develop symptoms of abdominal bloating/discomfort and loss of appetite for 2 months. Underwent imaging workup by primary care provider with CT abdominal and pelvis on 02/25/18 showing extensive intra- abdominal and inguinal lymphadenopathy with splenomegaly.    - 03/05/18 02 Seen by medical oncology. I ordered CT neck and CT chest which showed bulky mediastinal, hilar ,axillary and supraclavicular lymphadenopathy. Patient was referred to ENT for diagnostic biopsy. He underwent an FNA of the lymph node on 03/14 which came back with findings consistent with mantle cell lymphoma. 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 Proliferation index was estimated to be 50%. The marrow biopsy performed showed bone marrow involvement by mantle cell lymphoma 40-50%. Fish came back positive for translocation 11:14 consistent with mantle cell lymphoma. PET CT scan extensive hypermetabolic adenopathy of cervical, axillary, mediastinal, hilar, intra-abdominal, retroperitoneal inguinal with largest measuring 15.5 x 6.9 cm.    - 03/29/18  Started on bendamustine in combination with Rituxan along with allopurinol for tumor lysis syndrome prophylaxis.     - Hospitalized for TLS monitoring  - Hospitalized for sepsis from pneumonia  - Hospitalized for CHF 04/20 - 04/21    - 04/30/18 Second cycle   - 05/29/18 Third cycle   - 06/25/18 4th cycle    -07/05-07/07 Hospitalized for Symptomatic pleural effusion.        SUBJECTIVE     Patient's hearing clinic today for follow-up prior to cycle 5 of chemotherapy. Complains of generalized fatigue and decreased energy levels. Dyspnea has improved after thoracentesis on previous hospitalization. Denies fever/chills, productive cough, chest pain, lower extremity edema, orthopnea, PND or any other complaints      PAST MEDICAL HISTORY     Past Medical History:   Diagnosis Date     CAD (coronary artery disease) 1/09    s/p stentsx2     Coronary artery disease involving native coronary artery of native heart without angina pectoris 12/22/2015     Dyslipidemia      Erectile dysfunction      GERD (gastroesophageal reflux disease)      Hearing problem One ear 1961     Hypertension      NSTEMI (non-ST elevated myocardial infarction) (H) 3/20/2014     Pneumonia      Prostate cancer (H)     prostatecomy 9 years ago, PSAs remain good     Status post percutaneous transluminal coronary angioplasty-Coronary angioplasty with STEPHEN to LCx 4/4/2014     Stented coronary artery     stents placed         CURRENT OUTPATIENT MEDICATIONS     Current Outpatient Prescriptions   Medication Sig Dispense Refill     Acetaminophen (TYLENOL 8 HOUR PO) Take 500 mg by mouth as needed        allopurinol (ZYLOPRIM) 300 MG tablet Take 1 tablet (300 mg) by mouth daily 30 tablet 1     Alum & Mag Hydroxide-Simeth (ALUMINUM-MAGNESIUM-SIMETHICONE) 200-200-20 MG/5ML SUSP Take 5 mLs by mouth daily as needed       ASPIRIN 81 MG OR TABS 1 TABLET EVERY MORNING       Benzethonium Chloride 0.1 % LIQD Cleanse with wound cleanser and apply barrier paste and hydrocolloid dressing, change every 2-3 days as needed 237 mL 3     clindamycin (CLEOCIN T) 1 % lotion Apply twice daily for 6 weeks to the groin 60 mL 1     COMPRESSION STOCKINGS 1 each continuous prn Bilateral knee high compression stockings 2 each 0     fluticasone (FLONASE) 50 MCG/ACT spray Spray 1-2 sprays into both nostrils daily 1 Bottle 11     furosemide (LASIX) 20 MG  "tablet Take 1 tablet (20 mg) by mouth daily 180 tablet 3     Gauze Pads & Dressings (GAUZE BANDAGE 4\") MISC Cleanse with wound cleanser and apply barrier paste and hydrocolloid dressing, change every 2-3 days as needed 5 each 3     HYDROcodone-acetaminophen (NORCO) 5-325 MG per tablet Take 1 tablet by mouth every 4 hours as needed       hydrocortisone valerate (WEST-NINOSKA) 0.2 % ointment Apply sparingly to affected area three times daily as needed. 45 g 3     Lansoprazole (PREVACID PO) Take 20 mg by mouth every evening as needed Patient needs to use brand name Prevacid (generic version causes diarrhea)        lidocaine-prilocaine (EMLA) cream Apply 1 Application topically as needed       loratadine (CLARITIN) 10 MG tablet Take 1 tablet (10 mg) by mouth daily 30 tablet 1     LORazepam (ATIVAN) 0.5 MG tablet Take 1 tablet (0.5 mg) by mouth every 4 hours as needed (Anxiety, Nausea/Vomiting or Sleep) 30 tablet 3     metoprolol succinate (TOPROL-XL) 100 MG 24 hr tablet Take 1 tablet (100 mg) by mouth daily (Patient taking differently: Take 25 mg by mouth daily ) 90 tablet 3     potassium chloride SA (K-DUR/KLOR-CON M) 10 MEQ CR tablet Take 1 tablet (10 mEq) by mouth daily 30 tablet 1     prochlorperazine (COMPAZINE) 10 MG tablet Take 1 tablet (10 mg) by mouth every 6 hours as needed for nausea or vomiting 30 tablet 1     rosuvastatin (CRESTOR) 40 MG tablet Take 1 tablet (40 mg) by mouth daily (Patient taking differently: Take 40 mg by mouth every morning ) 90 tablet 3     zinc oxide - white petrolatum (CRITIC-AID THICK MOIST BARRIER) 20-51% PSTE topical paste Cleanse with wound cleanser and apply barrier paste and hydrocolloid dressing, change every 2-3 days as needed 170 g 3     furosemide (LASIX) 20 MG tablet Take 1 tablet (20 mg) by mouth daily 30 tablet      nitroGLYcerin (NITROSTAT) 0.4 MG sublingual tablet Place 1 tablet (0.4 mg) under the tongue every 5 minutes as needed up to 3 tablets per episode. 60 tablet 6     " ondansetron (ZOFRAN) 8 MG tablet Take 1 tablet (8 mg) by mouth every 8 hours as needed (Nausea/Vomiting) (Patient not taking: Reported on 7/23/2018) 10 tablet 3        ALLERGIES     Allergies   Allergen Reactions     Niacin      Severe rash and itching     Prevacid [Lansoprazole] Diarrhea     Patient notes diarrhea with 30mg generic version. Patient does ok with 20mg in generic and brand name.     Shellfish Allergy      One kind        REVIEW OF SYSTEMS   As above in the HPI, o/w complete 12-point ROS was negative.     PHYSICAL EXAM   B/P: 108/72, T: 97.7, P: 95, R: 16  SpO2 Readings from Last 4 Encounters:   07/23/18 95%   07/17/18 93%   07/16/18 95%   07/05/18 93%     Wt Readings from Last 3 Encounters:   07/23/18 72.1 kg (159 lb 0.3 oz)   07/17/18 71.5 kg (157 lb 11.2 oz)   07/16/18 71.3 kg (157 lb 1.6 oz)     GEN: NAD  EYES:PERRLA  Mouth/ENT: Oropharynx is clear.  NECK: cervical lymphadenopathy non palpable  LUNGS: breath sounds sound  CV: S1S2  ABDOMEN: soft, non-tender, non-distended,  EXT: 1+ edema bilaterally  NEURO: alert  SKIN: no rashes     LABORATORY AND IMAGING STUDIES     Lab Results   Component Value Date    WBC 8.3 07/23/2018     Lab Results   Component Value Date    RBC 3.38 07/23/2018     Lab Results   Component Value Date    HGB 11.7 07/23/2018     Lab Results   Component Value Date    HCT 36.2 07/23/2018     No components found for: MCT  Lab Results   Component Value Date     07/23/2018     Lab Results   Component Value Date    MCH 34.6 07/23/2018     Lab Results   Component Value Date    MCHC 32.3 07/23/2018     Lab Results   Component Value Date    RDW 15.9 07/23/2018     Lab Results   Component Value Date     07/23/2018     Recent Labs   Lab Test  07/23/18   0818  07/10/18   1442   NA  142  139   POTASSIUM  3.9  4.3   CHLORIDE  105  105   CO2  28  27   ANIONGAP  9  7   GLC  135*  90   BUN  15  12   CR  1.03  1.02   EVANGELINA  8.7  8.7       CT scans from  06/21/18    Impression:  \Interval improvement in the bilateral cervical and intraparotid  lymphadenopathy compared to 3/5/2018 suggestive of treatment response.      IMPRESSION: In this patient with history of mantle cell lymphoma:  1. Diffuse lymphadenopathy in the chest, abdomen, pelvis and inguinal  regions overall grossly stable from the prior exams on 4/20/2018 and  3/24/2018.  2. Small pericardial effusion. Large right and moderate to large left  pleural effusions with overlying atelectasis, slightly increased from  the prior 4/20/2018 CT exam.  3. Stable bilateral pulmonary nodules. No new or enlarging pulmonary  nodules.  4. Stable liver hypodensities, too small to characterize, likely  representing cysts. No new or enlarging liver lesions.  5. Mild to moderate volume of ascites, increased from the prior  studies.  6. Colonic diverticulosis      ASSESSMENT AND PLAN     77-year-old male with past medical history significant for prostate cancer status post prostatectomy, coronary disease status post stent placement, to complete develop symptoms of abdominal bloating/discomfort and loss of appetite for 2 months. Underwent imaging workup was noted to have diffuse extensive hypermetabolic lymphadenopathy with biopsy of the lymph node showing findings consistent with mantle cell lymphoma. Bone marrow biopsy showed 40-50 % involvement by lymphoma with FISH revealing t(11:14).    - Mantle cell lymphoma  Stage IV B  Started on Bendamustine 90 mg/m2 day 1 and 2 in combination with Rituxan 375 mg/m2 q.4 weeks- Plan is to proceed with 6 total cycles followed by repeat PET scan and if complete  response achieved, consider proceeding with maintenance Rituxan.   CT scans after 3 cycles showing decrease in his cervical lymphadenopathy along with overall stable adenopathy in chest abdomen and pelvis  Given old age, frailty and multiple hospital admissions, discussed doing a 20 % dose reduction in Bendamustine dose to 75  mg/m2  to improve tolerability. Pt understood and agreed to plan of care.     - Pleural effusion  S/p thoracentesis  Currently on lasix daily    - TLS prophylaxis  Continue with allopurinol. Encouraged oral hydration    - Anemia/Thrombocytopenia  Chemotherapy versus marrow involvement by lymphoma   Continue to monitor    -CAD/HTN/CHF  Continue lasix, metoprolol and lisinopril    Clinic in 2 weeks with GO for mid-cycle check.   Return to clinic in 4 weeks with GO  for office visit, labs, cycle 6 of chemotherapy.     RTC with me in about 2 months for ov, labs, PET and BM biopsy prior to assess response.      Chart documentation with Dragon Voice recognition Software. Although reviewed after completion, some words and grammatical errors may remain.  Erickson Adan MD  Attending Physician   Hematology/Medical Oncology

## 2018-07-23 NOTE — MR AVS SNAPSHOT
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)        Flatulence, eructation, and gas pain           Follow-ups after your visit        Your next 10 appointments already scheduled     Jul 24, 2018 11:00 AM CDT   Level 1 with BAY 5 INFUSION   [facility] ([facility])    [address] [facility] [address]   [phone]            Aug 06, 2018  9:45 AM CDT   Return Visit with MARTHA Gonzales CNP   [facility])    [address] [facility] [address]   [phone]            Aug 20, 2018  8:45 AM CDT   Return Visit with [facility]   [facility])    [address] [facility] [address]   [phone]            Aug 20, 2018  9:15 AM CDT   Return Visit with MARTHA Gonzales CNP   [facility] ([facility])    [address] [facility] [address]   [phone]            Aug 20, 2018 10:00 AM CDT   Level 3 with BAY 6 INFUSION   [facility] ([facility])    [address] [facility] [address]   [phone]            Aug 21, 2018 11:00 AM CDT   Level 1 with BAY 5 INFUSION   [facility] ([facility])    [address] [facility] [address]   [phone]            Aug 28, 2018  8:15 AM CDT   Return Visit with [facility]   [facility] ([facility])     [address] [place] [id number] [phone]            Aug 28, 2018  9:15 AM CDT   Return Visit with MARTHA Gonzales CNP   [facility] ([facility])    [address] [place] [address] [id number] [phone]            Sep 10, 2018 10:45 AM CDT   Return Visit with Erickson Adan MD   [facility] ([facility])    [address] [place] [id number] [phone]              Future tests that were ordered for you today     Open Future Orders        Priority Expected Expires Ordered    PET Oncology (Eyes to Thighs) Routine  7/23/2019 7/23/2018            Who to contact     If you have questions or need follow up information about today's clinic visit or your schedule please contact [facility] directly at [phone].  Normal or non-critical lab and imaging results will be communicated to you by Melbosst, letter or phone within 4 business days after the clinic has received the results. If you do not hear from us within 7 days, please contact the clinic through Melbosst or phone. If you have a critical or abnormal lab result, we will notify you by phone as soon as possible.  Submit refill requests through Adynxx or call your pharmacy and they will forward the refill request to us. Please allow 3 business days for your refill to be completed.          Additional Information About Your Visit        Adynxx Information     Adynxx gives you secure access to your electronic health record. If you see a primary care provider, you can also send messages to your care team and make appointments. If you have questions, please call your primary care clinic.  If you do not have a primary care provider, please call [phone] and they will assist you.      Adynxx is an electronic gateway that provides easy, online access to your medical records. With Adynxx, you can request a clinic appointment, read  your test results, renew a prescription or communicate with your care team.     To access your existing account, please contact your [facility] or call [phone] for assistance.        Care EveryWhere ID     This is your Care EveryWhere ID. This could be used by other organizations to access your [place] medical records  [id number]         Blood Pressure from Last 3 Encounters:   07/23/18 97/70   07/17/18 120/79   07/16/18 94/68    Weight from Last 3 Encounters:   07/23/18 72.1 kg (159 lb 0.3 oz)   07/17/18 71.5 kg (157 lb 11.2 oz)   07/16/18 71.3 kg (157 lb 1.6 oz)              Today, you had the following     No orders found for display         Today's Medication Changes          These changes are accurate as of 7/23/18  3:40 PM.  If you have any questions, ask your nurse or doctor.               These medicines have changed or have updated prescriptions.        Dose/Directions    metoprolol succinate 100 MG 24 hr tablet   Commonly known as:  TOPROL-XL   This may have changed:  how much to take   Used for:  Hypertension goal BP (blood pressure) < 130/80, Coronary artery disease involving native coronary artery of native heart without angina pectoris        Dose:  100 mg   Take 1 tablet (100 mg) by mouth daily   Quantity:  90 tablet   Refills:  3       rosuvastatin 40 MG tablet   Commonly known as:  CRESTOR   This may have changed:  when to take this   Used for:  Hyperlipidemia LDL goal <100, Coronary artery disease involving native coronary artery of native heart without angina pectoris        Dose:  40 mg   Take 1 tablet (40 mg) by mouth daily   Quantity:  90 tablet   Refills:  3                Primary Care Provider Office Phone # Fax #    Florian Alonzo [phone]       [address]  [facility] [address]        Equal Access to Services     NANDA BARAJAS AH: kenia Dobson, zhao mckeon  gemabryant janissayraalex roberson'aan ah. So [facility] [phone].    ATENCIÓN: Si santino flores, tiene a ivan disposición servicios gratuitos de asistencia lingüística. Micaela renner [phone].    We comply with applicable federal civil rights laws and [place] laws. We do not discriminate on the basis of race, color, national origin, age, disability, sex, sexual orientation, or gender identity.            Thank you!     Thank you for choosing [facility]  for your care. Our goal is always to provide you with excellent care. Hearing back from our patients is one way we can continue to improve our services. Please take a few minutes to complete the written survey that you may receive in the mail after your visit with us. Thank you!             Your Updated Medication List - Protect others around you: Learn how to safely use, store and throw away your medicines at www.disposemymeds.org.          This list is accurate as of 7/23/18  3:40 PM.  Always use your most recent med list.                   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)       Aluminum-Magnesium-Simethicone 200-200-20 MG/5ML Susp      Take 5 mLs by mouth daily as needed        aspirin 81 MG tablet      1 TABLET EVERY MORNING        Benzethonium Chloride 0.1 % Liqd     237 mL    Cleanse with wound cleanser and apply barrier paste and hydrocolloid dressing, change every 2-3 days as needed    Decubitus ulcer of buttock, unspecified laterality, unspecified ulcer stage       clindamycin 1 % lotion    CLEOCIN T    60 mL    Apply twice daily for 6 weeks to the groin    Rash       COMPRESSION STOCKINGS     2 each    1 each continuous prn Bilateral knee high compression stockings    Acute congestive heart failure, unspecified congestive heart failure type (H)       fluticasone 50 MCG/ACT spray    FLONASE  "   1 Bottle    Spray 1-2 sprays into both nostrils daily    Postnasal discharge       * furosemide 20 MG tablet    LASIX    180 tablet    Take 1 tablet (20 mg) by mouth daily    Hypervolemia, unspecified hypervolemia type       * furosemide 20 MG tablet    LASIX    30 tablet    Take 1 tablet (20 mg) by mouth daily    Acute diastolic congestive heart failure (H), Pleural effusion, 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)       potassium chloride SA 10 MEQ CR tablet    K-DUR/KLOR-CON M    30 tablet    Take 1 tablet (10 mEq) by mouth daily    Hypervolemia, unspecified hypervolemia type       PREVACID PO      Take 20 mg by mouth every evening as needed Patient needs to use brand name Prevacid (generic version causes diarrhea)        prochlorperazine 10 MG tablet    COMPAZINE    30 tablet    Take 1 tablet (10 mg) by mouth every 6 hours as needed for nausea or vomiting    Nausea       rosuvastatin 40 MG tablet    CRESTOR    90 tablet    Take 1 tablet (40 mg) by mouth daily    Hyperlipidemia LDL goal <100, Coronary artery disease involving native coronary artery of native heart without angina pectoris       TYLENOL 8 HOUR PO      Take 500 mg by mouth as needed        zinc oxide - white petrolatum 20-51% Pste topical paste     170 g    Cleanse with wound cleanser and apply barrier paste and hydrocolloid dressing, change every 2-3 days as needed    Decubitus ulcer of buttock, unspecified laterality, unspecified ulcer stage       * Notice:  This list has 2 medication(s) that are the same as other medications prescribed for you. Read the directions carefully, and ask your doctor or other care provider to review them with you.

## 2018-07-24 NOTE — PROGRESS NOTES
Infusion Nursing Note:  Francisco Javier NARAYAN Sophia presents today for C5D2 Bendamustine/Onpro.    Patient seen by provider today: No   present during visit today: Not Applicable.    Note: N/A.    Intravenous Access:  Implanted Port.    Treatment Conditions:  Not Applicable.    Post Infusion Assessment:  Patient tolerated infusion without incident.  Blood return noted pre and post infusion.  Site patent and intact, free from redness, edema or discomfort.  Access discontinued per protocol.    Discharge Plan:   Patient will return 8/20/18 for next appointment.   Patient discharged in stable condition accompanied by: wife.  Departure Mode: Ambulatory.    Nell Gabriel RN

## 2018-07-24 NOTE — PROGRESS NOTES
"SPIRITUAL HEALTH SERVICES  SPIRITUAL ASSESSMENT Progress Note  [facility]       Visited with the patient Francisco Javier and his wife Court for ongoing support. Offered reflective conversion and emotional support through validation of feelings and affirmation.     Illness Narrative: Francisco Javier shared that this has all been much harder than he expected. He is trying to just get through the next month of treatments. He just hopes that he doesn't have to go back to the hospital again.   Court shared that care giving has also been much harder than she expected. \" When he get up in the middle of the night, I have to get up too.\"    Distress:   Discussed the following area of distress:  Emotional  Couple has 4 adult children, only one of whom lives in the immediate area.  Court states that they helped a lot at first but now they are not as available.   Court feels that living in their current home is getting too difficult and she is trying to look into senior housing.  Being sent to the [facility] for any medical treatment causes them both stress, they would like to have  treatments in [place].  Alevism/Spiritual  Over 46 years they have attended 3 different [employer] churches and their current [employer] does not offer any support so they have not attended for some time. \" is not supportive.\"  Coping:   Emotional   Francisco Javier states that he is taking it one day at a time as there is \"nothing else to do.\"  I offered them support from our social workers or psychologist if they felt that would be helpful.  I encouraged them to reach out as needed.  Religous/Spiritual  Court states that she prays all the time as she worries about her kids and grand kids and Francisco Javier. I asked if it would be helpful today and they answered in the affirmative.  Prayed with them for their concerns.   Meaning Making: They are both enduring for the time being but it is all a bit over whelming for them.     PLAN: I let them " know that I was available for support and would check in periodically with them.     Libertad Grissom  Staff   Pager [phone]

## 2018-07-24 NOTE — MR AVS SNAPSHOT
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)        Flatulence, eructation, and gas pain           Follow-ups after your visit        Your next 10 appointments already scheduled     Aug 06, 2018  9:45 AM CDT   Return Visit with MARTHA Gonzales CNP   [facility] ([facility])    [address] [facility] [address]   [phone]            Aug 20, 2018  8:45 AM CDT   Return Visit with NURSE ONLY CANCER CENTER   [facility] ([facility])    [address] [facility] [address]   [phone]            Aug 20, 2018  9:15 AM CDT   Return Visit with MARTHA Gonzales CNP   [facility] ([facility])    [address] [facility] [address]   [phone]            Aug 20, 2018 10:00 AM CDT   Level 3 with BAY 6 INFUSION   [facility] ([facility])    [address] [facility] [address]   [phone]            Aug 21, 2018 11:00 AM CDT   Level 1 with BAY 5 INFUSION   [facility] ([facility])    [address] [facility] [address]   [phone]            Aug 28, 2018  8:15 AM CDT   Return Visit with [facility]URSE ONLY CANCER CENTER   [facility] ([facility])    [address] [facility] [address]   [phone]            Aug 28, 2018  9:15 AM CDT   Return Visit with MARTHA Gonzales CNP   [facility] ([facility]  [facility] [place])    [address] [place] [address]   [phone]            Sep 04, 2018  1:15 PM CDT   Nurse Only with MG IMAGING NURSE   [facility]    [address] [place] [address]   [phone]            Sep 04, 2018  1:30 PM CDT   PE NPET ONCOLOGY (EYES TO THIGHS) with MGPET1   [facility] ([facility])    [address] [place] [address]   [phone]           Tell your doctor:   If there is any chance you may be pregnant or if you are breastfeeding.   If you have problems lying in small spaces (claustrophobia). If you do, your doctor may give you medicine to help you relax. If you have diabetes:   Have your exam early in the morning. Your blood glucose will go up as the day goes by.   Your glucose level must be 180 or less at the start of the exam. Please take any medicines you need to ensure this blood glucose level. 24 hours before your scan: Don t do any heavy exercise. (No jogging, aerobics or other workouts.) Exercise will make your pictures less accurate. 6 hours before your scan:   Stop all food and liquids (except water).   Do not chew gum or suck on mints.   If you need to take medicine with food, you may take it with a few crackers.  Please call your Imaging Department at your exam site with any questions.            Sep 10, 2018 10:45 AM CDT   Return Visit with Erickson Adan MD   [facility] ([facility])    [address] [place] [address]   [phone]              Future tests that were ordered for you today     Open Future Orders        Priority Expected Expires Ordered    PET Oncology (Eyes to Thighs) Routine  7/23/2019 7/23/2018            Who to contact     If you have questions or need follow up information about today's clinic visit or your schedule please contact [facility] directly at  [phone].  Normal or non-critical lab and imaging results will be communicated to you by [employer], letter or phone within 4 business days after the clinic has received the results. If you do not hear from us within 7 days, please contact the clinic through [employer] groupt or phone. If you have a critical or abnormal lab result, we will notify you by phone as soon as possible.  Submit refill requests through Xumii or call your pharmacy and they will forward the refill request to us. Please allow 3 business days for your refill to be completed.          Additional Information About Your Visit        [employer] Information     Xum[employer] gives you secure access to your electronic health record. If you see a primary care provider, you can also send messages to your care team and make appointments. If you have questions, please call your primary care clinic.  If you do not have a primary care provider, please call [phone] and they will assist you.      Xumii is an electronic gateway that provides easy, online access to your medical records. With Xumii, you can request a clinic appointment, read your test results, renew a prescription or communicate with your care team.     To access your existing account, please contact your [facility] or call [phone] for assistance.        Care EveryWhere ID     This is your Care EveryWhere ID. This could be used by other organizations to access your [facility] medical records  [id number]        Your Vitals Were     Pulse Temperature Pulse Oximetry             99 97.7  F (36.5  C) (Oral) 95%          Blood Pressure from Last 3 Encounters:   07/24/18 117/83   07/23/18 97/70   07/17/18 120/79    Weight from Last 3 Encounters:   07/23/18 72.1 kg (159 lb 0.3 oz)   07/17/18 71.5 kg (157 lb 11.2 oz)   07/16/18 71.3 kg (157 lb 1.6 oz)              Today, you had the following     No orders found for display         Today's Medication Changes          These  changes are accurate as of 7/24/18  3:22 PM.  If you have any questions, ask your nurse or doctor.               These medicines have changed or have updated prescriptions.        Dose/Directions    metoprolol succinate 100 MG 24 hr tablet   Commonly known as:  TOPROL-XL   This may have changed:  how much to take   Used for:  Hypertension goal BP (blood pressure) < 130/80, Coronary artery disease involving native coronary artery of native heart without angina pectoris        Dose:  100 mg   Take 1 tablet (100 mg) by mouth daily   Quantity:  90 tablet   Refills:  3       rosuvastatin 40 MG tablet   Commonly known as:  CRESTOR   This may have changed:  when to take this   Used for:  Hyperlipidemia LDL goal <100, Coronary artery disease involving native coronary artery of native heart without angina pectoris        Dose:  40 mg   Take 1 tablet (40 mg) by mouth daily   Quantity:  90 tablet   Refills:  3                Primary Care Provider Office Phone # Fax #    Florian Alonzo [phone]       [address]  [facility] [address]        Equal Access to Services     [facility] : Hadii aad ku hadasho Soomaali, waaxda luqadaha, qaybta kaalmada adeegyada, waxay nickin haykaiden bazan . So [facility] [phone].    ATENCIÓN: Si habla español, tiene a ivan disposición servicios gratuitos de asistencia lingüística. [facility] [phone].    We comply with applicable federal civil rights laws and [place] laws. We do not discriminate on the basis of race, color, national origin, age, disability, sex, sexual orientation, or gender identity.            Thank you!     Thank you for choosing [facility]  for your care. Our goal is always to provide you with excellent care. Hearing back from our patients is one way we can continue to improve our services. Please take a few minutes to complete the written survey that you may receive in the mail after your visit with us. Thank you!    "          Your Updated Medication List - Protect others around you: Learn how to safely use, store and throw away your medicines at www.disposemymeds.org.          This list is accurate as of 7/24/18  3:22 PM.  Always use your most recent med list.                   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)       Aluminum-Magnesium-Simethicone 200-200-20 MG/5ML Susp      Take 5 mLs by mouth daily as needed        aspirin 81 MG tablet      1 TABLET EVERY MORNING        Benzethonium Chloride 0.1 % Liqd     237 mL    Cleanse with wound cleanser and apply barrier paste and hydrocolloid dressing, change every 2-3 days as needed    Decubitus ulcer of buttock, unspecified laterality, unspecified ulcer stage       clindamycin 1 % lotion    CLEOCIN T    60 mL    Apply twice daily for 6 weeks to the groin    Rash       COMPRESSION STOCKINGS     2 each    1 each continuous prn Bilateral knee high compression stockings    Acute congestive heart failure, unspecified congestive heart failure type (H)       fluticasone 50 MCG/ACT spray    FLONASE    1 Bottle    Spray 1-2 sprays into both nostrils daily    Postnasal discharge       * furosemide 20 MG tablet    LASIX    180 tablet    Take 1 tablet (20 mg) by mouth daily    Hypervolemia, unspecified hypervolemia type       * furosemide 20 MG tablet    LASIX    30 tablet    Take 1 tablet (20 mg) by mouth daily    Acute diastolic congestive heart failure (H), Pleural effusion, 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)       potassium chloride SA 10 MEQ CR tablet    K-DUR/KLOR-CON M    30 tablet    Take 1 tablet (10 mEq) by mouth daily    Hypervolemia, unspecified hypervolemia type       PREVACID PO      Take 20 mg by mouth every evening as needed Patient needs to use brand name Prevacid (generic version causes diarrhea)        prochlorperazine 10 MG tablet    COMPAZINE    30 tablet    Take 1 tablet (10 mg) by mouth every 6 hours as needed for nausea or vomiting    Nausea       rosuvastatin 40 MG tablet    CRESTOR    90 tablet    Take 1 tablet (40  mg) by mouth daily    Hyperlipidemia LDL goal <100, Coronary artery disease involving native coronary artery of native heart without angina pectoris       TYLENOL 8 HOUR PO      Take 500 mg by mouth as needed        zinc oxide - white petrolatum 20-51% Pste topical paste     170 g    Cleanse with wound cleanser and apply barrier paste and hydrocolloid dressing, change every 2-3 days as needed    Decubitus ulcer of buttock, unspecified laterality, unspecified ulcer stage       * Notice:  This list has 2 medication(s) that are the same as other medications prescribed for you. Read the directions carefully, and ask your doctor or other care provider to review them with you.

## 2018-07-24 NOTE — TELEPHONE ENCOUNTER
Oncology Distress Screening Follow-up  [facility]    Identified Concern and Score From Distress Screenin. How concerned are you about work and home life issues that may be affected by your cancer?   10           7. How concerned are you about knowing what resources are available to help you?   10             Date of Distress Screenin18    Data:  77-year-old male with past medical history significant for prostate cancer status post prostatectomy, coronary disease status post stent placement, to complete develop symptoms of abdominal bloating/discomfort and loss of appetite for 2 months. Underwent imaging workup was noted to have diffuse extensive hypermetabolic lymphadenopathy with biopsy of the lymph node showing findings consistent with mantle cell lymphoma.  PT is currently receiving chemotherapy treatment for his mantel cell lymphoma.  Pt resides with his spouse in home in [place], [place].  Pt recently had [facility] RN and PT services though goals were met and HHC was discontinued on 18.  SW has been involved with pt and spouse in the past in getting their health care directives completed/notarized.  Pt's HCD is in his medical record.      Intervention:  SW attempted to contact pt today via phone to follow up regarding the above Oncology Distress Screening results.  SW unable to connect with pt and  for pt to call SW back.     Education Provided:  NA yet    Follow-up Required:  SW will follow and await return call from pt.    BURTON Gregory     Social Work  [facility]  Office:  [phone]  E-Mail:  [email or web address]  2018 10:44 AM

## 2018-07-30 NOTE — TELEPHONE ENCOUNTER
Received call from wife Court. States patient is hospitalized at [facility] in [place]. Went to hospital 7/26/18 was discharged 7/28 and then was admitted again 7/29/18. She wanted to make Dr Adan aware. Court reported all that she was aware that had been done for Francisco Javier. This RN let her know that Dr Adan is able to access Francisco Javier's records and writer will let him inform him about Francisco Javier's current situation. Advised Court to call when pt is discharged so follow up appointment can be made to see Dr Adan. Court expressed gratitude for callback and will let clinic know when patient is discharged.  Zhane Barth  RN, BSN, OCN

## 2018-08-02 NOTE — TELEPHONE ENCOUNTER
Left message on Zhane's VM to call us back.    Adrienne Gordon RN,   [facility], [facility]

## 2018-08-02 NOTE — TELEPHONE ENCOUNTER
Admission Date: 7/29/2018  Discharge Date: 7/31/2018  He will be discharged 7/31/2018 to home    DISCHARGE DIAGNOSES:  1. Recurrent bilateral chylous pleural effusions.  2. SBP.  3. Hypokalemia, hypomagnesemia, hypocalcemia.  4. Mantle cell lymphoma.  5. Thrombocytopenia.  6. Coronary artery disease.  7. Acute on chronic anemia.        NEW MEDICATIONS   Details   ciprofloxacin HCl (CIPRO) 500 mg oral tablet Take 1 tablet (500 mg) by mouth twice a day for 5 days.  Qty: 9 tablet, Refills: 0       Discharge Procedure Orders  Magnesium   Standing Status: Future Standing Exp. Date: 08/30/18     Basic Metabolic Profile   Standing Status: Future Standing Exp. Date: 08/30/18     Discharge Instructions   Follow up with your regular oncologist within 1 week.     Discharge Instructions   Very Low fat diet, as discussed with you.     Follow Up   Referral Priority: Routine   Referred to Provider: PRIVATE MD     Follow up with your healthcare provider as directed: Your healthcare provider will want to see you again in 1 to 2 weeks to measure your weight and electrolyte level    Spoke to patient.  He states he is not doing very well.  He went to [facility].  They removed 1.25 L fluid from lungs and 5L from abdomen.  He states the fluid is coming from a lymph node.  There is a leakage someplace but cannot find out where exactly.  He then was sent home and he went to [facility] because he didn't get any answers at .  He saw a lung doctor who took another 1L from the lungs on Monday and was discharged Tuesday.  He states that he was told if he gets worse again to go to [facility] because they have a test that determines which lymph node is leaking.  He is trying to rest and stay comfortable.  Asked if he feels he needs to go back to ER? Patient stated not unless he has to.  He states that the lung Dr. Aggarwal has been in contact with oncologist Dr. Okeefe numerous times.  He doesn't f/u with onc until 8/20/18.  He is  still taking his antibiotics.    Routed to Dr. Alonzo.    Adrienne Gordon RN,   [facility], [facility]

## 2018-08-02 NOTE — TELEPHONE ENCOUNTER
Spoke to patients wife, patient was resting right now.  They got the message below and she stated that seeing oncology is probably best.  They will come in tomorrow at 1:45pm to see oncologist.    Cancelled appt with  for tomorrow.    Adrienne Gordon RN,   [facility], [facility]

## 2018-08-02 NOTE — TELEPHONE ENCOUNTER
Spoke to Zhane.   Ok to schedule patient with Joy Latiayo tomorrow at 1:45pm.    Scheduled patient with onc.  Will cancel Dr. Alonzo's appt tomorrow.    Left message on patients VM that we have changed up his plans for tomorrow, he will be seeing oncology NP at 1:45pm.  Asked him to call me back to let me know he is ok with this.    Adrienne Gordon RN,   [facility], [facility]

## 2018-08-02 NOTE — TELEPHONE ENCOUNTER
Can we check with Dr. Adan/RN  to see if patient needs to see him rather than us, given his ongoing concerns.      Erickson Adan MD Shiell, Aren M, RN Hokkanen, Julie A, RN        7/30/18 12:47 PM   Note      Received call from wife Court. States patient is hospitalized at [facility] in [place]. Went to hospital 7/26/18 was discharged 7/28 and then was admitted again 7/29/18. She wanted to make Dr Adan aware. Court reported all that she was aware that had been done for Francisco Javier. This RN let her know that Dr Adan is able to access Francisco Javier's records and writer will let him inform him about Francisco Javier's current situation. Advised Court to call when pt is discharged so follow up appointment can be made to see Dr Adan. Court expressed gratitude for callback and will let clinic know when patient is discharged.  Zhane Barth  RN, BSN, OCN

## 2018-08-03 NOTE — LETTER
8/3/2018         RE: Francisco Javier Cortes  [address] [facility] [address]        Dear Colleague,    Thank you for referring your patient, Francisco Javier Cortes, to the [facility]. Please see a copy of my visit note below.    Oncology Follow Up Visit: August 3, 2018     Oncologist: Dr. Erickson Adan  PCP: Florian Alonzo    Diagnosis: Stage IV mantle cell lymphoma  Francisco Javier Cortes is a 77 yo  male presented in March 2018 with complaint of abdominal bloating and discomfort loss of appetite ×2 months. Initial imaging showed splenomegaly, extensive retroperitoneal mesenteric, inguinal and pelvic as well as retrocrural adenopathy and enlarging pulmonary nodules up to 18 mm. FNA proved mantle cell lymphoma-please see progress notes of 3/29/18 by Dr. Auguste for specific information.  Treatment:   3/29/18 began Bendamustine and Rituxan  - Hospitalized for TLS  - Hospitalized for sepsis from pneumonia  - hospitalized for CHF 4/20-21    Interval History: Mr. Cortes comes to clinic with wife for post hospital check after being hospitalized at [facility] on 7/26-7/28 and again from  7/29-7/31/2018 for recurrent chylous pleural effusion,SBP, electrolyte deficiencies while in treatment for his mantle cell lymphoma. His last infusion of Rituxan/Bendamustine was 7/23 ( cycle 5 of 6). He shares he is having no pain and breathing is easier with less coughing now and less SOB noted  but feels the abdomen may be accumulating more fluid again. He has poor appetite but is using a supplement each day and eats better with wife offering more frequently- on low fat diet- no nausea, Denies fevers or new signs of infection and continues on oral antibiotic- cipro ordered at hospitalization  Bowels have been loose 3 x daily. He is weak and using cane but no falls. He admits to more anxiety with the breathing issues and is wondering about future plans for treatment of the problem.   Rest of comprehensive and  "complete ROS is reviewed and is negative.   Past Medical History:   Diagnosis Date     CAD (coronary artery disease) 1/09    s/p stentsx2     Coronary artery disease involving native coronary artery of native heart without angina pectoris 12/22/2015     Dyslipidemia      Erectile dysfunction      GERD (gastroesophageal reflux disease)      Hearing problem One ear 1961     Hypertension      NSTEMI (non-ST elevated myocardial infarction) (H) 3/20/2014     Pneumonia      Prostate cancer (H)     prostatecomy 9 years ago, PSAs remain good     Status post percutaneous transluminal coronary angioplasty-Coronary angioplasty with STEPHEN to LCx 4/4/2014     Stented coronary artery     stents placed     Current Outpatient Prescriptions   Medication     Acetaminophen (TYLENOL 8 HOUR PO)     allopurinol (ZYLOPRIM) 300 MG tablet     Alum & Mag Hydroxide-Simeth (ALUMINUM-MAGNESIUM-SIMETHICONE) 200-200-20 MG/5ML SUSP     ASPIRIN 81 MG OR TABS     Benzethonium Chloride 0.1 % LIQD     ciprofloxacin (CIPRO) 500 MG tablet     clindamycin (CLEOCIN T) 1 % lotion     COMPRESSION STOCKINGS     fluticasone (FLONASE) 50 MCG/ACT spray     furosemide (LASIX) 20 MG tablet     furosemide (LASIX) 20 MG tablet     Gauze Pads & Dressings (GAUZE BANDAGE 4\") MISC     HYDROcodone-acetaminophen (NORCO) 5-325 MG per tablet     hydrocortisone valerate (WEST-NINOSKA) 0.2 % ointment     Lansoprazole (PREVACID PO)     lidocaine-prilocaine (EMLA) cream     loratadine (CLARITIN) 10 MG tablet     LORazepam (ATIVAN) 0.5 MG tablet     metoprolol succinate (TOPROL-XL) 100 MG 24 hr tablet     nitroGLYcerin (NITROSTAT) 0.4 MG sublingual tablet     ondansetron (ZOFRAN) 8 MG tablet     potassium chloride SA (K-DUR/KLOR-CON M) 10 MEQ CR tablet     prochlorperazine (COMPAZINE) 10 MG tablet     rosuvastatin (CRESTOR) 40 MG tablet     zinc oxide - white petrolatum (CRITIC-AID THICK MOIST BARRIER) 20-51% PSTE topical paste     No current facility-administered medications for " "this visit.      Allergies   Allergen Reactions     Niacin      Severe rash and itching     Prevacid [Lansoprazole] Diarrhea     Patient notes diarrhea with 30mg generic version. Patient does ok with 20mg in generic and brand name.     Shellfish Allergy      One kind       Physical Exam:/76  Pulse 111  Temp 97.8  F (36.6  C)  Resp 16  Ht 1.651 m (5' 5\")  Wt 67.1 kg (148 lb)  SpO2 94%  BMI 24.63 kg/m2   ECOG PS- 1-2  Constitutional: Alert, moving slowly on own today with cane and appears weak. Cooperative   ENT: Eyes bright, No mouth sores  Neck: Supple, no nodes are noted.  Cardiac: Heart rate and rhythm is regular and strong without murmur  Respiratory: Breathing easy. Lung sound with decreased breath sound to left lower lung today. No cough and good effort.  GI: Abdomen is rounded, nontender, BS active. No masses or organomegaly  MS: Muscle tone fair - some weakness noted - moving on own to exam table. Extremities normal - no edema.  Skin: buttock fold wound  Is dark pink and does have barrier cream to area - no weeping noted nor further excoriation post hospitalization.  Neuro: Sensory grossly WNL, gait slow  Lymph: Normal ant/post cervical, axillary, supraclavicular nodes  Psych: Mentation appears normal and affect more quiet today but with good conversation.    Laboratory Results:   Results for orders placed or performed in visit on 08/03/18   Basic metabolic panel   Result Value Ref Range    Sodium 139 133 - 144 mmol/L    Potassium 4.3 3.4 - 5.3 mmol/L    Chloride 104 94 - 109 mmol/L    Carbon Dioxide 27 20 - 32 mmol/L    Anion Gap 8 3 - 14 mmol/L    Glucose 108 (H) 70 - 99 mg/dL    Urea Nitrogen 18 7 - 30 mg/dL    Creatinine 1.06 0.66 - 1.25 mg/dL    GFR Estimate 67 >60 mL/min/1.7m2    GFR Estimate If Black 82 >60 mL/min/1.7m2    Calcium 8.2 (L) 8.5 - 10.1 mg/dL   Magnesium   Result Value Ref Range    Magnesium 1.8 1.6 - 2.3 mg/dL     Assessment and Plan:   Recent hospitalization for pleural " effusion found to have chylous collections- pt was hospitalized at [facility] 7/7/26-7/28 and again from  7/29-7/31/2018 for recurrent chylous pleural effusion,SBP, electrolyte deficiencies while in treatment for his mantle cell lymphoma.   Today he is stable with review including labs which showed normal electrolytes and exam and has no progressive SOB.   We discussed near future bilateral thoracentesis followed by Lymphoscintigraphy in IR at [facility] to address the leakage and patient is ready to proceed with this when it can be set up and suggest that it should be completed as soon as possible to prevent readmission.    We will postpone Bendamustine/Rituxan treatment set for 8/6 to treat his mantle cell lymphoma until after above procedure.   Nutrition- pt has loss of appetite and has goal of low fat diet which wife is supervising and encouraging. He has seen dietician in hospital.  He is using supplement daily and encouraged offering smaller meals more often so less overwhelmed.   CAD- pt using lasix for help with symptoms. Visit with Dr Ball on 7/17/2018 brought no new changes to plan for his CAD.    This was a 25 min visit with > 50% in counseling and coordinating care including education and management of concerns.    Joy Bush CNP      Again, thank you for allowing me to participate in the care of your patient.        Sincerely,        Joy Bush NP, APRN CNP

## 2018-08-03 NOTE — MR AVS SNAPSHOT
After Visit Summary   8/3/2018    Francisco Javier Cortes    MRN: [id number]           Patient Information     Date Of Birth          1940        Visit Information        Provider Department      8/3/2018 1:45 PM Joy Bush APRN CNP [facility]        Today's Diagnoses     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)    -  1    Chylous effusion        Chylous ascites        Electrolyte abnormality           Follow-ups after your visit        Your next 10 appointments already scheduled     Aug 07, 2018  1:00 PM CDT   (Arrive by 12:00 PM)   IR THORACENTESIS with [id number]   [facility] ([facility])    [address]  5th Floor  [facility] [address] [phone]           1. Laboratory test are to be obtained by your doctor prior to the exam (Hgb/Hct, platelet count, INR) 2. Someone will need to drive you to and from the hospital if you feel you may need sedation for the procedure. 3. Bring a list of all drugs you are taking; include supplements and over-the-counter medications. 4. Wear comfortable clothes and leave your valuables at home. 5. If you are or may be pregnant, be sure to contact your doctor or a Radiology nurse prior to the day of the exam. 6. If you have diabetes, check with your doctor or a Radiology nurse to see if your insulin needs to be adjusted for the exam. 7. If you are taking Coumadin (to thin your blood) please contact your doctor or a Radiology nurse at least 3 days before the exam for special instructions (only need the INR to be <2.0). 8. The day before your exam you may eat your regular diet. Drink no alcoholic beverages for 24 hours prior to the exam. 9. Do not eat any solid food or milk products for 6 hours prior to the exam. You may drink clear liquids until 2 hours prior to the exam. Clear liquids include the following: water, Jell-O, clear broth, apple juice or any noncarbonated drink that  you can see through (no pop!) 10. The morning of the exam you may brush your teeth and take medications as directed with a sip of water. 11. Tell the Radiology nurse if you have any allergies. 12. If the tube needs to remain in place you will remain in the hospital until the tube can be removed 13. If the tube is removed immediately you may leave the hospital following your exam. However, if you received sedation you will need to stay 1-2 hours. You may be asked to stay longer and have a chest x-ray sometime after the procedure. 14. If you received sedation, you cannot drive until morning, and an adult must be with you until then. If you live more than one hour away you should stay in the [place] area overnight.            Aug 07, 2018   Procedure with Michelle Leroy PA-C   [facility] ([facility])    [address] [place] [address]  5th Floor  [facility] [id number] [phone]           Located in the Clinics and Surgery Center at [address] [place] [address].   parking is very convenient and highly recommended.  is a $6 flat rate fee.  Both  and self parkers should enter the main arrival plaza from [facility]; parking attendants will direct you based on your parking preference.            Aug 08, 2018  9:00 AM CDT   NM LYMPHOSCINTIGRAPHY INJECTION AND SCAN with UUNM2   [facility] ([facility])    [address] [id number] [phone]           Please bring a list of your medicines to the exam. (Include vitamins, minerals and over-the-counter drugs.) You should wear comfortable clothes. Leave your valuables at home. Please bring related prior results and films.  Tell your doctor:   If you are breastfeeding or may be pregnant.   If you have had a barium test within the past few days. Barium may change the results of certain exams.   If you think  you may need sedation (medicine to help you relax).  You may eat and drink as normal.  Please call your Imaging Department at your exam site with any questions.            Aug 20, 2018  8:45 AM CDT   Return Visit with NURSE ONLY CANCER CENTER   [facility] ([facility])    [address] [address]   [phone]            Aug 20, 2018  9:15 AM CDT   Return Visit with MARTHA Gonzales CNP   [facility] ([facility])    [address] [facility] [address]   [phone]            Aug 20, 2018 10:00 AM CDT   Level 3 with BAY 6 INFUSION   [facility])    [address] [facility] [address]   [phone]            Aug 21, 2018 11:00 AM CDT   Level 1 with BAY 5 INFUSION   [facility])    [address] [facility] [address]   [phone]            Aug 28, 2018  8:15 AM CDT   Return Visit with NURSE ONLY CANCER CENTER   [facility] ([facility])    [address] [facility] [address]   [phone]            Aug 28, 2018  9:15 AM CDT   Return Visit with MARTHA Gonzales CNP   [facility])    [address] [facility] [address]   [phone]            Sep 04, 2018  1:15 PM CDT   Nurse Only with MG IMAGING NURSE   [facility] ([facility])    [address] [facility] [address] [phone]              Future tests that were ordered for you today     Open Future Orders        Priority Expected Expires Ordered    IR Thoracentesis Routine  8/3/2019 8/3/2018    NM Lymphoscintigraphy Injection and Scan Routine  8/3/2019 8/3/2018            Who to contact     If you have questions or need follow up information about today's clinic visit or your  "schedule please contact [facility] directly at [phone].  Normal or non-critical lab and imaging results will be communicated to you by Desurahart, letter or phone within 4 business days after the clinic has received the results. If you do not hear from us within 7 days, please contact the clinic through Desurahart or phone. If you have a critical or abnormal lab result, we will notify you by phone as soon as possible.  Submit refill requests through [employer]Vision or call your pharmacy and they will forward the refill request to us. Please allow 3 business days for your refill to be completed.          Additional Information About Your Visit        MaxVision Information     [employer] gives you secure access to your electronic health record. If you see a primary care provider, you can also send messages to your care team and make appointments. If you have questions, please call your primary care clinic.  If you do not have a primary care provider, please call [phone] and they will assist you.      MaxVision is an electronic gateway that provides easy, online access to your medical records. With MaxVision, you can request a clinic appointment, read your test results, renew a prescription or communicate with your care team.     To access your existing account, please contact your [facility] or call [phone] for assistance.        Care EveryWhere ID     This is your Care EveryWhere ID. This could be used by other organizations to access your [facility] medical records  [id number]        Your Vitals Were     Pulse Temperature Respirations Height Pulse Oximetry BMI (Body Mass Index)    111 97.8  F (36.6  C) 16 1.651 m (5' 5\") 94% 24.63 kg/m2       Blood Pressure from Last 3 Encounters:   08/03/18 112/76   07/24/18 117/83   07/23/18 97/70    Weight from Last 3 Encounters:   08/03/18 67.1 kg (148 lb)   07/23/18 72.1 kg (159 lb 0.3 oz)   07/17/18 71.5 kg (157 lb 11.2 oz)            "   We Performed the Following     Basic metabolic panel     Magnesium          Today's Medication Changes          These changes are accurate as of 8/3/18  4:18 PM.  If you have any questions, ask your nurse or doctor.               These medicines have changed or have updated prescriptions.        Dose/Directions    metoprolol succinate 100 MG 24 hr tablet   Commonly known as:  TOPROL-XL   This may have changed:  how much to take   Used for:  Hypertension goal BP (blood pressure) < 130/80, Coronary artery disease involving native coronary artery of native heart without angina pectoris        Dose:  100 mg   Take 1 tablet (100 mg) by mouth daily   Quantity:  90 tablet   Refills:  3       rosuvastatin 40 MG tablet   Commonly known as:  CRESTOR   This may have changed:  when to take this   Used for:  Hyperlipidemia LDL goal <100, Coronary artery disease involving native coronary artery of native heart without angina pectoris        Dose:  40 mg   Take 1 tablet (40 mg) by mouth daily   Quantity:  90 tablet   Refills:  3                Primary Care Provider Office Phone # Fax #    Florian Alonzo [phone]       [address]  [facility] [address]        Equal Access to Services     [facility]: Hadii aad ku hadasho Soomaali, waaxda luqadaha, qaybta kaalmada adeegyada, waxay declan haykaiden bazan . So [facility] [phone].    ATENCIÓN: Si habla español, tiene a ivan disposición servicios gratuitos de asistencia lingüística. Llame al [phone].    We comply with applicable federal civil rights laws and [place] laws. We do not discriminate on the basis of race, color, national origin, age, disability, sex, sexual orientation, or gender identity.            Thank you!     Thank you for choosing [facility]  for your care. Our goal is always to provide you with excellent care. Hearing back from our patients is one way we can continue to improve our services. Please  "take a few minutes to complete the written survey that you may receive in the mail after your visit with us. Thank you!             Your Updated Medication List - Protect others around you: Learn how to safely use, store and throw away your medicines at www.disposemymeds.org.          This list is accurate as of 8/3/18  4:18 PM.  Always use your most recent med list.                   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)       Aluminum-Magnesium-Simethicone 200-200-20 MG/5ML Susp      Take 5 mLs by mouth daily as needed        aspirin 81 MG tablet      1 TABLET EVERY MORNING        Benzethonium Chloride 0.1 % Liqd     237 mL    Cleanse with wound cleanser and apply barrier paste and hydrocolloid dressing, change every 2-3 days as needed    Decubitus ulcer of buttock, unspecified laterality, unspecified ulcer stage       ciprofloxacin 500 MG tablet    CIPRO     Take 500 mg by mouth        clindamycin 1 % lotion    CLEOCIN T    60 mL    Apply twice daily for 6 weeks to the groin    Rash       COMPRESSION STOCKINGS     2 each    1 each continuous prn Bilateral knee high compression stockings    Acute congestive heart failure, unspecified congestive heart failure type (H)       fluticasone 50 MCG/ACT spray    FLONASE    1 Bottle    Spray 1-2 sprays into both nostrils daily    Postnasal discharge       * furosemide 20 MG tablet    LASIX    180 tablet    Take 1 tablet (20 mg) by mouth daily    Hypervolemia, unspecified hypervolemia type       * furosemide 20 MG tablet    LASIX    30 tablet    Take 1 tablet (20 mg) by mouth daily    Acute diastolic congestive heart failure (H), Pleural effusion, 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)       Gauze Bandage 4\" Misc     5 each    Cleanse with wound cleanser and apply barrier paste and " hydrocolloid dressing, change every 2-3 days as needed    Decubitus ulcer of buttock, unspecified laterality, unspecified ulcer stage       HYDROcodone-acetaminophen 5-325 MG per tablet    NORCO     Take 1 tablet by mouth every 4 hours as needed        hydrocortisone valerate 0.2 % ointment    WEST-NINOSKA    45 g    Apply sparingly to affected area three times daily as needed.    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)       potassium chloride SA 10 MEQ CR tablet    K-DUR/KLOR-CON M    30 tablet    Take 1 tablet (10 mEq) by mouth daily    Hypervolemia, unspecified hypervolemia type       PREVACID PO      Take 20 mg by mouth every evening as needed Patient needs to use brand name Prevacid (generic version causes diarrhea)        prochlorperazine 10 MG  tablet    COMPAZINE    30 tablet    Take 1 tablet (10 mg) by mouth every 6 hours as needed for nausea or vomiting    Nausea       rosuvastatin 40 MG tablet    CRESTOR    90 tablet    Take 1 tablet (40 mg) by mouth daily    Hyperlipidemia LDL goal <100, Coronary artery disease involving native coronary artery of native heart without angina pectoris       TYLENOL 8 HOUR PO      Take 500 mg by mouth as needed        zinc oxide - white petrolatum 20-51% Pste topical paste     170 g    Cleanse with wound cleanser and apply barrier paste and hydrocolloid dressing, change every 2-3 days as needed    Decubitus ulcer of buttock, unspecified laterality, unspecified ulcer stage       * Notice:  This list has 2 medication(s) that are the same as other medications prescribed for you. Read the directions carefully, and ask your doctor or other care provider to review them with you.

## 2018-08-03 NOTE — NURSING NOTE
"Oncology Rooming Note    August 3, 2018 1:57 PM   Francisco Javier Cortes is a 78 year old male who presents for:    Chief Complaint   Patient presents with     Oncology Clinic Visit     \[facility]\""     Initial Vitals: /76  Pulse 111  Temp 97.8  F (36.6  C)  Resp 16  Ht 1.651 m (5' 5\")  Wt 67.1 kg (148 lb)  SpO2 94%  BMI 24.63 kg/m2 Estimated body mass index is 24.63 kg/(m^2) as calculated from the following:    Height as of this encounter: 1.651 m (5' 5\").    Weight as of this encounter: 67.1 kg (148 lb). Body surface area is 1.75 meters squared.  Mild Pain (2) Comment: stomach    No LMP for male patient.  Allergies reviewed: Yes  Medications reviewed: Yes    Medications: Medication refills not needed today.  Pharmacy name entered into [employer]:    [facility] [place] - [place], [place] - [address], [address]  [facility] - [facility], [place] - [id number] - [place], [place] - [address] [facility] [facility]        5 minutes for nursing intake (face to face time)     Kathleen Sandoval LPN              "

## 2018-08-03 NOTE — PROGRESS NOTES
Oncology Follow Up Visit: August 3, 2018     Oncologist: Dr. Erickson Adan  PCP: Florian Alonzo    Diagnosis: Stage IV mantle cell lymphoma  Francisco Javier Cortes is a 79 yo  male presented in March 2018 with complaint of abdominal bloating and discomfort loss of appetite ×2 months. Initial imaging showed splenomegaly, extensive retroperitoneal mesenteric, inguinal and pelvic as well as retrocrural adenopathy and enlarging pulmonary nodules up to 18 mm. FNA proved mantle cell lymphoma-please see progress notes of 3/29/18 by Dr. Auguste for specific information.  Treatment:   3/29/18 began Bendamustine and Rituxan  - Hospitalized for TLS  - Hospitalized for sepsis from pneumonia  - hospitalized for CHF 4/20-21    Interval History: Mr. Cortes comes to clinic with wife for post hospital check after being hospitalized at [facility] on 7/26-7/28 and again from  7/29-7/31/2018 for recurrent chylous pleural effusion,SBP, electrolyte deficiencies while in treatment for his mantle cell lymphoma. His last infusion of Rituxan/Bendamustine was 7/23 ( cycle 5 of 6). He shares he is having no pain and breathing is easier with less coughing now and less SOB noted  but feels the abdomen may be accumulating more fluid again. He has poor appetite but is using a supplement each day and eats better with wife offering more frequently- on low fat diet- no nausea, Denies fevers or new signs of infection and continues on oral antibiotic- cipro ordered at hospitalization  Bowels have been loose 3 x daily. He is weak and using cane but no falls. He admits to more anxiety with the breathing issues and is wondering about future plans for treatment of the problem.   Rest of comprehensive and complete ROS is reviewed and is negative.   Past Medical History:   Diagnosis Date     CAD (coronary artery disease) 1/09    s/p stentsx2     Coronary artery disease involving native coronary artery of native heart without angina pectoris 12/22/2015  "    Dyslipidemia      Erectile dysfunction      GERD (gastroesophageal reflux disease)      Hearing problem One ear 1961     Hypertension      NSTEMI (non-ST elevated myocardial infarction) (H) 3/20/2014     Pneumonia      Prostate cancer (H)     prostatecomy 9 years ago, PSAs remain good     Status post percutaneous transluminal coronary angioplasty-Coronary angioplasty with STEPHEN to LCx 4/4/2014     Stented coronary artery     stents placed     Current Outpatient Prescriptions   Medication     Acetaminophen (TYLENOL 8 HOUR PO)     allopurinol (ZYLOPRIM) 300 MG tablet     Alum & Mag Hydroxide-Simeth (ALUMINUM-MAGNESIUM-SIMETHICONE) 200-200-20 MG/5ML SUSP     ASPIRIN 81 MG OR TABS     Benzethonium Chloride 0.1 % LIQD     ciprofloxacin (CIPRO) 500 MG tablet     clindamycin (CLEOCIN T) 1 % lotion     COMPRESSION STOCKINGS     fluticasone (FLONASE) 50 MCG/ACT spray     furosemide (LASIX) 20 MG tablet     furosemide (LASIX) 20 MG tablet     Gauze Pads & Dressings (GAUZE BANDAGE 4\") MISC     HYDROcodone-acetaminophen (NORCO) 5-325 MG per tablet     hydrocortisone valerate (WEST-NINOSKA) 0.2 % ointment     Lansoprazole (PREVACID PO)     lidocaine-prilocaine (EMLA) cream     loratadine (CLARITIN) 10 MG tablet     LORazepam (ATIVAN) 0.5 MG tablet     metoprolol succinate (TOPROL-XL) 100 MG 24 hr tablet     nitroGLYcerin (NITROSTAT) 0.4 MG sublingual tablet     ondansetron (ZOFRAN) 8 MG tablet     potassium chloride SA (K-DUR/KLOR-CON M) 10 MEQ CR tablet     prochlorperazine (COMPAZINE) 10 MG tablet     rosuvastatin (CRESTOR) 40 MG tablet     zinc oxide - white petrolatum (CRITIC-AID THICK MOIST BARRIER) 20-51% PSTE topical paste     No current facility-administered medications for this visit.      Allergies   Allergen Reactions     Niacin      Severe rash and itching     Prevacid [Lansoprazole] Diarrhea     Patient notes diarrhea with 30mg generic version. Patient does ok with 20mg in generic and brand name.     Shellfish " "Allergy      One kind       Physical Exam:/76  Pulse 111  Temp 97.8  F (36.6  C)  Resp 16  Ht 1.651 m (5' 5\")  Wt 67.1 kg (148 lb)  SpO2 94%  BMI 24.63 kg/m2   ECOG PS- 1-2  Constitutional: Alert, moving slowly on own today with cane and appears weak. Cooperative   ENT: Eyes bright, No mouth sores  Neck: Supple, no nodes are noted.  Cardiac: Heart rate and rhythm is regular and strong without murmur  Respiratory: Breathing easy. Lung sound with decreased breath sound to left lower lung today. No cough and good effort.  GI: Abdomen is rounded, nontender, BS active. No masses or organomegaly  MS: Muscle tone fair - some weakness noted - moving on own to exam table. Extremities normal - no edema.  Skin: buttock fold wound  Is dark pink and does have barrier cream to area - no weeping noted nor further excoriation post hospitalization.  Neuro: Sensory grossly WNL, gait slow  Lymph: Normal ant/post cervical, axillary, supraclavicular nodes  Psych: Mentation appears normal and affect more quiet today but with good conversation.    Laboratory Results:   Results for orders placed or performed in visit on 08/03/18   Basic metabolic panel   Result Value Ref Range    Sodium 139 133 - 144 mmol/L    Potassium 4.3 3.4 - 5.3 mmol/L    Chloride 104 94 - 109 mmol/L    Carbon Dioxide 27 20 - 32 mmol/L    Anion Gap 8 3 - 14 mmol/L    Glucose 108 (H) 70 - 99 mg/dL    Urea Nitrogen 18 7 - 30 mg/dL    Creatinine 1.06 0.66 - 1.25 mg/dL    GFR Estimate 67 >60 mL/min/1.7m2    GFR Estimate If Black 82 >60 mL/min/1.7m2    Calcium 8.2 (L) 8.5 - 10.1 mg/dL   Magnesium   Result Value Ref Range    Magnesium 1.8 1.6 - 2.3 mg/dL     Assessment and Plan:   Recent hospitalization for pleural effusion found to have chylous collections- pt was hospitalized at [facility] 7/7/26-7/28 and again from  7/29-7/31/2018 for recurrent chylous pleural effusion,SBP, electrolyte deficiencies while in treatment for his mantle cell " lymphoma.   Today he is stable with review including labs which showed normal electrolytes and exam and has no progressive SOB.   We discussed near future bilateral thoracentesis followed by Lymphoscintigraphy in IR at [place] to address the leakage and patient is ready to proceed with this when it can be set up and suggest that it should be completed as soon as possible to prevent readmission.    We will postpone Bendamustine/Rituxan treatment set for 8/6 to treat his mantle cell lymphoma until after above procedure.   Nutrition- pt has loss of appetite and has goal of low fat diet which wife is supervising and encouraging. He has seen dietician in hospital.  He is using supplement daily and encouraged offering smaller meals more often so less overwhelmed.   CAD- pt using lasix for help with symptoms. Visit with Dr Ball on 7/17/2018 brought no new changes to plan for his CAD.    This was a 25 min visit with > 50% in counseling and coordinating care including education and management of concerns.    Joy Bush,CNP

## 2018-08-06 NOTE — TELEPHONE ENCOUNTER
.Oncology Distress Screening Follow-up  [facility]           Oncology Distress Screening Follow-up  [facility]     Identified Concern and Score From Distress Screenin. How concerned are you about work and home life issues that may be affected by your cancer?   10                   7. How concerned are you about knowing what resources are available to help you?   10                      Date of Distress Screenin18     Data:  77-year-old male with past medical history significant for prostate cancer status post prostatectomy, coronary disease status post stent placement, to complete develop symptoms of abdominal bloating/discomfort and loss of appetite for 2 months. Underwent imaging workup was noted to have diffuse extensive hypermetabolic lymphadenopathy with biopsy of the lymph node showing findings consistent with mantle cell lymphoma.  PT is currently receiving chemotherapy treatment for his mantel cell lymphoma.  Pt resides with his spouse in home in [place], [place].  Pt recently had [facility] RN and PT services though goals were met and HHC was discontinued on 18.  SW has been involved with pt and spouse in the past in getting their health care directives completed/notarized.  Pt's HCD is in his medical record.       Intervention:  SW attempted to contact pt today via phone to follow up regarding the above Oncology Distress Screening results.  SW unable to connect with pt and LM for pt to call SW back.  This is the second time that SW reached out to pt regarding Oncology Distress Screening with no response and no return call.     Education Provided:  NA yet     Follow-up Required:  None    BURTON Gregory     Social Work  [facility]  Office:  [phone]  E-Mail:  [email or web address]  2018 11:55 AM

## 2018-08-08 NOTE — PROCEDURES
Interventional Radiology Brief Post Procedure Note    Procedure: Bilateral thoracentesis    Proceduralist: Michelle Leroy MS, PA-C    Assistant: None    Time Out: Prior to the start of the procedure and with procedural staff participation, I verbally confirmed the patient s identity using two indicators, relevant allergies, that the procedure was appropriate and matched the consent or emergent situation, and that the correct equipment/implants were available. Immediately prior to starting the procedure I conducted the Time Out with the procedural staff and re-confirmed the patient s name, procedure, and site/side. (The Joint Commission universal protocol was followed.)  Yes        Sedation: None. Local Anesthestic used    Findings: Completed ultrasound-guided bilateral therapeutic thoracenteses. A total of 1350 mL hazy dark yellow fluid drained from the right pleural space and a total of 1100 mL hazy serosanguinous fluid drained from the left pleural space.     Estimated Blood Loss: Minimal    Fluoroscopy Time:  minute(s)    SPECIMENS: None    Complications: 1. None     Condition: Stable    Plan: Follow up per primary team.     Comments: See dictated procedure note for full details.    Michelle Leroy PA-C

## 2018-08-08 NOTE — IP AVS SNAPSHOT
[facility]    [address] [place] [id number]    Phone:  [phone]    Fax:  [phone]                                       After Visit Summary   8/8/2018    Francisco Javier Cortes    MRN: [id number]           After Visit Summary Signature Page     I have received my discharge instructions, and my questions have been answered. I have discussed any challenges I see with this plan with the nurse or doctor.    ..........................................................................................................................................  Patient/Patient Representative Signature      ..........................................................................................................................................  Patient Representative Print Name and Relationship to Patient    ..................................................               ................................................  Date                                            Time    ..........................................................................................................................................  Reviewed by Signature/Title    ...................................................              ..............................................  Date                                                            Time

## 2018-08-08 NOTE — DISCHARGE INSTRUCTIONS
Discharge Instructions for Paracentesis or Thoracentesis     Paracentesis is a procedure to remove extra fluid from your belly (abdomen). Thoracentesis is a procedure to remove extra fluid from your chest.  This fluid buildup is called ascites. The procedure may have been done to take a sample of the fluid. Or, it may have been done to drain the extra fluid from your abdomen or chest to help make you more comfortable.     Home care    If you have pain after the procedure, your healthcare provider can prescribe or recommend pain medicines. Take these exactly as directed. If you stopped taking other medicines before the procedure, ask your provider when you can start them again.    Avoid strenuous activity for 48 hours.    You will have a small bandage over the puncture site. Adhesive tapes, adhesive strips, or surgical glue may also be used to close the incision. They also help stop bleeding and promote healing. You may take the bandage off in 24-48 hours. Once there is a scab over the site, no bandages are required.    Check the puncture site for the signs of infection listed below.     Follow-up care  Make a follow-up appointment with your healthcare provider as directed. During your follow-up visit, your provider will check your healing. Let your provider know how you are feeling. You can also discuss the cause of your fluid, and if you need any further treatment.    When to seek medical advice:  Call your healthcare provider if you have any of the following after the procedure:    A fever of 100.4 F (38.0 C) or higher    Trouble breathing    Abdominal pain that is worse than expected    Belly Abdominal pain not caused by having the skin punctured that is worse than expected    Persistent bleeding from the puncture site    More than a small amount of fluid leaking from the puncture site    Swollen belly    Signs of infection at the puncture site. These include increased pain, redness, or swelling, warmth, or  bad-smelling drainage.    Feeling dizzy or lightheaded, or fainting     Call our Interventional Radiology (IR) service if:  If you start bleeding from the procedure site.  If you do start to bleed from the site, lie down and hold some pressure on the site.  Our radiology provider can help you decide if you need to return to the hospital.  If you have new or worsening pain related to the procedure.  If you have pronounced swelling at the procedure site.  If you develop persistent nausea or vomiting.  If you develop hives or a rash or any unexplained itching.  If you have a fever of greater than 100.4  F and chills in the first 5 days after procedure.  Any other concerns related to your procedure.      [facility]  Interventional Radiology (IR)  [address] [facility] [facility]  [facility] [place] [address]    Contact Number:  [phone]  (IR control desk)  Monday - Friday 8:00 am - 4:30 pm    After hours for urgent concerns:  [phone]  After 4:30 pm Monday - Friday, Weekends and Holidays.   Ask for Interventional Radiology on-call.  Someone is available 24 hours a day.  [facility] toll free number:  [phone]

## 2018-08-08 NOTE — OR NURSING
1350 ml cloudy straw colored fluid drained from right chest and 1000ml sero sanguinous fluid drained from left chest

## 2018-08-08 NOTE — IP AVS SNAPSHOT
MRN:[id number]                      After Visit Summary   8/8/2018    Francisco Javier Cortes    MRN: [id number]           Thank you!     Thank you for choosing [facility] for your care. Our goal is always to provide you with excellent care. Hearing back from our patients is one way we can continue to improve our services. Please take a few minutes to complete the written survey that you may receive in the mail after you visit with us. Thank you!        Patient Information     Date Of Birth          1940        About your hospital stay     You were admitted on:  August 8, 2018 [facility]    You were discharged on:  August 8, 2018       Who to Call     For medical emergencies, please call 911.  For non-urgent questions about your medical care, please call your primary care provider or clinic, [phone]  For questions related to your surgery, please call your surgery clinic        Attending Provider     Provider Specialty    Michelle Leroy PA-C Physician Assistant       Primary Care Provider Office Phone # Fax #    Florian Alonzo [phone]      Your next 10 appointments already scheduled     Aug 08, 2018 10:30 AM CDT   NM LYMPHOSCINTIGRAPHY INJECTION AND SCAN with UUNM2   [facility], [place], Nuclear Medicine ([facility] [place])    [address] [address] [phone]           Please bring a list of your medicines to the exam. (Include vitamins, minerals and over-the-counter drugs.) You should wear comfortable clothes. Leave your valuables at home. Please bring related prior results and films.  Tell your doctor:   If you are breastfeeding or may be pregnant.   If you have had a barium test within the past few days. Barium may change the results of certain exams.   If you think you may need sedation (medicine to help you relax).  You may eat and drink as normal.   Please call your Imaging Department at your exam site with any questions.            Aug 20, 2018  8:45 AM CDT   Return Visit with NURSE ONLY CANCER CENTER   [facility]    [address] [facility] [address]   [phone]            Aug 20, 2018  9:15 AM CDT   Return Visit with MARTHA Gonzales CNP   [facility])    [address] [facility] [address] [phone]            Aug 20, 2018 10:00 AM CDT   Level 3 with BAY 6 INFUSION   [facility])    [address] [address] [phone]            Aug 21, 2018 11:00 AM CDT   Level 1 with BAY 5 INFUSION   [facility]    [address] [facility] [address] [phone]            Aug 28, 2018  8:15 AM CDT   Return Visit with NURSE ONLY CANCER CENTER   [facility] ([facility])    [address] [facility] [address] [phone]            Aug 28, 2018  9:15 AM CDT   Return Visit with MARTHA Gonzales CNP   [facility])    [address] [address] [phone]            Sep 04, 2018  1:15 PM CDT   Nurse Only with MG IMAGING NURSE   [facility])    [address] [facility] [address] [phone]            Sep 04, 2018  1:30 PM CDT   PE NPET ONCOLOGY (EYES TO THIGHS) with MGPET1   [facility])    [address] [facility] [id number] [phone]           Tell your doctor:   If there is any chance you may be pregnant or if you are breastfeeding.   If you have problems lying in small spaces (claustrophobia). If you do, your doctor may give you medicine to help you relax. If  you have diabetes:   Have your exam early in the morning. Your blood glucose will go up as the day goes by.   Your glucose level must be 180 or less at the start of the exam. Please take any oral diabetic medication you need to ensure this blood glucose level is below 180, but no insulin 4 hours prior to the exam.   If you are taking insulin in the morning take with breakfast by 6 am and schedule procedure between 12 and 2:15 pm.    If you are taking insulin at night take nightly dose, fast overnight, schedule procedure before 10 am.    If you take insulin both morning and night take morning dose by 6am and schedule procedure between 12 and 2:15 pm.  24 hours before your scan: Don t do any heavy exercise. (No jogging, aerobics or other workouts.) Exercise will make your pictures less accurate. 6 hours before your scan:   Stop all food and liquids (except water).   Do not chew gum or suck on mints.   If you need to take medicine with food, you may take it with a few crackers.  Please call your Imaging Department at your exam site with any questions.            Sep 17, 2018  2:15 PM CDT   Return Visit with Erickson Adan MD   [facility] ([facility])    [id number] [place] [address] [phone]              Further instructions from your care team       Discharge Instructions for Paracentesis or Thoracentesis     Paracentesis is a procedure to remove extra fluid from your belly (abdomen). Thoracentesis is a procedure to remove extra fluid from your chest.  This fluid buildup is called ascites. The procedure may have been done to take a sample of the fluid. Or, it may have been done to drain the extra fluid from your abdomen or chest to help make you more comfortable.     Home care    If you have pain after the procedure, your healthcare provider can prescribe or recommend pain medicines. Take these exactly as directed. If you stopped taking other medicines before  the procedure, ask your provider when you can start them again.    Avoid strenuous activity for 48 hours.    You will have a small bandage over the puncture site. Adhesive tapes, adhesive strips, or surgical glue may also be used to close the incision. They also help stop bleeding and promote healing. You may take the bandage off in 24-48 hours. Once there is a scab over the site, no bandages are required.    Check the puncture site for the signs of infection listed below.     Follow-up care  Make a follow-up appointment with your healthcare provider as directed. During your follow-up visit, your provider will check your healing. Let your provider know how you are feeling. You can also discuss the cause of your fluid, and if you need any further treatment.    When to seek medical advice:  Call your healthcare provider if you have any of the following after the procedure:    A fever of 100.4 F (38.0 C) or higher    Trouble breathing    Abdominal pain that is worse than expected    Belly Abdominal pain not caused by having the skin punctured that is worse than expected    Persistent bleeding from the puncture site    More than a small amount of fluid leaking from the puncture site    Swollen belly    Signs of infection at the puncture site. These include increased pain, redness, or swelling, warmth, or bad-smelling drainage.    Feeling dizzy or lightheaded, or fainting     Call our Interventional Radiology (IR) service if:  If you start bleeding from the procedure site.  If you do start to bleed from the site, lie down and hold some pressure on the site.  Our radiology provider can help you decide if you need to return to the hospital.  If you have new or worsening pain related to the procedure.  If you have pronounced swelling at the procedure site.  If you develop persistent nausea or vomiting.  If you develop hives or a rash or any unexplained itching.  If you have a fever of greater than 100.4  F and chills in the  "first 5 days after procedure.  Any other concerns related to your procedure.      [facility]  Interventional Radiology (IR)  [address]  2nd Floor, [facility] [place] [address]    Contact Number:  [phone]  (IR control desk)  Monday - Friday 8:00 am - 4:30 pm    After hours for urgent concerns:  [phone]  After 4:30 pm Monday - Friday, Weekends and Holidays.   Ask for Interventional Radiology on-call.  Someone is available 24 hours a day.  [facility] toll free number:  [phone]    Pending Results     Date and Time Order Name Status Description    8/8/2018 0740 IR THORACENTESIS In process             Admission Information     Date & Time Provider Department Dept. Phone    8/8/2018 Michelle Leroy PA-C [facility] [phone]      Your Vitals Were     Blood Pressure Pulse Temperature Respirations Height Weight    100/65 100 97.6  F (36.4  C) (Oral) 16 1.651 m (5' 5\") 67.1 kg (148 lb)    Pulse Oximetry BMI (Body Mass Index)                95% 24.63 kg/m2          [facility] Information     [facility] gives you secure access to your electronic health record. If you see a primary care provider, you can also send messages to your care team and make appointments. If you have questions, please call your primary care clinic.  If you do not have a primary care provider, please call [phone] and they will assist you.      [facility] is an electronic gateway that provides easy, online access to your medical records. With [facility], you can request a clinic appointment, read your test results, renew a prescription or communicate with your care team.     To access your existing account, please contact your [facility] or call [phone] for assistance.        Care EveryWhere ID     This is your Care EveryWhere ID. This could be used by other organizations to access your [facility] medical records  [id number]        Equal " Access to Services     [facility]: Hadii rivera collazo yahairakaren Sonormaali, waaxda luqadaha, qaybta kaalmada sharronmanasdonn, zhao lorenzo. So [facility] [place] [phone].    ATENCIÓN: Si habla sandra, tiene a ivan disposición servicios gratuitos de asistencia lingüística. Llame al [phone].    We comply with applicable federal civil rights laws and [place] laws. We do not discriminate on the basis of race, color, national origin, age, disability, sex, sexual orientation, or gender identity.               Review of your medicines      UNREVIEWED medicines. 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)        Dose:  300 mg   Take 1 tablet (300 mg) by mouth daily   Quantity:  30 tablet   Refills:  1       Aluminum-Magnesium-Simethicone 200-200-20 MG/5ML Susp        Dose:  5 mL   Take 5 mLs by mouth daily as needed   Refills:  0       aspirin 81 MG tablet        1 TABLET EVERY MORNING   Refills:  0       Benzethonium Chloride 0.1 % Liqd   Used for:  Decubitus ulcer of buttock, unspecified laterality, unspecified ulcer stage        Cleanse with wound cleanser and apply barrier paste and hydrocolloid dressing, change every 2-3 days as needed   Quantity:  237 mL   Refills:  3       clindamycin 1 % lotion   Commonly known as:  CLEOCIN T   Used for:  Rash        Apply twice daily for 6 weeks to the groin   Quantity:  60 mL   Refills:  1       fluticasone 50 MCG/ACT spray   Commonly known as:  FLONASE   Used for:  Postnasal discharge        Dose:  1-2 spray   Spray 1-2 sprays into both nostrils daily   Quantity:  1 Bottle   Refills:  11       * furosemide 20 MG tablet   Commonly known as:  LASIX   Used for:  Hypervolemia, unspecified hypervolemia type        Dose:  20 mg   Take 1 tablet (20 mg) by mouth daily   Quantity:  180 tablet   Refills:  3       * furosemide 20  MG tablet   Commonly known as:  LASIX   Used for:  Acute diastolic congestive heart failure (H), Pleural effusion, 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)        Dose:  0.5 mg   Take 1 tablet (0.5 mg) by mouth every 4 hours as needed (Anxiety, Nausea/Vomiting or Sleep)   Quantity:  30 tablet   Refills:  3       metoprolol succinate 100 MG 24 hr tablet   Commonly known as:  TOPROL-XL   Used for:  Hypertension goal BP (blood pressure) < 130/80, Coronary artery disease involving native coronary artery of native heart without angina pectoris        Dose:  100 mg   Take 1 tablet (100 mg) by mouth daily   Quantity:  90 tablet   Refills:  3       nitroGLYcerin 0.4 MG sublingual tablet   Commonly known as:  NITROSTAT   Used for:  Chest pain due to myocardial ischemia, unspecified ischemic chest pain type (H), Coronary artery disease involving native coronary artery of native heart without angina pectoris        Dose:  0.4 mg   Place 1 tablet (0.4 mg) under the tongue every 5 minutes as needed up  to 3 tablets per episode.   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)        Dose:  8 mg   Take 1 tablet (8 mg) by mouth every 8 hours as needed (Nausea/Vomiting)   Quantity:  10 tablet   Refills:  3       potassium chloride SA 10 MEQ CR tablet   Commonly known as:  K-DUR/KLOR-CON M   Used for:  Hypervolemia, unspecified hypervolemia type        Dose:  10 mEq   Take 1 tablet (10 mEq) by mouth daily   Quantity:  30 tablet   Refills:  1       PREVACID PO        Dose:  20 mg   Take 20 mg by mouth every evening as needed Patient needs to use brand name Prevacid (generic version causes diarrhea)   Refills:  0       prochlorperazine 10 MG tablet   Commonly known as:  COMPAZINE   Used for:  Nausea        Dose:  10 mg   Take 1 tablet (10 mg) by mouth every 6 hours as needed for nausea or vomiting   Quantity:  30 tablet   Refills:  1       rosuvastatin 40 MG tablet   Commonly known as:  CRESTOR   Used for:  Hyperlipidemia LDL goal <100, Coronary artery disease involving native coronary artery of native heart without angina pectoris        Dose:  40 mg   Take 1 tablet (40 mg) by mouth daily   Quantity:  90 tablet   Refills:  3       TYLENOL 8 HOUR PO        Dose:  500 mg   Take 500 mg by mouth as needed   Refills:  0       zinc oxide - white petrolatum 20-51% Pste topical paste   Used for:  Decubitus ulcer of buttock, unspecified laterality, unspecified ulcer stage        Cleanse with wound cleanser and apply barrier paste and hydrocolloid dressing, change every 2-3 days as needed   Quantity:  170 g   Refills:  3       * Notice:  This list has 2 medication(s) that are the same as other medications prescribed for you. Read the directions carefully, and ask your doctor or other care provider to review them with you.      CONTINUE these medicines which have NOT CHANGED        Dose / Directions    COMPRESSION  "STOCKINGS   Used for:  Acute congestive heart failure, unspecified congestive heart failure type (H)        Dose:  1 each   1 each continuous prn Bilateral knee high compression stockings   Quantity:  2 each   Refills:  0       Gauze Bandage 4\" Misc   Used for:  Decubitus ulcer of buttock, unspecified laterality, unspecified ulcer stage        Cleanse with wound cleanser and apply barrier paste and hydrocolloid dressing, change every 2-3 days as needed   Quantity:  5 each   Refills:  3                Protect others around you: Learn how to safely use, store and throw away your medicines at www.disposemymeds.org.             Medication List: This is a list of all your medications and when to take them. Check marks below indicate your daily home schedule. Keep this list as a reference.      Medications           Morning Afternoon Evening Bedtime As Needed    allopurinol 300 MG tablet   Commonly known as:  ZYLOPRIM   Take 1 tablet (300 mg) by mouth daily                                Aluminum-Magnesium-Simethicone 200-200-20 MG/5ML Susp   Take 5 mLs by mouth daily as needed                                aspirin 81 MG tablet   1 TABLET EVERY MORNING                                Benzethonium Chloride 0.1 % Liqd   Cleanse with wound cleanser and apply barrier paste and hydrocolloid dressing, change every 2-3 days as needed                                clindamycin 1 % lotion   Commonly known as:  CLEOCIN T   Apply twice daily for 6 weeks to the groin                                COMPRESSION STOCKINGS   1 each continuous prn Bilateral knee high compression stockings                                fluticasone 50 MCG/ACT spray   Commonly known as:  FLONASE   Spray 1-2 sprays into both nostrils daily                                * furosemide 20 MG tablet   Commonly known as:  LASIX   Take 1 tablet (20 mg) by mouth daily                                * furosemide 20 MG tablet   Commonly known as:  LASIX   Take 1 " "tablet (20 mg) by mouth daily                                Gauze Bandage 4\" Misc   Cleanse with wound cleanser and apply barrier paste and hydrocolloid dressing, change every 2-3 days as needed                                HYDROcodone-acetaminophen 5-325 MG per tablet   Commonly known as:  NORCO   Take 1 tablet by mouth every 4 hours as needed                                hydrocortisone valerate 0.2 % ointment   Commonly known as:  WEST-NINOSKA   Apply sparingly to affected area three times daily as needed.                                lidocaine-prilocaine cream   Commonly known as:  EMLA   Apply 1 Application topically as needed                                loratadine 10 MG tablet   Commonly known as:  CLARITIN   Take 1 tablet (10 mg) by mouth daily                                LORazepam 0.5 MG tablet   Commonly known as:  ATIVAN   Take 1 tablet (0.5 mg) by mouth every 4 hours as needed (Anxiety, Nausea/Vomiting or Sleep)                                metoprolol succinate 100 MG 24 hr tablet   Commonly known as:  TOPROL-XL   Take 1 tablet (100 mg) by mouth daily                                nitroGLYcerin 0.4 MG sublingual tablet   Commonly known as:  NITROSTAT   Place 1 tablet (0.4 mg) under the tongue every 5 minutes as needed up to 3 tablets per episode.                                ondansetron 8 MG tablet   Commonly known as:  ZOFRAN   Take 1 tablet (8 mg) by mouth every 8 hours as needed (Nausea/Vomiting)                                potassium chloride SA 10 MEQ CR tablet   Commonly known as:  K-DUR/KLOR-CON M   Take 1 tablet (10 mEq) by mouth daily                                PREVACID PO   Take 20 mg by mouth every evening as needed Patient needs to use brand name Prevacid (generic version causes diarrhea)                                prochlorperazine 10 MG tablet   Commonly known as:  COMPAZINE   Take 1 tablet (10 mg) by mouth every 6 hours as needed for nausea or vomiting                 "                rosuvastatin 40 MG tablet   Commonly known as:  CRESTOR   Take 1 tablet (40 mg) by mouth daily                                TYLENOL 8 HOUR PO   Take 500 mg by mouth as needed                                zinc oxide - white petrolatum 20-51% Pste topical paste   Cleanse with wound cleanser and apply barrier paste and hydrocolloid dressing, change every 2-3 days as needed                                * Notice:  This list has 2 medication(s) that are the same as other medications prescribed for you. Read the directions carefully, and ask your doctor or other care provider to review them with you.

## 2018-08-09 NOTE — TELEPHONE ENCOUNTER
Hello,  last fill date:07-  Last quantity:30    ++++  For Metoprolol 25mg tablets -- please note this was changed to the lower dose in the hospital-- if ok please send new rx +++      Thank You,  Marisel Del Cid  Pharmacy Technician  [facility]  [phone]

## 2018-08-10 NOTE — TELEPHONE ENCOUNTER
Patient states he takes Metoprolol 25 mg daily.  Patient is coming in to the clinic this morning and would like to  his medication at [facility] pharmacy.    Mamie Rivera CMA

## 2018-08-15 NOTE — PROGRESS NOTES
Spouse, Court, has been contacted with appointment details with Dr. Welch on 8/22/18 at 10:40 am - no sooner appointments were available with Dr. Welch or Dr. Garcia this week.  Per Dr. Adan, patient declined offer to schedule IR thoracentesis this week, and stated he would go to the ER if symptoms were to worsen.      Erickson Adan MD Shiell, Aren M, RN; Joy Bush, MARTHA CNP                   I offered setting up an IR thoracentesis this week.     Pt however wanted to wait and would go to ER if needed.            Previous Messages       ----- Message -----      From: Cam Conner, RN      Sent: 8/13/2018   2:06 PM        To: MARTHA Hahn CNS, *   Subject: RE: Interventional Pulm consult [facility] Dr. Adan,     Soonest available was with Dr. Welch for W[place] next week 8/22 at the [facility].  I will CC their care team to see if there would be anything sooner - if not, what should patient do in the meantime to manage?     Beti - would Dr. Welch or Dr. Garcia have anything available to see this patient sooner?  See below for information regarding referral.     Thank you,   Aren   ----- Message -----      From: Erickson Adan MD      Sent: 8/13/2018   1:45 PM        To: MARTHA Gonzales CNP, *   Subject: Interventional Pulm consult asap                 Hi Paige/AUBRIE Levy Lymphoscintigraphy Injection and Scan did not show any leak.     Spoke with him today about the results. He is developing some dyspnea again- likely from recurrent effusion. He needs to see interventional Pulmonary to discuss any possible intervention ( Pleurodesis/ Pleurax). Aren, can you help schedule that?     Thanks.

## 2018-08-20 NOTE — PROGRESS NOTES
Oncology Follow Up Visit: August 20, 2018     Oncologist: Dr. Erickson Adan  PCP: Florian Alonzo    Diagnosis: Stage IV mantle cell lymphoma  Francisco Javier Cortes is a 79 yo  male presented in March 2018 with complaint of abdominal bloating and discomfort loss of appetite ×2 months. Initial imaging showed splenomegaly, extensive retroperitoneal mesenteric, inguinal and pelvic as well as retrocrural adenopathy and enlarging pulmonary nodules up to 18 mm. FNA proved mantle cell lymphoma-please see progress notes of 3/29/18 by Dr. Auguste for specific information.  Treatment:   3/29/18 began Bendamustine and Rituxan  - Hospitalized for TLS  - Hospitalized for sepsis from pneumonia  - hospitalized for CHF 4/20-21    Interval History: Mr. Cortes comes to clinic with wife for toxicity check prior to cycle 6 of bendamustine/Rituxan.  Patient reports that he was in for a paracentesis again last week and he is feeling much better again today.  He has stopped his blood pressure medications and is feeling quite well at this point today with minimal dizziness and no pain.  He did have the testing done to see if there was a lymph leakage and they could not find any leak at this time the patient continues to fell with unknown fluid though shares that the last fluid is notably less yellow and cloudy today he is saying he does have some shortness of breath.  But is able to get around with his cane.  Wife feels he is less steady and is asking about a wheeled walker.  He is trying to stick to a low-fat diet but does have an appetite.  He has not had any nausea and bowels have been both loose versus constipated but feels it is related to his eating.  He admits to some anxiety about the disease and has some trouble sleeping related to the shortness of breath but is not taking any medication at this time.  Rest of comprehensive and complete ROS is reviewed and is negative.   Past Medical History:   Diagnosis Date     CAD (coronary  "artery disease) 1/09    s/p stentsx2     Coronary artery disease involving native coronary artery of native heart without angina pectoris 12/22/2015     Dyslipidemia      Erectile dysfunction      GERD (gastroesophageal reflux disease)      Hearing problem One ear 1961     Hypertension      NSTEMI (non-ST elevated myocardial infarction) (H) 3/20/2014     Pneumonia      Prostate cancer (H)     prostatecomy 9 years ago, PSAs remain good     Status post percutaneous transluminal coronary angioplasty-Coronary angioplasty with STEPHEN to LCx 4/4/2014     Stented coronary artery     stents placed     Current Outpatient Prescriptions   Medication     Acetaminophen (TYLENOL 8 HOUR PO)     allopurinol (ZYLOPRIM) 300 MG tablet     Alum & Mag Hydroxide-Simeth (ALUMINUM-MAGNESIUM-SIMETHICONE) 200-200-20 MG/5ML SUSP     ASPIRIN 81 MG OR TABS     Benzethonium Chloride 0.1 % LIQD     clindamycin (CLEOCIN T) 1 % lotion     COMPRESSION STOCKINGS     fluticasone (FLONASE) 50 MCG/ACT spray     furosemide (LASIX) 20 MG tablet     furosemide (LASIX) 20 MG tablet     Gauze Pads & Dressings (GAUZE BANDAGE 4\") MISC     HYDROcodone-acetaminophen (NORCO) 5-325 MG per tablet     hydrocortisone valerate (WEST-NINOSKA) 0.2 % ointment     Lansoprazole (PREVACID PO)     lidocaine-prilocaine (EMLA) cream     loratadine (CLARITIN) 10 MG tablet     LORazepam (ATIVAN) 0.5 MG tablet     metoprolol succinate (TOPROL-XL) 100 MG 24 hr tablet     metoprolol succinate (TOPROL-XL) 25 MG 24 hr tablet     nitroGLYcerin (NITROSTAT) 0.4 MG sublingual tablet     ondansetron (ZOFRAN) 8 MG tablet     potassium chloride SA (K-DUR/KLOR-CON M) 10 MEQ CR tablet     prochlorperazine (COMPAZINE) 10 MG tablet     rosuvastatin (CRESTOR) 40 MG tablet     zinc oxide - white petrolatum (CRITIC-AID THICK MOIST BARRIER) 20-51% PSTE topical paste     No current facility-administered medications for this visit.      Allergies   Allergen Reactions     Niacin      Severe rash and itching "     Prevacid [Lansoprazole] Diarrhea     Patient notes diarrhea with 30mg generic version. Patient does ok with 20mg in generic and brand name.     Shellfish Allergy      One kind       Physical Exam:/77  Pulse 123  Temp 98  F (36.7  C) (Oral)  Wt 64.7 kg (142 lb 11.2 oz)  SpO2 94%  BMI 23.75 kg/m2   ECOG PS- 1-2  Constitutional: Alert, moving slowly on own today with cane -moving slightly better today-no dizziness. Cooperative   ENT: Eyes bright, No mouth sores  Neck: Supple, no nodes are noted.  Cardiac: Heart rate and rhythm is regular and strong without murmur  Respiratory: Breathing easy. Lung sound with decreased breath sound to right lower lung today. No cough and good effort.  GI: Abdomen is rounded, nontender, BS active. No masses or organomegaly  MS: Muscle tone fair - some weakness - moving on own to exam table but weakness is obvious as he uses the step to the table. Extremities normal - no edema.  Skin: Did not review the buttock wound today.  Neuro: Sensory grossly WNL, gait slow with cane  Lymph: Normal ant/post cervical, axillary, supraclavicular nodes  Psych: Mentation appears normal and affect more quiet today but with good conversation.    Laboratory Results:    Ref. Range 8/20/2018 09:00   Sodium Latest Ref Range: 133 - 144 mmol/L 138   Potassium Latest Ref Range: 3.4 - 5.3 mmol/L 4.1   Chloride Latest Ref Range: 94 - 109 mmol/L 104   Carbon Dioxide Latest Ref Range: 20 - 32 mmol/L 24   Urea Nitrogen Latest Ref Range: 7 - 30 mg/dL 15   Creatinine Latest Ref Range: 0.66 - 1.25 mg/dL 1.02   GFR Estimate Latest Ref Range: >60 mL/min/1.7m2 70   GFR Estimate If Black Latest Ref Range: >60 mL/min/1.7m2 85   Calcium Latest Ref Range: 8.5 - 10.1 mg/dL 8.2 (L)   Anion Gap Latest Ref Range: 3 - 14 mmol/L 10   Magnesium Latest Ref Range: 1.6 - 2.3 mg/dL 1.8   Albumin Latest Ref Range: 3.4 - 5.0 g/dL 2.3 (L)   Protein Total Latest Ref Range: 6.8 - 8.8 g/dL 5.4 (L)   Bilirubin Total Latest Ref  Range: 0.2 - 1.3 mg/dL 0.2   Alkaline Phosphatase Latest Ref Range: 40 - 150 U/L 91   ALT Latest Ref Range: 0 - 70 U/L 20   AST Latest Ref Range: 0 - 45 U/L 42   Lactate Dehydrogenase Latest Ref Range: 85 - 227 U/L 323 (H)   Uric Acid Latest Ref Range: 3.5 - 7.2 mg/dL 5.0   Glucose Latest Ref Range: 70 - 99 mg/dL 118 (H)   WBC Latest Ref Range: 4.0 - 11.0 10e9/L 11.6 (H)   Hemoglobin Latest Ref Range: 13.3 - 17.7 g/dL 11.3 (L)   Hematocrit Latest Ref Range: 40.0 - 53.0 % 34.2 (L)   Platelet Count Latest Ref Range: 150 - 450 10e9/L 140 (L)   RBC Count Latest Ref Range: 4.4 - 5.9 10e12/L 3.36 (L)   MCV Latest Ref Range: 78 - 100 fl 102 (H)   MCH Latest Ref Range: 26.5 - 33.0 pg 33.6 (H)   MCHC Latest Ref Range: 31.5 - 36.5 g/dL 33.0   RDW Latest Ref Range: 10.0 - 15.0 % 15.9 (H)   Diff Method Unknown Manual Differential   % Neutrophils Latest Units: % 71.0   % Lymphocytes Latest Units: % 25.0   % Monocytes Latest Units: % 3.0   % Eosinophils Latest Units: % 1.0   Absolute Neutrophil Latest Ref Range: 1.6 - 8.3 10e9/L 8.3   Absolute Lymphocytes Latest Ref Range: 0.8 - 5.3 10e9/L 2.9   Absolute Monocytes Latest Ref Range: 0.0 - 1.3 10e9/L 0.3   Absolute Eosinophils Latest Ref Range: 0.0 - 0.7 10e9/L 0.1     Assessment and Plan:   Mantle cell lymphoma -patient is due to start cycle 6 of bendamustine Rituxan plan today.  Patient is anxious to complete all cycles and become healthier.  He does meet goals for continuation of plan so we will continue today.  He he will return to see Dr. Adan in 2 weeks for follow-up with CBC and CMP.  Pleural effusion -patient had procedures for thoracentesis and paracentesis completed last week and is feeling quite good today but testing for any lymph leak was found to be negative.   [facility] has been very helpful and accommodating with the thoracentesis and will set up an order for him to return for this procedure in the near future asking that he call at least 2 days prior to  set appointment rather than enter through ER.  Weakness-suggest patient could enter cancer rehab after completion of all treatment.  Will provide a DME order for a wheeled walker with seat for ambulation safety.  CAD- pt using lasix for help with symptoms. Visit with Dr Ball on 7/17/2018 brought no new changes to plan for his CAD.    This was a 25 min visit with > 50% in counseling and coordinating care including education and management of concerns.    Joy Bush,CNP

## 2018-08-20 NOTE — MR AVS SNAPSHOT
After Visit Summary   8/20/2018    Francisco Javier Cortes    MRN: [id number]           Patient Information     Date Of Birth          1940        Visit Information        Provider Department      8/20/2018 10:00 AM BAY 6 INFUSION [facility]        Today's Diagnoses     Drug-induced neutropenia (H)    -  1    Nausea        Encounter for antineoplastic chemotherapy        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)        Flatulence, eructation, and gas pain           Follow-ups after your visit        Your next 10 appointments already scheduled     Aug 21, 2018 11:00 AM CDT   Level 1 with BAY 5 INFUSION   [facility] ([facility])    [address] [place] [id number] [phone]            Aug 22, 2018 10:40 AM CDT   (Arrive by 10:25 AM)   NEW LUNG NODULE with Devin Welch MD   [facility] ([facility])    [address] [place] [id number] [phone]            Aug 28, 2018  8:15 AM CDT   Return Visit with NURSE ONLY CANCER CENTER   [facility] ([facility])    [address] [place] [id number] [phone]            Aug 28, 2018  9:15 AM CDT   Return Visit with MARTHA Gonzales CNP   [facility])    [address] [facility] [id number] [phone]            Sep 04, 2018  1:15 PM CDT   Nurse Only with MG IMAGING NURSE   [facility])    [id number] [place] [phone] [phone]            Sep 04, 2018  1:30 PM CDT   PE NPET ONCOLOGY (EYES TO THIGHS) with [id number]   [facility])    [address] [place] [address] [phone] [phone]           Tell your doctor:   If there is any chance you may be pregnant or if you  are breastfeeding.   If you have problems lying in small spaces (claustrophobia). If you do, your doctor may give you medicine to help you relax. If you have diabetes:   Have your exam early in the morning. Your blood glucose will go up as the day goes by.   Your glucose level must be 180 or less at the start of the exam. Please take any oral diabetic medication you need to ensure this blood glucose level is below 180, but no insulin 4 hours prior to the exam.   If you are taking insulin in the morning take with breakfast by 6 am and schedule procedure between 12 and 2:15 pm.    If you are taking insulin at night take nightly dose, fast overnight, schedule procedure before 10 am.    If you take insulin both morning and night take morning dose by 6am and schedule procedure between 12 and 2:15 pm.  24 hours before your scan: Don t do any heavy exercise. (No jogging, aerobics or other workouts.) Exercise will make your pictures less accurate. 6 hours before your scan:   Stop all food and liquids (except water).   Do not chew gum or suck on mints.   If you need to take medicine with food, you may take it with a few crackers.  Please call your Imaging Department at your exam site with any questions.            Sep 17, 2018  2:15 PM CDT   Return Visit with Erickson Adan MD   [facility]    [address] [place] [phone] [phone]            Oct 10, 2018 10:15 AM CDT   XR CHEST 2 VIEWS with UCXR1   [facility] ([facility])    [address] [address] [phone]           Please bring a list of your current medicines to your exam. (Include vitamins, minerals and over-thecounter medicines.) Leave your valuables at home.  Tell your doctor if there is a chance you may be pregnant.  You do not need to do anything special for this exam.              Future tests that were ordered  for you today     Open Future Orders        Priority Expected Expires Ordered    X-ray Chest 2 vws* Routine 8/20/2018 8/20/2019 8/20/2018            Who to contact     If you have questions or need follow up information about today's clinic visit or your schedule please contact [facility] directly at [phone].  Normal or non-critical lab and imaging results will be communicated to you by Tellohart, letter or phone within 4 business days after the clinic has received the results. If you do not hear from us within 7 days, please contact the clinic through Tellohart or phone. If you have a critical or abnormal lab result, we will notify you by phone as soon as possible.  Submit refill requests through [email or web address] or call your pharmacy and they will forward the refill request to us. Please allow 3 business days for your refill to be completed.          Additional Information About Your Visit        Tellohart Information     [email or web address] gives you secure access to your electronic health record. If you see a primary care provider, you can also send messages to your care team and make appointments. If you have questions, please call your primary care clinic.  If you do not have a primary care provider, please call [phone] and they will assist you.      [email or web address] is an electronic gateway that provides easy, online access to your medical records. With [email or web address], you can request a clinic appointment, read your test results, renew a prescription or communicate with your care team.     To access your existing account, please contact your [facility] or call [phone] for assistance.        Care EveryWhere ID     This is your Care EveryWhere ID. This could be used by other organizations to access your [facility] medical records  [id number]         Blood Pressure from Last 3 Encounters:   08/20/18 110/77   08/08/18 92/62   08/03/18 112/76    Weight from Last 3 Encounters:   08/20/18 64.7  kg (142 lb 11.2 oz)   08/08/18 67.1 kg (148 lb)   08/03/18 67.1 kg (148 lb)              Today, you had the following     No orders found for display         Today's Medication Changes          These changes are accurate as of 8/20/18  3:43 PM.  If you have any questions, ask your nurse or doctor.               Start taking these medicines.        Dose/Directions    order for DME   Used for:  Generalized muscle weakness, 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)   Started by:  Joy Bush APRN CNP        Equipment being ordered: wheeled walker with seat   Quantity:  1 Device   Refills:  0         These medicines have changed or have updated prescriptions.        Dose/Directions    rosuvastatin 40 MG tablet   Commonly known as:  CRESTOR   This may have changed:  when to take this   Used for:  Hyperlipidemia LDL goal <100, Coronary artery disease involving native coronary artery of native heart without angina pectoris        Dose:  40 mg   Take 1 tablet (40 mg) by mouth daily   Quantity:  90 tablet   Refills:  3            Where to get your medicines      Some of these will need a paper prescription and others can be bought over the counter.  Ask your nurse if you have questions.     Bring a paper prescription for each of these medications     order for DME                Primary Care Provider Office Phone # Fax #    Florian Alonzo [phone]       [address]  [facility] [address]        Equal Access to Services     NANDA BARAJAS AH: Hadii rivera collazo hadasho Soomaali, waaxda luqadaha, qaybta kaalmada adeegyada, zhao lorenzo. So [facility] [phone].    ATENCIÓN: Si habla español, tiene a ivan disposición servicios gratuitos de asistencia lingüística. Llame al [phone].    We comply with applicable federal civil rights laws and [place] laws. We do not discriminate on the basis of race, color, national origin, age, disability, sex, sexual  orientation, or gender identity.            Thank you!     Thank you for choosing [facility]  for your care. Our goal is always to provide you with excellent care. Hearing back from our patients is one way we can continue to improve our services. Please take a few minutes to complete the written survey that you may receive in the mail after your visit with us. Thank you!             Your Updated Medication List - Protect others around you: Learn how to safely use, store and throw away your medicines at www.disposemymeds.org.          This list is accurate as of 8/20/18  3:43 PM.  Always use your most recent med list.                   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)       Aluminum-Magnesium-Simethicone 200-200-20 MG/5ML Susp      Take 5 mLs by mouth daily as needed        aspirin 81 MG tablet      1 TABLET EVERY MORNING        Benzethonium Chloride 0.1 % Liqd     237 mL    Cleanse with wound cleanser and apply barrier paste and hydrocolloid dressing, change every 2-3 days as needed    Decubitus ulcer of buttock, unspecified laterality, unspecified ulcer stage       clindamycin 1 % lotion    CLEOCIN T    60 mL    Apply twice daily for 6 weeks to the groin    Rash       COMPRESSION STOCKINGS     2 each    1 each continuous prn Bilateral knee high compression stockings    Acute congestive heart failure, unspecified congestive heart failure type (H)       fluticasone 50 MCG/ACT spray    FLONASE    1 Bottle    Spray 1-2 sprays into both nostrils daily    Postnasal discharge       * furosemide 20 MG tablet    LASIX    180 tablet    Take 1 tablet (20 mg) by mouth daily    Hypervolemia, unspecified hypervolemia type       * furosemide 20 MG tablet    LASIX    30 tablet    Take 1 tablet (20 mg) by mouth daily    Acute diastolic  "congestive heart failure (H), Pleural effusion, 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)       * metoprolol succinate 100 MG 24 hr tablet    TOPROL-XL    90 tablet    Take 1 tablet (100 mg) by mouth daily    Hypertension goal BP (blood pressure) < 130/80, Coronary artery disease involving native coronary artery of native heart without angina pectoris       * metoprolol succinate 25 MG 24 hr tablet    TOPROL-XL    90 tablet    Take 1 tablet (25 mg) by mouth daily    Coronary artery disease involving native coronary artery of native heart without angina pectoris, Hypertension goal BP (blood pressure) < 130/80       nitroGLYcerin 0.4 MG sublingual tablet    NITROSTAT    60 tablet    Place 1 tablet (0.4 mg) under the tongue every 5 minutes as needed up to 3 tablets per episode.    Chest pain due to myocardial ischemia, unspecified ischemic chest pain type (H), Coronary artery disease involving native coronary artery of native heart without angina pectoris       ondansetron 8 MG tablet    ZOFRAN    10 tablet    Take 1 " tablet (8 mg) by mouth every 8 hours as needed (Nausea/Vomiting)    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)       order for DME     1 Device    Equipment being ordered: wheeled walker with seat    Generalized muscle weakness, Mantle cell lymphoma of lymph nodes of multiple sites (H)       potassium chloride SA 10 MEQ CR tablet    K-DUR/KLOR-CON M    30 tablet    Take 1 tablet (10 mEq) by mouth daily    Hypervolemia, unspecified hypervolemia type       PREVACID PO      Take 20 mg by mouth every evening as needed Patient needs to use brand name Prevacid (generic version causes diarrhea)        prochlorperazine 10 MG tablet    COMPAZINE    30 tablet    Take 1 tablet (10 mg) by mouth every 6 hours as needed for nausea or vomiting    Nausea       rosuvastatin 40 MG tablet    CRESTOR    90 tablet    Take 1 tablet (40 mg) by mouth daily    Hyperlipidemia LDL goal <100, Coronary artery disease involving native coronary artery of native heart without angina pectoris       TYLENOL 8 HOUR PO      Take 500 mg by mouth as needed        zinc oxide - white petrolatum 20-51% Pste topical paste     170 g    Cleanse with wound cleanser and apply barrier paste and hydrocolloid dressing, change every 2-3 days as needed    Decubitus ulcer of buttock, unspecified laterality, unspecified ulcer stage       * Notice:  This list has 4 medication(s) that are the same as other medications prescribed for you. Read the directions carefully, and ask your doctor or other care provider to review them with you.

## 2018-08-20 NOTE — MR AVS SNAPSHOT
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)        Flatulence, eructation, and gas pain           Follow-ups after your visit        Your next 10 appointments already scheduled     Aug 20, 2018  9:15 AM CDT   Return Visit with MARTHA Gonzales CNP   [facility] ([facility])    [address] [facility] [address]   [phone]            Aug 20, 2018 10:00 AM CDT   Level 3 with BAY 6 INFUSION   [facility])    [address] [facility] [address]   [phone]            Aug 21, 2018 11:00 AM CDT   Level 1 with BAY 5 INFUSION   [facility] ([facility])    [address] [place] [address]   [phone]            Aug 22, 2018 10:40 AM CDT   (Arrive by 10:25 AM)   NEW LUNG NODULE with Devin Welch MD   [facility] ([facility])    [address] [place] [address]  [address] [place] [id number] [phone]            Aug 28, 2018  8:15 AM CDT   Return Visit with NURSE ONLY CANCER CENTER   [facility] ([facility])    [address] [facility] [address]   [phone]            Aug 28, 2018  9:15 AM CDT   Return Visit with MARTHA Gonzales CNP   [facility])    [address] [facility] [address]   [phone]            Sep 04, 2018  1:15 PM CDT   Nurse Only with MG IMAGING NURSE   [facility]  [facility] ([facility])    [address] [place] [id number] [phone]            Sep 04, 2018  1:30 PM CDT   PE NPET ONCOLOGY (EYES TO THIGHS) with [id number]   [facility] ([facility])    [address] [place] [id number] [phone]           Tell your doctor:   If there is any chance you may be pregnant or if you are breastfeeding.   If you have problems lying in small spaces (claustrophobia). If you do, your doctor may give you medicine to help you relax. If you have diabetes:   Have your exam early in the morning. Your blood glucose will go up as the day goes by.   Your glucose level must be 180 or less at the start of the exam. Please take any oral diabetic medication you need to ensure this blood glucose level is below 180, but no insulin 4 hours prior to the exam.   If you are taking insulin in the morning take with breakfast by 6 am and schedule procedure between 12 and 2:15 pm.    If you are taking insulin at night take nightly dose, fast overnight, schedule procedure before 10 am.    If you take insulin both morning and night take morning dose by 6am and schedule procedure between 12 and 2:15 pm.  24 hours before your scan: Don t do any heavy exercise. (No jogging, aerobics or other workouts.) Exercise will make your pictures less accurate. 6 hours before your scan:   Stop all food and liquids (except water).   Do not chew gum or suck on mints.   If you need to take medicine with food, you may take it with a few crackers.  Please call your Imaging Department at your exam site with any questions.            Sep 17, 2018  2:15 PM CDT   Return Visit with Erickson Adan MD   [facility] ([facility])    [address] [place] [id number] [phone]            Oct 10, 2018 10:15 AM CDT   XR CHEST 2 VIEWS with UCXR1   [facility] ([facility])    [address] [facility]  [address] [facility] [address] [phone]           Please bring a list of your current medicines to your exam. (Include vitamins, minerals and over-thecounter medicines.) Leave your valuables at home.  Tell your doctor if there is a chance you may be pregnant.  You do not need to do anything special for this exam.              Future tests that were ordered for you today     Open Future Orders        Priority Expected Expires Ordered    X-ray Chest 2 vws* Routine 8/20/2018 8/20/2019 8/20/2018            Who to contact     If you have questions or need follow up information about today's clinic visit or your schedule please contact [facility] directly at [phone].  Normal or non-critical lab and imaging results will be communicated to you by [email or web address], letter or phone within 4 business days after the clinic has received the results. If you do not hear from us within 7 days, please contact the clinic through [email or web address] or phone. If you have a critical or abnormal lab result, we will notify you by phone as soon as possible.  Submit refill requests through [employer] or call your pharmacy and they will forward the refill request to us. Please allow 3 business days for your refill to be completed.          Additional Information About Your Visit        [email or web address] Information     [employer] gives you secure access to your electronic health record. If you see a primary care provider, you can also send messages to your care team and make appointments. If you have questions, please call your primary care clinic.  If you do not have a primary care provider, please call [phone] and they will assist you.      [employer] is an electronic gateway that provides easy, online access to your medical records. With [employer], you can request a clinic appointment, read your test results, renew a prescription or communicate with your care team.     To access your existing  account, please contact your [facility] or call [phone] for assistance.        Care EveryWhere ID     This is your Care EveryWhere ID. This could be used by other organizations to access your [facility] medical records  [id number]         Blood Pressure from Last 3 Encounters:   08/08/18 92/62   08/03/18 112/76   07/24/18 117/83    Weight from Last 3 Encounters:   08/08/18 67.1 kg (148 lb)   08/03/18 67.1 kg (148 lb)   07/23/18 72.1 kg (159 lb 0.3 oz)              We Performed the Following     *CBC with platelets differential     Comprehensive metabolic panel     Lactate Dehydrogenase     Uric acid          Today's Medication Changes          These changes are accurate as of 8/20/18  9:12 AM.  If you have any questions, ask your nurse or doctor.               These medicines have changed or have updated prescriptions.        Dose/Directions    * metoprolol succinate 100 MG 24 hr tablet   Commonly known as:  TOPROL-XL   This may have changed:  how much to take   Used for:  Hypertension goal BP (blood pressure) < 130/80, Coronary artery disease involving native coronary artery of native heart without angina pectoris        Dose:  100 mg   Take 1 tablet (100 mg) by mouth daily   Quantity:  90 tablet   Refills:  3       * metoprolol succinate 25 MG 24 hr tablet   Commonly known as:  TOPROL-XL   This may have changed:  Another medication with the same name was changed. Make sure you understand how and when to take each.   Used for:  Coronary artery disease involving native coronary artery of native heart without angina pectoris, Hypertension goal BP (blood pressure) < 130/80        Dose:  25 mg   Take 1 tablet (25 mg) by mouth daily   Quantity:  90 tablet   Refills:  1       rosuvastatin 40 MG tablet   Commonly known as:  CRESTOR   This may have changed:  when to take this   Used for:  Hyperlipidemia LDL goal <100, Coronary artery disease involving native coronary artery of native  heart without angina pectoris        Dose:  40 mg   Take 1 tablet (40 mg) by mouth daily   Quantity:  90 tablet   Refills:  3       * Notice:  This list has 2 medication(s) that are the same as other medications prescribed for you. Read the directions carefully, and ask your doctor or other care provider to review them with you.             Primary Care Provider Office Phone # Fax #    Florian Alonzo [phone] [address]  [facility] [address]        Equal Access to Services     [facility] : Hadii aad ku hadasho Soomaali, waaxda luqadaha, qaybta kaalmada adeegyada, waxay idiin hayaan adeeg kharaalex bazan . So [facility] [phone].    ATENCIÓN: Si habla español, tiene a ivan disposición servicios gratuitos de asistencia lingüística. Llame al [phone].    We comply with applicable federal civil rights laws and [place] laws. We do not discriminate on the basis of race, color, national origin, age, disability, sex, sexual orientation, or gender identity.            Thank you!     Thank you for choosing [facility]  for your care. Our goal is always to provide you with excellent care. Hearing back from our patients is one way we can continue to improve our services. Please take a few minutes to complete the written survey that you may receive in the mail after your visit with us. Thank you!             Your Updated Medication List - Protect others around you: Learn how to safely use, store and throw away your medicines at www.disposemymeds.org.          This list is accurate as of 8/20/18  9:12 AM.  Always use your most recent med list.                   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)       Aluminum-Magnesium-Simethicone 200-200-20 MG/5ML Susp      Take 5 mLs by mouth daily as needed         "aspirin 81 MG tablet      1 TABLET EVERY MORNING        Benzethonium Chloride 0.1 % Liqd     237 mL    Cleanse with wound cleanser and apply barrier paste and hydrocolloid dressing, change every 2-3 days as needed    Decubitus ulcer of buttock, unspecified laterality, unspecified ulcer stage       clindamycin 1 % lotion    CLEOCIN T    60 mL    Apply twice daily for 6 weeks to the groin    Rash       COMPRESSION STOCKINGS     2 each    1 each continuous prn Bilateral knee high compression stockings    Acute congestive heart failure, unspecified congestive heart failure type (H)       fluticasone 50 MCG/ACT spray    FLONASE    1 Bottle    Spray 1-2 sprays into both nostrils daily    Postnasal discharge       * furosemide 20 MG tablet    LASIX    180 tablet    Take 1 tablet (20 mg) by mouth daily    Hypervolemia, unspecified hypervolemia type       * furosemide 20 MG tablet    LASIX    30 tablet    Take 1 tablet (20 mg) by mouth daily    Acute diastolic congestive heart failure (H), Pleural effusion, 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    Psoriasis       lidocaine-prilocaine cream    EMLA     Apply 1 Application topically as needed        loratadine 10 MG tablet    CLARITIN    30 tablet    Take 1 tablet (10 mg) by mouth daily    Postnasal discharge       LORazepam 0.5 MG tablet    ATIVAN    30 tablet    Take 1 tablet (0.5 mg) by mouth every 4 hours as needed (Anxiety, Nausea/Vomiting or Sleep)    Mantle cell lymphoma of lymph " nodes of multiple sites (H)       * metoprolol succinate 100 MG 24 hr tablet    TOPROL-XL    90 tablet    Take 1 tablet (100 mg) by mouth daily    Hypertension goal BP (blood pressure) < 130/80, Coronary artery disease involving native coronary artery of native heart without angina pectoris       * metoprolol succinate 25 MG 24 hr tablet    TOPROL-XL    90 tablet    Take 1 tablet (25 mg) by mouth daily    Coronary artery disease involving native coronary artery of native heart without angina pectoris, Hypertension goal BP (blood pressure) < 130/80       nitroGLYcerin 0.4 MG sublingual tablet    NITROSTAT    60 tablet    Place 1 tablet (0.4 mg) under the tongue every 5 minutes as needed up to 3 tablets per episode.    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)       potassium chloride SA 10 MEQ CR tablet    K-DUR/KLOR-CON M    30 tablet    Take 1 tablet (10 mEq) by mouth daily    Hypervolemia, unspecified hypervolemia type       PREVACID PO      Take 20 mg by mouth every evening as needed Patient needs to use brand name Prevacid (generic version causes diarrhea)        prochlorperazine 10 MG tablet    COMPAZINE    30 tablet    Take 1 tablet (10 mg) by mouth every 6 hours as needed for nausea or vomiting    Nausea       rosuvastatin 40 MG tablet    CRESTOR    90 tablet    Take 1 tablet (40 mg) by mouth daily    Hyperlipidemia LDL goal <100, Coronary artery disease involving native coronary artery of native heart without angina pectoris       TYLENOL 8 HOUR PO      Take 500 mg by mouth as needed        zinc oxide - white petrolatum 20-51% Pste topical paste     170 g    Cleanse with wound cleanser and  apply barrier paste and hydrocolloid dressing, change every 2-3 days as needed    Decubitus ulcer of buttock, unspecified laterality, unspecified ulcer stage       * Notice:  This list has 4 medication(s) that are the same as other medications prescribed for you. Read the directions carefully, and ask your doctor or other care provider to review them with you.

## 2018-08-20 NOTE — PROGRESS NOTES
Infusion Nursing Note:  Francisco Javier Cortes presents today for C6 D1 Rituxan/Bendamustine.    Patient seen by provider today: Yes: Joy Bush NP   present during visit today: Not Applicable.    Note: N/A.    Intravenous Access:  Implanted Port.    Treatment Conditions:  Lab Results   Component Value Date    HGB 11.3 08/20/2018     Lab Results   Component Value Date    WBC 11.6 08/20/2018      Lab Results   Component Value Date    ANEU 8.3 08/20/2018     Lab Results   Component Value Date     08/20/2018      Lab Results   Component Value Date     08/20/2018                   Lab Results   Component Value Date    POTASSIUM 4.1 08/20/2018           Lab Results   Component Value Date    MAG 1.8 08/03/2018            Lab Results   Component Value Date    CR 1.02 08/20/2018                   Lab Results   Component Value Date    EVANGELINA 8.2 08/20/2018                Lab Results   Component Value Date    BILITOTAL 0.2 08/20/2018           Lab Results   Component Value Date    ALBUMIN 2.3 08/20/2018                    Lab Results   Component Value Date    ALT 20 08/20/2018           Lab Results   Component Value Date    AST 42 08/20/2018       Results reviewed, labs MET treatment parameters, ok to proceed with treatment.      Post Infusion Assessment:  Patient tolerated infusion without incident.  Site patent and intact, free from redness, edema or discomfort.  No evidence of extravasations.  Port heparin flushed per protocol.  Left port accessed for tomorrow's infusion.    Discharge Plan:   Patient will return tomorrow for next appointment.   Patient discharged in stable condition accompanied by: wife.  Departure Mode: Ambulatory.    Aura Almaraz RN

## 2018-08-20 NOTE — MR AVS SNAPSHOT
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)        Flatulence, eructation, and gas pain        Encounter for antineoplastic chemotherapy        Nausea        Drug-induced neutropenia (H)        Hypomagnesemia           Follow-ups after your visit        Your next 10 appointments already scheduled     Aug 21, 2018 11:00 AM CDT   Level 1 with BAY 5 INFUSION   [facility]    [address] [facility] [id number] [phone]            Aug 22, 2018 10:40 AM CDT   (Arrive by 10:25 AM)   NEW LUNG NODULE with Devin Welch MD   [facility] ([facility])    [address] [place] [address]  [address] [place] [address] [phone]            Aug 28, 2018  8:15 AM CDT   Return Visit with NURSE ONLY CANCER CENTER   [facility]    [address] [facility] [id number] [phone]            Aug 28, 2018  9:15 AM CDT   Return Visit with MARTHA Gonzales CNP   [facility])    [address] [facility] [id number] [phone]            Sep 04, 2018  1:15 PM CDT   Nurse Only with MG IMAGING NURSE   [facility]    [address] [facility] [id number] [phone]            Sep 04, 2018  1:30 PM CDT   PE NPET ONCOLOGY (EYES TO THIGHS) with MGPET1   [facility]    [address] [facility] [address] [phone]            Tell your doctor:   If there is any chance you may be pregnant or if you are breastfeeding.   If you have problems lying in small spaces (claustrophobia). If you do, your doctor may give you medicine to help you relax. If you have diabetes:   Have your exam early in the morning. Your blood glucose will go up as the day goes by.   Your glucose level must be 180 or less at the start of the exam. Please take any oral diabetic medication you need to ensure this blood glucose level is below 180, but no insulin 4 hours prior to the exam.   If you are taking insulin in the morning take with breakfast by 6 am and schedule procedure between 12 and 2:15 pm.    If you are taking insulin at night take nightly dose, fast overnight, schedule procedure before 10 am.    If you take insulin both morning and night take morning dose by 6am and schedule procedure between 12 and 2:15 pm.  24 hours before your scan: Don t do any heavy exercise. (No jogging, aerobics or other workouts.) Exercise will make your pictures less accurate. 6 hours before your scan:   Stop all food and liquids (except water).   Do not chew gum or suck on mints.   If you need to take medicine with food, you may take it with a few crackers.  Please call your Imaging Department at your exam site with any questions.            Sep 17, 2018  2:15 PM CDT   Return Visit with Erickson Adan MD   [facility]    [id number] [place] [address] [phone]            Oct 10, 2018 10:15 AM CDT   XR CHEST 2 VIEWS with UCXR1   [facility] ([facility])    [address] [address] [phone]           Please bring a list of your current medicines to your exam. (Include vitamins, minerals and over-thecounter medicines.) Leave your valuables at home.  Tell your doctor if there is a chance you may be pregnant.  You do not need to do  anything special for this exam.              Future tests that were ordered for you today     Open Future Orders        Priority Expected Expires Ordered    X-ray Chest 2 vws* Routine 8/20/2018 8/20/2019 8/20/2018            Who to contact     If you have questions or need follow up information about today's clinic visit or your schedule please contact [facility] directly at [phone].  Normal or non-critical lab and imaging results will be communicated to you by Ipropertyzhart, letter or phone within 4 business days after the clinic has received the results. If you do not hear from us within 7 days, please contact the clinic through Ipropertyzhart or phone. If you have a critical or abnormal lab result, we will notify you by phone as soon as possible.  Submit refill requests through [employer] or call your pharmacy and they will forward the refill request to us. Please allow 3 business days for your refill to be completed.          Additional Information About Your Visit        IpropertyzharXinguodu Information     [employer] gives you secure access to your electronic health record. If you see a primary care provider, you can also send messages to your care team and make appointments. If you have questions, please call your primary care clinic.  If you do not have a primary care provider, please call [phone] and they will assist you.      [employer] is an electronic gateway that provides easy, online access to your medical records. With [employer], you can request a clinic appointment, read your test results, renew a prescription or communicate with your care team.     To access your existing account, please contact your [facility] or call [phone] for assistance.        Care EveryWhere ID     This is your Care EveryWhere ID. This could be used by other organizations to access your [facility] medical records  [id number]        Your Vitals Were     Pulse Temperature Pulse Oximetry BMI (Body Mass  Index)          123 98  F (36.7  C) (Oral) 94% 23.75 kg/m2         Blood Pressure from Last 3 Encounters:   08/20/18 110/77   08/08/18 92/62   08/03/18 112/76    Weight from Last 3 Encounters:   08/20/18 64.7 kg (142 lb 11.2 oz)   08/08/18 67.1 kg (148 lb)   08/03/18 67.1 kg (148 lb)              We Performed the Following     Magnesium          Today's Medication Changes          These changes are accurate as of 8/20/18  5:58 PM.  If you have any questions, ask your nurse or doctor.               Start taking these medicines.        Dose/Directions    order for DME   Used for:  Generalized muscle weakness, Mantle cell lymphoma of lymph nodes of multiple sites (H)   Started by:  Joy Bush APRN CNP        Equipment being ordered: wheeled walker with seat   Quantity:  1 Device   Refills:  0         These medicines have changed or have updated prescriptions.        Dose/Directions    rosuvastatin 40 MG tablet   Commonly known as:  CRESTOR   This may have changed:  when to take this   Used for:  Hyperlipidemia LDL goal <100, Coronary artery disease involving native coronary artery of native heart without angina pectoris        Dose:  40 mg   Take 1 tablet (40 mg) by mouth daily   Quantity:  90 tablet   Refills:  3            Where to get your medicines      Some of these will need a paper prescription and others can be bought over the counter.  Ask your nurse if you have questions.     Bring a paper prescription for each of these medications     order for DME                Primary Care Provider Office Phone # Fax #    Florian Alonzo [phone]       [address]  [facility] [address]        Equal Access to Services     [facility] AH: Hadii rivera choi Solina, waaxda luqadaha, qaybta kaalmada zhao wu . [facility] [phone].    ATENCIÓN: Si habla español, tiene a ivan disposición servicios gratuitos de asistencia lingüística. Llame al  [phone].    We comply with applicable federal civil rights laws and [place] laws. We do not discriminate on the basis of race, color, national origin, age, disability, sex, sexual orientation, or gender identity.            Thank you!     Thank you for choosing [facility]  for your care. Our goal is always to provide you with excellent care. Hearing back from our patients is one way we can continue to improve our services. Please take a few minutes to complete the written survey that you may receive in the mail after your visit with us. Thank you!             Your Updated Medication List - Protect others around you: Learn how to safely use, store and throw away your medicines at www.disposemymeds.org.          This list is accurate as of 8/20/18  5:58 PM.  Always use your most recent med list.                   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)       Aluminum-Magnesium-Simethicone 200-200-20 MG/5ML Susp      Take 5 mLs by mouth daily as needed        aspirin 81 MG tablet      1 TABLET EVERY MORNING        Benzethonium Chloride 0.1 % Liqd     237 mL    Cleanse with wound cleanser and apply barrier paste and hydrocolloid dressing, change every 2-3 days as needed    Decubitus ulcer of buttock, unspecified laterality, unspecified ulcer stage       clindamycin 1 % lotion    CLEOCIN T    60 mL    Apply twice daily for 6 weeks to the groin    Rash       COMPRESSION STOCKINGS     2 each    1 each continuous prn Bilateral knee high compression stockings    Acute congestive heart failure, unspecified congestive heart failure type (H)       fluticasone 50 MCG/ACT spray    FLONASE    1 Bottle    Spray 1-2 sprays into both nostrils daily    Postnasal discharge       * furosemide 20 MG tablet    LASIX    180 tablet    Take 1 tablet (20  "mg) by mouth daily    Hypervolemia, unspecified hypervolemia type       * furosemide 20 MG tablet    LASIX    30 tablet    Take 1 tablet (20 mg) by mouth daily    Acute diastolic congestive heart failure (H), Pleural effusion, 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)       * metoprolol succinate 100 MG 24 hr tablet    TOPROL-XL    90 tablet    Take 1 tablet (100 mg) by mouth daily    Hypertension goal BP (blood pressure) < 130/80, Coronary artery disease involving native coronary artery of native heart without angina pectoris       * metoprolol succinate 25 MG 24 hr tablet    TOPROL-XL    90 tablet    Take 1 tablet (25 mg) by mouth daily    Coronary artery disease involving native coronary artery of native heart without angina pectoris, Hypertension goal BP (blood pressure) < 130/80       nitroGLYcerin 0.4 MG sublingual tablet    NITROSTAT    60 tablet    Place 1 tablet (0.4 mg) under the tongue every 5 minutes as needed up to 3 tablets per episode.    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)       potassium chloride SA 10 MEQ CR tablet    K-DUR/KLOR-CON M    30 tablet    Take 1 tablet (10 mEq) by mouth daily    Hypervolemia, unspecified hypervolemia type       PREVACID PO      Take 20 mg by mouth every evening as needed Patient needs to use brand name Prevacid (generic version causes diarrhea)        prochlorperazine 10 MG tablet    COMPAZINE    30 tablet    Take 1 tablet (10 mg) by mouth every 6 hours as needed for nausea or vomiting    Nausea       rosuvastatin 40 MG tablet    CRESTOR    90 tablet    Take 1 tablet (40 mg) by mouth daily    Hyperlipidemia LDL goal <100, Coronary artery disease involving native coronary artery of native heart without angina pectoris       TYLENOL 8 HOUR PO      Take 500 mg by mouth as needed        zinc oxide - white petrolatum 20-51% Pste topical paste     170 g    Cleanse with wound cleanser and apply barrier paste and hydrocolloid dressing, change every 2-3 days as needed    Decubitus ulcer of buttock, unspecified laterality, unspecified ulcer stage       * Notice:  This list has 4 medication(s) that are the same as other medications prescribed for you. Read the directions carefully, and ask your doctor or other care provider to review them with you.

## 2018-08-20 NOTE — NURSING NOTE
"Oncology Rooming Note    August 20, 2018 9:27 AM   Francisco Javier Cortes is a 78 year old male who presents for:    Chief Complaint   Patient presents with     Oncology Clinic Visit     priot to infusion     Initial Vitals: /77  Pulse 123  Temp 98  F (36.7  C) (Oral)  Wt 64.7 kg (142 lb 11.2 oz)  SpO2 94%  BMI 23.75 kg/m2 Estimated body mass index is 23.75 kg/(m^2) as calculated from the following:    Height as of 8/8/18: 1.651 m (5' 5\").    Weight as of this encounter: 64.7 kg (142 lb 11.2 oz). Body surface area is 1.72 meters squared.  No Pain (0) Comment: Data Unavailable   No LMP for male patient.  Allergies reviewed: Yes  Medications reviewed: Yes    Medications: Medication refills not needed today.  Pharmacy name entered into [employer]:    [facility] [place] - [place], [place] - [address], [address]  [facility] - [facility], [place] - [facility]  [id number] - [place], [place] - [address] [facility] AT [facility]    Clinical concerns: SOB/ BP recent ER visits  Joy Bush NP was notified.    5 minutes for nursing intake (face to face time)     Rodo Chirinos RN              "

## 2018-08-20 NOTE — PROGRESS NOTES
Power port needle left accessed for treatment. Tolerated lab draw without complaint. Port site scrubbed with Chloraprep for 30 seconds. Accessed using sterile technique. Viking drawn-Red/Green/Purple tubes. Double signed by patient and RN. See documentation flowsheet. Nieves Estrada, RN, BSN, OCN

## 2018-08-20 NOTE — LETTER
8/20/2018         RE: Francisco Javier Cortes  [address] [facility] [address]        Dear Colleague,    Thank you for referring your patient, Francisco Javier Cortes, to the [facility]. Please see a copy of my visit note below.    Oncology Follow Up Visit: August 20, 2018     Oncologist: Dr. Erickson dAan  PCP: Florian Alonzo    Diagnosis: Stage IV mantle cell lymphoma  Francisco Javier Cortes is a 77 yo  male presented in March 2018 with complaint of abdominal bloating and discomfort loss of appetite ×2 months. Initial imaging showed splenomegaly, extensive retroperitoneal mesenteric, inguinal and pelvic as well as retrocrural adenopathy and enlarging pulmonary nodules up to 18 mm. FNA proved mantle cell lymphoma-please see progress notes of 3/29/18 by Dr. Auguste for specific information.  Treatment:   3/29/18 began Bendamustine and Rituxan  - Hospitalized for TLS  - Hospitalized for sepsis from pneumonia  - hospitalized for CHF 4/20-21    Interval History: Mr. Cortes comes to clinic with wife for toxicity check prior to cycle 6 of bendamustine/Rituxan.  Patient reports that he was in for a paracentesis again last week and he is feeling much better again today.  He has stopped his blood pressure medications and is feeling quite well at this point today with minimal dizziness and no pain.  He did have the testing done to see if there was a lymph leakage and they could not find any leak at this time the patient continues to fell with unknown fluid though shares that the last fluid is notably less yellow and cloudy today he is saying he does have some shortness of breath.  But is able to get around with his cane.  Wife feels he is less steady and is asking about a wheeled walker.  He is trying to stick to a low-fat diet but does have an appetite.  He has not had any nausea and bowels have been both loose versus constipated but feels it is related to his eating.  He admits to some anxiety about  "the disease and has some trouble sleeping related to the shortness of breath but is not taking any medication at this time.  Rest of comprehensive and complete ROS is reviewed and is negative.   Past Medical History:   Diagnosis Date     CAD (coronary artery disease) 1/09    s/p stentsx2     Coronary artery disease involving native coronary artery of native heart without angina pectoris 12/22/2015     Dyslipidemia      Erectile dysfunction      GERD (gastroesophageal reflux disease)      Hearing problem One ear 1961     Hypertension      NSTEMI (non-ST elevated myocardial infarction) (H) 3/20/2014     Pneumonia      Prostate cancer (H)     prostatecomy 9 years ago, PSAs remain good     Status post percutaneous transluminal coronary angioplasty-Coronary angioplasty with STEPHEN to LCx 4/4/2014     Stented coronary artery     stents placed     Current Outpatient Prescriptions   Medication     Acetaminophen (TYLENOL 8 HOUR PO)     allopurinol (ZYLOPRIM) 300 MG tablet     Alum & Mag Hydroxide-Simeth (ALUMINUM-MAGNESIUM-SIMETHICONE) 200-200-20 MG/5ML SUSP     ASPIRIN 81 MG OR TABS     Benzethonium Chloride 0.1 % LIQD     clindamycin (CLEOCIN T) 1 % lotion     COMPRESSION STOCKINGS     fluticasone (FLONASE) 50 MCG/ACT spray     furosemide (LASIX) 20 MG tablet     furosemide (LASIX) 20 MG tablet     Gauze Pads & Dressings (GAUZE BANDAGE 4\") MISC     HYDROcodone-acetaminophen (NORCO) 5-325 MG per tablet     hydrocortisone valerate (WEST-NINOSKA) 0.2 % ointment     Lansoprazole (PREVACID PO)     lidocaine-prilocaine (EMLA) cream     loratadine (CLARITIN) 10 MG tablet     LORazepam (ATIVAN) 0.5 MG tablet     metoprolol succinate (TOPROL-XL) 100 MG 24 hr tablet     metoprolol succinate (TOPROL-XL) 25 MG 24 hr tablet     nitroGLYcerin (NITROSTAT) 0.4 MG sublingual tablet     ondansetron (ZOFRAN) 8 MG tablet     potassium chloride SA (K-DUR/KLOR-CON M) 10 MEQ CR tablet     prochlorperazine (COMPAZINE) 10 MG tablet     rosuvastatin " (CRESTOR) 40 MG tablet     zinc oxide - white petrolatum (CRITIC-AID THICK MOIST BARRIER) 20-51% PSTE topical paste     No current facility-administered medications for this visit.      Allergies   Allergen Reactions     Niacin      Severe rash and itching     Prevacid [Lansoprazole] Diarrhea     Patient notes diarrhea with 30mg generic version. Patient does ok with 20mg in generic and brand name.     Shellfish Allergy      One kind       Physical Exam:/77  Pulse 123  Temp 98  F (36.7  C) (Oral)  Wt 64.7 kg (142 lb 11.2 oz)  SpO2 94%  BMI 23.75 kg/m2   ECOG PS- 1-2  Constitutional: Alert, moving slowly on own today with cane -moving slightly better today-no dizziness. Cooperative   ENT: Eyes bright, No mouth sores  Neck: Supple, no nodes are noted.  Cardiac: Heart rate and rhythm is regular and strong without murmur  Respiratory: Breathing easy. Lung sound with decreased breath sound to right lower lung today. No cough and good effort.  GI: Abdomen is rounded, nontender, BS active. No masses or organomegaly  MS: Muscle tone fair - some weakness - moving on own to exam table but weakness is obvious as he uses the step to the table. Extremities normal - no edema.  Skin: Did not review the buttock wound today.  Neuro: Sensory grossly WNL, gait slow with cane  Lymph: Normal ant/post cervical, axillary, supraclavicular nodes  Psych: Mentation appears normal and affect more quiet today but with good conversation.    Laboratory Results:    Ref. Range 8/20/2018 09:00   Sodium Latest Ref Range: 133 - 144 mmol/L 138   Potassium Latest Ref Range: 3.4 - 5.3 mmol/L 4.1   Chloride Latest Ref Range: 94 - 109 mmol/L 104   Carbon Dioxide Latest Ref Range: 20 - 32 mmol/L 24   Urea Nitrogen Latest Ref Range: 7 - 30 mg/dL 15   Creatinine Latest Ref Range: 0.66 - 1.25 mg/dL 1.02   GFR Estimate Latest Ref Range: >60 mL/min/1.7m2 70   GFR Estimate If Black Latest Ref Range: >60 mL/min/1.7m2 85   Calcium Latest Ref Range: 8.5 -  10.1 mg/dL 8.2 (L)   Anion Gap Latest Ref Range: 3 - 14 mmol/L 10   Magnesium Latest Ref Range: 1.6 - 2.3 mg/dL 1.8   Albumin Latest Ref Range: 3.4 - 5.0 g/dL 2.3 (L)   Protein Total Latest Ref Range: 6.8 - 8.8 g/dL 5.4 (L)   Bilirubin Total Latest Ref Range: 0.2 - 1.3 mg/dL 0.2   Alkaline Phosphatase Latest Ref Range: 40 - 150 U/L 91   ALT Latest Ref Range: 0 - 70 U/L 20   AST Latest Ref Range: 0 - 45 U/L 42   Lactate Dehydrogenase Latest Ref Range: 85 - 227 U/L 323 (H)   Uric Acid Latest Ref Range: 3.5 - 7.2 mg/dL 5.0   Glucose Latest Ref Range: 70 - 99 mg/dL 118 (H)   WBC Latest Ref Range: 4.0 - 11.0 10e9/L 11.6 (H)   Hemoglobin Latest Ref Range: 13.3 - 17.7 g/dL 11.3 (L)   Hematocrit Latest Ref Range: 40.0 - 53.0 % 34.2 (L)   Platelet Count Latest Ref Range: 150 - 450 10e9/L 140 (L)   RBC Count Latest Ref Range: 4.4 - 5.9 10e12/L 3.36 (L)   MCV Latest Ref Range: 78 - 100 fl 102 (H)   MCH Latest Ref Range: 26.5 - 33.0 pg 33.6 (H)   MCHC Latest Ref Range: 31.5 - 36.5 g/dL 33.0   RDW Latest Ref Range: 10.0 - 15.0 % 15.9 (H)   Diff Method Unknown Manual Differential   % Neutrophils Latest Units: % 71.0   % Lymphocytes Latest Units: % 25.0   % Monocytes Latest Units: % 3.0   % Eosinophils Latest Units: % 1.0   Absolute Neutrophil Latest Ref Range: 1.6 - 8.3 10e9/L 8.3   Absolute Lymphocytes Latest Ref Range: 0.8 - 5.3 10e9/L 2.9   Absolute Monocytes Latest Ref Range: 0.0 - 1.3 10e9/L 0.3   Absolute Eosinophils Latest Ref Range: 0.0 - 0.7 10e9/L 0.1     Assessment and Plan:   Mantle cell lymphoma -patient is due to start cycle 6 of bendamustine Rituxan plan today.  Patient is anxious to complete all cycles and become healthier.  He does meet goals for continuation of plan so we will continue today.  He he will return to see Dr. Adan in 2 weeks for follow-up with CBC and CMP.  Pleural effusion -patient had procedures for thoracentesis and paracentesis completed last week and is feeling quite good today but testing for  any lymph leak was found to be negative.   [facility] has been very helpful and accommodating with the thoracentesis and will set up an order for him to return for this procedure in the near future asking that he call at least 2 days prior to set appointment rather than enter through ER  Weakness-  CAD- pt using lasix for help with symptoms. Visit with Dr Ball on 7/17/2018 brought no new changes to plan for his CAD.    This was a 25 min visit with > 50% in counseling and coordinating care including education and management of concerns.    Joy Bush CNP      Again, thank you for allowing me to participate in the care of your patient.        Sincerely,        Joy Bush, KATELYN, APRN CNP

## 2018-08-21 NOTE — PROGRESS NOTES
SPIRITUAL HEALTH SERVICES  SPIRITUAL ASSESSMENT Progress Note  [facility]       Visited with the patient and his wife Court for ongoing support. Offered reflective conversion and emotional support through validation of feelings and affirmation.     Illness Narrative: Court reports that the patient has been in the ER twice this past week.  She states that fluid in his chest and abdomin is causing problems.     Distress: Patient states that he has completed the full 6 chemotherapy treatments that was initially prescribed and he hopes that will be it but he has an appt. at [facility] tomorrow. There may be an issue with his lungs.  Their daughter is coming from [place] to drive them to the [place], but Court states she has already set boundaries and has told her they do not want her to come into the consult room.  Court states that they are not getting any further with finding a different place to live. Francisco Javier has not been able to help much with the house upkeep.     Coping: Francisco Javier continues to take this a day at a time.  He likes to have a plan as that makes it easier for him.  He does appreciate prayer.  Prayed with them today.     Meaning Making:Not discussed today.     PLAN: I let them know that I will check in with them periodically for support.     Libertad Grissom  Staff   Pager [phone]

## 2018-08-21 NOTE — MR AVS SNAPSHOT
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)        Flatulence, eructation, and gas pain           Follow-ups after your visit        Your next 10 appointments already scheduled     Aug 22, 2018 10:40 AM CDT   (Arrive by 10:25 AM)   NEW LUNG NODULE with Devin Welch MD   [facility] [place] ([facility])    [address]  [address]  [facility] [id number] [phone]            Aug 22, 2018 12:30 PM CDT   (Arrive by 12:15 PM)   INJECTION with Uc Oncology Injection Nurse   [facility] [place] ([facility])    [address]  [address]  [facility] [id number] [phone]            Aug 28, 2018  8:15 AM CDT   Return Visit with NURSE ONLY CANCER CENTER   [facility])    [address] [place] [address] [phone]            Aug 28, 2018  9:15 AM CDT   Return Visit with MARTHA Gonzales CNP   [facility])    [address] [facility] [address] [phone]            Sep 04, 2018  1:15 PM CDT   Nurse Only with MG IMAGING NURSE   [facility])    [address] [address] [place] [address] [phone]            Sep 04, 2018  1:30 PM CDT   PE NPET ONCOLOGY (EYES TO THIGHS) with MGPET1   [facility])    [address] [facility] [address] [phone]           Tell your  doctor:   If there is any chance you may be pregnant or if you are breastfeeding.   If you have problems lying in small spaces (claustrophobia). If you do, your doctor may give you medicine to help you relax. If you have diabetes:   Have your exam early in the morning. Your blood glucose will go up as the day goes by.   Your glucose level must be 180 or less at the start of the exam. Please take any oral diabetic medication you need to ensure this blood glucose level is below 180, but no insulin 4 hours prior to the exam.   If you are taking insulin in the morning take with breakfast by 6 am and schedule procedure between 12 and 2:15 pm.    If you are taking insulin at night take nightly dose, fast overnight, schedule procedure before 10 am.    If you take insulin both morning and night take morning dose by 6am and schedule procedure between 12 and 2:15 pm.  24 hours before your scan: Don t do any heavy exercise. (No jogging, aerobics or other workouts.) Exercise will make your pictures less accurate. 6 hours before your scan:   Stop all food and liquids (except water).   Do not chew gum or suck on mints.   If you need to take medicine with food, you may take it with a few crackers.  Please call your Imaging Department at your exam site with any questions.            Sep 17, 2018  2:15 PM CDT   Return Visit with Erickson Adan MD   [facility]    [address] [place] [address] [phone]            Oct 10, 2018 10:15 AM CDT   XR CHEST 2 VIEWS with UCXR1   [facility] ([facility])    [address] [address] [phone]           Please bring a list of your current medicines to your exam. (Include vitamins, minerals and over-thecounter medicines.) Leave your valuables at home.  Tell your doctor if there is a chance you may be pregnant.  You do not need to do anything  special for this exam.              Future tests that were ordered for you today     Open Future Orders        Priority Expected Expires Ordered    US Thoracentesis Bilateral Routine  8/20/2019 8/20/2018    X-ray Chest 2 vws* Routine 8/20/2018 8/20/2019 8/20/2018            Who to contact     If you have questions or need follow up information about today's clinic visit or your schedule please contact [facility] directly at [phone].  Normal or non-critical lab and imaging results will be communicated to you by [employer], letter or phone within 4 business days after the clinic has received the results. If you do not hear from us within 7 days, please contact the clinic through [employer] or phone. If you have a critical or abnormal lab result, we will notify you by phone as soon as possible.  Submit refill requests through [employer] or call your pharmacy and they will forward the refill request to us. Please allow 3 business days for your refill to be completed.          Additional Information About Your Visit        [employer] Information     [employer] gives you secure access to your electronic health record. If you see a primary care provider, you can also send messages to your care team and make appointments. If you have questions, please call your primary care clinic.  If you do not have a primary care provider, please call [phone] and they will assist you.      [employer] is an electronic gateway that provides easy, online access to your medical records. With [employer], you can request a clinic appointment, read your test results, renew a prescription or communicate with your care team.     To access your existing account, please contact your [facility] or call [phone] for assistance.        Care EveryWhere ID     This is your Care EveryWhere ID. This could be used by other organizations to access your [facility] medical records  [id number]        Your Vitals Were      Pulse Temperature Respirations Pulse Oximetry          103 97.6  F (36.4  C) (Oral) 18 92%         Blood Pressure from Last 3 Encounters:   08/21/18 110/76   08/20/18 110/77   08/08/18 92/62    Weight from Last 3 Encounters:   08/20/18 64.7 kg (142 lb 11.2 oz)   08/08/18 67.1 kg (148 lb)   08/03/18 67.1 kg (148 lb)              Today, you had the following     No orders found for display         Today's Medication Changes          These changes are accurate as of 8/21/18  1:20 PM.  If you have any questions, ask your nurse or doctor.               These medicines have changed or have updated prescriptions.        Dose/Directions    rosuvastatin 40 MG tablet   Commonly known as:  CRESTOR   This may have changed:  when to take this   Used for:  Hyperlipidemia LDL goal <100, Coronary artery disease involving native coronary artery of native heart without angina pectoris        Dose:  40 mg   Take 1 tablet (40 mg) by mouth daily   Quantity:  90 tablet   Refills:  3                Primary Care Provider Office Phone # Fax #    Florian Alonzo [phone]       [address] [facility] [address]        Equal Access to Services     TERENCE BARAJAS AH: Hadii rivera syedo Solina, waaxda luqadaha, qaybta kaalmada adeegyada, zhao lorenzo. So [facility] [phone].    ATENCIÓN: Si habla español, tiene a ivan disposición servicios gratuitos de asistencia lingüística. [facility] [phone].    We comply with applicable federal civil rights laws and [place] laws. We do not discriminate on the basis of race, color, national origin, age, disability, sex, sexual orientation, or gender identity.            Thank you!     Thank you for choosing [facility]  for your care. Our goal is always to provide you with excellent care. Hearing back from our patients is one way we can continue to improve our services. Please take a few minutes to complete the written survey that  "you may receive in the mail after your visit with us. Thank you!             Your Updated Medication List - Protect others around you: Learn how to safely use, store and throw away your medicines at www.disposemymeds.org.          This list is accurate as of 8/21/18  1:20 PM.  Always use your most recent med list.                   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)       Aluminum-Magnesium-Simethicone 200-200-20 MG/5ML Susp      Take 5 mLs by mouth daily as needed        aspirin 81 MG tablet      1 TABLET EVERY MORNING        Benzethonium Chloride 0.1 % Liqd     237 mL    Cleanse with wound cleanser and apply barrier paste and hydrocolloid dressing, change every 2-3 days as needed    Decubitus ulcer of buttock, unspecified laterality, unspecified ulcer stage       clindamycin 1 % lotion    CLEOCIN T    60 mL    Apply twice daily for 6 weeks to the groin    Rash       COMPRESSION STOCKINGS     2 each    1 each continuous prn Bilateral knee high compression stockings    Acute congestive heart failure, unspecified congestive heart failure type (H)       fluticasone 50 MCG/ACT spray    FLONASE    1 Bottle    Spray 1-2 sprays into both nostrils daily    Postnasal discharge       * furosemide 20 MG tablet    LASIX    180 tablet    Take 1 tablet (20 mg) by mouth daily    Hypervolemia, unspecified hypervolemia type       * furosemide 20 MG tablet    LASIX    30 tablet    Take 1 tablet (20 mg) by mouth daily    Acute diastolic congestive heart failure (H), Pleural effusion, 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)       * metoprolol succinate 100 MG 24 hr tablet    TOPROL-XL    90 tablet    Take 1 tablet (100 mg) by mouth daily    Hypertension goal BP (blood pressure) < 130/80, Coronary artery disease involving native coronary artery of native heart without angina pectoris       * metoprolol succinate 25 MG 24 hr tablet    TOPROL-XL    90 tablet    Take 1 tablet (25 mg) by mouth daily    Coronary artery disease involving native coronary artery of native heart without angina pectoris, Hypertension goal BP (blood pressure) < 130/80       nitroGLYcerin 0.4 MG sublingual tablet    NITROSTAT    60 tablet    Place 1 tablet (0.4 mg) under the tongue every 5 minutes as needed up to 3 tablets per episode.    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)       potassium chloride SA 10  MEQ CR tablet    K-DUR/KLOR-CON M    30 tablet    Take 1 tablet (10 mEq) by mouth daily    Hypervolemia, unspecified hypervolemia type       PREVACID PO      Take 20 mg by mouth every evening as needed Patient needs to use brand name Prevacid (generic version causes diarrhea)        prochlorperazine 10 MG tablet    COMPAZINE    30 tablet    Take 1 tablet (10 mg) by mouth every 6 hours as needed for nausea or vomiting    Nausea       rosuvastatin 40 MG tablet    CRESTOR    90 tablet    Take 1 tablet (40 mg) by mouth daily    Hyperlipidemia LDL goal <100, Coronary artery disease involving native coronary artery of native heart without angina pectoris       TYLENOL 8 HOUR PO      Take 500 mg by mouth as needed        zinc oxide - white petrolatum 20-51% Pste topical paste     170 g    Cleanse with wound cleanser and apply barrier paste and hydrocolloid dressing, change every 2-3 days as needed    Decubitus ulcer of buttock, unspecified laterality, unspecified ulcer stage       * Notice:  This list has 4 medication(s) that are the same as other medications prescribed for you. Read the directions carefully, and ask your doctor or other care provider to review them with you.

## 2018-08-21 NOTE — PROGRESS NOTES
Infusion Nursing Note:  Francisco Javier Cortes presents today for C6 D2 Bendamustine.    Patient seen by provider today: No   present during visit today: Not Applicable.    Note: Francisco Javier will not get ONPRO today as he has an xray tomorrow prior to his appointment with Dr. Welch.  He has been set up for Neulasta tomorrow 8/22 at the  at 1230.    Intravenous Access:  Implanted Port, patient was accessed from yesterday.      Treatment Conditions:  Not Applicable.      Post Infusion Assessment:  Patient tolerated infusion without incident.  Site patent and intact, free from redness, edema or discomfort.  Access discontinued per protocol.    Discharge Plan:   Patient will return to the [facility] tomorrow for neulasta as stated above for next appointment.   Patient discharged in stable condition accompanied by: wife.  Departure Mode: Ambulatory.    Patricia Villegas RN

## 2018-08-22 NOTE — MR AVS SNAPSHOT
After Visit Summary   8/22/2018    Francisco Javier Cortes    MRN: [id number]           Patient Information     Date Of Birth          1940        Visit Information        Provider Department      8/22/2018 10:40 AM Devin Welch MD [facility]        Today's Diagnoses     Pleural effusion, chylous           Follow-ups after your visit        Your next 10 appointments already scheduled     Aug 29, 2018  8:20 AM CDT   (Arrive by 8:05 AM)   New Patient Visit with Dorothy Rasmussen MD   [facility] ([facility] [place])    [address]  [address]  [facility] [address] [phone]            Aug 31, 2018  1:15 PM CDT   (Arrive by 12:15 PM)   IR THORACENTESIS with [email or web address]   [facility] ([facility] [place])    [address]  5th Floor  [facility] [id number] [phone]           Please wear loose clothing, such as a sweat suit or jogging clothes. Avoid snaps, zippers and other metal. We may ask you to undress and put on a hospital gown.  Please bring any scans or X-rays taken at other hospitals, if similar tests were done. Also bring a list of your medicines, including vitamins, minerals and over-the-counter drugs. It is safest to leave personal items at home.  Tell your doctor in advance:   If you are or may be pregnant.   If you are taking Coumadin (or any other blood thinners) 5 days prior to the exam for any special instructions.   If you are diabetic to determine if your insulin needs have to be adjusted for the exam.  Your doctor will obtain necessary laboratory tests prior to the exam (creatinine, Hgb/Hct, platelet count, and INR).  If you have any questions, please call the Imaging Department where you will have your exam. Directions, parking instructions, and other information are available on our website, [email or web address].            Aug 31, 2018   Procedure with Dean  Jay Marcus PA-C   [facility] ([facility])    [address]  5th Floor  [facility] [address] [phone]           Located in the Clinics and Surgery Center at [address] [place] [address], [place] [address].   parking is very convenient and highly recommended.  is a $6 flat rate fee.  Both  and self parkers should enter the main arrival plaza from [facility]; parking attendants will direct you based on your parking preference.            Sep 04, 2018  1:15 PM CDT   Nurse Only with MG IMAGING NURSE   [facility] ([facility])    [address] [place] [address] [phone]            Sep 04, 2018  1:30 PM CDT   PE NPET ONCOLOGY (EYES TO THIGHS) with [email or web address]   [facility] ([facility])    [address] [place] [address] [phone]           Tell your doctor:   If there is any chance you may be pregnant or if you are breastfeeding.   If you have problems lying in small spaces (claustrophobia). If you do, your doctor may give you medicine to help you relax. If you have diabetes:   Have your exam early in the morning. Your blood glucose will go up as the day goes by.   Your glucose level must be 180 or less at the start of the exam. Please take any oral diabetic medication you need to ensure this blood glucose level is below 180, but no insulin 4 hours prior to the exam.   If you are taking insulin in the morning take with breakfast by 6 am and schedule procedure between 12 and 2:15 pm.    If you are taking insulin at night take nightly dose, fast overnight, schedule procedure before 10 am.    If you take insulin both morning and night take morning dose by 6am and schedule procedure between 12 and 2:15 pm.  24 hours before your scan: Don t do any heavy exercise. (No jogging, aerobics or other workouts.) Exercise will make your pictures less  accurate. 6 hours before your scan:   Stop all food and liquids (except water).   Do not chew gum or suck on mints.   If you need to take medicine with food, you may take it with a few crackers.  Please call your Imaging Department at your exam site with any questions.            Sep 07, 2018 10:45 AM CDT   IR THORACENTESIS with [email or web address]   [facility] ([facility] [place])    [address] [place], [place] [address] [phone]           Please wear loose clothing, such as a sweat suit or jogging clothes. Avoid snaps, zippers and other metal. We may ask you to undress and put on a hospital gown.  Please bring any scans or X-rays taken at other hospitals, if similar tests were done. Also bring a list of your medicines, including vitamins, minerals and over-the-counter drugs. It is safest to leave personal items at home.  Tell your doctor in advance:   If you are or may be pregnant.   If you are taking Coumadin (or any other blood thinners) 5 days prior to the exam for any special instructions.   If you are diabetic to determine if your insulin needs have to be adjusted for the exam.  Your doctor will obtain necessary laboratory tests prior to the exam (creatinine, Hgb/Hct, platelet count, and INR).  If you have any questions, please call the Imaging Department where you will have your exam. Directions, parking instructions, and other information are available on our website, [email or web address].            Sep 07, 2018   Procedure with Allen Jean PA-C   [facility] ([facility] [place])    [address] [place] [id number] [phone]           Located in the Clinics and Surgery Center at [address] [place] [address].   parking is very convenient and highly recommended.  is a $6 flat rate fee.  Both  and self parkers should enter the main arrival plaza from  [facility]; parking attendants will direct you based on your parking preference.            Sep 17, 2018  2:15 PM CDT   Return Visit with Erickson Adan MD   [email or web address] ([email or web address]    [address] [address] [place] [address] [phone]            Oct 10, 2018 10:15 AM CDT   XR CHEST 2 VIEWS with UCXR1   [facility] ([facility])    [address]  1st Floor  [facility] [address] [phone]           Please bring a list of your current medicines to your exam. (Include vitamins, minerals and over-thecounter medicines.) Leave your valuables at home.  Tell your doctor if there is a chance you may be pregnant.  You do not need to do anything special for this exam.              Future tests that were ordered for you today     Open Standing Orders        Priority Remaining Interval Expires Ordered    Potassium Routine 1/1 AM DRAW  8/28/2018    Magnesium Routine 1/1 AM DRAW  8/28/2018    Wound care Routine 1/1 DAILY  8/28/2018    Oxygen: Nasal cannula Routine 12166/94437 CONTINUOUS  8/27/2018    Blood culture Routine 100/100 CONDITIONAL (SPECIFY)  8/27/2018    Blood culture Routine 100/100 CONDITIONAL (SPECIFY)  8/27/2018    Potassium Routine 100/100 CONDITIONAL (SPECIFY)  8/27/2018    Magnesium Routine 100/100 CONDITIONAL (SPECIFY)  8/27/2018    Phosphorus Routine 100/100 CONDITIONAL (SPECIFY)  8/27/2018          Open Future Orders        Priority Expected Expires Ordered    Bone marrow biopsy Routine  2/23/2019 8/27/2018    Leukemia Lymphoma Evaluation (Flow Cytometry) Routine  2/23/2019 8/27/2018    FISH With Professional Interpretation Routine  2/23/2019 8/27/2018            Who to contact     If you have questions or need follow up information about today's clinic visit or your schedule please contact [facility] directly at [phone].  Normal or non-critical lab and imaging results will be  "communicated to you by Talasimhart, letter or phone within 4 business days after the clinic has received the results. If you do not hear from us within 7 days, please contact the clinic through QW[employer] or phone. If you have a critical or abnormal lab result, we will notify you by phone as soon as possible.  Submit refill requests through [employer]PS or call your pharmacy and they will forward the refill request to us. Please allow 3 business days for your refill to be completed.          Additional Information About Your Visit        QWiPS Information     [employer] gives you secure access to your electronic health record. If you see a primary care provider, you can also send messages to your care team and make appointments. If you have questions, please call your primary care clinic.  If you do not have a primary care provider, please call [phone] and they will assist you.        Care EveryWhere ID     This is your Care EveryWhere ID. This could be used by other organizations to access your [facility] medical records  [id number]        Your Vitals Were     Pulse Temperature Respirations Height Pulse Oximetry BMI (Body Mass Index)    107 96.7  F (35.9  C) (Oral) 16 1.651 m (5' 5\") 97% 25.24 kg/m2       Blood Pressure from Last 3 Encounters:   08/28/18 (S) (!) 81/60   08/24/18 104/70   08/22/18 91/63    Weight from Last 3 Encounters:   08/28/18 61.9 kg (136 lb 7.4 oz)   08/24/18 68.4 kg (150 lb 12.8 oz)   08/22/18 68.5 kg (151 lb 1.6 oz)              Today, you had the following     No orders found for display         Today's Medication Changes          These changes are accurate as of 8/22/18 11:59 PM.  If you have any questions, ask your nurse or doctor.               These medicines have changed or have updated prescriptions.        Dose/Directions    rosuvastatin 40 MG tablet   Commonly known as:  CRESTOR   This may have changed:  when to take this   Used for:  Hyperlipidemia LDL goal <100, Coronary artery disease " involving native coronary artery of native heart without angina pectoris        Dose:  40 mg   Take 1 tablet (40 mg) by mouth daily   Quantity:  90 tablet   Refills:  3                Primary Care Provider Office Phone # Fax #    Florian Alonzo [phone] [address]  [facility] [address]        Equal Access to Services     [facility]: Hadii aad ku hadasho Soomaali, waaxda luqadaha, qaybta kaalmada adeegyada, waxay idiin hayaan adeeg khkrista layanelyn . So [facility] [phone].    ATENCIÓN: Si habla español, tiene a ivan disposición servicios gratuitos de asistencia lingüística. Llame al [phone].    We comply with applicable federal civil rights laws and [place] laws. We do not discriminate on the basis of race, color, national origin, age, disability, sex, sexual orientation, or gender identity.            Thank you!     Thank you for choosing [facility]  for your care. Our goal is always to provide you with excellent care. Hearing back from our patients is one way we can continue to improve our services. Please take a few minutes to complete the written survey that you may receive in the mail after your visit with us. Thank you!             Your Updated Medication List - Protect others around you: Learn how to safely use, store and throw away your medicines at www.disposemymeds.org.          This list is accurate as of 8/22/18 11:59 PM.  Always use your most recent med list.                   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)       Aluminum-Magnesium-Simethicone 200-200-20 MG/5ML Susp      Take 5 mLs by mouth daily as needed        aspirin 81 MG tablet      1 TABLET EVERY MORNING        Benzethonium Chloride 0.1 % Liqd     237 mL    Cleanse with wound cleanser and apply barrier paste and  "hydrocolloid dressing, change every 2-3 days as needed    Decubitus ulcer of buttock, unspecified laterality, unspecified ulcer stage       clindamycin 1 % lotion    CLEOCIN T    60 mL    Apply twice daily for 6 weeks to the groin    Rash       COMPRESSION STOCKINGS     2 each    1 each continuous prn Bilateral knee high compression stockings    Acute congestive heart failure, unspecified congestive heart failure type (H)       furosemide 20 MG tablet    LASIX    180 tablet    Take 1 tablet (20 mg) by mouth daily    Hypervolemia, unspecified hypervolemia type       Gauze Bandage 4\" Misc     5 each    Cleanse with wound cleanser and apply barrier paste and hydrocolloid dressing, change every 2-3 days as needed    Decubitus ulcer of buttock, unspecified laterality, unspecified ulcer stage       hydrocortisone valerate 0.2 % ointment    WEST-NINOSKA    45 g    Apply sparingly to affected area three times daily as needed.    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)       * metoprolol succinate 100 MG 24 hr tablet    TOPROL-XL    90 tablet    Take 1 tablet (100 mg) by mouth daily    Hypertension goal BP (blood pressure) < 130/80, Coronary artery disease involving native coronary artery of native heart without angina pectoris       * metoprolol succinate 25 MG 24 hr tablet    TOPROL-XL    90 tablet    Take 1 tablet (25 mg) by mouth daily    Coronary artery disease involving native coronary artery of native heart without angina pectoris, Hypertension goal BP (blood pressure) < 130/80       nitroGLYcerin 0.4 MG sublingual tablet    NITROSTAT    60 tablet    Place 1 tablet (0.4 mg) under the tongue every 5 minutes as needed up to 3 tablets per episode.    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)       potassium chloride SA 10 MEQ CR tablet    K-DUR/KLOR-CON M    30 tablet    Take 1 tablet (10 mEq) by mouth daily    Hypervolemia, unspecified hypervolemia type       prochlorperazine 10 MG tablet    COMPAZINE    30 tablet    Take 1 tablet (10 mg) by mouth every 6 hours as needed for nausea or vomiting    Nausea       rosuvastatin 40 MG tablet    CRESTOR    90 tablet    Take 1 tablet (40 mg) by mouth daily    Hyperlipidemia LDL goal <100, Coronary artery disease involving native coronary artery of native heart without angina pectoris       zinc oxide - white petrolatum 20-51% Pste topical paste     170 g    Cleanse with wound cleanser and apply barrier paste and hydrocolloid dressing, change every 2-3 days as needed    Decubitus ulcer of buttock, unspecified laterality, unspecified ulcer stage       * Notice:  This list has 2 medication(s) that are the same as other medications prescribed for you. Read the directions carefully, and ask your doctor or other care provider to review them with you.

## 2018-08-22 NOTE — NURSING NOTE
"Oncology Rooming Note    August 22, 2018 10:42 AM   Francisco Javier Cortes is a 78 year old male who presents for:    No chief complaint on file.    Initial Vitals: /69  Pulse 107  Temp 96.7  F (35.9  C) (Oral)  Resp 16  Ht 1.651 m (5' 5\")  Wt 68.8 kg (151 lb 11.2 oz)  SpO2 97%  BMI 25.24 kg/m2 Estimated body mass index is 25.24 kg/(m^2) as calculated from the following:    Height as of this encounter: 1.651 m (5' 5\").    Weight as of this encounter: 68.8 kg (151 lb 11.2 oz). Body surface area is 1.78 meters squared.  No Pain (0) Comment: Data Unavailable   No LMP for male patient.  Allergies reviewed: Yes  Medications reviewed: Yes    Medications: Medication refills not needed today.  Pharmacy name entered into [employer]:    [facility] [place] - [place], [place] - [address], [address]  [facility] - [facility], [place] - [address]/[id number] - [place], [place] - [address], [place] [facility]    Clinical concerns: No New Concerns    5 minutes for nursing intake (face to face time)     MIO Navarro        "

## 2018-08-22 NOTE — LETTER
8/22/2018       RE: Francisco Javier Cortes  [address] [id number]     Dear Colleague,    Thank you for referring your patient, Francisco Javier Cortes, to the [facility] CANCER [facility]INIC at [facility]. Please see a copy of my visit note below.    [facility]  Lung Nodule Clinic Note  August 22, 2018    Chief complaint:  Francisco Javier Cortes is a 78 year old male seen in the Pulmonary Clinic  for dyspnea    Assessment and Plan:  1.  Indeterminate pulmonary nodules that have been followed in 2014 and 2015 by me at my [facility] and there was no change.   2.  Recent diagnosis of mantle cell lymphoma stage IV-B and has had chemotherapy recently completed last week.  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  Current CT scan of the chest reveals significant bilateral pleural effusions and clinically has ascites today.  We discussed these findings with him and I will discuss his case with Interventional Radiology as well as Thoracic Surgery for thoracic duct ligation.  We also discussed the potential option of a PleurX catheter placement, but I am hesitant to do that because of chylothorax etiology.  However, PleurX placement can be entertained as a last resort/palliative option.     History of Present Illness:  This is a 78-year-old gentleman accompanied by his wife today.  He has history of mantle cell lymphoma with extensive lymphadenopathy as well as new pulmonary nodules that were evident also in a PET scan from 03/2018, which I reviewed.  He has significant bilateral pleural effusions along with shortness of breath and had that tapped several times in both sides within the last month or so.  He is here today to discuss his options for pleural effusions.     Exposure history: Asbestos;  No , TB;  No , Radiation;   No      Allergies   Allergen Reactions     Niacin      Severe rash and itching     Prevacid [Lansoprazole] Diarrhea     Patient notes diarrhea with 30mg generic version. Patient does ok with 20mg in generic and brand name.     Shellfish Allergy      One kind        Past Medical History:   Diagnosis Date     CAD (coronary artery disease) 1/09    s/p stentsx2     Coronary artery disease involving native coronary artery of native heart without angina pectoris 12/22/2015     Dyslipidemia      Erectile dysfunction      GERD (gastroesophageal reflux disease)      Hearing problem One ear 1961     Hypertension      NSTEMI (non-ST elevated myocardial infarction) (H) 3/20/2014     Pneumonia      Prostate cancer (H)     prostatecomy 9 years ago, PSAs remain good     Status post percutaneous transluminal coronary angioplasty-Coronary angioplasty with STEPHEN to LCx 4/4/2014     Stented coronary artery     stents placed        Past Surgical History:   Procedure Laterality Date     BIOPSY LYMPH NODE CERVICAL Left 3/22/2018    Procedure: BIOPSY LYMPH NODE CERVICAL;  Excisional Biopsy Left Cervical Lymph Node ;  Surgeon: Samia Gaviria MD;  Location: [facility] OR     C APPENDECTOMY       C REMV PROSTATE,RETROPUB,RAD,TOT NODES  1999     CATARACT IOL, RT/LT       COLONOSCOPY       COLONOSCOPY Left 7/5/2016    Procedure: COMBINED COLONOSCOPY, SINGLE OR MULTIPLE BIOPSY/POLYPECTOMY BY BIOPSY;  Surgeon: Duane, William Charles, MD;  Location: [facility] OR     COLONOSCOPY WITH CO2 INSUFFLATION N/A 7/5/2016    Procedure: COLONOSCOPY WITH CO2 INSUFFLATION;  Surgeon: Duane, William Charles, MD;  Location:  OR     ENT SURGERY  1980--1999     PHACOEMULSIFICATION CLEAR CORNEA WITH STANDARD INTRAOCULAR LENS IMPLANT Left 6/18/2015    Procedure: PHACOEMULSIFICATION CLEAR CORNEA WITH STANDARD INTRAOCULAR LENS IMPLANT;  Surgeon: John Zimmerman MD;  Location: [facility]     PHACOEMULSIFICATION CLEAR CORNEA WITH STANDARD INTRAOCULAR LENS IMPLANT Right 7/16/2015     Procedure: PHACOEMULSIFICATION CLEAR CORNEA WITH STANDARD INTRAOCULAR LENS IMPLANT;  Surgeon: John Zimmerman MD;  Location:  [facility]     STENT, CORONARY, DALLAS  1/09, 2014    x2, x1 in 2014     THORACENTESIS Bilateral 8/8/2018    Procedure: THORACENTESIS;  Thoracentesis Bilateral;  Surgeon: Michelle Leroy PA-C;  Location: [facility] OR     TYMPANOPLASTY       VASCULAR SURGERY  2013    stents        Social History     Social History     Marital status:      Spouse name: N/A     Number of children: N/A     Years of education: N/A     Occupational History     Not on file.     Social History Main Topics     Smoking status: Never Smoker     Smokeless tobacco: Never Used     Alcohol use Yes      Comment: a glass of red wine a day     Drug use: No     Sexual activity: Not Currently     Partners: Female     Birth control/ protection: Post-menopausal     Other Topics Concern      Service Yes     [employer]     Blood Transfusions No     Caffeine Concern No     Occupational Exposure Yes          Hobby Hazards No     Sleep Concern No     Stress Concern No     Weight Concern No     Special Diet Yes     Back Care No     Exercise Yes     Seat Belt Yes     Self-Exams Yes     Parent/Sibling W/ Cabg, Mi Or Angioplasty Before 65f 55m? Yes     Social History Narrative        Family History   Problem Relation Age of Onset     Cardiovascular Mother      HEART DISEASE Mother      Cancer - colorectal Father      HEART DISEASE Father      Other Cancer Father      Cancer Father      Hypertension Father      Asthma Brother      Coronary Artery Disease Brother      Hypertension Brother      HEART DISEASE Brother      HEART DISEASE Son      Hypertension Son      Cancer Daughter      non hodgkins     Prostate Cancer Brother      Hypertension Brother      Cancer Brother      Prostate Cancer Maternal Grandfather      Cancer Maternal Grandfather      Muscular Disorder Maternal Grandfather      HEART DISEASE Paternal  "Grandmother      Hypertension Paternal Grandmother      Hypertension Sister         Immunization History   Administered Date(s) Administered     Influenza (High Dose) 3 valent vaccine 09/19/2014, 09/24/2015, 09/29/2016, 09/12/2017     Influenza (IIV3) PF 09/30/2003, 09/21/2009, 09/07/2012     Influenza Vaccine IM 3yrs+ 4 Valent IIV4 09/19/2013     Pneumo Conj 13-V (2010&after) 12/22/2015     Pneumococcal 23 valent 12/08/2004     TD (ADULT, 7+) 03/12/2001     TDAP Vaccine (Adacel) 04/27/2011     Zoster vaccine, live 10/10/2007       Current Outpatient Prescriptions   Medication Sig     Acetaminophen (TYLENOL 8 HOUR PO) Take 500 mg by mouth as needed      allopurinol (ZYLOPRIM) 300 MG tablet Take 1 tablet (300 mg) by mouth daily     Alum & Mag Hydroxide-Simeth (ALUMINUM-MAGNESIUM-SIMETHICONE) 200-200-20 MG/5ML SUSP Take 5 mLs by mouth daily as needed     ASPIRIN 81 MG OR TABS 1 TABLET EVERY MORNING     Benzethonium Chloride 0.1 % LIQD Cleanse with wound cleanser and apply barrier paste and hydrocolloid dressing, change every 2-3 days as needed     clindamycin (CLEOCIN T) 1 % lotion Apply twice daily for 6 weeks to the groin     COMPRESSION STOCKINGS 1 each continuous prn Bilateral knee high compression stockings     fluticasone (FLONASE) 50 MCG/ACT spray Spray 1-2 sprays into both nostrils daily     furosemide (LASIX) 20 MG tablet Take 1 tablet (20 mg) by mouth daily     furosemide (LASIX) 20 MG tablet Take 1 tablet (20 mg) by mouth daily     Gauze Pads & Dressings (GAUZE BANDAGE 4\") MISC Cleanse with wound cleanser and apply barrier paste and hydrocolloid dressing, change every 2-3 days as needed     HYDROcodone-acetaminophen (NORCO) 5-325 MG per tablet Take 1 tablet by mouth every 4 hours as needed     hydrocortisone valerate (WEST-NINOSKA) 0.2 % ointment Apply sparingly to affected area three times daily as needed.     Lansoprazole (PREVACID PO) Take 20 mg by mouth every evening as needed Patient needs to use brand " name Prevacid (generic version causes diarrhea)      lidocaine-prilocaine (EMLA) cream Apply 1 Application topically as needed     loratadine (CLARITIN) 10 MG tablet Take 1 tablet (10 mg) by mouth daily     LORazepam (ATIVAN) 0.5 MG tablet Take 1 tablet (0.5 mg) by mouth every 4 hours as needed (Anxiety, Nausea/Vomiting or Sleep)     nitroGLYcerin (NITROSTAT) 0.4 MG sublingual tablet Place 1 tablet (0.4 mg) under the tongue every 5 minutes as needed up to 3 tablets per episode.     ondansetron (ZOFRAN) 8 MG tablet Take 1 tablet (8 mg) by mouth every 8 hours as needed (Nausea/Vomiting)     order for DME Equipment being ordered: wheeled walker with seat     potassium chloride SA (K-DUR/KLOR-CON M) 10 MEQ CR tablet Take 1 tablet (10 mEq) by mouth daily     prochlorperazine (COMPAZINE) 10 MG tablet Take 1 tablet (10 mg) by mouth every 6 hours as needed for nausea or vomiting     rosuvastatin (CRESTOR) 40 MG tablet Take 1 tablet (40 mg) by mouth daily (Patient taking differently: Take 40 mg by mouth every morning )     zinc oxide - white petrolatum (CRITIC-AID THICK MOIST BARRIER) 20-51% PSTE topical paste Cleanse with wound cleanser and apply barrier paste and hydrocolloid dressing, change every 2-3 days as needed     metoprolol succinate (TOPROL-XL) 100 MG 24 hr tablet Take 1 tablet (100 mg) by mouth daily (Patient not taking: Reported on 8/20/2018)     metoprolol succinate (TOPROL-XL) 25 MG 24 hr tablet Take 1 tablet (25 mg) by mouth daily (Patient not taking: Reported on 8/20/2018)     No current facility-administered medications for this visit.         Review of Systems:  I have done 10 points of review systems and pertinent findings are dypnea ,otherwise negative.    Physical examination  Constitutional: Alert, oriented, not in distress  Vitals: B/P: 111/69, T: 96.7, P: 107, R: 16  Eyes: No icterus, nystagmus, pupils isocoric  ENT/Mouth:  No ear discharge, moist mucosa, no ulceration, tonsillar  hypertrophy  Cardiovascular: Normal S1 and S2, no additional heart sounds, murmur, rub, normal peripheral pulses  Respiratory: Both hemithoraces are symmetrical, normal to palpation, + dullness to percussion, auscultation of lungs revealed decreased bronchovesicular sounds, but expirium prolongation, wheezing, rhonci and crackles  Gastrointestinal/Rectal: Bowel sounds present, soft, non tender, non distended, no organomegaly, + ascitis  Musculoskeletal: Normal muscle mass, no dephormity  Integumentary:  No rash, normal texture and turgor, no edema  Neurological: Alert, orientedx3, no motor deficits, cranial nerves grossly normal  Psychiatric:  Mood and affect are appropriate with insight into his/her medical condition  Hematologic/Immunologic/Lymphatic: No bruise, no lymph node enargement     Data:  Lab Results   Component Value Date    WBC 11.6 08/20/2018     Lab Results   Component Value Date    RBC 3.36 08/20/2018     Lab Results   Component Value Date    HGB 11.3 08/20/2018     Lab Results   Component Value Date    HCT 34.2 08/20/2018     Lab Results   Component Value Date     08/20/2018     Lab Results   Component Value Date    MCH 33.6 08/20/2018     Lab Results   Component Value Date    MCHC 33.0 08/20/2018     Lab Results   Component Value Date    RDW 15.9 08/20/2018     Lab Results   Component Value Date     08/20/2018       Lab Results   Component Value Date     08/20/2018      Lab Results   Component Value Date    POTASSIUM 4.1 08/20/2018     Lab Results   Component Value Date    CHLORIDE 104 08/20/2018     Lab Results   Component Value Date    EVANGELINA 8.2 08/20/2018     Lab Results   Component Value Date    CO2 24 08/20/2018     Lab Results   Component Value Date    BUN 15 08/20/2018     Lab Results   Component Value Date    CR 1.02 08/20/2018     Lab Results   Component Value Date     08/20/2018       Thank you for allowing me participate in the care of Francisco Javier Mccracken  MD Rebekah

## 2018-08-22 NOTE — PROGRESS NOTES
[facility]  Lung Nodule Clinic Note  August 22, 2018    Chief complaint:  Francisco Javier Cortes is a 78 year old male seen in the Pulmonary Clinic  for dyspnea    Assessment and Plan:  1.  Indeterminate pulmonary nodules that have been followed in 2014 and 2015 by me at my [facility] and there was no change.   2.  Recent diagnosis of mantle cell lymphoma stage IV-B and has had chemotherapy recently completed last week.  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  Current CT scan of the chest reveals significant bilateral pleural effusions and clinically has ascites today.  We discussed these findings with him and I will discuss his case with Interventional Radiology as well as Thoracic Surgery for thoracic duct ligation.  We also discussed the potential option of a PleurX catheter placement, but I am hesitant to do that because of chylothorax etiology.  However, PleurX placement can be entertained as a last resort/palliative option.     History of Present Illness:  This is a 78-year-old gentleman accompanied by his wife today.  He has history of mantle cell lymphoma with extensive lymphadenopathy as well as new pulmonary nodules that were evident also in a PET scan from 03/2018, which I reviewed.  He has significant bilateral pleural effusions along with shortness of breath and had that tapped several times in both sides within the last month or so.  He is here today to discuss his options for pleural effusions.     Exposure history: Asbestos;  No , TB;  No , Radiation;   No     Allergies   Allergen Reactions     Niacin      Severe rash and itching     Prevacid [Lansoprazole] Diarrhea     Patient notes diarrhea with 30mg generic version. Patient does ok with 20mg in generic and brand name.     Shellfish Allergy      One kind        Past Medical History:   Diagnosis Date      CAD (coronary artery disease) 1/09    s/p stentsx2     Coronary artery disease involving native coronary artery of native heart without angina pectoris 12/22/2015     Dyslipidemia      Erectile dysfunction      GERD (gastroesophageal reflux disease)      Hearing problem One ear 1961     Hypertension      NSTEMI (non-ST elevated myocardial infarction) (H) 3/20/2014     Pneumonia      Prostate cancer (H)     prostatecomy 9 years ago, PSAs remain good     Status post percutaneous transluminal coronary angioplasty-Coronary angioplasty with STEPHEN to LCx 4/4/2014     Stented coronary artery     stents placed        Past Surgical History:   Procedure Laterality Date     BIOPSY LYMPH NODE CERVICAL Left 3/22/2018    Procedure: BIOPSY LYMPH NODE CERVICAL;  Excisional Biopsy Left Cervical Lymph Node ;  Surgeon: Samia Gaviria MD;  Location: [facility] OR     C APPENDECTOMY       C REMV PROSTATE,RETROPUB,RAD,TOT NODES  1999     CATARACT IOL, RT/LT       COLONOSCOPY       COLONOSCOPY Left 7/5/2016    Procedure: COMBINED COLONOSCOPY, SINGLE OR MULTIPLE BIOPSY/POLYPECTOMY BY BIOPSY;  Surgeon: Duane, William Charles, MD;  Location: [facility] OR     COLONOSCOPY WITH CO2 INSUFFLATION N/A 7/5/2016    Procedure: COLONOSCOPY WITH CO2 INSUFFLATION;  Surgeon: Duane, William Charles, MD;  Location: [facility] OR     ENT SURGERY  1980--1999     PHACOEMULSIFICATION CLEAR CORNEA WITH STANDARD INTRAOCULAR LENS IMPLANT Left 6/18/2015    Procedure: PHACOEMULSIFICATION CLEAR CORNEA WITH STANDARD INTRAOCULAR LENS IMPLANT;  Surgeon: John Zimmerman MD;  Location:  [facility]     PHACOEMULSIFICATION CLEAR CORNEA WITH STANDARD INTRAOCULAR LENS IMPLANT Right 7/16/2015    Procedure: PHACOEMULSIFICATION CLEAR CORNEA WITH STANDARD INTRAOCULAR LENS IMPLANT;  Surgeon: John Zimmerman MD;  Location:  [facility]     STENT, CORONARY, [place]  1/09, 2014    x2, x1 in 2014     THORACENTESIS Bilateral 8/8/2018    Procedure: THORACENTESIS;  Thoracentesis Bilateral;  Surgeon:  Michelle Leroy PA-C;  Location: [facility] OR     TYMPANOPLASTY       VASCULAR SURGERY  2013    stents        Social History     Social History     Marital status:      Spouse name: N/A     Number of children: N/A     Years of education: N/A     Occupational History     Not on file.     Social History Main Topics     Smoking status: Never Smoker     Smokeless tobacco: Never Used     Alcohol use Yes      Comment: a glass of red wine a day     Drug use: No     Sexual activity: Not Currently     Partners: Female     Birth control/ protection: Post-menopausal     Other Topics Concern      Service Yes     [employer]     Blood Transfusions No     Caffeine Concern No     Occupational Exposure Yes          Hobby Hazards No     Sleep Concern No     Stress Concern No     Weight Concern No     Special Diet Yes     Back Care No     Exercise Yes     Seat Belt Yes     Self-Exams Yes     Parent/Sibling W/ Cabg, Mi Or Angioplasty Before 65f 55m? Yes     Social History Narrative        Family History   Problem Relation Age of Onset     Cardiovascular Mother      HEART DISEASE Mother      Cancer - colorectal Father      HEART DISEASE Father      Other Cancer Father      Cancer Father      Hypertension Father      Asthma Brother      Coronary Artery Disease Brother      Hypertension Brother      HEART DISEASE Brother      HEART DISEASE Son      Hypertension Son      Cancer Daughter      non hodgkins     Prostate Cancer Brother      Hypertension Brother      Cancer Brother      Prostate Cancer Maternal Grandfather      Cancer Maternal Grandfather      Muscular Disorder Maternal Grandfather      HEART DISEASE Paternal Grandmother      Hypertension Paternal Grandmother      Hypertension Sister         Immunization History   Administered Date(s) Administered     Influenza (High Dose) 3 valent vaccine 09/19/2014, 09/24/2015, 09/29/2016, 09/12/2017     Influenza (IIV3) PF 09/30/2003, 09/21/2009, 09/07/2012     Influenza  "Vaccine IM 3yrs+ 4 Valent IIV4 09/19/2013     Pneumo Conj 13-V (2010&after) 12/22/2015     Pneumococcal 23 valent 12/08/2004     TD (ADULT, 7+) 03/12/2001     TDAP Vaccine (Adacel) 04/27/2011     Zoster vaccine, live 10/10/2007       Current Outpatient Prescriptions   Medication Sig     Acetaminophen (TYLENOL 8 HOUR PO) Take 500 mg by mouth as needed      allopurinol (ZYLOPRIM) 300 MG tablet Take 1 tablet (300 mg) by mouth daily     Alum & Mag Hydroxide-Simeth (ALUMINUM-MAGNESIUM-SIMETHICONE) 200-200-20 MG/5ML SUSP Take 5 mLs by mouth daily as needed     ASPIRIN 81 MG OR TABS 1 TABLET EVERY MORNING     Benzethonium Chloride 0.1 % LIQD Cleanse with wound cleanser and apply barrier paste and hydrocolloid dressing, change every 2-3 days as needed     clindamycin (CLEOCIN T) 1 % lotion Apply twice daily for 6 weeks to the groin     COMPRESSION STOCKINGS 1 each continuous prn Bilateral knee high compression stockings     fluticasone (FLONASE) 50 MCG/ACT spray Spray 1-2 sprays into both nostrils daily     furosemide (LASIX) 20 MG tablet Take 1 tablet (20 mg) by mouth daily     furosemide (LASIX) 20 MG tablet Take 1 tablet (20 mg) by mouth daily     Gauze Pads & Dressings (GAUZE BANDAGE 4\") MISC Cleanse with wound cleanser and apply barrier paste and hydrocolloid dressing, change every 2-3 days as needed     HYDROcodone-acetaminophen (NORCO) 5-325 MG per tablet Take 1 tablet by mouth every 4 hours as needed     hydrocortisone valerate (WEST-NINOSKA) 0.2 % ointment Apply sparingly to affected area three times daily as needed.     Lansoprazole (PREVACID PO) Take 20 mg by mouth every evening as needed Patient needs to use brand name Prevacid (generic version causes diarrhea)      lidocaine-prilocaine (EMLA) cream Apply 1 Application topically as needed     loratadine (CLARITIN) 10 MG tablet Take 1 tablet (10 mg) by mouth daily     LORazepam (ATIVAN) 0.5 MG tablet Take 1 tablet (0.5 mg) by mouth every 4 hours as needed (Anxiety, " Nausea/Vomiting or Sleep)     nitroGLYcerin (NITROSTAT) 0.4 MG sublingual tablet Place 1 tablet (0.4 mg) under the tongue every 5 minutes as needed up to 3 tablets per episode.     ondansetron (ZOFRAN) 8 MG tablet Take 1 tablet (8 mg) by mouth every 8 hours as needed (Nausea/Vomiting)     order for DME Equipment being ordered: wheeled walker with seat     potassium chloride SA (K-DUR/KLOR-CON M) 10 MEQ CR tablet Take 1 tablet (10 mEq) by mouth daily     prochlorperazine (COMPAZINE) 10 MG tablet Take 1 tablet (10 mg) by mouth every 6 hours as needed for nausea or vomiting     rosuvastatin (CRESTOR) 40 MG tablet Take 1 tablet (40 mg) by mouth daily (Patient taking differently: Take 40 mg by mouth every morning )     zinc oxide - white petrolatum (CRITIC-AID THICK MOIST BARRIER) 20-51% PSTE topical paste Cleanse with wound cleanser and apply barrier paste and hydrocolloid dressing, change every 2-3 days as needed     metoprolol succinate (TOPROL-XL) 100 MG 24 hr tablet Take 1 tablet (100 mg) by mouth daily (Patient not taking: Reported on 8/20/2018)     metoprolol succinate (TOPROL-XL) 25 MG 24 hr tablet Take 1 tablet (25 mg) by mouth daily (Patient not taking: Reported on 8/20/2018)     No current facility-administered medications for this visit.         Review of Systems:  I have done 10 points of review systems and pertinent findings are dypnea ,otherwise negative.    Physical examination  Constitutional: Alert, oriented, not in distress  Vitals: B/P: 111/69, T: 96.7, P: 107, R: 16  Eyes: No icterus, nystagmus, pupils isocoric  ENT/Mouth:  No ear discharge, moist mucosa, no ulceration, tonsillar hypertrophy  Cardiovascular: Normal S1 and S2, no additional heart sounds, murmur, rub, normal peripheral pulses  Respiratory: Both hemithoraces are symmetrical, normal to palpation, + dullness to percussion, auscultation of lungs revealed decreased bronchovesicular sounds, but expirium prolongation, wheezing, rhonci and  crackles  Gastrointestinal/Rectal: Bowel sounds present, soft, non tender, non distended, no organomegaly, + ascitis  Musculoskeletal: Normal muscle mass, no dephormity  Integumentary:  No rash, normal texture and turgor, no edema  Neurological: Alert, orientedx3, no motor deficits, cranial nerves grossly normal  Psychiatric:  Mood and affect are appropriate with insight into his/her medical condition  Hematologic/Immunologic/Lymphatic: No bruise, no lymph node enargement     Data:  Lab Results   Component Value Date    WBC 11.6 08/20/2018     Lab Results   Component Value Date    RBC 3.36 08/20/2018     Lab Results   Component Value Date    HGB 11.3 08/20/2018     Lab Results   Component Value Date    HCT 34.2 08/20/2018     Lab Results   Component Value Date     08/20/2018     Lab Results   Component Value Date    MCH 33.6 08/20/2018     Lab Results   Component Value Date    MCHC 33.0 08/20/2018     Lab Results   Component Value Date    RDW 15.9 08/20/2018     Lab Results   Component Value Date     08/20/2018       Lab Results   Component Value Date     08/20/2018      Lab Results   Component Value Date    POTASSIUM 4.1 08/20/2018     Lab Results   Component Value Date    CHLORIDE 104 08/20/2018     Lab Results   Component Value Date    EVANGELINA 8.2 08/20/2018     Lab Results   Component Value Date    CO2 24 08/20/2018     Lab Results   Component Value Date    BUN 15 08/20/2018     Lab Results   Component Value Date    CR 1.02 08/20/2018     Lab Results   Component Value Date     08/20/2018       Thank you for allowing me participate in the care of Francisco Javier HELENE Welch MD

## 2018-08-22 NOTE — PROGRESS NOTES
Chief Complaint   Patient presents with     Imm/Inj     patient with Drug-induced neutropenia - here for vitals and a Neulasta injection     Patient arrived to clinic for a Neulasta injection today.  Patient declined to speak with an RN today.  Neulasta injection given to left arm without incident.  Patient will return next Tuesday 8/28 for next appointment which is at [place].    Patient had a low blood pressure today - but he mentioned having fluid around his lungs which can cause the blood pressure and SOB which he mentioned. He just had a Pulmonary appointment and he states he will call if symptoms get worse.

## 2018-08-22 NOTE — MR AVS SNAPSHOT
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)        Flatulence, eructation, and gas pain           Follow-ups after your visit        Your next 10 appointments already scheduled     Aug 28, 2018  8:15 AM CDT   Return Visit with NURSE ONLY CANCER CENTER   [facility] ([facility])    [id number] [place] [id number] [phone]            Aug 28, 2018  9:15 AM CDT   Return Visit with MARTHA Gonzales CNP   [facility]    [address] [place] [address] [id number] [phone]            Sep 04, 2018  1:15 PM CDT   Nurse Only with MG IMAGING NURSE   [facility]    [id number] [place] [id number] [phone]            Sep 04, 2018  1:30 PM CDT   PE NPET ONCOLOGY (EYES TO THIGHS) with MGPET1   [facility]    [address] [place] [address] [id number] [phone]           Tell your doctor:   If there is any chance you may be pregnant or if you are breastfeeding.   If you have problems lying in small spaces (claustrophobia). If you do, your doctor may give you medicine to help you relax. If you have diabetes:   Have your exam early in the morning. Your blood glucose will go up as the day goes by.   Your glucose level must be 180 or less at the start of the exam. Please take any oral diabetic medication you need to ensure this blood glucose level is below 180, but no  insulin 4 hours prior to the exam.   If you are taking insulin in the morning take with breakfast by 6 am and schedule procedure between 12 and 2:15 pm.    If you are taking insulin at night take nightly dose, fast overnight, schedule procedure before 10 am.    If you take insulin both morning and night take morning dose by 6am and schedule procedure between 12 and 2:15 pm.  24 hours before your scan: Don t do any heavy exercise. (No jogging, aerobics or other workouts.) Exercise will make your pictures less accurate. 6 hours before your scan:   Stop all food and liquids (except water).   Do not chew gum or suck on mints.   If you need to take medicine with food, you may take it with a few crackers.  Please call your Imaging Department at your exam site with any questions.            Sep 17, 2018  2:15 PM CDT   Return Visit with Erickson Adan MD   [facility] ([facility]    [address] [place] [id number] [phone]            Oct 10, 2018 10:15 AM CDT   XR CHEST 2 VIEWS with UCXR1   [facility] ([facility])    [address]  [facility] [address] [phone]           Please bring a list of your current medicines to your exam. (Include vitamins, minerals and over-thecounter medicines.) Leave your valuables at home.  Tell your doctor if there is a chance you may be pregnant.  You do not need to do anything special for this exam.              Who to contact     If you have questions or need follow up information about today's clinic visit or your schedule please contact [facility] directly at [phone].  Normal or non-critical lab and imaging results will be communicated to you by MyChart, letter or phone within 4 business days after the clinic has received the results. If you do not hear from us within 7 days, please contact the clinic through MyChart or phone. If you have  a critical or abnormal lab result, we will notify you by phone as soon as possible.  Submit refill requests through [employer] or call your pharmacy and they will forward the refill request to us. Please allow 3 business days for your refill to be completed.          Additional Information About Your Visit        Naterahart Information     [employer] gives you secure access to your electronic health record. If you see a primary care provider, you can also send messages to your care team and make appointments. If you have questions, please call your primary care clinic.  If you do not have a primary care provider, please call [phone] and they will assist you.        Care EveryWhere ID     This is your Care EveryWhere ID. This could be used by other organizations to access your [facility] medical records  [id number]        Your Vitals Were     Pulse Respirations Pulse Oximetry BMI (Body Mass Index)          104 18 95% 25.14 kg/m2         Blood Pressure from Last 3 Encounters:   08/22/18 91/63   08/22/18 111/69   08/21/18 110/76    Weight from Last 3 Encounters:   08/22/18 68.5 kg (151 lb 1.6 oz)   08/22/18 68.8 kg (151 lb 11.2 oz)   08/20/18 64.7 kg (142 lb 11.2 oz)              Today, you had the following     No orders found for display         Today's Medication Changes          These changes are accurate as of 8/22/18  1:26 PM.  If you have any questions, ask your nurse or doctor.               These medicines have changed or have updated prescriptions.        Dose/Directions    rosuvastatin 40 MG tablet   Commonly known as:  CRESTOR   This may have changed:  when to take this   Used for:  Hyperlipidemia LDL goal <100, Coronary artery disease involving native coronary artery of native heart without angina pectoris        Dose:  40 mg   Take 1 tablet (40 mg) by mouth daily   Quantity:  90 tablet   Refills:  3                Primary Care Provider Office Phone # Fax #    Florian Alonzo [phone]        [address]  [facility] [address]        Equal Access to Services     TERESETERENCE JENN : Hadii aad ku hadvasuo Sonormaali, waaxda luqadaha, qaybta kaalmada gemasadi, waxluis daniel nickin hayaajay ribeirobryant miranda la'chrisjay lorenzo. So [facility] [phone].    ATENCIÓN: Si habla español, tiene a ivan disposición servicios gratuitos de asistencia lingüística. Llame al [phone].    We comply with applicable federal civil rights laws and [place] laws. We do not discriminate on the basis of race, color, national origin, age, disability, sex, sexual orientation, or gender identity.            Thank you!     Thank you for choosing [facility]  for your care. Our goal is always to provide you with excellent care. Hearing back from our patients is one way we can continue to improve our services. Please take a few minutes to complete the written survey that you may receive in the mail after your visit with us. Thank you!             Your Updated Medication List - Protect others around you: Learn how to safely use, store and throw away your medicines at www.disposemymeds.org.          This list is accurate as of 8/22/18  1:26 PM.  Always use your most recent med list.                   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)       Aluminum-Magnesium-Simethicone 200-200-20 MG/5ML Susp      Take 5 mLs by mouth daily as needed        aspirin 81 MG tablet      1 TABLET EVERY MORNING        Benzethonium Chloride 0.1 % Liqd     237 mL    Cleanse with wound cleanser and apply barrier paste and hydrocolloid dressing, change every 2-3 days as needed    Decubitus ulcer of buttock, unspecified laterality, unspecified ulcer stage       clindamycin 1 % lotion    CLEOCIN T    60 mL    Apply twice daily for 6 weeks to the groin    Rash       COMPRESSION STOCKINGS     2 each    1  "each continuous prn Bilateral knee high compression stockings    Acute congestive heart failure, unspecified congestive heart failure type (H)       fluticasone 50 MCG/ACT spray    FLONASE    1 Bottle    Spray 1-2 sprays into both nostrils daily    Postnasal discharge       * furosemide 20 MG tablet    LASIX    180 tablet    Take 1 tablet (20 mg) by mouth daily    Hypervolemia, unspecified hypervolemia type       * furosemide 20 MG tablet    LASIX    30 tablet    Take 1 tablet (20 mg) by mouth daily    Acute diastolic congestive heart failure (H), Pleural effusion, 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)       * metoprolol succinate 100 MG 24 hr tablet    TOPROL-XL    90 tablet    Take 1 tablet (100 mg) by mouth daily    Hypertension goal BP (blood pressure) < 130/80, Coronary artery disease involving native coronary artery of native heart without angina pectoris       * metoprolol succinate 25 MG 24 hr tablet    TOPROL-XL    90 tablet    Take 1 tablet (25 mg) by mouth daily    Coronary artery disease involving " native coronary artery of native heart without angina pectoris, Hypertension goal BP (blood pressure) < 130/80       nitroGLYcerin 0.4 MG sublingual tablet    NITROSTAT    60 tablet    Place 1 tablet (0.4 mg) under the tongue every 5 minutes as needed up to 3 tablets per episode.    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)       potassium chloride SA 10 MEQ CR tablet    K-DUR/KLOR-CON M    30 tablet    Take 1 tablet (10 mEq) by mouth daily    Hypervolemia, unspecified hypervolemia type       PREVACID PO      Take 20 mg by mouth every evening as needed Patient needs to use brand name Prevacid (generic version causes diarrhea)        prochlorperazine 10 MG tablet    COMPAZINE    30 tablet    Take 1 tablet (10 mg) by mouth every 6 hours as needed for nausea or vomiting    Nausea       rosuvastatin 40 MG tablet    CRESTOR    90 tablet    Take 1 tablet (40 mg) by mouth daily    Hyperlipidemia LDL goal <100, Coronary artery disease involving native coronary artery of native heart without angina pectoris       TYLENOL 8 HOUR PO      Take 500 mg by mouth as needed        zinc oxide - white petrolatum 20-51% Pste topical paste     170 g    Cleanse with wound cleanser and apply barrier paste and hydrocolloid dressing, change every 2-3 days as needed    Decubitus ulcer of buttock, unspecified laterality, unspecified ulcer stage       * Notice:  This list has 4 medication(s) that are the same as other medications prescribed for you.  Read the directions carefully, and ask your doctor or other care provider to review them with you.

## 2018-08-22 NOTE — PROGRESS NOTES
Received telephone call from patient, requesting thoracentesis for this week at the [facility].  Provider Joy Bush CNP, has approved this.  Orders faxed to the [facility] at fax number [phone].  Writer verified that they will have openings this coming Friday 8/24 for the thoracentesis, they will call patient directly to get this scheduled.  Call was placed to patient with this update (voicemail message was left on cell phone).    Cam Conner, RN, BSN, OCN  Oncology Care Coordinator  [facility]

## 2018-08-23 PROBLEM — J90 PLEURAL EFFUSION: Status: ACTIVE | Noted: 2018-01-01

## 2018-08-23 NOTE — LETTER
Transition Communication Hand-off for Care Transitions to Next Level of Care Provider    Name: Francisco Javier Cortes  : 1940  MRN #: [id number]  Primary Care Provider: Florian Alonzo     Primary Clinic: [address]  [facility] [address]     Reason for Hospitalization:  Complications from Lymphoma  Pleural effusion  Admit Date/Time: 2018  2:15 PM  Discharge Date: 18  Payor Source: Payor: MEDICARE / Plan: MEDICARE / Product Type: Medicare /            Reason for Communication Hand-off Referral: Fragility    Discharge Plan:  Discharge Plan:       Most Recent Value    Concerns To Be Addressed no discharge needs identified           Concern for non-adherence with plan of care:   n  Discharge Needs Assessment:  Needs       Most Recent Value    Anticipated Changes Related to Illness none    Transportation Available none          Already enrolled in Tele-monitoring program and name of program:    Follow-up specialty is recommended: Yes    Follow-up plan:  Future Appointments  Date Time Provider Department Center   2018 8:00 AM UU BONE MARROW BIOPSY [facility]   2018 8:15 AM NURSE ONLY CANCER CENTER [facility]   2018 9:15 AM Joy Bush, APRN CNP [facility]   2018 2:00 PM SHUS4 SHULT [facility]   2018 8:20 AM [facility] Rasmussen MD [facility]   2018 1:15 PM [id number] [facility]   2018 1:15 PM MG IMAGING NURSE [facility]   2018 1:30 PM MGPET1 [facility]   2018 10:45 AM [facility]   2018 2:15 PM Erickson Adan MD [facility]   10/10/2018 10:15 AM UCXR1 [facility]       Any outstanding tests or procedures:    Procedures     Future Labs/Procedures    Oxygen Adult     Comments:    New Home Oxygen Order 4 liter(s) by nasal cannula continuously with use of portable tank. Expected treatment length is indefinite (99 months).. Test on conserving device as applicable.    Patients  who qualify for home O2 coverage under the CMS guidelines require ABG tests or O2 sat readings obtained closest to, but no earlier than 2 days prior to the discharge, as evidence of the need for home oxygen therapy. Testing must be performed while patient is in the chronic stable state. See notes for O2 sats.    I certify that this patient, Francisco Javier Cortes has been under my care and that I, or a nurse practitioner or physician's assistant working with me, had a face-to-face encounter that meets the face-to-face encounter requirements with this patient on 8/24/2018. The patient, Francisco Javier Cortes was evaluated or treated in whole, or in part, for the following medical condition, which necessitates the use of the ordered oxygen. Treatment Diagnosis: Congestive Heart Failure    Attending Provider: Rajwinder Bueno MD  Physician signature: See electronic signature associated with these discharge orders  Date of Order: August 24, 2018        Radiology & Cardiology Orders     Future Labs/Procedures Complete By Expires    IR Thoracentesis  8/27/2018 (Approximate) 8/24/2019    US Thoracentesis  8/27/2018 (Approximate) 8/24/2019    IR Thoracentesis  8/31/2018 (Approximate) 8/24/2019    IR Thoracentesis  9/7/2018 (Approximate) 8/24/2019              Leticia Lua    AVS/Discharge Summary is the source of truth; this is a helpful guide for improved communication of patient story

## 2018-08-23 NOTE — IP AVS SNAPSHOT
MRN:[id number]                      After Visit Summary   8/23/2018    Francisco Javier Cortes    MRN: [id number]           Thank you!     Thank you for choosing [facility] for your care. Our goal is always to provide you with excellent care. Hearing back from our patients is one way we can continue to improve our services. Please take a few minutes to complete the written survey that you may receive in the mail after you visit with us. Thank you!        Patient Information     Date Of Birth          1940        Designated Caregiver       Most Recent Value    Caregiver    Will someone help with your care after discharge? yes    Name of designated caregiver Court Cortes    Phone number of caregiver [phone]    Caregiver address with pt      About your hospital stay     You were admitted on:  August 23, 2018 You last received care in the:  [address] [facility]MT [facility]    You were discharged on:  August 24, 2018        Reason for your hospital stay       You were admitted with worsening shortness of breath due to underlying pleural effusions. You had a thoracentesis done on your right lung with subsequent improvement in your shortness of breath. The Thoracic Surgery team and Interventional Radiology discussed intervention for your chylothorax and have scheduled an appointment with Dr. Rasmussen to discuss the options with you and your family.                  Who to Call     For medical emergencies, please call 911.  For non-urgent questions about your medical care, please call your primary care provider or clinic, [phone]          Attending Provider     Provider Specialty    Skye Jolley MD Hematology & Oncology    [facility]       Primary Care Provider Office Phone # Fax #    Florian Alonzo [phone]       When to contact your care team       Call the [facility] 24-hour triage line at [phone] or [phone] for temp >100.4, uncontrolled  nausea/vomiting/diarrhea/constipation, unrelieved pain, bleeding not relieved with pressure, dizziness, chest pain, shortness of breath, loss of consciousness, and any new or concerning symptoms.     Call [phone] and ask for the care coordinator that works with your oncologist if you have questions about scans, appointments, hospital follow-up, or other concerns.                  After Care Instructions     Activity       Your activity upon discharge: Regular activity as tolerated.            Diet       Follow this diet upon discharge: Regular diet as tolerated.            Discharge Instructions       -- Please continue to take your Lasix daily. IF you are feeling dizzy or weak, do not take this as it can low your blood pressure.   -- STOP taking Metoprolol.            Oxygen Adult       [facility] Order 4 liter(s) by nasal cannula continuously with use of portable tank. Expected treatment length is indefinite (99 months).. Test on conserving device as applicable.    Patients who qualify for home O2 coverage under the CMS guidelines require ABG tests or O2 sat readings obtained closest to, but no earlier than 2 days prior to the discharge, as evidence of the need for home oxygen therapy. Testing must be performed while patient is in the chronic stable state. See notes for O2 sats.    I certify that this patient, Francisco Javier Cortes has been under my care and that I, or a nurse practitioner or physician's assistant working with me, had a face-to-face encounter that meets the face-to-face encounter requirements with this patient on 8/24/2018. The patient, Francisco Javier Cortes was evaluated or treated in whole, or in part, for the following medical condition, which necessitates the use of the ordered oxygen. Treatment Diagnosis: Congestive Heart Failure    Attending Provider: Rajwinder Bueno MD  Physician signature: See electronic signature associated with these discharge orders  Date of Order: August 24, 2018                   Follow-up Appointments     Adult [facility] Follow-up and recommended labs and tests       -- You have a bone marrow biopsy and appointment with Joy on Tuesday (8/28).   -- I scheduled a thoracentesis tap at [facility] on Tuesday (8/28) at 12:30 (after your bone marrow biopsy)  -- You will see Dr. Rasmussen from Interventional Radiology on Wednesday (8/29)  -- In anticipation of you needing frequent fluid taps, I scheduled a thoracentesis at the [facility] on 8/31 and 9/7.    Appointments on [place] and/or [facility] (with [facility] or [facility] provider or service). Call [phone] if you haven't heard regarding these appointments within 7 days of discharge.    See detailed appointment dates/times/location below:                  Your next 10 appointments already scheduled     Aug 28, 2018  8:15 AM CDT   Return Visit with NURSE [facility]   [facility]    [address] [place] [id number] [phone]            Aug 28, 2018  9:15 AM CDT   Return Visit with MARTHA Gonzales CNP   [facility] ([facility])    [address] [place] [address] [phone]            Aug 28, 2018  2:00 PM CDT   US THORACENTESIS with EMMY, LIDIA IMAGING NURSE, SH BODY RAD   [facility] Ultrasound ([facility])    [address] [place] [id number] [phone]           Bring a list of your medicines to the exam. Include vitamins, minerals and over-the-counter drugs.  Tell your doctor in advance:   If you are or may be pregnant.   If you are taking Coumadin (or any other blood thinners) 5 days prior to the exam for any special instructions.   If you are diabetic to determine if your insulin needs have to be adjusted for the exam.  IF YOUR DOCTOR ALSO PRESCRIBED SEDATION DURING THE EXAM (medicine to help you relax): You will receive separate instructions about  driving, eating, and additional tests that may be necessary prior to your exam day.  Please call the Imaging Department at your exam site with any questions.            Aug 29, 2018  8:20 AM CDT   (Arrive by 8:05 AM)   New Patient Visit with Dorothy Rasmussen MD   [facility] ([facility])    [address] [place] [address]  [address]  [facility] [id number] [phone]            Aug 31, 2018  1:15 PM CDT   (Arrive by 12:15 PM)   IR THORACENTESIS with [email or web address]   [facility] ([facility])    [address] [place] [address]  [address] [facility] [id number] [phone]           Please wear loose clothing, such as a sweat suit or jogging clothes. Avoid snaps, zippers and other metal. We may ask you to undress and put on a hospital gown.  Please bring any scans or X-rays taken at other hospitals, if similar tests were done. Also bring a list of your medicines, including vitamins, minerals and over-the-counter drugs. It is safest to leave personal items at home.  Tell your doctor in advance:   If you are or may be pregnant.   If you are taking Coumadin (or any other blood thinners) 5 days prior to the exam for any special instructions.   If you are diabetic to determine if your insulin needs have to be adjusted for the exam.  Your doctor will obtain necessary laboratory tests prior to the exam (creatinine, Hgb/Hct, platelet count, and INR).  If you have any questions, please call the Imaging Department where you will have your exam. Directions, parking instructions, and other information are available on our website, [email or web address].            Aug 31, 2018   Procedure with Dean Marcus PA-C   [facility] ([facility])    [address] [place] [address]  [address] [facility] [id number] [phone]           Located in the Clinics and Surgery Center at [address] [place] [address]  [id number].   parking is very convenient and highly recommended.  is a $6 flat rate fee.  Both  and self parkers should enter the main arrival plaza from [facility]; parking attendants will direct you based on your parking preference.            Sep 04, 2018  1:15 PM CDT   Nurse Only with MG IMAGING NURSE   [facility] ([facility])    [address] [place] [id number] [phone]            Sep 04, 2018  1:30 PM CDT   PE NPET ONCOLOGY (EYES TO THIGHS) with [email or web address]   [facility] ([facility])    [address] [place] [phone] [phone]           Tell your doctor:   If there is any chance you may be pregnant or if you are breastfeeding.   If you have problems lying in small spaces (claustrophobia). If you do, your doctor may give you medicine to help you relax. If you have diabetes:   Have your exam early in the morning. Your blood glucose will go up as the day goes by.   Your glucose level must be 180 or less at the start of the exam. Please take any oral diabetic medication you need to ensure this blood glucose level is below 180, but no insulin 4 hours prior to the exam.   If you are taking insulin in the morning take with breakfast by 6 am and schedule procedure between 12 and 2:15 pm.    If you are taking insulin at night take nightly dose, fast overnight, schedule procedure before 10 am.    If you take insulin both morning and night take morning dose by 6am and schedule procedure between 12 and 2:15 pm.  24 hours before your scan: Don t do any heavy exercise. (No jogging, aerobics or other workouts.) Exercise will make your pictures less accurate. 6 hours before your scan:   Stop all food and liquids (except water).   Do not chew gum or suck on mints.   If you need to take medicine with food, you may take it with a few crackers.  Please call your Imaging Department at your exam site with any  questions.              Future tests that were ordered for you     IR Thoracentesis           US Thoracentesis           **Comprehensive metabolic panel FUTURE anytime           CBC with platelets and differential       Last Lab Result: Hemoglobin (g/dL)       Date                     Value                 08/24/2018               10.1 (L)         ----------            IR Thoracentesis           IR Thoracentesis                 Further instructions from your care team       Oxygen Provider:  Arranged through [employer], contact number [phone]. If you have any questions or concerns please call the oxygen company directly.      Pending Results     Date and Time Order Name Status Description    8/24/2018 1112 POC US Guide for Thorcentesis In process     8/24/2018 0934 Leukemia Lymphoma Evaluation (Flow Cytometry) In process     8/24/2018 0934 Fluid Culture Aerobic Bacterial In process     8/23/2018 1648 Blood culture Preliminary     8/23/2018 1454 Urine Culture Aerobic Bacterial Preliminary             Statement of Approval     Ordered          08/24/18 1636  I have reviewed and agree with all the recommendations and orders detailed in this document.  EFFECTIVE NOW     Approved and electronically signed by:  Marisel Rogers PA-C             Admission Information     Date & Time Provider Department Dept. Phone    8/23/2018 Rajwinder Bueno MD [address] BMT [facility] [phone]      Your Vitals Were     Blood Pressure Pulse Temperature Respirations Weight Pulse Oximetry    104/70 (BP Location: Right arm) 117 97.9  F (36.6  C) (Axillary) 18 68.4 kg (150 lb 12.8 oz) 94%    BMI (Body Mass Index)                   25.09 kg/m2           [employer] Information     [facility] gives you secure access to your electronic health record. If you see a primary care provider, you can also send messages to your care team and make appointments. If you have questions, please call your primary care clinic.   If you do not have a primary care provider, please call [phone] and they will assist you.        Care EveryWhere ID     This is your Care EveryWhere ID. This could be used by other organizations to access your [facility] medical records  [id number]        Equal Access to Services     NANDA BARAJAS : Hadii aad ku hadvasukaren Rosalvaali, waqida luqadaha, qaybta kaalmada adesadi, zhao brucesayraalex lorenzo. So [facility] [phone].    ATENCIÓN: Si habla español, tiene a ivan disposición servicios gratuitos de asistencia lingüística. Llame al [phone].    We comply with applicable federal civil rights laws and [place] laws. We do not discriminate on the basis of race, color, national origin, age, disability, sex, sexual orientation, or gender identity.               Review of your medicines      CONTINUE these medicines which may have CHANGED, or have new prescriptions. If we are uncertain of the size of tablets/capsules you have at home, strength may be listed as something that might have changed.        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)        Dose:  300 mg   Take 1 tablet (300 mg) by mouth daily   Quantity:  30 tablet   Refills:  1       Aluminum-Magnesium-Simethicone 200-200-20 MG/5ML Susp        Dose:  5 mL   Take 5 mLs by mouth daily as needed   Refills:  0       aspirin 81 MG tablet        1 TABLET EVERY MORNING   Refills:  0       Benzethonium Chloride 0.1 % Liqd   Used  "for:  Decubitus ulcer of buttock, unspecified laterality, unspecified ulcer stage        Cleanse with wound cleanser and apply barrier paste and hydrocolloid dressing, change every 2-3 days as needed   Quantity:  237 mL   Refills:  3       clindamycin 1 % lotion   Commonly known as:  CLEOCIN T   Used for:  Rash        Apply twice daily for 6 weeks to the groin   Quantity:  60 mL   Refills:  1       COMPRESSION STOCKINGS   Used for:  Acute congestive heart failure, unspecified congestive heart failure type (H)        Dose:  1 each   1 each continuous prn Bilateral knee high compression stockings   Quantity:  2 each   Refills:  0       fluticasone 50 MCG/ACT spray   Commonly known as:  FLONASE   Used for:  Postnasal discharge        Dose:  1-2 spray   Spray 1-2 sprays into both nostrils daily   Quantity:  1 Bottle   Refills:  11       * furosemide 20 MG tablet   Commonly known as:  LASIX   Used for:  Hypervolemia, unspecified hypervolemia type        Dose:  20 mg   Take 1 tablet (20 mg) by mouth daily   Quantity:  180 tablet   Refills:  3       * furosemide 20 MG tablet   Commonly known as:  LASIX   Used for:  Acute diastolic congestive heart failure (H), 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)        Dose:  20 mg   Take 1 tablet (20 mg) by mouth daily   Quantity:  30 tablet   Refills:  0       Gauze Bandage 4\" Misc   Used for:  Decubitus ulcer of buttock, unspecified laterality, unspecified ulcer stage        Cleanse with wound cleanser and apply barrier paste and hydrocolloid dressing, change every 2-3 days as needed   Quantity:  5 each   Refills:  3       HYDROcodone-acetaminophen 5-325 MG per tablet   Commonly known as:  NORCO        Dose:  1 tablet   Take 1 tablet by mouth every 4 hours as needed   Refills:  0       hydrocortisone valerate 0.2 % ointment   Commonly known as:  WEST-NINOSKA   Used for:  Psoriasis        Apply sparingly to affected area three times daily as needed.   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)        Dose:  0.5 mg   Take 1 tablet (0.5 mg) by mouth every 4 hours as needed (Anxiety, Nausea/Vomiting or Sleep)   Quantity:  30 tablet   Refills:  3       MULTI-VITAMIN DAILY PO        Dose:  1 tablet   Take 1 tablet by mouth   Refills:  0       nitroGLYcerin 0.4 MG sublingual tablet   Commonly known as:  NITROSTAT   Used for:  Chest pain due to myocardial ischemia, unspecified ischemic chest pain type (H), Coronary artery disease involving native coronary artery of native heart without angina pectoris        Dose:  0.4 mg   Place 1 tablet (0.4 mg) under the tongue every 5 minutes as needed up to 3 tablets per episode.   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)        Equipment being ordered: wheeled walker with seat   Quantity:  1 Device   Refills:  0       potassium chloride SA 10 MEQ CR tablet   Commonly known as:  K-DUR/KLOR-CON M   Used for:  Hypervolemia, unspecified hypervolemia type        Dose:  10 mEq   Take 1 tablet (10 mEq) by mouth daily   Quantity:  30 tablet   Refills:  1       PREVACID PO        Dose:  20 mg   Take 20 mg by mouth every evening as needed Patient  needs to use brand name Prevacid (generic version causes diarrhea)   Refills:  0       prochlorperazine 10 MG tablet   Commonly known as:  COMPAZINE   Used for:  Nausea        Dose:  10 mg   Take 1 tablet (10 mg) by mouth every 6 hours as needed for nausea or vomiting   Quantity:  30 tablet   Refills:  1       TYLENOL 8 HOUR PO        Dose:  500 mg   Take 500 mg by mouth as needed   Refills:  0       zinc oxide - white petrolatum 20-51% Pste topical paste   Used for:  Decubitus ulcer of buttock, unspecified laterality, unspecified ulcer stage        Cleanse with wound cleanser and apply barrier paste and hydrocolloid dressing, change every 2-3 days as needed   Quantity:  170 g   Refills:  3       * Notice:  This list has 2 medication(s) that are the same as other medications prescribed for you. Read the directions carefully, and ask your doctor or other care provider to review them with you.      STOP taking     metoprolol succinate 100 MG 24 hr tablet   Commonly known as:  TOPROL-XL           metoprolol succinate 25 MG 24 hr tablet   Commonly known as:  TOPROL-XL                    Protect others around you: Learn how to safely use, store and throw away your medicines at www.disposemymeds.org.             Medication List: This is a list of all your medications and when to take them. Check marks below indicate your daily home schedule. Keep this list as a reference.      Medications           Morning Afternoon Evening Bedtime As Needed    allopurinol 300 MG tablet   Commonly known as:  ZYLOPRIM   Take 1 tablet (300 mg) by mouth daily   Last time this was given:  300 mg on 8/24/2018  8:32 AM                                Aluminum-Magnesium-Simethicone 200-200-20 MG/5ML Susp   Take 5 mLs by mouth daily as needed                                aspirin 81 MG tablet   1 TABLET EVERY MORNING                                Benzethonium Chloride 0.1 % Liqd   Cleanse with wound cleanser and apply barrier paste and  "hydrocolloid dressing, change every 2-3 days as needed                                clindamycin 1 % lotion   Commonly known as:  CLEOCIN T   Apply twice daily for 6 weeks to the groin                                COMPRESSION STOCKINGS   1 each continuous prn Bilateral knee high compression stockings                                fluticasone 50 MCG/ACT spray   Commonly known as:  FLONASE   Spray 1-2 sprays into both nostrils daily                                * furosemide 20 MG tablet   Commonly known as:  LASIX   Take 1 tablet (20 mg) by mouth daily                                * furosemide 20 MG tablet   Commonly known as:  LASIX   Take 1 tablet (20 mg) by mouth daily                                Gauze Bandage 4\" Misc   Cleanse with wound cleanser and apply barrier paste and hydrocolloid dressing, change every 2-3 days as needed                                HYDROcodone-acetaminophen 5-325 MG per tablet   Commonly known as:  NORCO   Take 1 tablet by mouth every 4 hours as needed                                hydrocortisone valerate 0.2 % ointment   Commonly known as:  WEST-NINOSKA   Apply sparingly to affected area three times daily as needed.                                lidocaine-prilocaine cream   Commonly known as:  EMLA   Apply 1 Application topically as needed                                loratadine 10 MG tablet   Commonly known as:  CLARITIN   Take 1 tablet (10 mg) by mouth daily   Last time this was given:  10 mg on 8/24/2018  8:32 AM                                LORazepam 0.5 MG tablet   Commonly known as:  ATIVAN   Take 1 tablet (0.5 mg) by mouth every 4 hours as needed (Anxiety, Nausea/Vomiting or Sleep)                                MULTI-VITAMIN DAILY PO   Take 1 tablet by mouth                                nitroGLYcerin 0.4 MG sublingual tablet   Commonly known as:  NITROSTAT   Place 1 tablet (0.4 mg) under the tongue every 5 minutes as needed up to 3 tablets per episode.          "                       ondansetron 8 MG tablet   Commonly known as:  ZOFRAN   Take 1 tablet (8 mg) by mouth every 8 hours as needed (Nausea/Vomiting)                                order for DME   Equipment being ordered: wheeled walker with seat                                potassium chloride SA 10 MEQ CR tablet   Commonly known as:  K-DUR/KLOR-CON M   Take 1 tablet (10 mEq) by mouth daily                                PREVACID PO   Take 20 mg by mouth every evening as needed Patient needs to use brand name Prevacid (generic version causes diarrhea)                                prochlorperazine 10 MG tablet   Commonly known as:  COMPAZINE   Take 1 tablet (10 mg) by mouth every 6 hours as needed for nausea or vomiting                                rosuvastatin 40 MG tablet   Commonly known as:  CRESTOR   Take 1 tablet (40 mg) by mouth daily   Last time this was given:  40 mg on 8/24/2018  8:32 AM                                TYLENOL 8 HOUR PO   Take 500 mg by mouth as needed                                zinc oxide - white petrolatum 20-51% Pste topical paste   Cleanse with wound cleanser and apply barrier paste and hydrocolloid dressing, change every 2-3 days as needed                                * Notice:  This list has 2 medication(s) that are the same as other medications prescribed for you. Read the directions carefully, and ask your doctor or other care provider to review them with you.

## 2018-08-23 NOTE — H&P
History and Physical -- Hematology / Oncology  Date of Admission:  (Not on file) -- Date of Service (when I saw the patient): 08/23/18    ASSESSMENT & PLAN  Francisco Javier Cortes is a 79 y/o M with stage IV B Mantle cell lymphoma + malignant ascites & pleural effusions (r/t questionable chylothorax), HTN, CAD s/p stents, HFpEF, admitted 2/2 to worsening SOB & worsening pleural effusions on imaging.    HEME/ONC  # Mantle cell lymphoma (stage IV - B) with malignant ascites, & malignant pleural effusions requiring para & thoracentesis   Follows with Dr. Adan. Presented in March 2018 with complaint of abdominal bloating and discomfort loss of appetite ×2 months. Initial imaging showed splenomegaly, extensive retroperitoneal mesenteric, inguinal and pelvic as well as retrocrural adenopathy and enlarging pulmonary nodules up to 18 mm. FNA proved mantle cell lymphoma. 3/29/18 began Bendamustine and Rituxan (s/p cycle 6 D2 on 8/21 & neulasta 8/22). Oncology treatment has been complicated by multiple hospitalizations (TLS, sepsis from PNA, CHF in April, and now hospitalized 8/23 r/t worsening SOB, bilateral pleural effusions & persistent chylothorax unable to be managed outpatient). Most recent CT imaging 8/22  with A few slightly larger left axillary and mediastinal lymph nodes since CT 6/21/2018, concerning for possible disease progression. Increased large right and moderate left pleural effusions with associated atelectasis/consolidation.   - Will need to f/u with Dr. Adan regarding treatment decisions and restaging     # Leukocytosis   Likely r/t neulasta 8/22. S/p vanc zosyn in ER in [place] but not febrile, not hypoxic, BP corrected with IVF, not neutropenic.   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 S/p SPECT-CT 8/8/18, but no evidence of pleural chylous leak, thoracic duct was said to be intact.  Had met with Dr. Welch outpatient for consideration of thoracic duct ligation vs pleurx. Per notes, Dr. Estela Rasmussen (IR) was planning to meet with pt outpatient to discuss thoracic duct ligation.   - Thoracic surgery consult for consideration of thoracic duct ligation via their team vs IR, recs appreciated  - Consider thoracentesis 8/24 if symptomatically worse    # Malignant ascites  S/p CT scan (8/22 showed significant bilateral pleural effusions & ascites) last had paracentesis 8/16, 7/27 (flow at that time positive for involvement of B cell lymphoma)  - can have CAPS evaluate for paracentesis PRN (increased ascites on CT from 8/22)    CARDs/PULM  # HTN, HLD, CAD & HFpEF. He is s/p stenting RCA in 2009, recurrent chest pain showed left main minimal disease, LAD 40% distal stenosis, circumflex 90% stenosis, RCA patent stents & at that time had drug eluding stent in circumflex CA.   - PTA was on aspirin 81 mg PO daily, lasix 20 mg daily, crestor 40 mg, and was on Metoprolol but had been holding it outpatient r/t dizziness  >> Hold lasix 8/23 r/t fluid resuscitation in ER  - s/p 2 L IVF resuscitation in ER r/t hypotension  - still tachycardic at this time, may need to consider repeat ECHO, CT w trace pericardial effusion    # Indeterminate pulmonary nodules was following with Rebekah outpatient    MISCELLANEOUS  # GERD, PUP.  - Continue PTA prevascid    # Pain Assessment:  Current Pain Score 8/20/2018   Patient currently in pain? -   Pain score (0-10) 0   Pain location -   Pain descriptors -   - Francisco Javier is experiencing pain due to fluid accumulation. Pain management was discussed and the plan was created in a collaborative fashion.  Francisco Javier's response to the current recommendations: engaged  - Please see the plan for pain management as documented above    FEN  - NPO after midnight for possible surgical intervention  - Hold IVF  - Replace lytes prn    PPX  - Hold VTE due to possible procedure  - GI: PTA  PPI    Lines/Drains  - Port     CONSULTS  - Thoracic surgery    CODE: Full code    DISPO: Admit for recurrent pleural effusions secondary to chylothorax.     Above plan discussed with staff physician, Dr. Ximena Rogers PA-C  Hematology/Oncology  Pager: [phone]    HISTORY OF PRESENT ILLNESS  History obtained from chart review and discussion with the patient. Francisco Javier Cortes is a 77 y/o M with stage IV B Mantle cell lymphoma + malignant ascites & pleural effusions (r/t questionable chylothorax), HTN, CAD s/p stents, HFpEF, admitted 2/2 to worsening SOB & worsening pleural effusions on imaging.    Presented to OSH ER this AM r/t worsening SOB over the past couple of days. Vitals from ER don't include temp, soft blood pressure 79/40, , O 2 sat 94% (but was 80's at home). At discharge BP more stable 100/60 still tachy, RR 29 & on 93% O2.    Labs/imaging: Lipase was WNL, Troponin WNL. Cr elevated at 1.45, sodium borderline low 136, calcium 8.3, AST slightly elevated baseline 51, albumin 2.2, TP 5.3. WBC 34.7 with ANC 33.1, Hgb 11.1, . No blood or urine culture was sent that I can see. CXR with enlarging bilateral pleural effusions large on the R moderate on the L with increased atelectasis bilaterally R >L. In ER was given IVF bolus 1 L x 2, vancomycin & zosyn.     Oncology history: presented in March 2018 with complaint of abdominal bloating and discomfort loss of appetite ×2 months. Initial imaging showed splenomegaly, extensive retroperitoneal mesenteric, inguinal and pelvic as well as retrocrural adenopathy and enlarging pulmonary nodules up to 18 mm. FNA proved mantle cell lymphoma. 3/29/18 began Bendamustine and Rituxan (s/p cycle 6 D2 on 8/21 & neulasta 8/22). Oncology treatment has been complicated by multiple hospitalizations r/t (TLS, sepsis from PNA, CHF in April, and now Hospitalized 8/23 r/t worsening SOB, bilateral pleural effusions & persistent chylothorax unable to be managed  outpatient). His most recent CT imaging 8/22  With A few slightly larger left axillary and mediastinal lymph nodes since CT 6/21/2018, concerning for possible disease progression. Increased large right and moderate left pleural effusions with associated atelectasis/consolidation. Unchanged small pericardial effusion. Increased ascites.    Outpatient issues: included malignant pleural effusions & ascites. Last had paracentesis 8/16, 7/27 (flow at that time positive for involvement of B cell lymphoma). Last had thoracentesis R pleural fluid 8/16/18, 7/30/18 (flow at that time positive for involvement of B cell lymphoma), 7/26/19, 7/6/18. He had  Neulasta was given on 8/22, had received cycle 6 D 2 bendamustine on 8/21/18. He was discussed at pulmonary nodule conference on 8/24 r/t chylo duct embolization vs bilateral PleurX placement. Was suppose to f/u with Dr Daniels in IR & Dr Welch in pulmonary.     PMH  Past Medical History:   Diagnosis Date     CAD (coronary artery disease) 1/09    s/p stentsx2     Coronary artery disease involving native coronary artery of native heart without angina pectoris 12/22/2015     Dyslipidemia      Erectile dysfunction      GERD (gastroesophageal reflux disease)      Hearing problem One ear 1961     Hypertension      NSTEMI (non-ST elevated myocardial infarction) (H) 3/20/2014     Pneumonia      Prostate cancer (H)     prostatecomy 9 years ago, PSAs remain good     Status post percutaneous transluminal coronary angioplasty-Coronary angioplasty with STEPHEN to LCx 4/4/2014     Stented coronary artery     stents placed     PSH  Past Surgical History:   Procedure Laterality Date     BIOPSY LYMPH NODE CERVICAL Left 3/22/2018    Procedure: BIOPSY LYMPH NODE CERVICAL;  Excisional Biopsy Left Cervical Lymph Node ;  Surgeon: Samia Gaviria MD;  Location: [facility] OR     C APPENDECTOMY       C REMV PROSTATE,RETROPUB,RAD,TOT NODES  1999     CATARACT IOL, RT/LT       COLONOSCOPY       COLONOSCOPY  "Left 2016    Procedure: COMBINED COLONOSCOPY, SINGLE OR MULTIPLE BIOPSY/POLYPECTOMY BY BIOPSY;  Surgeon: Duane, William Charles, MD;  Location: [facility] OR     COLONOSCOPY WITH CO2 INSUFFLATION N/A 2016    Procedure: COLONOSCOPY WITH CO2 INSUFFLATION;  Surgeon: Duane, William Charles, MD;  Location: [facility] OR     ENT SURGERY  --     PHACOEMULSIFICATION CLEAR CORNEA WITH STANDARD INTRAOCULAR LENS IMPLANT Left 2015    Procedure: PHACOEMULSIFICATION CLEAR CORNEA WITH STANDARD INTRAOCULAR LENS IMPLANT;  Surgeon: John Zimmerman MD;  Location:  [facility]     PHACOEMULSIFICATION CLEAR CORNEA WITH STANDARD INTRAOCULAR LENS IMPLANT Right 2015    Procedure: PHACOEMULSIFICATION CLEAR CORNEA WITH STANDARD INTRAOCULAR LENS IMPLANT;  Surgeon: John Zimmerman MD;  Location:  [facility]     STENT, CORONARY, [place]  1/09, 2014    x2, x1 in      THORACENTESIS Bilateral 2018    Procedure: THORACENTESIS;  Thoracentesis Bilateral;  Surgeon: Michelle Leroy PA-C;  Location: [facility] OR     TYMPANOPLASTY       VASCULAR SURGERY      stents     Prior to Admission MEDICATIONS  Prior to Admission Medications   Prescriptions Last Dose Informant Patient Reported? Taking?   ASPIRIN 81 MG OR TABS   Yes No   Si TABLET EVERY MORNING   Acetaminophen (TYLENOL 8 HOUR PO)   Yes No   Sig: Take 500 mg by mouth as needed    Alum & Mag Hydroxide-Simeth (ALUMINUM-MAGNESIUM-SIMETHICONE) 200-200-20 MG/5ML SUSP   Yes No   Sig: Take 5 mLs by mouth daily as needed   Benzethonium Chloride 0.1 % LIQD   No No   Sig: Cleanse with wound cleanser and apply barrier paste and hydrocolloid dressing, change every 2-3 days as needed   COMPRESSION STOCKINGS   No No   Si each continuous prn Bilateral knee high compression stockings   Gauze Pads & Dressings (GAUZE BANDAGE 4\") MISC   No No   Sig: Cleanse with wound cleanser and apply barrier paste and hydrocolloid dressing, change every 2-3 days as needed   HYDROcodone-acetaminophen (NORCO) " 5-325 MG per tablet   Yes No   Sig: Take 1 tablet by mouth every 4 hours as needed   LORazepam (ATIVAN) 0.5 MG tablet   No No   Sig: Take 1 tablet (0.5 mg) by mouth every 4 hours as needed (Anxiety, Nausea/Vomiting or Sleep)   Lansoprazole (PREVACID PO)   Yes No   Sig: Take 20 mg by mouth every evening as needed Patient needs to use brand name Prevacid (generic version causes diarrhea)    allopurinol (ZYLOPRIM) 300 MG tablet   No No   Sig: Take 1 tablet (300 mg) by mouth daily   clindamycin (CLEOCIN T) 1 % lotion   No No   Sig: Apply twice daily for 6 weeks to the groin   fluticasone (FLONASE) 50 MCG/ACT spray   No No   Sig: Spray 1-2 sprays into both nostrils daily   furosemide (LASIX) 20 MG tablet   No No   Sig: Take 1 tablet (20 mg) by mouth daily   furosemide (LASIX) 20 MG tablet   Yes No   Sig: Take 1 tablet (20 mg) by mouth daily   hydrocortisone valerate (WEST-NINOSKA) 0.2 % ointment   No No   Sig: Apply sparingly to affected area three times daily as needed.   lidocaine-prilocaine (EMLA) cream   Yes No   Sig: Apply 1 Application topically as needed   loratadine (CLARITIN) 10 MG tablet   No No   Sig: Take 1 tablet (10 mg) by mouth daily   metoprolol succinate (TOPROL-XL) 100 MG 24 hr tablet   No No   Sig: Take 1 tablet (100 mg) by mouth daily   Patient not taking: Reported on 8/20/2018   metoprolol succinate (TOPROL-XL) 25 MG 24 hr tablet   No No   Sig: Take 1 tablet (25 mg) by mouth daily   Patient not taking: Reported on 8/20/2018   nitroGLYcerin (NITROSTAT) 0.4 MG sublingual tablet   No No   Sig: Place 1 tablet (0.4 mg) under the tongue every 5 minutes as needed up to 3 tablets per episode.   ondansetron (ZOFRAN) 8 MG tablet   No No   Sig: Take 1 tablet (8 mg) by mouth every 8 hours as needed (Nausea/Vomiting)   order for DME   No No   Sig: Equipment being ordered: wheeled walker with seat   potassium chloride SA (K-DUR/KLOR-CON M) 10 MEQ CR tablet   No No   Sig: Take 1 tablet (10 mEq) by mouth daily    prochlorperazine (COMPAZINE) 10 MG tablet   No No   Sig: Take 1 tablet (10 mg) by mouth every 6 hours as needed for nausea or vomiting   rosuvastatin (CRESTOR) 40 MG tablet   No No   Sig: Take 1 tablet (40 mg) by mouth daily   Patient taking differently: Take 40 mg by mouth every morning    zinc oxide - white petrolatum (CRITIC-AID THICK MOIST BARRIER) 20-51% PSTE topical paste   No No   Sig: Cleanse with wound cleanser and apply barrier paste and hydrocolloid dressing, change every 2-3 days as needed      Facility-Administered Medications: None     Allergies   Allergies   Allergen Reactions     Niacin      Severe rash and itching     Prevacid [Lansoprazole] Diarrhea     Patient notes diarrhea with 30mg generic version. Patient does ok with 20mg in generic and brand name.     Shellfish Allergy      One kind     Social History  Social History     Social History     Marital status:      Spouse name: N/A     Number of children: N/A     Years of education: N/A     Occupational History     Not on file.     Social History Main Topics     Smoking status: Never Smoker     Smokeless tobacco: Never Used     Alcohol use Yes      Comment: a glass of red wine a day     Drug use: No     Sexual activity: Not Currently     Partners: Female     Birth control/ protection: Post-menopausal     Other Topics Concern      Service Yes     Beta Dashy 1983     Blood Transfusions No     Caffeine Concern No     Occupational Exposure Yes          Hobby Hazards No     Sleep Concern No     Stress Concern No     Weight Concern No     Special Diet Yes     Back Care No     Exercise Yes     Seat Belt Yes     Self-Exams Yes     Parent/Sibling W/ Cabg, Mi Or Angioplasty Before 65f 55m? Yes     Social History Narrative     Family History  Family History   Problem Relation Age of Onset     Cardiovascular Mother      HEART DISEASE Mother      Cancer - colorectal Father      HEART DISEASE Father      Other Cancer Father      Cancer  Father      Hypertension Father      Asthma Brother      Coronary Artery Disease Brother      Hypertension Brother      HEART DISEASE Brother      HEART DISEASE Son      Hypertension Son      Cancer Daughter      non hodgkins     Prostate Cancer Brother      Hypertension Brother      Cancer Brother      Prostate Cancer Maternal Grandfather      Cancer Maternal Grandfather      Muscular Disorder Maternal Grandfather      HEART DISEASE Paternal Grandmother      Hypertension Paternal Grandmother      Hypertension Sister      ROS  ROS negative unless otherwise noted in above HPI.    Physical Exam  Pulse:  [104] 104  Resp:  [18] 18  BP: (91)/(63) 91/63  SpO2:  [95 %] 95 %  0 lbs 0 oz    Constitutional: Pleasant male seen laying in bed. Appears short of breath and appears stated age.  Eyes: Lids and lashes normal, sclera clear, conjunctiva normal.  ENT: Normocephalic, oral pharynx with moist mucus membranes, tonsils without erythema or exudates, gums normal and good dentition.   Respiratory: On supplemental O2 with distant lung sounds bilaterally.   Cardiovascular: Regular rate and rhythm, normal S1 and S2, and no murmur noted.  GI: Distended abdomen. No masses or scars. +BS. Soft. Generalized tenderness on palpation.  Skin: No bruising or bleeding, no redness, warmth, or swelling, no rashes, no lesions, no jaundice.  Extremities: There is no redness, warmth, or swelling of the joints. No lower extremity edema. No cyanosis.  Neurologic: Awake, alert, oriented to name, place and time.    Vascular access: Port, c/d/i      DATA  No results found for this or any previous visit (from the past 24 hour(s)).     EXAM:  CT CHEST W/O CONTRAST . 8/22/2018 9:52 AM      TECHNIQUE:  Helical CT images from the thoracic inlet through the  upper abdomen were obtained without intravenous contrast.      COMPARISON: Chest x-ray 7/16/2018, CT CAP 6/21/2018     HISTORY:   Bilateral pleural effusions, h/o ascites, mantle cell  lymphoma;  Bilateral pleural effusion. 78-year-old male with a history  of mantle cell lymphoma currently on chemotherapy.     FINDINGS:  CHEST:  LUNGS: Large right and moderate left pleural effusions, which are low  density, with associated atelectasis/consolidation in the dependent  lungs. Pleural effusions are slightly increased from CT 6/21/2018.  Small pleural plaques in the left lobe are unchanged, for example 16 x  8 mm plaque in the anterior left upper lobe (series 4, image 103). New  9 x 9 mm pulmonary nodule in the right middle lobe (series 4, image  196). Otherwise unchanged subcentimeter pulmonary nodules, for example  3 mm nodule in the left upper lobe (series 4, image 153)..     MEDIASTINUM: Right chest wall Port-A-Cath device tip terminates at the  low SVC. Small volume of pericardial fluid. No cardiomegaly. Enlarged  lymph nodes, similar to slightly larger from prior, for example  (series 2):  - Image 16: Left axillary 35 x 16 mm node, previously 30 x 11 mm.  - Image 23: Right paratracheal 16 x 15 mm node, previously 15 x 15 mm.  - Image 35: Subcarinal 68 x 28 mm node, unchanged.     UPPER ABDOMEN: Increased ascites. Bulky upper abdominal lymph nodes  are again demonstrated, for example unchanged 23 x 20 mm  peripancreatic node (series 2, image 74). The scattered liver  hypodensities are not well visualized on this noncontrast study  compared to prior exam.     BONES/SOFT TISSUES: Degenerative changes of the spine. Unchanged  calcifications of the anterior longitudinal ligament.         IMPRESSION: In this patient with a history of mantle cell lymphoma  currently on chemotherapy:  1. A few slightly larger left axillary and mediastinal lymph nodes  since CT 6/21/2018, concerning for possible disease progression.  2. Increased large right and moderate left pleural effusions with  associated atelectasis/consolidation.  3. Unchanged small pericardial effusion.  4. Increased ascites.     I have personally reviewed  the examination and initial interpretation  and I agree with the findings.     BUSTER CRUZ MD    XRAY CHEST PA/LATERAL8/23/2018  [facility]   Result Impression   IMPRESSION: Enlarging bilateral pleural effusions, large on the right and moderate on the left, with increasing compressive atelectasis bilaterally, right greater than left.    REPORT SIGNED BY DR. Michel Alejandro   Result Narrative   EXAM: CHEST TWO VIEWS 8/23/2018 9:42 AM    CLINICAL: Shortness of breath.    COMPARISON: 8/17/2018.    FINDINGS: Bilateral pleural effusions are growing; the effusion on the right is large, the effusion on the left moderate.  Associated compressive atelectasis, greater on the right than on the left; aeration in both lungs has decreased since the prior exam.  Heart borders are extensively obscured. Port-A-Cath tip projecting near the expected location of the cavoatrial junction.   Other Result Information   Interface, Nmhcradordrslt In - 08/23/2018  9:57 AM CDT  EXAM: CHEST TWO VIEWS 8/23/2018 9:42 AM    CLINICAL: Shortness of breath.    COMPARISON: 8/17/2018.    FINDINGS: Bilateral pleural effusions are growing; the effusion on the right is large, the effusion on the left moderate.  Associated compressive atelectasis, greater on the right than on the left; aeration in both lungs has decreased since the prior exam.  Heart borders are extensively obscured. Port-A-Cath tip projecting near the expected location of the cavoatrial junction.    IMPRESSION  IMPRESSION: Enlarging bilateral pleural effusions, large on the right and moderate on the left, with increasing compressive atelectasis bilaterally, right greater than left.    REPORT SIGNED BY DR. Michel Alejandro     PCP & Hematologist/Oncologist  Florian Alonzo

## 2018-08-23 NOTE — CONSULTS
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  Thoracic surgery was consulted for possible VATS thoracic duct ligation after failed outpatient management.  Pt was initially diagnosed in 03/2018- presented w/ dyspnea, abdominal bloating and CT scan showed enlarged pulmonary nodules with splenomegaly, extensive retroperitoneal mesenteric, inguinal, pelvic lymphadenopathy.  FNA proved mantle cell lymphoma.  Pt is s/p cycle 6 of chemotherapy (bendamustine and rituxan) and received a dose of neulasta on 08/22.  Oncology tx was complicated by TLS and HPeEF.  He is currently managed outpatient for recurrent pleural effusions and ascites- had received multiple para/thoracentesis.   He was presented in the [facility] today with increased SOB.  Repeat CT scan showed larger axillary and mediastinal LAD concerning for possible disease progression. He was transferred here for further evaluation.     A/P: Patient is a 78 year old male with stage IV B mantle cell lymphoma with malignant ascites and pleural effusion    Plan:   - Above case will be discussed at pulmonary nodule multidisciplinary conference on 08/24.    Patient and plan discussed with attending.    Thank you for the opportunity to participate in the care of this patient.    PMH:  Past Medical History:   Diagnosis Date     CAD (coronary artery disease) 1/09    s/p stentsx2     Coronary artery disease involving native coronary artery of native heart without angina pectoris 12/22/2015     Dyslipidemia      Erectile dysfunction      GERD (gastroesophageal reflux disease)      Hearing problem One ear 1961     Hypertension      NSTEMI (non-ST elevated myocardial infarction) (H) 3/20/2014     Pneumonia      Prostate cancer (H)     prostatecomy 9 years ago, PSAs remain good     Status post  percutaneous transluminal coronary angioplasty-Coronary angioplasty with STEPHEN to LCx 4/4/2014     Stented coronary artery     stents placed       PSH:  Past Surgical History:   Procedure Laterality Date     BIOPSY LYMPH NODE CERVICAL Left 3/22/2018    Procedure: BIOPSY LYMPH NODE CERVICAL;  Excisional Biopsy Left Cervical Lymph Node ;  Surgeon: Samia Gaviria MD;  Location: [facility] OR     C APPENDECTOMY       C REMV PROSTATE,RETROPUB,RAD,TOT NODES  1999     CATARACT IOL, RT/LT       COLONOSCOPY       COLONOSCOPY Left 7/5/2016    Procedure: COMBINED COLONOSCOPY, SINGLE OR MULTIPLE BIOPSY/POLYPECTOMY BY BIOPSY;  Surgeon: Duane, William Charles, MD;  Location: [facility] OR     COLONOSCOPY WITH CO2 INSUFFLATION N/A 7/5/2016    Procedure: COLONOSCOPY WITH CO2 INSUFFLATION;  Surgeon: Duane, William Charles, MD;  Location: [facility] OR     ENT SURGERY  1980--1999     PHACOEMULSIFICATION CLEAR CORNEA WITH STANDARD INTRAOCULAR LENS IMPLANT Left 6/18/2015    Procedure: PHACOEMULSIFICATION CLEAR CORNEA WITH STANDARD INTRAOCULAR LENS IMPLANT;  Surgeon: John Zimmerman MD;  Location:  [facility]     PHACOEMULSIFICATION CLEAR CORNEA WITH STANDARD INTRAOCULAR LENS IMPLANT Right 7/16/2015    Procedure: PHACOEMULSIFICATION CLEAR CORNEA WITH STANDARD INTRAOCULAR LENS IMPLANT;  Surgeon: John Zimmerman MD;  Location:  [facility]     STENT, CORONARY, [place]  1/09, 2014    x2, x1 in 2014     THORACENTESIS Bilateral 8/8/2018    Procedure: THORACENTESIS;  Thoracentesis Bilateral;  Surgeon: Michelle Leroy PA-C;  Location: [facility] OR     TYMPANOPLASTY       VASCULAR SURGERY  2013    stents         FH:  family history includes Asthma in his brother; Cancer in his brother, daughter, father, and maternal grandfather; Cancer - colorectal in his father; Cardiovascular in his mother; Coronary Artery Disease in his brother; HEART DISEASE in his brother, father, mother, paternal grandmother, and son; Hypertension in his brother, brother, father, paternal  "grandmother, sister, and son; Muscular Disorder in his maternal grandfather; Other Cancer in his father; Prostate Cancer in his brother and maternal grandfather.      SH:  Never Smoker  A glass of wine once daily  No Illicit drug use    Allergies:  Allergies   Allergen Reactions     Niacin      Severe rash and itching     Prevacid [Lansoprazole] Diarrhea     Patient notes diarrhea with 30mg generic version. Patient does ok with 20mg in generic and brand name.     Shellfish Allergy      One kind       Home Meds:  Prescriptions Prior to Admission   Medication Sig Dispense Refill Last Dose     Acetaminophen (TYLENOL 8 HOUR PO) Take 500 mg by mouth as needed    Taking     allopurinol (ZYLOPRIM) 300 MG tablet Take 1 tablet (300 mg) by mouth daily 30 tablet 1 Taking     Alum & Mag Hydroxide-Simeth (ALUMINUM-MAGNESIUM-SIMETHICONE) 200-200-20 MG/5ML SUSP Take 5 mLs by mouth daily as needed   Taking     ASPIRIN 81 MG OR TABS 1 TABLET EVERY MORNING   Taking     Benzethonium Chloride 0.1 % LIQD Cleanse with wound cleanser and apply barrier paste and hydrocolloid dressing, change every 2-3 days as needed 237 mL 3 Taking     clindamycin (CLEOCIN T) 1 % lotion Apply twice daily for 6 weeks to the groin 60 mL 1 Taking     COMPRESSION STOCKINGS 1 each continuous prn Bilateral knee high compression stockings 2 each 0 Taking     fluticasone (FLONASE) 50 MCG/ACT spray Spray 1-2 sprays into both nostrils daily 1 Bottle 11 Taking     furosemide (LASIX) 20 MG tablet Take 1 tablet (20 mg) by mouth daily 30 tablet  Taking     furosemide (LASIX) 20 MG tablet Take 1 tablet (20 mg) by mouth daily 180 tablet 3 Taking     Gauze Pads & Dressings (GAUZE BANDAGE 4\") MISC Cleanse with wound cleanser and apply barrier paste and hydrocolloid dressing, change every 2-3 days as needed 5 each 3 Taking     HYDROcodone-acetaminophen (NORCO) 5-325 MG per tablet Take 1 tablet by mouth every 4 hours as needed   Taking     hydrocortisone valerate (WEST-NINOSKA) " 0.2 % ointment Apply sparingly to affected area three times daily as needed. 45 g 3 Taking     Lansoprazole (PREVACID PO) Take 20 mg by mouth every evening as needed Patient needs to use brand name Prevacid (generic version causes diarrhea)    Taking     lidocaine-prilocaine (EMLA) cream Apply 1 Application topically as needed   Taking     loratadine (CLARITIN) 10 MG tablet Take 1 tablet (10 mg) by mouth daily 30 tablet 1 Taking     metoprolol succinate (TOPROL-XL) 100 MG 24 hr tablet Take 1 tablet (100 mg) by mouth daily 90 tablet 3 Past Week at Unknown time     metoprolol succinate (TOPROL-XL) 25 MG 24 hr tablet Take 1 tablet (25 mg) by mouth daily 90 tablet 1 Past Week at Unknown time     Multiple Vitamin (MULTI-VITAMIN DAILY PO) Take 1 tablet by mouth   8/22/2018 at Unknown time     ondansetron (ZOFRAN) 8 MG tablet Take 1 tablet (8 mg) by mouth every 8 hours as needed (Nausea/Vomiting) 10 tablet 3 Taking     order for DME Equipment being ordered: wheeled walker with seat 1 Device 0 Taking     potassium chloride SA (K-DUR/KLOR-CON M) 10 MEQ CR tablet Take 1 tablet (10 mEq) by mouth daily 30 tablet 1 Taking     prochlorperazine (COMPAZINE) 10 MG tablet Take 1 tablet (10 mg) by mouth every 6 hours as needed for nausea or vomiting 30 tablet 1 Taking     rosuvastatin (CRESTOR) 40 MG tablet Take 1 tablet (40 mg) by mouth daily (Patient taking differently: Take 40 mg by mouth every morning ) 90 tablet 3 Taking     zinc oxide - white petrolatum (CRITIC-AID THICK MOIST BARRIER) 20-51% PSTE topical paste Cleanse with wound cleanser and apply barrier paste and hydrocolloid dressing, change every 2-3 days as needed 170 g 3 8/23/2018 at Unknown time     LORazepam (ATIVAN) 0.5 MG tablet Take 1 tablet (0.5 mg) by mouth every 4 hours as needed (Anxiety, Nausea/Vomiting or Sleep) 30 tablet 3 Taking     nitroGLYcerin (NITROSTAT) 0.4 MG sublingual tablet Place 1 tablet (0.4 mg) under the tongue every 5 minutes as needed up to 3  tablets per episode. 60 tablet 6 More than a month at Unknown time       ROS: Pertinent positives were noted in HPI    Physical Exam:  Temp:  [98  F (36.7  C)-98.2  F (36.8  C)] 98.2  F (36.8  C)  Pulse:  [117-124] 117  Resp:  [18] 18  BP: (108-123)/(76-78) 108/78  SpO2:  [89 %-97 %] 97 %    Gen: NAD, resting comfortably in bed  Lungs: CTAB, non-labored breathing 4L  CV: regular rhythm, normal rate  Abd: mild distended, non tender    Labs:  ABG No lab results found in last 7 days.  CBC  Recent Labs  Lab 08/20/18  0900   WBC 11.6*   HGB 11.3*   *     BMP  Recent Labs  Lab 08/20/18  0900      POTASSIUM 4.1   CHLORIDE 104   CO2 24   BUN 15   CR 1.02   *     LFT  Recent Labs  Lab 08/20/18  0900   AST 42   ALT 20   ALKPHOS 91   BILITOTAL 0.2   ALBUMIN 2.3*     PancreasNo lab results found in last 7 days.    Imaging:  Ct chest 08/22        Major Christianson MD  General Surgery PGY-1  [phone]

## 2018-08-24 NOTE — PLAN OF CARE
"Problem: Patient Care Overview  Goal: Plan of Care/Patient Progress Review  Outcome: No Change   08/24/18 0023   OTHER   Plan Of Care Reviewed With patient;spouse   Plan of Care Review   Progress no change       Problem: Breathing Pattern Ineffective (Adult)  Goal: Identify Related Risk Factors and Signs and Symptoms  Related risk factors and signs and symptoms are identified upon initiation of Human Response Clinical Practice Guideline (CPG).   Outcome: No Change   08/24/18 0023   Breathing Pattern Ineffective   Related Risk Factors (Breathing Pattern Ineffective) fatigue;underlying condition   Signs and Symptoms (Breathing Pattern Ineffective) breathing pattern altered       Problem: Chemotherapy Effects (Adult)  Goal: Signs and Symptoms of Listed Potential Problems Will be Absent, Minimized or Managed (Chemotherapy Effects)  Signs and symptoms of listed potential problems will be absent, minimized or managed by discharge/transition of care (reference Chemotherapy Effects (Adult) CPG).   Outcome: No Change   08/24/18 0023   Chemotherapy Effects   Problems Assessed (Chemotherapy Effects) all   Problems Present (Chemotherapy Effects) fatigue;diarrhea     Afebrile, Tmax 99. VSS ex tachy in the 110's, O2 sats 95% on 4L NC. A/O x4. Up with SBA/Assist x1 - calls appropriately. Patient SOB and tachy on exertion. Denies pain, nausea. Voiding normally, pull up on for night time per patient request; one episode of loose stool. Port infusing at 40 ml/hr D5/NS. Patient is NPO; reported not eating anything the \"whole day\" - had two episodes of asymptomatic hypoglycemia with evening lab draw - moonlighter was notified patient was given 25 ml of D50, patient BG responded to a BG of 100; patient was placed on q4 BG's. BG at 2230 was 65, patient was given 25 ml of D50 and BG responded to 112 within 15 minutes moonlighter was notified. Patient triggered lactic, result 1.8.  Small wound on coccyx area, about 2 cm in length - patient " reports that wound is healing, and that they usually apply a barrier cream at home. Wound care consult was requested as wound was noted to have depth to it, no drainage was visualized. No further nursing concerns, continue per POC.

## 2018-08-24 NOTE — DISCHARGE SUMMARY
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    Please see H&P for further detail of history.     Hospital Course   Francisco Javier Cortes was admitted on 8/23/2018.  The following problems were addressed during his hospitalization:      HEME/ONC  # Mantle cell lymphoma (stage IV - B) with malignant ascites, & malignant pleural effusions requiring para & thoracentesis   Follows with Dr. Adan. Presented in March 2018 with complaint of abdominal bloating and discomfort loss of appetite ×2 months. Initial imaging showed splenomegaly, extensive retroperitoneal mesenteric, inguinal and pelvic as well as retrocrural adenopathy and enlarging pulmonary nodules up to 18 mm. FNA proved mantle cell lymphoma. 3/29/18 began Bendamustine and Rituxan (s/p cycle 6 D2 on 8/21 & neulasta 8/22). Oncology treatment has been complicated by multiple hospitalizations (TLS, sepsis from PNA, CHF in April, and now hospitalized 8/23 r/t worsening SOB, bilateral pleural effusions & persistent chylothorax unable to be managed outpatient). Most recent CT imaging 8/22 with a few slightly larger left axillary and mediastinal lymph nodes since CT 6/21/2018, concerning for possible disease progression. Increased large right and moderate left pleural effusions with associated atelectasis/consolidation.   - Has BMBx on 8/28 along with GO visit  -  F/u with Dr. Adan regarding treatment decisions scheduled for 9/17    # Malignant Chylous duct leak / Chylothorax (bilateral)   Requiring frequent thoracentesis procedures. S/p SPECT-CT 8/8/18, but no evidence of pleural chylous leak, thoracic duct was said to be intact. Had met with Dr. Welch outpatient for consideration of thoracic duct ligation vs pleurx. Per notes, Dr. Estela Rasmussen (IR) was planning to meet with pt outpatient to discuss thoracic duct ligation vs embolization. Thoracic surgery consulted for consideration of surgical intervention, but felt that surgical intervention in the chest is unlikely to be of benefit given proximal location of leakage indicated by the presence of ascites. Recommended follow-up with IR. Underwent right sided thoracentesis on 8/24 with removal of 1.5L and subsequent improvement in breathing.   - Due to inability to be managed outpatient, patient will now require home oxygen supplementation - ~4L supplemental O2 via nasal cannula. Oxygen arranged and will be delivered to patient in hospital prior to discharge.   - Follow-up with Dr. Rasmussen (IR) on 8/29/18  - Scheduled for thoracentesis on 8/28, 8/31 and 9/7. Will likely need weekly taps to manage symptoms pending IR intervention (ligation vs embolization vs pleurx).       # Malignant ascites  S/p CT scan (8/22 showed significant bilateral pleural effusions & ascites) last had paracentesis 7/27, 8/16 (flow at that time positive for involvement of B cell lymphoma)  - Consider paracentesis prn for symptoms     # Leukocytosis   Likely r/t neulasta 8/22. S/p vanc zosyn in ER in [place] but not febrile, not hypoxic, BP corrected with IVF, not neutropenic.      CARDs/PULM  # HTN, HLD, CAD & HFpEF.   He is s/p stenting RCA in 2009, recurrent chest pain showed left main minimal disease, LAD 40% distal stenosis, circumflex 90% stenosis, RCA patent stents & at that time had drug eluding stent in circumflex CA. PTA was on  aspirin 81 mg PO daily, lasix 20 mg daily, crestor 40 mg, and was on Metoprolol but had been holding it outpatient r/t dizziness  - Due to inability to be managed outpatient, patient will now require home oxygen supplementation as stated above  - Continue PTA Lasix 20 mg daily   - HOLD Metoprolol with soft BP      # Indeterminate pulmonary nodules was following with Dincer outpatient      MISCELLANEOUS  # GERD, PUP.  - Continue PTA prevascid      #Discharge Pain Plan  - Patient currently has NO PAIN and is not being prescribed pain medications on discharge.      Dispo: Discharged to home on 8/24 with supplemental oxygen    Follow-up: Has f/u with Joy Bush on 8/28 for a BMBx. Also has thoracentesis procedures scheduled for 8/28, 8/31 and 9/7. Will see Dr. Rasmussen (IR) on 8/29.       Above plan discussed with staff physician, Dr. Ximena Rogers PA-C  Hematology/Oncology  Pager: [phone]    Significant Results and Procedures   Results for orders placed or performed during the hospital encounter of 08/23/18   XR Chest Port 1 View    Narrative    XR CHEST PORT 1 VW  8/24/2018 12:23 PM      HISTORY: post thoracentesis evaluation;     COMPARISON: 8/22/2018    FINDINGS: Portable AP view the chest. The left costophrenic angles,  new from the field-of-view of this study. Right Port-A-Cath with tip  in the right atrium. Decreased layering pleural effusions with  overlying atelectasis. Heart size is normal.      Impression    IMPRESSION: Decreased layering pleural effusions with overlying  atelectasis. Evaluation for pneumothorax is difficult given supine  positioning and collimation.    I have personally reviewed the examination and initial interpretation  and I agree with the findings.    SHARATH VALLADARES MD     Pending Results   These results will be followed up by in clinic   Unresulted Labs Ordered in the Past 30 Days of this Admission     Date and Time Order Name Status Description    8/24/2018  0934 Leukemia Lymphoma Evaluation (Flow Cytometry) In process     8/24/2018 0934 Gram stain In process     8/24/2018 0934 Fluid Culture Aerobic Bacterial In process     8/24/2018 0934 Cell count with differential fluid In process     8/23/2018 1648 Blood culture Preliminary     8/23/2018 1454 Urine Culture Aerobic Bacterial Preliminary           Code Status   Full Code    Primary Care Physician   Florian Alonzo    Physical Exam   Temp: 98.1  F (36.7  C) Temp src: Axillary BP: 99/69 Pulse: 117 Heart Rate: 111 Resp: 18 SpO2: 92 % O2 Device: None (Room air) Oxygen Delivery: 4 LPM  Vitals:    08/24/18 0759   Weight: 68.4 kg (150 lb 12.8 oz)     Vital Signs with Ranges  Temp:  [97.1  F (36.2  C)-99.1  F (37.3  C)] 98.1  F (36.7  C)  Pulse:  [117] 117  Heart Rate:  [108-112] 111  Resp:  [18] 18  BP: ()/(62-78) 99/69  SpO2:  [92 %-97 %] 92 %  I/O last 3 completed shifts:  In: 390 [I.V.:390]  Out: 100 [Urine:100]    Time Spent on this Encounter   I, Marisel Rogers, personally saw the patient today and spent greater than 30 minutes discharging this patient.    Discharge Disposition   Discharged to home  Condition at discharge: Stable    Consultations This Hospital Stay   THORACIC SURGERY ADULT IP CONSULT  INTERNAL MEDICINE PROCEDURE TEAM ADULT IP CONSULT [facility] - THORACENTESIS  INTERVENTIONAL RADIOLOGY ADULT/PEDS IP CONSULT    Discharge Orders     IR Thoracentesis     IR Thoracentesis     IR Thoracentesis     US Thoracentesis     CBC with platelets and differential   Last Lab Result: Hemoglobin (g/dL)      Date                     Value                08/24/2018               10.1 (L)         ----------     **Comprehensive metabolic panel FUTURE anytime     Reason for your hospital stay   You were admitted with worsening shortness of breath due to underlying pleural effusions. You had a thoracentesis done on your right lung with subsequent improvement in your shortness of breath. The Thoracic Surgery team and  Interventional Radiology discussed intervention for your chylothorax and have scheduled an appointment with Dr. Rasmussen to discuss the options with you and your family.     Adult [facility] Follow-up and recommended labs and tests   -- You have a bone marrow biopsy and appointment with Joy on Tuesday (8/28).   -- I scheduled a thoracentesis tap at [facility] on Tuesday (8/28) at 12:30 (after your bone marrow biopsy)  -- You will see Dr. Rasmussen from Interventional Radiology on Wednesday (8/29)  -- In anticipation of you needing frequent fluid taps, I scheduled a thoracentesis at the [facility] on 8/31 and 9/7.    Appointments on [place] and/or [facility] (with [facility] or [facility] provider or service). Call [phone] if you haven't heard regarding these appointments within 7 days of discharge.    See detailed appointment dates/times/location below:     Activity   Your activity upon discharge: Regular activity as tolerated.     When to contact your care team   Call the [facility] 24-hour triage line at [phone] or [phone] for temp >100.4, uncontrolled nausea/vomiting/diarrhea/constipation, unrelieved pain, bleeding not relieved with pressure, dizziness, chest pain, shortness of breath, loss of consciousness, and any new or concerning symptoms.     Call [phone] and ask for the care coordinator that works with your oncologist if you have questions about scans, appointments, hospital follow-up, or other concerns.     Discharge Instructions   -- Please continue to take your Lasix daily. IF you are feeling dizzy or weak, do not take this as it can low your blood pressure.   -- STOP taking Metoprolol.     Diet   Follow this diet upon discharge: Regular diet as tolerated.       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 (H)      Alum & Mag Hydroxide-Simeth (ALUMINUM-MAGNESIUM-SIMETHICONE) 200-200-20 MG/5ML SUSP Take 5 mLs by mouth daily as needed      ASPIRIN 81 MG OR TABS 1 TABLET EVERY MORNING      Benzethonium Chloride 0.1 % LIQD Cleanse with wound cleanser and apply barrier paste and hydrocolloid dressing, change every 2-3 days as needed  Qty: 237 mL, Refills: 3    Associated Diagnoses: Decubitus ulcer of buttock, unspecified laterality, unspecified ulcer stage      clindamycin (CLEOCIN T) 1 % lotion Apply twice daily for 6 weeks to the groin  Qty: 60 mL, Refills: 1    Associated Diagnoses: Rash      COMPRESSION STOCKINGS 1 each continuous prn Bilateral knee high compression stockings  Qty: 2 each, Refills: 0    Associated Diagnoses: Acute congestive heart failure, unspecified congestive heart failure type (H)      fluticasone (FLONASE) 50 MCG/ACT spray Spray 1-2 sprays into both nostrils daily  Qty: 1 Bottle, Refills: 11    Associated Diagnoses: Postnasal discharge      !! 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 furosemide (LASIX) 20 MG tablet Take 1 tablet (20 mg) by mouth daily  Qty: 180 tablet, Refills: 3    Associated Diagnoses: Hypervolemia, unspecified hypervolemia type      Gauze Pads & Dressings (GAUZE BANDAGE 4\") MISC Cleanse with wound cleanser and apply barrier paste and hydrocolloid dressing, change every 2-3 days as needed  Qty: 5 each, Refills: 3    Associated Diagnoses: Decubitus ulcer of buttock, unspecified laterality, unspecified ulcer stage      HYDROcodone-acetaminophen (NORCO) 5-325 MG per " tablet Take 1 tablet by mouth every 4 hours as needed      hydrocortisone valerate (WEST-NINOSKA) 0.2 % ointment Apply sparingly to affected area three times daily as needed.  Qty: 45 g, Refills: 3    Associated Diagnoses: Psoriasis      Lansoprazole (PREVACID PO) Take 20 mg by mouth every evening as needed Patient needs to use brand name Prevacid (generic version causes diarrhea)       lidocaine-prilocaine (EMLA) cream Apply 1 Application topically as needed      loratadine (CLARITIN) 10 MG tablet Take 1 tablet (10 mg) by mouth daily  Qty: 30 tablet, Refills: 1    Associated Diagnoses: Postnasal discharge      Multiple Vitamin (MULTI-VITAMIN DAILY PO) Take 1 tablet by mouth      ondansetron (ZOFRAN) 8 MG tablet Take 1 tablet (8 mg) by mouth every 8 hours as needed (Nausea/Vomiting)  Qty: 10 tablet, Refills: 3    Associated Diagnoses: 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 (H)      order for DME Equipment being ordered: wheeled walker with seat  Qty: 1 Device, Refills: 0    Associated Diagnoses: Generalized muscle weakness; 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 (H)      potassium chloride SA (K-DUR/KLOR-CON M) 10 MEQ CR tablet Take 1 tablet (10 mEq) by mouth daily  Qty: 30 tablet, Refills: 1    Associated Diagnoses: Hypervolemia, unspecified hypervolemia type      prochlorperazine (COMPAZINE) 10 MG tablet Take 1 tablet (10 mg) by mouth every 6 hours as needed for nausea or vomiting  Qty: 30 tablet, Refills: 1    Associated Diagnoses: Nausea      rosuvastatin (CRESTOR) 40 MG tablet Take 1 tablet (40 mg) by mouth daily  Qty: 90 tablet, Refills: 3    Associated Diagnoses: Hyperlipidemia LDL goal <100; Coronary artery disease involving native coronary artery of native heart without angina pectoris      zinc oxide - white petrolatum (CRITIC-AID THICK MOIST BARRIER) 20-51% PSTE topical paste Cleanse with wound cleanser and apply barrier paste and hydrocolloid dressing, change every 2-3 days as  needed  Qty: 170 g, Refills: 3    Associated Diagnoses: Decubitus ulcer of buttock, unspecified laterality, unspecified ulcer stage      LORazepam (ATIVAN) 0.5 MG tablet Take 1 tablet (0.5 mg) by mouth every 4 hours as needed (Anxiety, Nausea/Vomiting or Sleep)  Qty: 30 tablet, Refills: 3    Associated Diagnoses: 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  Qty: 60 tablet, Refills: 6    Associated Diagnoses: Chest pain due to myocardial ischemia, unspecified ischemic chest pain type (H); Coronary artery disease involving native coronary artery of native heart without angina pectoris       !! - Potential duplicate medications found. Please discuss with provider.      STOP taking these medications       metoprolol succinate (TOPROL-XL) 100 MG 24 hr tablet Comments:   Reason for Stopping:         metoprolol succinate (TOPROL-XL) 25 MG 24 hr tablet Comments:   Reason for Stopping:             Allergies   Allergies   Allergen Reactions     Niacin      Severe rash and itching     Prevacid [Lansoprazole] Diarrhea     Patient notes diarrhea with 30mg generic version. Patient does ok with 20mg in generic and brand name.     Shellfish Allergy      One kind     Data   ROUTINE IP LABS (Last four results)  BMP  Recent Labs  Lab 08/24/18 0245 08/23/18 1714 08/20/18  0900    137 138   POTASSIUM 4.1 4.4 4.1   CHLORIDE 107 106 104   EVANGELINA 7.6* 7.6* 8.2*   CO2 24 21 24   BUN 24 24 15   CR 1.22 1.24 1.02   GLC 76 62* 118*     CBC  Recent Labs  Lab 08/24/18 0245 08/23/18  1714 08/20/18  0900   WBC 30.7* 35.1* 11.6*   RBC 2.97* 3.12* 3.36*   HGB 10.1* 10.5* 11.3*   HCT 30.2* 31.9* 34.2*   * 102* 102*   MCH 34.0* 33.7* 33.6*   MCHC 33.4 32.9 33.0   RDW 16.6* 16.4* 15.9*   * 124* 140*     INR  Recent Labs  Lab 08/23/18 1714   INR 1.05

## 2018-08-24 NOTE — PLAN OF CARE
"Problem: Breathing Pattern Ineffective (Adult)  Goal: Identify Related Risk Factors and Signs and Symptoms  Related risk factors and signs and symptoms are identified upon initiation of Human Response Clinical Practice Guideline (CPG).   Outcome: No Change  /70 (BP Location: Right arm)  Pulse 117  Temp 97.9  F (36.6  C) (Axillary)  Resp 18  Wt 68.4 kg (150 lb 12.8 oz)  SpO2 94%  BMI 25.09 kg/m2 02 needs are labile. Pt goes up and down on 02 saturations on room air. Pt said it takes him a long time to \"catch up\". Using IS well at bedside and wife and son are very supportive of him. Pt is looking forward to going home today. Continue to monitor pt    08/24/18 7944   Breathing Pattern Ineffective   Signs and Symptoms (Breathing Pattern Ineffective) breath sounds abnormal     .      "

## 2018-08-24 NOTE — PROGRESS NOTES
"CLINICAL NUTRITION SERVICES - ASSESSMENT NOTE     Nutrition Prescription    RECOMMENDATIONS FOR MDs/PROVIDERS TO ORDER:  Diet advancement within 3-5 days vs nutrition support.     Malnutrition Status:    Non-severe malnutrition in the context of chronic illness    Recommendations already ordered by Registered Dietitian (RD):  None at this time.     Future/Additional Recommendations:  1.  Diet per providers as medically appropriate. Once diet to CLs, order Boost Breeze BID between meals.   2.  Monitor intake when diet is adv. If eating poorly, start calorie counts.   3.  May pursue WOC RN consult if deem pt has PI. Offer micronutrients if has a staged wound.   3.  PN rec, if needed: use dosing wt 65 kg, 1080 mL with initial 100g Dex, 100g AA, and lipids 5x/wk. Advance Dex gradually to goal of 255g Dex = 1624 kcals (25 kcal/kg), 1.5 g PRO/kg, GIR 2.7, 22% fat kcals.  4.  EN rec, if needed: TwoCal HN @ 40 mL/hr = 1920 kcals (30 kcal/kg), 81 g PRO (1.2 g/kg), 672 mL H2O, 210 g CHO and 5 g Fiber daily.         REASON FOR ASSESSMENT  Francisco Javier Cortes is a/an 78 year old male assessed by the dietitian for Admission Nutrition Risk Screen for reduced oral intake over the last month and stageable pressure injuries or large/non-healing wound or burn    NUTRITION HISTORY  Information obtained from the pt.   - Following a general/regular diet at home.   - Pt reports a decreased appetite x6 months. States his appetite takes 2 weeks to recover following chemo.   - Reports eating 2 smaller meals/day and 1 Boost \"clear\". Pt states that he was told to follow a low fat diet at home.     CURRENT NUTRITION ORDERS  Diet: NPO    LABS  Labs reviewed    MEDICATIONS  Medications reviewed    ANTHROPOMETRICS  Ht Readings from Last 1 Encounters:   08/22/18 1.651 m (5' 5\")   IBW: 61.8 kg  BMI: Overweight BMI 25-29.9  Weight History: Weight down ~12% the past 4 months.   Wt Readings from Last 25 Encounters:   08/22/18 68.5 kg (151 lb 1.6 oz) "   08/22/18 68.8 kg (151 lb 11.2 oz)   08/20/18 64.7 kg (142 lb 11.2 oz)   08/08/18 67.1 kg (148 lb)   08/03/18 67.1 kg (148 lb)   07/23/18 72.1 kg (159 lb 0.3 oz)   07/17/18 71.5 kg (157 lb 11.2 oz)   07/16/18 71.3 kg (157 lb 1.6 oz)   07/10/18 69.9 kg (154 lb)   07/05/18 72.1 kg (158 lb 14.4 oz)   06/26/18 72.6 kg (160 lb)   06/25/18 72.7 kg (160 lb 4.8 oz)   05/29/18 71.2 kg (157 lb)   05/14/18 67.6 kg (149 lb)   05/01/18 73 kg (161 lb)   04/30/18 72.6 kg (160 lb)   04/23/18 72.6 kg (160 lb)   04/21/18 73.5 kg (162 lb 1.6 oz)   04/16/18 79.8 kg (176 lb)   04/14/18 81 kg (178 lb 9.6 oz)   04/09/18 75.1 kg (165 lb 8 oz)   04/02/18 82.8 kg (182 lb 8 oz)   04/01/18 83.8 kg (184 lb 12.8 oz)   03/29/18 78 kg (172 lb)   03/22/18 73.5 kg (162 lb 0.6 oz)       Dosing Weight: 65 kg (actual) - driest recent wt of 64.7 kg (8/20/18)    ASSESSED NUTRITION NEEDS  Estimated Energy Needs: 0923-6278 kcals/day (25 - 30 kcals/kg)  Justification: Maintenance  Estimated Protein Needs: 78-98 grams protein/day (1.2 - 1.5 grams of pro/kg)  Justification: Hypercatabolism with acute-on-chronic illness; repletion  Estimated Fluid Needs: Per provider pending fluid status    PHYSICAL FINDINGS  See malnutrition section below.  Pt stated he has a sore on his butt (no WOC RN consulted at this time).     MALNUTRITION  % Intake: < 75% for >/= 3 months (non-severe)  % Weight Loss: > 10% in 6 months (severe)   Subcutaneous Fat Loss: Facial region, Upper arm, Lower arm, and Thoracic/intercostal:  Mild  Muscle Loss: Facial & jaw region, Upper arm (bicep, tricep), Lower arm  (forearm), Upper leg (quadricep, hamstring) and Patellar region:  Mild-moderate  Fluid Accumulation/Edema: None noted  Malnutrition Diagnosis: Non-severe malnutrition in the context of chronic illness    NUTRITION DIAGNOSIS  Inadequate oral intake related to decreased appetite d/t chemo, resp status, NPO status as evidenced by NPO since admission, suspect eating <75% of estimated  needs for >3 months      INTERVENTIONS  Implementation  Nutrition Education: Ongoing encouragement to eat high kcal/pro meals/supplements when diet is restarted.      Goals  Diet adv v nutrition support within 3-5 days.     Monitoring/Evaluation  Progress toward goals will be monitored and evaluated per protocol.    Segun Edmonds RD, LD  5C/BMT Dietitian  Pager: [phone]

## 2018-08-24 NOTE — PROGRESS NOTES
Received intake call for home oxygen at 3:50pm. Reviewed patient's chart; Patient qualifies under Medicare guidelines and all documentation is in the chart except signed notes that discuss the need for oxygen  Spoke with care coordinator,Xiomy multiple times in the afternoon to see if we had a good diagnosis for Medicare oxygen coverage. We were able to find CHF. She was going to have the provider enter the order, and addend the discharge summaries to discuss need for oxygen and then we can deliver the tank for discharge.   3:50pm- Called to offer choice and patient is okay with [facility] setting them up. Discussed equipment with patient and informed them that we would be to bedside with oxygen in the next 2 hours, once we had all the required paperwork. He understood.  4:35pm- Notes included talk of oxygen, but weren't signed. Was able to get a hold of the provider and she said she was finishing up now.    4:55pm- Headed to hospital with transport tank.

## 2018-08-24 NOTE — PLAN OF CARE
Problem: Breathing Pattern Ineffective (Adult)  Goal: Identify Related Risk Factors and Signs and Symptoms  Related risk factors and signs and symptoms are identified upon initiation of Human Response Clinical Practice Guideline (CPG).   A/O no confusion noted. Test pt on room air for discharge 02 sat low right now was 85% on room air. GO, notified. Pt was given incentive inspirometer to use and he said he knows how to use it. Pt seems comfortable at rest but noticed slight SOB. Continue to monitor pt and continue with plan of care.   08/24/18 1505   Breathing Pattern Ineffective   Related Risk Factors (Breathing Pattern Ineffective) fatigue;underlying condition   Signs and Symptoms (Breathing Pattern Ineffective) accessory muscle use

## 2018-08-24 NOTE — PROGRESS NOTES
Care Coordinator - Discharge Planning    Admission Date/Time:  8/23/2018  Attending MD:  Rajwinder Bueno MD     Data  Date of initial CC assessment:   Chart reviewed, discussed with interdisciplinary team.   Patient was admitted for:   1. Pleural effusion    2. Bilateral chylothorax    3. 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  Home oxygen orders entered by provider.     Referral made to [facility] who will outreach to pt and offer choice of DME providers.   Spoke briefly with pt spouse who was with pt via phone regarding plan for discharge.  No concerns noted. With discharge plan.      Coordination of Care and Referrals: Provided patient/family with options for DME.      Plan  Anticipated Discharge Date:  8/24  Anticipated Discharge Plan:  Home    CTS Handoff completed:  YES        Xiomy Lua RN BSN, PHN [place] RN Care Coordinator covering for 7D RN Care Coordinator  [email or web address]  Pager [phone]  8/24/2018 3:58 PM

## 2018-08-24 NOTE — PROGRESS NOTES
Oxygen saturations were completed at rest and with activity.     Resting saturation on RA: 88%  Resting saturation on O2: 93-95% on 4LPM via Nasal Canula.    Walking saturation on RA 89%  Walking saturation on O2: 94% on 2 LPM via Nasal Canula.     Patient was able to walk 30 ft and tolerated okay with some slight shortness of breath

## 2018-08-24 NOTE — PROCEDURES
PROCEDURE : Ted Herrera MD    Informed consent was obtained from the patient prior to the procedure. Indications, risks, and benefits were explained at length.     A time out was performed. The patient was positioned in a sitting position, & the right back was prepped & draped in a sterile manner using chlorhexidine scrub after the appropriate level was confirmed by ultrasound. 1% lidocaine was used to numb the region. A finder needle was then used to attempt to locate fluid. A 10-blade scalpel used to make the incision. The thoracentesis catheter was then threaded without difficulty. 1500 ml of cloudy yellow fluid was removed; thoracentesis catheter was withdrawn & the incision site was covered w/ a band-aid. No immediate complications were noted during the procedure. A sample of pleural fluid was sent for analysis.    Dr. Soto was present during the procedure.    Ted Herrera MD  Internal Medicine PGY-3  [phone]

## 2018-08-24 NOTE — PROGRESS NOTES
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  Thoracic surgery was consulted for possible VATS thoracic duct ligation after failed outpatient management.      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

## 2018-08-25 NOTE — PROGRESS NOTES
Pt discharged to: Home  Via: Care  Accompanied by: Wife and adult son  Belongings: clothing and cell phone carried out by pt  Teaching: Walked pt and wife through after visit summary and reviewed medications ( no new meds, but reiterated multiple time to pt need to stop Metoprolol)  Clinic appointment: Pt has f/u appointment in oncology clinic [place] on Tuesday @ 815 and multiple additional appointments for thoracenteses and f/u care scheduled through 9/4/2018- reviewed w/ pt and wife  Report called/faxed: Pt has referral for [facility]. Tank was delivered to pt's room prior to discharge for transport home and author assisted pt and wife to contact home oxygen to verify delivery timing of home condenser for this evening  Local housing: Pt and wife live in [place], [place]

## 2018-08-25 NOTE — PLAN OF CARE
Problem: Patient Care Overview  Goal: Plan of Care/Patient Progress Review  Outcome: Adequate for Discharge Date Met: 08/24/18 08/24/18 1700   OTHER   Plan Of Care Reviewed With patient;spouse;son   Plan of Care Review   Progress improving     Afebrile. Tachycardic in 110's. COLE w/ desats to 85-86 w/ activity on RA. When @ rest 91-94% on RA. Thoracentesis dressing is C/D/I. Denies pain, N/V/D/C. Apple juice uop[n return to unit to maintain BG (107 @ 1450). Declines to eat as intending to discharge to home this evening and would prefer to eat then. Denies N/V. Reports loose stool this morn, but not since. Wife and son present. Arranging for home oxygen and will complete teaching when paperwork and orders complete. Will continue to monitor per POC.      Problem: Breathing Pattern Ineffective (Adult)  Goal: Identify Related Risk Factors and Signs and Symptoms  Related risk factors and signs and symptoms are identified upon initiation of Human Response Clinical Practice Guideline (CPG).   Outcome: Improving   08/24/18 1700   Breathing Pattern Ineffective   Related Risk Factors (Breathing Pattern Ineffective) fatigue;underlying condition   Signs and Symptoms (Breathing Pattern Ineffective) activity intolerance;appetite change;breathing pattern altered     Goal: Effective Oxygenation/Ventilation  Patient will demonstrate the desired outcomes by discharge/transition of care.   Outcome: Improving   08/24/18 1700   Breathing Pattern Ineffective (Adult)   Effective Oxygenation/Ventilation making progress toward outcome     Goal: Anxiety/Fear Reduction  Patient will demonstrate the desired outcomes by discharge/transition of care.   Outcome: Adequate for Discharge Date Met: 08/24/18 08/24/18 1700   Breathing Pattern Ineffective (Adult)   Anxiety/Fear Reduction achieves outcome       Problem: Diabetes Comorbidity  Goal: Diabetes  Patient comorbidity will be monitored for signs and symptoms of hyperglycemia or hypoglycemia.  Problems will be absent, minimized or managed by discharge/transition of care.   Outcome: Improving  Pt's BG stable once NPO status removed. Not interested in eating, but had apple juice to maintain BG.

## 2018-08-27 PROBLEM — I48.91 ATRIAL FIBRILLATION (H): Status: ACTIVE | Noted: 2018-01-01

## 2018-08-27 NOTE — PROGRESS NOTES
Interventional Radiology Intra-procedural Nursing Note    Patient Name: Francisco Javier Cortes  Medical Record Number: [id number]  Today's Date: August 27, 2018         Start Time: 1400  End of procedure time: 1515  Procedure: Bilateral thoracentesis and paracentesis  Fire Safety Score: 2    Consent Review/Timeout Performed by: Dr. Andre Hernández/ Dr. Carlos Alberto Cabral   Procedure Performed By: Dr. Carlos Alberto Cabral    Procedure start time: 1400  Puncture time: 1405  Procedure end time: 1515    Report given to: Rubin RN  Time pt departs:  1530    Other Notes:  Alert male transported via cart from ED 22 to IR Procedure Room 1 for planned intervention.  ID band confirmed and patient acknowledges understanding of planned procedure. Patient repositioned to procedure table via hover-mat and positioned supine.  Patient prepped and draped per policy see VS flowsheet, MAR for further information.       Right abdominal site identified using image guidance.  A total of 4500 ml of pink cloudy fluid obtained and specimen sent to lab for evaluation.    Right side thoracentesis performed with puncture  at right posterior back.  A total of 1000 ml of straw yellow fluid obtained.    Left side thoracentesis performed with puncture at left posterior back.  A total of 1000 ml of pinkish tinged yellow fluid obtained.    Patient condition post procedure is stable.   Patient returned to ED for post-procedure monitoring and continuation of care.    Gail Tamez RN

## 2018-08-27 NOTE — PROGRESS NOTES
Admission          8/27/2018  9:49 AM  -----------------------------------------------------------  Reason for admission: SOB, HR A. Fib with RVR  Primary team notified of pt arrival.  Admitted from: ED  Via: stretcher  Accompanied by: family  Belongings: Placed in closet; valuables sent home with family  Admission Profile: complete  Teaching: orientation to unit and call light- call light within reach, call don't fall, use of console, meal times, when to call for the RN, and enforced importance of safety   Access: R Port  Telemetry:Placed on pt  Ht./Wt.:  Bed not zero'd, need weight  2 RN Skin Assessment Completed By: Martha RN and Obdulia RN  Pt status: pt A&O x 4, denies any pain.  No SOB at rest, states relief since admission.  Had bilateral thoracentesis and paracentesis.  BP soft, 's.  On 4L NC, per home use.  Pressure ulcer noted to coccyx, pt stated has been treating at home with barrier cream.  Spoke to Dr. Carrington about pt soft BP, wondering about albumin or fluid bolus.  Orders received for 500cc LR bolus.  Will attempt to start Dilt gtt after fluids given.  Family at bedside.  Continue with plan of care.     Temp:  [97.9  F (36.6  C)-98.3  F (36.8  C)] 98.3  F (36.8  C)  Pulse:  [126-133] 128  Heart Rate:  [112-144] 113  Resp:  [10-36] 22  BP: ()/(51-82) 90/67  SpO2:  [90 %-100 %] 94 %     Comments: 1) HR controlled < 120 bpm -HR currently 113  2) BP > 90 systolically - BP 90/67 currently  3) pt without cardiac symptoms, (chest pain, SOB, dizziness)-denies symptoms at this time.

## 2018-08-27 NOTE — H&P
[facility], [place]    History and Physical  Hematology / Oncology     Date of Admission:  8/27/2018    Assessment & Plan   Francisco Javier Cortes is a 78 year old male with a history of stage IV B Mantle cell lymphoma w/malignant ascites and pleural effusions (currently on chemotherapy), HTN, CAD s/p stent placement (2014), HFpEF, HTN, prostate cancer s/p prostatectomy, and GERD who is being admitted for observation s/p thoracentesis and paracentesis with new episode of afib with RVR.     Afib with RVR. Pt presents with increased SOB, s/p paracentesis and b/l thoracentesis. Patient reports he has had episodes of afib in the past and has responded well to fluids, though unclear per chart review if patient has had previous episodes of afib. due to age and comorbidities, unlikely a candidate for cardioversion.  Will focus on rate control. Patient is asymptomatic, denies dizziness, increased SOB, chest pain, nausea/vomiting.  HR 120s-140s, BPs 80s-130s systolically.   - will initiate diltiazem gtt for rate control  - 500 ml LR bolus   - monitor VS overnight   - telemetry   - consider cards consult if not controlled with dilt vs f/u with outpatient cardiology     SOB.   Malignant pleural effusions b/l vs chylothorax.   Requiring frequent thoracentesis procedures. S/p SPECT-CT 8/8/18, but no evidence of pleural chylous leak, thoracic duct was said to be intact. Had met with Dr. Welch outpatient for consideration of thoracic duct ligation vs pleurx. Per notes, Dr. Estela Rasmussen (IR) was planning to meet with pt outpatient to discuss thoracic duct ligation vs embolization. Thoracic surgery consulted for consideration of surgical intervention, but felt that surgical intervention in the chest is unlikely to be of benefit given proximal location of leakage indicated by the presence of ascites. Recommended follow-up with IR. Underwent right sided thoracentesis on 8/24 with removal of 1.5L and subsequent  improvement in breathing. He was discharged from hospital with home O2 (~ 4 LNC) and plan to follow up with IR for weekly taps vs IR intervention (ligation vs embolization vs pleurex).  Pt presented with increased SOB again today, went to IR today for possible chest tube vs pleurX placement, but was decided against at this time.    - b/l thoracentesis performed with studies sent to r/o chylothorax   - supplemental O2 to keep sats > 92 % as needed   - will follow up with IR/thoracic tomorrow for plan moving forward for malignant effusions.        CAD, s/p stent placement (2014)   HTN  HFpEF  He is s/p stenting RCA in 2009, recurrent chest pain showed left main minimal disease, LAD 40% distal stenosis, circumflex 90% stenosis, RCA patent stents & at that time had drug eluding stent in circumflex CA. PTA on lasix 20mg daily, ASA, prevacid, rosuvastatin.    - will HOLD lasix 2/2 labile BPs until rate controlled   - cont asa, prevacid, statin   - consider cardiology consult at discharge     # Indeterminate pulmonary nodules was following with [facility] outpatient    Malignant Ascites. S/p CT scan (8/22 showed significant bilateral pleural effusions & ascites) last had paracentesis 8/16, 7/27 (flow at that time positive for involvement of B cell lymphoma)  - s/p paracentesis today 8/27, ~ 4.5 L drained, cultures sent     # Mantle cell lymphoma (stage IV - B) with malignant ascites, & malignant pleural effusions requiring para & thoracentesis   Follows with Dr. Adan. Presented in March 2018 with complaint of abdominal bloating and discomfort loss of appetite ×2 months. Initial imaging showed splenomegaly, extensive retroperitoneal mesenteric, inguinal and pelvic as well as retrocrural adenopathy and enlarging pulmonary nodules up to 18 mm. FNA proved mantle cell lymphoma. 3/29/18 began Bendamustine and Rituxan (s/p cycle 6 D2 on 8/21 & neulasta 8/22). Oncology treatment has been complicated by multiple hospitalizations (TLS,  sepsis from PNA, CHF in April, and now hospitalized 8/23 r/t worsening SOB, bilateral pleural effusions & persistent chylothorax unable to be managed outpatient). Most recent CT imaging 8/22 with a few slightly larger left axillary and mediastinal lymph nodes since CT 6/21/2018, concerning for possible disease progression. Increased large right and moderate left pleural effusions with associated atelectasis/consolidation.      # Pain Assessment:  Current Pain Score 8/24/2018   Patient currently in pain? denies   Pain score (0-10) -   Pain location -   Pain descriptors -   Francisco Javier diane pain level was assessed and he currently denies pain.        FEN  - regular diet   - lyte replacement PRN (high protocol to keep Mg>2 , K>4 in setting of afib)   - IVF prn     Ppx:   VTE: mechanical only, no anticoagulation per Dr. Bueno    Discussed with staff attending, Dr. Bueno.     Johana Tadeo, DNP, APRN, CNP  Hematology/oncology  [id number]    Code Status   Full Code    Primary Care Physician   Florian Alonzo    Chief Complaint       History is obtained from the patient and electronic health record    History of Present Illness   Francisco Javier Cortes is a 78 year old male who presents with increased SOB due to malignant pleural effusions, ascites fluid.  Had b/l thoracentesis and paracentesis performed, with 1 L out of each lung, paracentesis with 4.5 L out. Unfortunately EKG with afib with RVR after procedures. Pt reports feeing much better after procedures. Denies headaches, chest pain, SOB, nausea/vomiting.  Denies dizziness. Reports recent diarrhea, but none in past 24 hours.  Denies recent fevers at home, chills, night sweats.  No increase in b/l lower extremity swelling.     Past Medical History    I have reviewed this patient's medical history and updated it with pertinent information if needed.   Past Medical History:   Diagnosis Date     CAD (coronary artery disease) 1/09    s/p stentsx2     Coronary artery disease involving  native coronary artery of native heart without angina pectoris 12/22/2015     Dyslipidemia      Erectile dysfunction      GERD (gastroesophageal reflux disease)      Hearing problem One ear 1961     Hypertension      NSTEMI (non-ST elevated myocardial infarction) (H) 3/20/2014     Pneumonia      Prostate cancer (H)     prostatecomy 9 years ago, PSAs remain good     Status post percutaneous transluminal coronary angioplasty-Coronary angioplasty with STEPHEN to LCx 4/4/2014     Stented coronary artery     stents placed       Past Surgical History   I have reviewed this patient's surgical history and updated it with pertinent information if needed.  Past Surgical History:   Procedure Laterality Date     BIOPSY LYMPH NODE CERVICAL Left 3/22/2018    Procedure: BIOPSY LYMPH NODE CERVICAL;  Excisional Biopsy Left Cervical Lymph Node ;  Surgeon: Samia Gaviria MD;  Location: [facility] OR     C APPENDECTOMY       C REMV PROSTATE,RETROPUB,RAD,TOT NODES  1999     CATARACT IOL, RT/LT       COLONOSCOPY       COLONOSCOPY Left 7/5/2016    Procedure: COMBINED COLONOSCOPY, SINGLE OR MULTIPLE BIOPSY/POLYPECTOMY BY BIOPSY;  Surgeon: Duane, William Charles, MD;  Location: [facility] OR     COLONOSCOPY WITH CO2 INSUFFLATION N/A 7/5/2016    Procedure: COLONOSCOPY WITH CO2 INSUFFLATION;  Surgeon: Duane, William Charles, MD;  Location: [facility] OR     ENT SURGERY  1980--1999     PHACOEMULSIFICATION CLEAR CORNEA WITH STANDARD INTRAOCULAR LENS IMPLANT Left 6/18/2015    Procedure: PHACOEMULSIFICATION CLEAR CORNEA WITH STANDARD INTRAOCULAR LENS IMPLANT;  Surgeon: John Zimmerman MD;  Location:  [facility]     PHACOEMULSIFICATION CLEAR CORNEA WITH STANDARD INTRAOCULAR LENS IMPLANT Right 7/16/2015    Procedure: PHACOEMULSIFICATION CLEAR CORNEA WITH STANDARD INTRAOCULAR LENS IMPLANT;  Surgeon: John Zimmerman MD;  Location:  [facility]     STENT, CORONARY, [place]  1/09, 2014    x2, x1 in 2014     THORACENTESIS Bilateral 8/8/2018    Procedure: THORACENTESIS;   "Thoracentesis Bilateral;  Surgeon: Michelle Leroy PA-C;  Location: [facility] OR     TYMPANOPLASTY       VASCULAR SURGERY  2013    stents       Prior to Admission Medications   Prior to Admission Medications   Prescriptions Last Dose Informant Patient Reported? Taking?   ASPIRIN 81 MG OR TABS 2018 at Unknown time  Yes Yes   Si TABLET EVERY MORNING   Alum & Mag Hydroxide-Simeth (ALUMINUM-MAGNESIUM-SIMETHICONE) 200-200-20 MG/5ML SUSP More than a month at Unknown time  Yes No   Sig: Take 5 mLs by mouth daily as needed   Benzethonium Chloride 0.1 % LIQD Past Week at Unknown time  No Yes   Sig: Cleanse with wound cleanser and apply barrier paste and hydrocolloid dressing, change every 2-3 days as needed   COMPRESSION STOCKINGS   No No   Si each continuous prn Bilateral knee high compression stockings   Gauze Pads & Dressings (GAUZE BANDAGE 4\") MISC   No No   Sig: Cleanse with wound cleanser and apply barrier paste and hydrocolloid dressing, change every 2-3 days as needed   LANsoprazole (PREVACID) 15 MG CR capsule 2018 at Unknown time  Yes Yes   Sig: Take 15 mg by mouth daily Patient needs to use brand name Prevacid (generic causes diarrhea)   LORazepam (ATIVAN) 0.5 MG tablet Past Month at Unknown time  No Yes   Sig: Take 1 tablet (0.5 mg) by mouth every 4 hours as needed (Anxiety, Nausea/Vomiting or Sleep)   Multiple Vitamin (MULTI-VITAMIN DAILY PO) 2018 at Unknown time  Yes Yes   Sig: Take 1 tablet by mouth   acetaminophen (TYLENOL) 500 MG tablet More than a month at Unknown time  Yes No   Sig: Take 500 mg by mouth every 6 hours as needed for mild pain   allopurinol (ZYLOPRIM) 300 MG tablet 2018 at Unknown time  No Yes   Sig: Take 1 tablet (300 mg) by mouth daily   clindamycin (CLEOCIN T) 1 % lotion 2018 at Unknown time  No Yes   Sig: Apply twice daily for 6 weeks to the groin   furosemide (LASIX) 20 MG tablet 2018 at Unknown time  No Yes   Sig: Take 1 tablet (20 mg) by mouth daily "   hydrocortisone valerate (WEST-NINOSKA) 0.2 % ointment Past Week at Unknown time  No Yes   Sig: Apply sparingly to affected area three times daily as needed.   lidocaine-prilocaine (EMLA) cream Past Week at Unknown time  Yes Yes   Sig: Apply 1 Application topically as needed   nitroGLYcerin (NITROSTAT) 0.4 MG sublingual tablet More than a month at Unknown time  No No   Sig: Place 1 tablet (0.4 mg) under the tongue every 5 minutes as needed up to 3 tablets per episode.   ondansetron (ZOFRAN) 8 MG tablet Past Month at Unknown time  No Yes   Sig: Take 1 tablet (8 mg) by mouth every 8 hours as needed (Nausea/Vomiting)   order for DME   No No   Sig: Equipment being ordered: wheeled walker with seat   potassium chloride SA (K-DUR/KLOR-CON M) 10 MEQ CR tablet 8/27/2018 at Unknown time  No Yes   Sig: Take 1 tablet (10 mEq) by mouth daily   prochlorperazine (COMPAZINE) 10 MG tablet Past Month at Unknown time  No Yes   Sig: Take 1 tablet (10 mg) by mouth every 6 hours as needed for nausea or vomiting   rosuvastatin (CRESTOR) 40 MG tablet 8/27/2018 at Unknown time  No Yes   Sig: Take 1 tablet (40 mg) by mouth daily   zinc oxide - white petrolatum (CRITIC-AID THICK MOIST BARRIER) 20-51% PSTE topical paste Past Week at Unknown time  No Yes   Sig: Cleanse with wound cleanser and apply barrier paste and hydrocolloid dressing, change every 2-3 days as needed      Facility-Administered Medications: None     Allergies   Allergies   Allergen Reactions     Niacin      Severe rash and itching     Prevacid [Lansoprazole] Diarrhea     Patient notes diarrhea with 30mg generic version. Patient does ok with 20mg in generic and brand name.     Shellfish Allergy      One kind       Social History   I have reviewed this patient's social history and updated it with pertinent information if needed. Francisco Javier Cortes  reports that he has never smoked. He has never used smokeless tobacco. He reports that he drinks alcohol. He reports that he does not use  illicit drugs.    Family History   I have reviewed this patient's family history and updated it with pertinent information if needed.   Family History   Problem Relation Age of Onset     Cardiovascular Mother      HEART DISEASE Mother      Cancer - colorectal Father      HEART DISEASE Father      Other Cancer Father      Cancer Father      Hypertension Father      Asthma Brother      Coronary Artery Disease Brother      Hypertension Brother      HEART DISEASE Brother      HEART DISEASE Son      Hypertension Son      Cancer Daughter      non hodgkins     Prostate Cancer Brother      Hypertension Brother      Cancer Brother      Prostate Cancer Maternal Grandfather      Cancer Maternal Grandfather      Muscular Disorder Maternal Grandfather      HEART DISEASE Paternal Grandmother      Hypertension Paternal Grandmother      Hypertension Sister        Review of Systems   The 10 point Review of Systems is negative other than noted in the HPI or here.     Physical Exam   Temp: 97.9  F (36.6  C) Temp src: Oral BP: 103/55 Pulse: 128 Heart Rate: 128 Resp: (!) 36 SpO2: 98 % O2 Device: Nasal cannula Oxygen Delivery: 4 LPM  Vital Signs with Ranges  Temp:  [97.9  F (36.6  C)] 97.9  F (36.6  C)  Pulse:  [126-133] 128  Heart Rate:  [122-144] 128  Resp:  [10-36] 36  BP: ()/(51-82) 103/55  SpO2:  [90 %-100 %] 98 %  154 lbs 0 oz    Constitutional: Awake, alert, cooperative, no apparent distress, and appears stated age.   Eyes: Lids and lashes normal, pupils equal, round and reactive to light, extra ocular muscles intact, sclera clear, conjunctiva normal.  ENT: Normocephalic, without obvious abnormality, atraumatic.  Respiratory: No increased work of breathing. No oxygen. LS clear, no crackles or wheezes   Cardiovascular: irregular rate, rhythm, no murmurs. Regular radial pulses.   GI: Normal bowel sounds, soft, non-distended, non-tender.  Musculoskeletal: No edema B/L LE. No obvious joint swelling or deformities.   Neurologic:  A&O x4, cranial nerves II-XII appear to be grossly intact.  No focal deficits.   Neuropsychiatric: Calm, normal eye contact, alert, normal affect memory for past and recent events intact and thought process normal.      Data   Results for orders placed or performed during the hospital encounter of 08/27/18 (from the past 24 hour(s))   EKG 12-lead, tracing only   Result Value Ref Range    Interpretation ECG Click View Image link to view waveform and result    CBC with platelets differential   Result Value Ref Range    WBC 19.6 (H) 4.0 - 11.0 10e9/L    RBC Count 3.05 (L) 4.4 - 5.9 10e12/L    Hemoglobin 10.2 (L) 13.3 - 17.7 g/dL    Hematocrit 31.3 (L) 40.0 - 53.0 %     (H) 78 - 100 fl    MCH 33.4 (H) 26.5 - 33.0 pg    MCHC 32.6 31.5 - 36.5 g/dL    RDW 16.8 (H) 10.0 - 15.0 %    Platelet Count 96 (L) 150 - 450 10e9/L    Diff Method Manual Differential     % Neutrophils 98.2 %    % Lymphocytes 0.0 %    % Monocytes 1.8 %    % Eosinophils 0.0 %    % Basophils 0.0 %    Absolute Neutrophil 19.2 (H) 1.6 - 8.3 10e9/L    Absolute Lymphocytes 0.0 (L) 0.8 - 5.3 10e9/L    Absolute Monocytes 0.4 0.0 - 1.3 10e9/L    Absolute Eosinophils 0.0 0.0 - 0.7 10e9/L    Absolute Basophils 0.0 0.0 - 0.2 10e9/L    Anisocytosis Slight     Poikilocytosis Moderate     Ovalocytes Slight     [place] Cells Slight     Dohle Bodies Present     Platelet Estimate Confirming automated cell count    Basic metabolic panel   Result Value Ref Range    Sodium 140 133 - 144 mmol/L    Potassium 4.2 3.4 - 5.3 mmol/L    Chloride 108 94 - 109 mmol/L    Carbon Dioxide 24 20 - 32 mmol/L    Anion Gap 9 3 - 14 mmol/L    Glucose 107 (H) 70 - 99 mg/dL    Urea Nitrogen 24 7 - 30 mg/dL    Creatinine 1.26 (H) 0.66 - 1.25 mg/dL    GFR Estimate 55 (L) >60 mL/min/1.7m2    GFR Estimate If Black 67 >60 mL/min/1.7m2    Calcium 8.2 (L) 8.5 - 10.1 mg/dL   Troponin I   Result Value Ref Range    Troponin I ES <0.015 0.000 - 0.045 ug/L   Nt probnp inpatient (BNP)   Result Value Ref  Range    N-Terminal Pro BNP Inpatient 1436 0 - 1800 pg/mL   ISTAT gases lactate jolene POCT   Result Value Ref Range    Ph Venous 7.35 7.32 - 7.43 pH    PCO2 Venous 38 (L) 40 - 50 mm Hg    PO2 Venous 36 25 - 47 mm Hg    Bicarbonate Venous 21 21 - 28 mmol/L    O2 Sat Venous 67 %    Lactic Acid 2.1 0.7 - 2.1 mmol/L   XR Chest 2 Views    Narrative    EXAM: XR CHEST 2 VW  8/27/2018 10:25 AM     HISTORY:  Shortness of breath; history of coronary artery disease and  NSTEMI    COMPARISON:  8/24/2018    FINDINGS: AP and lateral radiographs of the chest. Right Port-A-Cath  tip projects over the superior cavoatrial junction. Trachea is  midline. Prominent pulmonary vasculature. Bilateral interstitial  opacities with complete opacification of right lower lobe. Large  layering bilateral pleural effusions, right larger than left. Streaky  perihilar opacities are present. No pneumothorax.       Impression    IMPRESSION:  1. Large layering bilateral effusions, right larger than left. Streaky  perihilar opacities probably represent atelectasis.  2. Complete opacification of the right lower lobe probably represents  compressive atelectasis.  3. Bilateral interstitial opacities, favor pulmonary edema.    I have personally reviewed the examination and initial interpretation  and I agree with the findings.    PRESTON RENO MD   Interventional Radiology Adult/Peds IP Consult: Patient to be seen: EMERGENT within 1 hour; Call back #: [phone]; right sided pleural effusion    Narrative    Dean Marcus PA-C     8/27/2018 11:14 AM  Patient is on IR schedule 8/27/2018 for right-sided chest tube   placement. Per report, increased O2 needs and decreased   saturations.    INR is pending.    Preprocedural orders have been entered.    Consent will be done prior to procedure.    Please contact the IR charge RN at [phone] for estimated time of   procedure.     Case discussed with primary team.    Tyree Marcus PA-C  Interventional  Radiology  [phone] [facility].       Most Recent 3 CBC's:  Recent Labs   Lab Test  08/27/18   1005  08/24/18   0245  08/23/18   1714   WBC  19.6*  30.7*  35.1*   HGB  10.2*  10.1*  10.5*   MCV  103*  102*  102*   PLT  96*  105*  124*     Most Recent 3 BMP's:  Recent Labs   Lab Test  08/27/18   1005  08/24/18   0245  08/23/18   1714   NA  140  138  137   POTASSIUM  4.2  4.1  4.4   CHLORIDE  108  107  106   CO2  24  24  21   BUN  24  24  24   CR  1.26*  1.22  1.24   ANIONGAP  9  7  9   EVANGELINA  8.2*  7.6*  7.6*   GLC  107*  76  62*     Most Recent 2 LFT's:  Recent Labs   Lab Test  08/23/18   1714  08/20/18   0900   AST  48*  42   ALT  21  20   ALKPHOS  107  91   BILITOTAL  0.4  0.2     Most Recent 3 INR's:  Recent Labs   Lab Test  08/23/18   1714  08/08/18   0730  04/11/18   0354   INR  1.05  0.94  1.21*

## 2018-08-27 NOTE — TELEPHONE ENCOUNTER
FNA Triage Call  Presenting Problem: From Wife called with  Pt .  Having problems intermittently with  Shortness of breath  due to  fluid build up in lungs  caused by cancer and followed by  [facility] lymph node cancer - by Dr MARCO ANTONIO Bueno  . Finished last chemo on 8/20/18 . Seen at [place] ED , and transplanted to [facility] on 8/23/18 and 8/24/18 and tap right lung but didn t  touch stomach ( Asites ? )  or Left lung  fluid and discharge with o2 . Currently :  92 oximeter with o2 at 4 liters and pulse 125 and no fever , BP 92/65  .  Has appt tomorrow at [facility] .  Page on-call provider to Pt  .  Page   Yoyo  to page Dr TRISTIN Dykes    to  Pt and advised Pt to call back if no answer in 20 minutes . Prefers to speak to on-call instead of returning to ED but advised to call 911 if worse and wife/ Pt agrees.   .Viviana Tan RN [place] nurse advisors.

## 2018-08-27 NOTE — PHARMACY-ADMISSION MEDICATION HISTORY
Admission medication history interview status for the 8/27/2018 admission is complete. See EPIC admission navigator for allergy information, pharmacy, prior to admission medications and immunization status.     Medication history interview sources:  Patient, Wife, Reconcile Dispense Records     Changes made to PTA medication list (reason)  Added: none  Deleted: fluticasone, loratadine, hydrocodone- apap (patient and wife report no longer taking)   Changed: none     Additional medication history information (including reliability of information, actions taken by pharmacist):  -Wife confirmed medications through previous admission discharge summary (8/24). Medication dispense records confirm medications and doses. Patient did stop taking metoprolol succinate as instructed at discharge. Patient has started using pill box to manage medications.     Medication history completed by: Brandy Rothman, HernestoD       Prior to Admission medications    Medication Sig Last Dose Taking? Auth Provider   allopurinol (ZYLOPRIM) 300 MG tablet Take 1 tablet (300 mg) by mouth daily 8/27/2018 at Unknown time Yes Erickson Adan MD   ASPIRIN 81 MG OR TABS 1 TABLET EVERY MORNING 8/27/2018 at Unknown time Yes Reported, Patient   Benzethonium Chloride 0.1 % LIQD Cleanse with wound cleanser and apply barrier paste and hydrocolloid dressing, change every 2-3 days as needed Past Week at Unknown time Yes Florian Alonzo MD   clindamycin (CLEOCIN T) 1 % lotion Apply twice daily for 6 weeks to the groin 8/26/2018 at Unknown time Yes Aby Rodriguez MD   furosemide (LASIX) 20 MG tablet Take 1 tablet (20 mg) by mouth daily 8/27/2018 at Unknown time Yes Erickson Adan MD   hydrocortisone valerate (WEST-NINOSKA) 0.2 % ointment Apply sparingly to affected area three times daily as needed. Past Week at Unknown time Yes Florian Alonzo MD   LANsoprazole (PREVACID) 15 MG CR capsule Take 15 mg by mouth daily Patient needs to use brand name Prevacid  "(generic causes diarrhea) 8/27/2018 at Unknown time Yes Unknown, Entered By History   lidocaine-prilocaine (EMLA) cream Apply 1 Application topically as needed Past Week at Unknown time Yes Unknown, Entered By History   LORazepam (ATIVAN) 0.5 MG tablet Take 1 tablet (0.5 mg) by mouth every 4 hours as needed (Anxiety, Nausea/Vomiting or Sleep) Past Month at Unknown time Yes Merry Auguste MD   Multiple Vitamin (MULTI-VITAMIN DAILY PO) Take 1 tablet by mouth 8/27/2018 at Unknown time Yes Reported, Patient   ondansetron (ZOFRAN) 8 MG tablet Take 1 tablet (8 mg) by mouth every 8 hours as needed (Nausea/Vomiting) Past Month at Unknown time Yes Erickson Adan MD   potassium chloride SA (K-DUR/KLOR-CON M) 10 MEQ CR tablet Take 1 tablet (10 mEq) by mouth daily 8/27/2018 at Unknown time Yes Erickson Adan MD   prochlorperazine (COMPAZINE) 10 MG tablet Take 1 tablet (10 mg) by mouth every 6 hours as needed for nausea or vomiting Past Month at Unknown time Yes Joy Bush, APRN CNP   rosuvastatin (CRESTOR) 40 MG tablet Take 1 tablet (40 mg) by mouth daily 8/27/2018 at Unknown time Yes Florian Alonzo MD   zinc oxide - white petrolatum (CRITIC-AID THICK MOIST BARRIER) 20-51% PSTE topical paste Cleanse with wound cleanser and apply barrier paste and hydrocolloid dressing, change every 2-3 days as needed Past Week at Unknown time Yes Florian Alonzo MD   acetaminophen (TYLENOL) 500 MG tablet Take 500 mg by mouth every 6 hours as needed for mild pain More than a month at Unknown time  Unknown, Entered By History   Alum & Mag Hydroxide-Simeth (ALUMINUM-MAGNESIUM-SIMETHICONE) 200-200-20 MG/5ML SUSP Take 5 mLs by mouth daily as needed More than a month at Unknown time  Unknown, Entered By History   COMPRESSION STOCKINGS 1 each continuous prn Bilateral knee high compression stockings   Sih, Jad Watters MD   Gauze Pads & Dressings (GAUZE BANDAGE 4\") MISC Cleanse with wound cleanser and apply barrier paste and " hydrocolloid dressing, change every 2-3 days as needed   Florian Alonzo MD   nitroGLYcerin (NITROSTAT) 0.4 MG sublingual tablet Place 1 tablet (0.4 mg) under the tongue every 5 minutes as needed up to 3 tablets per episode. More than a month at Unknown time  Florian Alonzo MD   order for DME Equipment being ordered: wheeled walker with seat   Joy Bush, MARTHA CNP

## 2018-08-27 NOTE — IP AVS SNAPSHOT
MRN:[id number]                      After Visit Summary   8/27/2018    Francisco Javier Cortes    MRN: [id number]           Thank you!     Thank you for choosing [facility] for your care. Our goal is always to provide you with excellent care. Hearing back from our patients is one way we can continue to improve our services. Please take a few minutes to complete the written survey that you may receive in the mail after you visit with us. Thank you!        Patient Information     Date Of Birth          1940        About your hospital stay     You were admitted on:  August 27, 2018 You last received care in the:  [address] [facility]    You were discharged on:  August 28, 2018        Reason for your hospital stay       You were admitted after your thoracentesis and paracentesis for AFib RVR, you were given some IV fluids and you returned back to a regular rhythm                  Who to Call     For medical emergencies, please call 911.  For non-urgent questions about your medical care, please call your primary care provider or clinic, [phone]          Attending Provider     Provider Specialty    Makenzie Weeks MD Emergency Medicine    [facility], Rajwinder Kapoor MD Hematology       Primary Care Provider Office Phone # Fax #    Florian Alonzo [phone]       When to contact your care team       [facility] triage line at [phone] for temp >100.4, uncontrolled nausea/vomiting/diarrhea/constipation, unrelieved pain, bleeding not relieved with pressure, dizziness, chest pain, shortness of breath, loss of consciousness, and any new or concerning symptoms.                  After Care Instructions     Activity       Your activity upon discharge: activity as tolerated            Diet       Follow this diet upon discharge: Regular            Discharge Instructions       No changes were made to your medications  Continue all appointments that have been pre arranged for you.    Continue using Oxygen for shortness of breath or oxygen saturations below 90%  Continue drinking adequate fluids, your blood pressure was likely low since 7 liters was taken from your abdomen and lungs  You were discharged with a Zio patch, cardiology will follow up with you regarding these results.                  Follow-up Appointments     Follow Up and recommended labs and tests       Continue all of your scheudled appts as listed on your discharge ( The ones that have been scheduled will stay the same)  Your Bone marrow biopsy has been requested to change, you will be notified of new appt time.  Follow up with Cardiology in 1 month.                  Your next 10 appointments already scheduled     Aug 29, 2018  8:20 AM CDT   (Arrive by 8:05 AM)   New Patient Visit with Dorothy Rasmussen MD   [facility] ([facility] [place])    [address]  [address]  [facility] [id number] [phone]            Aug 31, 2018  1:15 PM CDT   (Arrive by 12:15 PM)   IR THORACENTESIS with [email or web address]   [facility] ([facility] [place])    [address]  5th Floor  [facility] [id number] [phone]           Please wear loose clothing, such as a sweat suit or jogging clothes. Avoid snaps, zippers and other metal. We may ask you to undress and put on a hospital gown.  Please bring any scans or X-rays taken at other hospitals, if similar tests were done. Also bring a list of your medicines, including vitamins, minerals and over-the-counter drugs. It is safest to leave personal items at home.  Tell your doctor in advance:   If you are or may be pregnant.   If you are taking Coumadin (or any other blood thinners) 5 days prior to the exam for any special instructions.   If you are diabetic to determine if your insulin needs have to be adjusted for the exam.  Your doctor will obtain necessary laboratory tests prior to the exam (creatinine,  Hgb/Hct, platelet count, and INR).  If you have any questions, please call the Imaging Department where you will have your exam. Directions, parking instructions, and other information are available on our website, [email or web address].            Aug 31, 2018   Procedure with Dean Marcus PA-C   [facility] ([facility] [place])    [address] [place] [address]  5th Floor  [facility] [id number] [phone]           Located in the Clinics and Surgery Center at [address] [place] [address].   parking is very convenient and highly recommended.  is a $6 flat rate fee.  Both  and self parkers should enter the main arrival plaza from [facility]; parking attendants will direct you based on your parking preference.            Sep 04, 2018  1:15 PM CDT   Nurse Only with MG IMAGING NURSE   [facility]    [address] [place] [address] [phone]            Sep 04, 2018  1:30 PM CDT   PE NPET ONCOLOGY (EYES TO THIGHS) with [email or web address]   [facility] ([facility])    [address] [place] [address] [phone]           Tell your doctor:   If there is any chance you may be pregnant or if you are breastfeeding.   If you have problems lying in small spaces (claustrophobia). If you do, your doctor may give you medicine to help you relax. If you have diabetes:   Have your exam early in the morning. Your blood glucose will go up as the day goes by.   Your glucose level must be 180 or less at the start of the exam. Please take any oral diabetic medication you need to ensure this blood glucose level is below 180, but no insulin 4 hours prior to the exam.   If you are taking insulin in the morning take with breakfast by 6 am and schedule procedure between 12 and 2:15 pm.    If you are taking insulin at night take nightly dose, fast overnight,  schedule procedure before 10 am.    If you take insulin both morning and night take morning dose by 6am and schedule procedure between 12 and 2:15 pm.  24 hours before your scan: Don t do any heavy exercise. (No jogging, aerobics or other workouts.) Exercise will make your pictures less accurate. 6 hours before your scan:   Stop all food and liquids (except water).   Do not chew gum or suck on mints.   If you need to take medicine with food, you may take it with a few crackers.  Please call your Imaging Department at your exam site with any questions.            Sep 07, 2018 10:45 AM CDT   IR THORACENTESIS with [email or web address]   [facility] ([facility] [place])    [address] [place] [address]  5th [facility] [address] [phone]           Please wear loose clothing, such as a sweat suit or jogging clothes. Avoid snaps, zippers and other metal. We may ask you to undress and put on a hospital gown.  Please bring any scans or X-rays taken at other hospitals, if similar tests were done. Also bring a list of your medicines, including vitamins, minerals and over-the-counter drugs. It is safest to leave personal items at home.  Tell your doctor in advance:   If you are or may be pregnant.   If you are taking Coumadin (or any other blood thinners) 5 days prior to the exam for any special instructions.   If you are diabetic to determine if your insulin needs have to be adjusted for the exam.  Your doctor will obtain necessary laboratory tests prior to the exam (creatinine, Hgb/Hct, platelet count, and INR).  If you have any questions, please call the Imaging Department where you will have your exam. Directions, parking instructions, and other information are available on our website, [email or web address].            Sep 07, 2018   Procedure with Allen Jean PA-C   [facility] ([facility] [place])    [address] [place] [address]  Floor  [facility] [id number] [phone]           Located in the Clinics and Surgery Center at [address] [facility] [place] [address].   parking is very convenient and highly recommended.  is a $6 flat rate fee.  Both  and self parkers should enter the main arrival plaza from [facility]; parking attendants will direct you based on your parking preference.            Sep 17, 2018  2:15 PM CDT   Return Visit with Erickson Adan MD   [facility]    [address] [place] [id number] [phone]            Oct 10, 2018 10:15 AM CDT   XR CHEST 2 VIEWS with [email or web address]   [facility] [place] [facility] ([facility] [place])    [address] [place] [address] [phone]           Please bring a list of your current medicines to your exam. (Include vitamins, minerals and over-thecounter medicines.) Leave your valuables at home.  Tell your doctor if there is a chance you may be pregnant.  You do not need to do anything special for this exam.              Additional Services     Medication Therapy Management Referral       MTM referral reason            Patient has 5 PTA or Discharge Medications AND one of the following   diagnoses: DM,HF,COPD,AMI DX,PULM HTN       This service is designed to help you get the most from your medications.  A specially trained pharmacist will work closely with you and your doctors  to solve any problems related to your medications and to help you get the   best results from taking them.      The Medication Therapy Management staff will call you to schedule an appointment.                  Pending Results     Date and Time Order Name Status Description    8/28/2018 1246 Zio Patch Holter In process     8/28/2018 0941 EKG 12-lead, complete Preliminary     8/28/2018 0839 Blood culture Preliminary     8/28/2018 0839 Blood culture Preliminary     8/27/2018 2059 EKG 12-lead,  "tracing only Preliminary     8/27/2018 1400 Fluid Culture Aerobic Bacterial Preliminary     8/27/2018 1112 IR Thoracentesis In process             Statement of Approval     Ordered          08/28/18 1221  I have reviewed and agree with all the recommendations and orders detailed in this document.  EFFECTIVE NOW     Approved and electronically signed by:  Keren Caballero APRN CNP             Admission Information     Date & Time Provider Department Dept. Phone    8/27/2018 Rajwinder Bueno MD [address] [facility] [phone]      Your Vitals Were     Blood Pressure Pulse Temperature Respirations Height Weight    95/64 (BP Location: Left arm) 107 98.1  F (36.7  C) (Oral) 16 1.651 m (5' 5\") 61.9 kg (136 lb 7.4 oz)    Pulse Oximetry BMI (Body Mass Index)                91% 22.71 kg/m2          MyChart Information     [facility] gives you secure access to your electronic health record. If you see a primary care provider, you can also send messages to your care team and make appointments. If you have questions, please call your primary care clinic.  If you do not have a primary care provider, please call [phone] and they will assist you.        Care EveryWhere ID     This is your Care EveryWhere ID. This could be used by other organizations to access your [facility] medical records  [id number]        Equal Access to Services     NANDA BARAJAS AH: Hadii rivera syedo Solina, waaxda luqadaha, qaybta kaalmada adeegyada, zhao lorenzo. So [facility] [phone].    ATENCIÓN: Si habla español, tiene a ivan disposición servicios gratuitos de asistencia lingüística. Llame al [phone].    We comply with applicable federal civil rights laws and [place] laws. We do not discriminate on the basis of race, color, national origin, age, disability, sex, sexual orientation, or gender identity.               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)        Dose:  300 mg   Take 1 tablet (300 mg) by mouth daily   Quantity:  30 tablet   Refills:  1       Aluminum-Magnesium-Simethicone 200-200-20 MG/5ML Susp        Dose:  5 mL   Take 5 mLs by mouth daily as needed   Refills:  0       aspirin 81 MG tablet        1 TABLET EVERY MORNING   Refills:  0       Benzethonium Chloride 0.1 % Liqd   Used for:  Decubitus ulcer of buttock, unspecified laterality, unspecified ulcer stage        Cleanse with wound cleanser and apply barrier paste and hydrocolloid dressing, change every 2-3 days as needed   Quantity:  237 mL   Refills:  3       clindamycin 1 % lotion   Commonly known as:  CLEOCIN T   Used for:  Rash        Apply twice daily for 6 weeks to the groin   Quantity:  60 mL   Refills:  1       COMPRESSION STOCKINGS   Used for:  Acute congestive heart failure, unspecified congestive heart failure type (H)        Dose:  1 each   1 each continuous prn Bilateral knee high compression stockings   Quantity:  2 each   Refills:  0       furosemide 20 MG tablet   Commonly known as:  LASIX   Used for:  Hypervolemia, unspecified hypervolemia type        Dose:  20 mg   Take 1 tablet (20 mg) by mouth daily   Quantity:  180 tablet   Refills:  3       Gauze Bandage 4\" Misc   Used for:  Decubitus ulcer of buttock, unspecified laterality, unspecified ulcer stage        Cleanse with wound cleanser and apply barrier paste and hydrocolloid dressing, change every 2-3 days as needed   Quantity:  5 each   Refills:  3       hydrocortisone valerate 0.2 % ointment   Commonly known as:  WEST-NINOSKA   Used for:  Psoriasis        Apply sparingly to affected area three times daily as needed.   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)        Dose:  0.5 mg   Take 1 tablet (0.5 mg) by mouth every 4 hours as needed (Anxiety, Nausea/Vomiting or Sleep)   Quantity:  30 tablet   Refills:  3       MULTI-VITAMIN DAILY PO        Dose:  1 tablet   Take 1 tablet by mouth   Refills:  0       nitroGLYcerin 0.4 MG sublingual tablet   Commonly known as:  NITROSTAT   Used for:  Chest pain due to myocardial ischemia, unspecified ischemic chest pain type (H), Coronary artery disease involving native coronary artery of native heart without angina pectoris        Dose:  0.4 mg   Place 1 tablet (0.4 mg) under the tongue every 5 minutes as needed up to 3 tablets per episode.   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)        Equipment being ordered: wheeled walker with seat   Quantity:  1 Device   Refills:  0       potassium chloride SA 10 MEQ CR tablet   Commonly known as:  K-DUR/KLOR-CON M   Used for:  Hypervolemia, unspecified hypervolemia type        Dose:  10 mEq   Take 1 tablet (10 mEq) by mouth daily   Quantity:  30 tablet   Refills:  1       prochlorperazine 10 MG tablet   Commonly known as:  COMPAZINE   Used for:   Nausea        Dose:  10 mg   Take 1 tablet (10 mg) by mouth every 6 hours as needed for nausea or vomiting   Quantity:  30 tablet   Refills:  1       rosuvastatin 40 MG tablet   Commonly known as:  CRESTOR   Used for:  Hyperlipidemia LDL goal <100, Coronary artery disease involving native coronary artery of native heart without angina pectoris        Dose:  40 mg   Take 1 tablet (40 mg) by mouth daily   Quantity:  90 tablet   Refills:  3       zinc oxide - white petrolatum 20-51% Pste topical paste   Used for:  Decubitus ulcer of buttock, unspecified laterality, unspecified ulcer stage        Cleanse with wound cleanser and apply barrier paste and hydrocolloid dressing, change every 2-3 days as needed   Quantity:  170 g   Refills:  3                Protect others around you: Learn how to safely use, store and throw away your medicines at www.disposemymeds.org.             Medication List: This is a list of all your medications and when to take them. Check marks below indicate your daily home schedule. Keep this list as a reference.      Medications           Morning Afternoon Evening Bedtime As Needed    acetaminophen 500 MG tablet   Commonly known as:  TYLENOL   Take 500 mg by mouth every 6 hours as needed for mild pain                                allopurinol 300 MG tablet   Commonly known as:  ZYLOPRIM   Take 1 tablet (300 mg) by mouth daily   Last time this was given:  300 mg on 8/28/2018  9:07 AM                                   Aluminum-Magnesium-Simethicone 200-200-20 MG/5ML Susp   Take 5 mLs by mouth daily as needed                                aspirin 81 MG tablet   1 TABLET EVERY MORNING                                   Benzethonium Chloride 0.1 % Liqd   Cleanse with wound cleanser and apply barrier paste and hydrocolloid dressing, change every 2-3 days as needed                                clindamycin 1 % lotion   Commonly known as:  CLEOCIN T   Apply twice daily for 6 weeks to the groin           "                         COMPRESSION STOCKINGS   1 each continuous prn Bilateral knee high compression stockings                                furosemide 20 MG tablet   Commonly known as:  LASIX   Take 1 tablet (20 mg) by mouth daily                                   Gauze Bandage 4\" Misc   Cleanse with wound cleanser and apply barrier paste and hydrocolloid dressing, change every 2-3 days as needed                                hydrocortisone valerate 0.2 % ointment   Commonly known as:  WEST-NINOSKA   Apply sparingly to affected area three times daily as needed.                                   LANsoprazole 15 MG CR capsule   Commonly known as:  PREVACID   Take 15 mg by mouth daily Patient needs to use brand name Prevacid (generic causes diarrhea)   Last time this was given:  15 mg on 8/28/2018  9:07 AM                                   lidocaine-prilocaine cream   Commonly known as:  EMLA   Apply 1 Application topically as needed                                LORazepam 0.5 MG tablet   Commonly known as:  ATIVAN   Take 1 tablet (0.5 mg) by mouth every 4 hours as needed (Anxiety, Nausea/Vomiting or Sleep)                                MULTI-VITAMIN DAILY PO   Take 1 tablet by mouth                                   nitroGLYcerin 0.4 MG sublingual tablet   Commonly known as:  NITROSTAT   Place 1 tablet (0.4 mg) under the tongue every 5 minutes as needed up to 3 tablets per episode.                                ondansetron 8 MG tablet   Commonly known as:  ZOFRAN   Take 1 tablet (8 mg) by mouth every 8 hours as needed (Nausea/Vomiting)                                order for DME   Equipment being ordered: wheeled walker with seat                                potassium chloride SA 10 MEQ CR tablet   Commonly known as:  K-DUR/KLOR-CON M   Take 1 tablet (10 mEq) by mouth daily   Last time this was given:  20 mEq on 8/28/2018 10:22 AM                                   prochlorperazine 10 MG tablet   Commonly known " as:  COMPAZINE   Take 1 tablet (10 mg) by mouth every 6 hours as needed for nausea or vomiting                                rosuvastatin 40 MG tablet   Commonly known as:  CRESTOR   Take 1 tablet (40 mg) by mouth daily   Last time this was given:  40 mg on 8/28/2018  9:07 AM                                   zinc oxide - white petrolatum 20-51% Pste topical paste   Cleanse with wound cleanser and apply barrier paste and hydrocolloid dressing, change every 2-3 days as needed

## 2018-08-27 NOTE — IP AVS SNAPSHOT
[address] [address]    Phone:  [phone]                                       After Visit Summary   8/27/2018    Francisco Javier Cortes    MRN: [id number]           After Visit Summary Signature Page     I have received my discharge instructions, and my questions have been answered. I have discussed any challenges I see with this plan with the nurse or doctor.    ..........................................................................................................................................  Patient/Patient Representative Signature      ..........................................................................................................................................  Patient Representative Print Name and Relationship to Patient    ..................................................               ................................................  Date                                            Time    ..........................................................................................................................................  Reviewed by Signature/Title    ...................................................              ..............................................  Date                                                            Time          22EPIC Rev 08/18

## 2018-08-27 NOTE — TELEPHONE ENCOUNTER
Patient discharged from Inpatient.  .    Discharge location: [facility]  Discharge date: 8/24/18  Diagnosis: Pleural Effusion    Please follow up as appropriate. If no follow up required, please close encounter.      Charlene WOODARD, Patient Care

## 2018-08-27 NOTE — ED PROVIDER NOTES
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 Per chart review, patient was admitted from 08/23/18-08/24/18 for worsening shortness of breath with increased pleural effusions diagnosed on imaging. He underwent right-sided thoracentesis on 08/24/18 at which time 1.5 L were removed. He was discharged on home oxygen ~4L nasal cannula. He was scheduled for thoracentesis on 08/28, 08/31, and 09/07 with plan for weekly taps to manage until IR intervention. Today, patient complains of increased shortness of breath that has been gradually worsening since last Friday, over the past three days. He feels as though the fluid on his lungs has re-accumulated. He denies fever, chest pain, or vomiting.    I have reviewed the Medications, Allergies, Past Medical and Surgical History, and Social History in the [employer] system.  Past Medical History:   Diagnosis Date     CAD (coronary artery disease) 1/09    s/p stentsx2     Coronary artery disease involving native coronary artery of native heart without angina pectoris 12/22/2015     Dyslipidemia      Erectile dysfunction      GERD (gastroesophageal reflux disease)      Hearing problem One ear 1961     Hypertension      NSTEMI (non-ST elevated myocardial infarction) (H) 3/20/2014     Pneumonia      Prostate cancer (H)     prostatecomy 9 years ago, PSAs remain good     Status post percutaneous transluminal coronary angioplasty-Coronary angioplasty with STEPHEN to LCx 4/4/2014     Stented coronary artery     stents placed       Past Surgical History:   Procedure Laterality Date     BIOPSY LYMPH NODE CERVICAL Left 3/22/2018    Procedure: BIOPSY LYMPH NODE  CERVICAL;  Excisional Biopsy Left Cervical Lymph Node ;  Surgeon: Samia Gaviria MD;  Location: [facility] OR     C APPENDECTOMY       C REMV PROSTATE,RETROPUB,RAD,TOT NODES  1999     CATARACT IOL, RT/LT       COLONOSCOPY       COLONOSCOPY Left 7/5/2016    Procedure: COMBINED COLONOSCOPY, SINGLE OR MULTIPLE BIOPSY/POLYPECTOMY BY BIOPSY;  Surgeon: Duane, William Charles, MD;  Location: [facility] OR     COLONOSCOPY WITH CO2 INSUFFLATION N/A 7/5/2016    Procedure: COLONOSCOPY WITH CO2 INSUFFLATION;  Surgeon: Duane, William Charles, MD;  Location: [facility] OR     ENT SURGERY  1980--1999     PHACOEMULSIFICATION CLEAR CORNEA WITH STANDARD INTRAOCULAR LENS IMPLANT Left 6/18/2015    Procedure: PHACOEMULSIFICATION CLEAR CORNEA WITH STANDARD INTRAOCULAR LENS IMPLANT;  Surgeon: John Zimmerman MD;  Location:  [facility]     PHACOEMULSIFICATION CLEAR CORNEA WITH STANDARD INTRAOCULAR LENS IMPLANT Right 7/16/2015    Procedure: PHACOEMULSIFICATION CLEAR CORNEA WITH STANDARD INTRAOCULAR LENS IMPLANT;  Surgeon: John Zimmerman MD;  Location:  [facility]     STENT, CORONARY, [place]  1/09, 2014    x2, x1 in 2014     THORACENTESIS Bilateral 8/8/2018    Procedure: THORACENTESIS;  Thoracentesis Bilateral;  Surgeon: Michelle Leroy PA-C;  Location: [facility] OR     TYMPANOPLASTY       VASCULAR SURGERY  2013    stents       Family History   Problem Relation Age of Onset     Cardiovascular Mother      HEART DISEASE Mother      Cancer - colorectal Father      HEART DISEASE Father      Other Cancer Father      Cancer Father      Hypertension Father      Asthma Brother      Coronary Artery Disease Brother      Hypertension Brother      HEART DISEASE Brother      HEART DISEASE Son      Hypertension Son      Cancer Daughter      non hodgkins     Prostate Cancer Brother      Hypertension Brother      Cancer Brother      Prostate Cancer Maternal Grandfather      Cancer Maternal Grandfather      Muscular Disorder Maternal Grandfather      HEART DISEASE Paternal  "Grandmother      Hypertension Paternal Grandmother      Hypertension Sister        Social History   Substance Use Topics     Smoking status: Never Smoker     Smokeless tobacco: Never Used     Alcohol use Yes      Comment: a glass of red wine a day       Current Facility-Administered Medications   Medication     diltiazem (CARDIZEM) 125 mg in sodium chloride 0.9 % 125 mL infusion     lactated ringers BOLUS 500 mL     Current Outpatient Prescriptions   Medication     allopurinol (ZYLOPRIM) 300 MG tablet     ASPIRIN 81 MG OR TABS     Benzethonium Chloride 0.1 % LIQD     clindamycin (CLEOCIN T) 1 % lotion     furosemide (LASIX) 20 MG tablet     hydrocortisone valerate (WEST-NINOSKA) 0.2 % ointment     LANsoprazole (PREVACID) 15 MG CR capsule     lidocaine-prilocaine (EMLA) cream     LORazepam (ATIVAN) 0.5 MG tablet     Multiple Vitamin (MULTI-VITAMIN DAILY PO)     ondansetron (ZOFRAN) 8 MG tablet     potassium chloride SA (K-DUR/KLOR-CON M) 10 MEQ CR tablet     prochlorperazine (COMPAZINE) 10 MG tablet     rosuvastatin (CRESTOR) 40 MG tablet     zinc oxide - white petrolatum (CRITIC-AID THICK MOIST BARRIER) 20-51% PSTE topical paste     acetaminophen (TYLENOL) 500 MG tablet     Alum & Mag Hydroxide-Simeth (ALUMINUM-MAGNESIUM-SIMETHICONE) 200-200-20 MG/5ML SUSP     COMPRESSION STOCKINGS     Gauze Pads & Dressings (GAUZE BANDAGE 4\") MISC     nitroGLYcerin (NITROSTAT) 0.4 MG sublingual tablet     order for DME     [DISCONTINUED] furosemide (LASIX) 20 MG tablet        Allergies   Allergen Reactions     Niacin      Severe rash and itching     Prevacid [Lansoprazole] Diarrhea     Patient notes diarrhea with 30mg generic version. Patient does ok with 20mg in generic and brand name.     Shellfish Allergy      One kind       Review of Systems   Constitutional: Negative for chills and fever.   HENT: Negative for congestion.    Eyes: Negative for visual disturbance.   Respiratory: Positive for cough and shortness of breath.  " "  Cardiovascular: Negative for chest pain.   Gastrointestinal: Negative for abdominal pain, nausea and vomiting.   Genitourinary: Negative for difficulty urinating.   Musculoskeletal: Negative for back pain, neck pain and neck stiffness.   Skin: Negative for color change.   Allergic/Immunologic: Positive for immunocompromised state.   Hematological: Negative for adenopathy. Does not bruise/bleed easily.   Psychiatric/Behavioral: Negative for agitation and behavioral problems.   All other systems reviewed and are negative.      Physical Exam   BP: 109/75  Pulse: 133  Heart Rate: 144  Temp: 97.9  F (36.6  C)  Resp: 25  Height: 165.1 cm (5' 5\")  Weight: 69.9 kg (154 lb)  SpO2: 98 %      Physical Exam   Constitutional: He is oriented to person, place, and time. He appears well-developed and well-nourished. No distress.   HENT:   Head: Normocephalic and atraumatic.   Mouth/Throat: Oropharynx is clear and moist. No oropharyngeal exudate.   Eyes: Conjunctivae and EOM are normal. Pupils are equal, round, and reactive to light. No scleral icterus.   Neck: Normal range of motion. Neck supple.   Cardiovascular: Normal heart sounds and intact distal pulses.    Tachycardia   Pulmonary/Chest: He is in respiratory distress. He has no wheezes. He has no rales.   Decreased breath sounds in the right in posterior lung field.   Abdominal: Soft. Bowel sounds are normal. He exhibits no distension. There is no tenderness. There is no rebound and no guarding.   Musculoskeletal: Normal range of motion. He exhibits no edema or tenderness.   Neurological: He is alert and oriented to person, place, and time. No cranial nerve deficit. He exhibits normal muscle tone. Coordination normal.   Skin: Skin is warm. No rash noted. He is not diaphoretic. No erythema.   Psychiatric: He has a normal mood and affect. His behavior is normal. Judgment and thought content normal.   Nursing note and vitals reviewed.      ED Course     ED Course "     Procedures       10:27 AM  The patient was seen and examined by Dr. Weeks in Room 22.          EKG Interpretation:      Interpreted by Makenzie Weeks  Time reviewed: 10:09 AM  Symptoms at time of EKG: Shortness of breath  Rhythm: atrial fibrillation - rapid  Rate: 130-140  Axis: Normal  Ectopy: none  Conduction: normal  ST Segments/ T Waves: No ST-T wave changes  Q Waves: none  Comparison to prior: Unchanged from old EKG done on April 20, 2018    Clinical Impression: Atrial fibrillation with rapid ventricular response                Critical Care time:  was 45 minutes for this patient excluding procedures.     The Lactic acid level is elevated due to increased work of breathing from pleural effusions and ascites, at this time there is no sign of severe sepsis or septic shock.       Labs Ordered and Resulted from Time of ED Arrival Up to the Time of Departure from the ED   CBC WITH PLATELETS DIFFERENTIAL - Abnormal; Notable for the following:        Result Value    WBC 19.6 (*)     RBC Count 3.05 (*)     Hemoglobin 10.2 (*)     Hematocrit 31.3 (*)      (*)     MCH 33.4 (*)     RDW 16.8 (*)     Platelet Count 96 (*)     Absolute Neutrophil 19.2 (*)     Absolute Lymphocytes 0.0 (*)     All other components within normal limits   BASIC METABOLIC PANEL - Abnormal; Notable for the following:     Glucose 107 (*)     Creatinine 1.26 (*)     GFR Estimate 55 (*)     Calcium 8.2 (*)     All other components within normal limits   ISTAT  GASES LACTATE ALMAS POCT - Abnormal; Notable for the following:     PCO2 Venous 38 (*)     All other components within normal limits   TROPONIN I   NT PROBNP INPATIENT   ALBUMIN LEVEL   PROTEIN TOTAL   PERIPHERAL IV CATHETER   CARDIAC CONTINUOUS MONITORING   PULSE OXIMETRY NURSING   ISTAT CG4 GASES LACTATE ALMAS NURSING POCT       Consults  Oncology: Responded (hem/onc) (08/27/18 1111)    Assessments & Plan (with Medical Decision Making)   78-year-old man with history of lymphoma  presenting with shortness of breath.  Differential diagnosis: Pleural effusion, pneumonia, pneumothorax, ACS, CHF.    After thorough history and physical exam, patient appears to be in mild to moderate distress due to increased work of breathing.  I have obtained laboratory studies, EKG, and chest x-ray. I reviewed the chest x-ray and I read the radiology report; patient has a large right-sided pleural effusion. Interventional Radiology was consulted for drainage and catheter placement. Patient will be admitted to the Oncology service for further evaluation and management. He did require a nasal cannula oxygen in the Emergency Department.     Laboratory studies returned with leukocytosis of 19,600, however, this is trending downwards in comparison to the study obtained 3 days ago.  There is chronic anemia with a hemoglobin of 10.2.  There is chronic thrombus cytopenia.  PH is normal at 7.35 and lactic acid is slightly elevated at 2.1.  There is evidence of dehydration with elevated creatinine of 1.26.  Troponin is undetectable and BNP is minimally elevated at 1436.  I doubt this is a CHF exacerbation or ACS.  I doubt patient has sepsis or pneumonia.  This is likely reaccumulation of malignant effusion.    Patient received bilateral thoracentesis and paracentesis in interventional radiology.  Subsequently he was hypotensive with atrial fibrillation with RVR.  He was given a bolus of intravenous lactated Ringer's and infusion of diltiazem was ordered.  He will be admitted to oncology team.  I spoke to Dr. Bueno, who accepted him.    I have reviewed the nursing notes.    I have reviewed the findings, diagnosis, plan and need for follow up with the patient.    New Prescriptions    No medications on file       Final diagnoses:   Pleural effusion   Atrial fibrillation with rapid ventricular response (H)   Mariah BAKER, am serving as a trained medical scribe to document services personally performed by Makenzie  MD Desi, based on the provider's statements to me.   I, Makenzie Weeks MD, was physically present and have reviewed and verified the accuracy of this note documented by Mariah Rosales.        8/27/2018   [facility], [place], EMERGENCY DEPARTMENT     Makenzie Weeks MD  08/27/18 0282

## 2018-08-27 NOTE — ED NOTES
[facility], [place]   ED Nurse to Floor Handoff     Francisco Javier Cortes is a 78 year old male who speaks English and lives with a spouse,  in a home  They arrived in the ED by ambulance from home    ED Chief Complaint: Shortness of Breath    ED Dx;   Final diagnoses:   Pleural effusion         Needed?: No    Allergies:   Allergies   Allergen Reactions     Niacin      Severe rash and itching     Prevacid [Lansoprazole] Diarrhea     Patient notes diarrhea with 30mg generic version. Patient does ok with 20mg in generic and brand name.     Shellfish Allergy      One kind   .  Past Medical Hx:   Past Medical History:   Diagnosis Date     CAD (coronary artery disease) 1/09    s/p stentsx2     Coronary artery disease involving native coronary artery of native heart without angina pectoris 12/22/2015     Dyslipidemia      Erectile dysfunction      GERD (gastroesophageal reflux disease)      Hearing problem One ear 1961     Hypertension      NSTEMI (non-ST elevated myocardial infarction) (H) 3/20/2014     Pneumonia      Prostate cancer (H)     prostatecomy 9 years ago, PSAs remain good     Status post percutaneous transluminal coronary angioplasty-Coronary angioplasty with STEPHEN to LCx 4/4/2014     Stented coronary artery     stents placed      Baseline Mental status: WDL  Current Mental Status changes: at basesline    Infection present or suspected this encounter: no  Sepsis suspected: No  Isolation type: No active isolations     Activity level - Baseline/Home:  Independent  Activity Level - Current:   Stand with Assist    Bariatric equipment needed?: No    In the ED these meds were given: Medications - No data to display    Drips running?  No    Home pump  No    Current LDAs  Port A Cath Single 03/27/18 Right Chest wall (Active)   Access Date 08/27/18 8/27/2018 10:05 AM   Access Attempts 1 8/27/2018 10:05 AM   Gauge/Length 20 gauge;3/4 inch 8/27/2018 10:05 AM   Site Assessment WDL 8/27/2018  10:05 AM   Number of days:153       Closed/Suction Drain 1 Left Neck Bulb  (Active)   Number of days:158       Pressure Injury 08/23/18 Coccyx Stage 1 (Active)   Number of days:4       Rash 03/19/14 medial coccyx patch (Active)   Number of days:1622       Incision/Surgical Site 03/22/18 Left Neck (Active)   Number of days:158       Incision/Surgical Site 08/08/18 Bilateral Back (Active)   Number of days:19       Labs results:   Labs Ordered and Resulted from Time of ED Arrival Up to the Time of Departure from the ED   CBC WITH PLATELETS DIFFERENTIAL - Abnormal; Notable for the following:        Result Value    WBC 19.6 (*)     RBC Count 3.05 (*)     Hemoglobin 10.2 (*)     Hematocrit 31.3 (*)      (*)     MCH 33.4 (*)     RDW 16.8 (*)     Platelet Count 96 (*)     Absolute Neutrophil 19.2 (*)     Absolute Lymphocytes 0.0 (*)     All other components within normal limits   BASIC METABOLIC PANEL - Abnormal; Notable for the following:     Glucose 107 (*)     Creatinine 1.26 (*)     GFR Estimate 55 (*)     Calcium 8.2 (*)     All other components within normal limits   ISTAT  GASES LACTATE ALMAS POCT - Abnormal; Notable for the following:     PCO2 Venous 38 (*)     All other components within normal limits   TROPONIN I   NT PROBNP INPATIENT   PERIPHERAL IV CATHETER   ISTAT CG4 GASES LACTATE ALMAS NURSING POCT   CARDIAC CONTINUOUS MONITORING   PULSE OXIMETRY NURSING       Imaging Studies:   Recent Results (from the past 24 hour(s))   XR Chest 2 Views    Narrative    EXAM: XR CHEST 2 VW  8/27/2018 10:25 AM     HISTORY:  Shortness of breath; history of coronary artery disease and  NSTEMI    COMPARISON:  8/24/2018    FINDINGS: AP and lateral radiographs of the chest. Right Port-A-Cath  tip projects over the superior cavoatrial junction. Trachea is  midline. Prominent pulmonary vasculature. Bilateral interstitial  opacities with complete opacification of right lower lobe. Large  layering bilateral pleural effusions, right  "larger than left. Streaky  perihilar opacities are present. No pneumothorax.       Impression    IMPRESSION:  1. Large layering bilateral effusions, right larger than left. Streaky  perihilar opacities probably represent atelectasis.  2. Complete opacification of the right lower lobe probably represents  compressive atelectasis.  3. Bilateral interstitial opacities, favor pulmonary edema.    I have personally reviewed the examination and initial interpretation  and I agree with the findings.    PRESTON RENO MD       Recent vital signs:   BP 97/64  Pulse 133  Temp 97.9  F (36.6  C) (Oral)  Resp 11  Ht 1.651 m (5' 5\")  Wt 69.9 kg (154 lb)  SpO2 93%  BMI 25.63 kg/m2    Cardiac Rhythm: A fib  Pt needs tele? No per ED MD  Skin/wound Issues: None    Code Status: Full Code    Pain control: pt had none    Nausea control: pt had none    Abnormal labs/tests/findings requiring intervention: Elevated WBC - had nulasta after last chemo.  Low O2 - 4L O2 given    Family present during ED course? Yes   Family Comments/Social Situation comments: Wife and son present.    Tasks needing completion: None    Sheila Box, RN    [phone] [facility] ED      "

## 2018-08-28 NOTE — PROGRESS NOTES
[facility], [place]    Sepsis Evaluation Progress Note    Date of Service: 08/27/2018    I was called to see Francisco Javier Cortes due to abnormal vital signs triggering the Sepsis SIRS screening alert. He is not known to have an infection.     Physical Exam    Vital Signs:  Temp: 98.3  F (36.8  C) Temp src: Oral BP: 104/67 Pulse: 128 Heart Rate: 114 Resp: 22 SpO2: 98 % O2 Device: Nasal cannula Oxygen Delivery: 4 LPM    Lab:  Lactic Acid   Date Value Ref Range Status   08/27/2018 2.1 0.7 - 2.1 mmol/L Final     Lactate for Sepsis Protocol   Date Value Ref Range Status   08/27/2018 3.3 (H) 0.7 - 2.0 mmol/L Final     Comment:     Significant value called to and read back by  SALLY RAYMOND ON 6B 08/27/18 AT 2018 BY MO         The patient is at baseline mental status.    The rest of the physical exam is without any acute changes.    Assessment and Plan    The SIRS findings are likely not due to infection/sepsis. Lactic acid increased to 3.3 today from 2.1 yesterday. Likely secondary to impaired tissue oxygenation from AFib with RVR and low BPs. Currently hemodynamically stable. No fevers. Status post  ml bolus x 2 today. BPs slightly improved after the 2nd bolus. HR relatively controlled to less than 120s.  - Give 5% albumin 25 grams x 1 now to offset any fluid shifts from large volume taps done today that may be contributing to hypotension.   - Repeat fluid boluses if BP drops.   - Repeat lactic acid with AML.  - Cards consulted. Holding off on diltiazem drip for now given HR < 120s.     Disposition: The patient will remain on the current unit. We will continue to monitor this patient closely.    Zeb Santana MD

## 2018-08-28 NOTE — PROGRESS NOTES
[facility] Nurse Inpatient Pressure Injury Assessment   Reason for consultation: Evaluate and treat coccyx pressure injury    ASSESSMENT  Pressure Injury: on coccyx , present on admission ,   Pressure Injury is Stage 2   Contributing factor of the pressure injury: pressure, microclimate and moisture  Status: initial assessment     TREATMENT PLAN  Plan of care for wound located on the coccyx Cleanse with MicroKlenz moisten gauze   Pat dry. Apply no sting barrier film to the fredy wound skin allow to dry   Cover with 4 x4 Mepilex dressing  Change every other day and as needed      Orders Written  [facility] Nurse follow-up plan:weekly  Nursing to notify the Provider(s) and re-consult the [facility] Nurse if wound(s) deteriorates or new skin concern.    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      Afib with RVR. Pt presents with increased SOB, s/p paracentesis and b/l thoracentesis. Patient reports he has had episodes of afib in the past and has responded well to fluids, though unclear per chart review if patient has had previous episodes of afib. due to age and comorbidities, unlikely a candidate for cardioversion.  Will focus on rate control. Patient is asymptomatic, denies dizziness, increased SOB, chest pain, nausea/vomiting.  HR 120s-140s, BPs 80s-130s systolically.   - will initiate diltiazem gtt for rate control  - 500 ml LR bolus   - monitor VS overnight   - telemetry   - consider cards consult if not controlled with dilt vs f/u with outpatient cardiology        Objective Data  Containment of urine/stool: Continent of bowel and Continent of bladder    Current Diet/  Nutrition:    Active Diet Order      Regular Diet Adult      Diet    Output:   I/O last 3 completed shifts:  In: 1220 [P.O.:200; I.V.:1020]  Out: 400 [Urine:400]    Risk Assessment:   Sensory Perception: 4-->no impairment  Moisture: 3-->occasionally moist  Activity: 3-->walks occasionally  Mobility: 3-->slightly limited  Nutrition: 3-->adequate  Friction and Shear: 3-->no apparent problem  Nicholas Score: 19      Labs:   Recent Labs  Lab 08/28/18  0533 08/27/18  1653   ALBUMIN  --  1.8*   HGB 8.9*  --    INR 1.07  --    WBC 11.7*  --        Physical Exam  Skin inspection: focused coccyx      Wound Location: coccyx  Date of last Photo 8/28/2018  Wound History:A stage 2 pressure injury present on admission. Patient reports that the pressure injury is a long term issue he has dealt with at home     Measurements (length x width x depth, in cm)  Wound Base:  100 % dermis  Tunneling N/A  Undermining N/A  Palpation of the wound bed: normal   Periwound skin: intact  Color: red  Temperature: normal   Drainage:, small  Description of drainage: serosanguinous  Odor: none  Pain:  aching    Interventions  Current support surface: Standard  Atmos Air mattress  Current off-loading measures: Foam padding  Repositioning aid: Pillows  Visual inspection of wound(s) completed   Wound Care: was done per plan of care.  Supplies: placed at the bedside  Educated provided: plan of care  Education provided to: patient   Discussed importance of:repositioning every 2 hours, off-loading pressure to wound, their role in pressure injury prevention, dressing change frequency, head elevation <30 degrees and moisture management  Discussed plan of care with Patient    Reva Hernandez MSN, RN, CNP-FNP, CWOCN

## 2018-08-28 NOTE — CONSULTS
CARDIOLOGY CONSULT HISTORY AND PHYSICAL     Reason for consult: Management of atrial fibrillation  Requesting team: Hematology/Oncology    HPI:  Mr. 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    At baseline, Mr. Cortes states that he has only been able to ambulate 10-20 feet on level ground for most of the past few weeks.   He has had multiple prior thoracenteses for recurrent effusions.  Mr. Cortes reports prior episodes of atrial fibrillation which was only treated medically.  He has never been anticoagulated for it.  He is followed by Dr. Jad Ball in the outpatient setting, who also noted in 07/2018 that he could not find any evidence of AFib.  Mr. Cortes was previously on metoprolol but this was stopped due to hypotension, by his account.     Today, Mr. Cortes underwent bilateral thoracentesis and paracentesis for increasing dyspnea stemming from his malignant pleural effusions and ascites.  He had 1 L removed from each side by thoracentesis and 4.5 L by paracentesis.  Following the procedure he was noted to have atrial ablation with rapid ventricular response with his heart rate going to 120.  His blood pressure was noted to be in systolic range of .  Despite this, Mr. Cortes remained asymptomatic and on absence of dizziness, chest pain, shortness of breath, or vomiting.  Cardiology was consulted to evaluate management of his atrial fibrillation.      At the time of his consultation, Mr. Cortes noted his chief complaint was his ongoing dyspnea.  This had nearly improved to baseline but still limited his exercise tolerance to 20-30 yards.     ROS otherwise negative.     PAST MEDICAL HISTORY:  - Coronary disease   - Status post prior PCI to right coronary artery and posterior lateral artery  "branch in 2009 and left circumflex artery in April 2014  - Atrial fibrillation   - No prior DCCV, ablation   - No atrial fibrillation noted on chart review (particularly prior Cardiology records)  - Mantle cell lymphoma   - Stage IV, diagnosed approx 8 months ago per patient   - Has been undergoing chemotherapy    - c/b malignant pleural effusions/chylothorax and ascites  - Dyslipidemia  - Hypertension  - Prostate cancer  - GERD  - No prior DM diagnosis, TIA/stroke, PAD/aortic plaque, CHF     PAST SURGICAL HISTORY:  - Bilateral cataract surgery  - Prostatectomy for prostate cancer    FAMILY HISTORY:  - Brother with CABG at age 43  - Father with MI at age 62  - Niece with valvular replacement age <40    SOCIAL HISTORY:   - Never smoker  - Drinks approximately 6-7 alcoholic drinks per week  - [employer]   - Worked as a     HOME MEDICATIONS:  Home cardiac meds: Aspirin 81 mg once daily, furosemide 20 mg once daily, potassium chloride 10 mg once daily, rosuvastatin 40 mill grams daily, as needed nitroglycerin.  Prior to Admission medications    Medication Sig Start Date End Date Taking? Authorizing Provider   allopurinol (ZYLOPRIM) 300 MG tablet Take 1 tablet (300 mg) by mouth daily 6/25/18  Yes Erickson Adan MD   ASPIRIN 81 MG OR TABS 1 TABLET EVERY MORNING   Yes Reported, Patient   Benzethonium Chloride 0.1 % LIQD Cleanse with wound cleanser and apply barrier paste and hydrocolloid dressing, change every 2-3 days as needed 5/23/18  Yes Florian Alonzo MD   clindamycin (CLEOCIN T) 1 % lotion Apply twice daily for 6 weeks to the groin 6/22/17  Yes Aby Rodriguez MD   COMPRESSION STOCKINGS 1 each continuous prn Bilateral knee high compression stockings 5/1/18  Yes Jad Ball MD   furosemide (LASIX) 20 MG tablet Take 1 tablet (20 mg) by mouth daily 6/25/18  Yes Erickson Adan MD   Gauze Pads & Dressings (GAUZE BANDAGE 4\") MISC Cleanse with wound cleanser and apply barrier paste and " hydrocolloid dressing, change every 2-3 days as needed 5/23/18  Yes Florian Alonzo MD   hydrocortisone valerate (WEST-NINOSKA) 0.2 % ointment Apply sparingly to affected area three times daily as needed. 2/24/17  Yes Florian Alonzo MD   LANsoprazole (PREVACID) 15 MG CR capsule Take 15 mg by mouth daily Patient needs to use brand name Prevacid (generic causes diarrhea)   Yes Unknown, Entered By History   lidocaine-prilocaine (EMLA) cream Apply 1 Application topically as needed 3/27/18  Yes Unknown, Entered By History   LORazepam (ATIVAN) 0.5 MG tablet Take 1 tablet (0.5 mg) by mouth every 4 hours as needed (Anxiety, Nausea/Vomiting or Sleep) 3/28/18  Yes Merry Auguste MD   Multiple Vitamin (MULTI-VITAMIN DAILY PO) Take 1 tablet by mouth   Yes Reported, Patient   ondansetron (ZOFRAN) 8 MG tablet Take 1 tablet (8 mg) by mouth every 8 hours as needed (Nausea/Vomiting) 4/30/18  Yes Erickson Adan MD   potassium chloride SA (K-DUR/KLOR-CON M) 10 MEQ CR tablet Take 1 tablet (10 mEq) by mouth daily 6/25/18  Yes Erickson Adan MD   prochlorperazine (COMPAZINE) 10 MG tablet Take 1 tablet (10 mg) by mouth every 6 hours as needed for nausea or vomiting 3/27/18  Yes Joy Bush APRN CNP   rosuvastatin (CRESTOR) 40 MG tablet Take 1 tablet (40 mg) by mouth daily 2/23/18  Yes Florian Alonzo MD   zinc oxide - white petrolatum (CRITIC-AID THICK MOIST BARRIER) 20-51% PSTE topical paste Cleanse with wound cleanser and apply barrier paste and hydrocolloid dressing, change every 2-3 days as needed 5/23/18  Yes Florian Alonzo MD   acetaminophen (TYLENOL) 500 MG tablet Take 500 mg by mouth every 6 hours as needed for mild pain    Unknown, Entered By History   Alum & Mag Hydroxide-Simeth (ALUMINUM-MAGNESIUM-SIMETHICONE) 200-200-20 MG/5ML SUSP Take 5 mLs by mouth daily as needed    Unknown, Entered By History   nitroGLYcerin (NITROSTAT) 0.4 MG sublingual tablet Place 1 tablet (0.4 mg) under the tongue every 5  minutes as needed up to 3 tablets per episode. 8/15/17   Florian Alonzo MD   order for DME Equipment being ordered: wheeled walker with seat 8/20/18   Joy Bush APRN CNP       VITAL SIGNS:  Temp: 98.3  F (36.8  C) Temp src: Oral BP: 104/67 Pulse: 128 Heart Rate: 114 Resp: 22 SpO2: 98 % O2 Device: Nasal cannula Oxygen Delivery: 4 LPM    154 lbs 0 oz    Intake/Output Summary (Last 24 hours) at 08/27/18 2004  Last data filed at 08/27/18 1900   Gross per 24 hour   Intake              200 ml   Output               75 ml   Net              125 ml         PHYSICAL EXAM:  Gen: Looks well  HEENT: MMM, no carotid bruits, NC at 4L/min  Resp: No signs of resp distress, chest with bilateral access sites with minor bleeding, chest ausc with decreased breath sounds at bases but good air entry in mid-zones and upper zones  CVS: JVP at 10 cm, apex undisplaced, S1+S2 without added sounds or murmurs  Abdo: ND, S, NT, +BS  Extremities: Warm, well-perfused.  No peripheral edema.  Good radials, ulnars, popliteals, DP/PT pulses bilaterally  Neuro: GCS 15/15, AAOx3    Labs:   Recent Labs   Lab Test  08/27/18   1653  08/27/18   1005   08/23/18   1714   HGB   --   10.2*   < >  10.5*   HCT   --   31.3*   < >  31.9*   WBC   --   19.6*   < >  35.1*   MCV   --   103*   < >  102*   MCH   --   33.4*   < >  33.7*   MCHC   --   32.6   < >  32.9   RDW   --   16.8*   < >  16.4*   PLT   --   96*   < >  124*   NA   --   140   < >  137   POTASSIUM   --   4.2   < >  4.4   CHLORIDE   --   108   < >  106   CO2   --   24   < >  21   BUN   --   24   < >  24   CR   --   1.26*   < >  1.24   GLC   --   107*   < >  62*   EVANGELINA   --   8.2*   < >  7.6*   ALBUMIN  1.8*   --    --   2.1*   BILITOTAL   --    --    --   0.4   ALKPHOS   --    --    --   107   AST   --    --    --   48*   ALT   --    --    --   21    < > = values in this interval not displayed.     TSH 08/14/17: 2.1     EKG 08/27/2018 10:00: Rate approximately 140 bpm atrial fibrillation.   Low voltage QRS.  CXR 08/27/2018 (pre-thoracentesis): moderate-large right-sided pleural effusion with blunting of right costophrenic angle and small-moderate left-sided pleural effusion   TTE 04/2018: Left ventricular ejection fraction 65-70%.  Mild LVH.  Estimated PA systolic pressure 45 mmHg without any significant valvular lesions.  Coronary angiogram 03/2014:  Left main with minimal disease  LAD gives off 2 diagonals, with minimal disease in proximal LAD (<25%), 30-40% long lesion in distal LAD.    LCX gives off 2 trivial OM and a large OM3. There is 90% obstruction of mid LCx.  RCA with stent in mid RCA and RPLA with minimal ISR.25% lesion in proximal RCA and 30% lesion after stent in distal RCA, with 2 RPLA with minimal disease. PDA without disease.     ASSESSMENT/PLAN:  Mr. Francisco Javier Cortes is a 78-year old male with a past medical history of hypertension, CAD, and mantle cell lymphoma who developed atrial fibrillation after thoracentesis earlier today.  Cardiology was consulted for management of the atrial fibrillation.    - AFib with RVR in the setting of recent thoracentesis   - Rate control with IV diltiazem or IV esmolol is appropriate, as both can be stopped and will wear off quickly if hypotension recurs    - Please titrate upwards as BP tolerates to achieve a HR between .    - If either IV medication causes hypotension, would recommend IV digoxin loading per Pharmacy protocol   - Would limit additional IV fluids in view of elevated JVP   - CHADS2-VASC score of 3 (HTN, age) would suggest that the patient would benefit from anticoagulation if safe from an oncologic standpoint if the AFib persists >24 hours    - Rhythm control strategies such as anti-arrhythmic drugs and DCCV can be considered if aforementioned is unsuccessful    - Avoidance of adrenergic stimulation would prevent stimulation of AFib     To be staffed on 08/28/2018.     Joce Bolanos   Cardiology Fellow  Pager [phone]

## 2018-08-28 NOTE — PROVIDER NOTIFICATION
Diltiazem gtt order discontinued by primary team therefore, nursing will not start and continue to monitor.

## 2018-08-28 NOTE — PLAN OF CARE
Problem: Patient Care Overview  Goal: Plan of Care/Patient Progress Review  Outcome: Adequate for Discharge Date Met: 08/28/18  Pt cleared for discharge.  Zio patch monitor placed, pt in ST -120.  BP 81/60 Pt is to not take his morning Lasix until after his appointment tomorrow.  Pts O2 was 95% on RA at rest.  Discharge paperwork explained, including medications and follow up appointments.  Wife and Pt verbalize understanding and have no further questions. Pt left via wheelchair with wife and son at 14:50.

## 2018-08-28 NOTE — PROGRESS NOTES
Observation Goals:  1) HR controlled <120: HR resting between 110-115, sinus tach with premature supraventricular complexes and PACs  2) SBP >90: Continuing to monitor frequently, see provider note re: BPs 70/50s. Pt receiving another dose of 25g of Albumin  3) Pt without cardiac symptoms (chest pain, SOB, dizziness): Pt denying symptoms

## 2018-08-28 NOTE — PROGRESS NOTES
Observation Goals:  1) HR controlled <120: HR resting between 110-115, now reading sinus tach with premature supraventricular complexes and PACs  2) SBP >90: Continuing to monitor frequently, pressures 90s/50s  3) Pt without cardiac symptoms (chest pain, SOB, dizziness):  Pt denying symptoms

## 2018-08-28 NOTE — DISCHARGE SUMMARY
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 He had 7.5 liters taken off from his Thoracentesis as well as paracentesis, he was given Fluid bolus and Albumin given and his Afib RVR converted to sinus tach with PVC. HE was seen by cardiology, who recommended no futher medications as he has converted and is asymptomatic. He will discharge with Zio patch and follow up in the clinic       Plan:  BMB cancelled as he was inpatient, appt was changed for next week  Zio patch, cardiology will follow up  No medications changes      Hospital Course   Francisco Javier Cortes was admitted on 8/27/2018.  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      Afib with RVR. Pt presents with increased SOB, s/p paracentesis and b/l thoracentesis. Patient reports he has had episodes of afib in the past and has responded well to fluids, though unclear per chart review if patient has had  previous episodes of afib. due to age and comorbidities, unlikely a candidate for cardioversion.  Will focus on rate control. Patient is asymptomatic, denies dizziness, increased SOB, chest pain, nausea/vomiting.  HR 120s-140s, BPs 80s-130s systolically.   - Converted on his own to Sinus tach with PVC/s, was asymptomatic. Was seen by cardiology, will go home with [place] and will follow up with patient in the outpatient setting  - 500 ml LR bolusgiven overnight with albumin  - Did have some soft blood pressure over night, likely due to the 7.5 liters that was taken off from paracentesis and thoracentesis yesterday    SOB.   Malignant pleural effusions b/l vs chylothorax.   Requiring frequent thoracentesis procedures. S/p SPECT-CT 8/8/18, but no evidence of pleural chylous leak, thoracic duct was said to be intact. Had met with Dr. Welch outpatient for consideration of thoracic duct ligation vs pleurx. Per notes, Dr. Estela Rasmussen (IR) was planning to meet with pt outpatient to discuss thoracic duct ligation vs embolization. Thoracic surgery consulted for consideration of surgical intervention, but felt that surgical intervention in the chest is unlikely to be of benefit given proximal location of leakage indicated by the presence of ascites. Recommended follow-up with IR. Underwent right sided thoracentesis on 8/24 with removal of 1.5L and subsequent improvement in breathing. He was discharged from hospital with home O2 (~ 4 LNC) and plan to follow up with IR for weekly taps vs IR intervention (ligation vs embolization vs pleurex).  Pt presented with increased SOB again today, went to IR today for possible chest tube vs pleurX placement, but was decided against at this time.    - b/l thoracentesis performed with studies sent to r/o chylothorax   - supplemental O2 to keep sats > 92 % as needed   - WIll keep outpatient appts scheduled for tomorrow.         CAD, s/p stent placement (2014)   HTN  HFpEF  He is s/p  stenting RCA in 2009, recurrent chest pain showed left main minimal disease, LAD 40% distal stenosis, circumflex 90% stenosis, RCA patent stents & at that time had drug eluding stent in circumflex CA. PTA on lasix 20mg daily, ASA, prevacid, rosuvastatin.    - cont asa, prevacid, statin   - Will see cardiology in the outpatient setting.      # Indeterminate pulmonary nodules was following with [facility] outpatient     Malignant Ascites. S/p CT scan (8/22 showed significant bilateral pleural effusions & ascites) last had paracentesis 8/16, 7/27 (flow at that time positive for involvement of B cell lymphoma)  - s/p paracentesis 8/27, ~ 4.5 L drained, cultures sent      # Mantle cell lymphoma (stage IV - B) with malignant ascites, & malignant pleural effusions requiring para & thoracentesis   Follows with Dr. Adan. Presented in March 2018 with complaint of abdominal bloating and discomfort loss of appetite ×2 months. Initial imaging showed splenomegaly, extensive retroperitoneal mesenteric, inguinal and pelvic as well as retrocrural adenopathy and enlarging pulmonary nodules up to 18 mm. FNA proved mantle cell lymphoma. 3/29/18 began Bendamustine and Rituxan (s/p cycle 6 D2 on 8/21 & neulasta 8/22). Oncology treatment has been complicated by multiple hospitalizations (TLS, sepsis from PNA, CHF in April, and now hospitalized 8/23 r/t worsening SOB, bilateral pleural effusions & persistent chylothorax unable to be managed outpatient). Most recent CT imaging 8/22 with a few slightly larger left axillary and mediastinal lymph nodes since CT 6/21/2018, concerning for possible disease progression. Increased large right and moderate left pleural effusions with associated atelectasis/consolidation.           EXAM:  Constitutional: Awake, alert, cooperative, no apparent distress  ENT: Oral mucosa pink and moist without visible lesions or petechiae  Respiratory: No increased work of breathing, good air exchange, crackles heard in  bases, oxygen cannula on   Cardiovascular: regular rate and rhythm, normal S1 and S2, no S3 or S4, and no murmur noted.  GI: normal bowel sounds, soft, non-distended, non-tender, no masses palpated  Lymph/Hematologic: No cervical lymphadenopathy and no supraclavicular lymphadenopathy.  Skin: No bruising or bleeding, rashes or lesions  Extremeties: No peripheral extremity edema noted  Neurologic: Awake, alert, oriented to name, place and time.        Code Status   Full Code    Primary Care Physician   Florian Alonzo        Discharge Disposition   Discharged to home  Condition at discharge: Stable    Consultations This Hospital Stay   INTERVENTIONAL RADIOLOGY ADULT/PEDS IP CONSULT  MEDICATION HISTORY IP PHARMACY CONSULT  WOUND OSTOMY CONTINENCE NURSE  IP CONSULT  CARDIOLOGY GENERAL ADULT IP CONSULT    Discharge Orders     Medication Therapy Management Referral     Reason for your hospital stay   You were admitted after your thoracentesis and paracentesis for AFib RVR, you were given some IV fluids and you returned back to a regular rhythm     Activity   Your activity upon discharge: activity as tolerated     When to contact your care team   [facility] triage line at [phone] for temp >100.4, uncontrolled nausea/vomiting/diarrhea/constipation, unrelieved pain, bleeding not relieved with pressure, dizziness, chest pain, shortness of breath, loss of consciousness, and any new or concerning symptoms.     Follow Up and recommended labs and tests   Continue all of your scheudled appts as listed on your discharge ( The ones that have been scheduled will stay the same)  Your Bone marrow biopsy has been requested to change, you will be notified of new appt time.  Follow up with Cardiology in 1 month.     Discharge Instructions   No changes were made to your medications  Continue all appointments that have been pre arranged for you.   Continue using Oxygen for shortness of breath or oxygen saturations  "below 90%  Continue drinking adequate fluids, your blood pressure was likely low since 7 liters was taken from your abdomen and lungs  You were discharged with a Zio patch, cardiology will follow up with you regarding these results.     Full Code     Diet   Follow this diet upon discharge: Regular       Discharge Medications   Current Discharge Medication List      CONTINUE these medications which have NOT CHANGED    Details   allopurinol (ZYLOPRIM) 300 MG tablet Take 1 tablet (300 mg) by mouth daily  Qty: 30 tablet, Refills: 1    Associated Diagnoses: Mantle cell lymphoma of lymph nodes of multiple sites (H)      ASPIRIN 81 MG OR TABS 1 TABLET EVERY MORNING      Benzethonium Chloride 0.1 % LIQD Cleanse with wound cleanser and apply barrier paste and hydrocolloid dressing, change every 2-3 days as needed  Qty: 237 mL, Refills: 3    Associated Diagnoses: Decubitus ulcer of buttock, unspecified laterality, unspecified ulcer stage      clindamycin (CLEOCIN T) 1 % lotion Apply twice daily for 6 weeks to the groin  Qty: 60 mL, Refills: 1    Associated Diagnoses: Rash      COMPRESSION STOCKINGS 1 each continuous prn Bilateral knee high compression stockings  Qty: 2 each, Refills: 0    Associated Diagnoses: Acute congestive heart failure, unspecified congestive heart failure type (H)      furosemide (LASIX) 20 MG tablet Take 1 tablet (20 mg) by mouth daily  Qty: 180 tablet, Refills: 3    Associated Diagnoses: Hypervolemia, unspecified hypervolemia type      Gauze Pads & Dressings (GAUZE BANDAGE 4\") MISC Cleanse with wound cleanser and apply barrier paste and hydrocolloid dressing, change every 2-3 days as needed  Qty: 5 each, Refills: 3    Associated Diagnoses: Decubitus ulcer of buttock, unspecified laterality, unspecified ulcer stage      hydrocortisone valerate (WEST-NINOSKA) 0.2 % ointment Apply sparingly to affected area three times daily as needed.  Qty: 45 g, Refills: 3    Associated Diagnoses: Psoriasis    "   LANsoprazole (PREVACID) 15 MG CR capsule Take 15 mg by mouth daily Patient needs to use brand name Prevacid (generic causes diarrhea)      lidocaine-prilocaine (EMLA) cream Apply 1 Application topically as needed      LORazepam (ATIVAN) 0.5 MG tablet Take 1 tablet (0.5 mg) by mouth every 4 hours as needed (Anxiety, Nausea/Vomiting or Sleep)  Qty: 30 tablet, Refills: 3    Associated Diagnoses: Mantle cell lymphoma of lymph nodes of multiple sites (H)      Multiple Vitamin (MULTI-VITAMIN DAILY PO) Take 1 tablet by mouth      ondansetron (ZOFRAN) 8 MG tablet Take 1 tablet (8 mg) by mouth every 8 hours as needed (Nausea/Vomiting)  Qty: 10 tablet, Refills: 3    Associated Diagnoses: Mantle cell lymphoma of lymph nodes of multiple sites (H)      potassium chloride SA (K-DUR/KLOR-CON M) 10 MEQ CR tablet Take 1 tablet (10 mEq) by mouth daily  Qty: 30 tablet, Refills: 1    Associated Diagnoses: Hypervolemia, unspecified hypervolemia type      prochlorperazine (COMPAZINE) 10 MG tablet Take 1 tablet (10 mg) by mouth every 6 hours as needed for nausea or vomiting  Qty: 30 tablet, Refills: 1    Associated Diagnoses: Nausea      rosuvastatin (CRESTOR) 40 MG tablet Take 1 tablet (40 mg) by mouth daily  Qty: 90 tablet, Refills: 3    Associated Diagnoses: Hyperlipidemia LDL goal <100; Coronary artery disease involving native coronary artery of native heart without angina pectoris      zinc oxide - white petrolatum (CRITIC-AID THICK MOIST BARRIER) 20-51% PSTE topical paste Cleanse with wound cleanser and apply barrier paste and hydrocolloid dressing, change every 2-3 days as needed  Qty: 170 g, Refills: 3    Associated Diagnoses: Decubitus ulcer of buttock, unspecified laterality, unspecified ulcer stage      acetaminophen (TYLENOL) 500 MG tablet Take 500 mg by mouth every 6 hours as needed for mild pain      Alum & Mag Hydroxide-Simeth (ALUMINUM-MAGNESIUM-SIMETHICONE) 200-200-20 MG/5ML SUSP Take 5 mLs by mouth daily as needed       nitroGLYcerin (NITROSTAT) 0.4 MG sublingual tablet Place 1 tablet (0.4 mg) under the tongue every 5 minutes as needed up to 3 tablets per episode.  Qty: 60 tablet, Refills: 6    Associated Diagnoses: Chest pain due to myocardial ischemia, unspecified ischemic chest pain type (H); Coronary artery disease involving native coronary artery of native heart without angina pectoris      order for DME Equipment being ordered: wheeled walker with seat  Qty: 1 Device, Refills: 0    Associated Diagnoses: Generalized muscle weakness; Mantle cell lymphoma of lymph nodes of multiple sites (H)           Allergies   Allergies   Allergen Reactions     Niacin      Severe rash and itching     Prevacid [Lansoprazole] Diarrhea     Patient notes diarrhea with 30mg generic version. Patient does ok with 20mg in generic and brand name.     Shellfish Allergy      One kind     Data   Most Recent 3 CBC's:  Recent Labs   Lab Test  08/28/18   0533  08/27/18   1005  08/24/18   0245   WBC  11.7*  19.6*  30.7*   HGB  8.9*  10.2*  10.1*   MCV  103*  103*  102*   PLT  65*  96*  105*      Most Recent 3 BMP's:  Recent Labs   Lab Test  08/28/18   0533  08/27/18   1005  08/24/18   0245   NA  141  140  138   POTASSIUM  4.0  4.2  4.1   CHLORIDE  109  108  107   CO2  24  24  24   BUN  23  24  24   CR  1.04  1.26*  1.22   ANIONGAP  8  9  7   EVANGELINA  7.5*  8.2*  7.6*   GLC  82  107*  76     Most Recent 2 LFT's:  Recent Labs   Lab Test  08/23/18   1714  08/20/18   0900   AST  48*  42   ALT  21  20   ALKPHOS  107  91   BILITOTAL  0.4  0.2     Most Recent INR's and Anticoagulation Dosing History:  Anticoagulation Dose History     Recent Dosing and Labs Latest Ref Rng & Units 3/31/2018 4/1/2018 4/10/2018 4/11/2018 8/8/2018 8/23/2018 8/28/2018    INR 0.86 - 1.14 1.09 1.14 1.2(H) 1.21(H) 0.94 1.05 1.07        Most Recent 3 Troponin's:  Recent Labs   Lab Test  08/27/18   1005  04/20/18   0956  03/20/14   1143   TROPI  <0.015  <0.015  8.380*     Most Recent  Cholesterol Panel:  Recent Labs   Lab Test  08/14/17   0706   CHOL  153   LDL  81   HDL  32*   TRIG  202*     Most Recent 6 Bacteria Isolates From Any Culture (See EPIC Reports for Culture Details):  Recent Labs   Lab Test  08/28/18   0917  08/28/18   0906  08/27/18   1430  08/24/18   1148  08/23/18   1952  08/23/18   1825   CULT  PENDING  PENDING  Culture negative monitoring continues  Culture negative monitoring continues  10,000 to 50,000 colonies/mL  Jonnathan briones  *  No growth after 5 days     Most Recent TSH, T4 and A1c Labs:  Recent Labs   Lab Test  02/21/18   1240  08/14/17   0706   TSH   --   2.06   A1C  5.8  5.6

## 2018-08-28 NOTE — PLAN OF CARE
"Problem: Patient Care Overview  Goal: Plan of Care/Patient Progress Review  Outcome: No Change  BP (!) 79/60 (BP Location: Left arm)  Pulse 128  Temp 98.7  F (37.1  C) (Oral)  Resp 16  Ht 1.651 m (5' 5\")  Wt 69.9 kg (154 lb)  SpO2 99%  BMI 25.63 kg/m2  HR continues to be 110-115, no longer in a-fib per 12 lead EKG. On 4L O2 via NC. Maintaining adequate O2 sats. Denying SOB or chest discomfort.  Pt A&Ox4, resting comfortably. Using call light appropriately. Pt denies pain. No diltiazem gtt was initiated as pt's HR was consistently <120 over night and pressures were soft. Voiding per urinal, due to void this AM. No BM. Plan for continued observation. MD to assess pt at bedside. Nursing will continue to follow the POC and update the MD team with concerns.  Pt received the observation brochure      Problem: Cardiac Disease Comorbidity  Goal: Cardiac Disease  Patient comorbidity will be monitored for signs and symptoms of Cardiac Disease.  Problems will be absent, minimized or managed by discharge/transition of care.  Outcome: No Change  Pt is no longer in A-fib, rates now <120.      "

## 2018-08-28 NOTE — PROVIDER NOTIFICATION
Spoke with Keren Caballero, Heme-Onc NP to ensure okay with discharging with BP of 81/60 and HR in the 120's. Per Keren, okay to continue to with discharge but please have Francisco Javier hold tomorrow's dose of lasix until after his clinic appointment tomorrow.    This writer verbalized these instructions to Francisco Javier and his wife Court. They verbalized understanding and will be back to his appointment tomorrow morning.     I also discussed with Keren that his belly already seems to be building up fluid again. No need for intervention, they will reassess at clinic appointment tomorrow AM.

## 2018-08-28 NOTE — PROGRESS NOTES
Observation Goals:  1) HR controlled <120: Pt is resting with  to 115  2) SBP >90: Monitoring of BPs every half and hour, briefly had lower pressures of 80s/40s. See provider note re: sepsis protocol  3) Pt without cardiac symptoms (chest pain, SOB, dizziness): Pt denying symptoms at rest

## 2018-08-28 NOTE — PROVIDER NOTIFICATION
Spoke with Heme/Onc re: pt triggering sepsis protocol with a positive LA of 3.3. MD consulted with Cards, no new orders at this time. Nursing continuing to monitor BPs and HR, pt denying discomfort. Will update with concerns.

## 2018-08-28 NOTE — PROGRESS NOTES
Notified Heme/Onc that pt's BP is 79/60 with a MAP of 65. The prior pressure was 74/42 with a MAP of 56. MD ordering for another 25 g bag of Albumin to be administered now. MD stated he will come by at bedside to assess pt.

## 2018-08-28 NOTE — TELEPHONE ENCOUNTER
ED / Discharge Outreach Protocol    Patient Contact    Attempt # 1    Was call answered?  No.  Left message that RN will call back.    Angeles Martin RN      (Upon chart review there may be a possibility that patient is currently being hospitalized)

## 2018-08-28 NOTE — TELEPHONE ENCOUNTER
Pt does not meet criteria for RN CC to f/u with.   Recent IP Discharge occurred, but RN CC did not received a CTS referral (hospital discharge planners identified that pt was at moderate to high risk) and was not on Care Coordination discharge report.  The amount of ED/IP visits does not pertain to IP follow up workflow. RN CC will follow up with IP discharges if the pt is in active care coordination, meets specific discharge criteria on care coordination discharge report or if a CTS referral is received.    Will route back to care team to follow up with pt.   Call RN CC with any questions or need for clarification.       Melissa Behl BSN, RN, PHN  [facility] Care Coordinator  [phone]

## 2018-08-28 NOTE — PROVIDER NOTIFICATION
Touched base with Keren Caballero NP from primary team to discuss whether or not to start diltiazem gtt. Per Keren, she will be down to assess patient and she'll also obtain another EKG. Diltiazem was not started last night. HR's currently in the 110-125 range with soft but stable BP's, patient entirely asymptomatic.

## 2018-08-29 NOTE — PROGRESS NOTES
SUBJECTIVE/OBJECTIVE:                Francisco Javier Cortes is a 78 year old male called for a transitions of care visit.  He was discharged from [facility]  on 8-28-18 for afib-with rvr secoindary to pleural effusion. .     Chief Complaint:  Some SOB still and elevated HR--otherwise he feels a lot better breathing wise after all the fluid was removed from his lungs.   .  Personal Healthcare Goals: fix fluid issues lungs (pulmo suggested they may place 2 tubes that he can drain at home)    Allergies/ADRs: Reviewed in Epic  Tobacco: No tobacco use   Alcohol: small amount of wine at dinner --very 1/3 to 1/2 glass.   Caffeine: 1 cups/day of coffee  Activity: sedentary now --keep laid back now.   PMH: Reviewed in Epic    Medication Adherence/Access:  Patient uses pill box(es).  Patient takes medications 2 time(s) per day.  Per patient, misses medication 0 times per week.   Medication barriers: none.   The patient fills medications at [facility]: YES.    BP Readings from Last 3 Encounters:   08/29/18 99/70   08/28/18 (S) (!) 81/60   08/24/18 104/70       Afib with RVR. Pt presents with increased SOB, s/p paracentesis and b/l thoracentesis. Patient reports he has had episodes of afib in the past and has responded well to fluids, though unclear per chart review if patient has had previous episodes of afib. due to age and comorbidities, unlikely a candidate for cardioversion.  Will focus on rate control. Patient is asymptomatic, denies dizziness, increased SOB, chest pain, nausea/vomiting.  HR 120s-140s, BPs 80s-130s systolically.   - Converted on his own to Sinus tach with PVC/s, was asymptomatic. Was seen by cardiology, will go home with Zamzam and will follow up with patient in the outpatient setting  - 500 ml LR bolusgiven overnight with albumin  - Did have some soft blood pressure over night, likely due to the 7.5 liters that was taken off from paracentesis and thoracentesis yesterday        Pt. Today remarked his beta blocker med  metoprolol is on hold due to hypotension . He does have home oximetry and just woke from nap (wife stated he doesn't like to wear his oxygen because the tubing makes his ears sore )--narendra had him check  His O-2 --it was 87% and his pulse was 123.     He remarks he wears it overnight but doesn't like to during day.    He is seeing pulmonology to work for long term solution to his pleural effusion-- they states may place 2 drain tubes in his lungs that he  Can use at home to drain fluid off .    He is also wearing heart-Zio patch this week and then will see cards for f/up on that .      No other change to his meds --not discharged with any new ones , he is taking all on his [facility] med list as prescribed.        Today's Vitals: oxygen 87% and HR =123 at home readings. He doesn't have home BP meter.     ASSESSMENT:                 Current medications were reviewed today.      Medication Adherence: excellent, no issues identified    Afib with RVR:   afib resolved after 7.5 liters fluid taken off him , narendra feels he should be wearing his oxygen now 100% of time to keep his O-2 level >90% and HR closer to 100 --hopefully the added oxygen will take pressure off his heart .     PLAN:                1. FYI-- Please continue your current medications as is --BUT please wear your Oxygen now 24/7 now to keep your daily Oxygen level >90% and that help keeps your heart rate in low 100's and takes pressure off your heart .     Follow-up with your doctors next week.      I spent 40 minutes with this patient today. I offer these suggestions for consideration by his PCP. A copy of the visit note was provided to the patient's primary care provider.      The patient was sent via [employer] a summary of these recommendations as an after visit summary.    Prieto Gallagher [facility].  Medication Therapy Management Provider  [phone]

## 2018-08-29 NOTE — TELEPHONE ENCOUNTER
See telephone encounter from 8/27/18. Patient was already seen by Oncology 08/29/18.    Livan Avalos RN, BSN

## 2018-08-29 NOTE — MR AVS SNAPSHOT
MRN:[id number]                      After Visit Summary   8/29/2018    Francisco Javier Cortes    MRN: [id number]           Visit Information        Provider Department      8/29/2018 8:20 AM Dorothy Rasmussen MD [facility]        Your next 10 appointments already scheduled     Sep 17, 2018  2:15 PM CDT   Return Visit with Erickson Adan MD   [facility] ([facility]    [address] [address] [place] [address] [id number] [phone]            Oct 10, 2018 10:15 AM CDT   XR CHEST 2 VIEWS with UCXR1   [facility] ([facility])    [address]  1st Floor  [facility] [address] [phone]           How do I prepare for my exam? (Food and drink instructions) No Food and Drink Restrictions.  How do I prepare for my exam? (Other instructions) You do not need to do anything special for this exam.  What should I wear: Wear comfortable clothes.  How long does the exam take: Most scans take less than 5 minutes.  What should I bring: Bring a list of your medicines, including vitamins, minerals and over-the-counter drugs. It is safest to leave personal items at home.  Do I need a :  No  is needed.  What do I need to tell my doctor: Tell your doctor if there s any chance you are pregnant.  What should I do after the exam: No restrictions, You may resume normal activities.  What is this test: An image of a specific body part shown in shades of black and white.  Who should I call with questions: If you have any questions, please call the Imaging Department where you will have your exam. Directions, parking instructions, and other information is available on our website, [email or web address].              [facility] [employer] Information     [employer] gives you secure access to your electronic health record. If you see a primary care provider, you can also send messages to your care team and make appointments. If  you have questions, please call your primary care clinic.  If you do not have a primary care provider, please call [phone] and they will assist you.        Care EveryWhere ID     This is your Care EveryWhere ID. This could be used by other organizations to access your [place] medical records  [id number]        Equal Access to Services     NANDA BARAJAS : Alondra Cherry, kenia fiore, zhao mckeon. So [facility] [phone].    ATENCIÓN: Si habla español, tiene a ivan disposición servicios gratuitos de asistencia lingüística. Llame al [phone].    We comply with applicable federal civil rights laws and [place] laws. We do not discriminate on the basis of race, color, national origin, age, disability, sex, sexual orientation, or gender identity.

## 2018-08-29 NOTE — PROGRESS NOTES
"    INTERVENTIONAL RADIOLOGY CONSULTATION    Name: Francisco Javier Cortes  Age: 78 year old   Referring Physician: Dr. Welch   REASON FOR REFERRAL: possible lymphangiogram     HPI: Mr. Cortes is referred by Dr. 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 He is a 74-year-old male was previously healthy for age. Several months ago he noted abdominal bloating and palpable masses in his neck and was noting fatigue, loss of appetite and weight loss. He underwent a workup and excisional biopsy of a left level 5 lymph node on 3/22/2018 revealed blastoid variant mantle cell lymphoma. On March 29, he was started on chemotherapy, and is completed 6 cycles. He has one cycle ago. In the context of his lymphoma diagnosis he developed shortness of breath, was found to have bilateral pleural effusions as well as ascites. Drainage revealed what is been described as \"malignant fluid\". Other prescriptions have described \"chylous fluid\". Reviewing laboratory results, the fluid does not been tested. He is required twice weekly drainage of this pleural effusions, and when this is not done in a timely fashion, his dyspnea leads to hospital admission. He is required less frequent drainage of this ascites, approximately once every 2 weeks. He underwent a CT scan in his workup, and this was recently repeated. It reveals bulky bilateral hilar adenopathy, large bilateral pleural effusions, bulky mesenteric adenopathy, and large volume ascites.He underwent a nuclear medicine lymphoscintigraphy on 8/8/2018 which revealed patent thoracic duct, no evidence of abdominal or thoracic lymphatic obstruction, and no evidence of leakage into the peritoneum or either pleural effusion.  No chest pain, n/v, lower extremity swelling, recent infection, known DVT. At his last hospital admission, he was found to have atrial fibrillation and is wearing a Holter monitor.      PAST MEDICAL HISTORY:   Past Medical History: "   Diagnosis Date     CAD (coronary artery disease) 1/09    s/p stentsx2     Coronary artery disease involving native coronary artery of native heart without angina pectoris 12/22/2015     Dyslipidemia      Erectile dysfunction      GERD (gastroesophageal reflux disease)      Hearing problem One ear 1961     Hypertension      NSTEMI (non-ST elevated myocardial infarction) (H) 3/20/2014     Pneumonia      Prostate cancer (H)     prostatecomy 9 years ago, PSAs remain good     Status post percutaneous transluminal coronary angioplasty-Coronary angioplasty with STEPHEN to LCx 4/4/2014     Stented coronary artery     stents placed       PAST SURGICAL HISTORY:   Past Surgical History:   Procedure Laterality Date     BIOPSY LYMPH NODE CERVICAL Left 3/22/2018    Procedure: BIOPSY LYMPH NODE CERVICAL;  Excisional Biopsy Left Cervical Lymph Node ;  Surgeon: Samia Gaviria MD;  Location: [facility] OR     C APPENDECTOMY       C REMV PROSTATE,RETROPUB,RAD,TOT NODES  1999     CATARACT IOL, RT/LT       COLONOSCOPY       COLONOSCOPY Left 7/5/2016    Procedure: COMBINED COLONOSCOPY, SINGLE OR MULTIPLE BIOPSY/POLYPECTOMY BY BIOPSY;  Surgeon: Duane, William Charles, MD;  Location: [facility] OR     COLONOSCOPY WITH CO2 INSUFFLATION N/A 7/5/2016    Procedure: COLONOSCOPY WITH CO2 INSUFFLATION;  Surgeon: Duane, William Charles, MD;  Location: [facility] OR     ENT SURGERY  1980--1999     PHACOEMULSIFICATION CLEAR CORNEA WITH STANDARD INTRAOCULAR LENS IMPLANT Left 6/18/2015    Procedure: PHACOEMULSIFICATION CLEAR CORNEA WITH STANDARD INTRAOCULAR LENS IMPLANT;  Surgeon: John Zimmerman MD;  Location:  [facility]     PHACOEMULSIFICATION CLEAR CORNEA WITH STANDARD INTRAOCULAR LENS IMPLANT Right 7/16/2015    Procedure: PHACOEMULSIFICATION CLEAR CORNEA WITH STANDARD INTRAOCULAR LENS IMPLANT;  Surgeon: John Zimmerman MD;  Location:  [facility]     STENT, CORONARY, [place]  1/09, 2014    x2, x1 in 2014     THORACENTESIS Bilateral 8/8/2018    Procedure:  THORACENTESIS;  Thoracentesis Bilateral;  Surgeon: Michelle Leroy PA-C;  Location: [facility] OR     TYMPANOPLASTY       VASCULAR SURGERY  2013    stents       FAMILY HISTORY:   Family History   Problem Relation Age of Onset     Cardiovascular Mother      HEART DISEASE Mother      Cancer - colorectal Father      HEART DISEASE Father      Other Cancer Father      Cancer Father      Hypertension Father      Asthma Brother      Coronary Artery Disease Brother      Hypertension Brother      HEART DISEASE Brother      HEART DISEASE Son      Hypertension Son      Cancer Daughter      non hodgkins     Prostate Cancer Brother      Hypertension Brother      Cancer Brother      Prostate Cancer Maternal Grandfather      Cancer Maternal Grandfather      Muscular Disorder Maternal Grandfather      HEART DISEASE Paternal Grandmother      Hypertension Paternal Grandmother      Hypertension Sister        SOCIAL HISTORY:   Social History   Substance Use Topics     Smoking status: Never Smoker     Smokeless tobacco: Never Used     Alcohol use Yes      Comment: a glass of red wine a day       PROBLEM LIST:   Patient Active Problem List    Diagnosis Date Noted     Atrial fibrillation (H) 08/27/2018     Priority: Medium     Pleural effusion 08/23/2018     Priority: Medium     Dehydration 05/14/2018     Priority: Medium     COLE (dyspnea on exertion) 04/20/2018     Priority: Medium     SIRS (systemic inflammatory response syndrome) (H) 04/11/2018     Priority: Medium     Drug-induced neutropenia (H) 03/29/2018     Priority: Medium     Nausea 03/29/2018     Priority: Medium     Encounter for antineoplastic chemotherapy 03/29/2018     Priority: Medium     Nonspecific ST-T wave electrocardiographic changes 03/29/2018     Priority: Medium     Examination prior to chemotherapy 03/29/2018     Priority: Medium     Tumor lysis syndrome 03/29/2018     Priority: Medium     Mantle cell lymphoma of lymph nodes of multiple sites (H) 03/28/2018     Priority:  Medium     Elevated AST (SGOT) 02/23/2018     Priority: Medium     Flatulence, eructation, and gas pain 02/23/2018     Priority: Medium     Bloating 02/23/2018     Priority: Medium     Eczema, unspecified type 09/12/2017     Priority: Medium     Squamous blepharitis of right upper eyelid 09/12/2017     Priority: Medium     Elevated fasting blood sugar 02/22/2017     Priority: Medium     Erectile dysfunction, unspecified erectile dysfunction type 06/27/2016     Priority: Medium     Essential hypertension with goal blood pressure less than 140/90 06/21/2016     Priority: Medium     Dyslipidemia 06/21/2016     Priority: Medium     Gastroesophageal reflux disease, esophagitis presence not specified 12/22/2015     Priority: Medium     Coronary artery disease involving native coronary artery of native heart without angina pectoris 12/22/2015     Priority: Medium     History of colonic polyps 12/22/2015     Priority: Medium     Chronic diarrhea 12/22/2015     Priority: Medium     Pseudophakia of both eyes 07/21/2015     Priority: Medium     Prostate Cancer, s/p prostatectomy 06/17/2015     Priority: Medium     Status post percutaneous transluminal coronary angioplasty-Coronary angioplasty with STEPHEN to LCx 04/04/2014     Priority: Medium     S/P coronary angioplasty 03/21/2014     Priority: Medium     NSTEMI (non-ST elevated myocardial infarction) (H) 03/20/2014     Priority: Medium     Perianal dermatitis 11/27/2013     Priority: Medium     Microalbuminuria 11/22/2013     Priority: Medium     Basal cell carcinoma of neck 06/03/2013     Priority: Medium     Inflamed seborrheic keratosis 04/30/2013     Priority: Medium     AK (actinic keratosis) 04/30/2013     Priority: Medium     Prediabetes 03/29/2013     Priority: Medium     Obesity (BMI 30-39.9) 03/29/2013     Priority: Medium     Advanced directives, counseling/discussion 01/31/2012     Priority: Medium     Health Care Directive dated 1-16-12 received as noted below  "reviewed for validation. Found to be invalid due to both witnesses also being named as agent. Validation form completed and sent for scanning to note invalid document. Copy of validation form will be sent to patient with contact information for resolution. Notified Danis Fountain facilitator at clinic for assistance. Obdulia Sam RN, System ACP Coordinator 12/6/2012    Advance Directive Problem List Overview:  Advance Directive received and scanned. Click on Code in the patient header to view. 1/31/2012   Mika Fountain MA           Hyperlipidemia LDL goal <100 01/31/2011     Priority: Medium       MEDICATIONS:   Prescription Medications as of 8/29/2018             acetaminophen (TYLENOL) 500 MG tablet Take 500 mg by mouth every 6 hours as needed for mild pain    allopurinol (ZYLOPRIM) 300 MG tablet Take 1 tablet (300 mg) by mouth daily    Alum & Mag Hydroxide-Simeth (ALUMINUM-MAGNESIUM-SIMETHICONE) 200-200-20 MG/5ML SUSP Take 5 mLs by mouth daily as needed    ASPIRIN 81 MG OR TABS 1 TABLET EVERY MORNING    Benzethonium Chloride 0.1 % LIQD Cleanse with wound cleanser and apply barrier paste and hydrocolloid dressing, change every 2-3 days as needed    clindamycin (CLEOCIN T) 1 % lotion Apply twice daily for 6 weeks to the groin    COMPRESSION STOCKINGS 1 each continuous prn Bilateral knee high compression stockings    furosemide (LASIX) 20 MG tablet Take 1 tablet (20 mg) by mouth daily    Gauze Pads & Dressings (GAUZE BANDAGE 4\") MISC Cleanse with wound cleanser and apply barrier paste and hydrocolloid dressing, change every 2-3 days as needed    hydrocortisone valerate (WEST-NINOSKA) 0.2 % ointment Apply sparingly to affected area three times daily as needed.    LANsoprazole (PREVACID) 15 MG CR capsule Take 15 mg by mouth daily Patient needs to use brand name Prevacid (generic causes diarrhea)    lidocaine-prilocaine (EMLA) cream Apply 1 Application topically as needed    LORazepam (ATIVAN) 0.5 MG tablet Take 1 tablet " (0.5 mg) by mouth every 4 hours as needed (Anxiety, Nausea/Vomiting or Sleep)    Multiple Vitamin (MULTI-VITAMIN DAILY PO) Take 1 tablet by mouth    nitroGLYcerin (NITROSTAT) 0.4 MG sublingual tablet Place 1 tablet (0.4 mg) under the tongue every 5 minutes as needed up to 3 tablets per episode.    ondansetron (ZOFRAN) 8 MG tablet Take 1 tablet (8 mg) by mouth every 8 hours as needed (Nausea/Vomiting)    order for DME Equipment being ordered: wheeled walker with seat    potassium chloride SA (K-DUR/KLOR-CON M) 10 MEQ CR tablet Take 1 tablet (10 mEq) by mouth daily    prochlorperazine (COMPAZINE) 10 MG tablet Take 1 tablet (10 mg) by mouth every 6 hours as needed for nausea or vomiting    rosuvastatin (CRESTOR) 40 MG tablet Take 1 tablet (40 mg) by mouth daily    zinc oxide - white petrolatum (CRITIC-AID THICK MOIST BARRIER) 20-51% PSTE topical paste Cleanse with wound cleanser and apply barrier paste and hydrocolloid dressing, change every 2-3 days as needed      Facility Administered Medications as of 8/29/2018             sodium chloride (PF) 0.9% PF flush 20 mL 20 mLs by Intracatheter route once    acetaminophen (TYLENOL) tablet 650 mg (Discontinued) Take 2 tablets (650 mg) by mouth every 4 hours as needed for mild pain or fever    allopurinol (ZYLOPRIM) tablet 300 mg (Discontinued) Take 1 tablet (300 mg) by mouth daily    aspirin [facility] tablet 81 mg (Discontinued) Take 1 tablet (81 mg) by mouth every morning    diltiazem (CARDIZEM) 125 mg in sodium chloride 0.9 % 125 mL infusion (Discontinued) Inject 5-15 mg/hr into the vein continuous    docusate sodium (COLACE) capsule 100 mg (Discontinued) Take 1 capsule (100 mg) by mouth 2 times daily    heparin 100 UNIT/ML injection 5 mL (Discontinued) 5 mLs by Intracatheter route every 28 days    LANsoprazole (PREVACID) CR capsule 15 mg (Discontinued) Take 1 capsule (15 mg) by mouth daily    LORazepam (ATIVAN) tablet 0.5 mg (Discontinued) Take 1 tablet (0.5 mg) by mouth every 4  "hours as needed (Anxiety, Nausea/Vomiting or Sleep)    magnesium sulfate 2 g in NS intermittent infusion (PharMEDium or FV Cmpd) (Discontinued) Inject 50 mLs (2 g) into the vein daily as needed for magnesium supplementation    magnesium sulfate 4 g in 100 mL sterile water (premade) (Discontinued) Inject 100 mLs (4 g) into the vein every 4 hours as needed for magnesium supplementation    Medication Instruction (Discontinued) continuous prn    nitroGLYcerin (NITROSTAT) sublingual tablet 0.4 mg (Discontinued) Place 1 tablet (0.4 mg) under the tongue every 5 minutes as needed for chest pain    ondansetron (ZOFRAN) injection 8 mg (Discontinued) Inject 4 mLs (8 mg) into the vein every 8 hours as needed for nausea or vomiting    Linked Group 1:  \"Or\" Linked Group Details     ondansetron (ZOFRAN) tablet 8 mg (Discontinued) Take 2 tablets (8 mg) by mouth every 8 hours as needed for nausea or vomiting    Linked Group 1:  \"Or\" Linked Group Details     ondansetron (ZOFRAN-ODT) ODT tab 8 mg (Discontinued) Take 2 tablets (8 mg) by mouth every 8 hours as needed for nausea or vomiting    Linked Group 1:  \"Or\" Linked Group Details     polyethylene glycol (MIRALAX/GLYCOLAX) Packet 17 g (Discontinued) Take 17 g by mouth daily    potassium chloride (KLOR-CON) Packet 20-40 mEq (Discontinued) 20-40 mEq by Oral or Feeding Tube route every 2 hours as needed for potassium supplementation    potassium chloride 10 mEq in 100 mL intermittent infusion with 10 mg lidocaine (Discontinued) Inject 100 mLs (10 mEq) into the vein every hour as needed for potassium supplementation    potassium chloride 10 mEq in 100 mL sterile water intermittent infusion (premix) (Discontinued) Inject 100 mLs (10 mEq) into the vein every hour as needed for potassium supplementation    potassium chloride 20 mEq in 50 mL intermittent infusion (Discontinued) Inject 50 mLs (20 mEq) into the vein every hour as needed for potassium supplementation    potassium chloride SA " "(K-DUR/KLOR-CON M) CR tablet 20-40 mEq (Discontinued) Take 2-4 tablets (20-40 mEq) by mouth every 2 hours as needed for potassium supplementation    potassium phosphate 10 mmol in D5W 250 mL intermittent infusion (Discontinued) Inject 10 mmol into the vein daily as needed for phosphorous supplementation    potassium phosphate 15 mmol in D5W 250 mL intermittent infusion (Discontinued) Inject 15 mmol into the vein daily as needed for phosphorous supplementation    potassium phosphate 20 mmol in D5W 250 mL intermittent infusion (Discontinued) Inject 20 mmol into the vein every 6 hours as needed for phosphorous supplementation    potassium phosphate 20 mmol in D5W 500 mL intermittent infusion (Discontinued) Inject 20 mmol into the vein every 6 hours as needed for phosphorous supplementation    potassium phosphate 25 mmol in D5W 500 mL intermittent infusion (Discontinued) Inject 25 mmol into the vein every 8 hours as needed for phosphorous supplementation    rosuvastatin (CRESTOR) tablet 40 mg (Discontinued) Take 4 tablets (40 mg) by mouth daily          ALLERGIES:   Niacin; Prevacid [lansoprazole]; and Shellfish allergy    ROS:  Per HPI    Physical Examination:   VITALS:   BP 99/70 (BP Location: Right arm, Patient Position: Sitting, Cuff Size: Adult Regular)  Pulse 117  Temp 96.8  F (36  C) (Oral)  Resp 14  Ht 1.651 m (5' 5\")  Wt 64.2 kg (141 lb 8 oz)  SpO2 95%  BMI 23.55 kg/m2  Constitutional: alert and no distress  Head: Normocephalic.   Cardiovascular:  Irregular. No murmur  Respiratory: Diminished bilateral mid to base  Gastrointestinal:Abdomen soft, non-tender. Distended  : Deferred  Skin: No jaundice  Psychiatric: mentation appears normal, affect normal/bright and mentation appears normal.  Extr- symmetric pedal edema    Labs:    BMP RESULTS:  Lab Results   Component Value Date     08/28/2018    POTASSIUM 4.0 08/28/2018    CHLORIDE 109 08/28/2018    CO2 24 08/28/2018    ANIONGAP 8 08/28/2018    GLC " 82 08/28/2018    BUN 23 08/28/2018    CR 1.04 08/28/2018    GFRESTIMATED 69 08/28/2018    GFRESTBLACK 83 08/28/2018    EVANGELINA 7.5 (L) 08/28/2018        CBC RESULTS:  Lab Results   Component Value Date    WBC 11.7 (H) 08/28/2018    RBC 2.67 (L) 08/28/2018    HGB 8.9 (L) 08/28/2018    HCT 27.4 (L) 08/28/2018     (H) 08/28/2018    MCH 33.3 (H) 08/28/2018    MCHC 32.5 08/28/2018    RDW 16.8 (H) 08/28/2018    PLT 65 (L) 08/28/2018       INR/PTT:  Lab Results   Component Value Date    INR 1.07 08/28/2018    PTT 39 (H) 08/23/2018       Diagnostic studies: Imaging reviewed by me. CT w/ bulky bilateral hilar adenopathy, large bilateral pleural effusions, bulky mesenteric adenopathy, and large volume ascites.Nuclear medicine lymphoscintigraphy on 8/8/2018 w/ patent thoracic duct, no evidence of leakage of radiotracer into the peritoneum or either pleural effusion.     Assessment: 78-year-old male with history of mantle cell lymphoma, with presumably lymphatic pleural effusions and ascites. Unfortunately, there was no evidence of radiotracer leakage into his ascites or pleural effusions to indicate that lymphangiogram and embolization would be of benefit. In addition, his CT shows that he has bulky bilateral hilar adenopathy likely causing his bilateral pleural effusions as well as bulky mesenteric lymphadenopathy which likely causes the ascites. In addition, there could be minimal local malignant involvement leading to fluid buildup. At this point the most reasonable plan seems to make him more comfortable by placing bilateral Pleurx catheters for his pleural effusions. Given that he has needed less than once every 2 week drainage of his abdominal fluid, we would defer placement of a abdominal Pleurx catheter in favor of paracentesis given the higher risk of infection of abdominal Pleurx. I explained that goal of his catheters is to make him more comfortable on a daily basis and reduce his need for procedures at the  hospital, as well as reduce the frequency of hospitalization based on buildup of this pleural fluid alone. I explained why thoracic duct embolization would be very unlikely to help him at this point. He understands and is agreeable to the plan.    Plan:  1. Bilateral Pleurx catheters for bilateral pleural effusions.  2. Paracentesis.  3. Fluid testingbe performed at the time, including cytology, cell count differential, triglycerides, chylomicron.    This assessment and plan was discussed with Courtney Jimenez, oncology PA, who was agreeable with the plan.    It was a pleasure to see Mr. Cortes and his wife in clinic today. Thank you for involving the Interventional Radiology service in his care.    I spent a total of 40 minutes with this patient, over 50% time was for counseling and care coordination.    Dorothy Rasmussen MD  Interventional Radiology Attending  [facility]   Pager [phone]      CC  Patient Care Team:  Florian Alonzo MD as PCP - General  Erickson Adan MD as MD (Hematology & Oncology)  Cam Conner, RN as Nurse Coordinator (Oncology)  Jad Ball MD as MD (Cardiology)  CHRISTEN DELUCA

## 2018-08-29 NOTE — LETTER
"8/29/2018       RE: Francisco Javier Cortes  [address] [id number]     Dear Colleague,    Thank you for referring your patient, Francisco Javier Cortes, to the [facility]. Please see a copy of my visit note below.        INTERVENTIONAL RADIOLOGY CONSULTATION    Name: Francisco Javier Cortes  Age: 78 year old   Referring Physician: Dr. Welch   REASON FOR REFERRAL: possible lymphangiogram     HPI: Mr. Cortes is referred by Dr. 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 He is a 74-year-old male was previously healthy for age. Several months ago he noted abdominal bloating and palpable masses in his neck and was noting fatigue, loss of appetite and weight loss. He underwent a workup and excisional biopsy of a left level 5 lymph node on 3/22/2018 revealed blastoid variant mantle cell lymphoma. On March 29, he was started on chemotherapy, and is completed 6 cycles. He has one cycle ago. In the context of his lymphoma diagnosis he developed shortness of breath, was found to have bilateral pleural effusions as well as ascites. Drainage revealed what is been described as \"malignant fluid\". Other prescriptions have described \"chylous fluid\". Reviewing laboratory results, the fluid does not been tested. He is required twice weekly drainage of this pleural effusions, and when this is not done in a timely fashion, his dyspnea leads to hospital admission. He is required less frequent drainage of this ascites, approximately once every 2 weeks. He underwent a CT scan in his workup, and this was recently repeated. It reveals bulky bilateral hilar adenopathy, large bilateral pleural effusions, bulky mesenteric adenopathy, and large volume ascites.He underwent a nuclear medicine lymphoscintigraphy on 8/8/2018 which revealed patent thoracic duct, no evidence of abdominal or thoracic lymphatic obstruction, and no evidence of leakage into the peritoneum or either pleural " effusion.  No chest pain, n/v, lower extremity swelling, recent infection, known DVT. At his last hospital admission, he was found to have atrial fibrillation and is wearing a Holter monitor.      PAST MEDICAL HISTORY:   Past Medical History:   Diagnosis Date     CAD (coronary artery disease) 1/09    s/p stentsx2     Coronary artery disease involving native coronary artery of native heart without angina pectoris 12/22/2015     Dyslipidemia      Erectile dysfunction      GERD (gastroesophageal reflux disease)      Hearing problem One ear 1961     Hypertension      NSTEMI (non-ST elevated myocardial infarction) (H) 3/20/2014     Pneumonia      Prostate cancer (H)     prostatecomy 9 years ago, PSAs remain good     Status post percutaneous transluminal coronary angioplasty-Coronary angioplasty with STEPHEN to LCx 4/4/2014     Stented coronary artery     stents placed       PAST SURGICAL HISTORY:   Past Surgical History:   Procedure Laterality Date     BIOPSY LYMPH NODE CERVICAL Left 3/22/2018    Procedure: BIOPSY LYMPH NODE CERVICAL;  Excisional Biopsy Left Cervical Lymph Node ;  Surgeon: Samia Gaviria MD;  Location: [facility] OR     C APPENDECTOMY       C REMV PROSTATE,RETROPUB,RAD,TOT NODES  1999     CATARACT IOL, RT/LT       COLONOSCOPY       COLONOSCOPY Left 7/5/2016    Procedure: COMBINED COLONOSCOPY, SINGLE OR MULTIPLE BIOPSY/POLYPECTOMY BY BIOPSY;  Surgeon: Duane, William Charles, MD;  Location: [facility] OR     COLONOSCOPY WITH CO2 INSUFFLATION N/A 7/5/2016    Procedure: COLONOSCOPY WITH CO2 INSUFFLATION;  Surgeon: Duane, William Charles, MD;  Location:  OR     ENT SURGERY  1980--1999     PHACOEMULSIFICATION CLEAR CORNEA WITH STANDARD INTRAOCULAR LENS IMPLANT Left 6/18/2015    Procedure: PHACOEMULSIFICATION CLEAR CORNEA WITH STANDARD INTRAOCULAR LENS IMPLANT;  Surgeon: John Zimmerman MD;  Location: [facility]     PHACOEMULSIFICATION CLEAR CORNEA WITH STANDARD INTRAOCULAR LENS IMPLANT Right 7/16/2015    Procedure:  PHACOEMULSIFICATION CLEAR CORNEA WITH STANDARD INTRAOCULAR LENS IMPLANT;  Surgeon: John Zimmerman MD;  Location: [facility]     STENT, CORONARY, [place]  1/09, 2014    x2, x1 in 2014     THORACENTESIS Bilateral 8/8/2018    Procedure: THORACENTESIS;  Thoracentesis Bilateral;  Surgeon: Michelle Leroy PA-C;  Location: [facility] OR     TYMPANOPLASTY       VASCULAR SURGERY  2013    stents       FAMILY HISTORY:   Family History   Problem Relation Age of Onset     Cardiovascular Mother      HEART DISEASE Mother      Cancer - colorectal Father      HEART DISEASE Father      Other Cancer Father      Cancer Father      Hypertension Father      Asthma Brother      Coronary Artery Disease Brother      Hypertension Brother      HEART DISEASE Brother      HEART DISEASE Son      Hypertension Son      Cancer Daughter      non hodgkins     Prostate Cancer Brother      Hypertension Brother      Cancer Brother      Prostate Cancer Maternal Grandfather      Cancer Maternal Grandfather      Muscular Disorder Maternal Grandfather      HEART DISEASE Paternal Grandmother      Hypertension Paternal Grandmother      Hypertension Sister        SOCIAL HISTORY:   Social History   Substance Use Topics     Smoking status: Never Smoker     Smokeless tobacco: Never Used     Alcohol use Yes      Comment: a glass of red wine a day       PROBLEM LIST:   Patient Active Problem List    Diagnosis Date Noted     Atrial fibrillation (H) 08/27/2018     Priority: Medium     Pleural effusion 08/23/2018     Priority: Medium     Dehydration 05/14/2018     Priority: Medium     COLE (dyspnea on exertion) 04/20/2018     Priority: Medium     SIRS (systemic inflammatory response syndrome) (H) 04/11/2018     Priority: Medium     Drug-induced neutropenia (H) 03/29/2018     Priority: Medium     Nausea 03/29/2018     Priority: Medium     Encounter for antineoplastic chemotherapy 03/29/2018     Priority: Medium     Nonspecific ST-T wave electrocardiographic changes 03/29/2018      Priority: Medium     Examination prior to chemotherapy 03/29/2018     Priority: Medium     Tumor lysis syndrome 03/29/2018     Priority: Medium     Mantle cell lymphoma of lymph nodes of multiple sites (H) 03/28/2018     Priority: Medium     Elevated AST (SGOT) 02/23/2018     Priority: Medium     Flatulence, eructation, and gas pain 02/23/2018     Priority: Medium     Bloating 02/23/2018     Priority: Medium     Eczema, unspecified type 09/12/2017     Priority: Medium     Squamous blepharitis of right upper eyelid 09/12/2017     Priority: Medium     Elevated fasting blood sugar 02/22/2017     Priority: Medium     Erectile dysfunction, unspecified erectile dysfunction type 06/27/2016     Priority: Medium     Essential hypertension with goal blood pressure less than 140/90 06/21/2016     Priority: Medium     Dyslipidemia 06/21/2016     Priority: Medium     Gastroesophageal reflux disease, esophagitis presence not specified 12/22/2015     Priority: Medium     Coronary artery disease involving native coronary artery of native heart without angina pectoris 12/22/2015     Priority: Medium     History of colonic polyps 12/22/2015     Priority: Medium     Chronic diarrhea 12/22/2015     Priority: Medium     Pseudophakia of both eyes 07/21/2015     Priority: Medium     Prostate Cancer, s/p prostatectomy 06/17/2015     Priority: Medium     Status post percutaneous transluminal coronary angioplasty-Coronary angioplasty with STEPHEN to LCx 04/04/2014     Priority: Medium     S/P coronary angioplasty 03/21/2014     Priority: Medium     NSTEMI (non-ST elevated myocardial infarction) (H) 03/20/2014     Priority: Medium     Perianal dermatitis 11/27/2013     Priority: Medium     Microalbuminuria 11/22/2013     Priority: Medium     Basal cell carcinoma of neck 06/03/2013     Priority: Medium     Inflamed seborrheic keratosis 04/30/2013     Priority: Medium     AK (actinic keratosis) 04/30/2013     Priority: Medium     Prediabetes  "03/29/2013     Priority: Medium     Obesity (BMI 30-39.9) 03/29/2013     Priority: Medium     Advanced directives, counseling/discussion 01/31/2012     Priority: Medium     Health Care Directive dated 1-16-12 received as noted below reviewed for validation. Found to be invalid due to both witnesses also being named as agent. Validation form completed and sent for scanning to note invalid document. Copy of validation form will be sent to patient with contact information for resolution. Notified Danis Fountain facilitator at clinic for assistance. Obdulia Sam RN, System ACP Coordinator 12/6/2012    Advance Directive Problem List Overview:  Advance Directive received and scanned. Click on Code in the patient header to view. 1/31/2012   Mika Fountain MA           Hyperlipidemia LDL goal <100 01/31/2011     Priority: Medium       MEDICATIONS:   Prescription Medications as of 8/29/2018             acetaminophen (TYLENOL) 500 MG tablet Take 500 mg by mouth every 6 hours as needed for mild pain    allopurinol (ZYLOPRIM) 300 MG tablet Take 1 tablet (300 mg) by mouth daily    Alum & Mag Hydroxide-Simeth (ALUMINUM-MAGNESIUM-SIMETHICONE) 200-200-20 MG/5ML SUSP Take 5 mLs by mouth daily as needed    ASPIRIN 81 MG OR TABS 1 TABLET EVERY MORNING    Benzethonium Chloride 0.1 % LIQD Cleanse with wound cleanser and apply barrier paste and hydrocolloid dressing, change every 2-3 days as needed    clindamycin (CLEOCIN T) 1 % lotion Apply twice daily for 6 weeks to the groin    COMPRESSION STOCKINGS 1 each continuous prn Bilateral knee high compression stockings    furosemide (LASIX) 20 MG tablet Take 1 tablet (20 mg) by mouth daily    Gauze Pads & Dressings (GAUZE BANDAGE 4\") MISC Cleanse with wound cleanser and apply barrier paste and hydrocolloid dressing, change every 2-3 days as needed    hydrocortisone valerate (WEST-NINOSKA) 0.2 % ointment Apply sparingly to affected area three times daily as needed.    LANsoprazole (PREVACID) 15 " MG CR capsule Take 15 mg by mouth daily Patient needs to use brand name Prevacid (generic causes diarrhea)    lidocaine-prilocaine (EMLA) cream Apply 1 Application topically as needed    LORazepam (ATIVAN) 0.5 MG tablet Take 1 tablet (0.5 mg) by mouth every 4 hours as needed (Anxiety, Nausea/Vomiting or Sleep)    Multiple Vitamin (MULTI-VITAMIN DAILY PO) Take 1 tablet by mouth    nitroGLYcerin (NITROSTAT) 0.4 MG sublingual tablet Place 1 tablet (0.4 mg) under the tongue every 5 minutes as needed up to 3 tablets per episode.    ondansetron (ZOFRAN) 8 MG tablet Take 1 tablet (8 mg) by mouth every 8 hours as needed (Nausea/Vomiting)    order for DME Equipment being ordered: wheeled walker with seat    potassium chloride SA (K-DUR/KLOR-CON M) 10 MEQ CR tablet Take 1 tablet (10 mEq) by mouth daily    prochlorperazine (COMPAZINE) 10 MG tablet Take 1 tablet (10 mg) by mouth every 6 hours as needed for nausea or vomiting    rosuvastatin (CRESTOR) 40 MG tablet Take 1 tablet (40 mg) by mouth daily    zinc oxide - white petrolatum (CRITIC-AID THICK MOIST BARRIER) 20-51% PSTE topical paste Cleanse with wound cleanser and apply barrier paste and hydrocolloid dressing, change every 2-3 days as needed      Facility Administered Medications as of 8/29/2018             sodium chloride (PF) 0.9% PF flush 20 mL 20 mLs by Intracatheter route once    acetaminophen (TYLENOL) tablet 650 mg (Discontinued) Take 2 tablets (650 mg) by mouth every 4 hours as needed for mild pain or fever    allopurinol (ZYLOPRIM) tablet 300 mg (Discontinued) Take 1 tablet (300 mg) by mouth daily    aspirin EC tablet 81 mg (Discontinued) Take 1 tablet (81 mg) by mouth every morning    diltiazem (CARDIZEM) 125 mg in sodium chloride 0.9 % 125 mL infusion (Discontinued) Inject 5-15 mg/hr into the vein continuous    docusate sodium (COLACE) capsule 100 mg (Discontinued) Take 1 capsule (100 mg) by mouth 2 times daily    heparin 100 UNIT/ML injection 5 mL  "(Discontinued) 5 mLs by Intracatheter route every 28 days    LANsoprazole (PREVACID) CR capsule 15 mg (Discontinued) Take 1 capsule (15 mg) by mouth daily    LORazepam (ATIVAN) tablet 0.5 mg (Discontinued) Take 1 tablet (0.5 mg) by mouth every 4 hours as needed (Anxiety, Nausea/Vomiting or Sleep)    magnesium sulfate 2 g in NS intermittent infusion (PharMEDium or FV Cmpd) (Discontinued) Inject 50 mLs (2 g) into the vein daily as needed for magnesium supplementation    magnesium sulfate 4 g in 100 mL sterile water (premade) (Discontinued) Inject 100 mLs (4 g) into the vein every 4 hours as needed for magnesium supplementation    Medication Instruction (Discontinued) continuous prn    nitroGLYcerin (NITROSTAT) sublingual tablet 0.4 mg (Discontinued) Place 1 tablet (0.4 mg) under the tongue every 5 minutes as needed for chest pain    ondansetron (ZOFRAN) injection 8 mg (Discontinued) Inject 4 mLs (8 mg) into the vein every 8 hours as needed for nausea or vomiting    Linked Group 1:  \"Or\" Linked Group Details     ondansetron (ZOFRAN) tablet 8 mg (Discontinued) Take 2 tablets (8 mg) by mouth every 8 hours as needed for nausea or vomiting    Linked Group 1:  \"Or\" Linked Group Details     ondansetron (ZOFRAN-ODT) ODT tab 8 mg (Discontinued) Take 2 tablets (8 mg) by mouth every 8 hours as needed for nausea or vomiting    Linked Group 1:  \"Or\" Linked Group Details     polyethylene glycol (MIRALAX/GLYCOLAX) Packet 17 g (Discontinued) Take 17 g by mouth daily    potassium chloride (KLOR-CON) Packet 20-40 mEq (Discontinued) 20-40 mEq by Oral or Feeding Tube route every 2 hours as needed for potassium supplementation    potassium chloride 10 mEq in 100 mL intermittent infusion with 10 mg lidocaine (Discontinued) Inject 100 mLs (10 mEq) into the vein every hour as needed for potassium supplementation    potassium chloride 10 mEq in 100 mL sterile water intermittent infusion (premix) (Discontinued) Inject 100 mLs (10 mEq) into the " "vein every hour as needed for potassium supplementation    potassium chloride 20 mEq in 50 mL intermittent infusion (Discontinued) Inject 50 mLs (20 mEq) into the vein every hour as needed for potassium supplementation    potassium chloride SA (K-DUR/KLOR-CON M) CR tablet 20-40 mEq (Discontinued) Take 2-4 tablets (20-40 mEq) by mouth every 2 hours as needed for potassium supplementation    potassium phosphate 10 mmol in D5W 250 mL intermittent infusion (Discontinued) Inject 10 mmol into the vein daily as needed for phosphorous supplementation    potassium phosphate 15 mmol in D5W 250 mL intermittent infusion (Discontinued) Inject 15 mmol into the vein daily as needed for phosphorous supplementation    potassium phosphate 20 mmol in D5W 250 mL intermittent infusion (Discontinued) Inject 20 mmol into the vein every 6 hours as needed for phosphorous supplementation    potassium phosphate 20 mmol in D5W 500 mL intermittent infusion (Discontinued) Inject 20 mmol into the vein every 6 hours as needed for phosphorous supplementation    potassium phosphate 25 mmol in D5W 500 mL intermittent infusion (Discontinued) Inject 25 mmol into the vein every 8 hours as needed for phosphorous supplementation    rosuvastatin (CRESTOR) tablet 40 mg (Discontinued) Take 4 tablets (40 mg) by mouth daily          ALLERGIES:   Niacin; Prevacid [lansoprazole]; and Shellfish allergy    ROS:  Per HPI    Physical Examination:   VITALS:   BP 99/70 (BP Location: Right arm, Patient Position: Sitting, Cuff Size: Adult Regular)  Pulse 117  Temp 96.8  F (36  C) (Oral)  Resp 14  Ht 1.651 m (5' 5\")  Wt 64.2 kg (141 lb 8 oz)  SpO2 95%  BMI 23.55 kg/m2  Constitutional: alert and no distress  Head: Normocephalic.   Cardiovascular:  Irregular. No murmur  Respiratory: Diminished bilateral mid to base  Gastrointestinal:Abdomen soft, non-tender. Distended  : Deferred  Skin: No jaundice  Psychiatric: mentation appears normal, affect normal/bright and " mentation appears normal.  Extr- symmetric pedal edema    Labs:    BMP RESULTS:  Lab Results   Component Value Date     08/28/2018    POTASSIUM 4.0 08/28/2018    CHLORIDE 109 08/28/2018    CO2 24 08/28/2018    ANIONGAP 8 08/28/2018    GLC 82 08/28/2018    BUN 23 08/28/2018    CR 1.04 08/28/2018    GFRESTIMATED 69 08/28/2018    GFRESTBLACK 83 08/28/2018    EVANGELINA 7.5 (L) 08/28/2018        CBC RESULTS:  Lab Results   Component Value Date    WBC 11.7 (H) 08/28/2018    RBC 2.67 (L) 08/28/2018    HGB 8.9 (L) 08/28/2018    HCT 27.4 (L) 08/28/2018     (H) 08/28/2018    MCH 33.3 (H) 08/28/2018    MCHC 32.5 08/28/2018    RDW 16.8 (H) 08/28/2018    PLT 65 (L) 08/28/2018       INR/PTT:  Lab Results   Component Value Date    INR 1.07 08/28/2018    PTT 39 (H) 08/23/2018       Diagnostic studies: Imaging reviewed by me. CT w/ bulky bilateral hilar adenopathy, large bilateral pleural effusions, bulky mesenteric adenopathy, and large volume ascites.Nuclear medicine lymphoscintigraphy on 8/8/2018 w/ patent thoracic duct, no evidence of leakage of radiotracer into the peritoneum or either pleural effusion.     Assessment: 78-year-old male with history of mantle cell lymphoma, with presumably lymphatic pleural effusions and ascites. Unfortunately, there was no evidence of radiotracer leakage into his ascites or pleural effusions to indicate that lymphangiogram and embolization would be of benefit. In addition, his CT shows that he has bulky bilateral hilar adenopathy likely causing his bilateral pleural effusions as well as bulky mesenteric lymphadenopathy which likely causes the ascites. In addition, there could be minimal local malignant involvement leading to fluid buildup. At this point the most reasonable plan seems to make him more comfortable by placing bilateral Pleurx catheters for his pleural effusions. Given that he has needed less than once every 2 week drainage of his abdominal fluid, we would defer placement of  a abdominal Pleurx catheter in favor of paracentesis given the higher risk of infection of abdominal Pleurx. I explained that goal of his catheters is to make him more comfortable on a daily basis and reduce his need for procedures at the hospital, as well as reduce the frequency of hospitalization based on buildup of this pleural fluid alone. I explained why thoracic duct embolization would be very unlikely to help him at this point. He understands and is agreeable to the plan.    Plan:  1. Bilateral Pleurx catheters for bilateral pleural effusions.  2. Paracentesis.  3. Fluid testingbe performed at the time, including cytology, cell count differential, triglycerides, chylomicron.    This assessment and plan was discussed with Courtney Jimenez, oncology PA, who was agreeable with the plan.    It was a pleasure to see Mr. Cortes and his wife in clinic today. Thank you for involving the Interventional Radiology service in his care.    I spent a total of 40 minutes with this patient, over 50% time was for counseling and care coordination.    Dorothy Rasmussen MD  Interventional Radiology Attending  [facility]   Pager [phone]      CC  Patient Care Team:  Florian Alonzo MD as PCP - General  Erickson Adan MD as MD (Hematology & Oncology)  Cam Conner, RN as Nurse Coordinator (Oncology)  Jad Ball MD as MD (Cardiology)  CHRISTEN DELUCA

## 2018-08-29 NOTE — TELEPHONE ENCOUNTER
Patient discharged from Inpatient      Discharge location: [facility]  Discharge date: 8/28/18  Diagnosis: Pleural Effusion    Please follow up as appropriate. If no follow up required, please close encounter.      Charlene WOODARD, Patient Care

## 2018-08-29 NOTE — NURSING NOTE
"Oncology Rooming Note    August 29, 2018 8:25 AM   Francisco Javier Cortes is a 78 year old male who presents for:    Chief Complaint   Patient presents with     Oncology Clinic Visit     New Patient : Chylo Duct Embolization     Initial Vitals: BP 99/70 (BP Location: Right arm, Patient Position: Sitting, Cuff Size: Adult Regular)  Pulse 117  Temp 96.8  F (36  C) (Oral)  Resp 14  Ht 1.651 m (5' 5\")  Wt 64.2 kg (141 lb 8 oz)  SpO2 95%  BMI 23.55 kg/m2 Estimated body mass index is 23.55 kg/(m^2) as calculated from the following:    Height as of this encounter: 1.651 m (5' 5\").    Weight as of this encounter: 64.2 kg (141 lb 8 oz). Body surface area is 1.72 meters squared.  No Pain (0) Comment: Data Unavailable   No LMP for male patient.  Allergies reviewed: Yes  Medications reviewed: Yes    Medications: Medication refills not needed today.  Pharmacy name entered into [facility]:    [facility] [place] - [place], [place] - [address], [address]  [facility] - [facility], [place]  [address]  [id number] - [place], [place] - [address], [place] [facility]    Clinical concerns: Patient states no further concerns at this time.     10 minutes for nursing intake (face to face time)     Roselyn Quintero CMA              "

## 2018-08-29 NOTE — MR AVS SNAPSHOT
After Visit Summary   8/29/2018    Francisco Javier Cortes    MRN: [id number]           Patient Information     Date Of Birth          1940        Visit Information        Provider Department      8/29/2018 4:30 PM Prieto Gallagher RPH [facility]        Today's Diagnoses     Atrial fibrillation, unspecified type (H)    -  1    Pleural effusion          Care Instructions    Dear Francisco Javier/Court,     It was a pleasure talking with you today regarding your medications and medical concerns. The following is a summary of what we discussed.     1. FYI-- Please continue your current medications as is --BUT please wear your Oxygen now 24/7 now to keep your daily Oxygen level >90% and that help keeps your heart rate in low 100's and takes pressure off your heart .     Follow-up with your doctors next week.    Thank you for your time and please feel free to call ([phone] voicemail/pager or [phone] clinic main line) or e-mail ([email or web address]) with any questions.     Prieto Gallagher Rph.  Medication Therapy Management Provider  [phone]            Follow-ups after your visit        Your next 10 appointments already scheduled     Aug 31, 2018  1:15 PM CDT   (Arrive by 12:15 PM)   IR THORACENTESIS with [email or web address]   [facility] ([facility])    [address] [place] [address] [phone]           Please wear loose clothing, such as a sweat suit or jogging clothes. Avoid snaps, zippers and other metal. We may ask you to undress and put on a hospital gown.  Please bring any scans or X-rays taken at other hospitals, if similar tests were done. Also bring a list of your medicines, including vitamins, minerals and over-the-counter drugs. It is safest to leave personal items at home.  Tell your doctor in advance:   If you are or may be pregnant.   If you are taking Coumadin (or any other blood thinners) 5 days prior to the exam  for any special instructions.   If you are diabetic to determine if your insulin needs have to be adjusted for the exam.  Your doctor will obtain necessary laboratory tests prior to the exam (creatinine, Hgb/Hct, platelet count, and INR).  If you have any questions, please call the Imaging Department where you will have your exam. Directions, parking instructions, and other information are available on our website, "[email or web address].            Aug 31, 2018   Procedure with Dean Marcus PA-C   [facility] ([facility] [place])    [address] [place] [address]  5th Floor  [facility] [address] [phone]           Located in the Clinics and Surgery Center at [address] [place] [address].   parking is very convenient and highly recommended.  is a $6 flat rate fee.  Both  and self parkers should enter the main arrival plaza from [facility]; parking attendants will direct you based on your parking preference.            Sep 04, 2018  1:15 PM CDT   Nurse Only with MG IMAGING NURSE   [facility] ([facility])    [address] [place] [address] [phone]            Sep 04, 2018  1:30 PM CDT   PE NPET ONCOLOGY (EYES TO THIGHS) with [email or web address]   [facility] ([facility])    [address] [place] [address] [phone]           Tell your doctor:   If there is any chance you may be pregnant or if you are breastfeeding.   If you have problems lying in small spaces (claustrophobia). If you do, your doctor may give you medicine to help you relax. If you have diabetes:   Have your exam early in the morning. Your blood glucose will go up as the day goes by.   Your glucose level must be 180 or less at the start of the exam. Please take any oral diabetic medication you need to ensure this blood glucose level is below 180, but no insulin 4 hours prior to  the exam.   If you are taking insulin in the morning take with breakfast by 6 am and schedule procedure between 12 and 2:15 pm.    If you are taking insulin at night take nightly dose, fast overnight, schedule procedure before 10 am.    If you take insulin both morning and night take morning dose by 6am and schedule procedure between 12 and 2:15 pm.  24 hours before your scan: Don t do any heavy exercise. (No jogging, aerobics or other workouts.) Exercise will make your pictures less accurate. 6 hours before your scan:   Stop all food and liquids (except water).   Do not chew gum or suck on mints.   If you need to take medicine with food, you may take it with a few crackers.  Please call your Imaging Department at your exam site with any questions.            Sep 07, 2018 10:45 AM CDT   IR THORACENTESIS with [email or web address]   [facility] ([facility])    [address] [facility]  5th [facility] [address] [phone]           Please wear loose clothing, such as a sweat suit or jogging clothes. Avoid snaps, zippers and other metal. We may ask you to undress and put on a hospital gown.  Please bring any scans or X-rays taken at other hospitals, if similar tests were done. Also bring a list of your medicines, including vitamins, minerals and over-the-counter drugs. It is safest to leave personal items at home.  Tell your doctor in advance:   If you are or may be pregnant.   If you are taking Coumadin (or any other blood thinners) 5 days prior to the exam for any special instructions.   If you are diabetic to determine if your insulin needs have to be adjusted for the exam.  Your doctor will obtain necessary laboratory tests prior to the exam (creatinine, Hgb/Hct, platelet count, and INR).  If you have any questions, please call the Imaging Department where you will have your exam. Directions, parking instructions, and other information are available on our website,  [email or web address].            Sep 07, 2018   Procedure with Allen Jean PA-C   [facility] ([facility] [place])    [address] [place] [address]  5th [facility] [address] [phone]           Located in the Clinics and Surgery Center at [address] [place] [address].   parking is very convenient and highly recommended.  is a $6 flat rate fee.  Both  and self parkers should enter the main arrival plaza from [facility]; parking attendants will direct you based on your parking preference.            Sep 17, 2018  2:15 PM CDT   Return Visit with Erickson Adan MD   [facility] ([facility])    [address] [place] [address] [phone]            Oct 10, 2018 10:15 AM CDT   XR CHEST 2 VIEWS with [email or web address]   [facility] ([facility])    [address] [place] [address]  1st [facility] [address] [phone]           Please bring a list of your current medicines to your exam. (Include vitamins, minerals and over-thecounter medicines.) Leave your valuables at home.  Tell your doctor if there is a chance you may be pregnant.  You do not need to do anything special for this exam.              Who to contact     If you have questions or need follow up information about today's clinic visit or your schedule please contact [facility] directly at [phone].  Normal or non-critical lab and imaging results will be communicated to you by MyChart, letter or phone within 4 business days after the clinic has received the results. If you do not hear from us within 7 days, please contact the clinic through MyChart or phone. If you have a critical or abnormal lab result, we will notify you by phone as soon as possible.  Submit refill requests through [employer] or call your pharmacy and they will forward the refill request to us.  Please allow 3 business days for your refill to be completed.          Additional Information About Your Visit        MyChart Information     [employer] gives you secure access to your electronic health record. If you see a primary care provider, you can also send messages to your care team and make appointments. If you have questions, please call your primary care clinic.  If you do not have a primary care provider, please call [phone] and they will assist you.        Care EveryWhere ID     This is your Care EveryWhere ID. This could be used by other organizations to access your [place] medical records  [id number]         Blood Pressure from Last 3 Encounters:   08/29/18 99/70   08/28/18 (S) (!) 81/60   08/24/18 104/70    Weight from Last 3 Encounters:   08/29/18 141 lb 8 oz (64.2 kg)   08/28/18 136 lb 7.4 oz (61.9 kg)   08/24/18 150 lb 12.8 oz (68.4 kg)              Today, you had the following     No orders found for display       Primary Care Provider Office Phone # Fax #    Florian Alonzo [phone]       [address]  [facility] [address]        Equal Access to Services     NANDA BARAJAS AH: Hadii aad ku hadasho Soomaali, waaxda luqadaha, qaybta kaalmada adeegyada, zhao lorenzo. So [facility] [phone].    ATENCIÓN: Si habla español, tiene a ivan disposición servicios gratuitos de asistencia lingüística. Llame al [phone].    We comply with applicable federal civil rights laws and [place] laws. We do not discriminate on the basis of race, color, national origin, age, disability, sex, sexual orientation, or gender identity.            Thank you!     Thank you for choosing [facility]  for your care. Our goal is always to provide you with excellent care. Hearing back from our patients is one way we can continue to improve our services. Please take a few minutes to complete the written survey that you may receive in the mail after  "your visit with us. Thank you!             Your Updated Medication List - Protect others around you: Learn how to safely use, store and throw away your medicines at www.disposemymeds.org.          This list is accurate as of 8/29/18  5:28 PM.  Always use your most recent med list.                   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)       Aluminum-Magnesium-Simethicone 200-200-20 MG/5ML Susp      Take 5 mLs by mouth daily as needed        aspirin 81 MG tablet      1 TABLET EVERY MORNING        Benzethonium Chloride 0.1 % Liqd     237 mL    Cleanse with wound cleanser and apply barrier paste and hydrocolloid dressing, change every 2-3 days as needed    Decubitus ulcer of buttock, unspecified laterality, unspecified ulcer stage       clindamycin 1 % lotion    CLEOCIN T    60 mL    Apply twice daily for 6 weeks to the groin    Rash       COMPRESSION STOCKINGS     2 each    1 each continuous prn Bilateral knee high compression stockings    Acute congestive heart failure, unspecified congestive heart failure type (H)       furosemide 20 MG tablet    LASIX    180 tablet    Take 1 tablet (20 mg) by mouth daily    Hypervolemia, unspecified hypervolemia type       Gauze Bandage 4\" Misc     5 each    Cleanse with wound cleanser and apply barrier paste and hydrocolloid dressing, change every 2-3 days as needed    Decubitus ulcer of buttock, unspecified laterality, unspecified ulcer stage       hydrocortisone valerate 0.2 % ointment    hospitals    45 g    Apply sparingly to affected area three times daily as needed.    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)       potassium chloride SA 10 MEQ CR tablet    K-DUR/KLOR-CON M    30 tablet    Take 1 tablet (10 mEq) by mouth daily    Hypervolemia, unspecified hypervolemia type       prochlorperazine 10 MG tablet    COMPAZINE    30 tablet    Take 1 tablet (10 mg) by mouth every 6 hours as needed for nausea or vomiting    Nausea       rosuvastatin 40 MG tablet    CRESTOR    90 tablet    Take 1 tablet (40 mg) by mouth daily    Hyperlipidemia LDL goal <100, Coronary artery disease involving native coronary artery of native heart without angina pectoris       zinc oxide - white petrolatum 20-51% Pste topical paste     170 g    Cleanse with wound cleanser and apply barrier paste and hydrocolloid dressing, change every 2-3 days as needed    Decubitus ulcer of buttock, unspecified laterality, unspecified ulcer stage

## 2018-08-29 NOTE — Clinical Note
Dr. Alonzo, This patient was referred for a MTM visit after their recent hospital discharge as part of [place]'s transitions of care work. I reviewed their medications. He just needs to be wearing his oxygen 24/7 now and he is agreeable to that until he see's you or cardiology. Please see my note for more details and contact me with any questions.  Thank you , Prieto Gallagher [facility]. Medication Therapy Management Provider [phone]

## 2018-08-31 PROBLEM — D70.1 CHEMOTHERAPY-INDUCED NEUTROPENIA (H): Status: ACTIVE | Noted: 2018-01-01

## 2018-08-31 PROBLEM — T45.1X5A CHEMOTHERAPY-INDUCED NEUTROPENIA (H): Status: ACTIVE | Noted: 2018-01-01

## 2018-08-31 NOTE — IP AVS SNAPSHOT
MRN:[id number]                      After Visit Summary   8/31/2018    Francisco Javier Cortes    MRN: [id number]           Thank you!     Thank you for choosing [facility] for your care. Our goal is always to provide you with excellent care. Hearing back from our patients is one way we can continue to improve our services. Please take a few minutes to complete the written survey that you may receive in the mail after you visit with us. Thank you!        Patient Information     Date Of Birth          1940        About your hospital stay     You were admitted on:  August 31, 2018 [facility]    You were discharged on:  August 31, 2018       Who to Call     For medical emergencies, please call 911.  For non-urgent questions about your medical care, please call your primary care provider or clinic, [phone]  For questions related to your surgery, please call your surgery clinic        Attending Provider     Provider Dean Marques PA-C Radiology       Primary Care Provider Office Phone # Fax #    Florian Alonzo [phone]      Your next 10 appointments already scheduled     Sep 04, 2018  1:15 PM CDT   Nurse Only with MG IMAGING NURSE   [facility]    [address] [place] [address] [phone]            Sep 04, 2018  1:30 PM CDT   PE NPET ONCOLOGY (EYES TO THIGHS) with MGPET1   [facility] ([facility]    [address] [place] [address] [phone]           Tell your doctor:   If there is any chance you may be pregnant or if you are breastfeeding.   If you have problems lying in small spaces (claustrophobia). If you do, your doctor may give you medicine to help you relax. If you have diabetes:   Have your exam early in the morning. Your blood glucose will go up as the day goes by.   Your  glucose level must be 180 or less at the start of the exam. Please take any oral diabetic medication you need to ensure this blood glucose level is below 180, but no insulin 4 hours prior to the exam.   If you are taking insulin in the morning take with breakfast by 6 am and schedule procedure between 12 and 2:15 pm.    If you are taking insulin at night take nightly dose, fast overnight, schedule procedure before 10 am.    If you take insulin both morning and night take morning dose by 6am and schedule procedure between 12 and 2:15 pm.  24 hours before your scan: Don t do any heavy exercise. (No jogging, aerobics or other workouts.) Exercise will make your pictures less accurate. 6 hours before your scan:   Stop all food and liquids (except water).   Do not chew gum or suck on mints.   If you need to take medicine with food, you may take it with a few crackers.  Please call your Imaging Department at your exam site with any questions.            Sep 05, 2018  9:30 AM CDT   [facility] Lab Draw with  [facility] LAB DRAW   [facility] Lab Draw ([facility])    [address] [place] [address]  [address] [place] [address] [phone]            Sep 05, 2018 10:20 AM CDT   (Arrive by 10:05 AM)   BONE MARROW BIOPSY with Courtney Jimenez PA-C,  BONE MARROW BIOPSY   [facility] ([facility])    [address] [place] [address]  [address] [place] [id number] [phone]           You may eat and drink prior to the procedure. You will have labs drawn prior to the procedure. You will be awake for the procedure. You will need a  to take you home. Please talk to your doctor about when to stop taking blood thinning medications. Please call our triage nurses with any questions that you may have at [phone].            Sep 06, 2018  8:00 AM CDT   Procedure 3.5 hour with U2A ROOM 6   [address] [facility] ([facility]  [facility])    [address] [facility] [address]               Sep 06, 2018  9:30 AM CDT   IR CHEST TUBE REPOSITION/REPLACEMENT TUNNELED BILATERAL with UUIR1   [facility], Interventional Radiology ([facility])    [address] [facility] [address]   [phone]           Please wear loose clothing, such as a sweat suit or jogging clothes. Avoid snaps, zippers and other metal. We may ask you to undress and put on a hospital gown.  Please bring any scans or X-rays taken at other hospitals, if similar tests were done. Also bring a list of your medicines, including vitamins, minerals and over-the-counter drugs. It is safest to leave personal items at home.  Tell your doctor in advance:   If you are or may be pregnant.   If you are taking Coumadin (or any other blood thinners) 5 days prior to the exam for any special instructions.   If you are diabetic to determine if your insulin needs have to be adjusted for the exam.  Your doctor will obtain necessary laboratory tests prior to the exam (creatinine, Hgb/Hct, platelet count, and INR).  If you have any questions, please call the Imaging Department where you will have your exam. Directions, parking instructions, and other information are available on our website, [email or web address].            Sep 07, 2018 10:45 AM CDT   IR THORACENTESIS with [id number]   [facility] ([facility])    [address]  5th Floor  [facility] [id number] [phone]           Please wear loose clothing, such as a sweat suit or jogging clothes. Avoid snaps, zippers and other metal. We may ask you to undress and put on a hospital gown.  Please bring any scans or X-rays taken at other hospitals, if similar tests were done. Also bring a list of your medicines, including vitamins, minerals and over-the-counter drugs. It is safest to leave personal items at home.  Tell your  doctor in advance:   If you are or may be pregnant.   If you are taking Coumadin (or any other blood thinners) 5 days prior to the exam for any special instructions.   If you are diabetic to determine if your insulin needs have to be adjusted for the exam.  Your doctor will obtain necessary laboratory tests prior to the exam (creatinine, Hgb/Hct, platelet count, and INR).  If you have any questions, please call the Imaging Department where you will have your exam. Directions, parking instructions, and other information are available on our website, [email or web address].            Sep 07, 2018   Procedure with Allen Jean PA-C   [facility] ([facility])    [address] [place] [address]  5th Floor  [facility] [address] [phone]           Located in the Clinics and Surgery Center at [address] [place] [address].   parking is very convenient and highly recommended.  is a $6 flat rate fee.  Both  and self parkers should enter the main arrival plaza from [facility]; parking attendants will direct you based on your parking preference.            Sep 17, 2018  2:15 PM CDT   Return Visit with Erickson Adan MD   [facility] ([facility])    [address] [place] [address] [phone]              Further instructions from your care team       Discharge Instructions for Paracentesis or Thoracentesis     Paracentesis is a procedure to remove extra fluid from your belly (abdomen). Thoracentesis is a procedure to remove extra fluid from your chest.  This fluid buildup is called ascites. The procedure may have been done to take a sample of the fluid. Or, it may have been done to drain the extra fluid from your abdomen or chest to help make you more comfortable.     Home care    If you have pain after the procedure, your healthcare provider can prescribe or recommend pain medicines.  Take these exactly as directed. If you stopped taking other medicines before the procedure, ask your provider when you can start them again.    Avoid strenuous activity for 48 hours.    You will have a small bandage over the puncture site. Adhesive tapes, adhesive strips, or surgical glue may also be used to close the incision. They also help stop bleeding and promote healing. You may take the bandage off in 24-48 hours. Once there is a scab over the site, no bandages are required.    Check the puncture site for the signs of infection listed below.     Follow-up care  Make a follow-up appointment with your healthcare provider as directed. During your follow-up visit, your provider will check your healing. Let your provider know how you are feeling. You can also discuss the cause of your fluid, and if you need any further treatment.    When to seek medical advice:  Call your healthcare provider if you have any of the following after the procedure:    A fever of 100.4 F (38.0 C) or higher    Trouble breathing    Abdominal pain that is worse than expected    Belly Abdominal pain not caused by having the skin punctured that is worse than expected    Persistent bleeding from the puncture site    More than a small amount of fluid leaking from the puncture site    Swollen belly    Signs of infection at the puncture site. These include increased pain, redness, or swelling, warmth, or bad-smelling drainage.    Feeling dizzy or lightheaded, or fainting     Call our Interventional Radiology (IR) service if:  If you start bleeding from the procedure site.  If you do start to bleed from the site, lie down and hold some pressure on the site.  Our radiology provider can help you decide if you need to return to the hospital.  If you have new or worsening pain related to the procedure.  If you have pronounced swelling at the procedure site.  If you develop persistent nausea or vomiting.  If you develop hives or a rash or any  "unexplained itching.  If you have a fever of greater than 100.4  F and chills in the first 5 days after procedure.  Any other concerns related to your procedure.      [facility]  Interventional Radiology (IR)  [address] [facility]  2nd Floor, [facility] [place] [address]    Contact Number:  [phone]  (IR control desk)  Monday - Friday 8:00 am - 4:30 pm    After hours for urgent concerns:  [phone]  After 4:30 pm Monday - Friday, Weekends and Holidays.   Ask for Interventional Radiology on-call.  Someone is available 24 hours a day.  [facility] toll free number:  [phone]    Pending Results     Date and Time Order Name Status Description    8/31/2018 1142 IR THORACENTESIS In process             Admission Information     Date & Time Provider Department Dept. Phone    8/31/2018 Dean Marcus PA-C [facility] [phone]      Your Vitals Were     Blood Pressure Pulse Temperature Respirations Height Weight    111/74 128 97.1  F (36.2  C) (Temporal) 24 1.651 m (5' 5\") 64.2 kg (141 lb 8 oz)    Pulse Oximetry BMI (Body Mass Index)                100% 23.55 kg/m2          [facility] Information     [facility] gives you secure access to your electronic health record. If you see a primary care provider, you can also send messages to your care team and make appointments. If you have questions, please call your primary care clinic.  If you do not have a primary care provider, please call [phone] and they will assist you.      [facility] is an electronic gateway that provides easy, online access to your medical records. With [facility], you can request a clinic appointment, read your test results, renew a prescription or communicate with your care team.     To access your existing account, please contact your [facility] or call [phone] for assistance.        Care EveryWhere ID     This is your Care EveryWhere ID. This " could be used by other organizations to access your [facility] medical records  [id number]        Equal Access to Services     NANDA BARAJAS : Hadii aad ku hadvasukaren Cherry, waqida luorlinadaha, qaybta kaalmada gemasadi, waxluis daniel declan brucesayraalex lorenzo. [facility] [phone].    ATENCIÓN: Si habla español, tiene a ivan disposición servicios gratuitos de asistencia lingüística. Dyloname al [phone].    We comply with applicable federal civil rights laws and [place] laws. We do not discriminate on the basis of race, color, national origin, age, disability, sex, sexual orientation, or gender identity.               Review of your medicines      UNREVIEWED medicines. 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)        Dose:  300 mg   Take 1 tablet (300 mg) by mouth daily   Quantity:  30 tablet   Refills:  1       Aluminum-Magnesium-Simethicone 200-200-20 MG/5ML Susp        Dose:  5 mL   Take 5 mLs by mouth daily as needed   Refills:  0       aspirin 81 MG tablet        1 TABLET EVERY MORNING   Refills:  0       Benzethonium Chloride 0.1 % Liqd   Used for:  Decubitus ulcer of buttock, unspecified laterality, unspecified ulcer stage        Cleanse with wound cleanser and apply barrier paste and hydrocolloid dressing, change every 2-3 days as needed   Quantity:  237 mL   Refills:  3       clindamycin 1 % lotion   Commonly known as:  CLEOCIN T   Used for:  Rash        Apply twice daily for 6 weeks to the groin   Quantity:  60 mL   Refills:  1       furosemide 20 MG tablet   Commonly known as:  LASIX   Used for:  Hypervolemia, unspecified hypervolemia type        Dose:  20 mg   Take 1 tablet (20 mg) by mouth daily    Quantity:  180 tablet   Refills:  3       hydrocortisone valerate 0.2 % ointment   Commonly known as:  WEST-NINOSKA   Used for:  Psoriasis        Apply sparingly to affected area three times daily as needed.   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)        Dose:  0.5 mg   Take 1 tablet (0.5 mg) by mouth every 4 hours as needed (Anxiety, Nausea/Vomiting or Sleep)   Quantity:  30 tablet   Refills:  3       MULTI-VITAMIN DAILY PO        Dose:  1 tablet   Take 1 tablet by mouth   Refills:  0       nitroGLYcerin 0.4 MG sublingual tablet   Commonly known as:  NITROSTAT   Used for:  Chest pain due to myocardial ischemia, unspecified ischemic chest pain type (H), Coronary artery disease involving native coronary artery of native heart without angina pectoris        Dose:  0.4 mg   Place 1 tablet (0.4 mg) under the tongue every 5 minutes as needed up to 3 tablets per episode.   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)        Dose:  8 mg   Take 1 tablet (8 mg) by mouth every 8 hours as needed (Nausea/Vomiting)   Quantity:  10 tablet   Refills:  3       potassium chloride SA 10 MEQ CR tablet   Commonly known as:  K-DUR/KLOR-CON M   Used for:  Hypervolemia, unspecified hypervolemia type        Dose:  10 mEq   Take 1 tablet (10 mEq) by mouth daily   Quantity:  30 tablet   Refills:  1       prochlorperazine 10 MG tablet   Commonly known as:  COMPAZINE   Used  "for:  Nausea        Dose:  10 mg   Take 1 tablet (10 mg) by mouth every 6 hours as needed for nausea or vomiting   Quantity:  30 tablet   Refills:  1       rosuvastatin 40 MG tablet   Commonly known as:  CRESTOR   Used for:  Hyperlipidemia LDL goal <100, Coronary artery disease involving native coronary artery of native heart without angina pectoris        Dose:  40 mg   Take 1 tablet (40 mg) by mouth daily   Quantity:  90 tablet   Refills:  3       zinc oxide - white petrolatum 20-51% Pste topical paste   Used for:  Decubitus ulcer of buttock, unspecified laterality, unspecified ulcer stage        Cleanse with wound cleanser and apply barrier paste and hydrocolloid dressing, change every 2-3 days as needed   Quantity:  170 g   Refills:  3         CONTINUE these medicines which have NOT CHANGED        Dose / Directions    COMPRESSION STOCKINGS   Used for:  Acute congestive heart failure, unspecified congestive heart failure type (H)        Dose:  1 each   1 each continuous prn Bilateral knee high compression stockings   Quantity:  2 each   Refills:  0       Gauze Bandage 4\" Misc   Used for:  Decubitus ulcer of buttock, unspecified laterality, unspecified ulcer stage        Cleanse with wound cleanser and apply barrier paste and hydrocolloid dressing, change every 2-3 days as needed   Quantity:  5 each   Refills:  3       order for DME   Used for:  Generalized muscle weakness, 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             Medication List: This is a list of all your medications and when to take them. Check marks below indicate your daily home schedule. Keep this list as a reference.      Medications           Morning Afternoon Evening Bedtime As Needed    acetaminophen 500 MG " "tablet   Commonly known as:  TYLENOL   Take 500 mg by mouth every 6 hours as needed for mild pain                                allopurinol 300 MG tablet   Commonly known as:  ZYLOPRIM   Take 1 tablet (300 mg) by mouth daily                                Aluminum-Magnesium-Simethicone 200-200-20 MG/5ML Susp   Take 5 mLs by mouth daily as needed                                aspirin 81 MG tablet   1 TABLET EVERY MORNING                                Benzethonium Chloride 0.1 % Liqd   Cleanse with wound cleanser and apply barrier paste and hydrocolloid dressing, change every 2-3 days as needed                                clindamycin 1 % lotion   Commonly known as:  CLEOCIN T   Apply twice daily for 6 weeks to the groin                                COMPRESSION STOCKINGS   1 each continuous prn Bilateral knee high compression stockings                                furosemide 20 MG tablet   Commonly known as:  LASIX   Take 1 tablet (20 mg) by mouth daily                                Gauze Bandage 4\" Misc   Cleanse with wound cleanser and apply barrier paste and hydrocolloid dressing, change every 2-3 days as needed                                hydrocortisone valerate 0.2 % ointment   Commonly known as:  WEST-NINOSKA   Apply sparingly to affected area three times daily as needed.                                LANsoprazole 15 MG CR capsule   Commonly known as:  PREVACID   Take 15 mg by mouth daily Patient needs to use brand name Prevacid (generic causes diarrhea)                                lidocaine-prilocaine cream   Commonly known as:  EMLA   Apply 1 Application topically as needed                                LORazepam 0.5 MG tablet   Commonly known as:  ATIVAN   Take 1 tablet (0.5 mg) by mouth every 4 hours as needed (Anxiety, Nausea/Vomiting or Sleep)                                MULTI-VITAMIN DAILY PO   Take 1 tablet by mouth                                nitroGLYcerin 0.4 MG sublingual tablet "   Commonly known as:  NITROSTAT   Place 1 tablet (0.4 mg) under the tongue every 5 minutes as needed up to 3 tablets per episode.                                ondansetron 8 MG tablet   Commonly known as:  ZOFRAN   Take 1 tablet (8 mg) by mouth every 8 hours as needed (Nausea/Vomiting)                                order for DME   Equipment being ordered: wheeled walker with seat                                potassium chloride SA 10 MEQ CR tablet   Commonly known as:  K-DUR/KLOR-CON M   Take 1 tablet (10 mEq) by mouth daily                                prochlorperazine 10 MG tablet   Commonly known as:  COMPAZINE   Take 1 tablet (10 mg) by mouth every 6 hours as needed for nausea or vomiting                                rosuvastatin 40 MG tablet   Commonly known as:  CRESTOR   Take 1 tablet (40 mg) by mouth daily                                zinc oxide - white petrolatum 20-51% Pste topical paste   Cleanse with wound cleanser and apply barrier paste and hydrocolloid dressing, change every 2-3 days as needed

## 2018-08-31 NOTE — IP AVS SNAPSHOT
[facility]    [address] [place] [address]    Phone:  [phone]    Fax:  [phone]                                       After Visit Summary   8/31/2018    Francisco Javier Cortes    MRN: [id number]           After Visit Summary Signature Page     I have received my discharge instructions, and my questions have been answered. I have discussed any challenges I see with this plan with the nurse or doctor.    ..........................................................................................................................................  Patient/Patient Representative Signature      ..........................................................................................................................................  Patient Representative Print Name and Relationship to Patient    ..................................................               ................................................  Date                                            Time    ..........................................................................................................................................  Reviewed by Signature/Title    ...................................................              ..............................................  Date                                                            Time          22EPIC Rev 08/18

## 2018-08-31 NOTE — DISCHARGE INSTRUCTIONS
Discharge Instructions for Paracentesis or Thoracentesis     Paracentesis is a procedure to remove extra fluid from your belly (abdomen). Thoracentesis is a procedure to remove extra fluid from your chest.  This fluid buildup is called ascites. The procedure may have been done to take a sample of the fluid. Or, it may have been done to drain the extra fluid from your abdomen or chest to help make you more comfortable.     Home care    If you have pain after the procedure, your healthcare provider can prescribe or recommend pain medicines. Take these exactly as directed. If you stopped taking other medicines before the procedure, ask your provider when you can start them again.    Avoid strenuous activity for 48 hours.    You will have a small bandage over the puncture site. Adhesive tapes, adhesive strips, or surgical glue may also be used to close the incision. They also help stop bleeding and promote healing. You may take the bandage off in 24-48 hours. Once there is a scab over the site, no bandages are required.    Check the puncture site for the signs of infection listed below.     Follow-up care  Make a follow-up appointment with your healthcare provider as directed. During your follow-up visit, your provider will check your healing. Let your provider know how you are feeling. You can also discuss the cause of your fluid, and if you need any further treatment.    When to seek medical advice:  Call your healthcare provider if you have any of the following after the procedure:    A fever of 100.4 F (38.0 C) or higher    Trouble breathing    Abdominal pain that is worse than expected    Belly Abdominal pain not caused by having the skin punctured that is worse than expected    Persistent bleeding from the puncture site    More than a small amount of fluid leaking from the puncture site    Swollen belly    Signs of infection at the puncture site. These include increased pain, redness, or swelling, warmth, or  bad-smelling drainage.    Feeling dizzy or lightheaded, or fainting     Call our Interventional Radiology (IR) service if:  If you start bleeding from the procedure site.  If you do start to bleed from the site, lie down and hold some pressure on the site.  Our radiology provider can help you decide if you need to return to the hospital.  If you have new or worsening pain related to the procedure.  If you have pronounced swelling at the procedure site.  If you develop persistent nausea or vomiting.  If you develop hives or a rash or any unexplained itching.  If you have a fever of greater than 100.4  F and chills in the first 5 days after procedure.  Any other concerns related to your procedure.      [facility]  Interventional Radiology (IR)  [address]  [facility] [place] [address]    Contact Number:  [phone]  (IR control desk)  Monday - Friday 8:00 am - 4:30 pm    After hours for urgent concerns:  [phone]  After 4:30 pm Monday - Friday, Weekends and Holidays.   Ask for Interventional Radiology on-call.  Someone is available 24 hours a day.  [facility] toll free number:  [phone]

## 2018-08-31 NOTE — BRIEF OP NOTE
Interventional Radiology Brief Post Procedure Note    Procedure: Thoracentesis    Proceduralist: Sarah Ugalde PA-C    Assistant: TESS Baez    Time Out: Prior to the start of the procedure and with procedural staff participation, I verbally confirmed the patient s identity using two indicators, relevant allergies, that the procedure was appropriate and matched the consent or emergent situation, and that the correct equipment/implants were available. Immediately prior to starting the procedure I conducted the Time Out with the procedural staff and re-confirmed the patient s name, procedure, and site/side. (The Joint Commission universal protocol was followed.)  Yes        Sedation: None. Local Anesthestic used    Findings: Ultrasound guided therapeutic bilateral thoracenteses performed. Return of opaque serous fluid from the right pleural space. Return of opaque ghulam fluid from the left pleural space.    Estimated Blood Loss: Minimal    Fluoroscopy Time: None    SPECIMENS: None    Complications: 1. None     Condition: Stable    Plan: Follow up per primary team.     Comments: See dictated procedure note for full details.    Dean Marcus PA-C

## 2018-09-04 NOTE — PROGRESS NOTES
Power port identified by 3 raised dots, previous imaging  IVAD accessed with 20 gauge 3/4 inch balderas gripper plus needle.   Flushed with 10 ml Sterile 0.9% NaCl (NDC [address]-10) and dressed with sterile Tegaderm.  Flushed with 10 cc NS (NDC [address]-28)and 5 cc 100 unit/ml Heparin (NDC [address]-72)  Needle: d/c'd intact  Comments: blood drawn for labs

## 2018-09-04 NOTE — MR AVS SNAPSHOT
MRN:[id number]                      After Visit Summary   9/4/2018    Francisco Javier Cortes    MRN: [id number]           Visit Information        Provider Department      9/4/2018 1:15 PM MG IMAGING NURSE [facility]        Your next 10 appointments already scheduled     Sep 05, 2018  9:30 AM CDT   [facility] Lab Draw with  MASONIC LAB DRAW   [facility] Lab Draw ([facility])    [address] [place] [address]  [address]  [facility] [id number] [phone]            Sep 05, 2018 10:20 AM CDT   (Arrive by 10:05 AM)   BONE MARROW BIOPSY with Courtney Jimenez PA-C, UU BONE MARROW BIOPSY   [facility] ([facility])    [address] [place] [address]  [address]  [facility] [address] [phone]           You may eat and drink prior to the procedure. You will have labs drawn prior to the procedure. You will be awake for the procedure. You will need a  to take you home. Please talk to your doctor about when to stop taking blood thinning medications. Please call our triage nurses with any questions that you may have at [phone].            Sep 06, 2018  8:00 AM CDT   Procedure 3.5 hour with U2A ROOM 6   Unit [address]A [facility] [place] ([facility])    [address] [place] [id number]               Sep 06, 2018  9:30 AM CDT   IR CHEST TUBE REPOSITION/REPLACEMENT TUNNELED BILATERAL with UUIR1   [facility] [place], Interventional Radiology ([facility])    [address] [id number] [phone]           Please wear loose clothing, such as a sweat suit or jogging clothes. Avoid snaps, zippers and other metal. We may ask you to undress and put on a hospital gown.  Please bring any scans or X-rays taken at other hospitals, if similar tests were done. Also bring a list of your medicines, including vitamins, minerals  and over-the-counter drugs. It is safest to leave personal items at home.  Tell your doctor in advance:   If you are or may be pregnant.   If you are taking Coumadin (or any other blood thinners) 5 days prior to the exam for any special instructions.   If you are diabetic to determine if your insulin needs have to be adjusted for the exam.  Your doctor will obtain necessary laboratory tests prior to the exam (creatinine, Hgb/Hct, platelet count, and INR).  If you have any questions, please call the Imaging Department where you will have your exam. Directions, parking instructions, and other information are available on our website, [email or web address].            Sep 07, 2018 10:45 AM CDT   IR THORACENTESIS with [id number]   [facility] ([facility])    [address] [place] [address] [phone]           Please wear loose clothing, such as a sweat suit or jogging clothes. Avoid snaps, zippers and other metal. We may ask you to undress and put on a hospital gown.  Please bring any scans or X-rays taken at other hospitals, if similar tests were done. Also bring a list of your medicines, including vitamins, minerals and over-the-counter drugs. It is safest to leave personal items at home.  Tell your doctor in advance:   If you are or may be pregnant.   If you are taking Coumadin (or any other blood thinners) 5 days prior to the exam for any special instructions.   If you are diabetic to determine if your insulin needs have to be adjusted for the exam.  Your doctor will obtain necessary laboratory tests prior to the exam (creatinine, Hgb/Hct, platelet count, and INR).  If you have any questions, please call the Imaging Department where you will have your exam. Directions, parking instructions, and other information are available on our website, [email or web address].            Sep 07, 2018   Procedure with Allen Jean PA-C   [facility] ([facility])    [address] [place] [address]  5th Floor  [facility] [id number] [phone]           Located in the Clinics and Surgery Center at [address] [place] [address].   parking is very convenient and highly recommended.  is a $6 flat rate fee.  Both  and self parkers should enter the main arrival plaza from [facility]; parking attendants will direct you based on your parking preference.            Sep 17, 2018  2:15 PM CDT   Return Visit with Erickson Adan MD   [facility] ([facility])    [address] [place] [address] [phone]            Oct 10, 2018 10:15 AM CDT   XR CHEST 2 VIEWS with UCXR1   [facility] [place] [facility] ([facility])    [address] [place] [address]  1st Floor  [facility] [id number] [phone]           Please bring a list of your current medicines to your exam. (Include vitamins, minerals and over-thecounter medicines.) Leave your valuables at home.  Tell your doctor if there is a chance you may be pregnant.  You do not need to do anything special for this exam.              [employer] Information     [employer] gives you secure access to your electronic health record. If you see a primary care provider, you can also send messages to your care team and make appointments. If you have questions, please call your primary care clinic.  If you do not have a primary care provider, please call [phone] and they will assist you.      [employer] is an electronic gateway that provides easy, online access to your medical records. With [employer], you can request a clinic appointment, read your test results, renew a prescription or communicate with your care team.     To access your existing account, please contact your [facility] or call [phone] for assistance.        Care EveryWhere ID     This is your Care  EveryWhere ID. This could be used by other organizations to access your [facility] medical records  [id number]        Equal Access to Services     NANDA BARAJAS : Alondra Cherry, kenia fiore, wong wu, zhao lorenzo. So [facility] [phone].    ATENCIÓN: Si habla español, tiene a ivan disposición servicios gratuitos de asistencia lingüística. Llame al [phone].    We comply with applicable federal civil rights laws and [place] laws. We do not discriminate on the basis of race, color, national origin, age, disability, sex, sexual orientation, or gender identity.

## 2018-09-05 NOTE — LETTER
9/5/2018      RE: Francisco Javier Cortes  [address] [address]       Bone marrow biopsy was not done today due to PET/CT from yesterday still having a large amount of residual disease after 6 cycles of BR. Dr. Adan was in agreement. He will review the PET/CT further and call the patient. Patient will need a different therapy.     He has been having difficulty with recurrent pleural effusions and ascites. Both has had positive flow. He has large effusion on R and moderate effusion on L and large amount of ascites on exam today. No SOB at rest, mild tachycardia, he is on 5L O2 and saturations are at 96%. He has orthopnea and is SOB within a few feet. Exam significant for regular rhythm, pulse in 100s, diminished sounds throughout on R, clear to half L upper lung, diminished bottom. Abdomen is soft but distended with palpable fluid wave.     Reviewed plan with Dr. Rasmussen for b/l PleurX placement tomorrow with drainage as well as paracentesis. Will defer PleurX in abdominal cavity due to higher risk of infections. This is appropriate timing given his symptoms. Ultimately needs disease control in order to alleviate fluid accumulation. This is from either pleural involvement of lung versus more likely lymphatic obstruction from bulky nodes.    Courtney Jimenez PA-C

## 2018-09-05 NOTE — MR AVS SNAPSHOT
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)        Encounter for antineoplastic chemotherapy           Follow-ups after your visit        Your next 10 appointments already scheduled     Sep 06, 2018  8:00 AM CDT   Procedure 3.5 hour with U2A ROOM 6   [address] [facility] ([facility])    [address] [facility] [address]               Sep 06, 2018  9:30 AM CDT   IR CHEST TUBE REPOSITION/REPLACEMENT TUNNELED BILATERAL with UUIR1   [facility], [place], Interventional Radiology ([facility])    [address] [facility] [address] [phone]           Please wear loose clothing, such as a sweat suit or jogging clothes. Avoid snaps, zippers and other metal. We may ask you to undress and put on a hospital gown.  Please bring any scans or X-rays taken at other hospitals, if similar tests were done. Also bring a list of your medicines, including vitamins, minerals and over-the-counter drugs. It is safest to leave personal items at home.  Tell your doctor in advance:   If you are or may be pregnant.   If you are taking Coumadin (or any other blood thinners) 5 days prior to the exam for any special instructions.   If you are diabetic to determine if your insulin needs have to be adjusted for the exam.  Your doctor will obtain necessary laboratory tests prior to the exam (creatinine, Hgb/Hct, platelet count, and INR).  If you have any questions, please call the Imaging Department where you will have your exam. Directions, parking  instructions, and other information are available on our website, [email or web address].            Sep 07, 2018 10:45 AM CDT   IR THORACENTESIS with [id number]   [facility] ([facility])    [address] [place], [place] [id number] [phone]           Please wear loose clothing, such as a sweat suit or jogging clothes. Avoid snaps, zippers and other metal. We may ask you to undress and put on a hospital gown.  Please bring any scans or X-rays taken at other hospitals, if similar tests were done. Also bring a list of your medicines, including vitamins, minerals and over-the-counter drugs. It is safest to leave personal items at home.  Tell your doctor in advance:   If you are or may be pregnant.   If you are taking Coumadin (or any other blood thinners) 5 days prior to the exam for any special instructions.   If you are diabetic to determine if your insulin needs have to be adjusted for the exam.  Your doctor will obtain necessary laboratory tests prior to the exam (creatinine, Hgb/Hct, platelet count, and INR).  If you have any questions, please call the Imaging Department where you will have your exam. Directions, parking instructions, and other information are available on our website, [email or web address].            Sep 07, 2018   Procedure with Allen Jean PA-C   [facility] ([facility])    [address] [place] [address] [phone]           Located in the Clinics and Surgery Center at [address] [place] [address].   parking is very convenient and highly recommended.  is a $6 flat rate fee.  Both  and self parkers should enter the main arrival plaza from [facility]; parking attendants will direct you based on your parking preference.            Sep 17, 2018  2:15 PM CDT   Return Visit with Erickson Adan MD   [facility]  Clinics ([facility])    [address] [place] [address] [phone]            Oct 10, 2018 10:15 AM CDT   XR CHEST 2 VIEWS with UCXR1   [facility] Xray ([facility])    [address] [facility]  [address] [facility] [address] [phone]           Please bring a list of your current medicines to your exam. (Include vitamins, minerals and over-thecounter medicines.) Leave your valuables at home.  Tell your doctor if there is a chance you may be pregnant.  You do not need to do anything special for this exam.              Who to contact     If you have questions or need follow up information about today's clinic visit or your schedule please contact [facility] directly at [phone].  Normal or non-critical lab and imaging results will be communicated to you by MyChart, letter or phone within 4 business days after the clinic has received the results. If you do not hear from us within 7 days, please contact the clinic through [email or web address]hart or phone. If you have a critical or abnormal lab result, we will notify you by phone as soon as possible.  Submit refill requests through [employer] or call your pharmacy and they will forward the refill request to us. Please allow 3 business days for your refill to be completed.          Additional Information About Your Visit        MyChart Information     [employer] gives you secure access to your electronic health record. If you see a primary care provider, you can also send messages to your care team and make appointments. If you have questions, please call your primary care clinic.  If you do not have a primary care provider, please call [phone] and they will assist you.        Care EveryWhere ID     This is your Care EveryWhere ID. This could be used by other organizations to access your [facility] medical records  [id number]        Your Vitals Were     Pulse Temperature Respirations  "Height Pulse Oximetry BMI (Body Mass Index)    115 97  F (36.1  C) (Oral) 16 1.651 m (5' 5\") 96% 23.66 kg/m2       Blood Pressure from Last 3 Encounters:   09/05/18 93/68   08/31/18 111/74   08/29/18 99/70    Weight from Last 3 Encounters:   09/05/18 64.5 kg (142 lb 3 oz)   08/31/18 64.2 kg (141 lb 8 oz)   08/29/18 64.2 kg (141 lb 8 oz)              We Performed the Following     *CBC with platelets differential        Primary Care Provider Office Phone # Fax #    Florian Alonzo [phone]       [address]  [facility] [address]        Equal Access to Services     NANDA BARAJAS : Alondra choi Solina, waaxda luqadaha, qaybta kaalmada adeegyadonn, zhao bazan . So [facility] [phone].    ATENCIÓN: Si habla español, tiene a ivan disposición servicios gratuitos de asistencia lingüística. Llame al [phone].    We comply with applicable federal civil rights laws and [place] laws. We do not discriminate on the basis of race, color, national origin, age, disability, sex, sexual orientation, or gender identity.            Thank you!     Thank you for choosing [facility]  for your care. Our goal is always to provide you with excellent care. Hearing back from our patients is one way we can continue to improve our services. Please take a few minutes to complete the written survey that you may receive in the mail after your visit with us. Thank you!             Your Updated Medication List - Protect others around you: Learn how to safely use, store and throw away your medicines at www.disposemymeds.org.          This list is accurate as of 9/5/18 12:08 PM.  Always use your most recent med list.                   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)       Aluminum-Magnesium-Simethicone 200-200-20 MG/5ML Susp      Take 5 mLs by mouth daily as needed        aspirin 81 MG tablet      1 TABLET EVERY MORNING        Benzethonium Chloride 0.1 % Liqd     237 mL    Cleanse with wound cleanser and apply barrier paste and hydrocolloid dressing, change every 2-3 days as needed    Decubitus ulcer of buttock, unspecified laterality, unspecified ulcer stage       clindamycin 1 % lotion    CLEOCIN T    60 mL    Apply twice daily for 6 weeks to the groin    Rash       COMPRESSION STOCKINGS     2 each    1 each continuous prn Bilateral knee high compression stockings    Acute congestive heart failure, unspecified congestive heart failure type (H)       furosemide 20 MG tablet    LASIX    180 tablet    Take 1 tablet (20 mg) by mouth daily    Hypervolemia, unspecified hypervolemia type       Gauze Bandage 4\" Misc     5 each    Cleanse with wound cleanser and apply barrier paste and hydrocolloid dressing, change every 2-3 days as needed    Decubitus ulcer of buttock, unspecified laterality, unspecified ulcer stage       hydrocortisone valerate 0.2 % ointment    [facility]    45 g    Apply sparingly to affected area three times daily as needed.    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)       potassium chloride SA 10 MEQ CR tablet    K-DUR/KLOR-CON M    30 tablet    Take 1 tablet (10 mEq) by mouth daily    Hypervolemia, unspecified hypervolemia type       prochlorperazine 10 MG tablet    COMPAZINE    30 tablet    Take 1 tablet (10 mg) by mouth every 6 hours as needed for nausea or vomiting    Nausea       rosuvastatin 40 MG tablet    CRESTOR    90 tablet    Take 1 tablet (40 mg) by mouth daily    Hyperlipidemia LDL goal <100, Coronary artery disease involving native coronary artery of native heart without angina pectoris       zinc oxide - white petrolatum 20-51% Pste topical paste     170 g    Cleanse with wound cleanser and apply barrier paste and hydrocolloid dressing, change every 2-3 days as needed    Decubitus ulcer of buttock, unspecified laterality, unspecified ulcer stage

## 2018-09-05 NOTE — NURSING NOTE
"Oncology Rooming Note    September 5, 2018 10:34 AM   Francisco Javier Cortes is a 78 year old male who presents for:    Chief Complaint   Patient presents with     Oncology Clinic Visit     Return: BMX     Initial Vitals: BP 93/68  Pulse 115  Temp 97  F (36.1  C) (Oral)  Resp 16  Ht 1.651 m (5' 5\")  Wt 64.5 kg (142 lb 3 oz)  SpO2 96%  BMI 23.66 kg/m2 Estimated body mass index is 23.66 kg/(m^2) as calculated from the following:    Height as of this encounter: 1.651 m (5' 5\").    Weight as of this encounter: 64.5 kg (142 lb 3 oz). Body surface area is 1.72 meters squared.  No Pain (0) Comment: Data Unavailable   No LMP for male patient.  Allergies reviewed: Yes  Medications reviewed: Yes    Medications: Medication refills not needed today.  Pharmacy name entered into [facility]:    [facility] [place] - [place], [place] - [address], [address]  [facility] - [facility], [place] - [address] [place] [address]/[id number] - [place], [place] - [address] [facility] [place] [facility] AT [facility]    Clinical concerns: new concerns are that his abdomen is full of fluid as well as his lungs and it is very difficult to breath. He is scheduled to have tubes placed in his lungs tomorrow Courtney Jimenez was notified.    10 minutes for nursing intake (face to face time)     Wilian Sales CMA              "

## 2018-09-05 NOTE — PROGRESS NOTES
Bone marrow biopsy was not done today due to PET/CT from yesterday still having a large amount of residual disease after 6 cycles of BR. Dr. Adan was in agreement. He will review the PET/CT further and call the patient. Patient will need a different therapy.     He has been having difficulty with recurrent pleural effusions and ascites. Both has had positive flow. He has large effusion on R and moderate effusion on L and large amount of ascites on exam today. No SOB at rest, mild tachycardia, he is on 5L O2 and saturations are at 96%. He has orthopnea and is SOB within a few feet. Exam significant for regular rhythm, pulse in 100s, diminished sounds throughout on R, clear to half L upper lung, diminished bottom. Abdomen is soft but distended with palpable fluid wave.     Reviewed plan with Dr. Rasmussen for b/l PleurX placement tomorrow with drainage as well as paracentesis. Will defer PleurX in abdominal cavity due to higher risk of infections. This is appropriate timing given his symptoms. Ultimately needs disease control in order to alleviate fluid accumulation. This is from either pleural involvement of lung versus more likely lymphatic obstruction from bulky nodes.    Courtney Jimenez PA-C

## 2018-09-06 NOTE — PROCEDURES
Interventional Radiology Pre-Procedure Sedation Assessment   Time of Assessment: 9:47 AM    Expected Level: Moderate Sedation    Indication: Sedation is required for the following type of Procedure: Pleurx catheter placement    Sedation and procedural consent: Risks, benefits and alternatives were discussed with Patient    PO Intake: Appropriately NPO for procedure    ASA Class: Class 3 - SEVERE SYSTEMIC DISEASE, DEFINITE FUNCTIONAL LIMITATIONS.    Mallampati: Grade 3:  Soft palate visible, posterior pharyngeal wall not visible    Lungs: Posterior auscultation not performed. Decreased breath sounds at lung bases bilaterally on lateral auscultation.     Heart: Normal heart sounds and rate    History and physical reviewed and no updates needed. I have reviewed the lab findings, diagnostic data, medications, and the plan for sedation. I have determined this patient to be an appropriate candidate for the planned sedation and procedure and have reassessed the patient IMMEDIATELY PRIOR to sedation and procedure.    Dean Marcus PA-C

## 2018-09-06 NOTE — PROCEDURES
Interventional Radiology Brief Post Procedure Note    Procedure: IR CHEST TUBE REPOSITION/REPLACEMENT TUNNELED BILATERAL    Proceduralist: Dean Marcus [facility], PASharonC    Assistant: None    Time Out: Prior to the start of the procedure and with procedural staff participation, I verbally confirmed the patient s identity using two indicators, relevant allergies, that the procedure was appropriate and matched the consent or emergent situation, and that the correct equipment/implants were available. Immediately prior to starting the procedure I conducted the Time Out with the procedural staff and re-confirmed the patient s name, procedure, and site/side. (The Joint Commission universal protocol was followed.)  Yes    Medications   Medication Event Details Admin User Admin Time   albumin human 25 % injection 12.5 g Medication New Bag Dose: 12.5 g; Route: Intravenous Ann Albright RN 9/6/2018 10:55 AM   albumin human 25 % injection 12.5 g Medication New Bag Dose: 12.5 g; Route: Intravenous Ann Albright RN 9/6/2018 11:09 AM   albumin human 25 % injection 12.5 g Medication New Bag Dose: 12.5 g; Route: Intravenous Ann Albright RN 9/6/2018 11:26 AM   lidocaine 1 % 1-30 mL Medication Given Dose: 35 mL; Route: Intradermal Ann Albright RN 9/6/2018 12:08 PM   fentaNYL (PF) (SUBLIMAZE) injection 25-50 mcg Medication Given Dose: 50 mcg; Route: Intravenous Gail Tamez RN 9/6/2018 12:21 PM       Sedation: IR Nurse Monitored Care   Post Procedure Summary:  Prior to the start of the procedure and with procedural staff participation, I verbally confirmed the patient s identity using two indicators, relevant allergies, that the procedure was appropriate and matched the consent or emergent situation, and that the correct equipment/implants were available. Immediately prior to starting the procedure I conducted the Time Out with the procedural staff and re-confirmed the patient s name, procedure, and site/side. (The Joint  Commission universal protocol was followed.)  Yes       Sedatives: Fentanyl    Vital signs, airway and pulse oximetry were monitored and remained stable throughout the procedure and sedation was maintained until the procedure was complete.  The patient was monitored by staff until sedation discharge criteria were met.    Patient tolerance: Patient tolerated the procedure well with no immediate complications.    Time of sedation in minutes: 70 minutes from beginning to end of physician one to one monitoring.          Findings: U/S guided therapeutic paracentesis performed at RLQ, with return of opaque serous fluid. Image guided placement of left thoracic 16 Fr. Pleur-X catheter. Left therapeutic thoracentesis, with return of opaque pink fluid. Image guided placement of right thoracic 16 Fr. Pleur-X catheter. Right therapeutic thoracentesis, with return of opaque serous fluid.    Estimated Blood Loss: Less than 10 ml    Fluoroscopy Time:  0.1 minute(s)    SPECIMENS: None    Complications: 1. None     Condition: Stable    Plan: Follow up per primary team.    Comments: See dictated procedure note for full details.    Dean Marcus PA-C

## 2018-09-06 NOTE — PROGRESS NOTES
Patient Name: Francisco Javier Cortes  Medical Record Number: [id number]  Today's Date: 9/6/2018    Procedure: bilateral thoracic pleurex catheter placement, paracentesis  Proceduralist: Tyree MORTENSEN    Sedation start time: 1100  Sedation end time: 1213  Sedation medications administered: 50 mcg fentanyl   Total sedation time: 70 min    Procedure start time: 1018  Puncture time: 1040  Procedure end time: 1213    Report given to: 2A RN      Other Notes: Pt arrived to IR room 1 from . Consent reviewed, pt confirmed. Pt denies any questions or concerns regarding procedure. Pt positioned in R lateral tilt and monitored per protocol. 3900ml pink, cloudy ascites drained from right abdominal site. Left tunneled pleurex cath placed and confirmed using image guidance. 1200ml pink cloudy fluid drained from site. Site dressed per protocol. Pt then placed in L lateral tilt for L pleurex cath placement. Right tunneled pleurex cath placed and confirmed using image guidance. 1200 ml pink cloudy fluid drained from site.  Site dressed per protocol. 3 doses albumin given during procedures for volume replacement per MAR. Pt tolerated procedure without any noted complications. Pt transferred back to .

## 2018-09-06 NOTE — PROGRESS NOTES
Received request from Interventional Radiology team to assist with home care management of newly placed bilateral PleurX drains.  Home Care Nursing Referral order placed with [facility] for ongoing nursing assistance/education of management of PleurX, drain changes and dressing changes.      [employer] order form for PleurX Drainage Kits completed and signed by provider Joy Bush CNP.  Completed order form, along with patient demographics and insurance information, faxed to [employer] at fax number [phone].  [employer] will contact patient/spouse directly to arrange for shipment of the drainage kits.    Cam Conner, RN, BSN, OCN  Oncology Care Coordinator  [facility]

## 2018-09-06 NOTE — PROGRESS NOTES
HERNANDEZ Manriquez notified of the sepsis flag when vital signs were taken.  The vitals that were taken are pt's baseline and that sepsis does not need to be worked up per HERNANDEZ Manriquez.

## 2018-09-06 NOTE — PROGRESS NOTES
Pt tolerated po well.  Pt tolerated ambulation in the hallway.  Rt chest port hep locked and de accessed.  Discharge  instructions went over with and given to pt and his wife, all questions answered.  1450-Pt discharged to home with his wife.

## 2018-09-06 NOTE — DISCHARGE INSTRUCTIONS
[facility]      Interventional Radiology  Patient Instructions Following Paracentesis    AFTER YOU GO HOME:  ? If you were given sedation; we recommend that an adult stay with you for the first 24 hours.   No driving or alcoholic beverages for 24 hours.  ? Resume previous diet and medication  ? Limit strenuous physical activity such as lifting (>10 lbs.), straining, or exercising for 48 hours.  You may resume normal activity in 24 hours  ? Change gauze at the puncture site as needed to keep site dry.  Leaking of additional fluid is not uncommon during the first 24-48 hours.    CALL 911:  ? If this is a medical emergency    CALL THE PHYSICIAN:  ? If you develop a fever, severe abdominal pain or excessive bleeding from the paracentesis site within 24 hours of the procedure  ? If you experience severe lightheadedness or fainting, call you doctor immediately or come to the closest Emergency Department.  Do not drive yourself.  ? If you have questions or concerns regarding your procedure call the Radiologis      [facility] INTERVENTIONAL RADIOLOGY DEPARTMENT  Procedure Physician:   Tyree Marcus PA-C                                       Date of procedure:  9/6/2018  Telephone Numbers: [phone] Monday-Friday 8:00 am to 4:30 pm  [phone] After 4:30 pm Monday-Friday, Weekends & Holidays.   Ask for the Interventional Radiologist on call.  Someone is on call 24 hrs/day  [facility] toll free number: [phone] Monday-Friday 8:00 am to 4:30 pm  [facility] Emergency Dept: [phone]    [facility]  Interventional Radiology    Pleurx Drainage Tube Instructions      AFTER YOU GO HOME:    Have an adult staty with you for 24 hours.     Drink plenty of fluids and resume your regular diet.        Please call for the following problems:.    Skin around tube is red , painful or has any drainage.    You have increased pain in your abdomen    Fever greater than 100.5 F and chills    You feel nauseated  "and  \"just not right\"      Change dressing every time you do a drainage      "

## 2018-09-06 NOTE — IP AVS SNAPSHOT
MRN:[id number]                      After Visit Summary   9/6/2018    Francisco Javier Cortes    MRN: [id number]           Visit Information        Department      9/6/2018  7:46 AM [address] [facility]          Review of your medicines      UNREVIEWED medicines. 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)        Dose:  300 mg   Take 1 tablet (300 mg) by mouth daily   Quantity:  30 tablet   Refills:  1       Aluminum-Magnesium-Simethicone 200-200-20 MG/5ML Susp        Dose:  5 mL   Take 5 mLs by mouth daily as needed   Refills:  0       aspirin 81 MG tablet        1 TABLET EVERY MORNING   Refills:  0       Benzethonium Chloride 0.1 % Liqd   Used for:  Decubitus ulcer of buttock, unspecified laterality, unspecified ulcer stage        Cleanse with wound cleanser and apply barrier paste and hydrocolloid dressing, change every 2-3 days as needed   Quantity:  237 mL   Refills:  3       clindamycin 1 % lotion   Commonly known as:  CLEOCIN T   Used for:  Rash        Apply twice daily for 6 weeks to the groin   Quantity:  60 mL   Refills:  1       furosemide 20 MG tablet   Commonly known as:  LASIX   Used for:  Hypervolemia, unspecified hypervolemia type        Dose:  20 mg   Take 1 tablet (20 mg) by mouth daily   Quantity:  180 tablet   Refills:  3       hydrocortisone valerate 0.2 % ointment   Commonly known as:  WEST-NINOSKA   Used for:  Psoriasis        Apply sparingly to affected area three times daily as needed.   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)        Dose:  0.5 mg   Take 1 tablet (0.5 mg) by mouth every 4 hours as needed (Anxiety, Nausea/Vomiting or Sleep)   Quantity:  30 tablet   Refills:  3       MULTI-VITAMIN DAILY PO        Dose:  1 tablet   Take 1 tablet by mouth   Refills:  0       nitroGLYcerin 0.4 MG sublingual tablet   Commonly known as:  NITROSTAT   Used for:  Chest pain due to myocardial ischemia, unspecified ischemic chest pain type (H), Coronary artery disease involving native coronary artery of native heart without angina pectoris        Dose:  0.4 mg   Place 1 tablet (0.4 mg) under the tongue every 5 minutes as needed up to 3 tablets per episode.   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)        Dose:  8 mg   Take 1 tablet (8 mg) by mouth every 8 hours as needed (Nausea/Vomiting)   Quantity:  10 tablet   Refills:  3       potassium chloride SA 10 MEQ CR tablet   Commonly known as:  K-DUR/KLOR-CON M   Used for:  Hypervolemia, unspecified hypervolemia type        Dose:  10 mEq   Take 1 tablet (10 mEq) by mouth daily   Quantity:  30 tablet   Refills:  1       prochlorperazine 10 MG tablet   Commonly known as:  COMPAZINE   Used for:  Nausea        Dose:  10 mg   Take 1 tablet (10 mg) by mouth every 6 hours as needed for nausea or vomiting   Quantity:  30 tablet   Refills:  1       rosuvastatin 40 MG tablet   Commonly known as:  CRESTOR   Used for:  Hyperlipidemia LDL goal <100, Coronary artery disease involving native coronary artery of native heart without angina pectoris        Dose:  40 mg   Take 1 tablet (40 mg) by  "mouth daily   Quantity:  90 tablet   Refills:  3       zinc oxide - white petrolatum 20-51% Pste topical paste   Used for:  Decubitus ulcer of buttock, unspecified laterality, unspecified ulcer stage        Cleanse with wound cleanser and apply barrier paste and hydrocolloid dressing, change every 2-3 days as needed   Quantity:  170 g   Refills:  3         CONTINUE these medicines which have NOT CHANGED        Dose / Directions    COMPRESSION STOCKINGS   Used for:  Acute congestive heart failure, unspecified congestive heart failure type (H)        Dose:  1 each   1 each continuous prn Bilateral knee high compression stockings   Quantity:  2 each   Refills:  0       Gauze Bandage 4\" Misc   Used for:  Decubitus ulcer of buttock, unspecified laterality, unspecified ulcer stage        Cleanse with wound cleanser and apply barrier paste and hydrocolloid dressing, change every 2-3 days as needed   Quantity:  5 each   Refills:  3       order for DME   Used for:  Generalized muscle weakness, 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         Follow-ups after your visit        Your next 10 appointments already scheduled     Sep 17, 2018  2:15 PM CDT   Return Visit with Erickson Adan MD   [facility]    [address] [place] [id number] [phone]            Oct 10, 2018 10:15 AM CDT   XR CHEST 2 VIEWS with UCXR1   [facility] ([facility])    [address] [address] [phone]           Please bring a list of your current medicines to your exam. (Include vitamins, minerals and over-thecounter medicines.) Leave your valuables at home.  Tell your " doctor if there is a chance you may be pregnant.  You do not need to do anything special for this exam.               Care Instructions        Further instructions from your care team         [facility]      Interventional Radiology  Patient Instructions Following Paracentesis    AFTER YOU GO HOME:  ? If you were given sedation; we recommend that an adult stay with you for the first 24 hours.   No driving or alcoholic beverages for 24 hours.  ? Resume previous diet and medication  ? Limit strenuous physical activity such as lifting (>10 lbs.), straining, or exercising for 48 hours.  You may resume normal activity in 24 hours  ? Change gauze at the puncture site as needed to keep site dry.  Leaking of additional fluid is not uncommon during the first 24-48 hours.    CALL 911:  ? If this is a medical emergency    CALL THE PHYSICIAN:  ? If you develop a fever, severe abdominal pain or excessive bleeding from the paracentesis site within 24 hours of the procedure  ? If you experience severe lightheadedness or fainting, call you doctor immediately or come to the closest Emergency Department.  Do not drive yourself.  ? If you have questions or concerns regarding your procedure call the Radiologis      [facility] INTERVENTIONAL RADIOLOGY DEPARTMENT  Procedure Physician:   Tyree Marcus PA-C                                       Date of procedure:  9/6/2018  Telephone Numbers: [phone] Monday-Friday 8:00 am to 4:30 pm  [phone] After 4:30 pm Monday-Friday, Weekends & Holidays.   Ask for the Interventional Radiologist on call.  Someone is on call 24 hrs/day  [facility] toll free number: [phone] Monday-Friday 8:00 am to 4:30 pm  [facility] Emergency Dept: [phone]    [facility]  Interventional Radiology    Pleurx Drainage Tube Instructions      AFTER YOU GO HOME:    Have an adult staty with you for 24 hours.     Drink plenty of fluids and resume your regular diet.        Please call for  "the following problems:.    Skin around tube is red , painful or has any drainage.    You have increased pain in your abdomen    Fever greater than 100.5 F and chills    You feel nauseated and  \"just not right\"      Change dressing every time you do a drainage         Additional Information About Your Visit        MyChart Information     [employer] gives you secure access to your electronic health record. If you see a primary care provider, you can also send messages to your care team and make appointments. If you have questions, please call your primary care clinic.  If you do not have a primary care provider, please call [phone] and they will assist you.        Care EveryWhere ID     This is your Care EveryWhere ID. This could be used by other organizations to access your [facility] medical records  [id number]        Your Vitals Were     Blood Pressure Pulse Temperature Respirations Height Weight    95/57 108 97.8  F (36.6  C) (Oral) 20 1.651 m (5' 5\") 66.7 kg (147 lb)    Pulse Oximetry BMI (Body Mass Index)                100% 24.46 kg/m2           Primary Care Provider Office Phone # Fax #    Florian Alonzo [phone]      Equal Access to Services     TERENCE BARAJAS : Hadii rivera collazo hadasho Sonormaali, waaxda luqadaha, qaybta kaalmada adebryantyada, zhao lorenzo. So [facility] [phone].    ATENCIÓN: Si habla español, tiene a ivan disposición servicios gratuitos de asistencia lingüística. Llame al [phone].    We comply with applicable federal civil rights laws and [place] laws. We do not discriminate on the basis of race, color, national origin, age, disability, sex, sexual orientation, or gender identity.            Thank you!     Thank you for choosing [facility] for your care. Our goal is always to provide you with excellent care. Hearing back from our patients is one way we can continue to improve our services. Please take a few minutes to complete the written " "survey that you may receive in the mail after you visit with us. Thank you!             Medication List: This is a list of all your medications and when to take them. Check marks below indicate your daily home schedule. Keep this list as a reference.      Medications           Morning Afternoon Evening Bedtime As Needed    acetaminophen 500 MG tablet   Commonly known as:  TYLENOL   Take 500 mg by mouth every 6 hours as needed for mild pain                                allopurinol 300 MG tablet   Commonly known as:  ZYLOPRIM   Take 1 tablet (300 mg) by mouth daily                                Aluminum-Magnesium-Simethicone 200-200-20 MG/5ML Susp   Take 5 mLs by mouth daily as needed                                aspirin 81 MG tablet   1 TABLET EVERY MORNING                                Benzethonium Chloride 0.1 % Liqd   Cleanse with wound cleanser and apply barrier paste and hydrocolloid dressing, change every 2-3 days as needed                                clindamycin 1 % lotion   Commonly known as:  CLEOCIN T   Apply twice daily for 6 weeks to the groin                                COMPRESSION STOCKINGS   1 each continuous prn Bilateral knee high compression stockings                                furosemide 20 MG tablet   Commonly known as:  LASIX   Take 1 tablet (20 mg) by mouth daily                                Gauze Bandage 4\" Misc   Cleanse with wound cleanser and apply barrier paste and hydrocolloid dressing, change every 2-3 days as needed                                hydrocortisone valerate 0.2 % ointment   Commonly known as:  WEST-NINOSKA   Apply sparingly to affected area three times daily as needed.                                LANsoprazole 15 MG CR capsule   Commonly known as:  PREVACID   Take 20 mg by mouth daily Patient needs to use brand name Prevacid (generic causes diarrhea)                                lidocaine-prilocaine cream   Commonly known as:  EMLA   Apply 1 Application " topically as needed                                LORazepam 0.5 MG tablet   Commonly known as:  ATIVAN   Take 1 tablet (0.5 mg) by mouth every 4 hours as needed (Anxiety, Nausea/Vomiting or Sleep)                                MULTI-VITAMIN DAILY PO   Take 1 tablet by mouth                                nitroGLYcerin 0.4 MG sublingual tablet   Commonly known as:  NITROSTAT   Place 1 tablet (0.4 mg) under the tongue every 5 minutes as needed up to 3 tablets per episode.                                ondansetron 8 MG tablet   Commonly known as:  ZOFRAN   Take 1 tablet (8 mg) by mouth every 8 hours as needed (Nausea/Vomiting)                                order for DME   Equipment being ordered: wheeled walker with seat                                potassium chloride SA 10 MEQ CR tablet   Commonly known as:  K-DUR/KLOR-CON M   Take 1 tablet (10 mEq) by mouth daily                                prochlorperazine 10 MG tablet   Commonly known as:  COMPAZINE   Take 1 tablet (10 mg) by mouth every 6 hours as needed for nausea or vomiting                                rosuvastatin 40 MG tablet   Commonly known as:  CRESTOR   Take 1 tablet (40 mg) by mouth daily                                zinc oxide - white petrolatum 20-51% Pste topical paste   Cleanse with wound cleanser and apply barrier paste and hydrocolloid dressing, change every 2-3 days as needed

## 2018-09-06 NOTE — IP AVS SNAPSHOT
[address] [address]                                       After Visit Summary   9/6/2018    Francisco Javier Cortes    MRN: [id number]           After Visit Summary Signature Page     I have received my discharge instructions, and my questions have been answered. I have discussed any challenges I see with this plan with the nurse or doctor.    ..........................................................................................................................................  Patient/Patient Representative Signature      ..........................................................................................................................................  Patient Representative Print Name and Relationship to Patient    ..................................................               ................................................  Date                                            Time    ..........................................................................................................................................  Reviewed by Signature/Title    ...................................................              ..............................................  Date                                                            Time          22EPIC Rev 08/18

## 2018-09-06 NOTE — CONSULTS
Francisco Javier and Court were seen for instructions on his Pleurx catheter and drainage cares. Court was the main person taught as Francisco Javier was resting after his placement of the catheter. She had a good understanding of extreme cleanliness and washing work area and hands. She demonstrated back with little assistance from me and reassured me that she would use the written material in the kit they received. She had no concerns or questions at the end of class.

## 2018-09-06 NOTE — PROGRESS NOTES
Prep and teaching complete for pleurex catheter placement. Wife Court at bedside, will drive pt home. Pt has questions where cath will be placed as his abdomen area is full also; will pass on to IR. Awaiting consent and lab results. Will call pt learning center for appointment for teaching with wife.

## 2018-09-06 NOTE — PROGRESS NOTES
Spoke to Dr SAVANNAH Adan, pt's oncologist; he agreed to put in request for home health care/instruction for Pleurx catheters. Staff from Pt [facility] here to do teaching with pt and wife in care and draining of pleurx catheters. Wife and pt here for all teaching. Kit sent home with pt.

## 2018-09-07 NOTE — MR AVS SNAPSHOT
After Visit Summary   9/7/2018    Francisco Javier Cortes    MRN: [id number]           Patient Information     Date Of Birth          1940        Visit Information        Provider Department      9/7/2018 11:30 AM Alla Quispe APRN CNP [facility]        Today's Diagnoses     Allergic rhinitis due to animal hair and dander, unspecified chronicity    -  1    Throat pain          Care Instructions    PLAN:   1.   Symptomatic therapy suggested: rest, increase fluids, OTC saline nasal spray as needed and call prn if symptoms persist or worsen.  ,Zyrtec, Claritin or Allegra  (or generic equivalents)    2. Patient needs to follow up in if no improvement,or sooner if worsening of symptoms or other symptoms develop.      It was a pleasure seeing you today at the [facility]. Thank you for allowing us to care for you today. We truly hope we provided you with the excellent service you deserve. Please let us know if there is anything else we can do for you so we can be sure you are leaving completley satisfied with your care experience.       General information about your clinic   Clinic Hours Lab Hours (Appointments are required)   Mon-Thurs: 7:30 AM - 7 PM Mon-Thurs: 7:30 AM - 7 PM   Fri: 7:30 AM - 5 PM Fri: 7:30 AM - 5 PM        After Hours Nurse Advise & Appts:  [facility]: [phone]  [facility] On Call: to make appointments anytime: [phone] On Call Physician: call [phone] and answering service will page the on call physician.        For urgent appointments, please call [phone] and ask for the triage nurse or your care team clinic nurse.  How to contact my care team:  MyChart: [email or web address]   Phone: [phone]   Fax: [phone]       [facility]:   Phone: [phone]  Hours: 8:00 AM - 6:00 PM  Medication Refills:  Call your pharmacy and they will forward the refill to us. Please allow 3 business days for  your refills to be completed.       Normal or non-critical lab and imaging results will be communicated to you by MyChart, letter or phone within 7 days.  If you do not hear from us within 10 days, please call the clinic. If you have a critical or abnormal lab result, we will notify you by phone as soon as possible.       We now have PWIC (Pediatric Walk in Care)  Monday-Friday from 7:30-4. Simply walk in and be seen for your urgent needs like cough, fever, rash, diarrhea or vomiting, pink eye, UTI. No appointments needed. Ask one of the team for more information      -Your Care Team:    Dr. Anshul Kelley - Internal Medicine/Pediatrics   Dr. Florian Alonzo - Family Medicine  Dr. Melissa Casiano - Pediatrics  Dr. Yudy Watters - Pediatrics  Alla Quispe CNP - Family Practice Nurse Practitioner                             Follow-ups after your visit        Your next 10 appointments already scheduled     Sep 17, 2018  2:15 PM CDT   [facility]on [facility]an [facility]   [facility]    [address] [place] [id number] [phone]            Oct 10, 2018 10:15 AM CDT   XR CHEST 2 VIEWS with UCXR1   [facility] ([facility])    [address] [address] [phone]           Please bring a list of your current medicines to your exam. (Include vitamins, minerals and over-thecounter medicines.) Leave your valuables at home.  Tell your doctor if there is a chance you may be pregnant.  You do not need to do anything special for this exam.              Future tests that were ordered for you today     Open Future Orders        Priority Expected Expires Ordered    IR Paracentesis Routine 9/6/2018 9/6/2019 9/6/2018            Who to contact     If you have questions or need follow up information about today's clinic visit or your schedule please contact [facility] directly at  [phone].  Normal or non-critical lab and imaging results will be communicated to you by [employer], letter or phone within 4 business days after the clinic has received the results. If you do not hear from us within 7 days, please contact the clinic through [employer] or phone. If you have a critical or abnormal lab result, we will notify you by phone as soon as possible.  Submit refill requests through [employer] or call your pharmacy and they will forward the refill request to us. Please allow 3 business days for your refill to be completed.          Additional Information About Your Visit        [employer] Information     [employer] gives you secure access to your electronic health record. If you see a primary care provider, you can also send messages to your care team and make appointments. If you have questions, please call your primary care clinic.  If you do not have a primary care provider, please call [phone] and they will assist you.      [employer] is an electronic gateway that provides easy, online access to your medical records. With [employer], you can request a clinic appointment, read your test results, renew a prescription or communicate with your care team.     To access your existing account, please contact your [facility] or call [phone] for assistance.        Care EveryWhere ID     This is your Care EveryWhere ID. This could be used by other organizations to access your [facility] medical records  [id number]        Your Vitals Were     Pulse Temperature Pulse Oximetry BMI (Body Mass Index)          120 96.5  F (35.8  C) (Temporal) 96% 21.4 kg/m2         Blood Pressure from Last 3 Encounters:   09/07/18 90/50   09/06/18 92/53   09/05/18 93/68    Weight from Last 3 Encounters:   09/07/18 128 lb 9.6 oz (58.3 kg)   09/06/18 147 lb (66.7 kg)   09/05/18 142 lb 3 oz (64.5 kg)              We Performed the Following     Beta strep group A culture     Strep, Rapid Screen         Primary Care Provider Office Phone # Fax #    Florian Alonzo [phone] [address]  [facility] [address]        Equal Access to Services     NANDA BARAJAS : Hadcelio rivera ku yahairao Sonormaali, waqida luqadaha, qaybta kaalmada bryce, zhao salazarkaiden lorenzo. So [facility] [phone].    ATENCIÓN: Si habla español, tiene a ivan disposición servicios gratuitos de asistencia lingüística. Llame al [phone].    We comply with applicable federal civil rights laws and [place] laws. We do not discriminate on the basis of race, color, national origin, age, disability, sex, sexual orientation, or gender identity.            Thank you!     Thank you for choosing [facility]  for your care. Our goal is always to provide you with excellent care. Hearing back from our patients is one way we can continue to improve our services. Please take a few minutes to complete the written survey that you may receive in the mail after your visit with us. Thank you!             Your Updated Medication List - Protect others around you: Learn how to safely use, store and throw away your medicines at www.disposemymeds.org.          This list is accurate as of 9/7/18 12:12 PM.  Always use your most recent med list.                   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)       Aluminum-Magnesium-Simethicone 200-200-20 MG/5ML Susp      Take 5 mLs by mouth daily as needed        aspirin 81 MG tablet      1 TABLET EVERY MORNING        Benzethonium Chloride 0.1 % Liqd     237 mL    Cleanse with wound cleanser and apply barrier paste and hydrocolloid dressing, change every 2-3 days as needed    Decubitus ulcer of  "buttock, unspecified laterality, unspecified ulcer stage       clindamycin 1 % lotion    CLEOCIN T    60 mL    Apply twice daily for 6 weeks to the groin    Rash       COMPRESSION STOCKINGS     2 each    1 each continuous prn Bilateral knee high compression stockings    Acute congestive heart failure, unspecified congestive heart failure type (H)       furosemide 20 MG tablet    LASIX    180 tablet    Take 1 tablet (20 mg) by mouth daily    Hypervolemia, unspecified hypervolemia type       Gauze Bandage 4\" Misc     5 each    Cleanse with wound cleanser and apply barrier paste and hydrocolloid dressing, change every 2-3 days as needed    Decubitus ulcer of buttock, unspecified laterality, unspecified ulcer stage       hydrocortisone valerate 0.2 % ointment    WEST-NINOSKA    45 g    Apply sparingly to affected area three times daily as needed.    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)       potassium chloride SA 10 MEQ CR tablet    K-DUR/KLOR-CON M    30 tablet    Take 1 tablet (10 mEq) by mouth daily    Hypervolemia, unspecified hypervolemia type       prochlorperazine 10 MG tablet    COMPAZINE    30 tablet    Take 1 tablet (10 mg) by mouth every 6 hours as needed for nausea or vomiting    Nausea       rosuvastatin 40 MG tablet    CRESTOR    90 tablet    Take 1 tablet (40 mg) by mouth daily    Hyperlipidemia LDL goal <100, Coronary artery disease involving native coronary artery of native heart without angina pectoris       zinc oxide - white petrolatum 20-51% Pste topical paste     170 g    Cleanse with wound cleanser and apply barrier paste and hydrocolloid dressing, change every 2-3 days as needed    Decubitus ulcer of buttock, unspecified laterality, unspecified ulcer stage

## 2018-09-07 NOTE — Clinical Note
Can we call home health and see where we are with home Health.  FM[facility]: [facility] [place] [phone]   [email or web address]

## 2018-09-07 NOTE — PATIENT INSTRUCTIONS
PLAN:   1.   Symptomatic therapy suggested: rest, increase fluids, OTC saline nasal spray as needed and call prn if symptoms persist or worsen.  ,Zyrtec, Claritin or Allegra  (or generic equivalents)    2. Patient needs to follow up in if no improvement,or sooner if worsening of symptoms or other symptoms develop.      It was a pleasure seeing you today at the [facility]. Thank you for allowing us to care for you today. We truly hope we provided you with the excellent service you deserve. Please let us know if there is anything else we can do for you so we can be sure you are leaving completley satisfied with your care experience.       General information about your clinic   Clinic Hours Lab Hours (Appointments are required)   Mon-Thurs: 7:30 AM - 7 PM Mon-Thurs: 7:30 AM - 7 PM   Fri: 7:30 AM - 5 PM Fri: 7:30 AM - 5 PM        After Hours Nurse Advise & Appts:  [facility]: [phone]  [facility] On Call: to make appointments anytime: [phone] On Call Physician: call [phone] and answering service will page the on call physician.        For urgent appointments, please call [phone] and ask for the triage nurse or your care team clinic nurse.  How to contact my care team:  [facility]: [email or web address]   Phone: [phone]   Fax: [phone]       [facility]:   Phone: [phone]  Hours: 8:00 AM - 6:00 PM  Medication Refills:  Call your pharmacy and they will forward the refill to us. Please allow 3 business days for your refills to be completed.       Normal or non-critical lab and imaging results will be communicated to you by MyChart, letter or phone within 7 days.  If you do not hear from us within 10 days, please call the clinic. If you have a critical or abnormal lab result, we will notify you by phone as soon as possible.       We now have PWIC (Pediatric Walk in Care)  Monday-Friday from 7:30-4. Simply walk in and be seen for your urgent  needs like cough, fever, rash, diarrhea or vomiting, pink eye, UTI. No appointments needed. Ask one of the team for more information      -Your Care Team:    Dr. Anshul Kelley - Internal Medicine/Pediatrics   Dr. Florian Alonzo - Family Medicine  Dr. Melissa Casiano - Pediatrics  Dr. Yudy Watters - Pediatrics  Alla Quispe CNP - Family Practice Nurse Practitioner

## 2018-09-07 NOTE — PROGRESS NOTES
SUBJECTIVE:   Francisco Javier Cortes is a 78 year old male who presents to clinic today for the following health issues:  This patient is accompanied in the office by his wife.    Acute Illness   Acute illness concerns: sore throat, coughing  Onset: 3 days    Fever: no    Chills/Sweats: no    Headache (location?): no    Sinus Pressure:no    Conjunctivitis:  no    Ear Pain: no    Rhinorrhea: YES    Congestion: no    Sore Throat: YES     Cough: YES-with shortness of breath    Wheeze: YES    Decreased Appetite: YES    Nausea: YES    Vomiting: no    Diarrhea:  no    Dysuria/Freq.: no    Fatigue/Achiness: YES    Sick/Strep Exposure: no     Therapies Tried and outcome: none      Has some sore throat for the last 3 days   Has been on oxygen regularly   Had a Pet scan last Monday  Just had paracentesis yesterday with tube placement   Was supposed to have a bone biopsy on Wednesday and was cancelled   Was supposed to have home health come out to related to the new tube to drain his lung   Having a lot of sinus drainage. Can not take flonase   Biggest concern as weekend is coming up and concerned will not have home health   Patient came into clinic without his O2 and appeared to be SOB so wife brought in his oxygen and that helped with the SOB  Reiterated to patient needs to be using his oxygen     Problem list and histories reviewed & adjusted, as indicated.  Additional history: as documented    Patient Active Problem List   Diagnosis     Hyperlipidemia LDL goal <100     Advanced directives, counseling/discussion     Prediabetes     Obesity (BMI 30-39.9)     Inflamed seborrheic keratosis     AK (actinic keratosis)     Basal cell carcinoma of neck     Microalbuminuria     Perianal dermatitis     NSTEMI (non-ST elevated myocardial infarction) (H)     S/P coronary angioplasty     Status post percutaneous transluminal coronary angioplasty-Coronary angioplasty with STEPHEN to LCx     Prostate Cancer, s/p prostatectomy     Pseudophakia of  both eyes     Gastroesophageal reflux disease, esophagitis presence not specified     Coronary artery disease involving native coronary artery of native heart without angina pectoris     History of colonic polyps     Chronic diarrhea     Essential hypertension with goal blood pressure less than 140/90     Dyslipidemia     Erectile dysfunction, unspecified erectile dysfunction type     Elevated fasting blood sugar     Eczema, unspecified type     Squamous blepharitis of right upper eyelid     Elevated AST (SGOT)     Flatulence, eructation, and gas pain     Bloating     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)     Drug-induced neutropenia (H)     Nausea     Encounter for antineoplastic chemotherapy     Nonspecific ST-T wave electrocardiographic changes     Examination prior to chemotherapy     Tumor lysis syndrome     SIRS (systemic inflammatory response syndrome) (H)     COLE (dyspnea on exertion)     Dehydration     Pleural effusion     Atrial fibrillation (H)     Chemotherapy-induced neutropenia (H)     Past Surgical History:   Procedure Laterality Date     BIOPSY LYMPH NODE CERVICAL Left 3/22/2018    Procedure: BIOPSY LYMPH NODE CERVICAL;  Excisional Biopsy Left Cervical Lymph Node ;  Surgeon: Samia Gaviria MD;  Location: [facility] OR     C APPENDECTOMY       C REMV PROSTATE,RETROPUB,RAD,TOT NODES  1999     CATARACT IOL, RT/LT       COLONOSCOPY       COLONOSCOPY Left 7/5/2016    Procedure: COMBINED COLONOSCOPY, SINGLE OR MULTIPLE BIOPSY/POLYPECTOMY BY BIOPSY;  Surgeon: Duane, William Charles, MD;  Location: [facility] OR     COLONOSCOPY WITH CO2 INSUFFLATION N/A 7/5/2016    Procedure: COLONOSCOPY WITH CO2 INSUFFLATION;  Surgeon: Duane, William Charles, MD;  Location: [facility] OR     ENT SURGERY  1980--1999     PHACOEMULSIFICATION CLEAR CORNEA WITH STANDARD INTRAOCULAR LENS IMPLANT Left 6/18/2015    Procedure: PHACOEMULSIFICATION CLEAR CORNEA WITH STANDARD INTRAOCULAR LENS IMPLANT;  Surgeon: John Zimmerman  MD Fazal;  Location:  [facility]     PHACOEMULSIFICATION CLEAR CORNEA WITH STANDARD INTRAOCULAR LENS IMPLANT Right 7/16/2015    Procedure: PHACOEMULSIFICATION CLEAR CORNEA WITH STANDARD INTRAOCULAR LENS IMPLANT;  Surgeon: John Zimmerman MD;  Location:  [facility]     STENT, CORONARY, [place]  1/09, 2014    x2, x1 in 2014     THORACENTESIS Bilateral 8/8/2018    Procedure: THORACENTESIS;  Thoracentesis Bilateral;  Surgeon: Michelle Leroy PA-C;  [facility]: [facility]     THORACENTESIS Bilateral 8/31/2018    Procedure: THORACENTESIS;  Thoracentesis;  Surgeon: Dean Marcus PA-C;  Location: [facility]     TYMPANOPLASTY       VASCULAR SURGERY  2013    stents       Social History   Substance Use Topics     Smoking status: Never Smoker     Smokeless tobacco: Never Used     Alcohol use Yes      Comment: a glass of red wine a day     Family History   Problem Relation Age of Onset     Cardiovascular Mother      HEART DISEASE Mother      Cancer - colorectal Father      HEART DISEASE Father      Other Cancer Father      Cancer Father      Hypertension Father      Asthma Brother      Coronary Artery Disease Brother      Hypertension Brother      HEART DISEASE Brother      HEART DISEASE Son      Hypertension Son      Cancer Daughter      non hodgkins     Prostate Cancer Brother      Hypertension Brother      Cancer Brother      Prostate Cancer Maternal Grandfather      Cancer Maternal Grandfather      Muscular Disorder Maternal Grandfather      HEART DISEASE Paternal Grandmother      Hypertension Paternal Grandmother      Hypertension Sister          Current Outpatient Prescriptions   Medication Sig Dispense Refill     acetaminophen (TYLENOL) 500 MG tablet Take 500 mg by mouth every 6 hours as needed for mild pain       allopurinol (ZYLOPRIM) 300 MG tablet Take 1 tablet (300 mg) by mouth daily 30 tablet 1     Alum & Mag Hydroxide-Simeth (ALUMINUM-MAGNESIUM-SIMETHICONE) 200-200-20 MG/5ML SUSP Take 5 mLs by mouth daily as needed        "ASPIRIN 81 MG OR TABS 1 TABLET EVERY MORNING       Benzethonium Chloride 0.1 % LIQD Cleanse with wound cleanser and apply barrier paste and hydrocolloid dressing, change every 2-3 days as needed 237 mL 3     clindamycin (CLEOCIN T) 1 % lotion Apply twice daily for 6 weeks to the groin 60 mL 1     COMPRESSION STOCKINGS 1 each continuous prn Bilateral knee high compression stockings 2 each 0     furosemide (LASIX) 20 MG tablet Take 1 tablet (20 mg) by mouth daily 180 tablet 3     Gauze Pads & Dressings (GAUZE BANDAGE 4\") MISC Cleanse with wound cleanser and apply barrier paste and hydrocolloid dressing, change every 2-3 days as needed 5 each 3     hydrocortisone valerate (WEST-NINOSKA) 0.2 % ointment Apply sparingly to affected area three times daily as needed. 45 g 3     LANsoprazole (PREVACID) 15 MG CR capsule Take 20 mg by mouth daily Patient needs to use brand name Prevacid (generic causes diarrhea)        lidocaine-prilocaine (EMLA) cream Apply 1 Application topically as needed       LORazepam (ATIVAN) 0.5 MG tablet Take 1 tablet (0.5 mg) by mouth every 4 hours as needed (Anxiety, Nausea/Vomiting or Sleep) 30 tablet 3     Multiple Vitamin (MULTI-VITAMIN DAILY PO) Take 1 tablet by mouth       nitroGLYcerin (NITROSTAT) 0.4 MG sublingual tablet Place 1 tablet (0.4 mg) under the tongue every 5 minutes as needed up to 3 tablets per episode. 60 tablet 6     ondansetron (ZOFRAN) 8 MG tablet Take 1 tablet (8 mg) by mouth every 8 hours as needed (Nausea/Vomiting) 10 tablet 3     order for DME Equipment being ordered: wheeled walker with seat 1 Device 0     potassium chloride SA (K-DUR/KLOR-CON M) 10 MEQ CR tablet Take 1 tablet (10 mEq) by mouth daily 30 tablet 1     prochlorperazine (COMPAZINE) 10 MG tablet Take 1 tablet (10 mg) by mouth every 6 hours as needed for nausea or vomiting 30 tablet 1     rosuvastatin (CRESTOR) 40 MG tablet Take 1 tablet (40 mg) by mouth daily 90 tablet 3     zinc oxide - white petrolatum " (CRITIC-AID THICK MOIST BARRIER) 20-51% PSTE topical paste Cleanse with wound cleanser and apply barrier paste and hydrocolloid dressing, change every 2-3 days as needed 170 g 3     Allergies   Allergen Reactions     Niacin      Severe rash and itching     Shellfish Allergy Nausea and Vomiting     Scallops only     Prevacid [Lansoprazole] Diarrhea     Patient notes diarrhea with 30mg generic version. Patient does ok with 20mg in generic and brand name.     BP Readings from Last 3 Encounters:   09/07/18 90/50   09/06/18 92/53   09/05/18 93/68    Wt Readings from Last 3 Encounters:   09/07/18 128 lb 9.6 oz (58.3 kg)   09/06/18 147 lb (66.7 kg)   09/05/18 142 lb 3 oz (64.5 kg)                  Labs reviewed in EPIC    Reviewed and updated as needed this visit by clinical staff  Tobacco  Allergies  Meds  Med Hx  Surg Hx  Fam Hx  Soc Hx      Reviewed and updated as needed this visit by Provider         ROS:  CONSTITUTIONAL:POSITIVE  for anorexia and fatigue and NEGATIVE  for sweats  INTEGUMENTARY/SKIN: NEGATIVE for rash   ENT/MOUTH: POSITIVE for rhinorrhea-clear and sore throat and NEGATIVE for epistaxis, fever and sinus pressure  RESP:POSITIVE for dyspnea on exertion and SOB/dyspnea and NEGATIVE for cough-productive and hemoptysis  CV: NEGATIVE for chest pain/chest pressure and palpitations  GI: POSITIVE for poor appetite and weight loss and NEGATIVE for melena and vomiting  MUSCULOSKELETAL: NEGATIVE for significant arthralgias or myalgia  HEME/ALLERGY/IMMUNE: NEGATIVE for bleeding problems    OBJECTIVE:     BP 90/50 (BP Location: Right arm, Patient Position: Sitting, Cuff Size: Adult Regular)  Pulse 120  Temp 96.5  F (35.8  C) (Temporal)  Wt 128 lb 9.6 oz (58.3 kg)  SpO2 96%  BMI 21.4 kg/m2  Body mass index is 21.4 kg/(m^2).  GENERAL: frail,in no apparent distress, non-toxic, in no respiratory distress and acyanotic, alert, cooperative and moderately ill  ill and lethargic  EYES:  Right conjunctiva is not  injected and without discharge.  Left conjunctiva is not injected and without discharge.  EARS: negative findings: external ears normal to inspection and palpation, TM's clear, no mastoid process tenderness, NOSE: negative findings: septum midline with no perforation or bleeding, positive findings: mucosa swollen, pale, and boggy, clear rhinorrhea,  Sinus not tender.  THROAT: normal.  NECK: supple with no adenopathy,   CARDIAC:NORMAL - regular rate and rhythm without murmur.  RESP: normal respiratory rate and rhythm and   tachypnea, clear to auscultation without rales or wheezes, decreased breath sounds in bases   ABD: Abdomen soft, non-tender.  SKIN: Skin color, texture, turgor normal. No rashes or lesions.  MS: extremities normal- no gross deformities noted, normal muscle tone, muscle wasting  and wheelchair dependent due to weakness     Diagnostic Test Results:  Results for orders placed or performed in visit on 09/07/18   Strep, Rapid Screen   Result Value Ref Range    Specimen Description Throat     Rapid Strep A Screen       NEGATIVE: No Group A streptococcal antigen detected by immunoassay, await culture report.   Beta strep group A culture   Result Value Ref Range    Specimen Description Throat     Culture Micro No beta hemolytic Streptococcus Group A isolated    '    ASSESSMENT/PLAN:     Francisco Javier was seen today for pharyngitis.    Diagnoses and all orders for this visit:    Throat pain  -     Strep, Rapid Screen  -     Beta strep group A culture    Allergic rhinitis due to animal hair and dander, unspecified chronicity  Symptomatic therapy suggested: rest, increase fluids and call prn if symptoms persist or worsen.  ,Zyrtec, Claritin or Allegra  (or generic equivalents)      Mantle cell lymphoma of lymph nodes of multiple regions (H)  Initiated consult with  to assist with getting home health coverage this week end due to chest drainage tube in place  FOLLOW UP WITH SPECIALIST :Oncology      Next 5  appointments (look out 90 days)     Sep 17, 2018  2:15 PM CDT   Return Visit with Erickson Adan MD   [facility] ([facility]    [address] [place] [id number] [phone]                See Patient Instructions  25 min spent in direct face to face time with this pt, greater than 50% in counseling and coordination of care.    MARTHA Casillas CNP  [facility]

## 2018-09-07 NOTE — NURSING NOTE
"Chief Complaint   Patient presents with     Pharyngitis     sore throat x 3 days       Initial BP 90/50 (BP Location: Right arm, Patient Position: Sitting, Cuff Size: Adult Regular)  Pulse 120  Temp 96.5  F (35.8  C) (Temporal)  Wt 128 lb 9.6 oz (58.3 kg)  SpO2 96%  BMI 21.4 kg/m2 Estimated body mass index is 21.4 kg/(m^2) as calculated from the following:    Height as of 9/6/18: 5' 5\" (1.651 m).    Weight as of this encounter: 128 lb 9.6 oz (58.3 kg).  Medication Reconciliation: complete      MIO Crandall      "

## 2018-09-08 NOTE — TELEPHONE ENCOUNTER
Michelle with [facility] nurse calling for orders. VO per [facility] protocol given for skilled nursing 1 wk 1, 3 wk 1, 2 wk 3, 2 PRN and  consult for future planning/possible palliative care. Routed to care team.

## 2018-09-10 NOTE — PROGRESS NOTES
Kush Cortes    The results of the strep culture was negative. Please call if you have any questions or concerns.    Sincerely,    Alla Quispe, CNP

## 2018-09-10 NOTE — TELEPHONE ENCOUNTER
"Received call from patient's wife, Court. States they are still waiting to hear from Dr Adan to discuss PET results and treatment options. Court expressing frustration \"We know nothing!\" States they have been waiting for a callback since 9/5/18. Patient and wife very appreciative with home care.They came out Saturday 9/8/18 and were very helpful with patient's new PleurX drains.  Court reports patient is extremely weak.  Instructed wife, Dr Adan is out of the office, but a message will be sent to him to call as soon as possible.    Zhane Barth  RN, BSN, OCN    "

## 2018-09-10 NOTE — PROGRESS NOTES
[facility] now requests orders and shares plan of care/discharge summaries for some patients through [place].  Please REPLY TO THIS MESSAGE OR ROUTE BACK TO THE AUTHOR in order to give authorization for orders when needed.  This is considered a verbal order, you will still receive a faxed copy of orders for signature.  Thank you for your assistance in improving collaboration for our patients.      Dr. Adan, Pt went in for his bone biopsy, as you may know it was canceled. The MD that was going to complete biopsy told Pt and his wife that you would be calling them with PET scan results later that day. Pt and his wife have been waiting for your call since Wednesday 9/5/18. Would you please call them and give them an update.    Thank you very much!    Debbi Nagy RN  [email or web address]  [phone]

## 2018-09-10 NOTE — TELEPHONE ENCOUNTER
Called [facility] main number as there is no number to call back.    Michelle's number is [phone].    Called Latasha, left general message to call back.  Do not see this was a secured voicemail in this message or phone message.    Leticia Neal RN, [facility]

## 2018-09-10 NOTE — TELEPHONE ENCOUNTER
RN is able to give verbal orders on Skilled nursing per RN protocol.    Routing to Kerri Quispe NP to advise on verbal order for   consult for future planning/possible palliative care.  PCP is Dr. Alonzo, patient saw Kerri Quispe NP on 9/7/18 and on 7/16/18.  He has not seen Dr. Alonzo since 2/23/18      Leticia Neal RN, [facility]

## 2018-09-10 NOTE — TELEPHONE ENCOUNTER
[facility] Call Center    Phone Message    May a detailed message be left on voicemail: yes    Reason for Call: Yissel from  home care. Would like clarification on who is monitoring his  PORT, Clinic or the home care nurses. Also requesting verbal PT/OT for eval and treat.     Action Taken: Message routed to:  Primary Care p [id number]

## 2018-09-11 NOTE — TELEPHONE ENCOUNTER
Called Brandy, Per RN Protocol, gave orders for OT and PT eval and treat.      Regarding the management of the port, will defer that to Oncology. Brandy agrees and already has a call out to Dr. Adan's office.    NIKHIL from Brandy, she will be sending updates on patient condition to .      Leticia Neal RN, [facility]

## 2018-09-11 NOTE — TELEPHONE ENCOUNTER
Brandy from  homecare calling for clarification re: port. Wondering if home care should be flushing the port or if this will be done in clinic?     Notified this patient is being seen at [facility] and port is being managed at the clinic. Writer called and left message for Brandy with this info.    Arleen Hubbard RN   [facility]

## 2018-09-11 NOTE — TELEPHONE ENCOUNTER
Gave the orders per Kerri Quispe NP regarding the requests below  to another Home care RN, Brandy [phone].    Michelle is the RN that opened homecare over the weekend.  Brandy will enter this into patient chart.    Leticia Neal RN, [facility]

## 2018-09-13 NOTE — PROGRESS NOTES
Clinic Care Coordination Contact - Social Work - Initial - 9-13-18 - Late Entry  Care Team Conversations    SW received referral from MARTHA Jovel, CNP, to meet with pt/spouse, Court, in clinic on Friday, 9-7-18, regarding pt not hearing yet from [facility] to schedule initial visit on Saturday, 9-8-18, to admit pt to [facility], and empty his pleurex catheter.  Pt has diagnosis of mantle cell lymphoma, was hospitalized recently for pleurex catheter placement due to recurrent pleural effusions.  SW met briefly with pt/spouse that day and reached out to [facility] to request home RN visit on Saturday.  Per [facility] intake and pt's spouse, on 9-7-18, [facility] RN visit was scheduled on Saturday, 9-8-18.  On Tuesday, 9-11-18, Court contacted this SW to provide update indicating that [facility] RN had been out several times to see pt and spouse was very pleased with this assistance/service in emptying pt's pleurex catheter.  Court also indicated that a HC SW was scheduled to see pt/spouse at home today for support/resources.  Per SW chart review today, MANNY noted that pt was admitted to [facility] on 9-11-18 for dizziness.  If pt is discharged directly home from hospital, pt's [facility] will be resumed.  While pt has [facility] SW following, this SW will not reach out to pt on a regular basis though SW can follow for SW related needs when pt is no longer being followed by [facility].  SW provided active listening, affirmations, and support to spouse during phone contact..       BURTON Gregory     Social Work  [facility]  Office:  [phone]  E-Mail:  [email or web address]  9/13/2018 11:54 AM

## 2018-09-14 NOTE — TELEPHONE ENCOUNTER
[facility]ijal states that they received the paper work to resume home care orders today by fax.    Adrienne Gordon RN,   [facility]

## 2018-09-14 NOTE — PROGRESS NOTES
Call was placed to JONATHAN Nava with [facility], with the following parameters for patient's PleurX drains per Dr. Adan (after reviewing with IR team): ok to drain bilateral PleurX drains daily as needed, up to 1,000 mL from each side daily.  Telephone order was provided to Brandy, who verbalizes understanding.    Cam Conner RN, BSN, OCN  Oncology Care Coordinator  [facility]

## 2018-09-14 NOTE — PROGRESS NOTES
[facility] now requests orders and shares plan of care/discharge summaries for some patients through [place].  Please REPLY TO THIS MESSAGE OR ROUTE BACK TO THE AUTHOR in order to give authorization for orders when needed.  This is considered a verbal order, you will still receive a faxed copy of orders for signature.  Thank you for your assistance in improving collaboration for our patients.    ORDERS REQUESTED FOR RESUMPTION OF CARE VISIT 9/14/18    Skilled Nursing 3x/week for 1 week, 2x/ week for 3 weeks, 3 PRNs  Home Health Aid 2x/week for 4 weeks  PT to eval and treat  OT to eval and treat  SW to assess    Wound care to sacrum and buttocks...    SN to perform the following wound care to buttocks wounds. Cleanse with saline or wound cleanser and pat dry. Apply gentle adhesive foam dressing to open areas, may apply Triad paste if dressings do not stay. Apply moisture barrier cream to periwound skin. Change foam dressings 3 times per week and prn. If using Triad paste, moisture barrier cream, change daily and prn if soiled.

## 2018-09-14 NOTE — TELEPHONE ENCOUNTER
[facility] Call Center    Phone Message    May a detailed message be left on voicemail: yes    Reason for Call: Order(s): Home Care Orders: Physical Therapy (PT): eval and treat, Occupational Therapy (OT): eval and treat, Skilled Nursing: 3 times a week for 1 week/ 2 times a wek for 2 weeks with 3 PRN's visits/home health aid 2 times a week for 4 weeks: , Other: social work eval and Date needed: ASAP    Action Taken: Message routed to:  Primary Care p [phone]

## 2018-09-14 NOTE — TELEPHONE ENCOUNTER
[facility] Call Center    Phone Message    May a detailed message be left on voicemail: yes    Reason for Call: Other: Mario Alberto was D/C from [facility] yesterday and requesting resume to home care orders from Dr. Alonzo. Please call to advise.     Action Taken: Message routed to:  Primary Care p [phone]

## 2018-09-14 NOTE — PROGRESS NOTES
Hospital Discharge Care Coordination Note - Oncology    Date of admission: 9/11/18    Date of discharge: 9/13/18    Discharge diagnosis: Dehydration, weakness, pleural effusions     Facility of discharge: [facility]    Discharge disposition: Home with spouse, with resumption of home care nursing services     Patient concerns about condition: YES.  Writer discussed patient's status with JONATHAN Nava with [facility], who is currently at patient's home with patient and spouse.  Per Brandy, patient and spouse are very unhappy with their recent hospital stay at [facility].  They feel that their needs were not listed to.  At this time, patient is reporting increased abdominal distention, increased shortness of breath, has labored breathing, and is feeling very uncomfortable.  His vital signs are currently: SpO2 97% on 2 liters O2, pulse 110, and BP 90/68.  Per Brandy, patient had his right PleurX tube drained while in the hospital, and had his left PleurX tube drained by home care staff earlier in the week.  Patient is requesting to have paracentesis done due to increasing abdominal distention/pressure/pain.  Patient and spouse had apparently requested to have this done while he was hospitalized at [facility], and they feel that this request was ignored.  Writer reviewed with Brandy that, unfortunately we would not be able to get him in for outpatient paracentesis at this time (Friday at 2:00 pm).  Reviewed that if patient feels it could wait, we could try to arrange outpatient paracentesis on Monday for him at either the [facility] or [facility].  However, if patient does not feel he can wait, and if he truly is having labored breathing at this time, unfortunately he may have to go into the ER for evaluation/treatment.  Patient and spouse prefer to be seen at the [facility], as this is much more convenient for them.  Writer reviewed that while [facility] is excellent,  they do not have a Hematology/Oncology team there - also, due to the complexity of patient's medical status and diagnosis, he may end up being transferred to [facility] depending upon his assessment there.  Brandy states she will review and communicate this to patient and spouse.    Patient concerns about medications: No concerns at this time.  Full med reconciliation will be completed at clinic visit.     Patient concerns about transitioning: YES.  Concerns include ongoing weakness, spouse having difficulty with caring for patient at home.  Per Brandy (RN with [facility]), while patient was in the hospital the subject of TCU placement was brought up.  Brandy will be investigating this further with their social work team, to see if there is something that the patient may qualify for.     Clinic office visit appointment date: Currently set for Monday 9/17/18 with Dr. Adan ([facility])       Cam Conner RN, BSN, OCN  Oncology Care Coordinator  [facility]

## 2018-09-15 NOTE — TELEPHONE ENCOUNTER
Call from wife, Court.  Patient gave verbal permission to discussed health.  Court says patient needs another paracentesis done.  Court says he now has a nurse through [facility].  [facility]  Kate says patient is very weak and she is unable to transport him by herself and all the kids are busy so cannot help.  FNA advised to call [facility] care and see if there's a plan in place to transport patient.  FNA advised to call back if Court needs FNA to page the doctor.  Court verbalized understanding.    Additional Information    Negative: [1] Caller is not with the adult (patient) AND [2] reporting urgent symptoms    Negative: Lab result questions    Negative: Medication questions    Negative: Caller cannot be reached by phone    Negative: Caller has already spoken to PCP or another triager    Negative: RN needs further essential information from caller in order to complete triage    Negative: Requesting regular office appointment    Negative: [1] Caller requesting NON-URGENT health information AND [2] PCP's office is the best resource    Negative: Health Information question, no triage required and triager able to answer question    General information question, no triage required and triager able to answer question    Protocols used: INFORMATION ONLY CALL-ADULT-

## 2018-09-17 PROBLEM — C83.10 MANTLE CELL LYMPHOMA (H): Status: ACTIVE | Noted: 2018-01-01

## 2018-09-17 NOTE — IP AVS SNAPSHOT
Francisco Javier Cortes #[id number] (CSN:[id number])  (78 year old M)  (Adm: 18)     [id number]               [address] [place] [address] BANK: [phone]            Patient Demographics     Patient Name Sex          Age SSN Address Phone    Francisco Javier Cortes Male 1940 (78 year old) [id number] [facility] [address] [phone] (Home) *Preferred*  none (Work)  [phone] (Mobile)      Emergency Contact(s)     Name Relation Home Work Mobile    Court Cortes Spouse [phone] none [phone]    Pallavi Madrid Daughter   [phone]      Admission Information     Attending Provider Admitting Provider Admission Type Admission Date/Time    Lee Ann Harp MD [place], Erica Dahl, MD Urgent 18  1814    Discharge Date Hospital Service Auth/Cert Status Service Area     Hem/Onc Incomplete [facility] SERVICES    Unit Room/Bed Admission Status       [address] 5412/5412-01 Admission (Confirmed)       Admission     Complaint    Failure to Thrive, Hypoxia, Mantle cell Lymphoma, Mantle cell lymphoma (H)      Hospital Account     Name Acct ID Class Status Primary Coverage    Francisco Javier Cortes [id number] Inpatient Open MEDICARE - MEDICARE            Guarantor Account (for Hospital Account #[id number])     Name Relation to Pt Service Area Active? Acct Type    Francisco Javier Cortes  FCS Yes Personal/Family    Address Phone          [address] [place] [id number] [phone](H)  none(O)              Coverage Information (for Hospital Account #[id number])     1. MEDICARE/MEDICARE     F/O Payor/Plan Precert #    MEDICARE/MEDICARE     Subscriber Subscriber #    Francisco Javier Cortes [id number]    Address Phone    ATTN CLAIMS  [address]  [facility] [place] [address] [phone]          2. // FOR LIFE     F/O Payor/Plan Precert #    // FOR LIFE     Subscriber Subscriber #    Francisco Javier Cortes [id number]    Address Phone     " FOR LIFE  [address]  [place] [address] [phone]                                                      INTERAGENCY TRANSFER FORM - PHYSICIAN ORDERS   9/17/2018                       [address] BMT [facility]: [phone]            Attending Provider: Lee Ann Harp MD     Allergies:  Niacin, Shellfish Allergy, Prevacid [Lansoprazole]    Infection:  None   Service:  Hem/Onc    Ht:  1.651 m (5' 5\")   Wt:  58.2 kg (128 lb 3.2 oz)   Admission Wt:  58.2 kg (128 lb 3.2 oz)    BMI:  21.33 kg/m 2   BSA:  1.63 m 2            ED Clinical Impression     Diagnosis Description Comment Added By Time Added    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) [C83.18] 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) [C83.18]  Johana Tadeo APRN CNP 9/19/2018 11:19 AM    Hospice care patient [Z51.5] Hospice care patient [Z51.5]  Johana Tadeo APRN CNP 9/19/2018 11:24 AM      Hospital Problems as of 9/19/2018              Priority Class Noted POA    Obesity (BMI 30-39.9) Medium  3/29/2013 Yes    S/P coronary angioplasty Medium  3/21/2014 Yes    Gastroesophageal reflux disease, esophagitis presence not specified Medium  12/22/2015 Yes    Coronary artery disease involving native coronary artery of native heart without angina pectoris Medium  12/22/2015 Yes    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) Medium  3/28/2018 Yes    Dehydration Medium  5/14/2018 Yes    Mantle cell lymphoma (H) Medium  9/17/2018 Yes      Non-Hospital Problems as of 9/19/2018              Priority Class Noted    Hyperlipidemia LDL goal <100 Medium  1/31/2011    Prediabetes Medium  3/29/2013    Inflamed seborrheic keratosis Medium  4/30/2013    AK (actinic keratosis) Medium  4/30/2013    Basal cell carcinoma of neck Medium  6/3/2013    Microalbuminuria Medium  11/22/2013    Perianal dermatitis Medium  11/27/2013    NSTEMI (non-ST elevated myocardial infarction) (H) Medium  3/20/2014    " Status post percutaneous transluminal coronary angioplasty-Coronary angioplasty with STEPHEN to LCx Medium  4/4/2014    Prostate Cancer, s/p prostatectomy Medium  6/17/2015    Pseudophakia of both eyes Medium  7/21/2015    History of colonic polyps Medium  12/22/2015    Chronic diarrhea Medium  12/22/2015    Essential hypertension with goal blood pressure less than 140/90 Medium  6/21/2016    Dyslipidemia Medium  6/21/2016    Erectile dysfunction, unspecified erectile dysfunction type Medium  6/27/2016    Elevated fasting blood sugar Medium  2/22/2017    Eczema, unspecified type Medium  9/12/2017    Squamous blepharitis of right upper eyelid Medium  9/12/2017    Elevated AST (SGOT) Medium  2/23/2018    Flatulence, eructation, and gas pain Medium  2/23/2018    Bloating Medium  2/23/2018    Drug-induced neutropenia (H) Medium  3/29/2018    Nausea Medium  3/29/2018    Encounter for antineoplastic chemotherapy Medium  3/29/2018    Nonspecific ST-T wave electrocardiographic changes Medium  3/29/2018    Examination prior to chemotherapy Medium  3/29/2018    Tumor lysis syndrome Medium  3/29/2018    SIRS (systemic inflammatory response syndrome) (H) Medium  4/11/2018    COLE (dyspnea on exertion) Medium  4/20/2018    Pleural effusion Medium  8/23/2018    Atrial fibrillation (H) Medium  8/27/2018    Chemotherapy-induced neutropenia (H) Medium  8/31/2018      Code Status History     Date Active Date Inactive Code Status Order ID Comments User Context    9/19/2018 11:34 AM  DNR/DNI [id number]  Johana Tadeo APRN CNP Outpatient    9/18/2018  1:52 PM 9/19/2018 11:34 AM DNR/DNI [id number]  Abigail Bagley MD Inpatient    9/17/2018  7:38 PM 9/18/2018  1:52 PM Full Code [id number]  Abhishek Hein MD Inpatient    8/28/2018 12:15 PM 9/17/2018  7:38 PM Full Code [id number]  Keren Caballero APRN CNP Outpatient    8/27/2018  6:08 PM 8/28/2018 12:15 PM Full Code [id number]  Johana Tadeo APRN CNP Inpatient    8/23/2018   2:54 PM 8/24/2018 10:26 PM Full Code [id number]  Simran VerdehrynMARTHA CNP Inpatient    4/21/2018 11:23 AM 8/23/2018  2:54 PM Full Code [id number]  Nikki Beck PA-C Outpatient    4/20/2018  3:59 PM 4/21/2018 11:23 AM Full Code [id number]  Nikki Beck PA-C Inpatient    4/15/2018 10:49 AM 4/20/2018  3:59 PM Full Code [id number]  Donald Flowers MD Outpatient    4/11/2018  3:06 AM 4/15/2018 10:49 AM Full Code [id number]  Fausto Watkins MD Inpatient    4/1/2018 10:38 AM 4/11/2018  3:06 AM Full Code [id number] Full code Se Shashank Matthews MD Outpatient    3/29/2018  7:13 PM 4/1/2018 10:38 AM Full Code [id number]  Wilma Green MD Inpatient    3/19/2014  7:19 PM 3/21/2014  2:02 PM Full Code [id number]  Ariana Griffith MD Inpatient    1/31/2012 11:35 AM 3/19/2014  7:19 PM Full Code [id number]  Mika Fountain Outpatient      Current Code Status     Date Active Code Status Order ID Comments User Context       Prior      Summary of Visit     Reason for your hospital stay       You were admitted with weakness, progressive disease.  You are being discharged to hospice program.                Medication Review      START taking        Dose / Directions Comments    atropine 1 % Soln   Used for:  Hospice care patient        Dose:  2-4 drop   Place 2-4 drops under the tongue every 2 hours as needed for secretions   Quantity:  5 mL   Refills:  prn        bisacodyl 10 MG Suppository   Commonly known as:  DULCOLAX   Used for:  Hospice care patient        Dose:  10 mg   Place 1 suppository (10 mg) rectally daily as needed for constipation   Quantity:  2 suppository   Refills:  prn        haloperidol 2 MG/ML (HIGH CONC) solution   Commonly known as:  HALDOL   Used for:  Hospice care patient        Dose:  0.5-1 mg   Take 0.25-0.5 mLs (0.5-1 mg) by mouth 2 times daily   Quantity:  15 mL   Refills:  prn        morphine sulfate (high concentrate) 20 MG/ML concentrated solution    Commonly known as:  ROXANOL-CONCENTRATED   Used for:  Hospice care patient        Dose:  2.5-5 mg   Take 0.125-0.25 mLs (2.5-5 mg) by mouth every 2 hours as needed for shortness of breath / dyspnea or breakthrough pain   Quantity:  30 mL   Refills:  0        senna-docusate 8.6-50 MG per tablet   Commonly known as:  SENOKOT-S;PERICOLACE   Used for:  Mantle cell lymphoma of lymph nodes of multiple sites (H), Hospice care patient        Dose:  1-2 tablet   Take 1-2 tablets by mouth 2 times daily   Quantity:  100 tablet   Refills:  prn        simethicone 80 MG chewable tablet   Commonly known as:  MYLICON   Used for:  Hospice care patient        Dose:   mg   Take 1-2 tablets ( mg) by mouth every 6 hours as needed for cramping   Quantity:  180 tablet   Refills:  3          CONTINUE these medications which may have CHANGED, or have new prescriptions. If we are uncertain of the size of tablets/capsules you have at home, strength may be listed as something that might have changed.        Dose / Directions Comments    * acetaminophen 500 MG tablet   Commonly known as:  TYLENOL   This may have changed:  Another medication with the same name was added. Make sure you understand how and when to take each.        Dose:  500 mg   Take 500 mg by mouth every 6 hours as needed for mild pain   Refills:  0        * acetaminophen 650 MG Suppository   Commonly known as:  TYLENOL   This may have changed:  You were already taking a medication with the same name, and this prescription was added. Make sure you understand how and when to take each.   Used for:  Hospice care patient        Dose:  650 mg   Place 1 suppository (650 mg) rectally every 4 hours as needed for fever   Quantity:  2 suppository   Refills:  prn        * Notice:  This list has 2 medication(s) that are the same as other medications prescribed for you. Read the directions carefully, and ask your doctor or other care provider to review them with you.     "  CONTINUE these medications which have NOT CHANGED        Dose / Directions Comments    Aluminum-Magnesium-Simethicone 200-200-20 MG/5ML Susp        Dose:  5 mL   Take 5 mLs by mouth daily as needed   Refills:  0        aspirin 81 MG tablet        1 TABLET EVERY MORNING   Refills:  0        Benzethonium Chloride 0.1 % Liqd   Used for:  Decubitus ulcer of buttock, unspecified laterality, unspecified ulcer stage        Cleanse with wound cleanser and apply barrier paste and hydrocolloid dressing, change every 2-3 days as needed   Quantity:  237 mL   Refills:  3        clindamycin 1 % lotion   Commonly known as:  CLEOCIN T   Used for:  Rash        Apply twice daily for 6 weeks to the groin   Quantity:  60 mL   Refills:  1        COMPRESSION STOCKINGS   Used for:  Acute congestive heart failure, unspecified congestive heart failure type (H)        Dose:  1 each   1 each continuous prn Bilateral knee high compression stockings   Quantity:  2 each   Refills:  0        Gauze Bandage 4\" Misc   Used for:  Decubitus ulcer of buttock, unspecified laterality, unspecified ulcer stage        Cleanse with wound cleanser and apply barrier paste and hydrocolloid dressing, change every 2-3 days as needed   Quantity:  5 each   Refills:  3        hydrocortisone valerate 0.2 % ointment   Commonly known as:  WEST-NINOSKA   Used for:  Psoriasis        Apply sparingly to affected area three times daily as needed.   Quantity:  45 g   Refills:  3        lidocaine-prilocaine cream   Commonly known as:  EMLA        Dose:  1 Application   Apply 1 Application topically as needed   Refills:  0        LORazepam 0.5 MG tablet   Commonly known as:  ATIVAN   Used for:  Mantle cell lymphoma of lymph nodes of multiple sites (H)        Dose:  0.5 mg   Take 1 tablet (0.5 mg) by mouth every 4 hours as needed (Anxiety, Nausea/Vomiting or Sleep)   Quantity:  30 tablet   Refills:  5        nitroGLYcerin 0.4 MG sublingual tablet   Commonly known as:  NITROSTAT "   Used for:  Chest pain due to myocardial ischemia, unspecified ischemic chest pain type (H), Coronary artery disease involving native coronary artery of native heart without angina pectoris        Dose:  0.4 mg   Place 1 tablet (0.4 mg) under the tongue every 5 minutes as needed up to 3 tablets per episode.   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 (H)        Equipment being ordered: wheeled walker with seat   Quantity:  1 Device   Refills:  0        prochlorperazine 10 MG tablet   Commonly known as:  COMPAZINE   Used for:  Nausea        Dose:  10 mg   Take 1 tablet (10 mg) by mouth every 6 hours as needed for nausea or vomiting   Quantity:  30 tablet   Refills:  1        zinc oxide - white petrolatum 20-51% Pste topical paste   Used for:  Decubitus ulcer of buttock, unspecified laterality, unspecified ulcer stage        Cleanse with wound cleanser and apply barrier paste and hydrocolloid dressing, change every 2-3 days as needed   Quantity:  170 g   Refills:  3          STOP taking     allopurinol 300 MG tablet   Commonly known as:  ZYLOPRIM           furosemide 20 MG tablet   Commonly known as:  LASIX           LANsoprazole 15 MG CR capsule   Commonly known as:  PREVACID           MULTI-VITAMIN DAILY PO           potassium chloride SA 10 MEQ CR tablet   Commonly known as:  K-DUR/KLOR-CON M           rosuvastatin 40 MG tablet   Commonly known as:  CRESTOR                   After Care     Activity - Up with nursing assistance           Advance Diet as Tolerated       Follow this diet upon discharge: regular as tolerated       Fall precautions            General info for SNF       Length of Stay Estimate: Short Term Care: Estimated # of Days 31-90  Condition at Discharge: Declining  Level of care:skilled   Rehabilitation Potential: Poor  Admission H&P remains valid and up-to-date: Yes  Recent Chemotherapy: N/A  Use Nursing Home Standing Orders: Yes       Mantoux instructions       Give two-step Mantoux (PPD) Per Facility Policy Yes       Wound care       Plan of care for wound located on perianal, sacrococcygeal and groin wound and skin breakdown   GENTLY cleanse with Dia cleanse and protect using the soft reema WIPES ONLY,   and please refrain from use Premoistened wipes  Due to increase pain   Apply a layer of Vaseline to the  perianal, sacrococcygeal and followed by application of the criticaid barrier paste   perianal, sacrococcygeal and the bilateral groin every shift and as needed.             Follow-Up Appointment Instructions     Follow Up and recommended labs and tests       Follow up with  hospice.  No labs needed.             Your next 10 appointments already scheduled     Oct 10, 2018 10:15 AM CDT   XR CHEST 2 VIEWS with UCXR1   [facility] ([facility])    [address] [place] [id number] [phone]           How do I prepare for my exam? (Food and drink instructions) No Food and Drink Restrictions.  How do I prepare for my exam? (Other instructions) You do not need to do anything special for this exam.  What should I wear: Wear comfortable clothes.  How long does the exam take: Most scans take less than 5 minutes.  What should I bring: Bring a list of your medicines, including vitamins, minerals and over-the-counter drugs. It is safest to leave personal items at home.  Do I need a :  No  is needed.  What do I need to tell my doctor: Tell your doctor if there s any chance you are pregnant.  What should I do after the exam: No restrictions, You may resume  "normal activities.  What is this test: An image of a specific body part shown in shades of black and white.  Who should I call with questions: If you have any questions, please call the Imaging Department where you will have your exam. Directions, parking instructions, and other information is available on our website, [email or web address]imaging.              Statement of Approval     Ordered          09/19/18 1139  I have reviewed and agree with all the recommendations and orders detailed in this document.  EFFECTIVE NOW     Approved and electronically signed by:  Johana Tadeo APRN CNP                                                 INTERAGENCY TRANSFER FORM - NURSING   9/17/2018                       [address] BMT [facility] BANK: [phone]            Attending Provider: Lee Ann Harp MD     Allergies:  Niacin, Shellfish Allergy, Prevacid [Lansoprazole]    Infection:  None   Service:  Hem/Onc    Ht:  1.651 m (5' 5\")   Wt:  58.2 kg (128 lb 3.2 oz)   Admission Wt:  58.2 kg (128 lb 3.2 oz)    BMI:  21.33 kg/m 2   BSA:  1.63 m 2            Advance Directives        Scanned docmt in ACP Activity?           Yes, scanned ACP docmt        Immunizations     Name Date      Influenza (High Dose) 3 valent vaccine 09/12/17     Influenza (High Dose) 3 valent vaccine 09/29/16     Influenza (High Dose) 3 valent vaccine 09/24/15     Influenza (High Dose) 3 valent vaccine 09/19/14     Influenza (IIV3) PF 09/07/12     Influenza (IIV3) PF 09/21/09     Influenza (IIV3) PF 09/30/03     Influenza Vaccine IM 3yrs+ 4 Valent IIV4 09/19/13     Pneumo Conj 13-V (2010&after) 12/22/15     Pneumococcal 23 valent 12/08/04     TD (ADULT, 7+) 03/12/01     TDAP Vaccine (Adacel) 04/27/11     Zoster vaccine, live 10/10/07       ASSESSMENT     Discharge Profile Flowsheet     EXPECTED DISCHARGE     COMMUNICATION ASSESSMENT      Expected Discharge Date  09/19/18 ([facility] 4pm) 09/19/18 1230   Patient's communication style  spoken language " "(English or Bilingual) 09/17/18 1849    DISCHARGE NEEDS ASSESSMENT     SKIN      Concerns To Be Addressed  no discharge needs identified 08/23/18 1433   Inspection of bony prominences  Full 09/19/18 1018    Equipment Currently Used at Home  cane, straight;oxygen 09/13/18 1138   Inspection under devices  Full 09/19/18 1018    GASTROINTESTINAL (ADULT,PEDIATRIC,OB)     Skin WDL  ex 09/19/18 1018    GI WDL  ex 09/19/18 0930   SAFETY      Last Bowel Movement  09/19/18 09/19/18 0930   Safety WDL  ex 09/19/18 0930    GI Signs/Symptoms  constipation 09/19/18 0930   All Alarms  alarm(s) activated and audible 09/19/18 0930    Passing flatus  no 09/18/18 0312                      Assessment WDL (Within Defined Limits) Definitions           Safety WDL     Effective: 09/28/15    Row Information: <b>WDL Definition:</b> Bed in low position, wheels locked; call light in reach; upper side rails up x 2; ID band on<br> <font color=\"gray\"><i>Item=AS safety wdl>>List=AS safety wdl>>Version=F14</i></font>      Skin WDL     Effective: 09/28/15    Row Information: <b>WDL Definition:</b> Warm; dry; intact; elastic; without discoloration; pressure points without redness<br> <font color=\"gray\"><i>Item=AS skin wdl>>List=AS skin wdl>>Version=F14</i></font>      Vitals     Vital Signs Flowsheet     VITAL SIGNS     Response to Interventions  Decrease in pain 09/19/18 1216    Temp  -- (Pt on comfort cares, no vitals taken) 09/19/18 0008   CLINICALLY ALIGNED PAIN ASSESSMENT (CAPA) ([facility], [facility] AND [facility] ADULTS ONLY)      Temp src  Axillary 09/18/18 1216   Comfort  comfortably manageable 09/19/18 1216    Resp  16 09/18/18 1216   Change in Pain  getting better 09/19/18 1216    Pulse  103 09/18/18 1216   Pain Control  partially effective 09/19/18 1216    Heart Rate  102 09/18/18 1216   Functioning  can do most things, but pain gets in the way of some 09/19/18 1216    BP  (!)  82/61 09/18/18 1239   ANALGESIA SIDE EFFECTS MONITORING      BP Location "  Right arm 09/18/18 1216   Side Effects Monitoring: Respiratory Quality  R 09/19/18 1216    Pain Score  3 (Mild) (tylenol) 09/17/18 1451   Side Effects Monitoring: Respiratory Depth  N 09/19/18 1216    Pain Loc  -- (colon) 09/17/18 1451   Side Effects Monitoring: Sedation Level  1 09/19/18 1216    OXYGEN THERAPY     HEIGHT AND WEIGHT      SpO2  96 % 09/18/18 1216   Weight  58.2 kg (128 lb 3.2 oz) 09/18/18 0852    O2 Device  None (Room air) 09/18/18 1216   POSITIONING      Oxygen Delivery  2 LPM 09/18/18 0358   Body Position  side-lying, right 09/19/18 1331    PAIN/COMFORT     Head of Bed (HOB)  HOB at 15 degrees 09/19/18 1022    Patient Currently in Pain  yes 09/19/18 1216   Positioning/Transfer Devices  pillows;in use 09/19/18 1331    Preferred Pain Scale  CAPA (Clinically Aligned Pain Assessment) ([facility], [facility] and [facility] Adults Only) 09/19/18 1216   DAILY CARE      Pain Location  Buttocks 09/19/18 1216   Activity Management  activity adjusted per tolerance 09/19/18 0925    Pain Descriptors  Sore 09/19/18 1216   Activity Assistance Provided  assistance, 1 person 09/19/18 0925    Pain Intervention(s)  Medication (See eMAR) 09/19/18 1216   Additional Documentation  Activity Device Assistance (Row) 09/18/18 0312            Patient Lines/Drains/Airways Status    Active LINES/DRAINS/AIRWAYS     Name: Placement date: Placement time: Site: Days: Last dressing change:    Chest Tube 1 Left Pleural 16 Turkish 09/06/18   1143   Pleural   13     Chest Tube 2 Right Pleural 16 Turkish 09/06/18   1209   Pleural   13     Closed/Suction Drain 1 Left Neck Bulb  03/22/18      Neck   181     Pressure Injury 08/23/18 Coccyx Stage 1 08/23/18   1432    26     Rash 03/19/14 medial coccyx patch 03/19/14       1645     Incision/Surgical Site 03/22/18 Left Neck 03/22/18   0805    181     Incision/Surgical Site 08/08/18 Bilateral Back 08/08/18   0834    42             Patient Lines/Drains/Airways Status    Active PICC/CVC     Name: Placement  date: Placement time: Site: Days: Additional Info Last dressing change:    Port A Cath Single  Right Chest wall       Chest wall    Orientation: Right            Power Port: Yes               Intake/Output Detail Report     Date Intake       Output   Net    Shift P.O. I.V. Other IV Piggyback Total Urine Chest Tube Total       Day 09/18/18 0000 - 09/18/18 0659 -- 275 -- 500 775 150 -- 150 625    Debbie 09/18/18 0700 - 09/18/18 1459 360 50 -- 100  1400 -890    Noc 09/18/18 1500 - 09/18/18 2359 -- -- -- -- -- 450 -- 450 -450    Day 09/19/18 0000 - 09/19/18 0659 120 -- -- -- 120 150 -- 150 -30    Debbie 09/19/18 0700 - 09/19/18 1459 480 -- -- -- 480 -- -- -- 480      Last Void/BM       Most Recent Value    Urine Occurrence 1 at 09/19/2018 0900    Stool Occurrence 1 at 09/19/2018 0900      Case Management/Discharge Planning     Case Management/Discharge Planning Flowsheet     LIVING ENVIRONMENT     Equipment Currently Used at Home  cane, straight;oxygen 09/13/18 1138    Lives With  significant other 09/17/18 1849   / CAREGIVER      COPING/STRESS     Filed Complexity Screen Score  8 09/18/18 0846    Major Change/Loss/Stressor  hospitalization;illness 09/17/18 1849   ABUSE RISK SCREEN      EXPECTED DISCHARGE     QUESTION TO PATIENT:  Has a member of your family or a partner(now or in the past) intimidated, hurt, manipulated, or controlled you in any way?  no 09/17/18 1849    Expected Discharge Date  09/19/18 ([facility] 4pm) 09/19/18 1230   QUESTION TO PATIENT: Do you feel safe going back to the place where you are living?  yes 09/17/18 1849    ASSESSMENT/CONCERNS TO BE ADDRESSED     OBSERVATION: Is there reason to believe there has been maltreatment of a vulnerable adult (ie. Physical/Sexual/Emotional abuse, self neglect, lack of adequate food, shelter, medical care, or financial exploitation)?  no 09/17/18 1849    Concerns To Be Addressed  no discharge needs identified 08/23/18 1433   OTHER      FINAL RESOURCES      Are you depressed or being treated for depression?  No 09/17/18 1849                  [address] BMT [facility] BANK: [phone]            Medication Administration Report for Francisco Javier Cortes as of 09/19/18 1406   Legend:    Given Hold Not Given Due Canceled Entry Other Actions    Time Time (Time) Time  Time-Action       Inactive    Active    Linked        Medications 09/13/18 09/14/18 09/15/18 09/16/18 09/17/18 09/18/18 09/19/18    acetaminophen (TYLENOL) tablet 500 mg  Dose: 500 mg  Freq: EVERY 6 HOURS PRN Route: PO  PRN Reasons: mild pain,fever  Start: 09/17/18 1938   Admin Instructions: Maximum acetaminophen dose from all sources = 75 mg/kg/day not to exceed 4 gram    Admin. Amount: 1 tablet (1 × 500 mg tablet)  Last Admin: 09/19/18 1003  Dispense Loc: [facility] ADS 5C1           0200 (500 mg)-Given [C]       1003 (500 mg)-Given           alum & mag hydroxide-simethicone (MYLANTA ES/MAALOX  ES) suspension 5 mL  Dose: 5 mL  Freq: DAILY PRN Route: PO  PRN Comment: stomach upset  Start: 09/17/18 1938   Admin. Amount: 5 mL  Dispense Loc: [facility] ADS 5C1  Volume: 30 mL               bacitracin ointment  Freq: 3 TIMES DAILY Route: Top  Start: 09/19/18 1400   Admin Instructions: Apply to right chest tube site.    Dispense Loc: [facility] Floor Stock                  [ ] 2000           clindamycin (CLEOCIN T) 1 % lotion  Freq: 2 TIMES DAILY Route: Top  Start: 09/17/18 2000   Admin Instructions: Apply to affected areas    Last Admin: 09/19/18 1003  Dispense Loc: [facility] Main [facility]  Volume: 60 mL         2202 ( )-Given        0814 ( )-Given       2129 ( )-Given        1003 ( )-Given       [ ] 2000           glucose gel 15-30 g  Dose: 15-30 g  Freq: EVERY 15 MIN PRN Route: PO  PRN Reason: low blood sugar  Start: 09/18/18 0522   Admin Instructions: Give 15 g for BG 51 to 69 mg/dL IF patient is conscious and able to swallow. Give 30 g for BG less than or equal to 50 mg/dL IF patient is conscious and able to swallow. Do NOT  give glucose gel via enteral tube.  IF patient has enteral tube: give apple juice 120 mL (4 oz or 15 g of CHO) via enteral tube for BG 51 to 69 mg/dL.  Give apple juice 240 mL (8 oz or 30 g of CHO) via enteral tube for BG less than or equal to 50 mg/dL.    ~Oral gel is preferable for conscious and able to swallow patient.   ~IF gel unavailable or patient refuses may provide apple juice 120 mL (4 oz or 15 g of CHO). Document juice on I and O flowsheet.    Admin. Amount: 15-30 g  Dispense Loc: [facility] ADS 5C1  Volume: 93.75 mL                     Or  dextrose 50 % injection 25-50 mL  Dose: 25-50 mL  Freq: EVERY 15 MIN PRN Route: IV  PRN Reason: low blood sugar  Start: 09/18/18 0522   Admin Instructions: Use if have IV access, BG less than 70 mg/dL and meet dose criteria below:  Dose if conscious and alert (or disorientated) and NPO = 25 mL  Dose if unconscious / not alert = 50 mL  Vesicant. For ordered doses up to 25 g, give IV Push undiluted. Give each 5g over 1 minute.    Admin. Amount: 25-50 mL  Last Admin: 09/18/18 0536  Dispense Loc: [facility] ADS 5C1  Infused Over: 1-5 Minutes  Volume: 50 mL          0536 (25 mL)-Given           Or  glucagon injection 1 mg  Dose: 1 mg  Freq: EVERY 15 MIN PRN Route: SC  PRN Reason: low blood sugar  PRN Comment: May repeat x 1 only  Start: 09/18/18 0522   Admin Instructions: May give SQ or IM. ONLY use glucagon IF patient has NO IV access AND is UNABLE to swallow AND blood glucose is LESS than or EQUAL to 50 mg/dL.  If ordered IV, give IV Push over 1 minute. Reconstitute with 1mL sterile water.    Admin. Amount: 1 mg  Dispense Loc: [facility] ADS 5C1                      heparin 100 UNIT/ML injection 5 mL  Dose: 5 mL  Freq: EVERY 28 DAYS Route: IK  Start: 09/17/18 2230   Admin Instructions: ONLY to de-access each port in dual implanted port.  Flush with 10 mL NS followed by 5 mL heparin (100 units/mL) at discharge and at least every 28 days.  MAX: 5 mL per port    Admin. Amount: 5  "mL  Last Admin: 09/19/18 1356  Dispense Loc: [facility] ADS 5C1  Volume: 5 mL         (2230)-Not Given         1356 (5 mL)-Given           heparin lock flush 10 UNIT/ML injection 5-10 mL  Dose: 5-10 mL  Freq: EVERY 1 HOUR PRN Route: IK  PRN Reason: other  PRN Comment: to lock each port in dual implanted port.  Start: 09/17/18 2224   Admin Instructions: MAX: 5 mL per port.    Admin. Amount: 5-10 mL  Dispense Loc: [facility] ADS 5C1  Volume: 10 mL               heparin lock flush 10 UNIT/ML injection 5-10 mL  Dose: 5-10 mL  Freq: EVERY 24 HOURS Route: IK  Start: 09/17/18 2230   Admin Instructions: To lock each dormant port in dual implanted port.  Check PRN heparin flush order to see when last dose of PRN heparin was given before administering.   MAX: 5 mL per port.    Admin. Amount: 5-10 mL  Dispense Loc: [facility] ADS 5C1  Volume: 10 mL         (2237)-Not Given        (2131)-Not Given        [ ] 2230           hydrocortisone valerate (WEST-NINOSKA) 0.2 % ointment  Freq: 3 TIMES DAILY Route: Top  Start: 09/17/18 2000   Admin Instructions: Apply to affected areas    Last Admin: 09/19/18 1003  Dispense Loc: [facility] Main Pharmacy         2202 ( )-Given        0814 ( )-Given       (1430)-Not Given       2130 ( )-Given        1003 ( )-Given       (1400)-Not Given       [ ] 2000           lidocaine (LMX4) cream  Freq: EVERY 1 HOUR PRN Route: Top  PRN Reason: moderate pain  PRN Comment: with VAD insertion or accessing implanted port  Start: 09/17/18 2224   Admin Instructions: Do NOT give if patient has a history of allergy to any local anesthetic or any \"joshua\" product.   Apply 30 minutes prior to VAD insertion or port access.  MAX Dose:  2.5 g (  of 5 g tube)    Dispense Loc: [facility] ADS 5C1               lidocaine 1 % 1 mL  Dose: 1 mL  Freq: EVERY 1 HOUR PRN Route: OTHER  PRN Comment: mild pain with VAD insertion or accessing implanted port  Start: 09/17/18 2224   Admin Instructions: Do NOT give if patient has a history of allergy " "to any local anesthetic or any \"joshua\" product. MAX dose 1 mL subcutaneous OR intradermal in divided doses.    Admin. Amount: 1 mL  Dispense Loc: [facility] ADS 5C1  Volume: 2 mL               LORazepam (ATIVAN) tablet 0.5-1 mg  Dose: 0.5-1 mg  Freq: EVERY 6 HOURS PRN Route: PO  PRN Reasons: other,anxiety  PRN Comment: Nausea and vomiting  Start: 09/17/18 1937   Admin Instructions: Offer third for n/v.<br>    Admin. Amount: 1-2 tablet (1-2 × 0.5 mg tablet)  Dispense Loc: [facility] ADS 5C1              Or  LORazepam (ATIVAN) injection 0.5-1 mg  Dose: 0.5-1 mg  Freq: EVERY 6 HOURS PRN Route: IV  PRN Reason: other  PRN Comment: Nausea and vomiting  Start: 09/17/18 1937   Admin Instructions: Offer third.  For IV PUSH: Dilute with equal volume of NS. For ordered IV doses 0.1-4 mg give IV Push. Administer each 2mg over 1-5 minutes.    Admin. Amount: 0.5-1 mg = 0.25-0.5 mL Conc: 2 mg/mL  Dispense Loc: [facility] ADS 5C1  Volume: 1 mL               magnesium sulfate 4 g in 100 mL sterile water (premade)  Dose: 4 g  Freq: EVERY 4 HOURS PRN Route: IV  PRN Reason: magnesium supplementation  Start: 09/17/18 2224   Admin Instructions: For serum Mg++ less than 1.6 mg/dL  Give 4 g and recheck magnesium level 2 hours after dose, and next AM.    Admin. Amount: 4 g = 100 mL Conc: 4 g/100 mL  Dispense Loc: [facility] ADS 5C1  Infused Over: 120 Minutes  Volume: 100 mL               Medication Instruction  Freq: CONTINUOUS PRN Route: XX  Start: 09/17/18 1938   Admin Instructions: No rectal suppositories if WBC less than 1000/microliter or platelets less than 50,000/microliter.    Dispense Loc: [facility] Main Pharmacy               nitroGLYcerin (NITROSTAT) sublingual tablet 0.4 mg  Dose: 0.4 mg  Freq: EVERY 5 MIN PRN Route: SL  PRN Reason: chest pain  Start: 09/17/18 1938   Admin. Amount: 1 tablet (1 × 0.4 mg tablet)  Dispense Loc: [facility] ADS 5C1               ondansetron (ZOFRAN) injection 8 mg  Dose: 8 mg  Freq: EVERY 8 HOURS PRN Route: IV  PRN " Reasons: nausea,vomiting  Start: 09/17/18 1937   Admin Instructions: Offer second  Irritant. For ordered IV doses 0.1-4 mg, give IV Push undiluted over 2-5 minutes.    Admin. Amount: 8 mg = 4 mL Conc: 4 mg/2 mL  Dispense Loc: [facility] ADS 5C1  Infused Over: 2-5 Minutes  Volume: 4 mL              Or  ondansetron (ZOFRAN-ODT) ODT tab 8 mg  Dose: 8 mg  Freq: EVERY 8 HOURS PRN Route: PO  PRN Reasons: nausea,vomiting  Start: 09/17/18 1937   Admin Instructions: Offer second.    Admin. Amount: 1 tablet (1 × 8 mg tablet)  Dispense Loc: [facility] ADS 5C1              Or  ondansetron (ZOFRAN) tablet 8 mg  Dose: 8 mg  Freq: EVERY 8 HOURS PRN Route: PO  PRN Reasons: nausea,vomiting  Start: 09/17/18 1937   Admin Instructions: Offer second.    Admin. Amount: 1 tablet (1 × 8 mg tablet)  Dispense Loc: [facility] ADS 5C1               potassium phosphate 15 mmol in D5W 250 mL intermittent infusion  Dose: 15 mmol  Freq: DAILY PRN Route: IV  PRN Reason: phosphorous supplementation  Start: 09/17/18 2224   Admin Instructions: For serum phosphorus level 2-2.4  Do not infuse Phosphorus in the same line as TPN.   Give 15 mmol and recheck phosphorus level next AM.  Each mmol of phosphate provides 1.47 mEq of Potassium. Multiply the patient's phosphate dose by 1.47 to determine the amount of potassium in this dose.    Admin. Amount: 15 mmol  Dispense Loc: [facility] Main Pharm[facility]  Infused Over: 4 Hours  Volume: 250 mL   Mixture Administration Information:   Medication Type Amount   potassium phosphate 3 MMOLE/ML SOLN Medications 15 mmol   D5W 5 % SOLN Base 250 mL                       potassium phosphate 20 mmol in D5W 250 mL intermittent infusion  Dose: 20 mmol  Freq: EVERY 6 HOURS PRN Route: IV  PRN Reason: phosphorous supplementation  Start: 09/17/18 2224   Admin Instructions: For serum phosphorus level 1.1-1.9  For CENTRAL Line ONLY  Do not infuse Phosphorus in the same line as TPN.   Give 20 mmol and recheck phosphorus level 2 hours after last  dose and next AM.  Each mmol of phosphate provides 1.47 mEq of Potassium. Multiply the patient's phosphate dose by 1.47 to determine the amount of potassium in this dose.    Admin. Amount: 20 mmol  Dispense Loc: [facility] Main Pharmacy  Infused Over: 4 Hours  Volume: 250 mL   Mixture Administration Information:   Medication Type Amount   potassium phosphate 3 MMOLE/ML SOLN Medications 20 mmol   D5W 5 % SOLN Base 250 mL                       potassium phosphate 20 mmol in D5W 500 mL intermittent infusion  Dose: 20 mmol  Freq: EVERY 6 HOURS PRN Route: IV  PRN Reason: phosphorous supplementation  Start: 09/17/18 2224   Admin Instructions: For serum phosphorus level 1.1-1.9  For Peripheral Line  Do not infuse Phosphorus in the same line as TPN.   Give 20 mmol and recheck phosphorus level 2 hours after last dose and next AM.  Each mmol of phosphate provides 1.47 mEq of Potassium. Multiply the patient's phosphate dose by 1.47 to determine the amount of potassium in this dose.    Admin. Amount: 20 mmol  Dispense Loc: [facility]  Infused Over: 4 Hours  Volume: 500 mL   Mixture Administration Information:   Medication Type Amount   potassium phosphate 3 MMOLE/ML SOLN Medications 20 mmol   D5W 5 % SOLN Base 500 mL                       potassium phosphate 25 mmol in D5W 500 mL intermittent infusion  Dose: 25 mmol  Freq: EVERY 8 HOURS PRN Route: IV  PRN Reason: phosphorous supplementation  Start: 09/17/18 2224   Admin Instructions: For serum phosphorus level less than 1.1  Do not infuse Phosphorus in the same line as TPN.   Give 25 mmol and recheck phosphorus level 2 hours after last dose and next AM.  Each mmol of phosphate provides 1.47 mEq of Potassium. Multiply the patient's phosphate dose by 1.47 to determine the amount of potassium in this dose.    Admin. Amount: 25 mmol  Dispense Loc: [facility]  Infused Over: 6 Hours  Volume: 500 mL   Mixture Administration Information:   Medication Type Amount    potassium phosphate 3 MMOLE/ML SOLN Medications 25 mmol   D5W 5 % SOLN Base 500 mL                       prochlorperazine (COMPAZINE) tablet 5 mg  Dose: 5 mg  Freq: EVERY 6 HOURS PRN Route: PO  PRN Reasons: nausea,vomiting  Start: 09/17/18 1937   Admin Instructions: Offer first    Admin. Amount: 1 tablet (1 × 5 mg tablet)  Dispense Loc: [facility] ADS 5C1              Or  prochlorperazine (COMPAZINE) injection 5 mg  Dose: 5 mg  Freq: EVERY 6 HOURS PRN Route: IV  PRN Reasons: nausea,vomiting  Start: 09/17/18 1937   Admin Instructions: Offer first  For ordered IV doses 0.1-10 mg, give IV Push undiluted. Each 5mg over 1 minute.    Admin. Amount: 5 mg = 1 mL Conc: 5 mg/mL  Dispense Loc: [facility] ADS 5C1  Infused Over: 1-2 Minutes  Volume: 1 mL               senna-docusate (SENOKOT-S;PERICOLACE) 8.6-50 MG per tablet 1 tablet  Dose: 1 tablet  Freq: 2 TIMES DAILY Route: PO  Start: 09/17/18 2000   Admin Instructions: If no bowel movement in 24 hours, increase to 2 tablets PO.  Hold for loose stools.    Admin. Amount: 1 tablet  Last Admin: 09/19/18 1003  Dispense Loc: [facility] ADS 5C1                 0811 (1 tablet)-Given               1003 (1 tablet)-Given       [ ] 2000          Or  senna-docusate (SENOKOT-S;PERICOLACE) 8.6-50 MG per tablet 2 tablet  Dose: 2 tablet  Freq: 2 TIMES DAILY Route: PO  Start: 09/17/18 2000   Admin Instructions: Hold for loose stools.    Admin. Amount: 2 tablet  Dispense Loc: [facility] ADS 5C1         (2045)-Not Given               (2131)-Not Given               [ ] 2000           simethicone (MYLICON) chewable tablet  mg  Dose:  mg  Freq: EVERY 6 HOURS PRN Route: PO  PRN Reason: cramping  Start: 09/19/18 1112   Admin. Amount: 1-2 tablet (1-2 × 80 mg tablet)  Dispense Loc: [facility] ADS 5C1               sodium chloride (PF) 0.9% PF flush 10-20 mL  Dose: 10-20 mL  Freq: EVERY 1 HOUR PRN Route: IK  PRN Reasons: line flush,post meds or blood draw  Start: 09/17/18 2224   Admin Instructions: And  Daily PRN, to de-access each port in dual implanted port.  Flush with 10 mL NS followed by 5 mL heparin (100 units/mL) at discharge and at least every 28 days.    Admin. Amount: 10-20 mL  Dispense Loc: [facility] Floor Stock  Volume: 20 mL               sodium chloride (PF) 0.9% PF flush 10-20 mL  Dose: 10-20 mL  Freq: EVERY 1 HOUR PRN Route: IK  PRN Reasons: line flush,post meds or blood draw  PRN Comment: To flush each CVC Implanted port.  Start: 18   Admin Instructions: 10 mL post IV meds;   20 mL post blood draw.    Admin. Amount: 10-20 mL  Dispense Loc: [facility] Floor Stock  Volume: 20 mL               zinc oxide - white petrolatum (CRITIC-AID Thick Moist Barrier) 20-51% topical paste  Freq: EVERY 1 HOUR PRN Route: Top  PRN Reason: rash  Start: 18   Admin Instructions: Apply to affected areas. Sterile stores.    Dispense Loc: [facility] Floor Stock              Completed Medications  Medications 09/13/18 09/14/18 09/15/18 09/16/18 09/17/18 09/18/18 09/19/18         Dose: 500 mL  Freq: ONCE Route: IV  Last Dose: 500 mL (18 1316)  Start: 18 1300   End: 18 1516   Admin. Amount: 500 mL  Last Admin: 18 1316  Dispense Loc: [facility] Floor Stock  Infused Over: 2 Hours  Administrations Remainin  Volume: 500 mL          1316 (500 mL)-New Bag              Dose: 500 mL  Freq: ONCE Route: IV  Last Dose: 1,000 mL (1822)  Start: 18 0515   End: 18 0722   Admin. Amount: 500 mL  Last Admin: 18  Dispense Loc: [facility] Floor Stock  Infused Over: 2 Hours  Administrations Remainin  Volume: 500 mL          0522 (1,000 mL)-New Bag              Dose: 500 mL  Freq: ONCE Route: IV  Last Dose: 1,000 mL (18)  Start: 18   End: 18   Admin. Amount: 500 mL  Last Admin: 18  Dispense Loc: [facility] Floor Stock  Infused Over: 1 Hours  Administrations Remainin  Volume: 500 mL          (1,000 mL)-New Bag             Discontinued Medications  Medications 09/13/18 09/14/18 09/15/18 09/16/18 09/17/18 09/18/18 09/19/18         Dose: 650 mg  Freq: EVERY 4 HOURS PRN Route: PO  PRN Reasons: mild pain,fever  Start: 09/17/18 1938   End: 09/17/18 1947   Admin Instructions: Maximum acetaminophen dose from all sources = 75 mg/kg/day not to exceed 4 grams/day.    Admin. Amount: 2 tablet (2 × 325 mg tablet)         1947-Med Discontinued           Dose: 300 mg  Freq: DAILY Route: PO  Start: 09/18/18 0800   End: 09/18/18 1515   Admin. Amount: 1 tablet (1 × 300 mg tablet)  Last Admin: 09/18/18 0810  Dispense Loc: [facility] ADS 5C1          0810 (300 mg)-Given       1515-Med Discontinued          Dose: 20 mg  Freq: DAILY Route: PO  Start: 09/18/18 0800   End: 09/18/18 1332   Admin. Amount: 1 tablet (1 × 20 mg tablet)  Last Admin: 09/18/18 0810  Dispense Loc: [facility] ADS 5C1          0810 (20 mg)-Given       1332-Med Discontinued          Dose: 15 mg  Freq: DAILY Route: PO  Start: 09/18/18 0800   End: 09/18/18 1515   Admin. Amount: 1 capsule (1 × 15 mg capsule)  Last Admin: 09/18/18 0810  Dispense Loc: [facility] ADS 5C1          0810 (15 mg)-Given       1515-Med Discontinued          Dose: 0.5 mg  Freq: EVERY 4 HOURS PRN Route: PO  PRN Comment: Anxiety, Nausea/Vomiting or Sleep  Start: 09/17/18 1938   End: 09/17/18 1949   Admin. Amount: 1 tablet (1 × 0.5 mg tablet)         1949-Med Discontinued           Dose: 20-40 mEq  Freq: EVERY 2 HOURS PRN Route: ORAL OR FEED  PRN Reason: potassium supplementation  Start: 09/17/18 2224   End: 09/18/18 1423   Admin Instructions: Use if unable to tolerate tablets.  If Serum K+ 3.0-3.3, dose = 60 mEq po total dose (40 mEq x1 followed in 2 hours by 20 mEq x1). Recheck K+ level 4 hours after dose and the next AM.  If Serum K+ 2.5-2.9, dose = 80 mEq po total dose (40 mEq Q2H x2). Recheck K+ level 4 hours after dose and the next AM.  If Serum K+ less than 2.5, See IV order.  Dissolve packet contents in 4-8 ounces of  cold water or juice.    Admin. Amount: 20-40 mEq  Dispense Loc: [facility] ADS 5C1          1423-Med Discontinued          Dose: 10 mEq  Freq: EVERY 1 HOUR PRN Route: IV  PRN Reason: potassium supplementation  Start: 09/17/18 2224   End: 09/17/18 2227   Admin Instructions: Infuse via PERIPHERAL LINE. Use potassium with lidocaine for pain with peripheral administration.  If Serum K+ 3.0-3.3, dose = 10 mEq/hr x4 doses (40 mEq IV total dose). Recheck K+ level 2 hours after dose and the next AM.  If Serum K+ less than 3.0, dose = 10 mEq/hr x6 doses (60 mEq IV total dose). Recheck K+ level 2 hours after dose and the next AM.    Admin. Amount: 10 mEq = 100 mL Conc: 10 mEq/100 mL  Infused Over: 1 Hours  Volume: 100 mL         2227-Med Discontinued           Dose: 10 mEq  Freq: EVERY 1 HOUR PRN Route: IV  PRN Reason: potassium supplementation  Start: 09/17/18 2224   End: 09/18/18 1423   Admin Instructions: Infuse via PERIPHERAL LINE or CENTRAL LINE. Use for central line replacement if patient weight less than 65 kg, if patient is on TPN with high potassium content or if unit does not stock 20 mEq bags.   If Serum K+ 3.0-3.3, dose = 10 mEq/hr x4 doses (40 mEq IV total dose). Recheck K+ level 2 hours after dose and the next AM.   If Serum K+ less than 3.0, dose = 10 mEq/hr x6 doses (60 mEq IV total dose). Recheck K+ level 2 hours after dose and the next AM.    Admin. Amount: 10 mEq = 100 mL Conc: 10 mEq/100 mL  Dispense Loc: [facility] ADS 5C1  Infused Over: 60 Minutes  Volume: 100 mL          1423-Med Discontinued          Dose: 20 mEq  Freq: EVERY 1 HOUR PRN Route: IV  PRN Reason: potassium supplementation  Start: 09/17/18 2224   End: 09/18/18 1423   Admin Instructions: Infuse via CENTRAL LINE Only. May need EKG if less than 65 kg or on TPN - Max rate is 0.3 mEq/kg/hr for patients not on EKG monitoring.   If Serum K+ 3.0-3.3, dose = 20 mEq/hr x2 doses (40 mEq IV total dose). Recheck K+ level 2 hours after dose and the next  AM.  If Serum K+ less than 3.0, dose = 20 mEq/hr x3 doses (60 mEq IV total dose). Recheck K+ level 2 hours after dose and the next AM.    Admin. Amount: 20 mEq = 50 mL Conc: 20 mEq/50 mL  Dispense Loc: [facility]  Volume: 50 mL          1423-Med Discontinued          Dose: 10 mEq  Freq: DAILY Route: PO  Start: 09/18/18 0800   End: 09/18/18 1515   Admin Instructions: DO NOT CRUSH.    Admin. Amount: 1 tablet (1 × 10 mEq tablet)  Last Admin: 09/18/18 0810  Dispense Loc: [facility] ADS 5C1          0810 (10 mEq)-Given       1515-Med Discontinued          Dose: 20-40 mEq  Freq: EVERY 2 HOURS PRN Route: PO  PRN Reason: potassium supplementation  Start: 09/17/18 2224   End: 09/18/18 1423   Admin Instructions: Use if able to take PO.   If Serum K+ 3.0-3.3, dose = 60 mEq po total dose (40 mEq x1 followed in 2 hours by 20 mEq x1). Recheck K+ level 4 hours after dose and the next AM.  If Serum K+ 2.5-2.9, dose = 80 mEq po total dose (40 mEq Q2H x2). Recheck K+ level 4 hours after dose and the next AM.  If Serum K+ less than 2.5, See IV order.  DO NOT CRUSH.    Admin. Amount: 2-4 tablet (2-4 × 10 mEq tablet)  Dispense Loc: [facility] ADS 5C1          1423-Med Discontinued          Dose: 10 mg  Freq: EVERY 6 HOURS PRN Route: PO  PRN Reasons: nausea,vomiting  Start: 09/17/18 1938   End: 09/17/18 1949   Admin. Amount: 2 tablet (2 × 5 mg tablet)         1949-Med Discontinued           Dose: 40 mg  Freq: EVERY EVENING Route: PO  Start: 09/17/18 2000   End: 09/18/18 1515   Admin. Amount: 1 tablet (1 × 40 mg tablet)  Last Admin: 09/17/18 2202  Dispense Loc: [facility] Main [facility]         2202 (40 mg)-Given        1515-Med Discontinued          Rate: 50 mL/hr   Freq: CONTINUOUS Route: IV  Last Dose: 1,000 mL (09/17/18 2014)  Start: 09/17/18 1945   End: 09/18/18 1515   Last Admin: 09/17/18 2014  Dispense Loc: [facility] Floor Stock  Volume: 1,000 mL         2014 (1,000 mL)-New Bag               1515-Med Discontinued     Medications  09/13/18 09/14/18 09/15/18 09/16/18 09/17/18 09/18/18 09/19/18               INTERAGENCY TRANSFER FORM - NOTES (H&P, Discharge Summary, Consults, Procedures, Therapies)   9/17/2018                       UNIT 5C BMT [facility] BANK: [phone]               History & Physicals      H&P by Abhishek Hein MD at 9/17/2018 10:12 PM     Author:  Abhishek Hein MD Service:  Hem/Onc Author Type:  Physician    Filed:  9/17/2018 10:44 PM Date of Service:  9/17/2018 10:12 PM Creation Time:  9/17/2018 10:11 PM    Status:  Signed :  Abhishek Hein MD (Physician)         [facility] -- History and Physical -- Hematology / Oncology  Date of Admission:  9/17/2018 -- Date of Service (when I saw the patient): 09/17/18    ASSESSMENT & PLAN  Francisco Javier Cortes is a 78 year old male most recently with a diagnosis of mantle cell lymphoma, s/p 6 cycles of bendamustine/rituximab, who has nevertheless had incomplete metabolic response, residual LAD in neck, axillae, mediastinum, bilateral hilum, mesenteric, and bilateral inguinal regions along with FDG avid omental caking suggesting peritoneal carcinomatosis, as well as bilateral recurrent chylous pleural effusions,s/p bilateral pleurx who is admitted for declining energy, increased fatigue, poor po intake, and considerations for palliative care options and hospice.     # Mantle cell lymphoma - presented to PMD with abdominal bloating/discomfort and loss of appetite x 2 months, CT 2/25/18 showed extensive intraabdominal and inguinal LAD with splenomegaly. FNA of lymph node and excisional biopsy in March 2018 c/w mantle cell lymphoma, blastoid variant. Proliferation index 50%. Marrow biopsy showed bone marrow involvement 40-50%. FISH + for t(11;14) consistent with mantle cell lymphoma. PET CT confirmed extensive hypermetabolic adenopathy. s/p 6 cycles of bendamustine/rituximab, who has nevertheless had incomplete metabolic response, residual LAD  in neck, axillae, mediastinum, bilateral hilum, mesenteric, and bilateral inguinal regions along with FDG avid omental caking suggesting peritoneal carcinomatosis, as well as bilateral recurrent chylous pleural effusions,s/p bilateral pleurx.   - Discussed in clinic with Dr. Adan re: poor prognosis and how second line therapy would likely be challenging.  - Patient informed in clinic re: recommednation to pursue palliative care and hospice care.   - Palliative consult in the AM    # Poor appetite - likely from refractory mantle cell lymphoma, involvement in the abdomen  # Slight hypotension  - Improved with 500 ml NS bolus at admission  - Continue NS @ 50ml/hr for now  - Consider nutrition consultation in the AM    # Incontinence of bladder and bladder over last few weeks  - He attributes this to his last chemotherapy   - Declines immodium, but he wants to be on stool softeners - I ordered this     # Pleural effusions - bilateral pleurx catheter in place  # Hyperuricemia - continue allopurinol for now, will check uric acid in AM.  # Sacral wound - will need eval by WOC in AM, barrier cream to area    # GERD- continue prevacid  # Bilateral pleural effusion - continue lasix 20mg daily for now    # Pain Assessment:[RK1.1]  Current Pain Score 8/31/2018   Patient currently in pain? denies   Pain score (0-10) -   Pain location -   Pain descriptors -[RK1.2]   Francisco Javier diane pain level was assessed and he currently denies pain.        FEN  - IVFs: NS @ 50ml/hr  - Diet: regular, but will need nutrition consult  - Daily KCl to go with his lasix  - electrolyte repletion if needed    PPX  - DVT: mechanical for now  - Bowel: Senna-Colace    Lines/Drains: port, pleurx x 2  Consults: palliative in the AM  CODE: full code for now  DISPO: inpatient >2 nights for symptom control and work up for malnutrition, as well as setting up palliative care services    CHIEF COMPLAINT/REASON FOR ADMIT: general decilne in functional status    HISTORY OF  PRESENT ILLNESS  History obtained from chart review and discussion with the patient.[RK1.1]     Francisco Javier Cortes[RK1.3] is a[RK1.1] 78 year old[RK1.3] gentleman, now with refractory extensive mantle cell lymphoma despite treatment with bendamustine and rituximab, as well as bilateral chylous pleural effusions who has been admitted because of decrease in function and poor appetite. He also has a history of prostate cancer s/p prostatectomy, CAD s/p stent in past. He presented to his oncologist's office today to discuss worsening generalized fatigue over the last few days to weeks. He has continue to have his pleurx catehter drained every 3 days before it reaccumulates and his breathing is at baseline with a 3-4 L/min O2 requirement via NC. He has not been able to eat much at all in the last few days. He has been feeling dehydrated because of this. He denies diarrhea but has frequent small stools, as well as incontinence of urine and stool over the last few weeks. He discussed this with his oncologist who referred him to be admitted directly for further work up and palliative care.  He continues to take care of his wound on his back, no concerns.      PMH[RK1.1]  Past Medical History:   Diagnosis Date     CAD (coronary artery disease) 1/09    s/p stentsx2     Coronary artery disease involving native coronary artery of native heart without angina pectoris 12/22/2015     Dyslipidemia      Erectile dysfunction      GERD (gastroesophageal reflux disease)      Hearing problem One ear 1961     Hypertension      NSTEMI (non-ST elevated myocardial infarction) (H) 3/20/2014     Pneumonia      Prostate cancer (H)     prostatecomy 9 years ago, PSAs remain good     Status post percutaneous transluminal coronary angioplasty-Coronary angioplasty with STEPHEN to LCx 4/4/2014     Stented coronary artery     stents placed[RK1.2]       PSH[RK1.1]  Past Surgical History:   Procedure Laterality Date     BIOPSY LYMPH NODE CERVICAL Left 3/22/2018     Procedure: BIOPSY LYMPH NODE CERVICAL;  Excisional Biopsy Left Cervical Lymph Node ;  Surgeon: Samia Gaviria MD;  Location: [facility] OR     C APPENDECTOMY       C REMV PROSTATE,RETROPUB,RAD,TOT NODES       CATARACT IOL, RT/LT       COLONOSCOPY       COLONOSCOPY Left 2016    Procedure: COMBINED COLONOSCOPY, SINGLE OR MULTIPLE BIOPSY/POLYPECTOMY BY BIOPSY;  Surgeon: Duane, William Charles, MD;  Location: [facility] OR     COLONOSCOPY WITH CO2 INSUFFLATION N/A 2016    Procedure: COLONOSCOPY WITH CO2 INSUFFLATION;  Surgeon: Duane, William Charles, MD;  Location: [facility] OR     ENT SURGERY       PHACOEMULSIFICATION CLEAR CORNEA WITH STANDARD INTRAOCULAR LENS IMPLANT Left 2015    Procedure: PHACOEMULSIFICATION CLEAR CORNEA WITH STANDARD INTRAOCULAR LENS IMPLANT;  Surgeon: John Zimmerman MD;  Location:  [facility]     PHACOEMULSIFICATION CLEAR CORNEA WITH STANDARD INTRAOCULAR LENS IMPLANT Right 2015    Procedure: PHACOEMULSIFICATION CLEAR CORNEA WITH STANDARD INTRAOCULAR LENS IMPLANT;  Surgeon: John Zimmerman MD;  Location:  [facility]     STENT, CORONARY, [place]  1/09, 2014    x2, x1 in      THORACENTESIS Bilateral 2018    Procedure: THORACENTESIS;  Thoracentesis Bilateral;  Surgeon: Michelle Leroy PA-C;  Location: [facility]     THORACENTESIS Bilateral 2018    Procedure: THORACENTESIS;  Thoracentesis;  Surgeon: Dean Marcus PA-C;  Location: [facility]     TYMPANOPLASTY       VASCULAR SURGERY      stents[RK1.2]       Prior to Admission MEDICATIONS[RK1.1]  Prior to Admission Medications   Prescriptions Last Dose Informant Patient Reported? Taking?   ASPIRIN 81 MG OR TABS 2018 at Unknown time  Yes Yes   Si TABLET EVERY MORNING   Alum & Mag Hydroxide-Simeth (ALUMINUM-MAGNESIUM-SIMETHICONE) 200-200-20 MG/5ML SUSP 2018 at Unknown time  Yes Yes   Sig: Take 5 mLs by mouth daily as needed   Benzethonium Chloride 0.1 % LIQD 2018 at Unknown time  No Yes   Sig: Cleanse  "with wound cleanser and apply barrier paste and hydrocolloid dressing, change every 2-3 days as needed   COMPRESSION STOCKINGS Past Week at Unknown time  No Yes   Si each continuous prn Bilateral knee high compression stockings   Gauze Pads & Dressings (GAUZE BANDAGE 4\") MISC 2018 at Unknown time  No Yes   Sig: Cleanse with wound cleanser and apply barrier paste and hydrocolloid dressing, change every 2-3 days as needed   LANsoprazole (PREVACID) 15 MG CR capsule 2018 at Unknown time  Yes Yes   Sig: Take 20 mg by mouth daily Patient needs to use brand name Prevacid (generic causes diarrhea)    LORazepam (ATIVAN) 0.5 MG tablet 2018 at Unknown time  No Yes   Sig: Take 1 tablet (0.5 mg) by mouth every 4 hours as needed (Anxiety, Nausea/Vomiting or Sleep)   Multiple Vitamin (MULTI-VITAMIN DAILY PO) 2018 at Unknown time  Yes Yes   Sig: Take 1 tablet by mouth   acetaminophen (TYLENOL) 500 MG tablet 2018 at Unknown time  Yes Yes   Sig: Take 500 mg by mouth every 6 hours as needed for mild pain   allopurinol (ZYLOPRIM) 300 MG tablet 2018 at Unknown time  No Yes   Sig: Take 1 tablet (300 mg) by mouth daily   clindamycin (CLEOCIN T) 1 % lotion 2018 at Unknown time  No Yes   Sig: Apply twice daily for 6 weeks to the groin   furosemide (LASIX) 20 MG tablet Past Week at Unknown time  No Yes   Sig: Take 1 tablet (20 mg) by mouth daily   hydrocortisone valerate (WEST-NINOSKA) 0.2 % ointment 2018 at Unknown time  No Yes   Sig: Apply sparingly to affected area three times daily as needed.   lidocaine-prilocaine (EMLA) cream 2018 at Unknown time  Yes Yes   Sig: Apply 1 Application topically as needed   nitroGLYcerin (NITROSTAT) 0.4 MG sublingual tablet   No No   Sig: Place 1 tablet (0.4 mg) under the tongue every 5 minutes as needed up to 3 tablets per episode.   ondansetron (ZOFRAN) 8 MG tablet 2018 at Unknown time  No Yes   Sig: Take 1 tablet (8 mg) by mouth every 8 hours as needed " (Nausea/Vomiting)   order for DME 9/17/2018 at Unknown time  No Yes   Sig: Equipment being ordered: wheeled walker with seat   potassium chloride SA (K-DUR/KLOR-CON M) 10 MEQ CR tablet 9/17/2018 at Unknown time  No Yes   Sig: Take 1 tablet (10 mEq) by mouth daily   prochlorperazine (COMPAZINE) 10 MG tablet 9/17/2018 at Unknown time  No Yes   Sig: Take 1 tablet (10 mg) by mouth every 6 hours as needed for nausea or vomiting   rosuvastatin (CRESTOR) 40 MG tablet 9/16/2018 at Unknown time  No Yes   Sig: Take 1 tablet (40 mg) by mouth daily   zinc oxide - white petrolatum (CRITIC-AID THICK MOIST BARRIER) 20-51% PSTE topical paste 9/17/2018 at Unknown time  No Yes   Sig: Cleanse with wound cleanser and apply barrier paste and hydrocolloid dressing, change every 2-3 days as needed      Facility-Administered Medications: None     Allergies   Allergies   Allergen Reactions     Niacin      Severe rash and itching     Shellfish Allergy Nausea and Vomiting     Scallops only     Prevacid [Lansoprazole] Diarrhea     Patient notes diarrhea with 30mg generic version. Patient does ok with 20mg in generic and brand name.[RK1.2]       Social History[RK1.1]  Social History     Social History     Marital status:      Spouse name: N/A     Number of children: N/A     Years of education: N/A     Occupational History     Not on file.     Social History Main Topics     Smoking status: Never Smoker     Smokeless tobacco: Never Used     Alcohol use Yes      Comment: a glass of red wine a day     Drug use: No     Sexual activity: Not Currently     Partners: Female     Birth control/ protection: Post-menopausal     Other Topics Concern      Service Yes     [employer]     Blood Transfusions No     Caffeine Concern No     Occupational Exposure Yes          Hobby Hazards No     Sleep Concern No     Stress Concern No     Weight Concern No     Special Diet Yes     Back Care No     Exercise Yes     Seat Belt Yes      Self-Exams Yes     Parent/Sibling W/ Cabg, Mi Or Angioplasty Before 65f 55m? Yes     Social History Narrative[RK1.2]       Family History[RK1.1]  Family History   Problem Relation Age of Onset     Cardiovascular Mother      HEART DISEASE Mother      Cancer - colorectal Father      HEART DISEASE Father      Other Cancer Father      Cancer Father      Hypertension Father      Asthma Brother      Coronary Artery Disease Brother      Hypertension Brother      HEART DISEASE Brother      HEART DISEASE Son      Hypertension Son      Cancer Daughter      non hodgkins     Prostate Cancer Brother      Hypertension Brother      Cancer Brother      Prostate Cancer Maternal Grandfather      Cancer Maternal Grandfather      Muscular Disorder Maternal Grandfather      HEART DISEASE Paternal Grandmother      Hypertension Paternal Grandmother      Hypertension Sister[RK1.2]      - Family history reviewed & no other pertinent oncologic/hematologic malignancy noted    ROS  Comprehensive ROS was performed and was negative unless otherwise noted in above HPI.    Physical Exam[RK1.1]  Temp:  [98  F (36.7  C)-99.4  F (37.4  C)] 98.9  F (37.2  C)  Pulse:  [] 104  Heart Rate:  [103] 103  Resp:  [16-22] 16  BP: (79-95)/(53-65) 95/64  SpO2:  [80 %-100 %] 97 %  0 lbs 0 oz[RK1.2]    Constitutional: Awake, alert, cooperative, in NAD. Chronically ill appearing thin  Eyes: PERRL, EOMI, sclera clear, conjunctiva normal.  ENT: Normocephalic, without obvious abnormality, sinuses nontender on palpation, oral pharynx with tacky mucus membranes  Respiratory: Non-labored breathing, decreased breath sounds throughout.   Cardiovascular: RRR, no murmur noted.  GI: + bowel sounds, soft, somewhatdistended, non-tender, no masses palpated, no hepatosplenomegaly.  Skin: No concerning lesions or rash on exposed areas.  Wound on sacral back dresssing in place  Musculoskeletal: No edema ever LEs.  Neurologic: Awake, alert & oriented x3.  Cranial nerves II-XII  are grossly intact.   Psych: appropriate affect    DATA[RK1.1]  Results for orders placed or performed during the hospital encounter of 09/17/18 (from the past 24 hour(s))   Lactic acid level STAT for sepsis protocol   Result Value Ref Range    Lactate for Sepsis Protocol 1.8 0.7 - 2.0 mmol/L   CBC with platelets differential   Result Value Ref Range    WBC 12.9 (H) 4.0 - 11.0 10e9/L    RBC Count 3.08 (L) 4.4 - 5.9 10e12/L    Hemoglobin 10.1 (L) 13.3 - 17.7 g/dL    Hematocrit 31.1 (L) 40.0 - 53.0 %     (H) 78 - 100 fl    MCH 32.8 26.5 - 33.0 pg    MCHC 32.5 31.5 - 36.5 g/dL    RDW 16.9 (H) 10.0 - 15.0 %    Platelet Count 107 (L) 150 - 450 10e9/L    Diff Method Automated Method     % Neutrophils 81.0 %    % Lymphocytes 13.9 %    % Monocytes 4.4 %    % Eosinophils 0.1 %    % Basophils 0.1 %    % Immature Granulocytes 0.5 %    Nucleated RBCs 0 0 /100    Absolute Neutrophil 10.4 (H) 1.6 - 8.3 10e9/L    Absolute Lymphocytes 1.8 0.8 - 5.3 10e9/L    Absolute Monocytes 0.6 0.0 - 1.3 10e9/L    Absolute Eosinophils 0.0 0.0 - 0.7 10e9/L    Absolute Basophils 0.0 0.0 - 0.2 10e9/L    Abs Immature Granulocytes 0.1 0 - 0.4 10e9/L    Absolute Nucleated RBC 0.0    Comprehensive metabolic panel   Result Value Ref Range    Sodium 134 133 - 144 mmol/L    Potassium 5.0 3.4 - 5.3 mmol/L    Chloride 104 94 - 109 mmol/L    Carbon Dioxide 23 20 - 32 mmol/L    Anion Gap 7 3 - 14 mmol/L    Glucose 75 70 - 99 mg/dL    Urea Nitrogen 29 7 - 30 mg/dL    Creatinine 1.11 0.66 - 1.25 mg/dL    GFR Estimate 64 >60 mL/min/1.7m2    GFR Estimate If Black 77 >60 mL/min/1.7m2    Calcium 7.8 (L) 8.5 - 10.1 mg/dL    Bilirubin Total 0.2 0.2 - 1.3 mg/dL    Albumin 1.6 (L) 3.4 - 5.0 g/dL    Protein Total 4.3 (L) 6.8 - 8.8 g/dL    Alkaline Phosphatase 94 40 - 150 U/L    ALT 24 0 - 70 U/L    AST 31 0 - 45 U/L   Magnesium   Result Value Ref Range    Magnesium 2.1 1.6 - 2.3 mg/dL   Phosphorus   Result Value Ref Range    Phosphorus 3.4 2.5 - 4.5 mg/dL   INR    Result Value Ref Range    INR 1.03 0.86 - 1.14   Fibrinogen activity   Result Value Ref Range    Fibrinogen 363 200 - 420 mg/dL[RK1.2]       PCP & Hematologist/Oncologist[RK1.1]  Florian Alonzo[RK1.2]  Erickson Hein MD  Hematology/Oncology  September 17, 2018[RK1.1]       Revision History        User Key Date/Time User Provider Type Action    > RK1.3 9/17/2018 10:44 PM Abhishek Hein MD Physician Sign     RK1.2 9/17/2018 10:12 PM Abhishek Hein MD Physician      RK1.1 9/17/2018 10:11 PM Abhishek Hein MD Physician                   Discharge Summaries     No notes of this type exist for this encounter.      Consult Notes     No notes of this type exist for this encounter.         Progress Notes - Physician (Notes for yesterday and today)      Progress Notes by Abigail Bagley MD at 9/18/2018  1:52 PM     Author:  Abigail Bagley MD Service:  Hem/Onc Author Type:  Fellow    Filed:  9/18/2018  1:53 PM Date of Service:  9/18/2018  1:52 PM Creation Time:  9/18/2018  1:52 PM    Status:  Signed :  Abigail Bagley MD (Fellow)         Brief Hematology Note  09/18/18  1:52 PM    Discussed hospice and comfort cares with patient and wife.  They are in agreement that we will proceed with this route.  We will change code status to DNR/DNI and get hospice RN involved today for search for inpatient hospice facility.    Abigail Bagley MD  Hematology-Oncology-Transplant Fellow[AJ1.1]       Revision History        User Key Date/Time User Provider Type Action    > AJ1.1 9/18/2018  1:53 PM Abigail Bagley MD Fellow Sign                  Procedure Notes     No notes of this type exist for this encounter.      Progress Notes - Therapies (Notes from 09/16/18 through 09/19/18)     No notes of this type exist for this encounter.                                          INTERAGENCY TRANSFER FORM - LAB / IMAGING / EKG / EMG RESULTS   9/17/2018                       UNIT 5C BMT [facility]:  [phone]            Unresulted Labs     None         Lab Results - 3 Days      Cortisol [[id number]] (Abnormal)  Resulted: 09/18/18 0643, Result status: Final result    Ordering provider: Nael Rollins MD  09/18/18 0504 Resulting lab: [facility]    Specimen Information    Type Source Collected On   Blood  09/18/18 0528          Components       Value Reference Range Flag Lab   Cortisol Serum 34.9 4 - 22 ug/dL H 51   Comment:         8 AM Cortisol Reference Range = 4-22 ug/dL  4 PM Cortisol Reference Range = 3-17 ug/dL              Glucose by meter [[id number]]  Resulted: 09/18/18 0631, Result status: Final result    Ordering provider: Lee Ann Harp MD  09/18/18 0620 Resulting lab: POINT OF CARE TEST, GLUCOSE    Specimen Information    Type Source Collected On     09/18/18 0620          Components       Value Reference Range Flag Lab   Glucose 92 70 - 99 mg/dL  170            Comprehensive metabolic panel [[id number]] (Abnormal)  Resulted: 09/18/18 0506, Result status: Final result    Ordering provider: Lee Ann Harp MD  09/17/18 2200 Resulting lab: [facility]    Specimen Information    Type Source Collected On   Blood  09/18/18 0424          Components       Value Reference Range Flag Lab   Sodium 136 133 - 144 mmol/L  51   Potassium 4.6 3.4 - 5.3 mmol/L  51   Chloride 106 94 - 109 mmol/L  51   Carbon Dioxide 21 20 - 32 mmol/L  51   Anion Gap 9 3 - 14 mmol/L  51   Glucose 66 70 - 99 mg/dL L 51   Urea Nitrogen 29 7 - 30 mg/dL  51   Creatinine 1.00 0.66 - 1.25 mg/dL  51   GFR Estimate 72 >60 mL/min/1.7m2  51   Comment:  Non  GFR Calc   GFR Estimate If Black 87 >60 mL/min/1.7m2  51   Comment:  African American GFR Calc   Calcium 7.7 8.5 - 10.1 mg/dL L 51   Bilirubin Total 0.3 0.2 - 1.3 mg/dL  51   Albumin 1.5 3.4 - 5.0 g/dL L 51   Protein Total 4.2 6.8 - 8.8 g/dL L 51   Alkaline Phosphatase 92 40 - 150 U/L  51    ALT 23 0 - 70 U/L  51   AST 35 0 - 45 U/L  51            Magnesium [[id number]]  Resulted: 09/18/18 0506, Result status: Final result    Ordering provider: Lee Ann Harp MD  09/17/18 2200 Resulting lab: [facility]    Specimen Information    Type Source Collected On   Blood  09/18/18 0424          Components       Value Reference Range Flag Lab   Magnesium 2.1 1.6 - 2.3 mg/dL  51            Phosphorus [[id number]]  Resulted: 09/18/18 0506, Result status: Final result    Ordering provider: Lee Ann Harp MD  09/17/18 2200 Resulting lab: [facility]    Specimen Information    Type Source Collected On   Blood  09/18/18 0424          Components       Value Reference Range Flag Lab   Phosphorus 3.5 2.5 - 4.5 mg/dL  51            CBC with platelets [[id number]] (Abnormal)  Resulted: 09/18/18 0452, Result status: Final result    Ordering provider: Lee Ann Harp MD  09/17/18 2200 Resulting lab: [facility]    Specimen Information    Type Source Collected On   Blood  09/18/18 0424          Components       Value Reference Range Flag Lab   WBC 12.4 4.0 - 11.0 10e9/L H 51   RBC Count 2.90 4.4 - 5.9 10e12/L L 51   Hemoglobin 9.6 13.3 - 17.7 g/dL L 51   Hematocrit 29.0 40.0 - 53.0 % L 51    78 - 100 fl  51   MCH 33.1 26.5 - 33.0 pg H 51   MCHC 33.1 31.5 - 36.5 g/dL  51   RDW 17.3 10.0 - 15.0 % H 51   Platelet Count 97 150 - 450 10e9/L L 51            Comprehensive metabolic panel [[id number]] (Abnormal)  Resulted: 09/17/18 2120, Result status: Final result    Ordering provider: Lee Ann Harp MD  09/17/18 2027 Resulting lab: [facility]    Specimen Information    Type Source Collected On   Blood  09/17/18 2046          Components       Value Reference Range Flag Lab   Sodium 134 133 - 144 mmol/L  51   Potassium 5.0 3.4 - 5.3 mmol/L  51   Chloride 104 94 - 109 mmol/L  51    Carbon Dioxide 23 20 - 32 mmol/L  51   Anion Gap 7 3 - 14 mmol/L  51   Glucose 75 70 - 99 mg/dL  51   Urea Nitrogen 29 7 - 30 mg/dL  51   Creatinine 1.11 0.66 - 1.25 mg/dL  51   GFR Estimate 64 >60 mL/min/1.7m2  51   Comment:  Non  GFR Calc   GFR Estimate If Black 77 >60 mL/min/1.7m2  51   Comment:  African American GFR Calc   Calcium 7.8 8.5 - 10.1 mg/dL L 51   Bilirubin Total 0.2 0.2 - 1.3 mg/dL  51   Albumin 1.6 3.4 - 5.0 g/dL L 51   Protein Total 4.3 6.8 - 8.8 g/dL L 51   Alkaline Phosphatase 94 40 - 150 U/L  51   ALT 24 0 - 70 U/L  51   AST 31 0 - 45 U/L  51            Magnesium [[id number]]  Resulted: 09/17/18 2120, Result status: Final result    Ordering provider: Lee Ann Harp MD  09/17/18 2027 Resulting lab: [facility]    Specimen Information    Type Source Collected On   Blood  09/17/18 2046          Components       Value Reference Range Flag Lab   Magnesium 2.1 1.6 - 2.3 mg/dL  51            Phosphorus [[id number]]  Resulted: 09/17/18 2120, Result status: Final result    Ordering provider: Lee Ann Harp MD  09/17/18 2027 Resulting lab: [facility]    Specimen Information    Type Source Collected On   Blood  09/17/18 2046          Components       Value Reference Range Flag Lab   Phosphorus 3.4 2.5 - 4.5 mg/dL  51            INR [[id number]]  Resulted: 09/17/18 2113, Result status: Final result    Ordering provider: Lee Ann Harp MD  09/17/18 2027 Resulting lab: [facility]    Specimen Information    Type Source Collected On   Blood  09/17/18 2046          Components       Value Reference Range Flag Lab   INR 1.03 0.86 - 1.14  51            Fibrinogen activity [[id number]]  Resulted: 09/17/18 2113, Result status: Final result    Ordering provider: Lee Ann Harp MD  09/17/18 2027 Resulting lab: [facility]    Specimen Information    Type  Source Collected On   Blood  09/17/18 2046          Components       Value Reference Range Flag Lab   Fibrinogen 363 200 - 420 mg/dL  51            CBC with platelets differential [[id number]] (Abnormal)  Resulted: 09/17/18 2110, Result status: Final result    Ordering provider: Lee Ann Harp MD  09/17/18 2027 Resulting lab: [facility]    Specimen Information    Type Source Collected On   Blood  09/17/18 2046          Components       Value Reference Range Flag Lab   WBC 12.9 4.0 - 11.0 10e9/L H 51   RBC Count 3.08 4.4 - 5.9 10e12/L L 51   Hemoglobin 10.1 13.3 - 17.7 g/dL L 51   Hematocrit 31.1 40.0 - 53.0 % L 51    78 - 100 fl H 51   MCH 32.8 26.5 - 33.0 pg  51   MCHC 32.5 31.5 - 36.5 g/dL  51   RDW 16.9 10.0 - 15.0 % H 51   Platelet Count 107 150 - 450 10e9/L L 51   Diff Method Automated Method   51   % Neutrophils 81.0 %  51   % Lymphocytes 13.9 %  51   % Monocytes 4.4 %  51   % Eosinophils 0.1 %  51   % Basophils 0.1 %  51   % Immature Granulocytes 0.5 %  51   Nucleated RBCs 0 0 /100  51   Absolute Neutrophil 10.4 1.6 - 8.3 10e9/L H 51   Absolute Lymphocytes 1.8 0.8 - 5.3 10e9/L  51   Absolute Monocytes 0.6 0.0 - 1.3 10e9/L  51   Absolute Eosinophils 0.0 0.0 - 0.7 10e9/L  51   Absolute Basophils 0.0 0.0 - 0.2 10e9/L  51   Abs Immature Granulocytes 0.1 0 - 0.4 10e9/L  51   Absolute Nucleated RBC 0.0   51            Lactic acid level STAT for sepsis protocol [[id number]]  Resulted: 09/17/18 1923, Result status: Final result    Ordering provider: Lee Ann Harp MD  09/17/18 1835 Resulting lab: [facility]    Specimen Information    Type Source Collected On   Blood  09/17/18 1856          Components       Value Reference Range Flag Lab   Lactate for Sepsis Protocol 1.8 0.7 - 2.0 mmol/L  51            Testing Performed By     Lab - Abbreviation Name Director Address Valid Date Range    51 - Unknown [facility]  CAMPUS Unknown [address] [id number] 12/31/14 1010 - Present    170 - Unknown POINT OF CARE TEST, GLUCOSE Unknown Unknown 10/31/11 1114 - Present            Encounter-Level Documents:     There are no encounter-level documents.      Order-Level Documents:     There are no order-level documents.

## 2018-09-17 NOTE — NURSING NOTE
"Oncology Rooming Note    September 17, 2018 2:57 PM   Francisco Javier Cortes is a 78 year old male who presents for:    Chief Complaint   Patient presents with     Oncology Clinic Visit     Initial Vitals: BP (!) 89/65  Pulse 116  Resp 22  SpO2 98% Estimated body mass index is 21.4 kg/(m^2) as calculated from the following:    Height as of 9/6/18: 1.651 m (5' 5\").    Weight as of 9/7/18: 58.3 kg (128 lb 9.6 oz). There is no height or weight on file to calculate BSA.  Mild Pain (3) Comment: tylenol   No LMP for male patient.  Allergies reviewed: Yes  Medications reviewed: Yes    Medications: MEDICATION REFILLS NEEDED TODAY. Provider was notified.  Pharmacy name entered into [facility]:    [facility] [place] - [place], [place] - [address], [id number]  [facility] - [facility], [place] - [address]  [id number] - [place], [place] - [address], [place] AT [facility]         15 minutes for nursing intake (face to face time)     Phoebe Byrnes LPN              "

## 2018-09-17 NOTE — Clinical Note
9/17/2018         RE: Francisco Javier Cortes  [address] [address]        Dear Colleague,    Thank you for referring your patient, Francisco Javier Cortes, to the [facility]. Please see a copy of my visit note below.      FOLLOW-UP VISIT NOTE    PATIENT NAME: Francisco Javier Cortes MRN # [id number]  DATE OF VISIT: Sep 17, 2018 YOB: 1940    REFERRING PROVIDER: No referring provider defined for this encounter.    CANCER TYPE: Mantle cell lymphoma  STAGE: IVB  ECOG PS: 1    ONCOLOGY HISTORY:  77-year-old male with past medical history significant for prostate cancer status post prostatectomy, coronary disease status post stent placement, to complete develop symptoms of abdominal bloating/discomfort and loss of appetite for 2 months. Underwent imaging workup by primary care provider with CT abdominal and pelvis on 02/25/18 showing extensive intra- abdominal and inguinal lymphadenopathy with splenomegaly.    - 03/05/18 02 Seen by medical oncology. I ordered CT neck and CT chest which showed bulky mediastinal, hilar ,axillary and supraclavicular lymphadenopathy. Patient was referred to ENT for diagnostic biopsy. He underwent an FNA of the lymph node on 03/14 which came back with findings consistent with mantle cell lymphoma. 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 Proliferation index was estimated to be 50%. The marrow biopsy performed showed bone marrow involvement by mantle cell lymphoma 40-50%. Fish came back positive for translocation 11:14 consistent with mantle cell lymphoma. PET CT scan extensive hypermetabolic adenopathy of cervical, axillary, mediastinal, hilar, intra-abdominal, retroperitoneal inguinal with largest measuring 15.5 x 6.9 cm.    - 03/29/18  Started on bendamustine in combination with Rituxan along with allopurinol for tumor lysis syndrome prophylaxis.      - Hospitalized for TLS monitoring  - Hospitalized for sepsis from pneumonia  - Hospitalized for CHF 04/20 - 04/21 08/20/18 . 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 Underwent evaluation by interventional radiology as well as pulmonary. He underwent a nuclear medicine study on 08/08/18 which revealed patent thoracic duct with no obvious evidence of thoracic lymphatic obstruction or or leakage into the peripheral pleural effusion. After discussion, recommendation was to proceed with bilateral Pleuryx PLACEMENT for symptomatic relief      SUBJECTIVE     Patient's hearing clinic today for follow-up prior to cycle 5 of chemotherapy. Complains of generalized fatigue and decreased energy levels. Dyspnea has improved after thoracentesis on previous hospitalization. Denies fever/chills, productive cough, chest pain, lower extremity edema, orthopnea, PND or any other complaints      PAST MEDICAL HISTORY     Past Medical History:   Diagnosis Date     CAD (coronary artery disease) 1/09    s/p stentsx2     Coronary artery disease involving native coronary artery of native heart without angina pectoris 12/22/2015     Dyslipidemia      Erectile dysfunction      GERD (gastroesophageal reflux disease)      Hearing problem One ear 1961     Hypertension      NSTEMI (non-ST elevated myocardial infarction) (H) 3/20/2014     Pneumonia      Prostate cancer (H)     prostatecomy 9 years ago, PSAs remain good     Status post percutaneous transluminal coronary angioplasty-Coronary angioplasty with STEPHEN to LCx 4/4/2014     Stented coronary artery     stents placed         CURRENT OUTPATIENT MEDICATIONS     Current Outpatient Prescriptions   Medication Sig Dispense Refill     acetaminophen (TYLENOL) 500 MG tablet Take 500 mg by mouth every 6 hours as needed for mild pain       allopurinol (ZYLOPRIM) 300 MG tablet Take 1  "tablet (300 mg) by mouth daily 30 tablet 1     Alum & Mag Hydroxide-Simeth (ALUMINUM-MAGNESIUM-SIMETHICONE) 200-200-20 MG/5ML SUSP Take 5 mLs by mouth daily as needed       ASPIRIN 81 MG OR TABS 1 TABLET EVERY MORNING       clindamycin (CLEOCIN T) 1 % lotion Apply twice daily for 6 weeks to the groin 60 mL 1     Gauze Pads & Dressings (GAUZE BANDAGE 4\") MISC Cleanse with wound cleanser and apply barrier paste and hydrocolloid dressing, change every 2-3 days as needed 5 each 3     LANsoprazole (PREVACID) 15 MG CR capsule Take 20 mg by mouth daily Patient needs to use brand name Prevacid (generic causes diarrhea)        lidocaine-prilocaine (EMLA) cream Apply 1 Application topically as needed       LORazepam (ATIVAN) 0.5 MG tablet Take 1 tablet (0.5 mg) by mouth every 4 hours as needed (Anxiety, Nausea/Vomiting or Sleep) 30 tablet 3     Multiple Vitamin (MULTI-VITAMIN DAILY PO) Take 1 tablet by mouth       nitroGLYcerin (NITROSTAT) 0.4 MG sublingual tablet Place 1 tablet (0.4 mg) under the tongue every 5 minutes as needed up to 3 tablets per episode. 60 tablet 6     ondansetron (ZOFRAN) 8 MG tablet Take 1 tablet (8 mg) by mouth every 8 hours as needed (Nausea/Vomiting) 10 tablet 3     order for DME Equipment being ordered: wheeled walker with seat 1 Device 0     potassium chloride SA (K-DUR/KLOR-CON M) 10 MEQ CR tablet Take 1 tablet (10 mEq) by mouth daily 30 tablet 1     prochlorperazine (COMPAZINE) 10 MG tablet Take 1 tablet (10 mg) by mouth every 6 hours as needed for nausea or vomiting 30 tablet 1     rosuvastatin (CRESTOR) 40 MG tablet Take 1 tablet (40 mg) by mouth daily 90 tablet 3     zinc oxide - white petrolatum (CRITIC-AID THICK MOIST BARRIER) 20-51% PSTE topical paste Cleanse with wound cleanser and apply barrier paste and hydrocolloid dressing, change every 2-3 days as needed 170 g 3     Benzethonium Chloride 0.1 % LIQD Cleanse with wound cleanser and apply barrier paste and hydrocolloid dressing, change " every 2-3 days as needed 237 mL 3     COMPRESSION STOCKINGS 1 each continuous prn Bilateral knee high compression stockings (Patient not taking: Reported on 9/17/2018) 2 each 0     furosemide (LASIX) 20 MG tablet Take 1 tablet (20 mg) by mouth daily (Patient not taking: Reported on 9/17/2018) 180 tablet 3     hydrocortisone valerate (WEST-NINOSKA) 0.2 % ointment Apply sparingly to affected area three times daily as needed. 45 g 3        ALLERGIES     Allergies   Allergen Reactions     Niacin      Severe rash and itching     Shellfish Allergy Nausea and Vomiting     Scallops only     Prevacid [Lansoprazole] Diarrhea     Patient notes diarrhea with 30mg generic version. Patient does ok with 20mg in generic and brand name.        REVIEW OF SYSTEMS   As above in the HPI, o/w complete 12-point ROS was negative.     PHYSICAL EXAM   B/P: 108/72, T: 97.7, P: 95, R: 16  SpO2 Readings from Last 4 Encounters:   09/17/18 98%   09/07/18 96%   09/06/18 100%   09/05/18 96%     Wt Readings from Last 3 Encounters:   09/07/18 58.3 kg (128 lb 9.6 oz)   09/06/18 66.7 kg (147 lb)   09/05/18 64.5 kg (142 lb 3 oz)     GEN: Frail. Mild respiratory distress  Mouth/ENT: Oropharynx is clear.  NECK: cervical lymphadenopathy non palpable  LUNGS: Diminished sounds  CV: S1S2  ABDOMEN: soft, non-tender  EXT: 1+ edema bilaterally  NEURO: alert  SKIN: no rashes     LABORATORY AND IMAGING STUDIES     Lab Results   Component Value Date    WBC 8.3 07/23/2018     Lab Results   Component Value Date    RBC 3.38 07/23/2018     Lab Results   Component Value Date    HGB 11.7 07/23/2018     Lab Results   Component Value Date    HCT 36.2 07/23/2018     No components found for: MCT  Lab Results   Component Value Date     07/23/2018     Lab Results   Component Value Date    MCH 34.6 07/23/2018     Lab Results   Component Value Date    MCHC 32.3 07/23/2018     Lab Results   Component Value Date    RDW 15.9 07/23/2018     Lab Results   Component Value Date      07/23/2018     Recent Labs   Lab Test  07/23/18   0818  07/10/18   1442   NA  142  139   POTASSIUM  3.9  4.3   CHLORIDE  105  105   CO2  28  27   ANIONGAP  9  7   GLC  135*  90   BUN  15  12   CR  1.03  1.02   EVANGELINA  8.7  8.7       PET/ CT scan 09/04/18     IMPRESSION: In this patient with a history of mantle cell lymphoma,  status post 6 cycles of BR treatment:  1. Incomplete metabolic response by the Lugano criteria. There has  been overall slight decreased size and decreased SUV max of extensive  lymphadenopathy the chest, abdomen, and pelvis as detailed above.  There is residual extensive FDG avid lymphadenopathy in bilateral  neck, bilateral axilla, mediastinum, bilateral hilum, mesenteric and  bilateral inguinal regions. FDG avid omental caking suggest peritoneal  carcinomatosis.   2. Increased large bilateral pleural effusions, right greater than  left, with mass effect on the mediastinal structures and rightward  shift.  3. Increased abdominal ascites.  4. Decreased size and FDG avidity of the spleen.     Findings of worsening pleural fluid was discussed with Dr. Adan at  approximately 3:00 pm on 9/4/18.      Lugano Criteria for Lymphoma response to therapy methodology  Reported as: ex.  Lugano Criteria: Complete Response     PET response  Used for FDG avid Lymphomas (Hodgkins & Diffuse Large B-Cell)  Complete                     <=Liver  (Deauville 1-3)  Partial                          > Liver & decreased from baseline   (Deauville 4-5)  Stable/No response     > Liver & no change from baseline  (Deauville  4-5)  Progressive                 > Liver & Increased or new FDG avid  lesion (Deauville 4-5)     CT response  Used for variable uptake Lymphomas (Follicular, Marginal zone, CLL,  Mantle Cell)  Complete                     all nodes <1.5 cm  (longest diameter)  Partial                          Shrank > 50%        (SPD*)  Stable/No response      Shrank <50%         (SPD*)  Progressive disease       New or Increased size of lesions     *SPD = (sum of up to six lesions (longest x shortest diameter)     Source: Federico et al.  Imaging for Staging and Response Assessment in  Lymphoma.  Radiology August 2015.             ASSESSMENT AND PLAN     77-year-old male with past medical history significant for prostate cancer status post prostatectomy, coronary disease status post stent placement, to complete develop symptoms of abdominal bloating/discomfort and loss of appetite for 2 months. Underwent imaging workup was noted to have diffuse extensive hypermetabolic lymphadenopathy with biopsy of the lymph node showing findings consistent with mantle cell lymphoma. Bone marrow biopsy showed 40-50 % involvement by lymphoma with FISH revealing t(11:14).    - Mantle cell lymphoma  Stage IV B  ECOG 3  Started on Bendamustine 90 mg/m2 day 1 and 2 in combination with Rituxan 375 mg/m2 q.4 weeks PET/CT scan at the end of the treatment showing incomplete metabolic response to therapy with residual extensive FDG avid lymphadenopathy in bilateralneck, bilateral axilla, mediastinum, bilateral hilum, mesenteric and bilateral inguinal regions along with FDG avid omental caking suggest peritoneal  carcinomatosis as well as bilateral pleural effusions and significant ascites.   He has continued to decline in the last few weeks with worsening fatigue and decreased energy levels. Appears very weak and debilitated in clinic today along with hypoxia despite being on 4 L.  He continues to require Pleurx catheter drainage every few days for marginal symptomatic relief.  A detailed discussion with patient and family members present today about the poor prognosis associated with refractory mantle cell lymphoma. Treatment options with second line therapy are going to be very challenging given his poor performance status and fraililty and so the potential benefit would be less than the associated complications with pursuing treatments  aggressively. Patient was informed that my recommendation would be to consider palliative care options and pursue with hospice care. She and her family members understand the above situation and are in agreement with plan of care. However given difficulty with current clinical situation, would require hospital admission for palliative care consultation and setting up hospice. Patient is requesting to be [facility] to [facility] and we'll work on arranging that.     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 Although reviewed after completion, some words and grammatical errors may remain.  Erickson Adan MD  Attending Physician   Hematology/Medical Oncology    Again, thank you for allowing me to participate in the care of your patient.        Sincerely,        Erickson Adan MD

## 2018-09-17 NOTE — PROGRESS NOTES
FOLLOW-UP VISIT NOTE    PATIENT NAME: Francisco Javier Cortes MRN # [id number]  DATE OF VISIT: Sep 17, 2018 YOB: 1940    REFERRING PROVIDER: No referring provider defined for this encounter.    CANCER TYPE: Mantle cell lymphoma  STAGE: IVB  ECOG PS: 1    ONCOLOGY HISTORY:  77-year-old male with past medical history significant for prostate cancer status post prostatectomy, coronary disease status post stent placement, to complete develop symptoms of abdominal bloating/discomfort and loss of appetite for 2 months. Underwent imaging workup by primary care provider with CT abdominal and pelvis on 02/25/18 showing extensive intra- abdominal and inguinal lymphadenopathy with splenomegaly.    - 03/05/18 02 Seen by medical oncology. I ordered CT neck and CT chest which showed bulky mediastinal, hilar ,axillary and supraclavicular lymphadenopathy. Patient was referred to ENT for diagnostic biopsy. He underwent an FNA of the lymph node on 03/14 which came back with findings consistent with mantle cell lymphoma. 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 Proliferation index was estimated to be 50%. The marrow biopsy performed showed bone marrow involvement by mantle cell lymphoma 40-50%. Fish came back positive for translocation 11:14 consistent with mantle cell lymphoma. PET CT scan extensive hypermetabolic adenopathy of cervical, axillary, mediastinal, hilar, intra-abdominal, retroperitoneal inguinal with largest measuring 15.5 x 6.9 cm.    - 03/29/18  Started on bendamustine in combination with Rituxan along with allopurinol for tumor lysis syndrome prophylaxis.     - Hospitalized for TLS monitoring  - Hospitalized for sepsis from pneumonia  - Hospitalized for CHF 04/20 - 04/21 08/20/18 . 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 Underwent evaluation by interventional radiology as well as pulmonary. He underwent a nuclear medicine study on 08/08/18 which revealed patent thoracic duct with no obvious evidence of thoracic lymphatic obstruction or or leakage into the peripheral pleural effusion. After discussion, recommendation was to proceed with bilateral Pleuryx PLACEMENT for symptomatic relief      SUBJECTIVE     Patient's hearing clinic today for follow-up prior to cycle 5 of chemotherapy. Complains of generalized fatigue and decreased energy levels. Dyspnea has improved after thoracentesis on previous hospitalization. Denies fever/chills, productive cough, chest pain, lower extremity edema, orthopnea, PND or any other complaints      PAST MEDICAL HISTORY     Past Medical History:   Diagnosis Date     CAD (coronary artery disease) 1/09    s/p stentsx2     Coronary artery disease involving native coronary artery of native heart without angina pectoris 12/22/2015     Dyslipidemia      Erectile dysfunction      GERD (gastroesophageal reflux disease)      Hearing problem One ear 1961     Hypertension      NSTEMI (non-ST elevated myocardial infarction) (H) 3/20/2014     Pneumonia      Prostate cancer (H)     prostatecomy 9 years ago, PSAs remain good     Status post percutaneous transluminal coronary angioplasty-Coronary angioplasty with STEPHEN to LCx 4/4/2014     Stented coronary artery     stents placed         CURRENT OUTPATIENT MEDICATIONS     Current Outpatient Prescriptions   Medication Sig Dispense Refill     acetaminophen (TYLENOL) 500 MG tablet Take 500 mg by mouth every 6 hours as needed for mild pain       allopurinol (ZYLOPRIM) 300 MG tablet Take 1 tablet (300 mg) by mouth daily 30 tablet 1     Alum & Mag Hydroxide-Simeth (ALUMINUM-MAGNESIUM-SIMETHICONE) 200-200-20 MG/5ML SUSP Take 5 mLs by mouth daily as needed       ASPIRIN 81 MG OR TABS 1 TABLET EVERY MORNING       clindamycin (CLEOCIN T) 1 %  "lotion Apply twice daily for 6 weeks to the groin 60 mL 1     Gauze Pads & Dressings (GAUZE BANDAGE 4\") MISC Cleanse with wound cleanser and apply barrier paste and hydrocolloid dressing, change every 2-3 days as needed 5 each 3     LANsoprazole (PREVACID) 15 MG CR capsule Take 20 mg by mouth daily Patient needs to use brand name Prevacid (generic causes diarrhea)        lidocaine-prilocaine (EMLA) cream Apply 1 Application topically as needed       LORazepam (ATIVAN) 0.5 MG tablet Take 1 tablet (0.5 mg) by mouth every 4 hours as needed (Anxiety, Nausea/Vomiting or Sleep) 30 tablet 3     Multiple Vitamin (MULTI-VITAMIN DAILY PO) Take 1 tablet by mouth       nitroGLYcerin (NITROSTAT) 0.4 MG sublingual tablet Place 1 tablet (0.4 mg) under the tongue every 5 minutes as needed up to 3 tablets per episode. 60 tablet 6     ondansetron (ZOFRAN) 8 MG tablet Take 1 tablet (8 mg) by mouth every 8 hours as needed (Nausea/Vomiting) 10 tablet 3     order for DME Equipment being ordered: wheeled walker with seat 1 Device 0     potassium chloride SA (K-DUR/KLOR-CON M) 10 MEQ CR tablet Take 1 tablet (10 mEq) by mouth daily 30 tablet 1     prochlorperazine (COMPAZINE) 10 MG tablet Take 1 tablet (10 mg) by mouth every 6 hours as needed for nausea or vomiting 30 tablet 1     rosuvastatin (CRESTOR) 40 MG tablet Take 1 tablet (40 mg) by mouth daily 90 tablet 3     zinc oxide - white petrolatum (CRITIC-AID THICK MOIST BARRIER) 20-51% PSTE topical paste Cleanse with wound cleanser and apply barrier paste and hydrocolloid dressing, change every 2-3 days as needed 170 g 3     Benzethonium Chloride 0.1 % LIQD Cleanse with wound cleanser and apply barrier paste and hydrocolloid dressing, change every 2-3 days as needed 237 mL 3     COMPRESSION STOCKINGS 1 each continuous prn Bilateral knee high compression stockings (Patient not taking: Reported on 9/17/2018) 2 each 0     furosemide (LASIX) 20 MG tablet Take 1 tablet (20 mg) by mouth daily " (Patient not taking: Reported on 9/17/2018) 180 tablet 3     hydrocortisone valerate (WEST-NINOSKA) 0.2 % ointment Apply sparingly to affected area three times daily as needed. 45 g 3        ALLERGIES     Allergies   Allergen Reactions     Niacin      Severe rash and itching     Shellfish Allergy Nausea and Vomiting     Scallops only     Prevacid [Lansoprazole] Diarrhea     Patient notes diarrhea with 30mg generic version. Patient does ok with 20mg in generic and brand name.        REVIEW OF SYSTEMS   As above in the HPI, o/w complete 12-point ROS was negative.     PHYSICAL EXAM   B/P: 108/72, T: 97.7, P: 95, R: 16  SpO2 Readings from Last 4 Encounters:   09/17/18 98%   09/07/18 96%   09/06/18 100%   09/05/18 96%     Wt Readings from Last 3 Encounters:   09/07/18 58.3 kg (128 lb 9.6 oz)   09/06/18 66.7 kg (147 lb)   09/05/18 64.5 kg (142 lb 3 oz)     GEN: Frail. Mild respiratory distress  Mouth/ENT: Oropharynx is clear.  NECK: cervical lymphadenopathy non palpable  LUNGS: Diminished sounds  CV: S1S2  ABDOMEN: soft, non-tender  EXT: 1+ edema bilaterally  NEURO: alert  SKIN: no rashes     LABORATORY AND IMAGING STUDIES     Lab Results   Component Value Date    WBC 8.3 07/23/2018     Lab Results   Component Value Date    RBC 3.38 07/23/2018     Lab Results   Component Value Date    HGB 11.7 07/23/2018     Lab Results   Component Value Date    HCT 36.2 07/23/2018     No components found for: MCT  Lab Results   Component Value Date     07/23/2018     Lab Results   Component Value Date    MCH 34.6 07/23/2018     Lab Results   Component Value Date    MCHC 32.3 07/23/2018     Lab Results   Component Value Date    RDW 15.9 07/23/2018     Lab Results   Component Value Date     07/23/2018     Recent Labs   Lab Test  07/23/18   0818  07/10/18   1442   NA  142  139   POTASSIUM  3.9  4.3   CHLORIDE  105  105   CO2  28  27   ANIONGAP  9  7   GLC  135*  90   BUN  15  12   CR  1.03  1.02   EVANGELINA  8.7  8.7       PET/ CT scan  09/04/18     IMPRESSION: In this patient with a history of mantle cell lymphoma,  status post 6 cycles of BR treatment:  1. Incomplete metabolic response by the Lugano criteria. There has  been overall slight decreased size and decreased SUV max of extensive  lymphadenopathy the chest, abdomen, and pelvis as detailed above.  There is residual extensive FDG avid lymphadenopathy in bilateral  neck, bilateral axilla, mediastinum, bilateral hilum, mesenteric and  bilateral inguinal regions. FDG avid omental caking suggest peritoneal  carcinomatosis.   2. Increased large bilateral pleural effusions, right greater than  left, with mass effect on the mediastinal structures and rightward  shift.  3. Increased abdominal ascites.  4. Decreased size and FDG avidity of the spleen.     Findings of worsening pleural fluid was discussed with Dr. Adan at  approximately 3:00 pm on 9/4/18.      Lugano Criteria for Lymphoma response to therapy methodology  Reported as: ex.  Lugano Criteria: Complete Response     PET response  Used for FDG avid Lymphomas (Hodgkins & Diffuse Large B-Cell)  Complete                     <=Liver  (Deauville 1-3)  Partial                          > Liver & decreased from baseline   (Deauville 4-5)  Stable/No response     > Liver & no change from baseline  (Deauville  4-5)  Progressive                 > Liver & Increased or new FDG avid  lesion (Deauville 4-5)     CT response  Used for variable uptake Lymphomas (Follicular, Marginal zone, CLL,  Mantle Cell)  Complete                     all nodes <1.5 cm  (longest diameter)  Partial                          Shrank > 50%        (SPD*)  Stable/No response      Shrank <50%         (SPD*)  Progressive disease      New or Increased size of lesions     *SPD = (sum of up to six lesions (longest x shortest diameter)     Source: Federico et al.  Imaging for Staging and Response Assessment in  Lymphoma.  Radiology August 2015.             ASSESSMENT AND PLAN      77-year-old male with past medical history significant for prostate cancer status post prostatectomy, coronary disease status post stent placement, to complete develop symptoms of abdominal bloating/discomfort and loss of appetite for 2 months. Underwent imaging workup was noted to have diffuse extensive hypermetabolic lymphadenopathy with biopsy of the lymph node showing findings consistent with mantle cell lymphoma. Bone marrow biopsy showed 40-50 % involvement by lymphoma with FISH revealing t(11:14).    - Mantle cell lymphoma  Stage IV B  ECOG 3  Started on Bendamustine 90 mg/m2 day 1 and 2 in combination with Rituxan 375 mg/m2 q.4 weeks PET/CT scan at the end of the treatment showing incomplete metabolic response to therapy with residual extensive FDG avid lymphadenopathy in bilateralneck, bilateral axilla, mediastinum, bilateral hilum, mesenteric and bilateral inguinal regions along with FDG avid omental caking suggest peritoneal  carcinomatosis as well as bilateral pleural effusions and significant ascites.   He has continued to decline in the last few weeks with worsening fatigue and decreased energy levels. Appears very weak and debilitated in clinic today along with hypoxia despite being on 4 L.  He continues to require Pleurx catheter drainage every few days for marginal symptomatic relief.  A detailed discussion with patient and family members present today about the poor prognosis associated with refractory mantle cell lymphoma. Treatment options with second line therapy are going to be very challenging given his poor performance status and fraililty and so the potential benefit would be less than the associated complications with pursuing treatments aggressively. Patient was informed that my recommendation would be to consider palliative care options and pursue with hospice care. She and her family members understand the above situation and are in agreement with plan of care. However given  difficulty with current clinical situation, would require hospital admission for palliative care consultation and setting up hospice. Patient is requesting to be transferred to [facility] and we'll work on arranging that.     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 Although reviewed after completion, some words and grammatical errors may remain.  Erickson Adan MD  Attending Physician   Hematology/Medical Oncology

## 2018-09-17 NOTE — MR AVS SNAPSHOT
After Visit Summary   9/17/2018    Francisco Javier Cortes    MRN: [id number]           Patient Information     Date Of Birth          1940        Visit Information        Provider Department      9/17/2018 2:15 PM Erickson Adan MD [facility]        Today's Diagnoses     Mantle cell lymphoma of lymph nodes of multiple sites (H)        Nausea           Follow-ups after your visit        Who to contact     If you have questions or need follow up information about today's clinic visit or your schedule please contact [facility] directly at [phone].  Normal or non-critical lab and imaging results will be communicated to you by [employer], letter or phone within 4 business days after the clinic has received the results. If you do not hear from us within 7 days, please contact the clinic through [employer] or phone. If you have a critical or abnormal lab result, we will notify you by phone as soon as possible.  Submit refill requests through [employer] or call your pharmacy and they will forward the refill request to us. Please allow 3 business days for your refill to be completed.          Additional Information About Your Visit        MyChart Information     [employer] gives you secure access to your electronic health record. If you see a primary care provider, you can also send messages to your care team and make appointments. If you have questions, please call your primary care clinic.  If you do not have a primary care provider, please call [phone] and they will assist you.      [employer] is an electronic gateway that provides easy, online access to your medical records. With [employer], you can request a clinic appointment, read your test results, renew a prescription or communicate with your care team.     To access your existing account, please contact your [facility] or call [phone] for assistance.        Care EveryWhere ID     This is your  Care EveryWhere ID. This could be used by other organizations to access your [facility] medical records  [id number]        Your Vitals Were     Pulse Respirations Pulse Oximetry             116 22 98%          Blood Pressure from Last 3 Encounters:   No data found for BP    Weight from Last 3 Encounters:   No data found for Wt              Today, you had the following     No orders found for display         Where to get your medicines      These medications were sent to [facility] - [place], [place] - [address], [address]  [address], [address], [facility] [address]     Phone:  [phone]     ondansetron 8 MG tablet    prochlorperazine 10 MG tablet         Some of these will need a paper prescription and others can be bought over the counter.  Ask your nurse if you have questions.     Bring a paper prescription for each of these medications     LORazepam 0.5 MG tablet          Primary Care Provider Office Phone # Fax #    Florian Alonzo [phone]       [address]  [facility] [address]        Equal Access to Services     [facility]: Hadii aad ku hadasho Soomaali, waaxda luqadaha, qaybta kaalmada adeegyada, waxay idiin haykaiden bazan . So [facility] [phone].    ATENCIÓN: Si habla español, tiene a ivan disposición servicios gratuitos de asistencia lingüística. Llame al [phone].    We comply with applicable federal civil rights laws and [place] laws. We do not discriminate on the basis of race, color, national origin, age, disability, sex, sexual orientation, or gender identity.            Thank you!     Thank you for choosing [facility]  for your care. Our goal is always to provide you with excellent care. Hearing back from our patients is one way we can continue to improve our services. Please take a few minutes to complete the written survey that you may receive in the mail after your visit with us. Thank you!    "          Your Updated Medication List - Protect others around you: Learn how to safely use, store and throw away your medicines at www.disposemymeds.org.          This list is accurate as of 9/17/18  6:14 PM.  Always use your most recent med list.                   Brand Name Dispense Instructions for use Diagnosis    * acetaminophen 500 MG tablet    TYLENOL     Take 500 mg by mouth every 6 hours as needed for mild pain        * acetaminophen 650 MG Suppository    TYLENOL    2 suppository    Place 1 suppository (650 mg) rectally every 4 hours as needed for fever    Hospice care patient       allopurinol 300 MG tablet    ZYLOPRIM    30 tablet    Take 1 tablet (300 mg) by mouth daily    Mantle cell lymphoma of lymph nodes of multiple sites (H)       Aluminum-Magnesium-Simethicone 200-200-20 MG/5ML Susp      Take 5 mLs by mouth daily as needed        aspirin 81 MG tablet      1 TABLET EVERY MORNING        atropine 1 % Soln     5 mL    Place 2-4 drops under the tongue every 2 hours as needed for secretions    Hospice care patient       Benzethonium Chloride 0.1 % Liqd     237 mL    Cleanse with wound cleanser and apply barrier paste and hydrocolloid dressing, change every 2-3 days as needed    Decubitus ulcer of buttock, unspecified laterality, unspecified ulcer stage       bisacodyl 10 MG Suppository    DULCOLAX    2 suppository    Place 1 suppository (10 mg) rectally daily as needed for constipation    Hospice care patient       clindamycin 1 % lotion    CLEOCIN T    60 mL    Apply twice daily for 6 weeks to the groin    Rash       COMPRESSION STOCKINGS     2 each    1 each continuous prn Bilateral knee high compression stockings    Acute congestive heart failure, unspecified congestive heart failure type (H)       furosemide 20 MG tablet    LASIX    180 tablet    Take 1 tablet (20 mg) by mouth daily    Hypervolemia, unspecified hypervolemia type       Gauze Bandage 4\" Misc     5 each    Cleanse with wound cleanser " and apply barrier paste and hydrocolloid dressing, change every 2-3 days as needed    Decubitus ulcer of buttock, unspecified laterality, unspecified ulcer stage       haloperidol 2 MG/ML (HIGH CONC) solution    HALDOL    15 mL    Take 0.25-0.5 mLs (0.5-1 mg) by mouth 2 times daily    Hospice care patient       hydrocortisone valerate 0.2 % ointment    WEST-NINOSKA    45 g    Apply sparingly to affected area three times daily as needed.    Psoriasis       LANsoprazole 15 MG CR capsule    PREVACID     Take 20 mg by mouth daily Patient needs to use brand name Prevacid (generic causes diarrhea)        lidocaine-prilocaine cream    EMLA     Apply 1 Application topically as needed        * LORazepam 0.5 MG tablet    ATIVAN    30 tablet    Take 1 tablet (0.5 mg) by mouth every 4 hours as needed (Anxiety, Nausea/Vomiting or Sleep)    Mantle cell lymphoma of lymph nodes of multiple sites (H)       * LORazepam 0.5 MG tablet    ATIVAN    30 tablet    Take 1 tablet (0.5 mg) by mouth every 4 hours as needed (Anxiety, Nausea/Vomiting or Sleep)    Mantle cell lymphoma of lymph nodes of multiple sites (H)       morphine sulfate (high concentrate) 20 MG/ML concentrated solution    ROXANOL-CONCENTRATED    30 mL    Take 0.125-0.25 mLs (2.5-5 mg) by mouth every 2 hours as needed for shortness of breath / dyspnea or breakthrough pain    Hospice care patient       MULTI-VITAMIN DAILY PO      Take 1 tablet by mouth        nitroGLYcerin 0.4 MG sublingual tablet    NITROSTAT    60 tablet    Place 1 tablet (0.4 mg) under the tongue every 5 minutes as needed up to 3 tablets per episode.    Chest pain due to myocardial ischemia, unspecified ischemic chest pain type (H), Coronary artery disease involving native coronary artery of native heart without angina pectoris       * ondansetron 8 MG tablet    ZOFRAN    10 tablet    Take 1 tablet (8 mg) by mouth every 8 hours as needed (Nausea/Vomiting)    Mantle cell lymphoma of lymph nodes of multiple  sites (H)       * ondansetron 8 MG tablet    ZOFRAN    30 tablet    Take 1 tablet (8 mg) by mouth every 8 hours as needed (Nausea/Vomiting)    Mantle cell lymphoma of lymph nodes of multiple sites (H)       order for DME     1 Device    Equipment being ordered: wheeled walker with seat    Generalized muscle weakness, Mantle cell lymphoma of lymph nodes of multiple sites (H)       potassium chloride SA 10 MEQ CR tablet    K-DUR/KLOR-CON M    30 tablet    Take 1 tablet (10 mEq) by mouth daily    Hypervolemia, unspecified hypervolemia type       prochlorperazine 10 MG tablet    COMPAZINE    30 tablet    Take 1 tablet (10 mg) by mouth every 6 hours as needed for nausea or vomiting    Nausea       rosuvastatin 40 MG tablet    CRESTOR    90 tablet    Take 1 tablet (40 mg) by mouth daily    Hyperlipidemia LDL goal <100, Coronary artery disease involving native coronary artery of native heart without angina pectoris       zinc oxide - white petrolatum 20-51% Pste topical paste     170 g    Cleanse with wound cleanser and apply barrier paste and hydrocolloid dressing, change every 2-3 days as needed    Decubitus ulcer of buttock, unspecified laterality, unspecified ulcer stage       * Notice:  This list has 6 medication(s) that are the same as other medications prescribed for you. Read the directions carefully, and ask your doctor or other care provider to review them with you.

## 2018-09-17 NOTE — TELEPHONE ENCOUNTER
Gave jose verbal orders for home care below.  She states that Dr. Alonzo has already given the ok also.      Adrienne Gordon RN,   M. [facility], [facility]

## 2018-09-17 NOTE — IP AVS SNAPSHOT
MRN:[id number]                      After Visit Summary   9/17/2018    Francisco Javier Cortes    MRN: [id number]           Thank you!     Thank you for choosing [facility] for your care. Our goal is always to provide you with excellent care. Hearing back from our patients is one way we can continue to improve our services. Please take a few minutes to complete the written survey that you may receive in the mail after you visit with us. Thank you!        Patient Information     Date Of Birth          1940        Designated Caregiver       Most Recent Value    Caregiver    Will someone help with your care after discharge? yes    Name of designated caregiver Court    Phone number of caregiver [phone]    Caregiver address [place]      About your hospital stay     You were admitted on:  September 17, 2018 You last received care in the:  [address] BMT [facility]    You were discharged on:  September 19, 2018        Reason for your hospital stay       You were admitted with weakness, progressive disease.  You are being discharged to hospice program.                  Who to Call     For medical emergencies, please call 911.  For non-urgent questions about your medical care, please call your primary care provider or clinic, [phone]          Attending Provider     Provider Lee Ann Jalloh MD Internal Medicine       Primary Care Provider Office Phone # Fax #    Florian Alonzo [phone]      After Care Instructions     Activity - Up with nursing assistance           Advance Diet as Tolerated       Follow this diet upon discharge: regular as tolerated            Fall precautions           General info for SNF       Length of Stay Estimate: Short Term Care: Estimated # of Days 31-90  Condition at Discharge: Declining  Level of care:skilled   Rehabilitation Potential: Poor  Admission H&P remains valid and up-to-date: Yes  Recent Chemotherapy: N/A  Use Nursing  Home Standing Orders: Yes            Mantoux instructions       Give two-step Mantoux (PPD) Per Facility Policy Yes            Wound care       Plan of care for wound located on perianal, sacrococcygeal and groin wound and skin breakdown   GENTLY cleanse with Dia cleanse and protect using the soft reema WIPES ONLY,   and please refrain from use Premoistened wipes  Due to increase pain   Apply a layer of Vaseline to the  perianal, sacrococcygeal and followed by application of the criticaid barrier paste   perianal, sacrococcygeal and the bilateral groin every shift and as needed.                  Follow-up Appointments     Follow Up and recommended labs and tests       Follow up with  hospice.  No labs needed.                  Your next 10 appointments already scheduled     Oct 10, 2018 10:15 AM CDT   XR CHEST 2 VIEWS with UCXR1   [facility] X[facility] ([facility])    [address] [place] [id number] [phone]           How do I prepare for my exam? (Food and drink instructions) No Food and Drink Restrictions.  How do I prepare for my exam? (Other instructions) You do not need to do anything special for this exam.  What should I wear: Wear comfortable clothes.  How long does the exam take: Most scans take less than 5 minutes.  What should I bring: Bring a list of your medicines, including vitamins, minerals and over-the-counter drugs. It is safest to leave personal items at home.  Do I need a :  No  is needed.  What do I need to tell my doctor: Tell your doctor if there s any chance you are pregnant.  What should I do after the exam: No restrictions, You may resume normal activities.  What is this test: An image of a specific body part shown in shades of black and white.  Who should I call with questions: If you have any questions, please call the Imaging Department where you will have your exam. Directions, parking instructions, and  other information is available on our website, [email or web address].              Pending Results     No orders found from 9/15/2018 to 9/18/2018.            Statement of Approval     Ordered          09/19/18 1139  I have reviewed and agree with all the recommendations and orders detailed in this document.  EFFECTIVE NOW     Approved and electronically signed by:  Johana Tadeo APRN CNP             Admission Information     Date & Time Provider Department Dept. Phone    9/17/2018 Lee Ann Harp MD Unit [address] BMT [facility] [place] [phone]      Your Vitals Were     Blood Pressure Pulse Temperature Respirations Weight Pulse Oximetry    82/61 103 97.7  F (36.5  C) (Axillary) 16 58.2 kg (128 lb 3.2 oz) 96%    BMI (Body Mass Index)                   21.33 kg/m2           MyChart Information     [employer] [facility] gives you secure access to your electronic health record. If you see a primary care provider, you can also send messages to your care team and make appointments. If you have questions, please call your primary care clinic.  If you do not have a primary care provider, please call [phone] and they will assist you.        Care EveryWhere ID     This is your Care EveryWhere ID. This could be used by other organizations to access your [facility] medical records  [id number]        Equal Access to Services     NANDA BARAJAS : Alondra syedo Solina, waaxda luqadaha, qaybta kaalmada adebryantyada, zhao lorenzo. So [facility] [phone].    ATENCIÓN: Si habla español, tiene a ivan disposición servicios gratuitos de asistencia lingüística. Llame al [phone].    We comply with applicable federal civil rights laws and [place] laws. We do not discriminate on the basis of race, color, national origin, age, disability, sex, sexual orientation, or gender identity.               Review of your medicines      START taking        Dose / Directions    atropine 1 % Soln   Used for:  Hospice care  patient        Dose:  2-4 drop   Place 2-4 drops under the tongue every 2 hours as needed for secretions   Quantity:  5 mL   Refills:  prn       bisacodyl 10 MG Suppository   Commonly known as:  DULCOLAX   Used for:  Hospice care patient        Dose:  10 mg   Place 1 suppository (10 mg) rectally daily as needed for constipation   Quantity:  2 suppository   Refills:  prn       haloperidol 2 MG/ML (HIGH CONC) solution   Commonly known as:  HALDOL   Used for:  Hospice care patient        Dose:  0.5-1 mg   Take 0.25-0.5 mLs (0.5-1 mg) by mouth 2 times daily   Quantity:  15 mL   Refills:  prn       morphine sulfate (high concentrate) 20 MG/ML concentrated solution   Commonly known as:  ROXANOL-CONCENTRATED   Used for:  Hospice care patient        Dose:  2.5-5 mg   Take 0.125-0.25 mLs (2.5-5 mg) by mouth every 2 hours as needed for shortness of breath / dyspnea or breakthrough pain   Quantity:  30 mL   Refills:  0       senna-docusate 8.6-50 MG per tablet   Commonly known as:  SENOKOT-S;PERICOLACE   Used for:  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 If we are uncertain of the size of tablets/capsules you have at home, strength may be listed as something that might have changed.        Dose / Directions    * acetaminophen 500 MG tablet   Commonly known as:  TYLENOL   This may have changed:  Another medication with the same name was added. Make sure you understand how and when to take each.        Dose:  500 mg   Take 500 mg by mouth every 6 hours as needed  "for mild pain   Refills:  0       * acetaminophen 650 MG Suppository   Commonly known as:  TYLENOL   This may have changed:  You were already taking a medication with the same name, and this prescription was added. Make sure you understand how and when to take each.   Used for:  Hospice care patient        Dose:  650 mg   Place 1 suppository (650 mg) rectally every 4 hours as needed for fever   Quantity:  2 suppository   Refills:  prn       * Notice:  This list has 2 medication(s) that are the same as other medications prescribed for you. Read the directions carefully, and ask your doctor or other care provider to review them with you.      CONTINUE these medicines which have NOT CHANGED        Dose / Directions    Aluminum-Magnesium-Simethicone 200-200-20 MG/5ML Susp        Dose:  5 mL   Take 5 mLs by mouth daily as needed   Refills:  0       aspirin 81 MG tablet        1 TABLET EVERY MORNING   Refills:  0       Benzethonium Chloride 0.1 % Liqd   Used for:  Decubitus ulcer of buttock, unspecified laterality, unspecified ulcer stage        Cleanse with wound cleanser and apply barrier paste and hydrocolloid dressing, change every 2-3 days as needed   Quantity:  237 mL   Refills:  3       clindamycin 1 % lotion   Commonly known as:  CLEOCIN T   Used for:  Rash        Apply twice daily for 6 weeks to the groin   Quantity:  60 mL   Refills:  1       COMPRESSION STOCKINGS   Used for:  Acute congestive heart failure, unspecified congestive heart failure type (H)        Dose:  1 each   1 each continuous prn Bilateral knee high compression stockings   Quantity:  2 each   Refills:  0       Gauze Bandage 4\" Misc   Used for:  Decubitus ulcer of buttock, unspecified laterality, unspecified ulcer stage        Cleanse with wound cleanser and apply barrier paste and hydrocolloid dressing, change every 2-3 days as needed   Quantity:  5 each   Refills:  3       hydrocortisone valerate 0.2 % ointment   Commonly known as:  WEST-NINOSKA "   Used for:  Psoriasis        Apply sparingly to affected area three times daily as needed.   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)        Dose:  0.5 mg   Take 1 tablet (0.5 mg) by mouth every 4 hours as needed (Anxiety, Nausea/Vomiting or Sleep)   Quantity:  30 tablet   Refills:  5       nitroGLYcerin 0.4 MG sublingual tablet   Commonly known as:  NITROSTAT   Used for:  Chest pain due to myocardial ischemia, unspecified ischemic chest pain type (H), Coronary artery disease involving native coronary artery of native heart without angina pectoris        Dose:  0.4 mg   Place 1 tablet (0.4 mg) under the tongue every 5 minutes as needed up to 3 tablets per episode.   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)        Dose:  8 mg   Take 1 tablet (8 mg) by mouth every 8 hours as needed (Nausea/Vomiting)   Quantity:  30 tablet   Refills:  prn       order for DME   Used for:  Generalized muscle weakness, Mantle cell lymphoma of lymph nodes of multiple sites (H)        Equipment being ordered: wheeled walker with seat   Quantity:  1 Device   Refills:  0       prochlorperazine 10 MG tablet   Commonly known as:  COMPAZINE   Used for:  Nausea        Dose:  10 mg   Take 1 tablet (10 mg) by mouth every 6 hours as needed for nausea or vomiting   Quantity:  30 tablet   Refills:  1       zinc oxide - white petrolatum 20-51% Pste topical paste   Used for:  Decubitus ulcer of buttock, unspecified laterality, unspecified ulcer stage        Cleanse with wound cleanser and apply barrier paste and hydrocolloid dressing, change every 2-3 days as needed    Quantity:  170 g   Refills:  3         STOP taking     allopurinol 300 MG tablet   Commonly known as:  ZYLOPRIM           furosemide 20 MG tablet   Commonly known as:  LASIX           LANsoprazole 15 MG CR capsule   Commonly known as:  PREVACID           MULTI-VITAMIN DAILY PO           potassium chloride SA 10 MEQ CR tablet   Commonly known as:  K-DUR/KLOR-CON M           rosuvastatin 40 MG tablet   Commonly known as:  CRESTOR                Where to get your medicines      These medications were sent to [facility] - [place], [place] - [address] [address]     Phone:  [phone]     acetaminophen 650 MG Suppository    atropine 1 % Soln    bisacodyl 10 MG Suppository    haloperidol 2 MG/ML (HIGH CONC) solution    ondansetron 8 MG tablet    senna-docusate 8.6-50 MG per tablet    simethicone 80 MG chewable tablet         Some of these will need a paper prescription and others can be bought over the counter. Ask your nurse if you have questions.     Bring a paper prescription for each of these medications     LORazepam 0.5 MG tablet    morphine sulfate (high concentrate) 20 MG/ML concentrated solution                Protect others around you: Learn how to safely use, store and throw away your medicines at www.disposemymeds.org.        Information about OPIOIDS     PRESCRIPTION OPIOIDS: WHAT YOU NEED TO KNOW   We gave you an opioid (narcotic) pain medicine. It is important to manage your pain, but opioids are not always the best choice. You should first try all the other options your care team gave you. Take this medicine for as short a time (and as few doses) as possible.    Some activities can increase your pain, such as bandage changes or therapy sessions. It may help to take your pain medicine 30 to 60 minutes before these activities. Reduce your stress by getting enough sleep, working on hobbies you enjoy and practicing relaxation or meditation. Talk to  your care team about ways to manage your pain beyond prescription opioids.    These medicines have risks:    DO NOT drive when on new or higher doses of pain medicine. These medicines can affect your alertness and reaction times, and you could be arrested for driving under the influence (DUI). If you need to use opioids long-term, talk to your care team about driving.    DO NOT operate heavy machinery    DO NOT do any other dangerous activities while taking these medicines.    DO NOT drink any alcohol while taking these medicines.     If the opioid prescribed includes acetaminophen, DO NOT take with any other medicines that contain acetaminophen. Read all labels carefully. Look for the word  acetaminophen  or  Tylenol.  Ask your pharmacist if you have questions or are unsure.    You can get addicted to pain medicines, especially if you have a history of addiction (chemical, alcohol or substance dependence). Talk to your care team about ways to reduce this risk.    All opioids tend to cause constipation. Drink plenty of water and eat foods that have a lot of fiber, such as fruits, vegetables, prune juice, apple juice and high-fiber cereal. Take a laxative (Miralax, milk of magnesia, Colace, Senna) if you don t move your bowels at least every other day. Other side effects include upset stomach, sleepiness, dizziness, throwing up, tolerance (needing more of the medicine to have the same effect), physical dependence and slowed breathing.    Store your pills in a secure place, locked if possible. We will not replace any lost or stolen medicine. If you don t finish your medicine, please throw away (dispose) as directed by your pharmacist. The [place] Pollution [employer] has more information about safe disposal: https://www.pca.[facility].[place].us/living-green/managing-unwanted-medications             Medication List: This is a list of all your medications and when to take them. Check marks below indicate your daily home  "schedule. Keep this list as a reference.      Medications           Morning Afternoon Evening Bedtime As Needed    * acetaminophen 500 MG tablet   Commonly known as:  TYLENOL   Take 500 mg by mouth every 6 hours as needed for mild pain   Last time this was given:  500 mg on 9/19/2018 10:03 AM                                * acetaminophen 650 MG Suppository   Commonly known as:  TYLENOL   Place 1 suppository (650 mg) rectally every 4 hours as needed for fever                                Aluminum-Magnesium-Simethicone 200-200-20 MG/5ML Susp   Take 5 mLs by mouth daily as needed                                aspirin 81 MG tablet   1 TABLET EVERY MORNING                                atropine 1 % Soln   Place 2-4 drops under the tongue every 2 hours as needed for secretions                                Benzethonium Chloride 0.1 % Liqd   Cleanse with wound cleanser and apply barrier paste and hydrocolloid dressing, change every 2-3 days as needed                                bisacodyl 10 MG Suppository   Commonly known as:  DULCOLAX   Place 1 suppository (10 mg) rectally daily as needed for constipation                                clindamycin 1 % lotion   Commonly known as:  CLEOCIN T   Apply twice daily for 6 weeks to the groin   Last time this was given:  9/19/2018 10:03 AM                                COMPRESSION STOCKINGS   1 each continuous prn Bilateral knee high compression stockings                                Gauze Bandage 4\" Misc   Cleanse with wound cleanser and apply barrier paste and hydrocolloid dressing, change every 2-3 days as needed                                haloperidol 2 MG/ML (HIGH CONC) solution   Commonly known as:  HALDOL   Take 0.25-0.5 mLs (0.5-1 mg) by mouth 2 times daily                                hydrocortisone valerate 0.2 % ointment   Commonly known as:  WEST-NINOSKA   Apply sparingly to affected area three times daily as needed.   Last time this was given:  9/19/2018 " 10:03 AM                                lidocaine-prilocaine cream   Commonly known as:  EMLA   Apply 1 Application topically as needed                                LORazepam 0.5 MG tablet   Commonly known as:  ATIVAN   Take 1 tablet (0.5 mg) by mouth every 4 hours as needed (Anxiety, Nausea/Vomiting or Sleep)                                morphine sulfate (high concentrate) 20 MG/ML concentrated solution   Commonly known as:  ROXANOL-CONCENTRATED   Take 0.125-0.25 mLs (2.5-5 mg) by mouth every 2 hours as needed for shortness of breath / dyspnea or breakthrough pain                                nitroGLYcerin 0.4 MG sublingual tablet   Commonly known as:  NITROSTAT   Place 1 tablet (0.4 mg) under the tongue every 5 minutes as needed up to 3 tablets per episode.                                ondansetron 8 MG tablet   Commonly known as:  ZOFRAN   Take 1 tablet (8 mg) by mouth every 8 hours as needed (Nausea/Vomiting)                                order for DME   Equipment being ordered: wheeled walker with seat                                prochlorperazine 10 MG tablet   Commonly known as:  COMPAZINE   Take 1 tablet (10 mg) by mouth every 6 hours as needed for nausea or vomiting                                senna-docusate 8.6-50 MG per tablet   Commonly known as:  SENOKOT-S;PERICOLACE   Take 1-2 tablets by mouth 2 times daily   Last time this was given:  1 tablet on 9/19/2018 10:03 AM                                simethicone 80 MG chewable tablet   Commonly known as:  MYLICON   Take 1-2 tablets ( mg) by mouth every 6 hours as needed for cramping                                zinc oxide - white petrolatum 20-51% Pste topical paste   Cleanse with wound cleanser and apply barrier paste and hydrocolloid dressing, change every 2-3 days as needed                                * Notice:  This list has 2 medication(s) that are the same as other medications prescribed for you. Read the directions carefully,  and ask your doctor or other care provider to review them with you.

## 2018-09-17 NOTE — LETTER
Transition Communication Hand-off for Care Transitions to Next Level of Care Provider    Name: Francisco Javier Cortes  : 1940  MRN #: [id number]  Primary Care Provider: Florian Alonzo     Primary Clinic: [address]  [facility] [address]     Reason for Hospitalization:  Failure to Thrive  Hypoxia  Mantle cell Lymphoma  Mantle cell lymphoma (H)  Admit Date/Time: 2018  6:14 PM  Discharge Date: 18  Payor Source: Payor: MEDICARE / Plan: MEDICARE / Product Type: Medicare /     Readmission Assessment Measure (ASHLEY) Risk Score/category: Extreme         Reason for Communication Hand-off Referral: Admitting to LTC and electing hospice    Discharge Plan:    Social Work Services Discharge Note      Patient Name:  Francisco Javier Cortes     Anticipated Discharge Date:  18    Discharge Disposition:   LT:  [place] [id number]  Main: [phone], Fax: [phone]  Admissions: [phone]  **For Nurse to Nurse, please call: [phone]**    Hospice: [facility] [phone]    Following MD:  Per facilities designation     Pre-Admission Screening (PAS) online form has been completed.  The Level of Care (LOC) is:  Determined  Confirmation Code is:  [id number]  Patient/caregiver informed of referral to [facility] for Pre-Admission Screening for skilled nursing facility (SNF) placement and to expect a phone call post discharge from SNF.     Additional Services/Equipment Arranged:   arranged for stretcher transport through [facility] (Ph: [phone]) for p/u today at 4PM with O2; Stretcher transport required r/t hospice and O2 needs. Pt/wife aware that this may not be covered by insurance and could potentially be a private fee. PCS Form completed.    Per Zulma in Admissions, they will require a downpayment of $5,000 upon arrival to LT. Pt will be in a private room and they will waive the private room fee. Pt/wife aware of  downpayment and have agreed to this.     Patient / Family response to discharge plan:  In agreement     Concern for non-adherence with plan of care:   Y/N N  Discharge Needs Assessment:      Already enrolled in Tele-monitoring program and name of program:  N/A  Follow-up specialty is recommended: No    Follow-up plan:  Future Appointments  Date Time Provider Department Center   10/10/2018 10:15 AM UCXR1 [facility]     PACO Lock, LGSW  7C Surgical Oncology Unit  - Covering for Unit 7D Hematology/Oncology  Phone: [phone]  Pager: [phone]

## 2018-09-17 NOTE — IP AVS SNAPSHOT
[address] BMT [address] [address]    Phone:  [phone]    Fax:  [phone]                                       After Visit Summary   9/17/2018    Francisco Javier Cortes    MRN: [id number]           After Visit Summary Signature Page     I have received my discharge instructions, and my questions have been answered. I have discussed any challenges I see with this plan with the nurse or doctor.    ..........................................................................................................................................  Patient/Patient Representative Signature      ..........................................................................................................................................  Patient Representative Print Name and Relationship to Patient    ..................................................               ................................................  Date                                   Time    ..........................................................................................................................................  Reviewed by Signature/Title    ...................................................              ..............................................  Date                                               Time          22EPIC Rev 08/18

## 2018-09-17 NOTE — PROGRESS NOTES
..Patient admitted to:5c  Admitted from:[facility]  Arrived by:paramedics  Reason for admission:hospice initiative  Patient accompanied by:paramedics  Belongings:clothing  Teaching:admission

## 2018-09-17 NOTE — NURSING NOTE
Received request from Dr. Adan to assist with arranging for hospital admission for patient to [facility].  Direct admission arranged with unit 7D.  Nursing report called to 7D charge RNBree.  Patient transported to [facility] via ambulance.  Port-a-cath accessed and heparin locked prior to patient leaving clinic.  Nursing report also given to EMS staff.    Cam Conner RN, BSN, OCN  Oncology Care Coordinator  [facility]

## 2018-09-18 NOTE — PROGRESS NOTES
CLINICAL NUTRITION SERVICES - ASSESSMENT NOTE     Nutrition Prescription    Malnutrition Status:    Severe malnutrition in the context of chronic illness    Recommendations already ordered by Registered Dietitian (RD):  Room Service Appropriate with Assist and Benecalorie prn.     Future/Additional Recommendations:  1.  Monitor GOC/POC for appropriateness to pursue aggressive nutrition intervention.  2.  Ongoing poor oral intake. Down 29 lbs x 2 months, so likely to benefit from nutrition support.   3.  Continue diet and supplements, as ordered. Rec try an appetite stimulant (marinol, megace, etc).   4.  Calorie counts if continuing aggressive cares. Rec pt consume a minimum of 1200 kcal, 75g PRO daily.   5.  If PN, rec: use dosing wt 58 kg, 960 mL with initial 120g Dex, 90g AA, and lipids 5x/wk. Advance Dex gradually to goal of 250g Dex = 1567 kcals (27 kcal/kg), 1.5g PRO/kg, GIR 3, 22% fat kcals.  6.  If EN, rec: TwoCal HN @ goal 40 mL/hr + 2 pkt ProSource TF daily = 2008 kcals (35 kcal/kg), 103 g PRO (1.8 g/kg).   7.  Monitor skin and WOC assessments. If pt has a stage II pressure injury (or greater) rec wound-healing vitamin protocol.   8.  If lytes trend low, aggressively replace.          REASON FOR ASSESSMENT  Francisco Javier Cortes is a/an 78 year old male assessed by the dietitian for Admission Nutrition Risk Screen for reduced oral intake over the last month    NUTRITION HISTORY  Information obtained from the pt and his family (at bedside).   -Following a general/regular diet.   -Intake/tolerance: poor.   -Pt with ongoing poor appetite (>6 months) r/t side effects of chemo/cancer treatment.   -Stated that he does not eat following chemo and his appetite usually recovers slowly.   -Indicated that he does not want boost/ensure, dislikes the taste, and is fatigued with drinking them.   -Enjoys pasta, baked beans, soft chicken, and milkshakes. Has trouble swallowing dry food and large pills.     CURRENT NUTRITION  "ORDERS  Diet: Regular  Intake/Tolerance: Ate 50% of breakfast tray this morning (oatmeal, coffee, orange, and rice crispy bar).     LABS  Labs reviewed  Ketones +10 -- may be indicative of prolonged poor oral intake    MEDICATIONS  Medications reviewed  NaCl @ 50 mL/hr  Senna-Docusate BID    ANTHROPOMETRICS  Ht Readings from Last 1 Encounters:   09/06/18 1.651 m (5' 5\")   Most Recent Weight: 58.2 kg (128 lb 3.2 oz)    IBW: 61.8 kg (94%)  BMI: Normal BMI  Weight History: Weight down 29 lbs (18%) in 2 months - severe weight loss.   Wt Readings from Last 20 Encounters:   09/18/18 58.2 kg (128 lb 3.2 oz)   09/07/18 58.3 kg (128 lb 9.6 oz)   09/06/18 66.7 kg (147 lb)   09/05/18 64.5 kg (142 lb 3 oz)   08/31/18 64.2 kg (141 lb 8 oz)   08/29/18 64.2 kg (141 lb 8 oz)   08/28/18 61.9 kg (136 lb 7.4 oz)   08/24/18 68.4 kg (150 lb 12.8 oz)   08/22/18 68.5 kg (151 lb 1.6 oz)   08/22/18 68.8 kg (151 lb 11.2 oz)   08/20/18 64.7 kg (142 lb 11.2 oz)   08/08/18 67.1 kg (148 lb)   08/03/18 67.1 kg (148 lb)   07/23/18 72.1 kg (159 lb 0.3 oz)   07/17/18 71.5 kg (157 lb 11.2 oz)   07/16/18 71.3 kg (157 lb 1.6 oz)   07/10/18 69.9 kg (154 lb)   07/05/18 72.1 kg (158 lb 14.4 oz)   06/26/18 72.6 kg (160 lb)   06/25/18 72.7 kg (160 lb 4.8 oz)       Dosing Weight: 58 kg (actual) - driest wt this admission of 58.2 kg (9/18/18)    ASSESSED NUTRITION NEEDS  Estimated Energy Needs: 5523-9618 kcals/day (30 - 35 kcals/kg)  Justification: Repletion (min 25 kcal/kg for PN)  Estimated Protein Needs:  grams protein/day (1.5 - 2 grams of pro/kg)  Justification: Repletion  Estimated Fluid Needs: 1 mL/kcal    Justification: Maintenance    PHYSICAL FINDINGS  See malnutrition section below.  Non-healing wound on coccyx (WOC RN assessment is pending).     MALNUTRITION  % Intake: < 75% for >/= 1 month (non-severe)  % Weight Loss: > 7.5% in 3 months (severe)  Subcutaneous Fat Loss: Facial region, Upper arm, Lower arm and Thoracic/intercostal:  " Moderate  Muscle Loss: Facial & jaw region, Upper arm (bicep, tricep) and Lower arm  (forearm):  Moderate  Fluid Accumulation/Edema: None noted  Malnutrition Diagnosis: Severe malnutrition in the context of chronic illness    NUTRITION DIAGNOSIS  Inadequate oral intake r/t decreased appetite r/t cancer treatment AEB suspect intakes meeting <75% of nutrition needs for >1 month, 18% loss of bw x 2 months, muscle/fat loss, and Ketones +10.     INTERVENTIONS  Implementation  Nutrition Education: RD role, reviewed nutrition supplements, and room service options   Collaboration with other providers: ordered room service appropriate with assist  Medical food supplement therapy: benecalorie prn, supplements prn.     Goals  Patient to consume % of nutritionally adequate meal trays TID, or the equivalent with supplements/snacks.     Monitoring/Evaluation  Progress toward goals will be monitored and evaluated per protocol.    Segun Edmonds RD, LD  5C/BMT Dietitian  Pager: [phone]

## 2018-09-18 NOTE — PROGRESS NOTES
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        Orders Written  WO Nurse follow-up plan:weekly  Nursing to notify the Provider(s) and re-consult the WO Nurse if wound(s) deteriorates or new skin concern.    Patient History  According to provider note(s):  Francisco Javier Cortes is a 78 year old male most recently with a diagnosis of mantle cell lymphoma, s/p 6 cycles of bendamustine/rituximab, who has nevertheless had incomplete metabolic response, residual LAD in neck, axillae, mediastinum, bilateral hilum, mesenteric, and bilateral inguinal regions along with FDG avid omental caking suggesting peritoneal carcinomatosis, as well as bilateral recurrent chylous pleural effusions,s/p bilateral pleurx who is admitted for declining energy, increased fatigue, poor po intake, and considerations for palliative care options and hospice.      Objective Data  Containment of urine/stool: Diaper and Incontinence Protocol    Active Diet Order    Active Diet Order      Regular Diet Adult    Output:   I/O last 3 completed shifts:  In: 1935 [P.O.:360; I.V.:975; IV Piggyback:600]  Out: 1550 [Urine:300; Chest Tube:1250]    Risk Assessment:   Sensory Perception: 3-->slightly limited  Moisture: 3-->occasionally moist  Activity: 3-->walks occasionally  Mobility: 3-->slightly  limited  Nutrition: 2-->probably inadequate  Friction and Shear: 1-->problem  Nicholas Score: 15                          Labs:   Recent Labs  Lab 09/18/18  0424 09/17/18 2046   ALBUMIN 1.5* 1.6*   HGB 9.6* 10.1*   INR  --  1.03   WBC 12.4* 12.9*       Physical Exam  Skin inspection: focused Coccyx and sacral areas and perianal       Perianal skin breakdown 9/18/18        Sacrococcygeal areas breakdown 9/18/18  Wound Location: Coccyx and sacral   Date of last photo 9/18/2018  Wound History: IAD skin breakdown  Wound Base:  **% blanchable erythema  Tunneling N/A  Undermining N/A  Palpation of the wound bed: normal   Periwound skin: denuded  Color: red  Temperature: normal   Drainage:, moist areas vs drainage   Odor: none  Pain: , throbbing, shooting and constant    Interventions  Current support surface: Standard  Atmos Air mattress  Current off-loading measures: Pillows under calves  Visual inspection of wound(s) completed  Wound Care: done per plan of care  Supplies: gathered and placed at the bedside  Education provided: plan of care  Discussed plan of care with Patient and Family    Reva Hernandez MSN, RN, CNP-FNP, CWOCN

## 2018-09-18 NOTE — PROGRESS NOTES
Care Coordinator Progress Note    Admission Date/Time:  9/17/2018  Attending MD:  Lee Ann Harp MD    Data  Chart reviewed, discussed with interdisciplinary team.     Concerns with insurance coverage for discharge needs: None.  Current Living Situation: Patient lives with spouse.  Support System: Supportive and Involved  Services Involved: skilled RN, PT, OT through [facility]. Chronic home oxygen- DME company unknown  Transportation at Discharge: TBD- family vs non-emergent medical transport  Barriers to Discharge: chronically ill and physical impairment     Assessment  Patient with mantle cell lymphoma admitted from clinic with FTT. Patient has bilateral pleurx catheters and chronic use of supplemental oxygen.    Coordination of Care and Referrals:  Patient was open to [facility] for skilled RN, PT, OT prior to admission. RNCC and SW met with patient and his wife, Court at bedside for collaborative meeting surrounding discharge and disposition options. Court requested time to discuss goals of care and preferences with patient and their adult children as well as await PT eval, before making a decision.  RNCC will continue to follow plan of care and assist in discharge planning as appropriate.     Plan  Anticipated Discharge Date:  TBD  Anticipated Discharge Plan:  TBCAROL RODRIGUES Heme/Onc RN Care Coordinator  Pager [phone]

## 2018-09-18 NOTE — PROGRESS NOTES
Social Work Services Progress Note    Hospital Day: 1  Date of Initial Social Work Evaluation:  Not yet completed  Collaborated with:  Patient, Pt's wife (Court), HemOnc GO (Johana), RNCC (Miriam), SNF's (see below), Chart Review    Data:  SW was asked to meet with Pt/wife to discuss discharge options.    Intervention:  SW and RNCC (Miriam) met with Pt/wife at bedside today to introduce ourselves, SW/RNCC roles and discuss discharge planning options. RNCC discussed HHC vs. Palliative HHC options for discharge as well as potentially private duty HHC; SW explained TCU vs. LTC vs. LTC with hospice vs. Hospice Home with Hospice vs. Home with Hospice. SW explained to Pt/wife the cost associated with placement in that Pt's insurance does not cover the cost of room and board; Pt/wife expressed understanding that it would be private pay in the event placement is pursued. SW did explain the insurance coverage for TCU placement if Pt/wife wanted to pursue that avenue as well. SW and RNCC answered questions as able. Wife requested time to discuss GOC and discharge preferences with medical team, adult children and wanted to see Pt participate in PT/OT before making a decision for discharge planning.    Pt/wife did indicate that in the event placement is pursued for either TCU or LTC, their SNF preferences would be:  [facility]  [facility]    SW notified by [facility] GO (Johana) later today that Pt/wife are wanting to pursue hospice in a LTC facility. SW faxed referrals to the above.    SW will need to address hospice agency of choice with Pt/wife tomorrow.    Assessment:  See bedside RN and medical team notes.    Plan:    Anticipated Disposition:  SNF/LTC with hospice    Barriers to d/c plan:  Placement, coordination of hospice services    Follow Up:  SW to continue to follow and assist with discharge plan.    PACO Lock, LGSW   Surgical Oncology Unit  -  Covering for Unit 7D Hematology/Oncology  Phone: [phone]  Pager: [phone]

## 2018-09-18 NOTE — H&P
[facility], [place] -- History and Physical -- Hematology / Oncology  Date of Admission:  9/17/2018 -- Date of Service (when I saw the patient): 09/17/18    ASSESSMENT & PLAN  Francisco Javier Cortes is a 78 year old male most recently with a diagnosis of mantle cell lymphoma, s/p 6 cycles of bendamustine/rituximab, who has nevertheless had incomplete metabolic response, residual LAD in neck, axillae, mediastinum, bilateral hilum, mesenteric, and bilateral inguinal regions along with FDG avid omental caking suggesting peritoneal carcinomatosis, as well as bilateral recurrent chylous pleural effusions,s/p bilateral pleurx who is admitted for declining energy, increased fatigue, poor po intake, and considerations for palliative care options and hospice.     # Mantle cell lymphoma - presented to PMD with abdominal bloating/discomfort and loss of appetite x 2 months, CT 2/25/18 showed extensive intraabdominal and inguinal LAD with splenomegaly. FNA of lymph node and excisional biopsy in March 2018 c/w mantle cell lymphoma, blastoid variant. Proliferation index 50%. Marrow biopsy showed bone marrow involvement 40-50%. FISH + for t(11;14) consistent with mantle cell lymphoma. PET CT confirmed extensive hypermetabolic adenopathy. s/p 6 cycles of bendamustine/rituximab, who has nevertheless had incomplete metabolic response, residual LAD in neck, axillae, mediastinum, bilateral hilum, mesenteric, and bilateral inguinal regions along with FDG avid omental caking suggesting peritoneal carcinomatosis, as well as bilateral recurrent chylous pleural effusions,s/p bilateral pleurx.   - Discussed in clinic with Dr. Adan re: poor prognosis and how second line therapy would likely be challenging.  - Patient informed in clinic re: recommednation to pursue palliative care and hospice care.   - Palliative consult in the AM    # Poor appetite - likely from refractory mantle cell lymphoma, involvement in the abdomen  #  Slight hypotension  - Improved with 500 ml NS bolus at admission  - Continue NS @ 50ml/hr for now  - Consider nutrition consultation in the AM    # Incontinence of bladder and bladder over last few weeks  - He attributes this to his last chemotherapy   - Declines immodium, but he wants to be on stool softeners - I ordered this     # Pleural effusions - bilateral pleurx catheter in place  # Hyperuricemia - continue allopurinol for now, will check uric acid in AM.  # Sacral wound - will need eval by WOC in AM, barrier cream to area    # GERD- continue prevacid  # Bilateral pleural effusion - continue lasix 20mg daily for now    # Pain Assessment:  Current Pain Score 8/31/2018   Patient currently in pain? denies   Pain score (0-10) -   Pain location -   Pain descriptors -   Francisco Javier diane pain level was assessed and he currently denies pain.        FEN  - IVFs: NS @ 50ml/hr  - Diet: regular, but will need nutrition consult  - Daily KCl to go with his lasix  - electrolyte repletion if needed    PPX  - DVT: mechanical for now  - Bowel: Senna-Colace    Lines/Drains: port, pleurx x 2  Consults: palliative in the AM  CODE: full code for now  DISPO: inpatient >2 nights for symptom control and work up for malnutrition, as well as setting up palliative care services    CHIEF COMPLAINT/REASON FOR ADMIT: general decilne in functional status    HISTORY OF PRESENT ILLNESS  History obtained from chart review and discussion with the patient.     Francisco Javier Cortes is a 78 year old gentleman, now with refractory extensive mantle cell lymphoma despite treatment with bendamustine and rituximab, as well as bilateral chylous pleural effusions who has been admitted because of decrease in function and poor appetite. He also has a history of prostate cancer s/p prostatectomy, CAD s/p stent in past. He presented to his oncologist's office today to discuss worsening generalized fatigue over the last few days to weeks. He has continue to have his pleurx  catehter drained every 3 days before it reaccumulates and his breathing is at baseline with a 3-4 L/min O2 requirement via NC. He has not been able to eat much at all in the last few days. He has been feeling dehydrated because of this. He denies diarrhea but has frequent small stools, as well as incontinence of urine and stool over the last few weeks. He discussed this with his oncologist who referred him to be admitted directly for further work up and palliative care.  He continues to take care of his wound on his back, no concerns.      PMH  Past Medical History:   Diagnosis Date     CAD (coronary artery disease) 1/09    s/p stentsx2     Coronary artery disease involving native coronary artery of native heart without angina pectoris 12/22/2015     Dyslipidemia      Erectile dysfunction      GERD (gastroesophageal reflux disease)      Hearing problem One ear 1961     Hypertension      NSTEMI (non-ST elevated myocardial infarction) (H) 3/20/2014     Pneumonia      Prostate cancer (H)     prostatecomy 9 years ago, PSAs remain good     Status post percutaneous transluminal coronary angioplasty-Coronary angioplasty with STEPHEN to LCx 4/4/2014     Stented coronary artery     stents placed       [facility]  Past Surgical History:   Procedure Laterality Date     BIOPSY LYMPH NODE CERVICAL Left 3/22/2018    Procedure: BIOPSY LYMPH NODE CERVICAL;  Excisional Biopsy Left Cervical Lymph Node ;  Surgeon: Samia Gaviria MD;  Location: [facility] OR     C APPENDECTOMY       C REMV PROSTATE,RETROPUB,RAD,TOT NODES  1999     CATARACT IOL, RT/LT       COLONOSCOPY       COLONOSCOPY Left 7/5/2016    Procedure: COMBINED COLONOSCOPY, SINGLE OR MULTIPLE BIOPSY/POLYPECTOMY BY BIOPSY;  Surgeon: Duane, William Charles, MD;  Location: [facility] OR     COLONOSCOPY WITH CO2 INSUFFLATION N/A 7/5/2016    Procedure: COLONOSCOPY WITH CO2 INSUFFLATION;  Surgeon: Duane, William Charles, MD;  Location: [facility] OR     ENT SURGERY  1980--1999     PHACOEMULSIFICATION CLEAR  "CORNEA WITH STANDARD INTRAOCULAR LENS IMPLANT Left 2015    Procedure: PHACOEMULSIFICATION CLEAR CORNEA WITH STANDARD INTRAOCULAR LENS IMPLANT;  Surgeon: John Zimmerman MD;  Location:  [facility]     PHACOEMULSIFICATION CLEAR CORNEA WITH STANDARD INTRAOCULAR LENS IMPLANT Right 2015    Procedure: PHACOEMULSIFICATION CLEAR CORNEA WITH STANDARD INTRAOCULAR LENS IMPLANT;  Surgeon: John Zimmerman MD;  Location:  [facility]     STENT, CORONARY, [place]  1/09, 2014    x2, x1 in      THORACENTESIS Bilateral 2018    Procedure: THORACENTESIS;  Thoracentesis Bilateral;  Surgeon: Michelle Leroy PA-C;  Location: [facility] OR     THORACENTESIS Bilateral 2018    Procedure: THORACENTESIS;  Thoracentesis;  Surgeon: Dean Marcus PA-C;  Location:  OR     TYMPANOPLASTY       VASCULAR SURGERY      stents       Prior to Admission MEDICATIONS  Prior to Admission Medications   Prescriptions Last Dose Informant Patient Reported? Taking?   ASPIRIN 81 MG OR TABS 2018 at Unknown time  Yes Yes   Si TABLET EVERY MORNING   Alum & Mag Hydroxide-Simeth (ALUMINUM-MAGNESIUM-SIMETHICONE) 200-200-20 MG/5ML SUSP 2018 at Unknown time  Yes Yes   Sig: Take 5 mLs by mouth daily as needed   Benzethonium Chloride 0.1 % LIQD 2018 at Unknown time  No Yes   Sig: Cleanse with wound cleanser and apply barrier paste and hydrocolloid dressing, change every 2-3 days as needed   COMPRESSION STOCKINGS Past Week at Unknown time  No Yes   Si each continuous prn Bilateral knee high compression stockings   Gauze Pads & Dressings (GAUZE BANDAGE 4\") MISC 2018 at Unknown time  No Yes   Sig: Cleanse with wound cleanser and apply barrier paste and hydrocolloid dressing, change every 2-3 days as needed   LANsoprazole (PREVACID) 15 MG CR capsule 2018 at Unknown time  Yes Yes   Sig: Take 20 mg by mouth daily Patient needs to use brand name Prevacid (generic causes diarrhea)    LORazepam (ATIVAN) 0.5 MG tablet " 9/16/2018 at Unknown time  No Yes   Sig: Take 1 tablet (0.5 mg) by mouth every 4 hours as needed (Anxiety, Nausea/Vomiting or Sleep)   Multiple Vitamin (MULTI-VITAMIN DAILY PO) 9/17/2018 at Unknown time  Yes Yes   Sig: Take 1 tablet by mouth   acetaminophen (TYLENOL) 500 MG tablet 9/17/2018 at Unknown time  Yes Yes   Sig: Take 500 mg by mouth every 6 hours as needed for mild pain   allopurinol (ZYLOPRIM) 300 MG tablet 9/17/2018 at Unknown time  No Yes   Sig: Take 1 tablet (300 mg) by mouth daily   clindamycin (CLEOCIN T) 1 % lotion 9/16/2018 at Unknown time  No Yes   Sig: Apply twice daily for 6 weeks to the groin   furosemide (LASIX) 20 MG tablet Past Week at Unknown time  No Yes   Sig: Take 1 tablet (20 mg) by mouth daily   hydrocortisone valerate (WEST-NINOSKA) 0.2 % ointment 9/16/2018 at Unknown time  No Yes   Sig: Apply sparingly to affected area three times daily as needed.   lidocaine-prilocaine (EMLA) cream 9/16/2018 at Unknown time  Yes Yes   Sig: Apply 1 Application topically as needed   nitroGLYcerin (NITROSTAT) 0.4 MG sublingual tablet   No No   Sig: Place 1 tablet (0.4 mg) under the tongue every 5 minutes as needed up to 3 tablets per episode.   ondansetron (ZOFRAN) 8 MG tablet 9/16/2018 at Unknown time  No Yes   Sig: Take 1 tablet (8 mg) by mouth every 8 hours as needed (Nausea/Vomiting)   order for DME 9/17/2018 at Unknown time  No Yes   Sig: Equipment being ordered: wheeled walker with seat   potassium chloride SA (K-DUR/KLOR-CON M) 10 MEQ CR tablet 9/17/2018 at Unknown time  No Yes   Sig: Take 1 tablet (10 mEq) by mouth daily   prochlorperazine (COMPAZINE) 10 MG tablet 9/17/2018 at Unknown time  No Yes   Sig: Take 1 tablet (10 mg) by mouth every 6 hours as needed for nausea or vomiting   rosuvastatin (CRESTOR) 40 MG tablet 9/16/2018 at Unknown time  No Yes   Sig: Take 1 tablet (40 mg) by mouth daily   zinc oxide - white petrolatum (CRITIC-AID THICK MOIST BARRIER) 20-51% PSTE topical paste 9/17/2018 at  Unknown time  No Yes   Sig: Cleanse with wound cleanser and apply barrier paste and hydrocolloid dressing, change every 2-3 days as needed      Facility-Administered Medications: None     Allergies   Allergies   Allergen Reactions     Niacin      Severe rash and itching     Shellfish Allergy Nausea and Vomiting     Scallops only     Prevacid [Lansoprazole] Diarrhea     Patient notes diarrhea with 30mg generic version. Patient does ok with 20mg in generic and brand name.       Social History  Social History     Social History     Marital status:      Spouse name: N/A     Number of children: N/A     Years of education: N/A     Occupational History     Not on file.     Social History Main Topics     Smoking status: Never Smoker     Smokeless tobacco: Never Used     Alcohol use Yes      Comment: a glass of red wine a day     Drug use: No     Sexual activity: Not Currently     Partners: Female     Birth control/ protection: Post-menopausal     Other Topics Concern      Service Yes     [employer]     Blood Transfusions No     Caffeine Concern No     Occupational Exposure Yes          Hobby Hazards No     Sleep Concern No     Stress Concern No     Weight Concern No     Special Diet Yes     Back Care No     Exercise Yes     Seat Belt Yes     Self-Exams Yes     Parent/Sibling W/ Cabg, Mi Or Angioplasty Before 65f 55m? Yes     Social History Narrative       Family History  Family History   Problem Relation Age of Onset     Cardiovascular Mother      HEART DISEASE Mother      Cancer - colorectal Father      HEART DISEASE Father      Other Cancer Father      Cancer Father      Hypertension Father      Asthma Brother      Coronary Artery Disease Brother      Hypertension Brother      HEART DISEASE Brother      HEART DISEASE Son      Hypertension Son      Cancer Daughter      non hodgkins     Prostate Cancer Brother      Hypertension Brother      Cancer Brother      Prostate Cancer Maternal Grandfather       Cancer Maternal Grandfather      Muscular Disorder Maternal Grandfather      HEART DISEASE Paternal Grandmother      Hypertension Paternal Grandmother      Hypertension Sister      - Family history reviewed & no other pertinent oncologic/hematologic malignancy noted    ROS  Comprehensive ROS was performed and was negative unless otherwise noted in above HPI.    Physical Exam  Temp:  [98  F (36.7  C)-99.4  F (37.4  C)] 98.9  F (37.2  C)  Pulse:  [] 104  Heart Rate:  [103] 103  Resp:  [16-22] 16  BP: (79-95)/(53-65) 95/64  SpO2:  [80 %-100 %] 97 %  0 lbs 0 oz    Constitutional: Awake, alert, cooperative, in NAD. Chronically ill appearing thin  Eyes: PERRL, EOMI, sclera clear, conjunctiva normal.  ENT: Normocephalic, without obvious abnormality, sinuses nontender on palpation, oral pharynx with tacky mucus membranes  Respiratory: Non-labored breathing, decreased breath sounds throughout.   Cardiovascular: RRR, no murmur noted.  GI: + bowel sounds, soft, somewhatdistended, non-tender, no masses palpated, no hepatosplenomegaly.  Skin: No concerning lesions or rash on exposed areas.  Wound on sacral back dresssing in place  Musculoskeletal: No edema ever LEs.  Neurologic: Awake, alert & oriented x3.  Cranial nerves II-XII are grossly intact.   Psych: appropriate affect    DATA  Results for orders placed or performed during the hospital encounter of 09/17/18 (from the past 24 hour(s))   Lactic acid level STAT for sepsis protocol   Result Value Ref Range    Lactate for Sepsis Protocol 1.8 0.7 - 2.0 mmol/L   CBC with platelets differential   Result Value Ref Range    WBC 12.9 (H) 4.0 - 11.0 10e9/L    RBC Count 3.08 (L) 4.4 - 5.9 10e12/L    Hemoglobin 10.1 (L) 13.3 - 17.7 g/dL    Hematocrit 31.1 (L) 40.0 - 53.0 %     (H) 78 - 100 fl    MCH 32.8 26.5 - 33.0 pg    MCHC 32.5 31.5 - 36.5 g/dL    RDW 16.9 (H) 10.0 - 15.0 %    Platelet Count 107 (L) 150 - 450 10e9/L    Diff Method Automated Method     % Neutrophils  81.0 %    % Lymphocytes 13.9 %    % Monocytes 4.4 %    % Eosinophils 0.1 %    % Basophils 0.1 %    % Immature Granulocytes 0.5 %    Nucleated RBCs 0 0 /100    Absolute Neutrophil 10.4 (H) 1.6 - 8.3 10e9/L    Absolute Lymphocytes 1.8 0.8 - 5.3 10e9/L    Absolute Monocytes 0.6 0.0 - 1.3 10e9/L    Absolute Eosinophils 0.0 0.0 - 0.7 10e9/L    Absolute Basophils 0.0 0.0 - 0.2 10e9/L    Abs Immature Granulocytes 0.1 0 - 0.4 10e9/L    Absolute Nucleated RBC 0.0    Comprehensive metabolic panel   Result Value Ref Range    Sodium 134 133 - 144 mmol/L    Potassium 5.0 3.4 - 5.3 mmol/L    Chloride 104 94 - 109 mmol/L    Carbon Dioxide 23 20 - 32 mmol/L    Anion Gap 7 3 - 14 mmol/L    Glucose 75 70 - 99 mg/dL    Urea Nitrogen 29 7 - 30 mg/dL    Creatinine 1.11 0.66 - 1.25 mg/dL    GFR Estimate 64 >60 mL/min/1.7m2    GFR Estimate If Black 77 >60 mL/min/1.7m2    Calcium 7.8 (L) 8.5 - 10.1 mg/dL    Bilirubin Total 0.2 0.2 - 1.3 mg/dL    Albumin 1.6 (L) 3.4 - 5.0 g/dL    Protein Total 4.3 (L) 6.8 - 8.8 g/dL    Alkaline Phosphatase 94 40 - 150 U/L    ALT 24 0 - 70 U/L    AST 31 0 - 45 U/L   Magnesium   Result Value Ref Range    Magnesium 2.1 1.6 - 2.3 mg/dL   Phosphorus   Result Value Ref Range    Phosphorus 3.4 2.5 - 4.5 mg/dL   INR   Result Value Ref Range    INR 1.03 0.86 - 1.14   Fibrinogen activity   Result Value Ref Range    Fibrinogen 363 200 - 420 mg/dL       PCP & Hematologist/Oncologist  Florian Hein MD  Hematology/Oncology  September 17, 2018

## 2018-09-18 NOTE — PROGRESS NOTES
Brief Hematology Note  09/18/18  1:52 PM    Discussed hospice and comfort cares with patient and wife.  They are in agreement that we will proceed with this route.  We will change code status to DNR/DNI and get hospice RN involved today for search for inpatient hospice facility.    Abigail Bagley MD  Hematology-Oncology-Transplant Fellow

## 2018-09-18 NOTE — PROGRESS NOTES
[facility]  Patient is currently open to home care services with [facility].  The patient is currently receiving RN/PT/OT/HA services.  These disciplines are pending since last hospital admission.  No resumption of care was completed after previous hospitalization due to rehospitalization.  [facility]  and team have been notified of patient admission.  [facility] liaison will continue to follow patient during stay.  If appropriate provide orders to resume home care at time of discharge.    Thank you  Rosa Elena Kumar RN, BSN  [facility] Liaison  [facility]  [phone]

## 2018-09-18 NOTE — TELEPHONE ENCOUNTER
Hello,  last fill date:08-14-20181  Last quantity:10    Thank You,  Marisel Del Cid  Pharmacy Technician  [facility]  [phone]

## 2018-09-18 NOTE — PROGRESS NOTES
[facility], [place]    Hematology / Oncology Progress Note     Assessment & Plan      Francisco Javier Cortes is a 78 year old male most recently with a diagnosis of mantle cell lymphoma, s/p 6 cycles of bendamustine/rituximab, who has nevertheless had incomplete metabolic response, residual LAD in neck, axillae, mediastinum, bilateral hilum, mesenteric, and bilateral inguinal regions along with FDG avid omental caking suggesting peritoneal carcinomatosis, as well as bilateral recurrent chylous pleural effusions,s/p bilateral pleurx who is admitted for declining energy, increased fatigue, poor po intake, and considerations for palliative care options and hospice.    # Mantle cell lymphoma - presented to PMD with abdominal bloating/discomfort and loss of appetite x 2 months, CT 2/25/18 showed extensive intraabdominal and inguinal LAD with splenomegaly. FNA of lymph node and excisional biopsy in March 2018 c/w mantle cell lymphoma, blastoid variant. Proliferation index 50%. Marrow biopsy showed bone marrow involvement 40-50%. FISH + for t(11;14) consistent with mantle cell lymphoma. PET CT confirmed extensive hypermetabolic adenopathy. s/p 6 cycles of bendamustine/rituximab, who has nevertheless had incomplete metabolic response, residual LAD in neck, axillae, mediastinum, bilateral hilum, mesenteric, and bilateral inguinal regions along with FDG avid omental caking suggesting peritoneal carcinomatosis, as well as bilateral recurrent chylous pleural effusions,s/p bilateral pleurx.   - Discussed in clinic with Dr. Adan re: poor prognosis and how second line therapy would likely be challenging.  - Patient informed in clinic re: recommednation to pursue palliative care/ hospice care.   - Discussed patient's condition with patient and family that he is not strong enough to receive more chemotherapy and more chemotherapy would be more harmful than beneficial at this time.  SW/CC to discuss options with  "patient today, patient and wife agree that inpatient hospice care will be needed.   - code status changed to DNR/DNI   - will ask hospice liaison to see patient in AM.      # Poor appetite - likely from refractory mantle cell lymphoma, involvement in the abdomen    # Slight hypotension  - Improved with IVF, bolus PRN   - Continue NS @ 50ml/hr for now  - Consider nutrition consultation in the AM     # Incontinence of bladder and bladder over last few weeks  - He attributes this to his last chemotherapy   - Declines immodium, but he wants to be on stool softeners - I ordered this      # Pleural effusions - bilateral pleurx catheter in place  # Hyperuricemia - continue allopurinol for now, will check uric acid in AM.  # Sacral wound - will need eval by WOC in AM, barrier cream to area     # GERD- continue prevacid  # Bilateral pleural effusion - hold lasix 2/2 soft pressures     FEN  - IVFs: NS @ 50ml/hr  - Diet: regular, but will need nutrition consult  - Daily KCl to go with his lasix  - electrolyte repletion if needed     PPX  - DVT: mechanical for now  - Bowel: Senna-Colace     Lines/Drains: port, pleurx x 2  Consults: palliative in the AM  CODE: full code for now  DISPO: inpatient >2 nights for symptom control and work up for malnutrition, as well as setting up hospice/palliative care services       Disposition Plan   Expected discharge: 2 - 3 days, likely to hospice facility .     Entered: Johana Tadeo 09/18/2018, 2:11 PM   Information in the above section will display in the discharge planner report.    Discussed plan with Dr. Harp, staff attending.     Johana Tadeo, DNP, APRN, CNP  Hematology/oncology  [id number]    Interval History   Patient was seen and examined in his room this morning.  He denies headaches, chest pain, SOB, nausea/vomiting.  pleurx is due to be drained today per patient.  He reports that he has not had recent fevers, but has noted that he has been \"running all over the place\" over " "the past couple of weeks and has become \"more run down.\"  He was woken up multiple times overnight and wants to sleep.  He is frustrated that he had to wait three weeks prior to learning his CT results post treatment. Discussed palliative care/hospice care with patient and family today.  They are agreeable to meeting with SW to discuss logistics and think about their options.     Physical Exam   Temp: 97.7  F (36.5  C) Temp src: Axillary BP: (!) 82/61 Pulse: 103 Heart Rate: 102 Resp: 16 SpO2: 96 % O2 Device: None (Room air) Oxygen Delivery: 2 LPM  Vitals:    09/18/18 0849   Weight: 58.2 kg (128 lb 3.2 oz)     Vital Signs with Ranges  Temp:  [96.9  F (36.1  C)-99.4  F (37.4  C)] 97.7  F (36.5  C)  Pulse:  [103-116] 103  Heart Rate:  [] 102  Resp:  [16-18] 16  BP: (79-97)/(53-66) 82/61  SpO2:  [94 %-100 %] 96 %  I/O last 3 completed shifts:  In: 1425 [I.V.:925; IV Piggyback:500]  Out: 150 [Urine:150]    Constitutional: Awake, alert, frail appearing male, seen lying in bed. He is cooperative, no apparent distress, and appears stated age.   Eyes: Lids and lashes normal, pupils equal, round and reactive to light, extra ocular muscles intact, sclera clear, conjunctiva normal.  ENT: Normocephalic, without obvious abnormality, atraumatic.  Respiratory: No increased work of breathing. No oxygen. LS clear, no crackles or wheezes   Cardiovascular: Regular rate, regular rhythm, no murmurs.  GI: Normal bowel sounds, soft, non-distended, non-tender.  Musculoskeletal: No edema B/L LE. No obvious joint swelling or deformities.   Neurologic: A&O x4, cranial nerves II-XII appear to be grossly intact.  No focal deficits.   Neuropsychiatric: Calm, normal eye contact, alert, normal affect memory for past and recent events intact and thought process normal.      Medications     - MEDICATION INSTRUCTIONS -       sodium chloride 1,000 mL (09/17/18 2014)       sodium chloride 0.9%  500 mL Intravenous Once     allopurinol  300 mg Oral " Daily     clindamycin   Topical BID     heparin  5 mL Intracatheter Q28 Days     heparin lock flush  5-10 mL Intracatheter Q24H     hydrocortisone valerate   Topical TID     LANsoprazole  15 mg Oral Daily     potassium chloride SA  10 mEq Oral Daily     rosuvastatin  40 mg Oral QPM     senna-docusate  1 tablet Oral BID    Or     senna-docusate  2 tablet Oral BID       Data   Results for orders placed or performed during the hospital encounter of 09/17/18 (from the past 24 hour(s))   Lactic acid level STAT for sepsis protocol   Result Value Ref Range    Lactate for Sepsis Protocol 1.8 0.7 - 2.0 mmol/L   CBC with platelets differential   Result Value Ref Range    WBC 12.9 (H) 4.0 - 11.0 10e9/L    RBC Count 3.08 (L) 4.4 - 5.9 10e12/L    Hemoglobin 10.1 (L) 13.3 - 17.7 g/dL    Hematocrit 31.1 (L) 40.0 - 53.0 %     (H) 78 - 100 fl    MCH 32.8 26.5 - 33.0 pg    MCHC 32.5 31.5 - 36.5 g/dL    RDW 16.9 (H) 10.0 - 15.0 %    Platelet Count 107 (L) 150 - 450 10e9/L    Diff Method Automated Method     % Neutrophils 81.0 %    % Lymphocytes 13.9 %    % Monocytes 4.4 %    % Eosinophils 0.1 %    % Basophils 0.1 %    % Immature Granulocytes 0.5 %    Nucleated RBCs 0 0 /100    Absolute Neutrophil 10.4 (H) 1.6 - 8.3 10e9/L    Absolute Lymphocytes 1.8 0.8 - 5.3 10e9/L    Absolute Monocytes 0.6 0.0 - 1.3 10e9/L    Absolute Eosinophils 0.0 0.0 - 0.7 10e9/L    Absolute Basophils 0.0 0.0 - 0.2 10e9/L    Abs Immature Granulocytes 0.1 0 - 0.4 10e9/L    Absolute Nucleated RBC 0.0    Comprehensive metabolic panel   Result Value Ref Range    Sodium 134 133 - 144 mmol/L    Potassium 5.0 3.4 - 5.3 mmol/L    Chloride 104 94 - 109 mmol/L    Carbon Dioxide 23 20 - 32 mmol/L    Anion Gap 7 3 - 14 mmol/L    Glucose 75 70 - 99 mg/dL    Urea Nitrogen 29 7 - 30 mg/dL    Creatinine 1.11 0.66 - 1.25 mg/dL    GFR Estimate 64 >60 mL/min/1.7m2    GFR Estimate If Black 77 >60 mL/min/1.7m2    Calcium 7.8 (L) 8.5 - 10.1 mg/dL    Bilirubin Total 0.2 0.2 -  1.3 mg/dL    Albumin 1.6 (L) 3.4 - 5.0 g/dL    Protein Total 4.3 (L) 6.8 - 8.8 g/dL    Alkaline Phosphatase 94 40 - 150 U/L    ALT 24 0 - 70 U/L    AST 31 0 - 45 U/L   Magnesium   Result Value Ref Range    Magnesium 2.1 1.6 - 2.3 mg/dL   Phosphorus   Result Value Ref Range    Phosphorus 3.4 2.5 - 4.5 mg/dL   INR   Result Value Ref Range    INR 1.03 0.86 - 1.14   Fibrinogen activity   Result Value Ref Range    Fibrinogen 363 200 - 420 mg/dL   CBC with platelets   Result Value Ref Range    WBC 12.4 (H) 4.0 - 11.0 10e9/L    RBC Count 2.90 (L) 4.4 - 5.9 10e12/L    Hemoglobin 9.6 (L) 13.3 - 17.7 g/dL    Hematocrit 29.0 (L) 40.0 - 53.0 %     78 - 100 fl    MCH 33.1 (H) 26.5 - 33.0 pg    MCHC 33.1 31.5 - 36.5 g/dL    RDW 17.3 (H) 10.0 - 15.0 %    Platelet Count 97 (L) 150 - 450 10e9/L   Comprehensive metabolic panel   Result Value Ref Range    Sodium 136 133 - 144 mmol/L    Potassium 4.6 3.4 - 5.3 mmol/L    Chloride 106 94 - 109 mmol/L    Carbon Dioxide 21 20 - 32 mmol/L    Anion Gap 9 3 - 14 mmol/L    Glucose 66 (L) 70 - 99 mg/dL    Urea Nitrogen 29 7 - 30 mg/dL    Creatinine 1.00 0.66 - 1.25 mg/dL    GFR Estimate 72 >60 mL/min/1.7m2    GFR Estimate If Black 87 >60 mL/min/1.7m2    Calcium 7.7 (L) 8.5 - 10.1 mg/dL    Bilirubin Total 0.3 0.2 - 1.3 mg/dL    Albumin 1.5 (L) 3.4 - 5.0 g/dL    Protein Total 4.2 (L) 6.8 - 8.8 g/dL    Alkaline Phosphatase 92 40 - 150 U/L    ALT 23 0 - 70 U/L    AST 35 0 - 45 U/L   Magnesium   Result Value Ref Range    Magnesium 2.1 1.6 - 2.3 mg/dL   Phosphorus   Result Value Ref Range    Phosphorus 3.5 2.5 - 4.5 mg/dL   Cortisol   Result Value Ref Range    Cortisol Serum 34.9 (H) 4 - 22 ug/dL   Glucose by meter   Result Value Ref Range    Glucose 92 70 - 99 mg/dL       Recent Labs  Lab 09/18/18  0424 09/17/18 2046   WBC 12.4* 12.9*   HGB 9.6* 10.1*    101*   PLT 97* 107*   INR  --  1.03    134   POTASSIUM 4.6 5.0   CHLORIDE 106 104   CO2 21 23   BUN 29 29   CR 1.00 1.11    ANIONGAP 9 7   EVANGELINA 7.7* 7.8*   GLC 66* 75   ALBUMIN 1.5* 1.6*   PROTTOTAL 4.2* 4.3*   BILITOTAL 0.3 0.2   ALKPHOS 92 94   ALT 23 24   AST 35 31     No results found for this or any previous visit (from the past 24 hour(s)).

## 2018-09-19 NOTE — DISCHARGE SUMMARY
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)    Dehydration    Mantle cell lymphoma (H)      History of Present Illness   Francisco Javier Cortes is a 78 year old male most recently with a diagnosis of mantle cell lymphoma, s/p 6 cycles of bendamustine/rituximab, who has nevertheless had incomplete metabolic response, residual LAD in neck, axillae, mediastinum, bilateral hilum, mesenteric, and bilateral inguinal regions along with FDG avid omental caking suggesting peritoneal carcinomatosis, as well as bilateral recurrent chylous pleural effusions,s/p bilateral pleurx who is admitted for declining energy, increased fatigue, poor po intake, and considerations for palliative care options and hospice. After discussion with patient and family, decision was made to change code status to DNR/DNI and make the transition to hospice.  Patient will be discharged today to inpatient hospice facility.   hospice will meet patient and family at facility tomorrow. Hospice meds sent to discharge pharmacy.        Hospital Course   Francisco Javier Cortes was admitted on 9/17/2018.  The following problems were addressed during his hospitalization:    # Mantle cell lymphoma - presented to PMD with abdominal bloating/discomfort and loss of appetite x 2 months, CT 2/25/18 showed extensive intraabdominal and inguinal LAD with splenomegaly. FNA of lymph node and excisional biopsy in March 2018 c/w mantle cell lymphoma, blastoid variant. Proliferation index 50%. Marrow biopsy  showed bone marrow involvement 40-50%. FISH + for t(11;14) consistent with mantle cell lymphoma. PET CT confirmed extensive hypermetabolic adenopathy. s/p 6 cycles of bendamustine/rituximab, who has nevertheless had incomplete metabolic response, residual LAD in neck, axillae, mediastinum, bilateral hilum, mesenteric, and bilateral inguinal regions along with FDG avid omental caking suggesting peritoneal carcinomatosis, as well as bilateral recurrent chylous pleural effusions,s/p bilateral pleurx.   - Discussed in clinic with Dr. Adan re: poor prognosis and how second line therapy would likely be challenging.  - Patient informed in clinic re: recommednation to pursue palliative care/ hospice care.   - code status changed to DNR/DNI   -  Pt will discharge today to inpatient hospice facility,  hospice will enroll patient tomorrow AM       #Severe malnutrition in the context of chronic illness . Evidenced by > 7.5% in 3 months.  - likely from refractory mantle cell lymphoma, involvement in the abdomen        # Incontinence of bladder and bladder over last few weeks  - He attributes this to his last chemotherapy   - Declines immodium, but he wants to be on stool softeners       # Pleural effusions - bilateral pleurx catheter in place  # Hyperuricemia - continue allopurinol for now, will check uric acid in AM.  # Sacral wound - WOC RN evaluated, see note for recs.     # GERD- continue prevacid  # Bilateral pleural effusion - hold lasix 2/2 soft pressures      FEN  - IVFs: no MIVF   - Diet: regular      PPX  - DVT: mechanical for now  - Bowel: Senna-Colace      Lines/Drains: port, pleurx x 2  Consults: palliative in the AM  CODE: DNR/DNI   DISPO:  pt to discharge today to inpatient hospice facility.     Discussed with staff attending, Dr. Bueno.     Johana Tadeo, DNP, APRN, CNP  Hematology/oncology  [id number]          Significant Results and Procedures   N/a     Pending Results   These results will be followed up by  n/a   Unresulted Labs Ordered in the Past 30 Days of this Admission     No orders found from 7/19/2018 to 9/18/2018.          Code Status   DNR / DNI    Primary Care Physician   Florian Alonzo    Physical Exam                     Vitals:    09/18/18 0849   Weight: 58.2 kg (128 lb 3.2 oz)     Vital Signs with Ranges     I/O last 3 completed shifts:  In: 600 [P.O.:600]  Out: 600 [Urine:600]  .  Constitutional: Awake, alert, frail appearing male, seen lying in bed. He is cooperative, no apparent distress, and appears stated age.   Eyes: Lids and lashes normal, pupils equal, round and reactive to light, extra ocular muscles intact, sclera clear, conjunctiva normal.  ENT: Normocephalic, without obvious abnormality, atraumatic.  Respiratory: No increased work of breathing. On 2 L o2.  oxygen. LS clear, no crackles or wheezes. PleurX catheter dressings c/d/i    Cardiovascular: Regular rate, regular rhythm, no murmurs.  GI: Normal bowel sounds, soft, non-distended, non-tender.  Musculoskeletal: No edema B/L LE. No obvious joint swelling or deformities.   Neurologic: A&O x4, cranial nerves II-XII appear to be grossly intact.  No focal deficits.   Neuropsychiatric: Calm, normal eye contact, alert, normal affect memory for past and recent events intact and thought process normal.     Time Spent on this Encounter   SAMUEL, Johana Tadeo, personally saw the patient today and spent greater than 30 minutes discharging this patient.    Discharge Disposition   Discharged to long-term care facility  Condition at discharge: Stable    Consultations This Hospital Stay   WOUND OSTOMY CONTINENCE NURSE  IP CONSULT    Discharge Orders     General info for SNF   Length of Stay Estimate: Short Term Care: Estimated # of Days 31-90  Condition at Discharge: Declining  Level of care:skilled   Rehabilitation Potential: Poor  Admission H&P remains valid and up-to-date: Yes  Recent Chemotherapy: N/A  Use Nursing Home Standing Orders: Yes      Mantoux instructions   Give two-step Mantoux (PPD) Per Facility Policy Yes     Reason for your hospital stay   You were admitted with weakness, progressive disease.  You are being discharged to hospice program.     Wound care   Plan of care for wound located on perianal, sacrococcygeal and groin wound and skin breakdown   GENTLY cleanse with Dia cleanse and protect using the soft reema WIPES ONLY,   and please refrain from use Premoistened wipes  Due to increase pain   Apply a layer of Vaseline to the  perianal, sacrococcygeal and followed by application of the criticaid barrier paste  perianal, sacrococcygeal and the bilateral groin every shift and as needed.     Follow Up and recommended labs and tests   Follow up with [facility].  No labs needed.     Activity - Up with nursing assistance     DNR/DNI     Fall precautions     Advance Diet as Tolerated   Follow this diet upon discharge: regular as tolerated       Discharge Medications   Current Discharge Medication List      START taking these medications    Details   acetaminophen (TYLENOL) 650 MG Suppository Place 1 suppository (650 mg) rectally every 4 hours as needed for fever  Qty: 2 suppository, Refills: prn    Associated Diagnoses: Hospice care patient      atropine 1 % SOLN Place 2-4 drops under the tongue every 2 hours as needed for secretions  Qty: 5 mL, Refills: prn    Associated Diagnoses: Hospice care patient      bisacodyl (DULCOLAX) 10 MG Suppository Place 1 suppository (10 mg) rectally daily as needed for constipation  Qty: 2 suppository, Refills: prn    Associated Diagnoses: Hospice care patient      haloperidol (HALDOL) 2 MG/ML (HIGH CONC) solution Take 0.25-0.5 mLs (0.5-1 mg) by mouth 2 times daily  Qty: 15 mL, Refills: prn    Associated Diagnoses: Hospice care patient      morphine sulfate, high concentrate, (ROXANOL-CONCENTRATED) 20 MG/ML concentrated solution Take 0.125-0.25 mLs (2.5-5 mg) by mouth every 2 hours as needed for shortness  of breath / dyspnea or breakthrough pain  Qty: 30 mL, Refills: 0    Associated Diagnoses: Hospice care patient      ondansetron (ZOFRAN) 8 MG tablet Take 1 tablet (8 mg) by mouth every 8 hours as needed (Nausea/Vomiting)  Qty: 30 tablet, Refills: prn    Associated Diagnoses: 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)      senna-docusate (SENOKOT-S;PERICOLACE) 8.6-50 MG per tablet Take 1-2 tablets by mouth 2 times daily  Qty: 100 tablet, Refills: prn    Associated Diagnoses: 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); Hospice care patient      simethicone (MYLICON) 80 MG chewable tablet Take 1-2 tablets ( mg) by mouth every 6 hours as needed for cramping  Qty: 180 tablet, Refills: 3    Associated Diagnoses: Hospice care patient         CONTINUE these medications which have CHANGED    Details   LORazepam (ATIVAN) 0.5 MG tablet Take 1 tablet (0.5 mg) by mouth every 4 hours as needed (Anxiety, Nausea/Vomiting or Sleep)  Qty: 30 tablet, Refills: 5    Associated Diagnoses: 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)         CONTINUE these medications which have NOT CHANGED    Details   acetaminophen (TYLENOL) 500 MG tablet Take 500 mg by mouth every 6 hours as needed for mild pain      Alum & Mag Hydroxide-Simeth (ALUMINUM-MAGNESIUM-SIMETHICONE) 200-200-20 MG/5ML SUSP Take 5 mLs by mouth daily as needed      ASPIRIN 81 MG OR TABS 1 TABLET EVERY MORNING      Benzethonium Chloride 0.1 % LIQD Cleanse with wound cleanser and apply barrier paste and hydrocolloid dressing, change every 2-3 days as needed  Qty: 237 mL, Refills: 3    Associated Diagnoses: Decubitus ulcer of buttock, unspecified laterality, unspecified ulcer stage      clindamycin (CLEOCIN T) 1 % lotion Apply twice daily for 6 weeks to the groin  Qty: 60 mL, Refills: 1    Associated Diagnoses: Rash      COMPRESSION STOCKINGS 1 each continuous prn Bilateral knee high compression stockings  Qty: 2 each, Refills: 0     "Associated Diagnoses: Acute congestive heart failure, unspecified congestive heart failure type (H)      Gauze Pads & Dressings (GAUZE BANDAGE 4\") MISC Cleanse with wound cleanser and apply barrier paste and hydrocolloid dressing, change every 2-3 days as needed  Qty: 5 each, Refills: 3    Associated Diagnoses: Decubitus ulcer of buttock, unspecified laterality, unspecified ulcer stage      hydrocortisone valerate (WEST-NINOSKA) 0.2 % ointment Apply sparingly to affected area three times daily as needed.  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)      prochlorperazine (COMPAZINE) 10 MG tablet Take 1 tablet (10 mg) by mouth every 6 hours as needed for nausea or vomiting  Qty: 30 tablet, Refills: 1    Associated Diagnoses: Nausea      zinc oxide - white petrolatum (CRITIC-AID THICK MOIST BARRIER) 20-51% PSTE topical paste Cleanse with wound cleanser and apply barrier paste and hydrocolloid dressing, change every 2-3 days as needed  Qty: 170 g, Refills: 3    Associated Diagnoses: Decubitus ulcer of buttock, unspecified laterality, unspecified ulcer stage      nitroGLYcerin (NITROSTAT) 0.4 MG sublingual tablet Place 1 tablet (0.4 mg) under the tongue every 5 minutes as needed up to 3 tablets per episode.  Qty: 60 tablet, Refills: 6    Associated Diagnoses: Chest pain due to myocardial ischemia, unspecified ischemic chest pain type (H); Coronary artery disease involving native coronary artery of native heart without angina pectoris         STOP taking these medications       allopurinol (ZYLOPRIM) 300 MG tablet Comments:   Reason for Stopping:         furosemide (LASIX) 20 MG tablet Comments:   Reason for Stopping:         " LANsoprazole (PREVACID) 15 MG CR capsule Comments:   Reason for Stopping:         Multiple Vitamin (MULTI-VITAMIN DAILY PO) Comments:   Reason for Stopping:         potassium chloride SA (K-DUR/KLOR-CON M) 10 MEQ CR tablet Comments:   Reason for Stopping:         rosuvastatin (CRESTOR) 40 MG tablet Comments:   Reason for Stopping:             Allergies   Allergies   Allergen Reactions     Niacin      Severe rash and itching     Shellfish Allergy Nausea and Vomiting     Scallops only     Prevacid [Lansoprazole] Diarrhea     Patient notes diarrhea with 30mg generic version. Patient does ok with 20mg in generic and brand name.     Data   Most Recent 3 CBC's:  Recent Labs   Lab Test  09/18/18 0424 09/17/18 2046 09/05/18   1047   WBC  12.4*  12.9*  15.1*   HGB  9.6*  10.1*  11.1*   MCV  100  101*  102*   PLT  97*  107*  187      Most Recent 3 BMP's:  Recent Labs   Lab Test  09/18/18 0424 09/17/18 2046 09/04/18   1340   NA  136  134  135   POTASSIUM  4.6  5.0  4.5   CHLORIDE  106  104  100   CO2  21  23  25   BUN  29  29  25   CR  1.00  1.11  1.10   ANIONGAP  9  7  10   EVANGELINA  7.7*  7.8*  8.2*   GLC  66*  75  104*     Most Recent 2 LFT's:  Recent Labs   Lab Test  09/18/18 0424 09/17/18 2046   AST  35  31   ALT  23  24   ALKPHOS  92  94   BILITOTAL  0.3  0.2     Most Recent INR's and Anticoagulation Dosing History:  Anticoagulation Dose History     Recent Dosing and Labs Latest Ref Rng & Units 4/10/2018 4/11/2018 8/8/2018 8/23/2018 8/28/2018 9/6/2018 9/17/2018    INR 0.86 - 1.14 1.2(H) 1.21(H) 0.94 1.05 1.07 0.95 1.03        Most Recent 3 Troponin's:  Recent Labs   Lab Test  08/27/18   1005  04/20/18   0956  03/20/14   1143   TROPI  <0.015  <0.015  8.380*     Most Recent Cholesterol Panel:  Recent Labs   Lab Test  08/14/17   0706   CHOL  153   LDL  81   HDL  32*   TRIG  202*     Most Recent 6 Bacteria Isolates From Any Culture (See EPIC Reports for Culture Details):  Recent Labs   Lab Test  09/07/18   1143   08/28/18   0917  08/28/18   0906  08/27/18   1430  08/24/18   1148  08/23/18   1952   CULT  No beta hemolytic Streptococcus Group A isolated  No growth  No growth  No growth  No growth  10,000 to 50,000 colonies/mL  Hafnia alvei  *     Most Recent TSH, T4 and A1c Labs:  Recent Labs   Lab Test  02/21/18   1240  08/14/17   0706   TSH   --   2.06   A1C  5.8  5.6     Results for orders placed or performed during the hospital encounter of 09/06/18   IR Chest Tube Repo/Repl Tunneled Bilateral    Narrative    Procedure date: 9/6/2018.    History: Patient with history of mental cell lymphoma and bilateral  pleural effusions, here for placement of bilateral thoracic pleurex  drainage catheters.    Preprocedure diagnosis: Mental cell lymphoma.    Postprocedure diagnosis: Same.    Procedure: Bilateral thoracic pleurex tube placement. Bilateral  therapeutic thoracenteses.    : Tyree Marcus PA-C.    Assist: None.    Nursing: Throughout the procedure the patient's vital signs and oxygen  saturations were continuously monitored by IR nursing staff under the  supervision of the attending physician, and remained stable.    Medications: Fentanyl 50mcg IV, 1% lidocaine 25 ml local anesthesia.    Face-to-face sedation time: 70 minutes.    Fluoroscopy time: 0.1 minutes.    IV contrast: None.    Consent: Informed written and verbal consent was obtained.    Procedure: Attention was first directed toward the left pleural  effusion. Patient was placed in the right lateral position on the  gurney. Ultrasound evaluation of the lateral left chest revealed a  large pleural effusion. The lateral and anterior  chest was prepped  and draped in usual sterile fashion. The area overlying the effusion  at the mid axillary line was anesthetized with 1% lidocaine, and a 1.5  cm incision was made with a #11 blade. Under ultrasound guidance, a 5  Nepali, 7 cm straight [place] catheter was advanced into the left pleural  space. Syringe aspiration  revealed  opaque pink pleural fluid, and the  catheter was capped. The lateral and anterior chest was then  anesthetized with 1% lidocaine, and a 1.5 cm incision was made at the  anterior axillary line with a #11 blade. The Pleurx catheter was cut  to length and connected to a tunneling device. The catheter was  tunneled from the anterior axillary line slightly inferior to the  puncture site, pulled through, and positioned so that the cuff was 1.5  cm deep to the catheter exit site. The catheter was uncapped, and,  under fluoroscopic guidance, a 0.035 Bentson wire was advanced through  the catheter and into the pleural space. Under fluoroscopic guidance,  the subcutaneous tissues were dilated with a 14 Bruneian dilator. The 14  Bruneian dilator was removed, and a 16 Bruneian dilator/peel-away sheath  was advanced over wire into the pleural space. The 16 Bruneian dilator  and wire were removed, a 16 Bruneian Pleurx catheter was advanced  through the peel-away sheath into the pleural space, and the peel-away  sheath was removed. Repeat fluoroscopic evaluation confirmed proper  placement within the left pleural space. Catheter was secured using a  single 2-0 Ethilon suture. The puncture site incision was closed with  a single subcutaneous 4-0 Monocryl suture, and Dermabond glue. 1,200  cc of fluid was rapidly removed using suction attached to the pleurex  tubing. The pleurex catheter was then capped. The area was cleansed  and appropriately dressed with a sterile bandage.  Attention was then directed toward the right pleural effusion. Patient  was placed in the left lateral position on the [place]. Ultrasound  evaluation of the lateral right chest revealed a large pleural  effusion. The lateral and anterior  chest was prepped and draped in  usual sterile fashion. The area overlying the effusion at the mid  axillary line was anesthetized with 1% lidocaine, and a 1.5 cm  incision was made with a #11 blade. Under ultrasound  guidance, a 5  Mauritian, 7 cm straight Yueh catheter was advanced into the right  pleural space. Syringe aspiration revealed  opaque serous pleural  fluid, and the catheter was capped. The lateral and anterior chest was  then anesthetized with 1% lidocaine, and a 1.5 cm incision was made at  the anterior axillary line with a #11 blade. The Pleurx catheter was  cut to length and connected to a tunneling device. The catheter was  tunneled from the anterior axillary line slightly inferior to the  puncture site, pulled through, and positioned so that the cuff was 1.5  cm deep to the catheter exit site. The catheter was uncapped, and,  under fluoroscopic guidance, a 0.035 Y[place]on wire was advanced through  the catheter and into the pleural space. Under fluoroscopic guidance,  a 16 Mauritian dilator/peel-away sheath was advanced over wire into the  pleural space. The 16 Mauritian dilator and wire were removed, a 16  Mauritian Pleurx catheter was advanced through the peel-away sheath into  the pleural space, and the peel-away sheath was removed. Repeat  fluoroscopic evaluation confirmed proper placement within the left  pleural space. Catheter was secured using a single 2-0 Ethilon suture.  The puncture site incision was closed with a single subcutaneous 4-0  Monocryl suture, and Dermabond glue. 1,200 cc of fluid was rapidly  removed using suction attached to the pleurex tubing. The pleurex  catheter was then capped. The area was cleansed and appropriately  dressed with a sterile bandage.  Images were saved throughout the procedure. The procedure was well  tolerated, with no immediate complications.    Estimated blood loss: Less than 5 cc.    Specimens: None.      Impression    Impression: Image guided placement of left-sided 16 Mauritian tunneled  thoracic Pleurx catheter. Left therapeutic thoracentesis, with 1200 cc  of opaque pink fluid removed from the left pleural space. Image guided  placement of right-sided 16 Mauritian tunneled  thoracic Pleurx catheter.  Right therapeutic thoracentesis, with 1200 cc of opaque serous fluid  removed from the right pleural space.    Follow-up plan: Patient transported to post care unit in stable  condition. Patient to followup with primary team. The catheter can be  used immediately in the home setting.    Attestation: The physician assistant (PA) who performed this procedure  and signed the above report is licensed to practice in the [facility] pursuant to [place] Statute 147A.09.  This includes meeting the  Statute and [place] Board of Medical Practice requirement of an  active Delegation Agreement, which documents delegation of services by  primary and alternate supervising physicians. All services rendered  are performed under a collaborative agreement with Dr. Jose Hardwick, Director of Interventional Radiology, [facility].    JEANETTE MARCUS PA-C   IR Paracentesis    Narrative    Procedure date: 9/6/2018:    1. Ultrasound guided therapeutic paracentesis.    Preop diagnosis: Ascites.    Postop diagnosis: Same.    Proceduralist: Tyree Marcus PA-C    Assist: None.    Attending on duty: Van Stuart MD    Medications: No intravenous sedation was administered. 1% lidocaine,  10 cc.  37.5 grams 25% albumin IV.     Nursing: The patient was continuously monitored by IR nursing staff  under my supervision. The patient remained stable throughout the  procedure.    PROCEDURE: The patient understood the limitations, alternatives, and  risks of the procedure and requested the procedure be performed. Both  written and oral consent were obtained.    The patient was identified by name and date of birth, and placed in  the recumbent position. The right lower quadrant was prepped and  draped in the usual sterile fashion. Ultrasound evaluation revealed a  large fluid collection at this location, and an image was saved. Color  ultrasound was used to assess the subcutaneous tissues. No  vessels  were identified in the region of planned puncture. The site overlying  the fluid was anesthetized with 1% buffered lidocaine. Under  ultrasound guidance, a 5 Yakut, 10 cm straight Yueh catheter was  advanced into the abdominal space, and an image was saved. Syringe  aspiration revealed initial return of opaque serous fluid. Catheter to  vacuum drainage, with 3600 cc ascites aspirated. Catheter removed.  Sterile dressing applied. Images were saved throughout the procedure.  Procedure was well tolerated, with no immediate complications.    Estimate blood loss: Less than 1 cc.    Specimens: None.      Impression    IMPRESSION: Ultrasound guided therapeutic paracentesis. Total of  3600  cc of opaque serous ascites aspirated.    PLAN: Followup per primary team.    Attestation: The physician assistant (PA) who performed this procedure  and signed the above report is licensed to practice in the [facility] pursuant to [place] Statute 147A.09.  This includes meeting the  Statute and [place] Board of Medical Practice requirement of an  active Delegation Agreement, which documents delegation of services by  primary and alternate supervising physicians. All services rendered  are performed under a collaborative agreement with Dr. Jose Hardwick, Director of Interventional Radiology, [facility].    JEANETTE STEELE PA-C

## 2018-09-19 NOTE — PROGRESS NOTES
CLINICAL NUTRITION SERVICES    Informed decision made to change pt s status to comfort care. Nutrition interventions discontinued and no further interventions planned at this time. RD can be consulted if needed.    RD signing off on 9/19/2018.    Segun Edmonds RD, LD  5C/BMT Dietitian  Pager: [phone]

## 2018-09-19 NOTE — PROGRESS NOTES
Social Work Services Discharge Note      Patient Name:  Francisco Javier Cortes     Anticipated Discharge Date:  9/19/18    Discharge Disposition:   LT:  [facility]   [address] [place] [address]  Main: [phone], Fax: [phone]  Admissions: [phone]  **For Nurse to Nurse, please call: [phone]**    Hospice: [facility] [phone]    Following MD:  Per facilities designation     Pre-Admission Screening (PAS) online form has been completed.  The Level of Care (LOC) is:  Determined  Confirmation Code is:  [id number]  Patient/caregiver informed of referral to [facility] Line for Pre-Admission Screening for skilled nursing facility (SNF) placement and to expect a phone call post discharge from SNF.     Additional Services/Equipment Arranged:  MANNY arranged for stretcher transport through [facility] (Ph: [phone]) for p/u today at 4PM with O2; Stretcher transport required r/t hospice and O2 needs. Pt/wife aware that this may not be covered by insurance and could potentially be a private fee. PCS Form completed.    Per Zulma in Admissions, they will require a downpayment of $5,000 upon arrival to LTC. Pt will be in a private room and they will waive the private room fee. Pt/wife aware of downpayment and have agreed to this.     Patient / Family response to discharge plan:  In agreement     Persons notified of above discharge plan:  Pt, Pt's, wife,  Nursing (Ann Watson20), [facility] (Zulma),  Hospice (Myah)    Staff Discharge Instructions:  MANNY faxed discharge orders.  Please print a packet and send with patient.   Please send medications with Pt from the discharge pharmacy.    CTS Handoff completed:  YES    PACO Lock, LGSW  7C Surgical Oncology Unit  - Covering for Unit 7D Hematology/Oncology  Phone: [phone]  Pager: [phone]

## 2018-09-20 NOTE — PROGRESS NOTES
[facility]  PRIMARY CARE PROVIDER AND CLINIC:  AndreaspaJovitaberenice ALVAREZ [address] / [place] [address]  Chief Complaint   Patient presents with     Hospital F/U     [facility] Medical Record Number:  [id number]    HPI:    Francisco Javier Cortes is a 78 year old  (1940),admitted to the [facility]  from Hospital  [facility].  Hospital stay 9/17/18 through 9/19/18.  Admitted to this facility for  nursing care and hospice.  HPI information obtained from: facility chart records, facility staff, patient report and [facility] chart review.  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 Of note, in the past month, has been seen at [facility] ED 9/15/18 for shortness of breath, [facility] 9/11/18 through 9/13/18 for weakness, [facility] 9/6/18 for bilateral pleurX placement and paracentesis and [facility] 8/27/18 through 8/28/18 for new onset atrial fibrillation with RVR, [facility] 8/23/18 through 8/24/18 for recurrent pleural effusions. During this hospitalization, Palliative Care was consulted, code status was changed to DNR/DNI and the decision was made to transition to  hospice.   Current issues are:      Complains of discomfort in his lungs. Reports he needs them drained. Feels he needs them drained every 3 days for comfort. Reports medications were not available last night when he got to the facility and he experienced heartburn. Has not eaten food for days. Reports hospice met with his wife yesterday. Feels his body is ready to pass, but his mind is not. Would like his wife contacted with concerns. Denies pain.  CODE STATUS/ADVANCE DIRECTIVES DISCUSSION:   DNR / DNI  Patient's living condition: lives with spouse    ALLERGIES:Niacin; Shellfish allergy; and Prevacid [lansoprazole]  PAST MEDICAL HISTORY:  has a past medical history of CAD (coronary artery disease) (1/09); Coronary artery disease involving native coronary artery of native heart without angina pectoris (12/22/2015); Dyslipidemia; Erectile dysfunction; GERD (gastroesophageal reflux disease); Hearing problem (One ear 1961); Hypertension; NSTEMI (non-ST elevated myocardial infarction) (H) (3/20/2014); Pneumonia; Prostate cancer (H); Status post percutaneous transluminal coronary angioplasty-Coronary angioplasty with STEPHEN to LCx (4/4/2014); and Stented coronary artery. He also has no past medical history of Arthritis; Congestive heart failure, unspecified; COPD (chronic obstructive pulmonary disease) (H); CVA (cerebral infarction); Depressive disorder; Diabetes (H); History of blood transfusion; Thyroid disease; or Uncomplicated asthma.  PAST SURGICAL HISTORY:  has a past surgical history that includes APPENDECTOMY; REMV PROSTATE,RETROPUB,RAD,TOT NODES (1999); colonoscopy; ENT surgery (1980--1999); vascular surgery (2013); stent, coronary, betty (1/09, 2014); Phacoemulsification clear cornea with standard intraocular lens implant (Left, 6/18/2015); Phacoemulsification clear cornea with standard intraocular lens implant (Right, 7/16/2015); Colonoscopy with CO2 insufflation (N/A, 7/5/2016); Colonoscopy (Left, 7/5/2016);  cataract iol, rt/lt; Tympanoplasty; Biopsy lymph node cervical (Left, 3/22/2018); Thoracentesis (Bilateral, 8/8/2018); and Thoracentesis (Bilateral, 8/31/2018).  FAMILY HISTORY: family history includes Asthma in his brother; Cancer in his brother, daughter, father, and maternal grandfather; Cancer - colorectal in his father; Cardiovascular in his mother; Coronary Artery Disease in his brother; HEART DISEASE in his brother, father, mother, paternal grandmother, and son; Hypertension in his brother, brother, father, paternal grandmother, sister, and son; Muscular Disorder in his maternal grandfather; Other Cancer in his father; Prostate Cancer in his brother and maternal grandfather.  SOCIAL HISTORY:  reports that he has never smoked. He has never used smokeless tobacco. He reports that he drinks alcohol. He reports that he does not use illicit drugs.    Post Discharge Medication Reconciliation Status: discharge medications reconciled and changed, per note/orders (see AVS).  Current Outpatient Prescriptions   Medication Sig Dispense Refill     acetaminophen (TYLENOL) 500 MG tablet Take 500 mg by mouth every 6 hours as needed for mild pain       acetaminophen (TYLENOL) 650 MG Suppository Place 1 suppository (650 mg) rectally every 4 hours as needed for fever 2 suppository prn     Alum & Mag Hydroxide-Simeth (ALUMINUM-MAGNESIUM-SIMETHICONE) 200-200-20 MG/5ML SUSP Take 5 mLs by mouth daily as needed       ASPIRIN 81 MG OR TABS 1 TABLET EVERY MORNING       atropine 1 % SOLN Place 2-4 drops under the tongue every 2 hours as needed for secretions 5 mL prn     Benzethonium Chloride 0.1 % LIQD Cleanse with wound cleanser and apply barrier paste and hydrocolloid dressing, change every 2-3 days as needed 237 mL 3     bisacodyl (DULCOLAX) 10 MG Suppository Place 1 suppository (10 mg) rectally daily as needed for constipation 2 suppository prn     clindamycin (CLEOCIN T) 1 % lotion Apply twice daily for 6 weeks to the groin 60 mL  "1     COMPRESSION STOCKINGS 1 each continuous prn Bilateral knee high compression stockings 2 each 0     Gauze Pads & Dressings (GAUZE BANDAGE 4\") MISC Cleanse with wound cleanser and apply barrier paste and hydrocolloid dressing, change every 2-3 days as needed 5 each 3     haloperidol (HALDOL) 2 MG/ML (HIGH CONC) solution Take 0.25-0.5 mLs (0.5-1 mg) by mouth 2 times daily 15 mL prn     hydrocortisone valerate (WEST-NINOSKA) 0.2 % ointment Apply sparingly to affected area three times daily as needed. 45 g 3     lidocaine-prilocaine (EMLA) cream Apply 1 Application topically as needed       LORazepam (ATIVAN) 0.5 MG tablet Take 1 tablet (0.5 mg) by mouth every 4 hours as needed (Anxiety, Nausea/Vomiting or Sleep) 30 tablet 5     morphine sulfate, high concentrate, (ROXANOL-CONCENTRATED) 20 MG/ML concentrated solution Take 0.125-0.25 mLs (2.5-5 mg) by mouth every 2 hours as needed for shortness of breath / dyspnea or breakthrough pain 30 mL 0     nitroGLYcerin (NITROSTAT) 0.4 MG sublingual tablet Place 1 tablet (0.4 mg) under the tongue every 5 minutes as needed up to 3 tablets per episode. 60 tablet 6     ondansetron (ZOFRAN) 8 MG tablet Take 1 tablet (8 mg) by mouth every 8 hours as needed (Nausea/Vomiting) 30 tablet prn     order for DME Equipment being ordered: wheeled walker with seat 1 Device 0     prochlorperazine (COMPAZINE) 10 MG tablet Take 1 tablet (10 mg) by mouth every 6 hours as needed for nausea or vomiting 30 tablet 1     sennosides (SENOKOT) 8.6 MG tablet Take 1 tablet by mouth 2 times daily       simethicone (MYLICON) 80 MG chewable tablet Take 1-2 tablets ( mg) by mouth every 6 hours as needed for cramping 180 tablet 3     zinc oxide - white petrolatum (CRITIC-AID THICK MOIST BARRIER) 20-51% PSTE topical paste Cleanse with wound cleanser and apply barrier paste and hydrocolloid dressing, change every 2-3 days as needed 170 g 3     ROS:  10 point ROS of systems including Constitutional, Eyes, " Respiratory, Cardiovascular, Gastroenterology, Genitourinary, Integumentary, Muscularskeletal, Psychiatric were all negative except for pertinent positives noted in my HPI.    Exam:  /77  Pulse 110  Temp 97.7  F (36.5  C)  Resp 20  Wt 128 lb 4.8 oz (58.2 kg)  SpO2 99%  BMI 21.35 kg/m2  GENERAL APPEARANCE:  Alert, in no distress  ENT:  Mouth and posterior oropharynx normal, moist mucous membranes  EYES:  EOM, conjunctivae, lids, pupils and irises normal  RESP:  respiratory effort and palpation of chest normal, lungs clear to auscultation , no respiratory distress  CV:  Palpation and auscultation of heart done , regular rate and rhythm, no murmur, rub, or gallop  ABDOMEN:  normal bowel sounds, soft, nontender, no hepatosplenomegaly or other masses  M/S:   Active movement of bilateral upper extremities. No edema.  SKIN:  Dressing in place on left side of chest. No drainage.  NEURO:   Cranial nerves 2-12 are normal tested and grossly at patient's baseline  PSYCH:  Frustrated    Lab/Diagnostic data:  Lab Results   Component Value Date    WBC 12.4 09/18/2018     Lab Results   Component Value Date    RBC 2.90 09/18/2018     Lab Results   Component Value Date    HGB 9.6 09/18/2018     Lab Results   Component Value Date    HCT 29.0 09/18/2018     Lab Results   Component Value Date     09/18/2018     Lab Results   Component Value Date    MCH 33.1 09/18/2018     Lab Results   Component Value Date    MCHC 33.1 09/18/2018     Lab Results   Component Value Date    RDW 17.3 09/18/2018     Lab Results   Component Value Date    PLT 97 09/18/2018     Last Comprehensive Metabolic Panel:  Sodium   Date Value Ref Range Status   09/18/2018 136 133 - 144 mmol/L Final     Potassium   Date Value Ref Range Status   09/18/2018 4.6 3.4 - 5.3 mmol/L Final     Chloride   Date Value Ref Range Status   09/18/2018 106 94 - 109 mmol/L Final     Carbon Dioxide   Date Value Ref Range Status   09/18/2018 21 20 - 32 mmol/L Final      Anion Gap   Date Value Ref Range Status   09/18/2018 9 3 - 14 mmol/L Final     Glucose   Date Value Ref Range Status   09/18/2018 66 (L) 70 - 99 mg/dL Final     Urea Nitrogen   Date Value Ref Range Status   09/18/2018 29 7 - 30 mg/dL Final     Creatinine   Date Value Ref Range Status   09/18/2018 1.00 0.66 - 1.25 mg/dL Final     GFR Estimate   Date Value Ref Range Status   09/18/2018 72 >60 mL/min/1.7m2 Final     Comment:     Non  GFR Calc     Calcium   Date Value Ref Range Status   09/18/2018 7.7 (L) 8.5 - 10.1 mg/dL Final     ASSESSMENT/PLAN:  Mantle Cell Lymphoma. Followed by Oncology, Dr. Adan. Given poor prognosis, challenging second line therapy and overall weakness and decline, the decision was made to transition to [facility]. [facility] met with patient today. Will manage bilateral pleurX for recurrent chylous pleural effusions with order to drain every 2 days as needed. Furosemide discontinued in hospital due to hypotension. Today, 1000 ml was drained by hospice RN. EMLA cream available as needed for comfort with insertions. Requiring supplemental Oxygen. Atropine, Morphine Sulfate, Lorazepam, Prochlorperazine, Ondansetron and Haloperidol ordered for end of life. Also has Acetaminophen available PRN.     Severe Malnutrition. Poor oral intake secondary to above. Weight 140-150 lbs in August -> 128 lbs on 9/20/18. Enrolled in [facility].     Gastroesophageal Reflux Disease. Per Hospice RN, patient reports Prevacid be re-initiated for comfort. Simethicone and Mylanta also available as needed.    Coronary Artery Disease/Atrial Fibrillation. Atrial fibrillation new diagnosis 8/27/18. Remains on Aspirin and Nitroglycerin. Continue to work with hospice to reduce polypharmacy with goal of comfort.     Chronic Diarrhea. Per history. Incontinent of bowel and bladder over the past few weeks. Patient reports frequent stools, but not diarrhea. Continue Senna as ordered for now.  May need to discontinue if staff reports loose stools.     Sacral Wound. Did not visualize during today's examination. Facility staff to continue wound management as outlined by [facility].     Total time spent with patient visit at the skilled nursing facility was 35 min including patient visit, review of past records and phone call to patient contact. Greater than 50% of total time spent with counseling and coordinating care due to review of past medical history, discussion with patient and phone call to hospice    Electronically signed by:  MARTHA Rico CNP

## 2018-09-20 NOTE — PROGRESS NOTES
Hospital Discharge Care Coordination Note - Oncology    Date of admission: 9/17/18    Date of discharge: 9/19/18    Discharge diagnosis: Mantle Cell Lymphoma, dehydration, severe malnutrition, bilateral pleural effusions, sacral wounds, incontinence of bladder/bowel.     Facility of discharge: [facility]    Discharge disposition: Patient was discharged to [facility] ([place], [place]) with hospice services (patient had a code change to DNR/DNI while inpatient).    Patient concerns about medications: None.  Medications will now be managed by [facility]ospice Team.     Patient concerns about transitioning: No concerns at this time.     Clinic office visit appointment date: Not applicable - no further follow up at this time, as patient transitioned to hospice cares.     Using the date of discharge as day 1, the 30th day post discharge is 10/18/18.     Cam Conner, RN, BSN, OCN  Oncology Care Coordinator  [facility]

## 2018-09-20 NOTE — LETTER
9/20/2018        RE: Francisco Javier Cortes  [address] [facility] [id number]        [facility]  PRIMARY CARE PROVIDER AND CLINIC:  Florian Alonzo [address] / [place] [address]  Chief Complaint   Patient presents with     Hospital F/U     [place] Medical Record Number:  [id number]    HPI:    Francisco Javier Cortes is a 78 year old  (1940),admitted to the [facility]  from Hospital  [facility].  Hospital stay 9/17/18 through 9/19/18.  Admitted to this facility for  nursing care and hospice.  HPI information obtained from: facility chart records, facility staff, patient report and [facility] chart review.  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 Of note, in the past month, has been seen at [facility] ED 9/15/18 for shortness of breath, [facility] 9/11/18 through 9/13/18 for weakness, [facility] 9/6/18 for bilateral pleurX placement and paracentesis and [facility] 8/27/18 through 8/28/18 for new onset atrial fibrillation with RVR, [facility] 8/23/18 through 8/24/18 for recurrent pleural effusions. During this hospitalization, Palliative  Care was consulted, code status was changed to DNR/DNI and the decision was made to transition to hospice.   Current issues are:      Complains of discomfort in his lungs. Reports he needs them drained. Feels he needs them drained every 3 days for comfort. Reports medications were not available last night when he got to the facility and he experienced heartburn. Has not eaten food for days. Reports hospice met with his wife yesterday. Feels his body is ready to pass, but his mind is not. Would like his wife contacted with concerns. Denies pain.  CODE STATUS/ADVANCE DIRECTIVES DISCUSSION:   DNR / DNI  Patient's living condition: lives with spouse    ALLERGIES:Niacin; Shellfish allergy; and Prevacid [lansoprazole]  PAST MEDICAL HISTORY:  has a past medical history of CAD (coronary artery disease) (1/09); Coronary artery disease involving native coronary artery of native heart without angina pectoris (12/22/2015); Dyslipidemia; Erectile dysfunction; GERD (gastroesophageal reflux disease); Hearing problem (One ear 1961); Hypertension; NSTEMI (non-ST elevated myocardial infarction) (H) (3/20/2014); Pneumonia; Prostate cancer (H); Status post percutaneous transluminal coronary angioplasty-Coronary angioplasty with STEPHEN to LCx (4/4/2014); and Stented coronary artery. He also has no past medical history of Arthritis; Congestive heart failure, unspecified; COPD (chronic obstructive pulmonary disease) (H); CVA (cerebral infarction); Depressive disorder; Diabetes (H); History of blood transfusion; Thyroid disease; or Uncomplicated asthma.  PAST SURGICAL HISTORY:  has a past surgical history that includes APPENDECTOMY; REMV PROSTATE,RETROPUB,RAD,TOT NODES (1999); colonoscopy; ENT surgery (1980--1999); vascular surgery (2013); stent, coronary, betty (1/09, 2014); Phacoemulsification clear cornea with standard intraocular lens implant (Left, 6/18/2015); Phacoemulsification clear cornea with standard intraocular lens implant (Right,  7/16/2015); Colonoscopy with CO2 insufflation (N/A, 7/5/2016); Colonoscopy (Left, 7/5/2016); cataract iol, rt/lt; Tympanoplasty; Biopsy lymph node cervical (Left, 3/22/2018); Thoracentesis (Bilateral, 8/8/2018); and Thoracentesis (Bilateral, 8/31/2018).  FAMILY HISTORY: family history includes Asthma in his brother; Cancer in his brother, daughter, father, and maternal grandfather; Cancer - colorectal in his father; Cardiovascular in his mother; Coronary Artery Disease in his brother; HEART DISEASE in his brother, father, mother, paternal grandmother, and son; Hypertension in his brother, brother, father, paternal grandmother, sister, and son; Muscular Disorder in his maternal grandfather; Other Cancer in his father; Prostate Cancer in his brother and maternal grandfather.  SOCIAL HISTORY:  reports that he has never smoked. He has never used smokeless tobacco. He reports that he drinks alcohol. He reports that he does not use illicit drugs.    Post Discharge Medication Reconciliation Status: discharge medications reconciled and changed, per note/orders (see AVS).  Current Outpatient Prescriptions   Medication Sig Dispense Refill     acetaminophen (TYLENOL) 500 MG tablet Take 500 mg by mouth every 6 hours as needed for mild pain       acetaminophen (TYLENOL) 650 MG Suppository Place 1 suppository (650 mg) rectally every 4 hours as needed for fever 2 suppository prn     Alum & Mag Hydroxide-Simeth (ALUMINUM-MAGNESIUM-SIMETHICONE) 200-200-20 MG/5ML SUSP Take 5 mLs by mouth daily as needed       ASPIRIN 81 MG OR TABS 1 TABLET EVERY MORNING       atropine 1 % SOLN Place 2-4 drops under the tongue every 2 hours as needed for secretions 5 mL prn     Benzethonium Chloride 0.1 % LIQD Cleanse with wound cleanser and apply barrier paste and hydrocolloid dressing, change every 2-3 days as needed 237 mL 3     bisacodyl (DULCOLAX) 10 MG Suppository Place 1 suppository (10 mg) rectally daily as needed for constipation 2  "suppository prn     clindamycin (CLEOCIN T) 1 % lotion Apply twice daily for 6 weeks to the groin 60 mL 1     COMPRESSION STOCKINGS 1 each continuous prn Bilateral knee high compression stockings 2 each 0     Gauze Pads & Dressings (GAUZE BANDAGE 4\") MISC Cleanse with wound cleanser and apply barrier paste and hydrocolloid dressing, change every 2-3 days as needed 5 each 3     haloperidol (HALDOL) 2 MG/ML (HIGH CONC) solution Take 0.25-0.5 mLs (0.5-1 mg) by mouth 2 times daily 15 mL prn     hydrocortisone valerate (WEST-NINOSKA) 0.2 % ointment Apply sparingly to affected area three times daily as needed. 45 g 3     lidocaine-prilocaine (EMLA) cream Apply 1 Application topically as needed       LORazepam (ATIVAN) 0.5 MG tablet Take 1 tablet (0.5 mg) by mouth every 4 hours as needed (Anxiety, Nausea/Vomiting or Sleep) 30 tablet 5     morphine sulfate, high concentrate, (ROXANOL-CONCENTRATED) 20 MG/ML concentrated solution Take 0.125-0.25 mLs (2.5-5 mg) by mouth every 2 hours as needed for shortness of breath / dyspnea or breakthrough pain 30 mL 0     nitroGLYcerin (NITROSTAT) 0.4 MG sublingual tablet Place 1 tablet (0.4 mg) under the tongue every 5 minutes as needed up to 3 tablets per episode. 60 tablet 6     ondansetron (ZOFRAN) 8 MG tablet Take 1 tablet (8 mg) by mouth every 8 hours as needed (Nausea/Vomiting) 30 tablet prn     order for DME Equipment being ordered: wheeled walker with seat 1 Device 0     prochlorperazine (COMPAZINE) 10 MG tablet Take 1 tablet (10 mg) by mouth every 6 hours as needed for nausea or vomiting 30 tablet 1     sennosides (SENOKOT) 8.6 MG tablet Take 1 tablet by mouth 2 times daily       simethicone (MYLICON) 80 MG chewable tablet Take 1-2 tablets ( mg) by mouth every 6 hours as needed for cramping 180 tablet 3     zinc oxide - white petrolatum (CRITIC-AID THICK MOIST BARRIER) 20-51% PSTE topical paste Cleanse with wound cleanser and apply barrier paste and hydrocolloid dressing, " change every 2-3 days as needed 170 g 3     ROS:  10 point ROS of systems including Constitutional, Eyes, Respiratory, Cardiovascular, Gastroenterology, Genitourinary, Integumentary, Muscularskeletal, Psychiatric were all negative except for pertinent positives noted in my HPI.    Exam:  /77  Pulse 110  Temp 97.7  F (36.5  C)  Resp 20  Wt 128 lb 4.8 oz (58.2 kg)  SpO2 99%  BMI 21.35 kg/m2  GENERAL APPEARANCE:  Alert, in no distress  ENT:  Mouth and posterior oropharynx normal, moist mucous membranes  EYES:  EOM, conjunctivae, lids, pupils and irises normal  RESP:  respiratory effort and palpation of chest normal, lungs clear to auscultation , no respiratory distress  CV:  Palpation and auscultation of heart done , regular rate and rhythm, no murmur, rub, or gallop  ABDOMEN:  normal bowel sounds, soft, nontender, no hepatosplenomegaly or other masses  M/S:   Active movement of bilateral upper extremities. No edema.  SKIN:  Dressing in place on left side of chest. No drainage.  NEURO:   Cranial nerves 2-12 are normal tested and grossly at patient's baseline  PSYCH:  Frustrated    Lab/Diagnostic data:  Lab Results   Component Value Date    WBC 12.4 09/18/2018     Lab Results   Component Value Date    RBC 2.90 09/18/2018     Lab Results   Component Value Date    HGB 9.6 09/18/2018     Lab Results   Component Value Date    HCT 29.0 09/18/2018     Lab Results   Component Value Date     09/18/2018     Lab Results   Component Value Date    MCH 33.1 09/18/2018     Lab Results   Component Value Date    MCHC 33.1 09/18/2018     Lab Results   Component Value Date    RDW 17.3 09/18/2018     Lab Results   Component Value Date    PLT 97 09/18/2018     Last Comprehensive Metabolic Panel:  Sodium   Date Value Ref Range Status   09/18/2018 136 133 - 144 mmol/L Final     Potassium   Date Value Ref Range Status   09/18/2018 4.6 3.4 - 5.3 mmol/L Final     Chloride   Date Value Ref Range Status   09/18/2018 106 94 -  109 mmol/L Final     Carbon Dioxide   Date Value Ref Range Status   09/18/2018 21 20 - 32 mmol/L Final     Anion Gap   Date Value Ref Range Status   09/18/2018 9 3 - 14 mmol/L Final     Glucose   Date Value Ref Range Status   09/18/2018 66 (L) 70 - 99 mg/dL Final     Urea Nitrogen   Date Value Ref Range Status   09/18/2018 29 7 - 30 mg/dL Final     Creatinine   Date Value Ref Range Status   09/18/2018 1.00 0.66 - 1.25 mg/dL Final     GFR Estimate   Date Value Ref Range Status   09/18/2018 72 >60 mL/min/1.7m2 Final     Comment:     Non  GFR Calc     Calcium   Date Value Ref Range Status   09/18/2018 7.7 (L) 8.5 - 10.1 mg/dL Final     ASSESSMENT/PLAN:  Mantle Cell Lymphoma. Followed by Oncology, Dr. Adan. Given poor prognosis, challenging second line therapy and overall weakness and decline, the decision was made to transition to [facility]. [facility] met with patient today. Will manage bilateral pleurX for recurrent chylous pleural effusions with order to drain every 2 days as needed. Furosemide discontinued in hospital due to hypotension. Today, 1000 ml was drained by hospice RN. EMLA cream available as needed for comfort with insertions. Requiring supplemental Oxygen. Atropine, Morphine Sulfate, Lorazepam, Prochlorperazine, Ondansetron and Haloperidol ordered for end of life. Also has Acetaminophen available PRN.     Severe Malnutrition. Poor oral intake secondary to above. Weight 140-150 lbs in August -> 128 lbs on 9/20/18. Enrolled in [facility].     Gastroesophageal Reflux Disease. Per Hospice RN, patient reports Prevacid be re-initiated for comfort. Simethicone and Mylanta also available as needed.    Coronary Artery Disease/Atrial Fibrillation. Atrial fibrillation new diagnosis 8/27/18. Remains on Aspirin and Nitroglycerin. Continue to work with hospice to reduce polypharmacy with goal of comfort.     Chronic Diarrhea. Per history. Incontinent of bowel and bladder over the  past few weeks. Patient reports frequent stools, but not diarrhea. Continue Senna as ordered for now. May need to discontinue if staff reports loose stools.     Sacral Wound. Did not visualize during today's examination. Facility staff to continue wound management as outlined by [facility].     Total time spent with patient visit at the skilled nursing facility was 35 min including patient visit, review of past records and phone call to patient contact. Greater than 50% of total time spent with counseling and coordinating care due to review of past medical history, discussion with patient and phone call to hospice    Electronically signed by:  MARTHA Rico CNP                    Sincerely,        MARTHA Rico CNP

## 2018-09-25 ENCOUNTER — CARE COORDINATION (OUTPATIENT)
Dept: ONCOLOGY | Facility: CLINIC | Age: 78
End: 2018-09-25

## 2018-09-28 DIAGNOSIS — Z53.9 DIAGNOSIS NOT YET DEFINED: Primary | ICD-10-CM

## 2018-10-31 ENCOUNTER — MEDICAL CORRESPONDENCE (OUTPATIENT)
Dept: HEALTH INFORMATION MANAGEMENT | Facility: CLINIC | Age: 78
End: 2018-10-31

## 2019-10-24 NOTE — PATIENT INSTRUCTIONS
Dear Francisco Javier/Court,     It was a pleasure talking with you today regarding your medications and medical concerns. The following is a summary of what we discussed.     1. FYI-- Please continue your current medications as is --BUT please wear your Oxygen now 24/7 now to keep your daily Oxygen level >90% and that help keeps your heart rate in low 100's and takes pressure off your heart .     Follow-up with your doctors next week.    Thank you for your time and please feel free to call ([phone] voicemail/pager or [phone] clinic main line) or e-mail ([email or web address]) with any questions.     Prieto Gallagher Rph.  Medication Therapy Management Provider  [phone]     oral

## 2019-11-19 NOTE — PROGRESS NOTES
Call placed to patient to verify schedule and plan for upcoming appointments.  Reviewed plan for repeat labs and possible infusion of IV fluids this coming Thursday 4/5/18, along with repeat labs and visit with Dr. Adan on Monday 4/9/18.  Patient verbalizes understanding, and is in agreement with this plan.  He will also plan to review appointment details via his [employer] portal.  Encouraged patient to call with any questions or concerns.    Cam Conner, RN, BSN, OCN  Oncology Care Coordinator  [facility]  
Detail Level: Zone

## 2020-09-15 NOTE — IP AVS SNAPSHOT
[address] [address]    Phone:  [phone]    Fax:  [phone]                                       After Visit Summary   8/23/2018    Francisco Javier Cortes    MRN: [id number]           After Visit Summary Signature Page     I have received my discharge instructions, and my questions have been answered. I have discussed any challenges I see with this plan with the nurse or doctor.    ..........................................................................................................................................  Patient/Patient Representative Signature      ..........................................................................................................................................  Patient Representative Print Name and Relationship to Patient    ..................................................               ................................................  Date                                            Time    ..........................................................................................................................................  Reviewed by Signature/Title    ...................................................              ..............................................  Date                                                            Time          22EPIC Rev 08/18         15-Sep-2020 16:52

## 2021-10-16 NOTE — TELEPHONE ENCOUNTER
[facility] Patient's wife calling to talk. She provided recap of symptoms prior to bringing to hospital 4/10/18. Expressing her concern that he is not doing well. She was told he has pneumonia and an infection in his blood.  She is wondering if this is a normal occurrence with someone on chemo.  Writer allowed wife to vent and express concerns. Reassured Court he is in good hands at the hospital and when he is discharged he will follow up within a day or two with Dr Adan or Joy ZEPEDA. Patient expressed gratitude for listening to her. Encouraged her to call when she needs.  Zhane Barth  RN, BSN, OCN

## 2023-02-22 NOTE — PLAN OF CARE
Problem: Palliative Care (Adult)  Goal: Identify Related Risk Factors and Signs and Symptoms  Related risk factors and signs and symptoms are identified upon initiation of Human Response Clinical Practice Guideline (CPG).    09/19/18 5469   Palliative Care   Palliative Care: Related Risk Factors advanced age;chronic illness;terminal illness;worsening symptoms   Palliative Care: Signs and Symptoms constipation;breathlessness;fatigue       Problem: Patient Care Overview  Goal: Plan of Care/Patient Progress Review  Francisco Javier has pain from his sacral wound that he takes tylenol for, he says this helps some and he is not interested in anything stronger at this point.  Wounds were cleaned and covered with creams per wound nurse recommendations.  He had been constipated but has had two stools today.  No incontinence.  Bilateral pleurex drains with dressings intact, they were drained yesterday and he usually has them drained every three days.  Two pleurex drain and dressing kits were sent with his wife who will bring them to the nursing home.  Francisco Javier is unsteady on his feet and needs an assist when up to the commode, bed alarm on for safety.  He drank orange crush and coffee today and ate a rice krispie bar.  He has been sleeping between cares and is aware of the plan to transport him to [facility] at 4 pm via [employer] stretcher.        
Problem: Palliative Care (Adult)  Goal: Identify Related Risk Factors and Signs and Symptoms  Related risk factors and signs and symptoms are identified upon initiation of Human Response Clinical Practice Guideline (CPG).   Outcome: No Change       09/18/18 0400 09/19/18 0523   Palliative Care   Palliative Care: Related Risk Factors chronic illness --    Palliative Care: Signs and Symptoms --  constipation;fatigue       Problem: Patient Care Overview  Goal: Plan of Care/Patient Progress Review  Outcome: No Change  Pt on comfort cares. A&Ox4. Pt has pain in sacral wound area. Refuses positioning with pillows. Took 500mg tylenol with little to no relief. Up with SBA. On bed alarm due to falls risk. Pt is frustrated by alarms, would like to be able to get up independently. Extreme COLE. However while resting is comfortable on 3L NC. Pt has expressed sadness about current situation, but is realistic and appropriate regarding plan of care. No other complaints, continue to monitor.     09/19/18 0523   OTHER   Plan Of Care Reviewed With patient   Plan of Care Review   Progress no change         
Problem: Patient Care Overview  Goal: Plan of Care/Patient Progress Review  [id number]  BP soft 80s/60s. . Sating 96% on 2L. A&Ox4.  LS diminished in bases. Fine crackles on R. Bilateral plerex. Pt drains q3days. Drained today. 500ml NS bolus given. Maintenance fluid NS@50ml/hr via Port. Up with assist of 1 to commode.      
Problem: Patient Care Overview  Goal: Plan of Care/Patient Progress Review  Outcome: Completed Date Met: 09/19/18  [facility]. Pt left with discharge medications sealed with signature and personal care items including pleurex drain kits. [facility] to give to oncoming facility nurse. Discharged at 1615.      
Problem: Patient Care Overview  Goal: Plan of Care/Patient Progress Review  Outcome: No Change  Assumed cares from 09/17/2018 at 19:30 to 09/18/2019 at 07:30 AM Pt was hypotensive, 79/56 Tachycardic. HO was notified and gave 500 ML NS flush to be given. Post flush BP was 95/64. BP continue to be soft again after the flush HO notified and he ordered another 500 NS flush to be given over 2 hours. Pt seems tired/Lethargic but A/o X 3 TO 4. Bilateral drains clamped and needs to be drain today. Pt said he draains them every 3 days. BG this morning was 66 and D50 25 ML given with BG recheck 92. Cortisol level done and was 34.9. Pt has open Coccyx and groins are red. Anus area excroriated. Wound nurse to see pt and give recomendtion for treatment. Mepilex applied to coccyx wound for now. Pt was placed on falls precaution based on weakness. Bed alarm in use. Continue to monitor pt and continue with plan of care.   09/18/18 0721   OTHER   Plan Of Care Reviewed With patient   Plan of Care Review   Progress no change         
alice baldwin

## 2025-01-14 NOTE — PLAN OF CARE
Problem: Diabetes Comorbidity  Intervention: Optimize Glycemic Control  /73 (BP Location: Right arm)  Pulse 117  Temp 97.1  F (36.2  C) (Axillary)  Resp 18  SpO2 94% Pt is NPO for possible drain placement. A/O x4. HR continue to be tachy, BP WNL Pt continue to have low Blood sugar, 66 via glucometer, 25 ML of D50 IV given and rechecked was 90. BG on AM lab was 76. BG done at 0715 was 90 73 this morning. HO notified with low BG 66 and ordered to increased IVF D5W + NS 0.9/L from 40 ml/hour to 50 ML/hr. Pt is voiding small amounts, 100 ML each void. Abdomen is large but soft. Pt has stationary SOB, 02 3L NC with good saturations, 95 % via NC.Denies any pain/N/V/D. Continue to monitor pt and continue with plan of care.           cancer/diabetes

## (undated) DEVICE — GLOVE PROTEXIS BLUE W/NEU-THERA 6.5  2D73EB65

## (undated) DEVICE — STOPCOCK ANGIO 1-WAY MARQUIS MLL  H1RC

## (undated) DEVICE — SU SILK 3-0 TIE 12X30" A304H

## (undated) DEVICE — KNIFE HANDLE W/15 BLADE 371615

## (undated) DEVICE — NDL YUEH CENTESIS 5FRX10CM G09490 DTVN-5.0-19-10.0-YUEH

## (undated) DEVICE — SU VICRYL 3-0 SH 8X18" UND J864D

## (undated) DEVICE — GOWN XLG DISP 9545

## (undated) DEVICE — SYR 50ML LL W/O NDL 309653

## (undated) DEVICE — PACK NEURO MINOR UMMC SNE32MNMU4

## (undated) DEVICE — DRSG PRIMAPORE 02X3" 7133

## (undated) DEVICE — PREP CHLORAPREP W/ORANGE TINT 10.5ML 260715

## (undated) DEVICE — GLOVE PROTEXIS POWDER FREE SMT 7.5  2D72PT75X

## (undated) DEVICE — LINEN GOWN XLG 5407

## (undated) DEVICE — SU ETHILON 3-0 PS-1 18" 1663H

## (undated) DEVICE — LINEN TOWEL PACK X5 5464

## (undated) DEVICE — GLOVE PROTEXIS MICRO 6.0  2D73PM60

## (undated) DEVICE — SUCTION MANIFOLD DORNOCH ULTRA CART UL-CL500

## (undated) DEVICE — DRAIN JACKSON PRATT RESERVOIR 100ML SU130-1305

## (undated) DEVICE — CONNECTOR STOPCOCK 3 WAY MALE LL MX4311L

## (undated) DEVICE — CLIP HORIZON MED BLUE 002200

## (undated) DEVICE — RETR ELASTIC STAYS LONE STAR BLUNT DUAL LEAD 3550-1G

## (undated) DEVICE — Device

## (undated) DEVICE — ESU ELEC NDL 1" E1552

## (undated) DEVICE — GLOVE PROTEXIS POWDER FREE SMT 7.0  2D72PT70X

## (undated) DEVICE — SUCTION SLEEVE NEPTUNE 2 125MM 0703-005-125

## (undated) DEVICE — DRSG TEGADERM 2 3/8X2 3/4" 1624W

## (undated) DEVICE — TUBING MEDRAD 48" HIGH PRESSURE MX694

## (undated) DEVICE — CONNECTOR MALE TO MALE LL

## (undated) DEVICE — ESU PENCIL SMOKE EVAC W/ROCKER SWITCH 0703-047-000

## (undated) DEVICE — ESU GROUND PAD ADULT W/CORD E7507

## (undated) DEVICE — PREP CHLORAPREP 26ML TINTED ORANGE  260815

## (undated) DEVICE — GLOVE PROTEXIS POWDER FREE SMT 6.5  2D72PT65X

## (undated) DEVICE — SPONGE KITTNER 30-101

## (undated) DEVICE — SOL ADH LIQUID BENZOIN SWAB 0.6ML C1544

## (undated) DEVICE — CLIP HORIZON SM RED WIDE SLOT 001201

## (undated) DEVICE — SU MONOCRYL 4-0 RB-1 27" Y214H

## (undated) DEVICE — NDL 15GA 1.5" 8881200029

## (undated) DEVICE — SU SILK 2-0 TIE 12X30" A305H

## (undated) DEVICE — DRSG STERI STRIP 1/2X4" R1547

## (undated) DEVICE — STIMULATOR NERVE NEURO-PULSE SURGICAL LOCATOR 30968-220

## (undated) DEVICE — SYR 30ML LL W/O NDL 302832

## (undated) DEVICE — DRAIN JACKSON PRATT 10FR ROUND SU130-1321

## (undated) RX ORDER — SODIUM CHLORIDE 9 MG/ML
INJECTION, SOLUTION INTRAVENOUS
Status: DISPENSED
Start: 2018-01-01

## (undated) RX ORDER — LIDOCAINE HYDROCHLORIDE 10 MG/ML
INJECTION, SOLUTION EPIDURAL; INFILTRATION; INTRACAUDAL; PERINEURAL
Status: DISPENSED
Start: 2018-01-01

## (undated) RX ORDER — ALBUMIN (HUMAN) 12.5 G/50ML
SOLUTION INTRAVENOUS
Status: DISPENSED
Start: 2018-01-01

## (undated) RX ORDER — HEPARIN SODIUM (PORCINE) LOCK FLUSH IV SOLN 100 UNIT/ML 100 UNIT/ML
SOLUTION INTRAVENOUS
Status: DISPENSED
Start: 2018-01-01

## (undated) RX ORDER — LIDOCAINE HYDROCHLORIDE AND EPINEPHRINE 10; 10 MG/ML; UG/ML
INJECTION, SOLUTION INFILTRATION; PERINEURAL
Status: DISPENSED
Start: 2018-01-01

## (undated) RX ORDER — DIPHENHYDRAMINE HYDROCHLORIDE 50 MG/ML
INJECTION INTRAMUSCULAR; INTRAVENOUS
Status: DISPENSED
Start: 2018-01-01

## (undated) RX ORDER — ALBUMIN, HUMAN INJ 5% 5 %
SOLUTION INTRAVENOUS
Status: DISPENSED
Start: 2018-01-01

## (undated) RX ORDER — FENTANYL CITRATE 50 UG/ML
INJECTION, SOLUTION INTRAMUSCULAR; INTRAVENOUS
Status: DISPENSED
Start: 2018-01-01

## (undated) RX ORDER — ONDANSETRON 2 MG/ML
INJECTION INTRAMUSCULAR; INTRAVENOUS
Status: DISPENSED
Start: 2018-01-01

## (undated) RX ORDER — DEXAMETHASONE SODIUM PHOSPHATE 4 MG/ML
INJECTION, SOLUTION INTRA-ARTICULAR; INTRALESIONAL; INTRAMUSCULAR; INTRAVENOUS; SOFT TISSUE
Status: DISPENSED
Start: 2018-01-01

## (undated) RX ORDER — LIDOCAINE HYDROCHLORIDE 10 MG/ML
INJECTION, SOLUTION INFILTRATION; PERINEURAL
Status: DISPENSED
Start: 2018-01-01

## (undated) RX ORDER — GLYCOPYRROLATE 0.2 MG/ML
INJECTION, SOLUTION INTRAMUSCULAR; INTRAVENOUS
Status: DISPENSED
Start: 2018-01-01

## (undated) RX ORDER — PROPOFOL 10 MG/ML
INJECTION, EMULSION INTRAVENOUS
Status: DISPENSED
Start: 2018-01-01